# Patient Record
Sex: FEMALE | Race: BLACK OR AFRICAN AMERICAN | NOT HISPANIC OR LATINO | Employment: OTHER | URBAN - METROPOLITAN AREA
[De-identification: names, ages, dates, MRNs, and addresses within clinical notes are randomized per-mention and may not be internally consistent; named-entity substitution may affect disease eponyms.]

---

## 2018-02-12 ENCOUNTER — HISTORICAL (OUTPATIENT)
Dept: ADMINISTRATIVE | Facility: HOSPITAL | Age: 83
End: 2018-02-12

## 2018-02-14 LAB
HCT VFR BLD AUTO: 23.1 % (ref 36–48)
HGB BLD-MCNC: 7.5 G/DL (ref 12–15)
MCH RBC QN AUTO: 31 PG (ref 25–35)
MCHC RBC AUTO-ENTMCNC: 32.5 G/DL (ref 31–36)
MCV RBC AUTO: 95.5 FL (ref 79–98)
NUCLEATED RBCS: 0 %
PLATELET # BLD AUTO: 91 K/UL (ref 140–440)
RBC # BLD AUTO: 2.42 M/UL (ref 3.5–5.5)
WBC # BLD AUTO: 11.1 K/UL (ref 5–10)

## 2018-02-23 ENCOUNTER — OFFICE VISIT (OUTPATIENT)
Dept: ORTHOPEDICS | Facility: CLINIC | Age: 83
End: 2018-02-23
Payer: MEDICARE

## 2018-02-23 VITALS
DIASTOLIC BLOOD PRESSURE: 46 MMHG | SYSTOLIC BLOOD PRESSURE: 88 MMHG | HEIGHT: 63 IN | BODY MASS INDEX: 22.15 KG/M2 | HEART RATE: 83 BPM | WEIGHT: 125 LBS

## 2018-02-23 DIAGNOSIS — S72.142D CLOSED DISPLACED INTERTROCHANTERIC FRACTURE OF LEFT FEMUR WITH ROUTINE HEALING, SUBSEQUENT ENCOUNTER: Primary | ICD-10-CM

## 2018-02-23 PROCEDURE — 99024 POSTOP FOLLOW-UP VISIT: CPT | Mod: ,,, | Performed by: ORTHOPAEDIC SURGERY

## 2018-02-23 RX ORDER — ALLOPURINOL 300 MG/1
1 TABLET ORAL DAILY
Status: ON HOLD | COMMUNITY
Start: 2017-11-27 | End: 2018-06-11 | Stop reason: HOSPADM

## 2018-02-23 RX ORDER — FUROSEMIDE 20 MG/1
1 TABLET ORAL DAILY
Status: ON HOLD | COMMUNITY
Start: 2017-11-27 | End: 2018-06-11 | Stop reason: HOSPADM

## 2018-02-23 RX ORDER — HYDROCODONE BITARTRATE AND ACETAMINOPHEN 5; 325 MG/1; MG/1
1 TABLET ORAL EVERY 4 HOURS PRN
COMMUNITY
Start: 2018-02-14 | End: 2018-05-27

## 2018-02-23 RX ORDER — POTASSIUM CHLORIDE 750 MG/1
1 TABLET, EXTENDED RELEASE ORAL DAILY
Status: ON HOLD | COMMUNITY
Start: 2017-11-27 | End: 2018-06-11 | Stop reason: HOSPADM

## 2018-02-23 RX ORDER — LOSARTAN POTASSIUM 25 MG/1
1 TABLET ORAL DAILY
Status: ON HOLD | COMMUNITY
Start: 2017-11-27 | End: 2018-06-11 | Stop reason: HOSPADM

## 2018-02-23 RX ORDER — CLONIDINE HYDROCHLORIDE 0.1 MG/1
1 TABLET ORAL 2 TIMES DAILY
Status: ON HOLD | COMMUNITY
Start: 2017-11-27 | End: 2018-06-11

## 2018-02-23 RX ORDER — PRAVASTATIN SODIUM 40 MG/1
1 TABLET ORAL DAILY
Status: ON HOLD | COMMUNITY
Start: 2017-11-27 | End: 2018-06-11 | Stop reason: HOSPADM
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
## 2018-03-16 ENCOUNTER — OFFICE VISIT (OUTPATIENT)
Dept: ORTHOPEDICS | Facility: CLINIC | Age: 83
End: 2018-03-16
Payer: MEDICARE

## 2018-03-16 VITALS
SYSTOLIC BLOOD PRESSURE: 128 MMHG | BODY MASS INDEX: 22.15 KG/M2 | DIASTOLIC BLOOD PRESSURE: 60 MMHG | WEIGHT: 125 LBS | HEIGHT: 63 IN | HEART RATE: 65 BPM

## 2018-03-16 DIAGNOSIS — G89.18 POST-OPERATIVE PAIN: Primary | ICD-10-CM

## 2018-03-16 DIAGNOSIS — S72.142D CLOSED DISPLACED INTERTROCHANTERIC FRACTURE OF LEFT FEMUR WITH ROUTINE HEALING, SUBSEQUENT ENCOUNTER: ICD-10-CM

## 2018-03-16 DIAGNOSIS — Z98.890 POST-OPERATIVE STATE: ICD-10-CM

## 2018-03-16 PROCEDURE — 73502 X-RAY EXAM HIP UNI 2-3 VIEWS: CPT | Mod: FY,LT,, | Performed by: ORTHOPAEDIC SURGERY

## 2018-03-16 PROCEDURE — 99024 POSTOP FOLLOW-UP VISIT: CPT | Mod: ,,, | Performed by: ORTHOPAEDIC SURGERY

## 2018-03-16 NOTE — PROGRESS NOTES
Subjective:       Chief Complaint    Chief Complaint   Patient presents with    Post-op Evaluation     4 weeks & 4 days, left hip.  DOS:  2/12/18, reduction & internal fixation of displaced closed comminuted intertrochanteric fx left hip.  DOI: approx. 2/2/18, due to a fall.  Patient's daughter states the patient has had pain approx. 1 week to the point where she can't bear weight.  Before she was doing P.T. and moving around fine.  Using rolling walker like a wheelchair today.       HPI  Sury Cobos is a 83 y.o.  female who presentsFollow-up post TFN fixation intertrochanteric fracture left hip. Patient had a malunion deformity of the left femur prior to sustaining the hip fracture. We're able to get a short TFN nail in position. Overall, she seems to be doing well.       Past History  Past Medical History:   Diagnosis Date    Closed intertrochanteric fracture of left hip     Low potassium syndrome      Past Surgical History:   Procedure Laterality Date    reduction & internal fixation of displaced closed comminuted intertrochanteric fx left hip  02/12/2018     Social History     Social History    Marital status: Single     Spouse name: N/A    Number of children: N/A    Years of education: N/A     Occupational History    Not on file.     Social History Main Topics    Smoking status: Current Every Day Smoker     Packs/day: 3.00    Smokeless tobacco: Never Used    Alcohol use Yes      Comment: socially    Drug use: No    Sexual activity: Not on file     Other Topics Concern    Not on file     Social History Narrative    No narrative on file         Medications  Current Outpatient Prescriptions   Medication Sig    allopurinol (ZYLOPRIM) 300 MG tablet Take 1 tablet by mouth once daily.    cloNIDine (CATAPRES) 0.1 MG tablet Take 1 tablet by mouth once daily.     furosemide (LASIX) 20 MG tablet Take 1 tablet by mouth once daily.    hydrocodone-acetaminophen 5-325mg (NORCO) 5-325 mg per tablet Take 1  tablet by mouth every 4 (four) hours as needed.    losartan (COZAAR) 25 MG tablet Take 1 tablet by mouth once daily.    potassium chloride (KLOR-CON) 10 MEQ TbSR Take 1 tablet by mouth once daily.    pravastatin (PRAVACHOL) 40 MG tablet Take 1 tablet by mouth once daily.     No current facility-administered medications for this visit.        Allergies  Review of patient's allergies indicates:  No Known Allergies      Review of Systems     Constitutional: Negative    HENT: Negative  Eyes: Negative  Respiratory: Negative  Cardiovascular: Negative  Musculoskeletal: HPI  Skin: Negative  Neurological: Negative  Hematological: Negative  Endocrine: Negative      Physical Exam    Vitals:    03/16/18 1347   BP: 128/60   Pulse: 65     Physical Examination: There is no tenderness over the anterior hip. Rotation is painless. Anterolateral malunion deformity is evident at the middle third femur     Skin-clear  General appearance -  well appearing, and in no distress  Mental status - awake  Neck - supple  Chest -  symmetric air entry  Heart - normal rate   Abdomen - soft      Assessment/Plan   Post-operative pain  -     X-Ray Hip 2 or 3 views Left    Closed displaced intertrochanteric fracture of left femur with routine healing, subsequent encounter    Post-operative state        Patient is receiving excellent care from her daughter. With whom she lives.     This note was dictated using voice recognition software and may contain grammatical errors.

## 2018-05-27 ENCOUNTER — HOSPITAL ENCOUNTER (EMERGENCY)
Facility: HOSPITAL | Age: 83
Discharge: ANOTHER HOSPITAL | End: 2018-05-27
Attending: INTERNAL MEDICINE
Payer: MEDICARE

## 2018-05-27 ENCOUNTER — HOSPITAL ENCOUNTER (INPATIENT)
Facility: HOSPITAL | Age: 83
LOS: 15 days | Discharge: HOME HEALTH | DRG: 064 | End: 2018-06-11
Attending: PSYCHIATRY & NEUROLOGY | Admitting: PSYCHIATRY & NEUROLOGY
Payer: MEDICARE

## 2018-05-27 VITALS
BODY MASS INDEX: 19.84 KG/M2 | RESPIRATION RATE: 18 BRPM | DIASTOLIC BLOOD PRESSURE: 64 MMHG | SYSTOLIC BLOOD PRESSURE: 151 MMHG | WEIGHT: 112 LBS | OXYGEN SATURATION: 100 % | HEART RATE: 74 BPM

## 2018-05-27 DIAGNOSIS — R13.10 DYSPHAGIA, UNSPECIFIED TYPE: ICD-10-CM

## 2018-05-27 DIAGNOSIS — I63.411 EMBOLIC STROKE INVOLVING RIGHT MIDDLE CEREBRAL ARTERY: ICD-10-CM

## 2018-05-27 DIAGNOSIS — E78.00 HIGH CHOLESTEROL: ICD-10-CM

## 2018-05-27 DIAGNOSIS — R41.82 ALTERED MENTAL STATUS: ICD-10-CM

## 2018-05-27 DIAGNOSIS — I51.7 ATRIAL ENLARGEMENT, LEFT: ICD-10-CM

## 2018-05-27 DIAGNOSIS — I63.00 CEREBROVASCULAR ACCIDENT (CVA) DUE TO THROMBOSIS OF PRECEREBRAL ARTERY: Primary | ICD-10-CM

## 2018-05-27 DIAGNOSIS — Z79.899 LONG TERM CURRENT USE OF ANTIPSYCHOTIC MEDICATION: ICD-10-CM

## 2018-05-27 DIAGNOSIS — I48.91 NEW ONSET A-FIB: ICD-10-CM

## 2018-05-27 DIAGNOSIS — R47.1 DYSARTHRIA: ICD-10-CM

## 2018-05-27 DIAGNOSIS — R47.01 APHASIA: ICD-10-CM

## 2018-05-27 DIAGNOSIS — I10 BENIGN ESSENTIAL HTN: ICD-10-CM

## 2018-05-27 DIAGNOSIS — I63.9 ISCHEMIC STROKE: ICD-10-CM

## 2018-05-27 DIAGNOSIS — G93.40 ENCEPHALOPATHY: ICD-10-CM

## 2018-05-27 DIAGNOSIS — R41.82 ALTERED MENTAL STATUS, UNSPECIFIED ALTERED MENTAL STATUS TYPE: Primary | ICD-10-CM

## 2018-05-27 DIAGNOSIS — I63.9 STROKE: ICD-10-CM

## 2018-05-27 DIAGNOSIS — I63.511 ACUTE ISCHEMIC RIGHT MCA STROKE: ICD-10-CM

## 2018-05-27 PROBLEM — S72.142D CLOSED DISPLACED INTERTROCHANTERIC FRACTURE OF LEFT FEMUR WITH ROUTINE HEALING: Status: RESOLVED | Noted: 2018-02-23 | Resolved: 2018-05-27

## 2018-05-27 PROBLEM — F03.918 DEMENTIA WITH BEHAVIORAL DISTURBANCE: Status: ACTIVE | Noted: 2018-05-27

## 2018-05-27 PROBLEM — Z98.890 POST-OPERATIVE STATE: Status: RESOLVED | Noted: 2018-03-16 | Resolved: 2018-05-27

## 2018-05-27 PROBLEM — Z92.82 S/P ADMN TPA IN DIFF FAC W/N LAST 24 HR BEF ADM TO CRNT FAC: Status: ACTIVE | Noted: 2018-03-16

## 2018-05-27 PROBLEM — G89.18 POST-OPERATIVE PAIN: Status: RESOLVED | Noted: 2018-03-16 | Resolved: 2018-05-27

## 2018-05-27 LAB
ABO + RH BLD: NORMAL
ALBUMIN SERPL BCP-MCNC: 4.1 G/DL
ALP SERPL-CCNC: 67 U/L
ALT SERPL W/O P-5'-P-CCNC: 11 U/L
ANION GAP SERPL CALC-SCNC: 14 MMOL/L
AST SERPL-CCNC: 21 U/L
BASOPHILS # BLD AUTO: 0.09 K/UL
BASOPHILS NFR BLD: 1.2 %
BILIRUB SERPL-MCNC: 0.3 MG/DL
BLD GP AB SCN CELLS X3 SERPL QL: NORMAL
BUN SERPL-MCNC: 10 MG/DL
CALCIUM SERPL-MCNC: 9.5 MG/DL
CHLORIDE SERPL-SCNC: 103 MMOL/L
CHOLEST SERPL-MCNC: 197 MG/DL
CHOLEST/HDLC SERPL: 3.3 {RATIO}
CK MB SERPL-MCNC: 2.6 NG/ML
CK MB SERPL-RTO: 2.4 %
CK SERPL-CCNC: 109 U/L
CK SERPL-CCNC: 109 U/L
CO2 SERPL-SCNC: 29 MMOL/L
CREAT SERPL-MCNC: 0.6 MG/DL
DIFFERENTIAL METHOD: ABNORMAL
EOSINOPHIL # BLD AUTO: 0.2 K/UL
EOSINOPHIL NFR BLD: 2.5 %
ERYTHROCYTE [DISTWIDTH] IN BLOOD BY AUTOMATED COUNT: 13.5 %
EST. GFR  (AFRICAN AMERICAN): >60 ML/MIN/1.73 M^2
EST. GFR  (NON AFRICAN AMERICAN): >60 ML/MIN/1.73 M^2
ESTIMATED AVG GLUCOSE: 111 MG/DL
GLUCOSE SERPL-MCNC: 157 MG/DL
GLUCOSE SERPL-MCNC: 161 MG/DL (ref 70–110)
HBA1C MFR BLD HPLC: 5.5 %
HCT VFR BLD AUTO: 37.9 %
HDLC SERPL-MCNC: 59 MG/DL
HDLC SERPL: 29.9 %
HGB BLD-MCNC: 12.6 G/DL
IMM GRANULOCYTES # BLD AUTO: 0.01 K/UL
IMM GRANULOCYTES NFR BLD AUTO: 0.1 %
INR PPP: 1
LDLC SERPL CALC-MCNC: 110.8 MG/DL
LYMPHOCYTES # BLD AUTO: 3.6 K/UL
LYMPHOCYTES NFR BLD: 49.4 %
MCH RBC QN AUTO: 30.4 PG
MCHC RBC AUTO-ENTMCNC: 33.2 G/DL
MCV RBC AUTO: 91 FL
MONOCYTES # BLD AUTO: 0.5 K/UL
MONOCYTES NFR BLD: 6.6 %
NEUTROPHILS # BLD AUTO: 2.9 K/UL
NEUTROPHILS NFR BLD: 40.2 %
NONHDLC SERPL-MCNC: 138 MG/DL
NRBC BLD-RTO: 0 /100 WBC
PLATELET # BLD AUTO: 102 K/UL
PMV BLD AUTO: 12.6 FL
POCT GLUCOSE: 150 MG/DL (ref 70–110)
POCT GLUCOSE: 175 MG/DL (ref 70–110)
POTASSIUM SERPL-SCNC: 2.6 MMOL/L
PROT SERPL-MCNC: 7.6 G/DL
PROTHROMBIN TIME: 11.8 SEC
RBC # BLD AUTO: 4.15 M/UL
SODIUM SERPL-SCNC: 146 MMOL/L
TRIGL SERPL-MCNC: 136 MG/DL
TROPONIN I SERPL DL<=0.01 NG/ML-MCNC: 0.07 NG/ML
TSH SERPL DL<=0.005 MIU/L-ACNC: 1.64 UIU/ML
WBC # BLD AUTO: 7.31 K/UL

## 2018-05-27 PROCEDURE — 71045 X-RAY EXAM CHEST 1 VIEW: CPT | Mod: TC,FY

## 2018-05-27 PROCEDURE — 70450 CT HEAD/BRAIN W/O DYE: CPT | Mod: TC

## 2018-05-27 PROCEDURE — 93005 ELECTROCARDIOGRAM TRACING: CPT

## 2018-05-27 PROCEDURE — 82550 ASSAY OF CK (CPK): CPT

## 2018-05-27 PROCEDURE — 84100 ASSAY OF PHOSPHORUS: CPT

## 2018-05-27 PROCEDURE — 96365 THER/PROPH/DIAG IV INF INIT: CPT

## 2018-05-27 PROCEDURE — 63600175 PHARM REV CODE 636 W HCPCS: Mod: JW,JG

## 2018-05-27 PROCEDURE — 80061 LIPID PANEL: CPT

## 2018-05-27 PROCEDURE — G8987 SELF CARE CURRENT STATUS: HCPCS | Mod: CL

## 2018-05-27 PROCEDURE — 25500020 PHARM REV CODE 255: Performed by: PSYCHIATRY & NEUROLOGY

## 2018-05-27 PROCEDURE — 20000000 HC ICU ROOM

## 2018-05-27 PROCEDURE — 85025 COMPLETE CBC W/AUTO DIFF WBC: CPT

## 2018-05-27 PROCEDURE — 84443 ASSAY THYROID STIM HORMONE: CPT

## 2018-05-27 PROCEDURE — 25000003 PHARM REV CODE 250: Performed by: PHYSICIAN ASSISTANT

## 2018-05-27 PROCEDURE — 93010 ELECTROCARDIOGRAM REPORT: CPT | Mod: ,,, | Performed by: INTERNAL MEDICINE

## 2018-05-27 PROCEDURE — 94761 N-INVAS EAR/PLS OXIMETRY MLT: CPT

## 2018-05-27 PROCEDURE — 84484 ASSAY OF TROPONIN QUANT: CPT | Mod: 91

## 2018-05-27 PROCEDURE — 70450 CT HEAD/BRAIN W/O DYE: CPT | Mod: 26,,, | Performed by: RADIOLOGY

## 2018-05-27 PROCEDURE — 85610 PROTHROMBIN TIME: CPT

## 2018-05-27 PROCEDURE — 86850 RBC ANTIBODY SCREEN: CPT

## 2018-05-27 PROCEDURE — 80053 COMPREHEN METABOLIC PANEL: CPT

## 2018-05-27 PROCEDURE — 83036 HEMOGLOBIN GLYCOSYLATED A1C: CPT

## 2018-05-27 PROCEDURE — 96374 THER/PROPH/DIAG INJ IV PUSH: CPT | Mod: 59

## 2018-05-27 PROCEDURE — 96366 THER/PROPH/DIAG IV INF ADDON: CPT

## 2018-05-27 PROCEDURE — 82553 CREATINE MB FRACTION: CPT

## 2018-05-27 PROCEDURE — 63600175 PHARM REV CODE 636 W HCPCS: Performed by: INTERNAL MEDICINE

## 2018-05-27 PROCEDURE — 82553 CREATINE MB FRACTION: CPT | Mod: 91

## 2018-05-27 PROCEDURE — 84484 ASSAY OF TROPONIN QUANT: CPT

## 2018-05-27 PROCEDURE — 71045 X-RAY EXAM CHEST 1 VIEW: CPT | Mod: 26,,, | Performed by: RADIOLOGY

## 2018-05-27 PROCEDURE — 99221 1ST HOSP IP/OBS SF/LOW 40: CPT | Mod: ,,, | Performed by: PHYSICIAN ASSISTANT

## 2018-05-27 PROCEDURE — G8989 SELF CARE D/C STATUS: HCPCS | Mod: CL

## 2018-05-27 PROCEDURE — A4216 STERILE WATER/SALINE, 10 ML: HCPCS | Performed by: PHYSICIAN ASSISTANT

## 2018-05-27 PROCEDURE — 99291 CRITICAL CARE FIRST HOUR: CPT | Mod: 25

## 2018-05-27 PROCEDURE — 37195 THROMBOLYTIC THERAPY STROKE: CPT

## 2018-05-27 PROCEDURE — 99285 EMERGENCY DEPT VISIT HI MDM: CPT | Mod: 25

## 2018-05-27 PROCEDURE — 82962 GLUCOSE BLOOD TEST: CPT

## 2018-05-27 PROCEDURE — 83735 ASSAY OF MAGNESIUM: CPT

## 2018-05-27 PROCEDURE — G0427 INPT/ED TELECONSULT70: HCPCS | Mod: GT,,, | Performed by: PSYCHIATRY & NEUROLOGY

## 2018-05-27 PROCEDURE — G8988 SELF CARE GOAL STATUS: HCPCS | Mod: CK

## 2018-05-27 PROCEDURE — 99223 1ST HOSP IP/OBS HIGH 75: CPT | Mod: ,,, | Performed by: NURSE PRACTITIONER

## 2018-05-27 PROCEDURE — 82550 ASSAY OF CK (CPK): CPT | Mod: 91

## 2018-05-27 RX ORDER — DONEPEZIL HYDROCHLORIDE 5 MG/1
10 TABLET, FILM COATED ORAL NIGHTLY
Status: DISCONTINUED | OUTPATIENT
Start: 2018-05-28 | End: 2018-06-05

## 2018-05-27 RX ORDER — ATORVASTATIN CALCIUM 20 MG/1
40 TABLET, FILM COATED ORAL DAILY
Status: DISCONTINUED | OUTPATIENT
Start: 2018-05-28 | End: 2018-05-27

## 2018-05-27 RX ORDER — HYDROCODONE BITARTRATE AND ACETAMINOPHEN 7.5; 325 MG/1; MG/1
1 TABLET ORAL EVERY 12 HOURS PRN
Status: ON HOLD | COMMUNITY
End: 2018-06-11 | Stop reason: HOSPADM

## 2018-05-27 RX ORDER — MAGNESIUM SULFATE/D5W 2 G/50 ML
2 INTRAVENOUS SOLUTION, PIGGYBACK (ML) INTRAVENOUS ONCE
Status: DISCONTINUED | OUTPATIENT
Start: 2018-05-27 | End: 2018-05-27

## 2018-05-27 RX ORDER — BUPROPION HYDROCHLORIDE 75 MG/1
150 TABLET ORAL 2 TIMES DAILY
Status: DISCONTINUED | OUTPATIENT
Start: 2018-05-27 | End: 2018-05-27

## 2018-05-27 RX ORDER — ATORVASTATIN CALCIUM 20 MG/1
40 TABLET, FILM COATED ORAL DAILY
Status: DISCONTINUED | OUTPATIENT
Start: 2018-05-28 | End: 2018-06-05

## 2018-05-27 RX ORDER — DONEPEZIL HYDROCHLORIDE 10 MG/1
10 TABLET, FILM COATED ORAL NIGHTLY
Status: ON HOLD | COMMUNITY
End: 2018-06-11

## 2018-05-27 RX ORDER — BUPROPION HYDROCHLORIDE 150 MG/1
150 TABLET, EXTENDED RELEASE ORAL 2 TIMES DAILY
Status: ON HOLD | COMMUNITY
End: 2018-06-11 | Stop reason: HOSPADM

## 2018-05-27 RX ORDER — POTASSIUM CHLORIDE 14.9 MG/ML
20 INJECTION INTRAVENOUS
Status: COMPLETED | OUTPATIENT
Start: 2018-05-27 | End: 2018-05-27

## 2018-05-27 RX ORDER — HYDRALAZINE HYDROCHLORIDE 20 MG/ML
10 INJECTION INTRAMUSCULAR; INTRAVENOUS EVERY 4 HOURS PRN
Status: DISCONTINUED | OUTPATIENT
Start: 2018-05-27 | End: 2018-06-05

## 2018-05-27 RX ORDER — BUPROPION HYDROCHLORIDE 75 MG/1
150 TABLET ORAL 2 TIMES DAILY
Status: DISCONTINUED | OUTPATIENT
Start: 2018-05-28 | End: 2018-06-05

## 2018-05-27 RX ORDER — SODIUM CHLORIDE 0.9 % (FLUSH) 0.9 %
3 SYRINGE (ML) INJECTION EVERY 8 HOURS
Status: DISCONTINUED | OUTPATIENT
Start: 2018-05-27 | End: 2018-06-03

## 2018-05-27 RX ORDER — LABETALOL HYDROCHLORIDE 5 MG/ML
10 INJECTION, SOLUTION INTRAVENOUS
Status: DISCONTINUED | OUTPATIENT
Start: 2018-05-27 | End: 2018-06-11 | Stop reason: HOSPADM

## 2018-05-27 RX ORDER — DONEPEZIL HYDROCHLORIDE 5 MG/1
10 TABLET, FILM COATED ORAL NIGHTLY
Status: DISCONTINUED | OUTPATIENT
Start: 2018-05-27 | End: 2018-05-27

## 2018-05-27 RX ORDER — ASPIRIN 81 MG/1
81 TABLET ORAL DAILY
Status: ON HOLD | COMMUNITY
End: 2018-06-11 | Stop reason: HOSPADM

## 2018-05-27 RX ORDER — DEXMEDETOMIDINE HYDROCHLORIDE 4 UG/ML
0.2 INJECTION, SOLUTION INTRAVENOUS CONTINUOUS
Status: DISCONTINUED | OUTPATIENT
Start: 2018-05-27 | End: 2018-05-28

## 2018-05-27 RX ADMIN — DEXMEDETOMIDINE HYDROCHLORIDE 0.2 MCG/KG/HR: 4 INJECTION, SOLUTION INTRAVENOUS at 07:05

## 2018-05-27 RX ADMIN — IOHEXOL 100 ML: 350 INJECTION, SOLUTION INTRAVENOUS at 06:05

## 2018-05-27 RX ADMIN — ALTEPLASE 41.2 MG: KIT at 04:05

## 2018-05-27 RX ADMIN — Medication 3 ML: at 11:05

## 2018-05-27 RX ADMIN — POTASSIUM CHLORIDE 20 MEQ: 14.9 INJECTION, SOLUTION INTRAVENOUS at 04:05

## 2018-05-27 NOTE — ED NOTES
Bed: 05  Expected date: 5/27/18  Expected time: 4:31 PM  Means of arrival:   Comments:  TPA Transfer

## 2018-05-27 NOTE — HPI
84 y/o female LKN at 1320. Ambulating and normal. ~ 1445, found altered - mute with left hemiparesis and confusion. Unable to follow commands.

## 2018-05-27 NOTE — ED PROVIDER NOTES
Encounter Date: 5/27/2018       History     Chief Complaint   Patient presents with    Altered Mental Status     Patient was alert talking walking today with family, daughter went to run and errand and when returned found mother slumped over and unable to talk or move her left side.   No prior CVA. No complaints of headache. Hx of HBP, T2DM, and high cholesterol. Glucose on arrival was 161. Brought directly to the hospital.   Elapsed time from last seen to arrival about 45 min (tops). On arrival, dense left hemiparesis with faint reponsiveness and expressive aphasia.   After CT improved (daughter also agrees) with movement on left side and improved responsiveness. Able to talk but still faint and with left facial weakness.   Neurological consult performed with Dr. Marx, agrees with CVA and proceed to TPA.           Review of patient's allergies indicates:  No Known Allergies  Past Medical History:   Diagnosis Date    Closed intertrochanteric fracture of left hip     Diabetes mellitus     High cholesterol     Low potassium syndrome      Past Surgical History:   Procedure Laterality Date    CARDIAC SURGERY      reduction & internal fixation of displaced closed comminuted intertrochanteric fx left hip  02/12/2018     No family history on file.  Social History   Substance Use Topics    Smoking status: Current Every Day Smoker     Packs/day: 3.00    Smokeless tobacco: Never Used    Alcohol use Yes      Comment: socially     Review of Systems   Constitutional: Positive for activity change.   HENT: Positive for trouble swallowing.    Musculoskeletal: Positive for gait problem.   All other systems reviewed and are negative.      Physical Exam     Initial Vitals [05/27/18 1523]   BP Pulse Resp Temp SpO2   (!) 179/109 86 17 -- --      MAP       132.33         Physical Exam    Nursing note and vitals reviewed.  Constitutional: Vital signs are normal. She appears well-developed and well-nourished. She appears  listless. She is active and cooperative.   HENT:   Head: Normocephalic and atraumatic.   Eyes: Conjunctivae, EOM and lids are normal. Pupils are equal, round, and reactive to light. Lids are everted and swept, no foreign bodies found.   Neck: Trachea normal, normal range of motion and full passive range of motion without pain. Neck supple. No JVD present.   Cardiovascular: Normal rate, regular rhythm, S1 normal, S2 normal, normal heart sounds, intact distal pulses and normal pulses.  No extrasystoles are present.   Pulmonary/Chest: Breath sounds normal.   Abdominal: Soft. Normal appearance and bowel sounds are normal.   Musculoskeletal: Normal range of motion.   Lymphadenopathy:     She has no cervical adenopathy.   Neurological: She has normal reflexes. She appears listless. A cranial nerve deficit and sensory deficit is present. She exhibits abnormal muscle tone. Coordination abnormal. GCS eye subscore is 2. GCS verbal subscore is 2. GCS motor subscore is 3.   Initial dense left hemiparesis with expressive aphasia, improved from baseline at this writing.    Skin: Skin is warm, dry and intact. Capillary refill takes less than 2 seconds.   Psychiatric: She has a normal mood and affect. Her speech is normal and behavior is normal. Cognition and memory are normal.         ED Course   Procedures  Labs Reviewed   CBC W/ AUTO DIFFERENTIAL - Abnormal; Notable for the following:        Result Value    Platelets 102 (*)     Lymph% 49.4 (*)     All other components within normal limits   POCT GLUCOSE MONITORING CONTINUOUS - Abnormal; Notable for the following:     POC Glucose 161 (*)     All other components within normal limits   PROTIME-INR   COMPREHENSIVE METABOLIC PANEL   TSH   LIPID PANEL     EKG Readings: (Independently Interpreted)   Initial Reading: No STEMI. Rhythm: Normal Sinus Rhythm. Ectopy: Bigeminy. Conduction: Normal. ST Segments: Non-Specific ST Segment Depression. Axis: Normal. Clinical Impression: Normal  Sinus Rhythm and Myocardial Infarction       X-Rays:   Independently Interpreted Readings:   Head CT: No hemorrhage.  No skull fracture.  No acute stroke.     Medical Decision Making:   Clinical Tests:   Lab Tests: Ordered and Reviewed  Radiological Study: Ordered and Reviewed  ED Management:  Consultation with Dr. Marx agrees acute CVA (duration 2 hours), within window. Proceed with TPA.   Transfer to [facility] arranged.                       Clinical Impression:   The primary encounter diagnosis was Cerebrovascular accident (CVA) due to thrombosis of precerebral artery. A diagnosis of Stroke was also pertinent to this visit.    Disposition:   Disposition: Transferred  Reason for referral: Neurological evaluation and support                        Trell Tucker MD  05/27/18 [id number]

## 2018-05-27 NOTE — ASSESSMENT & PLAN NOTE
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  Antithrombotics for secondary stroke prevention: Antiplatelets: Aspirin: 81 mg daily  Clopidogrel: 75 mg daily    Statins for secondary stroke prevention and hyperlipidemia, if present:   Statins: Atorvastatin- 40 mg daily    Aggressive risk factor modification: HTN, DM, HLD     Rehab efforts: PT/OT/SLP to evaluate and treat    Diagnostics ordered/pending: CTA Head to assess vasculature , CTA Neck/Arch to assess vasculature, HgbA1C to assess blood glucose levels, Lipid Profile to assess cholesterol levels, MRI head without contrast to assess brain parenchyma, TTE to assess cardiac function/status     VTE prophylaxis: Heparin 5000 units SQ every 8 hours    BP parameters: Infarct: Post tPA, SBP <180

## 2018-05-27 NOTE — HPI
84 y/o female who was last seen normal at 1320 was alert talking walking today with family, daughter went to run and errand and when returned at round 1445  found mother slumped over and unable to talk or move her left side. She was taken to [facility] and telemedicine consult was done with Dr Marx and with with no acute changes on CT head recommendation was to give IV TPA and transfer to [facility] for further evaluation.   Upon arrival patient still with aphasia, facial droop, dysarthria. Minimal left sided weakness.   CTA head and neck multiphase complete and No LVO so no IR  Risk factors are DM, HTN, CHF, HLP

## 2018-05-27 NOTE — ED NOTES
Report and all required paperwork with radiology disk given to flight crew. Patient out of department with flight crew.

## 2018-05-27 NOTE — SUBJECTIVE & OBJECTIVE
Woke up with symptoms?: no  Last known normal:    1320     Recent bleeding noted: no  Does the patient take any Blood Thinners? no  Medications: Antiplatelets:  aspirin      Past Medical History: hypertension, diabetes and hyperlipidemia    Past Surgical History: no major surgeries within the last 2 weeks    Family History: no relevant history    Social History: drinking    Allergies:   No known drug allergies    Review of Systems   Neurological: Positive for facial asymmetry, speech difficulty and weakness.   All other systems reviewed and are negative.    Objective:   Vitals: Blood pressure (!) 179/109, pulse 86, resp. rate 17, weight 56.2 kg (124 lb).     CT READ: Yes  No hemmorhage. No mass effect. No early infarct signs.     Physical Exam   Constitutional: She is oriented to person, place, and time. She appears well-developed and well-nourished.   HENT:   Head: Normocephalic and atraumatic.   Eyes: EOM are normal. Pupils are equal, round, and reactive to light.   Cardiovascular: Normal rate and regular rhythm.    Pulmonary/Chest: Effort normal.   Neurological: She is alert and oriented to person, place, and time.   Vitals reviewed.

## 2018-05-27 NOTE — ED NOTES
Spoke with neuro critical care MD about patient kicking and scratching and getting out of bed. Orders for 4 point soft restraints being put in on patient. No further orders were given for patient.

## 2018-05-27 NOTE — ED PROVIDER NOTES
Encounter Date: 5/27/2018       History     Chief Complaint   Patient presents with    Cerebrovascular Accident     left facial droop, left sided weakness, which is resolved, expressive aphasia, transfer from [place], TPA given PTA.       Presented in transfer from [place] with stroke. Had been given TPA after having a last known well time of 1845. Had intially presented somnolent, but while in ER became agitated and confused and attempted to get out of bed and walk in ER. Able to move all extrem and speaking, though dysarthric. Combative to exam.           Review of patient's allergies indicates:  No Known Allergies  Past Medical History:   Diagnosis Date    Arthritis     CHF (congestive heart failure)     Closed intertrochanteric fracture of left hip     Coronary artery disease     Dementia     Diabetes mellitus     Gout     High cholesterol     Hypertension     Low potassium syndrome     Urinary incontinence      Past Surgical History:   Procedure Laterality Date    CARDIAC SURGERY      CORONARY ARTERY BYPASS GRAFT      reduction & internal fixation of displaced closed comminuted intertrochanteric fx left hip  02/12/2018     History reviewed. No pertinent family history.  Social History   Substance Use Topics    Smoking status: Current Every Day Smoker     Packs/day: 3.00    Smokeless tobacco: Never Used    Alcohol use Yes      Comment: socially     Review of Systems   Unable to perform ROS: Other (pt combative and confused)       Physical Exam     Initial Vitals   BP Pulse Resp Temp SpO2   05/27/18 1751 05/27/18 1751 05/27/18 1751 05/27/18 1752 05/27/18 1751   (!) 155/59 92 14 98.4 °F (36.9 °C) 95 %      MAP       05/27/18 1751       91         Physical Exam    Nursing note and vitals reviewed.  Constitutional:   Agitated and confused   HENT:   Head: Normocephalic and atraumatic.   Left facial droop, drooling   Eyes: Conjunctivae and EOM are normal.   Neck: Normal range of motion. Neck supple.  No JVD present.   Cardiovascular: Normal rate, regular rhythm and normal heart sounds.   Pulmonary/Chest: Breath sounds normal.   Abdominal: Soft. She exhibits no distension.   Musculoskeletal: Normal range of motion. She exhibits no edema or tenderness.   Neurological:   Moves all 4 exterm, confused, combative, dysarthria   Skin: Skin is warm.         ED Course   Critical Care  Date/Time: 5/27/2018 7:38 PM  Performed by: JACOB FOY  Authorized by: TAY MARIE   Direct patient critical care time: 15 minutes  Additional history critical care time: 5 minutes  Ordering / reviewing critical care time: 5 minutes  Documentation critical care time: 5 minutes  Consulting other physicians critical care time: 5 minutes  Total critical care time (exclusive of procedural time) : 35 minutes  Critical care time was exclusive of separately billable procedures and treating other patients and teaching time.  Critical care was necessary to treat or prevent imminent or life-threatening deterioration of the following conditions: CNS failure or compromise.  Critical care was time spent personally by me on the following activities: discussions with consultants, evaluation of patient's response to treatment, obtaining history from patient or surrogate, ordering and review of laboratory studies, pulse oximetry, ordering and review of radiographic studies, ordering and performing treatments and interventions, examination of patient, interpretation of cardiac output measurements and re-evaluation of patient's condition.        Labs Reviewed   HEMOGLOBIN A1C   TROPONIN I   CK-MB   URINALYSIS   CK   TOXICOLOGY SCREEN, URINE, RANDOM (COMPLIANCE)   TYPE & SCREEN   POCT GLUCOSE MONITORING CONTINUOUS             Medical Decision Making:   ED Management:  Required sedation with precedex. Will be sent to neuro icu.                       Clinical Impression:   The primary encounter diagnosis was Altered mental status, unspecified  altered mental status type. Diagnoses of Ischemic stroke, Benign essential HTN, and High cholesterol were also pertinent to this visit.                           Garret Sheriff MD  05/27/18 1939

## 2018-05-27 NOTE — ED NOTES
The patient came to the ER today by [facility] from [facility] for CVA symptoms. Pt received TPA. Bolus of 4.6 mg given at 1606 and infusion of 41.2 mg given at 1609. INR 1.0 and Creatinine 1.0, pt with PIV @20g to left AC and #20g right FA. Pt with baseline dementia. K = 2.6 at previous facility and was given 20 meq Potassium in 100cc over 2 hours. Potassium infusion was complete upon pt arrival to [facility]. Pt initially presented to [place] with expressive aphasia left sided weakness and left facial droop. Nkda. Hx of gout, htn, and dm

## 2018-05-27 NOTE — SUBJECTIVE & OBJECTIVE
Past Medical History:   Diagnosis Date    Arthritis     CHF (congestive heart failure)     Closed intertrochanteric fracture of left hip     Coronary artery disease     Dementia     Diabetes mellitus     Gout     High cholesterol     Hypertension     Low potassium syndrome     Urinary incontinence      Past Surgical History:   Procedure Laterality Date    CARDIAC SURGERY      CORONARY ARTERY BYPASS GRAFT      reduction & internal fixation of displaced closed comminuted intertrochanteric fx left hip  02/12/2018     History reviewed. No pertinent family history.  Social History   Substance Use Topics    Smoking status: Current Every Day Smoker     Packs/day: 3.00    Smokeless tobacco: Never Used    Alcohol use Yes      Comment: socially     Review of patient's allergies indicates:  No Known Allergies    Medications: I have reviewed the current medication administration record.      (Not in a hospital admission)    Review of Systems   Unable to perform ROS: Acuity of condition     Objective:     Vital Signs (Most Recent):  Temp: 98.4 °F (36.9 °C) (05/27/18 1752)  Pulse: 95 (05/27/18 1836)  Resp: 20 (05/27/18 1836)  BP: (!) 157/69 (05/27/18 1836)  SpO2: 96 % (05/27/18 1836)    Vital Signs Range (Last 24H):  Temp:  [98.4 °F (36.9 °C)]   Pulse:  [70-95]   Resp:  [12-20]   BP: (150-187)/()   SpO2:  [95 %-100 %]     Physical Exam   Constitutional:   Thin elderly female   HENT:   Head: Normocephalic and atraumatic.   Eyes: EOM are normal. Pupils are equal, round, and reactive to light.   Neck: Normal range of motion.   Cardiovascular: Normal rate.    Pulmonary/Chest: Effort normal.   Abdominal: Soft.   Neurological: She is alert.   oriented x2, aphasia, dysarthria and facial droop   Skin: Skin is warm and dry.   Nursing note and vitals reviewed.      Neurological Exam:   LOC: alert  Attention Span: poor  Language: Expressive aphasia  Articulation: Dysarthria  Orientation: Not oriented to place, Not  oriented to time  Visual Fields: Full  EOM (CN III, IV, VI): Full/intact  Pupils (CN II, III): PERRL  Facial Sensation (CN V): Normal  Facial Movement (CN VII): Lower facial weakness on the Left  Gag Reflex: present  Reflexes: 2+ throughout  flexor plantar responses bilaterally  Motor: Arm left  Paresis: 4/5  Leg left  Paresis: 4/5  Arm right  Normal 5/5  Leg right Normal 5/5  Cebellar: No evidence of appendicular or axial ataxia  Sensation: Intact to light touch, temperature and vibration  Tone: Normal tone throughout      Laboratory:  CMP:   Recent Labs  Lab 05/27/18  1514   CALCIUM 9.5   ALBUMIN 4.1   PROT 7.6   *   K 2.6*   CO2 29      BUN 10   CREATININE 0.6   ALKPHOS 67   ALT 11   AST 21   BILITOT 0.3     CBC:   Recent Labs  Lab 05/27/18  1514   WBC 7.31   RBC 4.15   HGB 12.6   HCT 37.9   *   MCV 91   MCH 30.4   MCHC 33.2     Lipid Panel:   Recent Labs  Lab 05/27/18  1514   CHOL 197   LDLCALC 110.8   HDL 59   TRIG 136     Coagulation:   Recent Labs  Lab 05/27/18  1514   INR 1.0     Hgb A1C: No results for input(s): HGBA1C in the last 168 hours.  TSH:   Recent Labs  Lab 05/27/18  1514   TSH 1.638       Diagnostic Results:      Brain imaging:  CT head w/o contrast 5-27-18 results:  Mild involutional changes and findings consistent with microvascular ischemic change.  Mild asymmetric widening of ventricles relative to the sulci and fissures which in the appropriate clinical setting can question normal pressure hydrocephalus with correlation recommended clinically.  No acute intracranial findings.    Vessel Imaging:  CTA Head and neck multiphase 5-27-18     Cardiac Evaluation:   EKG 5-27-18 results:  Normal sinus rhythm  Nonspecific ST and T wave abnormality  Abnormal ECG  No previous ECGs available

## 2018-05-27 NOTE — ED NOTES
Indy from [facility] line called. Patient accepted to [facility] ED by Dr. Marx. Number for report [phone]

## 2018-05-27 NOTE — ED NOTES
"Pt found wandering outside of room 5 in hallway. Pt had removed all cardiac monitoring, blood pressure cuff and pulse ox. Pt confused and and still with incomprehensible speech. Pt combative and not wanting to go back to ED room insisting that she "needs to go home". Pt escorted back to Room 5 and required two male RN assist to get back into bed due to refusal. Dr Sheriff at bedside and states Neuro team declines sedation for the pt at this time. Pt now attempting to bite ED staff, kicking, scratching, and punching at nurses. Pt yelling and cursing. Requires 4 point upper extremity soft restraints at this  Time for pt and staff safety. Pt placed back on cardiac monitor. Dr Sheriff aware of need for restraints  "

## 2018-05-27 NOTE — ED NOTES
Brittany Mclean with Stroke team at bedside. Per Brittany Mclean, pt's SBP needs to be less than 180

## 2018-05-27 NOTE — ED TRIAGE NOTES
Daughter reports patient was last seen normal at 1320 sitting in the chair smoking a cigarette. She came back to the house about 20 minutes later and the patient was found slumped over with left side facial droop. Patient was not able to stand and had expressive aphasia.Respirations even and unlabored. No distress noted.

## 2018-05-27 NOTE — HOSPITAL COURSE
84 y/o female with aphasia, dysarthria and facial droop, received IV TPA but no LVO so no IR. MRI with right precentral gyrus infarct. Suspected cardioembolic event. Patient noted per nursing to be in afib while in NCC, not captured on ECG. Patient was started after PEG placement on eliquis 2.5mg BID for secondary stroke prevention. She was also changed to atorvastatin 40mg daily (PCP will need to continue to monitor liver enzymes which were mildly elevated upon discharge). Patient with UTI and PEG site infections during hospitalization, treated with appropriate antibiotics. Additional events in hospitalization as outlined below. Patient will be discharged home with home health and home medical equipment on 6/11. Patient's daughter was given stroke education prior to discharge.       5/28: Blood pressure elevated >200 overnight requiring cardene. Patient bradycardic and with paroxysmal afib overnight as well. MRI with infarct in the right precentral gyrus.   5/29: No events overnight. Patient with many bacteria on UTI so short cipro course started per NCC. Pending step down to NSU.   5/30: seems more agitated today, not participating in exam or following commands, severe dysarthria and excessive secretions  05/31/18 Continues to have increased secretions and severe dysarthria.  Patient following some commands and plans to transfer to stroke service today   6/1: Secretions are better controlled today. Plan to step down. On exam patient is agitated and stating she wants to go home. In bilateral wrist restraints. Right lung opacity - Chest PT and duonebs ordered per NCC. Nursing notes noted Afib but EKG showing no afib. Echo revealed Severe left atrial enlargement. May need AC on discharge.  6/2: No acute events overnight. Stepped down to NSU. Plan for PEG early next week if no improvement per ST.   6/3: No acute events overnight. Holding TF and heparin tonight in preparation for possible PEG tomorrow. O2 sats low  90's, CXR ordered.   6/4: Family wishes to proceed with PEG (recs per SLP.) IR unable as no safe window; consult placed to Gen Surg.  6/5: PEG with GS today. SBP elevated related to NPO status; BP meds given. Repeat UA WNL.  6/6: Nystatin ordered for fungal infection under the breasts. Initiating TFs per PEG today. D/c'd restraints. CM to price check NOACs for previously-captured AFib. Bedside RNs will train family on home equipment and associated care needs tomorrow.  6/7: Changed eliquis to 2.5 mg BID due to age and weight. Patient with elevated white count, pending UA and CXR. Afebrile. Pending family training to take patient home with home health. Patient will need home medical equipment.   6/8/19 - UA and CXR normal today, wbc increased from yesterday. No signs of dvt, skin infection, urine or chest symptoms. May be due to recent peg placement  6/9/18 - pain at peg site. Discussed with gen surg - loosened slightly, has low suspicion for infection. WBC on the rise - 20 today. Will continue to monitor and do broad spectrum abx   6/10/18- peg pulled overnight - surgery contacted. Wbc improving, continue broad spectrum abx. CDiff neg  6/11/18- Discussed with general surgery and IR regarding fernandez in PEG place. Assured that the fernandez is a sufficient tube to continue medications and feeds and that the placement was confirmed and is ok to use. Changing IV antibiotics to augmentin suspension given susceptibilities came back on the staph cultured from the PEG site. Patient's white count improved to normal and afebrile last 24 hours. Patient's home medical equipment is being set up for today and patient will be ok to go home with home health.

## 2018-05-27 NOTE — CONSULTS
[facility]  Vascular Neurology  Comprehensive Stroke Center  Tele-Consultation Note      Inpatient consult to Telemedicine-Stroke  Consult performed by: TAY MARIE  Consult ordered by: KAY MITTAL          Consulting Provider: Spoke Physician:: dr kay mittal   Current Providers  No providers found    Patient Location: [place]  Spoke hospital nurse at bedside with patient assisting consultant.     Patient information was obtained from Dr. Mittal.       Assessment/Plan:     STROKE DOCUMENTATION     Acute Stroke Times:        NIH Scale:  Interval: baseline (upon arrival/admit)  1a. Level Of Consciousness: 0-->Alert: keenly responsive  1b. LOC Questions: 2-->Answers neither question correctly  1c. LOC Commands: 0-->Performs both tasks correctly  2. Best Gaze: 0-->Normal  3. Visual: 0-->No visual loss  4. Facial Palsy: 2-->Partial paralysis (total or near-total paralysis of lower face)  5a. Motor Arm, Left: 0-->No drift: limb holds 90 (or 45) degrees for full 10 secs  5b. Motor Arm, Right: 0-->No drift: limb holds 90 (or 45) degrees for full 10 secs  6a. Motor Leg, Left: 0-->No drift: leg holds 30 degree position for full 5 secs  6b. Motor Leg, Right: 0-->No drift: leg holds 30 degree position for full 5 secs  7. Limb Ataxia: 1-->Present in one limb  8. Sensory: 0-->Normal: no sensory loss  9. Best Language: 3-->Mute, global aphasia: no usable speech or auditory comprehension  10. Dysarthria: 2-->Severe dysarthria: patients speech is so slurred as to be unintelligible in the absence of or out of proportion to any dysphasia, or is mute/anarthric  11. 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  Antithrombotics for secondary stroke prevention: Antiplatelets: Aspirin: 81 mg daily  Clopidogrel: 75 mg daily    Statins for secondary stroke prevention and hyperlipidemia, if present:   Statins: Atorvastatin- 40 mg daily    Aggressive risk factor modification: HTN, DM, HLD     Rehab efforts: PT/OT/SLP to evaluate and treat    Diagnostics ordered/pending: CTA Head to assess vasculature , CTA Neck/Arch to assess vasculature, HgbA1C to assess blood glucose levels, Lipid Profile to assess cholesterol levels, MRI head without contrast to assess brain parenchyma, TTE to assess cardiac function/status     VTE prophylaxis: Heparin 5000 units SQ every 8 hours    BP parameters: Infarct: Post tPA, SBP <180                Blood pressure (!) 179/109, pulse 86, resp. rate 17, weight 56.2 kg (124 lb).  Alteplase Eligible?: Yes  Alteplase Recommendation:   Alteplase Total Dose:45.8 mg   Total dose: Alteplase 0.9mg/kg (max dose:90mg)                      ** based on acquired weight from facility   Bolus Dose: 4.6 mg   10% of total Alteplase dose given intravenously over 1 minute   Continuous Infusion Dose: 41.2 mg   Remaining 90% of total Alteplase dose infused intravenously over 60 minutes    **infusion must start at the same time as the bolus dose     Additional Recommendations:   1. Neurological assessment and vital signs (except temperature) every 15 minutes during Altaplase infusion.  2. Frequency of BP assessments may need to be increased if systolic BP stays >= 180 mm Hg or diastolic BP stays >= 105 mm Hg. Administer antihypertensive meds as ordered  3. Continue to monitor and control blood pressure and monitor for neurological deterioration every 15 minutes for the first hour after the infusion is stopped. Then every 30 minutes for the next 6 hours. Perform hourly monitoring from the 8th post-infusion hour until 24 hours  post-infusion.  4. Temperature every 4 hours or as required.  5. Follow hospital protocol for further orders re: post tPA infusion patient management.  6. No antithrombotics or anticoagulants (including but not limited to: heparin, warfarin, aspirin, clopidigrel, or dipyridamole) for 24 hours, then start antithrombotics as ordered by treating physician    Adapted from the American Heart Association/American Stroke Association (AHA/ASA) and American Association of Neuroscience Nurses (AANN) Guidelines.   Possible Interventional Revascularization Candidate? Yes    Disposition Recommendation: transfer to [facility] by  air  stat      Subjective:     History of Present Illness:  84 y/o female LKN at 1320. Ambulating and normal. ~ 1445, found altered - mute with left hemiparesis and confusion. Unable to follow commands.      Woke up with symptoms?: no  Last known normal:    1320     Recent bleeding noted: no  Does the patient take any Blood Thinners? no  Medications: Antiplatelets:  aspirin      Past Medical History: hypertension, diabetes and hyperlipidemia    Past Surgical History: no major surgeries within the last 2 weeks    Family History: no relevant history    Social History: drinking    Allergies:   No known drug allergies    Review of Systems   Neurological: Positive for facial asymmetry, speech difficulty and weakness.   All other systems reviewed and are negative.    Objective:   Vitals: Blood pressure (!) 179/109, pulse 86, resp. rate 17, weight 56.2 kg (124 lb).     CT READ: Yes  No hemmorhage. No mass effect. No early infarct signs.     Physical Exam   Constitutional: She is oriented to person, place, and time. She appears well-developed and well-nourished.   HENT:   Head: Normocephalic and atraumatic.   Eyes: EOM are normal. Pupils are equal, round, and reactive to light.   Cardiovascular: Normal rate and regular rhythm.    Pulmonary/Chest: Effort normal.   Neurological: She is alert and oriented  to person, place, and time.   Vitals reviewed.            Recommended the emergency room physician to have a brief discussion with the patient and/or family if available regarding the risks and benefits of treatment, and to briefly document the occurrence of that discussion in his clinical encounter note.     The attending portion of this evaluation, treatment, and documentation was performed per Frida Marx MD via audiovisual.    Billing code:  (moderate to severe stroke, large areas of edema, some mimics)    · This patient has a critical neurological condition/illness, with high morbidity and mortality.  · There is a high probability for acute neurological change leading to clinical and possibly life-threatening deterioration requiring highest level of physician preparedness for urgent intervention.  · Care was coordinated with other physicians involved in the patient's care.  · Radiologic studies and laboratory data were reviewed and interpreted, and plan of care was re-assessed based on the results.  · Diagnosis, treatment options and prognosis may have been discussed with the patient and/or family members or caregiver.  · Further advanced medical management and further evaluation is warranted for his care.      Consult End Time: 4:08 PM     Frida Marx MD  Comprehensive Stroke Center  Vascular Neurology   [facility]

## 2018-05-28 PROBLEM — R41.82 ALTERED MENTAL STATUS: Status: ACTIVE | Noted: 2018-05-28

## 2018-05-28 PROBLEM — E87.6 HYPOKALEMIA: Status: ACTIVE | Noted: 2018-05-28

## 2018-05-28 LAB
ACANTHOCYTES BLD QL SMEAR: ABNORMAL
ALBUMIN SERPL BCP-MCNC: 3.5 G/DL
ALBUMIN SERPL BCP-MCNC: 3.8 G/DL
ALP SERPL-CCNC: 80 U/L
ALP SERPL-CCNC: 82 U/L
ALT SERPL W/O P-5'-P-CCNC: 10 U/L
ALT SERPL W/O P-5'-P-CCNC: 11 U/L
AMPHET+METHAMPHET UR QL: NEGATIVE
ANION GAP SERPL CALC-SCNC: 13 MMOL/L
ANION GAP SERPL CALC-SCNC: 9 MMOL/L
ANISOCYTOSIS BLD QL SMEAR: ABNORMAL
AORTIC VALVE STENOSIS: ABNORMAL
APTT BLDCRRT: 22.8 SEC
AST SERPL-CCNC: 19 U/L
AST SERPL-CCNC: 25 U/L
AUER BODIES BLD QL SMEAR: ABNORMAL
BACTERIA #/AREA URNS AUTO: ABNORMAL /HPF
BARBITURATES UR QL SCN>200 NG/ML: NEGATIVE
BASO STIPL BLD QL SMEAR: ABNORMAL
BASOPHILS # BLD AUTO: 0.07 K/UL
BASOPHILS # BLD AUTO: ABNORMAL K/UL
BASOPHILS NFR BLD: 0.9 %
BASOPHILS NFR BLD: ABNORMAL %
BENZODIAZ UR QL SCN>200 NG/ML: NORMAL
BILIRUB SERPL-MCNC: 0.3 MG/DL
BILIRUB SERPL-MCNC: 0.3 MG/DL
BILIRUB UR QL STRIP: NEGATIVE
BLASTS NFR BLD MANUAL: ABNORMAL %
BUN SERPL-MCNC: 11 MG/DL
BUN SERPL-MCNC: 12 MG/DL
BURR CELLS BLD QL SMEAR: ABNORMAL
BZE UR QL SCN: NEGATIVE
CABOT RINGS BLD QL SMEAR: ABNORMAL
CALCIUM SERPL-MCNC: 9.2 MG/DL
CALCIUM SERPL-MCNC: 9.3 MG/DL
CANNABINOIDS UR QL SCN: NEGATIVE
CHLORIDE SERPL-SCNC: 104 MMOL/L
CHLORIDE SERPL-SCNC: 104 MMOL/L
CK MB SERPL-MCNC: 3.5 NG/ML
CK MB SERPL-RTO: 2.5 %
CK SERPL-CCNC: 141 U/L
CLARITY UR REFRACT.AUTO: ABNORMAL
CO2 SERPL-SCNC: 28 MMOL/L
CO2 SERPL-SCNC: 30 MMOL/L
COLOR UR AUTO: ABNORMAL
CREAT SERPL-MCNC: 0.7 MG/DL
CREAT SERPL-MCNC: 0.7 MG/DL
CREAT UR-MCNC: 43 MG/DL
DACRYOCYTES BLD QL SMEAR: ABNORMAL
DIASTOLIC DYSFUNCTION: YES
DIFFERENTIAL METHOD: ABNORMAL
DIFFERENTIAL METHOD: ABNORMAL
DOHLE BOD BLD QL SMEAR: ABNORMAL
EOSINOPHIL # BLD AUTO: 0.1 K/UL
EOSINOPHIL # BLD AUTO: ABNORMAL K/UL
EOSINOPHIL NFR BLD: 1.7 %
EOSINOPHIL NFR BLD: ABNORMAL %
ERYTHROCYTE [DISTWIDTH] IN BLOOD BY AUTOMATED COUNT: 13.7 %
ERYTHROCYTE [DISTWIDTH] IN BLOOD BY AUTOMATED COUNT: ABNORMAL %
EST. GFR  (AFRICAN AMERICAN): >60 ML/MIN/1.73 M^2
EST. GFR  (AFRICAN AMERICAN): >60 ML/MIN/1.73 M^2
EST. GFR  (NON AFRICAN AMERICAN): >60 ML/MIN/1.73 M^2
EST. GFR  (NON AFRICAN AMERICAN): >60 ML/MIN/1.73 M^2
ETHANOL UR-MCNC: <10 MG/DL
GIANT PLATELETS BLD QL SMEAR: ABNORMAL
GLUCOSE SERPL-MCNC: 122 MG/DL
GLUCOSE SERPL-MCNC: 150 MG/DL
GLUCOSE UR QL STRIP: NEGATIVE
HCT VFR BLD AUTO: 36.8 %
HCT VFR BLD AUTO: ABNORMAL %
HEINZ BOD BLD QL SMEAR: ABNORMAL
HGB BLD-MCNC: 12.1 G/DL
HGB BLD-MCNC: ABNORMAL G/DL
HGB C CRY RBC QL MICRO: ABNORMAL
HGB UR QL STRIP: NEGATIVE
HOWELL-JOLLY BOD BLD QL SMEAR: ABNORMAL
HYPOCHROMIA BLD QL SMEAR: ABNORMAL
IMM GRANULOCYTES # BLD AUTO: 0.01 K/UL
IMM GRANULOCYTES # BLD AUTO: ABNORMAL K/UL
IMM GRANULOCYTES NFR BLD AUTO: 0.1 %
IMM GRANULOCYTES NFR BLD AUTO: ABNORMAL %
INR PPP: 0.9
KETONES UR QL STRIP: NEGATIVE
LEUKOCYTE ESTERASE UR QL STRIP: ABNORMAL
LYMPHOCYTES # BLD AUTO: 2.5 K/UL
LYMPHOCYTES # BLD AUTO: ABNORMAL K/UL
LYMPHOCYTES NFR BLD: 33.2 %
LYMPHOCYTES NFR BLD: ABNORMAL %
MAGNESIUM SERPL-MCNC: 1.9 MG/DL
MAGNESIUM SERPL-MCNC: 2.1 MG/DL
MAGNESIUM SERPL-MCNC: 2.2 MG/DL
MCH RBC QN AUTO: 30.2 PG
MCH RBC QN AUTO: ABNORMAL PG
MCHC RBC AUTO-ENTMCNC: 32.9 G/DL
MCHC RBC AUTO-ENTMCNC: ABNORMAL G/DL
MCV RBC AUTO: 92 FL
MCV RBC AUTO: ABNORMAL FL
MEGAKARYOCYTIC FRAGMENTS: ABNORMAL
METAMYELOCYTES NFR BLD MANUAL: ABNORMAL %
METHADONE UR QL SCN>300 NG/ML: NEGATIVE
MICROSCOPIC COMMENT: ABNORMAL
MONOCYTES # BLD AUTO: 0.5 K/UL
MONOCYTES # BLD AUTO: ABNORMAL K/UL
MONOCYTES NFR BLD: 7.1 %
MONOCYTES NFR BLD: ABNORMAL %
MYELOCYTES NFR BLD MANUAL: ABNORMAL %
NEUTROPHILS # BLD AUTO: 4.3 K/UL
NEUTROPHILS # BLD AUTO: ABNORMAL K/UL
NEUTROPHILS NFR BLD: 57 %
NEUTROPHILS NFR BLD: ABNORMAL %
NEUTS BAND NFR BLD MANUAL: ABNORMAL %
NITRITE UR QL STRIP: NEGATIVE
NRBC BLD-RTO: 0 /100 WBC
NRBC BLD-RTO: ABNORMAL /100 WBC
OPIATES UR QL SCN: NEGATIVE
OVALOCYTES BLD QL SMEAR: ABNORMAL
PAPPENHEIMER BOD BLD QL SMEAR: ABNORMAL
PCP UR QL SCN>25 NG/ML: NEGATIVE
PH UR STRIP: 7 [PH] (ref 5–8)
PHOSPHATE SERPL-MCNC: 2.5 MG/DL
PHOSPHATE SERPL-MCNC: 2.6 MG/DL
PHOSPHATE SERPL-MCNC: 3.2 MG/DL
PHOSPHATE SERPL-MCNC: 3.2 MG/DL
PLASMODIUM BLD QL SMEAR: ABNORMAL
PLATELET # BLD AUTO: 91 K/UL
PLATELET # BLD AUTO: ABNORMAL K/UL
PLATELET BLD QL SMEAR: ABNORMAL
PMV BLD AUTO: 12.4 FL
PMV BLD AUTO: ABNORMAL FL
POCT GLUCOSE: 127 MG/DL (ref 70–110)
POCT GLUCOSE: 135 MG/DL (ref 70–110)
POCT GLUCOSE: 135 MG/DL (ref 70–110)
POCT GLUCOSE: 152 MG/DL (ref 70–110)
POIKILOCYTOSIS BLD QL SMEAR: ABNORMAL
POLYCHROMASIA BLD QL SMEAR: ABNORMAL
POTASSIUM SERPL-SCNC: 2.8 MMOL/L
POTASSIUM SERPL-SCNC: 2.9 MMOL/L
POTASSIUM SERPL-SCNC: 3.1 MMOL/L
POTASSIUM SERPL-SCNC: 4.7 MMOL/L
PROMYELOCYTES NFR BLD MANUAL: ABNORMAL %
PROT SERPL-MCNC: 6.9 G/DL
PROT SERPL-MCNC: 7.8 G/DL
PROT UR QL STRIP: NEGATIVE
PROTHROMBIN TIME: 10 SEC
RBC # BLD AUTO: 4.01 M/UL
RBC # BLD AUTO: ABNORMAL M/UL
RBC #/AREA URNS AUTO: 1 /HPF (ref 0–4)
RBC AGGLUT BLD QL: ABNORMAL
RETIRED EF AND QEF - SEE NOTES: 72 (ref 55–65)
ROULEAUX BLD QL SMEAR: ABNORMAL
SCHISTOCYTES BLD QL SMEAR: ABNORMAL
SCHISTOCYTES BLD QL SMEAR: ABNORMAL
SICKLE CELLS BLD QL SMEAR: ABNORMAL
SMUDGE CELLS BLD QL SMEAR: ABNORMAL
SODIUM SERPL-SCNC: 143 MMOL/L
SODIUM SERPL-SCNC: 145 MMOL/L
SP GR UR STRIP: 1.02 (ref 1–1.03)
SPHEROCYTES BLD QL SMEAR: ABNORMAL
SQUAMOUS #/AREA URNS AUTO: 3 /HPF
STOMATOCYTES BLD QL SMEAR: ABNORMAL
TARGETS BLD QL SMEAR: ABNORMAL
TOXIC GRANULES BLD QL SMEAR: ABNORMAL
TOXICOLOGY INFORMATION: NORMAL
TROPONIN I SERPL DL<=0.01 NG/ML-MCNC: 0.07 NG/ML
TROPONIN I SERPL DL<=0.01 NG/ML-MCNC: 0.1 NG/ML
TROPONIN I SERPL DL<=0.01 NG/ML-MCNC: <0.006 NG/ML
URN SPEC COLLECT METH UR: ABNORMAL
UROBILINOGEN UR STRIP-ACNC: NEGATIVE EU/DL
WBC # BLD AUTO: 7.57 K/UL
WBC # BLD AUTO: ABNORMAL K/UL
WBC #/AREA URNS AUTO: 14 /HPF (ref 0–5)
WBC NRBC COR # BLD: ABNORMAL K/UL
WBC OTHER NFR BLD MANUAL: ABNORMAL %
WBC TOXIC VACUOLES BLD QL SMEAR: ABNORMAL

## 2018-05-28 PROCEDURE — 85730 THROMBOPLASTIN TIME PARTIAL: CPT

## 2018-05-28 PROCEDURE — 25000003 PHARM REV CODE 250

## 2018-05-28 PROCEDURE — 84100 ASSAY OF PHOSPHORUS: CPT | Mod: 91

## 2018-05-28 PROCEDURE — 80053 COMPREHEN METABOLIC PANEL: CPT | Mod: 91

## 2018-05-28 PROCEDURE — 85610 PROTHROMBIN TIME: CPT

## 2018-05-28 PROCEDURE — 25000003 PHARM REV CODE 250: Performed by: PHYSICIAN ASSISTANT

## 2018-05-28 PROCEDURE — 99233 SBSQ HOSP IP/OBS HIGH 50: CPT | Mod: GC,,, | Performed by: PSYCHIATRY & NEUROLOGY

## 2018-05-28 PROCEDURE — 80307 DRUG TEST PRSMV CHEM ANLYZR: CPT

## 2018-05-28 PROCEDURE — 84132 ASSAY OF SERUM POTASSIUM: CPT | Mod: 91

## 2018-05-28 PROCEDURE — 93010 ELECTROCARDIOGRAM REPORT: CPT | Mod: ,,, | Performed by: INTERNAL MEDICINE

## 2018-05-28 PROCEDURE — 97162 PT EVAL MOD COMPLEX 30 MIN: CPT

## 2018-05-28 PROCEDURE — 63600175 PHARM REV CODE 636 W HCPCS: Performed by: PSYCHIATRY & NEUROLOGY

## 2018-05-28 PROCEDURE — 63600175 PHARM REV CODE 636 W HCPCS: Performed by: PHYSICIAN ASSISTANT

## 2018-05-28 PROCEDURE — 97802 MEDICAL NUTRITION INDIV IN: CPT

## 2018-05-28 PROCEDURE — 92610 EVALUATE SWALLOWING FUNCTION: CPT

## 2018-05-28 PROCEDURE — 85025 COMPLETE CBC W/AUTO DIFF WBC: CPT

## 2018-05-28 PROCEDURE — 81001 URINALYSIS AUTO W/SCOPE: CPT

## 2018-05-28 PROCEDURE — 20000000 HC ICU ROOM

## 2018-05-28 PROCEDURE — 84132 ASSAY OF SERUM POTASSIUM: CPT

## 2018-05-28 PROCEDURE — A4216 STERILE WATER/SALINE, 10 ML: HCPCS | Performed by: PHYSICIAN ASSISTANT

## 2018-05-28 PROCEDURE — 83735 ASSAY OF MAGNESIUM: CPT

## 2018-05-28 PROCEDURE — 80053 COMPREHEN METABOLIC PANEL: CPT

## 2018-05-28 PROCEDURE — 25000003 PHARM REV CODE 250: Performed by: NURSE PRACTITIONER

## 2018-05-28 PROCEDURE — 93306 TTE W/DOPPLER COMPLETE: CPT

## 2018-05-28 PROCEDURE — 99233 SBSQ HOSP IP/OBS HIGH 50: CPT | Mod: ,,, | Performed by: PSYCHIATRY & NEUROLOGY

## 2018-05-28 PROCEDURE — 83735 ASSAY OF MAGNESIUM: CPT | Mod: 91

## 2018-05-28 PROCEDURE — 25000003 PHARM REV CODE 250: Performed by: PSYCHIATRY & NEUROLOGY

## 2018-05-28 PROCEDURE — G8996 SWALLOW CURRENT STATUS: HCPCS | Mod: CN

## 2018-05-28 PROCEDURE — G8997 SWALLOW GOAL STATUS: HCPCS | Mod: CK

## 2018-05-28 PROCEDURE — 84100 ASSAY OF PHOSPHORUS: CPT

## 2018-05-28 PROCEDURE — 97116 GAIT TRAINING THERAPY: CPT

## 2018-05-28 PROCEDURE — 84484 ASSAY OF TROPONIN QUANT: CPT

## 2018-05-28 PROCEDURE — 94761 N-INVAS EAR/PLS OXIMETRY MLT: CPT

## 2018-05-28 PROCEDURE — 93306 TTE W/DOPPLER COMPLETE: CPT | Mod: 26,,, | Performed by: INTERNAL MEDICINE

## 2018-05-28 RX ORDER — NICARDIPINE HYDROCHLORIDE 0.2 MG/ML
1 INJECTION INTRAVENOUS CONTINUOUS
Status: DISCONTINUED | OUTPATIENT
Start: 2018-05-28 | End: 2018-05-28

## 2018-05-28 RX ORDER — NICARDIPINE HYDROCHLORIDE 0.2 MG/ML
INJECTION INTRAVENOUS
Status: COMPLETED
Start: 2018-05-28 | End: 2018-05-28

## 2018-05-28 RX ORDER — SODIUM,POTASSIUM PHOSPHATES 280-250MG
2 POWDER IN PACKET (EA) ORAL
Status: DISCONTINUED | OUTPATIENT
Start: 2018-05-28 | End: 2018-06-05

## 2018-05-28 RX ORDER — POTASSIUM CHLORIDE 20 MEQ/15ML
40 SOLUTION ORAL
Status: DISCONTINUED | OUTPATIENT
Start: 2018-05-28 | End: 2018-06-05

## 2018-05-28 RX ORDER — QUETIAPINE FUMARATE 25 MG/1
25 TABLET, FILM COATED ORAL NIGHTLY
Status: DISCONTINUED | OUTPATIENT
Start: 2018-05-28 | End: 2018-06-05

## 2018-05-28 RX ORDER — MIDAZOLAM HYDROCHLORIDE 1 MG/ML
1 INJECTION INTRAMUSCULAR; INTRAVENOUS ONCE
Status: COMPLETED | OUTPATIENT
Start: 2018-05-28 | End: 2018-05-28

## 2018-05-28 RX ORDER — CARVEDILOL 3.12 MG/1
3.12 TABLET ORAL 2 TIMES DAILY
Status: DISCONTINUED | OUTPATIENT
Start: 2018-05-28 | End: 2018-05-30

## 2018-05-28 RX ORDER — HEPARIN SODIUM 5000 [USP'U]/ML
5000 INJECTION, SOLUTION INTRAVENOUS; SUBCUTANEOUS EVERY 8 HOURS
Status: DISCONTINUED | OUTPATIENT
Start: 2018-05-28 | End: 2018-06-03

## 2018-05-28 RX ORDER — AMOXICILLIN 250 MG
1 CAPSULE ORAL DAILY PRN
Status: DISCONTINUED | OUTPATIENT
Start: 2018-05-28 | End: 2018-06-05

## 2018-05-28 RX ORDER — SODIUM CHLORIDE 9 MG/ML
INJECTION, SOLUTION INTRAVENOUS CONTINUOUS
Status: DISCONTINUED | OUTPATIENT
Start: 2018-05-28 | End: 2018-05-29

## 2018-05-28 RX ORDER — MIDAZOLAM HYDROCHLORIDE 1 MG/ML
1 INJECTION INTRAMUSCULAR; INTRAVENOUS
Status: DISCONTINUED | OUTPATIENT
Start: 2018-05-28 | End: 2018-05-28

## 2018-05-28 RX ORDER — ASPIRIN 81 MG/1
81 TABLET ORAL DAILY
Status: DISCONTINUED | OUTPATIENT
Start: 2018-05-28 | End: 2018-05-30

## 2018-05-28 RX ORDER — HALOPERIDOL 5 MG/ML
INJECTION INTRAMUSCULAR
Status: DISPENSED
Start: 2018-05-28 | End: 2018-05-29

## 2018-05-28 RX ORDER — NICARDIPINE HYDROCHLORIDE 0.2 MG/ML
2.5 INJECTION INTRAVENOUS CONTINUOUS
Status: DISCONTINUED | OUTPATIENT
Start: 2018-05-28 | End: 2018-05-28

## 2018-05-28 RX ORDER — HALOPERIDOL 5 MG/ML
2 INJECTION INTRAMUSCULAR ONCE
Status: COMPLETED | OUTPATIENT
Start: 2018-05-28 | End: 2018-05-28

## 2018-05-28 RX ORDER — POTASSIUM CHLORIDE 20 MEQ/15ML
20 SOLUTION ORAL ONCE
Status: COMPLETED | OUTPATIENT
Start: 2018-05-28 | End: 2018-05-28

## 2018-05-28 RX ORDER — LOSARTAN POTASSIUM 25 MG/1
25 TABLET ORAL DAILY
Status: DISCONTINUED | OUTPATIENT
Start: 2018-05-28 | End: 2018-05-29

## 2018-05-28 RX ORDER — FENTANYL CITRATE 50 UG/ML
INJECTION, SOLUTION INTRAMUSCULAR; INTRAVENOUS
Status: DISCONTINUED
Start: 2018-05-28 | End: 2018-05-28 | Stop reason: WASHOUT

## 2018-05-28 RX ORDER — LANOLIN ALCOHOL/MO/W.PET/CERES
800 CREAM (GRAM) TOPICAL
Status: DISCONTINUED | OUTPATIENT
Start: 2018-05-28 | End: 2018-06-05

## 2018-05-28 RX ORDER — POTASSIUM CHLORIDE 20 MEQ/15ML
60 SOLUTION ORAL
Status: DISCONTINUED | OUTPATIENT
Start: 2018-05-28 | End: 2018-06-05

## 2018-05-28 RX ADMIN — LABETALOL HYDROCHLORIDE 10 MG: 5 INJECTION, SOLUTION INTRAVENOUS at 05:05

## 2018-05-28 RX ADMIN — NICARDIPINE HYDROCHLORIDE 2.5 MG: 0.2 INJECTION INTRAVENOUS at 08:05

## 2018-05-28 RX ADMIN — HYDRALAZINE HYDROCHLORIDE 10 MG: 20 INJECTION INTRAMUSCULAR; INTRAVENOUS at 12:05

## 2018-05-28 RX ADMIN — POTASSIUM CHLORIDE 60 MEQ: 20 SOLUTION ORAL at 11:05

## 2018-05-28 RX ADMIN — POTASSIUM CHLORIDE 60 MEQ: 20 SOLUTION ORAL at 01:05

## 2018-05-28 RX ADMIN — CARVEDILOL 3.12 MG: 3.12 TABLET, FILM COATED ORAL at 09:05

## 2018-05-28 RX ADMIN — POTASSIUM & SODIUM PHOSPHATES POWDER PACK 280-160-250 MG 2 PACKET: 280-160-250 PACK at 03:05

## 2018-05-28 RX ADMIN — HALOPERIDOL LACTATE 2 MG: 5 INJECTION, SOLUTION INTRAMUSCULAR at 06:05

## 2018-05-28 RX ADMIN — Medication 3 ML: at 09:05

## 2018-05-28 RX ADMIN — CARVEDILOL 3.12 MG: 3.12 TABLET, FILM COATED ORAL at 01:05

## 2018-05-28 RX ADMIN — POTASSIUM & SODIUM PHOSPHATES POWDER PACK 280-160-250 MG 2 PACKET: 280-160-250 PACK at 11:05

## 2018-05-28 RX ADMIN — ASPIRIN 81 MG: 81 TABLET, COATED ORAL at 05:05

## 2018-05-28 RX ADMIN — HYDRALAZINE HYDROCHLORIDE 10 MG: 20 INJECTION INTRAMUSCULAR; INTRAVENOUS at 08:05

## 2018-05-28 RX ADMIN — HYDRALAZINE HYDROCHLORIDE 10 MG: 20 INJECTION INTRAMUSCULAR; INTRAVENOUS at 06:05

## 2018-05-28 RX ADMIN — MIDAZOLAM HYDROCHLORIDE 1 MG: 1 INJECTION, SOLUTION INTRAMUSCULAR; INTRAVENOUS at 05:05

## 2018-05-28 RX ADMIN — HEPARIN SODIUM 5000 UNITS: 5000 INJECTION, SOLUTION INTRAVENOUS; SUBCUTANEOUS at 09:05

## 2018-05-28 RX ADMIN — LABETALOL HYDROCHLORIDE 10 MG: 5 INJECTION, SOLUTION INTRAVENOUS at 11:05

## 2018-05-28 RX ADMIN — DONEPEZIL HYDROCHLORIDE 10 MG: 5 TABLET, FILM COATED ORAL at 09:05

## 2018-05-28 RX ADMIN — ATORVASTATIN CALCIUM 40 MG: 20 TABLET, FILM COATED ORAL at 08:05

## 2018-05-28 RX ADMIN — BUPROPION HYDROCHLORIDE 150 MG: 75 TABLET, FILM COATED ORAL at 09:05

## 2018-05-28 RX ADMIN — POTASSIUM CHLORIDE 20 MEQ: 20 SOLUTION ORAL at 04:05

## 2018-05-28 RX ADMIN — QUETIAPINE FUMARATE 25 MG: 25 TABLET ORAL at 09:05

## 2018-05-28 RX ADMIN — Medication 3 ML: at 06:05

## 2018-05-28 RX ADMIN — LOSARTAN POTASSIUM 25 MG: 25 TABLET, FILM COATED ORAL at 01:05

## 2018-05-28 RX ADMIN — BUPROPION HYDROCHLORIDE 150 MG: 75 TABLET, FILM COATED ORAL at 08:05

## 2018-05-28 RX ADMIN — SODIUM CHLORIDE: 0.9 INJECTION, SOLUTION INTRAVENOUS at 01:05

## 2018-05-28 RX ADMIN — NICARDIPINE HYDROCHLORIDE 2.5 MG: 0.2 INJECTION, SOLUTION INTRAVENOUS at 08:05

## 2018-05-28 RX ADMIN — Medication 3 ML: at 02:05

## 2018-05-28 NOTE — ASSESSMENT & PLAN NOTE
-s/p tpa at O[facility]  -CTAMP neg for LVO  -admit to NCC  -q1h neuro checks  -PT OT SLP  -VN consulted  -post tpa MRI tomorrow at 1400  -atorvastatin 40 mg qd  -hold ASA, SQH for now

## 2018-05-28 NOTE — ED NOTES
Patient sitting up stating that she wants to go fishing and that she needs to urinate. Patient reassured that she has a depends on and can urinate at anytime.

## 2018-05-28 NOTE — PLAN OF CARE
05/28/18 1451   Discharge Assessment   Assessment Type Discharge Planning Assessment   Confirmed/corrected address and phone number on facesheet? Yes   Assessment information obtained from? Patient;Caregiver   Expected Length of Stay (days) 4   Communicated expected length of stay with patient/caregiver yes   Prior to hospitilization cognitive status: Alert/Oriented   Prior to hospitalization functional status: Independent   Current cognitive status: Unable to Assess  (H/O dementia)   Current Functional Status: Needs Assistance   Facility Arrived From: [facility]   Lives With child(tiana), adult   Able to Return to Prior Arrangements unable to determine at this time (comments)   Is patient able to care for self after discharge? Unable to determine at this time (comments)   Who are your caregiver(s) and their phone number(s)? Yuniel Benítez (daughter) [phone]   Patient's perception of discharge disposition other (comments)  (selwyn)   Readmission Within The Last 30 Days no previous admission in last 30 days   Patient currently being followed by outpatient case management? No   Patient currently receives any other outside agency services? No   Equipment Currently Used at Home walker, rolling;wheelchair   Do you have any problems affording any of your prescribed medications? No   Is the patient taking medications as prescribed? yes   Does the patient have transportation home? Yes   Transportation Available family or friend will provide   Does the patient receive services at the [facility]? No   Discharge Plan A Home with family;Home Health   Discharge Plan B Skilled Nursing Facility   Patient/Family In Agreement With Plan unable to assess     Awaiting therapy recs        Discharge/ My Health Packet Folder Given to patient/family:      yes      PCP:   Ese Moya MD        Pharmacy:    [facility] [address] [place] [address] & [address]  [facility] [address]  Phone: [phone] Fax: [phone]        Emergency Contacts:  Extended Emergency Contact Information  Primary Emergency Contact: Lauren Benítez   [facility] Phone: [phone]  Relation: Daughter      Insurance:  Payor: [employer] / Plan: [employer] [facility] HEALTH / Product Type: Medicare Advantage /     Suzy Moreno RN, CCRN-K, [facility]  Neuro-Critical Care   X [phone]

## 2018-05-28 NOTE — PT/OT/SLP EVAL
Physical Therapy Evaluation    Patient Name:  Sury Cobos   MRN:  [id number]    Recommendations:     Discharge Recommendations:  nursing facility, skilled   Discharge Equipment Recommendations: 3-in-1 commode   Barriers to discharge: Decreased caregiver support    Assessment:     Sury Cobos is a 83 y.o. female admitted with a medical diagnosis of Acute ischemic right MCA stroke.  She presents with the following impairments/functional limitations:  weakness, impaired endurance, impaired self care skills, impaired functional mobilty, gait instability, impaired balance, impaired cognition, decreased coordination, decreased upper extremity function, decreased lower extremity function, impaired joint extensibility.  Pt tolerated PT evaluation able to complete supine<>sit with Mod A, sit<>stand with Min A using RW x 2 trials, and amb 5 ft with Min A using RW x 2 trials.  Pt is appropriate and would benefit from skilled acute PT 4x/week.  Pt would require 24 hour assistance for safe d/c home, otherwise recommending SNF.    Rehab Prognosis:  Good; patient would benefit from acute skilled PT services to address these deficits and reach maximum level of function.      Recent Surgery: * No surgery found *      Plan:     During this hospitalization, patient to be seen 4 x/week to address the above listed problems via gait training, therapeutic activities, therapeutic exercises, neuromuscular re-education  · Plan of Care Expires:  06/28/18   Plan of Care Reviewed with: patient, daughter    Subjective     Communicated with RN prior to session.  Patient found supine with R UE restraint upon PT entry to room, agreeable to evaluation.  Pt's daughter present.    Chief Complaint: denies pain  Patient comments/goals: pt willing to participate in PT eval.  Pt has difficulty communicated needs to dysarthria and possible expressive aphasia.  Pain/Comfort:  · Pain Rating 1: 0/10  · Pain Rating Post-Intervention 1: 0/10    Patients  cultural, spiritual, [employer]alism conflicts given the current situation: None reported    Living Environment:  Home environment:  Pt lives in [facility], handicap accessible with a ramp; bathroom has a tub-shower combo.  DME:  Own a rollator, w/c, SPC.  PLOF:  Pt amb mod (I) household and in community using rollator with supervision from daughter.  Daughter assisted pt with transfers in/out of tub.  Social Hx:  Pt lives with daughter who is retired and able to assist.    Patient has the following equipment: walker, rolling, wheelchair.  DME owned (not currently used): single point cane and wheelchair.  Upon discharge, patient will have assistance from daughter.    Objective:     Patient found with: peripheral IV, blood pressure cuff, PureWick, SCD, pressure relief boots, pulse ox (continuous)     General Precautions: Standard, aspiration, fall   Orthopedic Precautions:N/A   Braces: N/A     Exams:  · Cognitive Exam:  Patient is oriented to Person and pt able to identify birth year, unable to accurately indicate Month.  PT oriented pt to time/place. and follows functional commands, follows 80% single step commands, difficulty following multi-step commands.  · Hard-of-hearing  · Postural Exam:  Patient presented with the following abnormalities:    · -       Rounded shoulders  · -       Crouched posture d/t B hip/knee flexion contractures.  · -       Leg-length discrepancy (owns L shoe with lift d/t shorter L LE, but do not currently have it).  · Sensation:    · -       Intact  light/touch B LE  · Skin Integrity/Edema:      · -       Skin integrity: Visible skin intact  · RLE ROM: Moderate hip & knee restriction d/t contracture.  · RLE Strength: Grossly WFL, except hip flexion 4+/5  · LLE ROM: Moderate hip & knee restriction d/t contracture.  · LLE Strength: Grossly WFL  · L UE Strength: hemiparesis, able to partially WB transfers and amb using RW    Functional Mobility:  · Bed Mobility:     · Rolling Left:  contact guard  assistance  · Rolling Right: contact guard assistance  · Supine to Sit: moderate assistance to the L side, pt assist with LE positioning and push-off to elevate trunk  · Sit to Supine: moderate assistance for safe positioning of L UE and controlled trunk descent.    · Transfers:     · Sit to Stand:    · minimum assistance with rolling walker  · 2 trials  · PT assist for stability and safety d/t limited pt safety awareness with safe hand-placement.    · Gait:   · 5 ft x 2 trials Min A using RW  · Deviations:  Decreased B knee/hip dynamic range of motion, flexed posture, absent L heel strike with L forefoot contact in stance d/t leg-length discrepancy, decreased SLS stability.  · PT assist with positioning RW and for stability, cuing upright posture.  · Gait limited d/t pt within tPA precautions timeframe.    · Balance:   · Static Sitting Balance SBA; Dynamic Sitting Balance CGA  · Static/Dynamic Standing Balance Min A using RW.    AM-PAC 6 CLICK MOBILITY  Total Score:16       Therapeutic Activities and Exercises:     PT educated pt and pt's daughter on:  - Role of PT & PT POC  - D/C options regarding therapy and any concerns/comments with d/c SNF vs. Home  - Safety with transfers and amb using RW    Patient left supine with all lines intact, call button in reach, RN notified and daughter present.    GOALS:    Physical Therapy Goals        Problem: Physical Therapy Goal    Goal Priority Disciplines Outcome Goal Variances Interventions   Physical Therapy Goal     PT/OT, PT Ongoing (interventions implemented as appropriate)     Description:  Goals to be met by: 2018     Patient will increase functional independence with mobility by performin. Supine to sit with Contact Guard Assistance  2. Sit to supine with Contact Guard Assistance  3. Rolling to Left and Right with Supervision.  4. Sit to stand transfer with Stand-by Assistance  5. Gait  x 20 feet with Contact Guard Assistance using Rolling Walker.   6.  Stand for 10 minutes with Contact Guard Assistance using Rolling Walker                      History:     Past Medical History:   Diagnosis Date    Arthritis     Benign essential HTN     CHF (congestive heart failure)     Closed intertrochanteric fracture of left hip     Coronary artery disease     Dementia     Diabetes mellitus     Gout     High cholesterol     Low potassium syndrome     Urinary incontinence        Past Surgical History:   Procedure Laterality Date    CARDIAC SURGERY      CORONARY ARTERY BYPASS GRAFT      reduction & internal fixation of displaced closed comminuted intertrochanteric fx left hip  02/12/2018       Clinical Decision Making:     History  Co-morbidities and personal factors that may impact the plan of care Examination  Body Structures and Functions, activity limitations and participation restrictions that may impact the plan of care Clinical Presentation   Decision Making/ Complexity Score   Co-morbidities:   [x] Time since onset of injury / illness / exacerbation  [] Status of current condition  [x]Patient's cognitive status and safety concerns    [x] Multiple Medical Problems (see med hx)  Personal Factors:   [] Patient's age  [x] Prior Level of function   [x] Patient's home situation (environment and family support)  [] Patient's level of motivation  [] Expected progression of patient      HISTORY:(criteria)    [] 17116 - no personal factors/history    [] 79314 - has 1-2 personal factor/comorbidity     [x] 71201 - has >3 personal factor/comorbidity     Body Regions:  [x] Objective examination findings  [] Head     []  Neck  [x] Trunk   [x] Upper Extremity  [x] Lower Extremity    Body Systems:  [x] For communication ability, affect, cognition, language, and learning style: the assessment of the ability to make needs known, consciousness, orientation (person, place, and time), expected emotional /behavioral responses, and learning preferences (eg, learning barriers,  education  needs)  [x] For the neuromuscular system: a general assessment of gross coordinated movement (eg, balance, gait, locomotion, transfers, and transitions) and motor function  (motor control and motor learning)  [x] For the musculoskeletal system: the assessment of gross symmetry, gross range of motion, gross strength, height, and weight  [x] For the integumentary system: the assessment of pliability(texture), presence of scar formation, skin color, and skin integrity  [] For cardiovascular/pulmonary system: the assessment of heart rate, respiratory rate, blood pressure, and edema     Activity limitations:    [x] Patient's cognitive status and saf ety concerns          [x] Status of current condition      [] Weight bearing restriction  [] Cardiopulmunary Restriction    Participation Restrictions:   [] Goals and goal agreement with the patient     [] Rehab potential (prognosis) and probable outcome      Examination of Body System: (criteria)    [] 42603 - addressing 1-2 elements    [] 79541 - addressing a total of 3 or more elements     [x] 90961 -  Addressing a total of 4 or more elements         Clinical Presentation: (criteria)  Evolving - 77335     On examination of body system using standardized tests and measures patient presents with 4 or more elements from any of the following: body structures and functions, activity limitations, and/or participation restrictions.  Leading to a clinical presentation that is considered evolving with changing characteristics                              Clinical Decision Making  (Eval Complexity):  Moderate - 04153     Time Tracking:     PT Received On: 05/28/18  PT Start Time: 1507     PT Stop Time: 1549  PT Total Time (min): 42 min     Billable Minutes: Evaluation 32, Gait Training 10      DANE Fowler  05/28/2018

## 2018-05-28 NOTE — PROGRESS NOTES
Notified ARISTEO Rodas troponin now 0.100 from 0.071 @ 1919 5-27. No new orders. Continue to monitor with trending q8 hours.

## 2018-05-28 NOTE — PROGRESS NOTES
[facility]  Neurocritical Care  Progress Note    Admit Date: 5/27/2018  Service Date: 05/28/2018  Length of Stay: 1    Subjective:     Chief Complaint: Acute ischemic right MCA stroke    History of Present Illness: Ms. Cobos is a 84 y/o female with pmhx DM2, HTN, HLD, dementia who was last seen normal at 1320, alert  today with family. Daughter states she went to run and errand and returned at 1445 to find the patient slumped over and unable to talk or move her left side. She was taken to [place] and telemedicine consult was done with Dr Marx. No acute changes on CT head. Recommendation was to give IV TPA and transfer to [facility] for further evaluation.   Upon arrival patient still with aphasia, L facial droop, dysarthria. Minimal left sided weakness.   CTA head and neck multiphase complete, no LVO, no IR. Pt admitted to [facility] for higher level of care.       Hospital Course: 5/27: admit to [facility] s/p tpa, no intervention, R MCA    Interval History:   NAEON.     Review of Systems   Respiratory: Negative for shortness of breath.    Cardiovascular: Negative for chest pain.   Neurological: Positive for speech difficulty and weakness.   Psychiatric/Behavioral: Positive for agitation.       Objective:     Vitals:  Temp: 98.5 °F (36.9 °C)  Pulse: 86  Rhythm: normal sinus rhythm  BP: 139/65  MAP (mmHg): 93  Resp: (!) 25  SpO2: 100 %  O2 Device (Oxygen Therapy): room air    Temp  Min: 97.7 °F (36.5 °C)  Max: 98.8 °F (37.1 °C)  Pulse  Min: 47  Max: 122  BP  Min: 122/60  Max: 223/92  MAP (mmHg)  Min: 87  Max: 132  Resp  Min: 11  Max: 37  SpO2  Min: 95 %  Max: 100 %    05/27 0701 - 05/28 0700  In: 140.9 [I.V.:70.9]  Out: -    Unmeasured Output  Urine Occurrence: 1  Stool Occurrence: 0  Pad Count: 1       Physical Exam   Constitutional: She appears well-developed and well-nourished.   HENT:   Head: Normocephalic and atraumatic.   Cardiovascular: Normal rate.    Pulmonary/Chest: Effort normal.    Neurological:   E4V4M5  Alert. Oriented to person, place.   Dysarthric. Follows simple commands, agitated however and intermittently refuses to follow.   PERRL, EOMI.   Left facial droop  Motor LUE LLE 4/5 RUE RLE 5/5       Medications:  Continuous  sodium chloride 0.9% Last Rate: 75 mL/hr at 05/28/18 1339   nicardipine    Scheduled  atorvastatin 40 mg Daily   buPROPion 150 mg BID   carvedilol 3.125 mg BID   donepezil 10 mg QHS   losartan 25 mg Daily   sodium chloride 0.9% 3 mL Q8H   PRN  hydrALAZINE 10 mg Q4H PRN   labetalol 10 mg PRN   magnesium oxide 800 mg PRN   magnesium oxide 800 mg PRN   potassium chloride 10% 40 mEq PRN   potassium chloride 10% 40 mEq PRN   potassium chloride 10% 60 mEq PRN   potassium, sodium phosphates 2 packet PRN   potassium, sodium phosphates 2 packet PRN   potassium, sodium phosphates 2 packet PRN   senna-docusate 8.6-50 mg 1 tablet Daily PRN     Today I personally reviewed pertinent medications, lines/drains/airways, imaging, cardiology, lab results, microbiology results,     Diet  Diet NPO  Diet NPO    Assessment/Plan:     Neuro   * Acute ischemic right MCA stroke    -s/p tpa at OSH  -CTA without evidence of LVO  -MRI with evidence of cortical infarct involving the right pre-central gyrus  -q1h neuro checks  -PT OT SLP  -Vascular neurology following  -asp 81mg qd  -atorvastatin 40 mg qd  -heparin 5000U q8h        Dementia with behavioral disturbance    -home wellbutrin 150 mg BID  -donepezil 10 mg qhs        Aphasia    -see R MCA         Cardiac/Vascular   Benign essential HTN    -SBP<180  -start coreg 3.125mg BID  -resume home med - losartan 25mg qd  -labetalol, hydralazine, cardene prn        Hematology   S/P admn tPA in diff fac w/n last 24 hr bef adm to crnt fac    -see R MCA            Stepdown to vascular neurology when out of the tpa window.      Prophylaxis:  Venous Thromboembolism: mechanical chemical  Stress Ulcer: NA  Ventilator Pneumonia: not applicable     Activity  Orders          Activity as tolerated starting at 05/28 0868        Full Code    Juani Queen MD  Neurocritical Care  [facility]

## 2018-05-28 NOTE — HOSPITAL COURSE
5/27: admit to [facility] s/p tpa, no intervention, R MCA  5/30: EEG to rule out seizures, Continue 3% nebulizer, duo nebs and Chest PT for increased thick secretions. CXR to rule out aspiration pneumonia. Pt intermittently lethargic. Cancelled transfer as pt not stable for transfer. Will need anti-coagulation once stable.    5/31:Pulmonary toileting, holding transfer for frequent suctioning    6/1: TTF under Vascular Neurology

## 2018-05-28 NOTE — ED NOTES
Patient is agitated at this time. Patient pulling at restraints, yelling, swearing and attempting to bite this RN. ED tech at bedside sitting with patient.

## 2018-05-28 NOTE — H&P
[facility]  Neurocritical Care  H&P    Admit Date: 5/27/2018  Service Date: 05/27/2018  Length of Stay: 0    Subjective:     Chief Complaint: Acute ischemic right MCA stroke    History of Present Illness:  Ms. Cobos is a 82 y/o female with pmhx DM2, HTN, HLD, dementia who was last seen normal at 1320, alert  today with family. Daughter states she went to run and errand and returned at 1445 to find the patient slumped over and unable to talk or move her left side. She was taken to [place] and telemedicine consult was done with Dr Marx. No acute changes on CT head. Recommendation was to give IV TPA and transfer to [facility] for further evaluation.   Upon arrival patient still with aphasia, L facial droop, dysarthria. Minimal left sided weakness.   CTA head and neck multiphase complete, no LVO, no IR. Pt admitted to [facility] for higher level of care.     Hospital Course: 5/27: admit to [facility] s/p tpa, no intervention, R MCA    Past Medical History:   Diagnosis Date    Arthritis     Benign essential HTN     CHF (congestive heart failure)     Closed intertrochanteric fracture of left hip     Coronary artery disease     Dementia     Diabetes mellitus     Gout     High cholesterol     Low potassium syndrome     Urinary incontinence      Past Surgical History:   Procedure Laterality Date    CARDIAC SURGERY      CORONARY ARTERY BYPASS GRAFT      reduction & internal fixation of displaced closed comminuted intertrochanteric fx left hip  02/12/2018     History reviewed. No pertinent family history.  Social History   Substance Use Topics    Smoking status: Current Every Day Smoker     Packs/day: 3.00    Smokeless tobacco: Never Used    Alcohol use Yes      Comment: socially     Review of patient's allergies indicates:  No Known Allergies    Interval History: NAEON    Review of Systems:   Constitutional: Denies fevers or chills.  Pulmonary: Denies shortness of breath or cough.  Cardiology:  Denies chest pain or palpitations.  GI: Denies abdominal pain or constipation.  Neurologic: Denies new weakness,  headache, or paresthesias.    Vitals:   Temp: 98.4 °F (36.9 °C)  Pulse: 96  BP: 136/69  MAP (mmHg): 96  Resp: 20  SpO2: 96 %    Temp  Min: 98.4 °F (36.9 °C)  Max: 98.4 °F (36.9 °C)  Pulse  Min: 70  Max: 122  BP  Min: 122/91  Max: 187/67  MAP (mmHg)  Min: 96  Max: 96  Resp  Min: 12  Max: 20  SpO2  Min: 95 %  Max: 100 %    No intake/output data recorded.         Examination:   Constitutional: Well-nourished and -developed. No apparent distress.   Eyes: Conjunctiva clear, anicteric. Lids no lesions.  Head/Ears/Nose/Mouth/Throat/Neck: Moist mucous membranes. External ears, nose atraumatic.   Cardiovascular: Regular rhythm. No murmurs. No leg edema.  Respiratory: Comfortable respirations. Clear to auscultation.  Gastrointestinal: No hernia. Soft, nondistended, nontender. + bowel sounds.    Neurologic:  -GCS E4V4M5  -Alert. Oriented to person, place. Dysarthria. Expressive aphasia. Agitated. Does not cooperate with exam. Intermittently follows commands.  -Cranial nerves II-XII intact except lower facial weakness on the left  -Motor LUE LLE 4/5 RUE RLE 5/5  -Sensation to light touch intact       Medications:   Continuous    dexmedetomidine (PRECEDEX) infusion Last Rate: 0.4 mcg/kg/hr (05/27/18 2002)   Scheduled    [START ON 5/28/2018] atorvastatin 40 mg Daily   buPROPion 150 mg BID   donepezil 10 mg QHS   sodium chloride 0.9% 3 mL Q8H   PRN    hydrALAZINE 10 mg Q4H PRN   labetalol 10 mg PRN      Today I independently reviewed pertinent medications, lines/drains/airways, imaging, cardiology, lab results, microbiology results,      Assessment/Plan:     Neuro   * Acute ischemic right MCA stroke    -s/p tpa at OSH  -CTAMP neg for LVO  -admit to [facility]  -q1h neuro checks  -PT OT SLP  -VN consulted  -post tpa MRI tomorrow at 1400  -atorvastatin 40 mg qd  -hold ASA, SQH for now        Dementia with behavioral disturbance     -home wellbutrin 150 mg BID  -donepezil 10 mg qhs        Aphasia    -see R MCA         Cardiac/Vascular   Benign essential HTN    -SBP<180  -labetalol, hydralazine prn        Hematology   S/P admn tPA in diff fac w/n last 24 hr bef adm to crnt fac    -see R MCA             Prophylaxis:  Venous Thromboembolism: mechanical  Stress Ulcer: None  Ventilator Pneumonia: not applicable     Activity Orders          None        Full Code    Georgina Rascon PA-C  Neurocritical Care  [facility]

## 2018-05-28 NOTE — HPI
Ms. Cobos is a 84 y/o female with pmhx DM2, HTN, HLD, dementia who was last seen normal at 1320, alert  today with family. Daughter states she went to run and errand and returned at 1445 to find the patient slumped over and unable to talk or move her left side. She was taken to [place] and telemedicine consult was done with Dr Marx. No acute changes on CT head. Recommendation was to give IV TPA and transfer to [facility] for further evaluation.   Upon arrival patient still with aphasia, L facial droop, dysarthria. Minimal left sided weakness.   CTA head and neck multiphase complete, no LVO, no IR. Pt admitted to [facility] for higher level of care.

## 2018-05-28 NOTE — PLAN OF CARE
Problem: Patient Care Overview  Goal: Plan of Care Review  Outcome: Ongoing (interventions implemented as appropriate)  Nutrition assessment completed. Please see RD note for details.    Recommendation/Intervention:  1. If unable to advance diet recommend TF regimen of Glucerna 1.5 @ goal rate of 40 mL/hr.   · Initiate @ 15 mL/hr and advance by 10mL/hr q4hr as tolerated or until goal is met.   · Hold for residuals >500mL.   · This regimen provides 1440 kcal/day, 79g protein/day and 1093mL free water.    2. If diet advanced, recommend diabetic diet with texture per SLP.  RD to monitor.      Goals: Initiation of nutrition support/ diet  Nutrition Goal Status: new  Communication of RD Recs: reviewed with RN

## 2018-05-28 NOTE — PROGRESS NOTES
Pt bradycardic with HR as low as 45 bpm, frequent PACs, and went A-fib on monitor. Decreased precedex gtt to 0.4mcg/kg/hr. 12 Lead done. Notified ARISTEO Rodas. No new orders at this time. Will continue to monitor very closely.

## 2018-05-28 NOTE — CONSULTS
"  [facility]  Adult Nutrition  Consult Note    SUMMARY     Recommendations    Recommendation/Intervention:  1. If unable to advance diet recommend TF regimen of Glucerna 1.5 @ goal rate of 40 mL/hr.   · Initiate @ 15 mL/hr and advance by 10mL/hr q4hr as tolerated or until goal is met.   · Hold for residuals >500mL.   · This regimen provides 1440 kcal/day, 79g protein/day and 1093mL free water.    2. If diet advanced, recommend diabetic diet with texture per SLP.  RD to monitor.     Goals: Initiation of nutrition support/ diet  Nutrition Goal Status: new  Communication of RD Recs: reviewed with RN    Reason for Assessment    Reason for Assessment: consult  Diagnosis: stroke/CVA (Acute ischemic right MCA stroke)  Relevant Medical History: DM, CAD, low potassium syndrome, high cholesterol, aphasia, dementia w/ behavioral disturbance  Interdisciplinary Rounds: attended  General Information Comments: Noted NG tube in place, no formula ordered currently  Nutrition Discharge Planning: unable to determine at this time    Nutrition Risk Screen    Nutrition Risk Screen: tube feeding or parenteral nutrition    Nutrition/Diet History  Patient Reported Diet/Restrictions/Preferences: general  Do you have any cultural, spiritual, Voodoo conflicts, given your current situation?: no  Factors Affecting Nutritional Intake: NPO    Anthropometrics  Temp: 98.6 °F (37 °C)  Height Method: Measured  Height: 4' 11" (149.9 cm)  Height (inches): 59 in  Weight Method: Bed Scale  Weight: 54.7 kg (120 lb 9.5 oz)  Weight (lb): 120.59 lb  Ideal Body Weight (IBW), Female: 95 lb  % Ideal Body Weight, Female (lb): 126.94 lb  BMI (Calculated): 24.4  BMI Grade: 18.5-24.9 - normal       Lab/Procedures/Meds  Pertinent Labs Reviewed: reviewed  Pertinent Labs Comments: K 2.8, Anion Gap 30, glu 150 POCT glu 135, phos 2.5  Pertinent Medications Reviewed: reviewed  Pertinent Medications Comments: precedex, atorvastatin, cardene, " donepezil, wellbutrin    Physical Findings/Assessment  Overall Physical Appearance: nourished  Tubes: nasogastric tube  Oral/Mouth Cavity: tooth/teeth missing  Skin: intact    Estimated/Assessed Needs  Weight Used For Calorie Calculations: 54.7 kg (120 lb 9.5 oz)  Energy Calorie Requirements (kcal): 1135 kcal/day  Energy Need Method: [place]-St Jeor (1.25 x PAL)    Protein Requirements: 44-50g/day (0.8-0.9 g/kg)  Weight Used For Protein Calculations: 54.7 kg (120 lb 9.5 oz)    Fluid Requirements (mL): 1135 or per MD  Fluid Need Method: RDA Method  RDA Method (mL): 1135  CHO requirements: 50% of total kcals     Nutrition Prescription Ordered  Current Diet Order: NPO    Evaluation of Received Nutrient/Fluid Intake  I/O:     Intake/Output Summary (Last 24 hours) at 05/28/18 1015  Last data filed at 05/28/18 0909   Gross per 24 hour   Intake           258.38 ml   Output              350 ml   Net           -91.62 ml     % Intake of Estimated Energy Needs: 0%  % Meal Intake: NPO    Nutrition Risk  Level of Risk/Frequency of Follow-up:  (f/u 2x weekly)     Assessment and Plan  Nutrition Problem  Inadequate energy intake    Related to (etiology):   Decreased  Ability to consume sufficient nutrition    Signs and Symptoms (as evidenced by):   NPO status with no alternate means of nutrtion    Interventions/Recommendations (treatment strategy):  See above     Nutrition Diagnosis Status:   New      Service: Nutrition       Monitor and Evaluation  Food and Nutrient Intake: energy intake, food and beverage intake, enteral nutrition intake  Food and Nutrient Adminstration: diet order, enteral and parenteral nutrition administration  Knowledge/Beliefs/Attitudes: food and nutrition knowledge/skill  Physical Activity and Function: nutrition-related ADLs and IADLs  Anthropometric Measurements: weight, weight change, body mass index  Biochemical Data, Medical Tests and Procedures: electrolyte and renal panel, gastrointestinal profile,  glucose/endocrine profile  Nutrition-Focused Physical Findings: overall appearance, skin     Nutrition Follow-Up    RD Follow-up?: Yes    I certify that I directed the dietetic intern in service delivery and guided them using my skilled judgment. As the cosigning dietitian, I have reviewed the dietetic interns documentation and am responsible for the treatment, assessment, and plan.    Note Completed by : Addie Cr

## 2018-05-28 NOTE — ASSESSMENT & PLAN NOTE
-SBP<180  -start coreg 3.125mg BID  -resume home med - losartan 25mg qd  -labetalol, hydralazine, cardene prn

## 2018-05-28 NOTE — SUBJECTIVE & OBJECTIVE
Interval History:   NAEON.     Review of Systems   Respiratory: Negative for shortness of breath.    Cardiovascular: Negative for chest pain.   Neurological: Positive for speech difficulty and weakness.   Psychiatric/Behavioral: Positive for agitation.       Objective:     Vitals:  Temp: 98.5 °F (36.9 °C)  Pulse: 86  Rhythm: normal sinus rhythm  BP: 139/65  MAP (mmHg): 93  Resp: (!) 25  SpO2: 100 %  O2 Device (Oxygen Therapy): room air    Temp  Min: 97.7 °F (36.5 °C)  Max: 98.8 °F (37.1 °C)  Pulse  Min: 47  Max: 122  BP  Min: 122/60  Max: 223/92  MAP (mmHg)  Min: 87  Max: 132  Resp  Min: 11  Max: 37  SpO2  Min: 95 %  Max: 100 %    05/27 0701 - 05/28 0700  In: 140.9 [I.V.:70.9]  Out: -    Unmeasured Output  Urine Occurrence: 1  Stool Occurrence: 0  Pad Count: 1       Physical Exam   Constitutional: She appears well-developed and well-nourished.   HENT:   Head: Normocephalic and atraumatic.   Cardiovascular: Normal rate.    Pulmonary/Chest: Effort normal.   Neurological:   E4V4M5  Alert. Oriented to person, place.   Dysarthric. Follows simple commands, agitated however and intermittently refuses to follow.   PERRL, EOMI.   Left facial droop  Motor LUE LLE 4/5 RUE RLE 5/5       Medications:  Continuous  sodium chloride 0.9% Last Rate: 75 mL/hr at 05/28/18 1339   nicardipine    Scheduled  atorvastatin 40 mg Daily   buPROPion 150 mg BID   carvedilol 3.125 mg BID   donepezil 10 mg QHS   losartan 25 mg Daily   sodium chloride 0.9% 3 mL Q8H   PRN  hydrALAZINE 10 mg Q4H PRN   labetalol 10 mg PRN   magnesium oxide 800 mg PRN   magnesium oxide 800 mg PRN   potassium chloride 10% 40 mEq PRN   potassium chloride 10% 40 mEq PRN   potassium chloride 10% 60 mEq PRN   potassium, sodium phosphates 2 packet PRN   potassium, sodium phosphates 2 packet PRN   potassium, sodium phosphates 2 packet PRN   senna-docusate 8.6-50 mg 1 tablet Daily PRN     Today I personally reviewed pertinent medications, lines/drains/airways, imaging,  cardiology, lab results, microbiology results,     Diet  Diet NPO  Diet NPO

## 2018-05-28 NOTE — PLAN OF CARE
Problem: SLP Goal  Goal: SLP Goal  Outcome: Ongoing (interventions implemented as appropriate)  Remain npo with ng tube and strict aspiration precautions.    Paula Pal MA/[facility]-SLP  Speech Language Pathologist  Pager [phone]  5/28/2018

## 2018-05-28 NOTE — NURSING
1420H- patient started to get restless during MRI procedure, Liz REYES was notified and stated to give 1mg of versed IV. Will monitor patient.

## 2018-05-28 NOTE — ASSESSMENT & PLAN NOTE
84 y/o female with left sided facial droop, aphasia, dysarthria who received IV TPA but no IR. MRI with infarct in R precentral gyrus.   Patient NPO with NG in place.   Nursing notes state afib seen on telemetry, but no afib documented on 12 lead ECG.     Antithrombotics: Aspirin 81 mg on 5-28-17 at 2100    Statins: Lipitor 40 mg daily    Aggressive risk factor modification: HTN, HLD     Rehab efforts: PT/OT/SLP to evaluate and treat, PM&R consult     Diagnostics ordered/pending: none     VTE prophylaxis: SCD's may begin Heparin 500 units SC Q8H on 5-28-17 at 2200    BP parameters: Infarct: Post tPA, SBP <180

## 2018-05-28 NOTE — PLAN OF CARE
Problem: Patient Care Overview  Goal: Plan of Care Review  Outcome: Ongoing (interventions implemented as appropriate)  POC reviewed with pt and family at 1400. Pt verbalized understanding. Questions and concerns addressed. No acute events today. Pt progressing toward goals. Patient is still confused but oriented to self. Patient with left side neglect. MRI was done today. Will continue to monitor. See flowsheets for full assessment and VS info.

## 2018-05-28 NOTE — ASSESSMENT & PLAN NOTE
84 y/o female with left sided facail droop, aphasia, dysarthria who received IV TPA but no IR    Antithrombotics: Aspirin 81 mg on 5-28-17 at 2100 if no conversion    Statins: Lipitor 40 mg daily    Aggressive risk factor modification: HTN, HLD     Rehab efforts: PT/OT/SLP to evaluate and treat, PM&R consult     Diagnostics ordered/pending: HgbA1C to assess blood glucose levels, MRI head without contrast to assess brain parenchyma, TTE to assess cardiac function/status     VTE prophylaxis: SCD's may begin Heparin 500 units SC Q8H on 5-28-17 at 2200    BP parameters: Infarct: Post tPA, SBP <180

## 2018-05-28 NOTE — PROGRESS NOTES
Notified AGNES Hoover that pt's K+ 3.1 and continuing to have bradycardia with PACs. Precedex turned off. Awaiting replacement orders. Will continue to monitor very closely.

## 2018-05-28 NOTE — SUBJECTIVE & OBJECTIVE
Neurologic Chief Complaint: Aphasia, dysarthria, LSW     Subjective:     Interval History: Patient is seen for follow-up neurological assessment and treatment recommendations:     Blood pressure elevated >200 overnight requiring cardene. Patient bradycardic and with paroxysmal afib overnight as well. MRI with infarct in the right precentral gyrus.     HPI, Past Medical, Family, and Social History remains the same as documented in the initial encounter.     Review of Systems   Constitutional: Negative for fever.   Gastrointestinal: Negative for abdominal pain, nausea and vomiting.   Neurological: Positive for speech difficulty. Negative for headaches.   Psychiatric/Behavioral: Negative for agitation and behavioral problems.     Scheduled Meds:   aspirin  81 mg Per NG tube Daily    atorvastatin  40 mg Per NG tube Daily    buPROPion  150 mg Per NG tube BID    carvedilol  3.125 mg Per NG tube BID    donepezil  10 mg Per NG tube QHS    heparin (porcine)  5,000 Units Subcutaneous Q8H    losartan  25 mg Per NG tube Daily    midazolam  1 mg Intravenous Once    sodium chloride 0.9%  3 mL Intravenous Q8H     Continuous Infusions:   sodium chloride 0.9% 75 mL/hr at 05/28/18 1709     PRN Meds:hydrALAZINE, labetalol, magnesium oxide, magnesium oxide, potassium chloride 10%, potassium chloride 10%, potassium chloride 10%, potassium, sodium phosphates, potassium, sodium phosphates, potassium, sodium phosphates, senna-docusate 8.6-50 mg    Objective:     Vital Signs (Most Recent):  Temp: 99 °F (37.2 °C) (05/28/18 1709)  Pulse: 83 (05/28/18 1709)  Resp: (!) 28 (05/28/18 1709)  BP: (!) 173/104 (05/28/18 1714)  SpO2: 99 % (05/28/18 1709)  BP Location: Right arm    Vital Signs Range (Last 24H):  Temp:  [97.7 °F (36.5 °C)-99 °F (37.2 °C)]   Pulse:  []   Resp:  [11-37]   BP: (122-223)/()   SpO2:  [95 %-100 %]   BP Location: Right arm    Physical Exam   Constitutional: She appears well-developed. No distress.   HENT:    Head: Normocephalic and atraumatic.   Eyes: EOM are normal.   Neck: Neck supple.   Pulmonary/Chest: Effort normal.   Neurological: She is alert.   Not oriented   Skin: Skin is warm and dry.   Psychiatric: She has a normal mood and affect.       Neurological Exam:   LOC: alert  Attention Span: Good   Language: mild aphasia  Articulation: Dysarthria  Orientation: Not oriented to time  Visual Fields: Full  EOM (CN III, IV, VI): Full/intact  Facial Movement (CN VII): Lower facial weakness on the Left  Motor: Arm left  Paresis: 4/5  Leg left  Normal 5/5  Arm right  Normal 5/5  Leg right Normal 5/5  Sensation: Avery-hypoesthesia left  Tone: Normal tone throughout    Laboratory:  CMP:   Recent Labs  Lab 05/28/18  0332 05/28/18  0853   CALCIUM 9.3  --    ALBUMIN 3.5  --    PROT 6.9  --      --    K 2.8* 2.9*   CO2 30*  --      --    BUN 11  --    CREATININE 0.7  --    ALKPHOS 80  --    ALT 10  --    AST 19  --    BILITOT 0.3  --      BMP:   Recent Labs  Lab 05/28/18  0332 05/28/18  0853     --    K 2.8* 2.9*     --    CO2 30*  --    BUN 11  --    CREATININE 0.7  --    CALCIUM 9.3  --      CBC:   Recent Labs  Lab 05/28/18  0332   WBC 7.57   RBC 4.01   HGB 12.1   HCT 36.8*   PLT 91*   MCV 92   MCH 30.2   MCHC 32.9     Lipid Panel:   Recent Labs  Lab 05/27/18  1514   CHOL 197   LDLCALC 110.8   HDL 59   TRIG 136     Coagulation:   Recent Labs  Lab 05/28/18  0332   INR 0.9   APTT 22.8     Platelet Aggregation Study: No results for input(s): PLTAGG, PLTAGINTERP, PLTAGREGLACO, ADPPLTAGGREG in the last 168 hours.  Hgb A1C:   Recent Labs  Lab 05/27/18  1919   HGBA1C 5.5     TSH:   Recent Labs  Lab 05/27/18  1514   TSH 1.638       Diagnostic Results     Brain Imaging   MRI brain 5/28:   There is a small cortical area of  acute/recent infarction in the right precentral gyrus.    Superimposed age-appropriate cerebral volume loss with patchy and confluent T2 FLAIR signal abnormality supra infratentorial  parenchyma most compatible with severe chronic ischemic change.    Numerous scattered areas of a prior infarction in the cerebellum with encephalomalacia.    Punctate susceptibility right frontal periventricular white matter suggestive for remote microhemorrhage.    Vessel Imaging   CTA MP 5/27:   No acute intracranial hemorrhage or major vascular distribution infarct.    Bilateral basal ganglia, thalami and cerebellar hemispheres small remote infarcts.    Generalized cerebral volume loss and extensive white matter chronic small vessel ischemic change.    Ventriculomegaly out of proportion to sulcal prominence, nonspecific but can be seen with normal pressure hydrocephalus, but overall unchanged from prior MRI.    The anterior and posterior circulation demonstrate no high-grade stenosis or major vessel occlusion.    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    Cardiac Imaging   ECHO 5/28/18:   CONCLUSIONS     1 - Concentric LVH with hyperdynamic left ventricular systolic function (EF >70%).     2 - Severe left atrial enlargement.     3 - Impaired LV relaxation, elevated LAP (grade 2 diastolic dysfunction).     4 - Normal right ventricular systolic function .     5 - Mild to moderate aortic stenosis, ALBER = 1.64 cm2, AVAi = 1.12 cm2/m2, peak velocity = 3.11 m/s, mean gradient = 24 mmHg

## 2018-05-28 NOTE — MEDICAL/APP STUDENT
"Subjective:       Patient ID: Sury Cobos is a 83 y.o. female.    Chief Complaint:  Cerebrovascular Accident (left facial droop, left sided weakness, which is resolved, expressive aphasia, transfer from [place], TPA given PTA.  )    BRIEF HPI  Sury Cobos is a 83 y.o. [unfilled] female who  has a past medical history of Arthritis; Benign essential HTN; CHF (congestive heart failure); Closed intertrochanteric fracture of left hip; Coronary artery disease; Dementia; Diabetes mellitus; Gout; High cholesterol; Low potassium syndrome; and Urinary incontinence., currently presenting with Acute ischemic right MCA stroke.     Per notes: "Patient was last seen normal at 1320, alert  today with family. Daughter states she went to run and errand and returned at 1445 to find the patient slumped over and unable to talk or move her left side. She was taken to [place] and telemedicine consult was done with Dr Marx. No acute changes on CT head. Recommendation was to give IV TPA and transfer to [facility] for further evaluation.   Upon arrival patient still with aphasia, L facial droop, dysarthria. Minimal left sided weakness.   CTA head and neck multiphase complete, no LVO, no IR. Pt admitted to [facility] for higher level of care."    Consultation Requested by:   Trell Tucker Md  [address] [place] [address]    History of Present Illness  Stroke Follow-up  She presents for follow-up of a stroke. Event occurred 1 day ago. She has residual symptoms of weakness of left face, left arm(s), left upper leg(s), left lower leg(s) or left foot(feet), inability to speak. She denies syncope, seizures, cognitive impairment, swallowing difficulty, loss of vision bilateral.     Past Medical History:   Diagnosis Date    Arthritis     Benign essential HTN     CHF (congestive heart failure)     Closed intertrochanteric fracture of left hip     Coronary artery disease     Dementia     Diabetes mellitus     Gout     High " cholesterol     Low potassium syndrome     Urinary incontinence        Past Surgical History:   Procedure Laterality Date    CARDIAC SURGERY      CORONARY ARTERY BYPASS GRAFT      reduction & internal fixation of displaced closed comminuted intertrochanteric fx left hip  02/12/2018       History reviewed. No pertinent family history.    Social History     Social History    Marital status: Single     Spouse name: N/A    Number of children: N/A    Years of education: N/A     Social History Main Topics    Smoking status: Current Every Day Smoker     Packs/day: 3.00    Smokeless tobacco: Never Used    Alcohol use Yes      Comment: socially    Drug use: No    Sexual activity: Not Asked     Other Topics Concern    None     Social History Narrative    None       Review of Systems  Review of Systems   Constitutional: Negative.    HENT: Negative.    Eyes: Negative.    Respiratory: Negative.    Cardiovascular: Negative.    Gastrointestinal: Negative.    Endocrine: Negative.    Genitourinary: Negative.    Musculoskeletal: Negative.    Skin: Negative.    Allergic/Immunologic: Negative.    Neurological: Positive for speech difficulty. Negative for dizziness, tremors, seizures, syncope, light-headedness and headaches.   Hematological: Negative.    Psychiatric/Behavioral: Negative.        Objective:     Vitals:    05/28/18 1109   BP: (!) 165/72   Pulse: 73   Resp: (!) 22   Temp: 98.5 °F (36.9 °C)      Physical Exam   Constitutional: She appears well-developed and well-nourished.   HENT:   Head: Normocephalic and atraumatic.   Eyes: Conjunctivae are normal.   Neck: Normal range of motion. Neck supple.   Cardiovascular: Normal rate, regular rhythm, normal heart sounds and intact distal pulses.    Pulmonary/Chest: Effort normal and breath sounds normal.   Abdominal: Soft. Bowel sounds are normal.   Musculoskeletal: Normal range of motion.   Skin: Skin is warm.   Psychiatric: She has a normal mood and affect. Her  behavior is normal. Judgment and thought content normal.   Nursing note and vitals reviewed.      Neurologic Exam  -GCS E4V4M5  -Alert. Oriented to person, place. Dysarthria. Expressive aphasia. Agitated. Does not cooperate with exam. Intermittently follows commands.  -Cranial nerves II-XII intact except lower facial weakness on the left  -Motor LUE LLE 4/5 RUE RLE 5/5  -Sensation to light touch intact     Results for orders placed or performed during the hospital encounter of 05/27/18   CT Head Without Contrast    Narrative    EXAMINATION:  CT HEAD WITHOUT CONTRAST    CLINICAL HISTORY:  Confusion/delirium, altered LOC, unexplained;    TECHNIQUE:  Low dose axial images were obtained through the head.  Coronal and sagittal reformations were also performed. Contrast was not administered.    COMPARISON:  None.    FINDINGS:  There is mild dilatation of ventricles, sulci, fissures.  There is decreased density in white matter consistent with microvascular ischemic change.  There is slightly greater asymmetric dilatation of the ventricles relative to the sulci and fissures and recommend correlation clinically if there is clinical consideration for normal pressure hydrocephalus.  More likely this represents asymmetric involutional change.    There is no acute intracranial hemorrhage.  There is no intracranial mass effect.  There is no acute major vascular territory infarct.  The calvarium appears intact, mastoids pneumatized.  Visualized paranasal sinuses appear clear aside from slight mucosal thickening and questionable trace fluid within the sphenoid sinus.      Impression    Mild involutional changes and findings consistent with microvascular ischemic change.  Mild asymmetric widening of ventricles relative to the sulci and fissures which in the appropriate clinical setting can question normal pressure hydrocephalus with correlation recommended clinically.  No acute intracranial findings.      Electronically signed  by: Floridalma Rojas MD  Date:    05/27/2018  Time:    15:40   Results for orders placed or performed during the hospital encounter of 05/15/15   MRI Brain W WO Contrast    Narrative    Sagittal and axial images are obtained through the brain with multiple MR sequences.  Fine detail axial and coronal images were obtained through the internal auditory canals.  Following administration of 12 cc Multihance gadolinium contrast IV repeat   fine detail axial and coronal images are obtained to the internal artery canals and inner ears and through the brain.      The seventh and eighth nerve complex these are well seen bilaterally.  The internal auditory canals are sharp and symmetrical.  No neuroma or mass is identified.  The basal cisterns are clear and symmetric.  The MR signal from the inner ear bilaterally   is symmetric and within normal limits.  Abnormal contrast enhancement is not seen.    The basal cisterns are clear and symmetric.  There is no mass-effect or midline shift.  There is mild to moderate enlargement of the lateral ventricles and some mild enlargement of the cerebral sulci consistent with brain atrophy.  Within the cerebrum   and central cerebellum there is elevated signal intensity on flair weighting consistent with deep white matter ischemic changes.  In the right frontal lobe there is additional abnormal elevated signal around a single sulcus which may represent a small   ischemic CVA.  Other focal areas of increased or decreased signal intensity consistent with tumor, edema, CVA or hemorrhage are not seen.  No intracranial hemorrhage or hematoma is noted.  No abnormal areas of contrast enhancement are noted.  The patient   is seen to have left chronic maxillary sinusitis.    Impression     Negative MRI of the internal auditory canals and inner ears bilaterally.  Mild to moderate generalized brain atrophy with extensive deep white matter ischemic changes.  Possible small old right frontal CVA.  Chronic  left maxillary sinusitis        Electronically signed by: Dago Hathaway MD  Date:     05/21/15  Time:    11:38      Assessment/Plan:     Problem List Items Addressed This Visit     * (Principal)Acute ischemic right MCA stroke    Current Assessment & Plan     -s/p tpa at [facility]  -CTAMP neg for LVO  -admit to [facility]  -q1h neuro checks  -PT OT SLP  -VN consulted  -post tpa MRI tomorrow at 1400  -atorvastatin 40 mg qd  -hold ASA, SQH for now         Benign essential HTN    Current Assessment & Plan     -SBP<180  -labetalol, hydralazine prn         High cholesterol    Current Assessment & Plan     Stroke risk factor    Lipitor 40 mg daily           Other Visit Diagnoses     Altered mental status, unspecified altered mental status type    -  Primary            Qasim Adams (MS3)  5/28/2018 11:22 AM

## 2018-05-28 NOTE — PT/OT/SLP EVAL
"Speech Language Pathology Evaluation  Bedside Swallow    Patient Name:  Sury Cobos   MRN:  [id number]  Admitting Diagnosis: Acute ischemic right MCA stroke    Recommendations:                 General Recommendations:  Dysphagia therapy and Speech language evaluation  Diet recommendations:  NPO, NPO   Aspiration Precautions: Strict aspiration precautions   General Precautions: Standard, aspiration, fall  Communication strategies:  go to room if call light pushed    History:     Past Medical History:   Diagnosis Date    Arthritis     Benign essential HTN     CHF (congestive heart failure)     Closed intertrochanteric fracture of left hip     Coronary artery disease     Dementia     Diabetes mellitus     Gout     High cholesterol     Low potassium syndrome     Urinary incontinence        Past Surgical History:   Procedure Laterality Date    CARDIAC SURGERY      CORONARY ARTERY BYPASS GRAFT      reduction & internal fixation of displaced closed comminuted intertrochanteric fx left hip  02/12/2018       Social History: Patient lives with family.      Prior diet: regular      Subjective   "Why did this happen?"  Referring to stroke  Patient goals: home    Pain/Comfort:  · Pain Rating 1: 0/10  · Pain Rating Post-Intervention 1: 0/10    Objective:     Oral Musculature Evaluation  · Oral Musculature: left weakness, gross asymmetry present, general weakness  · Dentition: scattered dentition  · Mucosal Quality: adequate  · Mandibular Strength and Mobility: WFL  · Oral Labial Strength and Mobility: impaired retraction, impaired pursing  · Lingual Strength and Mobility: impaired strength, impaired protrusion  · Velar Elevation: impaired  · Buccal Strength and Mobility: impaired  · Volitional Cough: weak, wet  · Volitional Swallow: delayed  · Voice Prior to PO Intake: wet; decreased intensity    Bedside Swallow Eval:   Consistencies Assessed:  · Thin liquids teaspoon x2 trials  · Honey thick liquids teaspoon x1 "     Oral Phase:   · Slow oral transit time    Pharyngeal Phase:   · coughing/choking  · decreased hyolaryngeal excursion to palpation  · multiple spontaneous swallows  · throat clearing  · wet vocal quality after swallow    Compensatory Strategies  · None    Treatment: Coughing noted on 1 of 2 trials of thin.  Delayed cough noted following honey thick water    Assessment:     Sury Cobos is a 83 y.o. female with an SLP diagnosis of Dysphagia and Dysarthria.      Goals:    SLP Goals        Problem: SLP Goal    Goal Priority Disciplines Outcome   SLP Goal     SLP Ongoing (interventions implemented as appropriate)                   Plan:     · Patient to be seen:  5 x/week   · Plan of Care expires:  06/26/18  · Plan of Care reviewed with:  patient   · SLP Follow-Up:  Yes       Discharge recommendations:  nursing facility, skilled     Time Tracking:     SLP Treatment Date:   05/28/18  Speech Start Time:  0800  Speech Stop Time:  0810     Speech Total Time (min):  10 min    Billable Minutes: Eval Swallow and Oral Function 10    Paula Pal MA, CCC-SLP  05/28/2018

## 2018-05-28 NOTE — CONSULTS
[facility]  Vascular Neurology  Comprehensive Stroke Center  Consult Note    Inpatient consult to Vascular (Stroke) Neurology  Consult performed by: BARBARA CLARKE  Consult ordered by: FANI RICHARDS  Reason for consult: stroke        Assessment/Plan:     Patient is a 83 y.o. year old female with:    * Acute ischemic right MCA stroke    84 y/o female with left sided facail droop, aphasia, dysarthria who received IV TPA but no IR    Antithrombotics: Aspirin 81 mg on 5-28-17 at 2100 if no conversion    Statins: Lipitor 40 mg daily    Aggressive risk factor modification: HTN, HLD     Rehab efforts: PT/OT/SLP to evaluate and treat, PM&R consult     Diagnostics ordered/pending: HgbA1C to assess blood glucose levels, MRI head without contrast to assess brain parenchyma, TTE to assess cardiac function/status     VTE prophylaxis: SCD's may begin Heparin 500 units SC Q8H on 5-28-17 at 2200    BP parameters: Infarct: Post tPA, SBP <180            Aphasia    Due to stroke  Aggressive therapy        Benign essential HTN    Stroke risk factor  SBP <180 due to TPA        High cholesterol    Stroke risk factor    Lipitor 40 mg daily        S/P admn tPA in diff fac w/n last 24 hr bef adm to crnt fac    Close monitoring in NCCU for 24 hours post administration            STROKE DOCUMENTATION     Acute Stroke Times   Last Known Normal Date: 05/27/18  Last Known Normal Time: 1320  Symptom Onset Date: 05/27/18  Symptom Onset Time: 1445  Stroke Team Called Date: 05/27/18  Stroke Team Called Time: 1550  Stroke Team Arrival Date: 05/27/18  Stroke Team Arrival Time: 1751  CT Interpretation Time: 1536  Decision to Treat Time for Alteplase: 1806    NIH Scale:  Interval: 1hour post tPA or Endovascular procedure completion  1a. Level Of Consciousness: 0-->Alert: keenly responsive  1b. LOC Questions: 2-->Answers neither question correctly  1c. LOC Commands: 0-->Performs both tasks correctly  2. Best Gaze:  0-->Normal  3. Visual: 0-->No visual loss  4. Facial Palsy: 2-->Partial paralysis (total or near-total paralysis of lower face)  5a. Motor Arm, Left: 1-->Drift: limb holds 90 (or 45) degrees, but drifts down before full 10 seconds: does not hit bed or other support  5b. Motor Arm, Right: 0-->No drift: limb holds 90 (or 45) degrees for full 10 secs  6a. Motor Leg, Left: 0-->No drift: leg holds 30 degree position for full 5 secs  6b. Motor Leg, Right: 0-->No drift: leg holds 30 degree position for full 5 secs  7. Limb Ataxia: 0-->Absent  8. Sensory: 0-->Normal: no sensory loss  9. Best Language: 2-->Severe aphasia: all communication is through fragmentary expression: great need for inference, questioning, and guessing by the listener. Range of information that can be exchanged is limited: listener carries burden of. . . (see row details)  10. Dysarthria: 2-->Severe dysarthria: patients speech is so slurred as to be unintelligible in the absence of or out of proportion to any dysphasia, or is mute/anarthric  11. Extinction and Inattention (formerly Neglect): 0-->No abnormality  Total (NIH Stroke Scale): 9    Modified [place] Score: 0  [place] Coma Scale:12   ABCD2 Score:    [id number] Score:   HAS -BLED Score:   ICH Score:   Hunt & Burkett Classification:       Thrombolysis Candidate? Yes, given prior to arrival at outside hospital      Interventional Revascularization Candidate?   Is the patient eligible for mechanical endovascular reperfusion (ROSALIA)?  No; No large vessel occlusion      Hemorrhagic change of an Ischemic Stroke: Does this patient have an ischemic stroke with hemorrhagic changes? No     Subjective:     History of Present Illness:  82 y/o female who was last seen normal at 1320 was alert talking walking today with family, daughter went to run and errand and when returned at round 1445  found mother slumped over and unable to talk or move her left side. She was taken to [facility] and  telemedicine consult was done with Dr Marx and with with no acute changes on CT head recommendation was to give IV TPA and transfer to [facility] for further evaluation.   Upon arrival patient still with aphasia, facial droop, dysarthria. Minimal left sided weakness.   CTA head and neck multiphase complete and No LVO so no IR  Risk factors are DM, HTN, CHF, HLP        Past Medical History:   Diagnosis Date    Arthritis     CHF (congestive heart failure)     Closed intertrochanteric fracture of left hip     Coronary artery disease     Dementia     Diabetes mellitus     Gout     High cholesterol     Hypertension     Low potassium syndrome     Urinary incontinence      Past Surgical History:   Procedure Laterality Date    CARDIAC SURGERY      CORONARY ARTERY BYPASS GRAFT      reduction & internal fixation of displaced closed comminuted intertrochanteric fx left hip  02/12/2018     History reviewed. No pertinent family history.  Social History   Substance Use Topics    Smoking status: Current Every Day Smoker     Packs/day: 3.00    Smokeless tobacco: Never Used    Alcohol use Yes      Comment: socially     Review of patient's allergies indicates:  No Known Allergies    Medications: I have reviewed the current medication administration record.      (Not in a hospital admission)    Review of Systems   Unable to perform ROS: Acuity of condition     Objective:     Vital Signs (Most Recent):  Temp: 98.4 °F (36.9 °C) (05/27/18 1752)  Pulse: 95 (05/27/18 1836)  Resp: 20 (05/27/18 1836)  BP: (!) 157/69 (05/27/18 1836)  SpO2: 96 % (05/27/18 1836)    Vital Signs Range (Last 24H):  Temp:  [98.4 °F (36.9 °C)]   Pulse:  [70-95]   Resp:  [12-20]   BP: (150-187)/()   SpO2:  [95 %-100 %]     Physical Exam   Constitutional:   Thin elderly female   HENT:   Head: Normocephalic and atraumatic.   Eyes: EOM are normal. Pupils are equal, round, and reactive to light.   Neck: Normal range of motion.    Cardiovascular: Normal rate.    Pulmonary/Chest: Effort normal.   Abdominal: Soft.   Neurological: She is alert.   oriented x2, aphasia, dysarthria and facial droop   Skin: Skin is warm and dry.   Nursing note and vitals reviewed.      Neurological Exam:   LOC: alert  Attention Span: poor  Language: Expressive aphasia  Articulation: Dysarthria  Orientation: Not oriented to place, Not oriented to time  Visual Fields: Full  EOM (CN III, IV, VI): Full/intact  Pupils (CN II, III): PERRL  Facial Sensation (CN V): Normal  Facial Movement (CN VII): Lower facial weakness on the Left  Gag Reflex: present  Reflexes: 2+ throughout  flexor plantar responses bilaterally  Motor: Arm left  Paresis: 4/5  Leg left  Paresis: 4/5  Arm right  Normal 5/5  Leg right Normal 5/5  Cebellar: No evidence of appendicular or axial ataxia  Sensation: Intact to light touch, temperature and vibration  Tone: Normal tone throughout      Laboratory:  CMP:   Recent Labs  Lab 05/27/18  1514   CALCIUM 9.5   ALBUMIN 4.1   PROT 7.6   *   K 2.6*   CO2 29      BUN 10   CREATININE 0.6   ALKPHOS 67   ALT 11   AST 21   BILITOT 0.3     CBC:   Recent Labs  Lab 05/27/18  1514   WBC 7.31   RBC 4.15   HGB 12.6   HCT 37.9   *   MCV 91   MCH 30.4   MCHC 33.2     Lipid Panel:   Recent Labs  Lab 05/27/18  1514   CHOL 197   LDLCALC 110.8   HDL 59   TRIG 136     Coagulation:   Recent Labs  Lab 05/27/18  1514   INR 1.0     Hgb A1C: No results for input(s): HGBA1C in the last 168 hours.  TSH:   Recent Labs  Lab 05/27/18  1514   TSH 1.638       Diagnostic Results:      Brain imaging:  CT head w/o contrast 5-27-18 results:  Mild involutional changes and findings consistent with microvascular ischemic change.  Mild asymmetric widening of ventricles relative to the sulci and fissures which in the appropriate clinical setting can question normal pressure hydrocephalus with correlation recommended clinically.  No acute intracranial findings.    Vessel  Imaging:  CTA Head and neck multiphase 5-27-18     Cardiac Evaluation:   EKG 5-27-18 results:  Normal sinus rhythm  Nonspecific ST and T wave abnormality  Abnormal ECG  No previous ECGs available      Brittany Mclean NP  [facility] Stroke [facility]  Department of Vascular Neurology   [facility]

## 2018-05-28 NOTE — PROGRESS NOTES
Notified AGNES Hoover that pts /79 after receiving PRN hydralazine @ 0635. Holding PRN labetalol since pt has been pa throughout night. Cardene gtt ordered. Informed AM RN who will start gtt. Will continue to monitor very closely.

## 2018-05-28 NOTE — PROGRESS NOTES
Patient arrived to [facility] from [place] via [facility] ED by stretcher    Type of Stroke ischemic R MCA    TPA start time 1609 & end time 1709    Patients current symptoms include slurred speech, L FD, LUE drift, slight aphasia    Charge RN and NCC notified.

## 2018-05-28 NOTE — ED NOTES
Patient continues to be agitated. Patient yelling, hitting side rails. Patient being frequently checked on and closely monitored.

## 2018-05-28 NOTE — ASSESSMENT & PLAN NOTE
-s/p tpa at OSH  -CTA without evidence of LVO  -MRI with evidence of cortical infarct involving the right pre-central gyrus  -q1h neuro checks  -PT OT SLP  -Vascular neurology following  -asp 81mg qd  -atorvastatin 40 mg qd  -heparin 5000U q8h

## 2018-05-28 NOTE — PROGRESS NOTES
2300 - Pt failed MARYURI. Notified AGNES Hoover and ARISTEO Rodas at bedside. Okay to insert NGT.    2315 - attempted to insert NGT with RNx2 unsuccessfully. Pt became agitated, tried to bite tube, cussing, and pulling at restraints. Restarted precedex gtt @ 0.2mcg/kg/hr. Will reattempt after pt has settled down.    2340 - NGT inserted successfully @ 55cm. KUB ordered.

## 2018-05-28 NOTE — PROGRESS NOTES
[facility]  Vascular Neurology  Comprehensive Stroke Center  Progress Note    Assessment/Plan:     * Acute ischemic right MCA stroke    84 y/o female with left sided facial droop, aphasia, dysarthria who received IV TPA but no IR. MRI with infarct in R precentral gyrus.   Patient NPO with NG in place.   Nursing notes state afib seen on telemetry, but no afib documented on 12 lead ECG.     Antithrombotics: Aspirin 81 mg on 5-28-17 at 2100    Statins: Lipitor 40 mg daily    Aggressive risk factor modification: HTN, HLD     Rehab efforts: PT/OT/SLP to evaluate and treat, PM&R consult     Diagnostics ordered/pending: none     VTE prophylaxis: SCD's may begin Heparin 500 units SC Q8H on 5-28-17 at 2200    BP parameters: Infarct: Post tPA, SBP <180            Hypokalemia    K 2.9 today  Management per NCC, replacement ordered        High cholesterol    Stroke risk factor    Lipitor 40 mg daily        Benign essential HTN    Stroke risk factor  SBP <180 due to TPA        Aphasia    Due to stroke  Aggressive therapy        S/P admn tPA in diff fac w/n last 24 hr bef adm to crnt fac    Close monitoring in NCCU for 24 hours post administration             84 y/o female with aphasia, dysarthria and facial droop, received IV TPA but no LVO so no IR. Probable small vessel disease.    5/28: Blood pressure elevated >200 overnight requiring cardene. Patient bradycardic and with paroxysmal afib overnight as well. MRI with infarct in the right precentral gyrus.     STROKE DOCUMENTATION   Acute Stroke Times   Last Known Normal Date: 05/27/18  Last Known Normal Time: 1320  Symptom Onset Date: 05/27/18  Symptom Onset Time: 1445  Stroke Team Called Date: 05/27/18  Stroke Team Called Time: 1550  Stroke Team Arrival Date: 05/27/18  Stroke Team Arrival Time: 1751  CT Interpretation Time: 1536  Decision to Treat Time for Alteplase: 1806    NIH Scale:  1a. Level Of Consciousness: 0-->Alert: keenly responsive  1b.  LOC Questions: 2-->Answers neither question correctly  1c. LOC Commands: 0-->Performs both tasks correctly  2. Best Gaze: 1-->Partial gaze palsy: gaze is abnormal in one or both eyes, but forced deviation or total gaze paresis is not present  3. Visual: 0-->No visual loss  4. Facial Palsy: 0-->Normal symmetrical movements  5a. Motor Arm, Left: 1-->Drift: limb holds 90 (or 45) degrees, but drifts down before full 10 seconds: does not hit bed or other support  5b. Motor Arm, Right: 0-->No drift: limb holds 90 (or 45) degrees for full 10 secs  6a. Motor Leg, Left: 0-->No drift: leg holds 30 degree position for full 5 secs  6b. Motor Leg, Right: 0-->No drift: leg holds 30 degree position for full 5 secs  7. Limb Ataxia: 0-->Absent  8. Sensory: 0-->Normal: no sensory loss  9. Best Language: 1-->Mild-to-moderate aphasia: some obvious loss of fluency or facility of comprehension, without significant limitation on ideas expressed or form of expression. Reduction of speech and/or comprehension, however, makes conversation. . . (see row details)  10. Dysarthria: 2-->Severe dysarthria: patients speech is so slurred as to be unintelligible in the absence of or out of proportion to any dysphasia, or is mute/anarthric  11. Extinction and Inattention (formerly Neglect): 1-->Visual, tactile, auditory, spatial, or personal inattention or extinction to bilateral simultaneous stimulation in one of the sensory modalities  Total (NIH Stroke Scale): 8       Modified [place] Score: 0  [place] Coma Scale:    ABCD2 Score:    [id number] Score:   HAS -BLED Score:   ICH Score:   Hunt & Burkett Classification:      Hemorrhagic change of an Ischemic Stroke: Does this patient have an ischemic stroke with hemorrhagic changes? No     Neurologic Chief Complaint: Aphasia, dysarthria, LSW     Subjective:     Interval History: Patient is seen for follow-up neurological assessment and treatment recommendations:     Blood pressure elevated >200 overnight  requiring cardene. Patient bradycardic and with paroxysmal afib overnight as well. MRI with infarct in the right precentral gyrus.     HPI, Past Medical, Family, and Social History remains the same as documented in the initial encounter.     Review of Systems   Constitutional: Negative for fever.   Gastrointestinal: Negative for abdominal pain, nausea and vomiting.   Neurological: Positive for speech difficulty. Negative for headaches.   Psychiatric/Behavioral: Negative for agitation and behavioral problems.     Scheduled Meds:   aspirin  81 mg Per NG tube Daily    atorvastatin  40 mg Per NG tube Daily    buPROPion  150 mg Per NG tube BID    carvedilol  3.125 mg Per NG tube BID    donepezil  10 mg Per NG tube QHS    heparin (porcine)  5,000 Units Subcutaneous Q8H    losartan  25 mg Per NG tube Daily    midazolam  1 mg Intravenous Once    sodium chloride 0.9%  3 mL Intravenous Q8H     Continuous Infusions:   sodium chloride 0.9% 75 mL/hr at 05/28/18 1709     PRN Meds:hydrALAZINE, labetalol, magnesium oxide, magnesium oxide, potassium chloride 10%, potassium chloride 10%, potassium chloride 10%, potassium, sodium phosphates, potassium, sodium phosphates, potassium, sodium phosphates, senna-docusate 8.6-50 mg    Objective:     Vital Signs (Most Recent):  Temp: 99 °F (37.2 °C) (05/28/18 1709)  Pulse: 83 (05/28/18 1709)  Resp: (!) 28 (05/28/18 1709)  BP: (!) 173/104 (05/28/18 1714)  SpO2: 99 % (05/28/18 1709)  BP Location: Right arm    Vital Signs Range (Last 24H):  Temp:  [97.7 °F (36.5 °C)-99 °F (37.2 °C)]   Pulse:  []   Resp:  [11-37]   BP: (122-223)/()   SpO2:  [95 %-100 %]   BP Location: Right arm    Physical Exam   Constitutional: She appears well-developed. No distress.   HENT:   Head: Normocephalic and atraumatic.   Eyes: EOM are normal.   Neck: Neck supple.   Pulmonary/Chest: Effort normal.   Neurological: She is alert.   Not oriented   Skin: Skin is warm and dry.   Psychiatric: She has a  normal mood and affect.       Neurological Exam:   LOC: alert  Attention Span: Good   Language: mild aphasia  Articulation: Dysarthria  Orientation: Not oriented to time  Visual Fields: Full  EOM (CN III, IV, VI): Full/intact  Facial Movement (CN VII): Lower facial weakness on the Left  Motor: Arm left  Paresis: 4/5  Leg left  Normal 5/5  Arm right  Normal 5/5  Leg right Normal 5/5  Sensation: Avery-hypoesthesia left  Tone: Normal tone throughout    Laboratory:  CMP:   Recent Labs  Lab 05/28/18  0332 05/28/18  0853   CALCIUM 9.3  --    ALBUMIN 3.5  --    PROT 6.9  --      --    K 2.8* 2.9*   CO2 30*  --      --    BUN 11  --    CREATININE 0.7  --    ALKPHOS 80  --    ALT 10  --    AST 19  --    BILITOT 0.3  --      BMP:   Recent Labs  Lab 05/28/18  0332 05/28/18  0853     --    K 2.8* 2.9*     --    CO2 30*  --    BUN 11  --    CREATININE 0.7  --    CALCIUM 9.3  --      CBC:   Recent Labs  Lab 05/28/18  0332   WBC 7.57   RBC 4.01   HGB 12.1   HCT 36.8*   PLT 91*   MCV 92   MCH 30.2   MCHC 32.9     Lipid Panel:   Recent Labs  Lab 05/27/18  1514   CHOL 197   LDLCALC 110.8   HDL 59   TRIG 136     Coagulation:   Recent Labs  Lab 05/28/18  0332   INR 0.9   APTT 22.8     Platelet Aggregation Study: No results for input(s): PLTAGG, PLTAGINTERP, PLTAGREGLACO, ADPPLTAGGREG in the last 168 hours.  Hgb A1C:   Recent Labs  Lab 05/27/18  1919   HGBA1C 5.5     TSH:   Recent Labs  Lab 05/27/18  1514   TSH 1.638       Diagnostic Results     Brain Imaging   MRI brain 5/28:   There is a small cortical area of  acute/recent infarction in the right precentral gyrus.    Superimposed age-appropriate cerebral volume loss with patchy and confluent T2 FLAIR signal abnormality supra infratentorial parenchyma most compatible with severe chronic ischemic change.    Numerous scattered areas of a prior infarction in the cerebellum with encephalomalacia.    Punctate susceptibility right frontal periventricular white matter  suggestive for remote microhemorrhage.    Vessel Imaging   CTA MP 5/27:   No acute intracranial hemorrhage or major vascular distribution infarct.    Bilateral basal ganglia, thalami and cerebellar hemispheres small remote infarcts.    Generalized cerebral volume loss and extensive white matter chronic small vessel ischemic change.    Ventriculomegaly out of proportion to sulcal prominence, nonspecific but can be seen with normal pressure hydrocephalus, but overall unchanged from prior MRI.    The anterior and posterior circulation demonstrate no high-grade stenosis or major vessel occlusion.    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    Cardiac Imaging   ECHO 5/28/18:   CONCLUSIONS     1 - Concentric LVH with hyperdynamic left ventricular systolic function (EF >70%).     2 - Severe left atrial enlargement.     3 - Impaired LV relaxation, elevated LAP (grade 2 diastolic dysfunction).     4 - Normal right ventricular systolic function .     5 - Mild to moderate aortic stenosis, ALBER = 1.64 cm2, AVAi = 1.12 cm2/m2, peak velocity = 3.11 m/s, mean gradient = 24 mmHg      Adrianna Perez PA-C  Comprehensive Stroke Center  Department of Vascular Neurology   [facility]

## 2018-05-28 NOTE — PLAN OF CARE
Problem: Patient Care Overview  Goal: Plan of Care Review  Outcome: Ongoing (interventions implemented as appropriate)  POC reviewed with Sury Cobos at 0500. Pt refuses to verbalized any understanding. No acute events overnight. Pt progressing toward goals. Will continue to monitor. See flowsheets for full assessment and VS info. NGT placed @ 55cm, confirmed with nursing communication order in. Replacing K+ with one time dose. EKG done. Trending troponin q8 hours. On and off precedex gtt throughout night. See notes from overnight for more details.    Restraints: bilateral wrists still present for pulling at lines,  18 @ 1846.        Admit Date: 18    Admit Dx: R MCA s/p tPA    Past Medical History:   Diagnosis Date    Arthritis     Benign essential HTN     CHF (congestive heart failure)     Closed intertrochanteric fracture of left hip     Coronary artery disease     Dementia     Diabetes mellitus     Gout     High cholesterol     Low potassium syndrome     Urinary incontinence        Past Surgical History:   Procedure Laterality Date    CARDIAC SURGERY      CORONARY ARTERY BYPASS GRAFT      reduction & internal fixation of displaced closed comminuted intertrochanteric fx left hip  2018       Individualization:   1.  Likes to be covered up

## 2018-05-28 NOTE — PLAN OF CARE
Problem: Physical Therapy Goal  Goal: Physical Therapy Goal  Goals to be met by: 2018     Patient will increase functional independence with mobility by performin. Supine to sit with Contact Guard Assistance  2. Sit to supine with Contact Guard Assistance  3. Rolling to Left and Right with Supervision.  4. Sit to stand transfer with Stand-by Assistance  5. Gait  x 20 feet with Contact Guard Assistance using Rolling Walker.   6. Stand for 10 minutes with Contact Guard Assistance using Rolling Walker    Outcome: Ongoing (interventions implemented as appropriate)  Pt evaluated, and PT goals set.    Cruz Pedersen, SPT  2018

## 2018-05-29 PROBLEM — N39.0 UTI (URINARY TRACT INFECTION): Status: ACTIVE | Noted: 2018-05-29

## 2018-05-29 LAB
ALBUMIN SERPL BCP-MCNC: 3.5 G/DL
ALP SERPL-CCNC: 77 U/L
ALT SERPL W/O P-5'-P-CCNC: 12 U/L
ANION GAP SERPL CALC-SCNC: 9 MMOL/L
APTT BLDCRRT: 28.4 SEC
AST SERPL-CCNC: 24 U/L
BASOPHILS # BLD AUTO: 0.05 K/UL
BASOPHILS NFR BLD: 0.5 %
BILIRUB SERPL-MCNC: 0.6 MG/DL
BUN SERPL-MCNC: 9 MG/DL
CALCIUM SERPL-MCNC: 8.9 MG/DL
CHLORIDE SERPL-SCNC: 102 MMOL/L
CO2 SERPL-SCNC: 25 MMOL/L
CREAT SERPL-MCNC: 0.6 MG/DL
DIFFERENTIAL METHOD: ABNORMAL
EOSINOPHIL # BLD AUTO: 0.1 K/UL
EOSINOPHIL NFR BLD: 0.5 %
ERYTHROCYTE [DISTWIDTH] IN BLOOD BY AUTOMATED COUNT: 14.1 %
EST. GFR  (AFRICAN AMERICAN): >60 ML/MIN/1.73 M^2
EST. GFR  (NON AFRICAN AMERICAN): >60 ML/MIN/1.73 M^2
GLUCOSE SERPL-MCNC: 145 MG/DL
HCT VFR BLD AUTO: 35.3 %
HGB BLD-MCNC: 11.6 G/DL
IMM GRANULOCYTES # BLD AUTO: 0.02 K/UL
IMM GRANULOCYTES NFR BLD AUTO: 0.2 %
INR PPP: 1.4
LYMPHOCYTES # BLD AUTO: 2 K/UL
LYMPHOCYTES NFR BLD: 20 %
MAGNESIUM SERPL-MCNC: 1.7 MG/DL
MCH RBC QN AUTO: 30.6 PG
MCHC RBC AUTO-ENTMCNC: 32.9 G/DL
MCV RBC AUTO: 93 FL
MONOCYTES # BLD AUTO: 0.6 K/UL
MONOCYTES NFR BLD: 5.7 %
NEUTROPHILS # BLD AUTO: 7.3 K/UL
NEUTROPHILS NFR BLD: 73.1 %
NRBC BLD-RTO: 0 /100 WBC
PHOSPHATE SERPL-MCNC: 3.4 MG/DL
PLATELET # BLD AUTO: 106 K/UL
PMV BLD AUTO: 13.4 FL
POCT GLUCOSE: 115 MG/DL (ref 70–110)
POCT GLUCOSE: 132 MG/DL (ref 70–110)
POCT GLUCOSE: 147 MG/DL (ref 70–110)
POCT GLUCOSE: 159 MG/DL (ref 70–110)
POTASSIUM SERPL-SCNC: 3.5 MMOL/L
POTASSIUM SERPL-SCNC: 4 MMOL/L
PROT SERPL-MCNC: 7.1 G/DL
PROTHROMBIN TIME: 13.7 SEC
RBC # BLD AUTO: 3.79 M/UL
SODIUM SERPL-SCNC: 136 MMOL/L
WBC # BLD AUTO: 10 K/UL

## 2018-05-29 PROCEDURE — 36600 WITHDRAWAL OF ARTERIAL BLOOD: CPT

## 2018-05-29 PROCEDURE — 94761 N-INVAS EAR/PLS OXIMETRY MLT: CPT

## 2018-05-29 PROCEDURE — 92526 ORAL FUNCTION THERAPY: CPT

## 2018-05-29 PROCEDURE — 85610 PROTHROMBIN TIME: CPT

## 2018-05-29 PROCEDURE — 84100 ASSAY OF PHOSPHORUS: CPT

## 2018-05-29 PROCEDURE — 83735 ASSAY OF MAGNESIUM: CPT

## 2018-05-29 PROCEDURE — 20000000 HC ICU ROOM

## 2018-05-29 PROCEDURE — 25000242 PHARM REV CODE 250 ALT 637 W/ HCPCS: Performed by: PHYSICIAN ASSISTANT

## 2018-05-29 PROCEDURE — 99233 SBSQ HOSP IP/OBS HIGH 50: CPT | Mod: ,,, | Performed by: PSYCHIATRY & NEUROLOGY

## 2018-05-29 PROCEDURE — 85730 THROMBOPLASTIN TIME PARTIAL: CPT

## 2018-05-29 PROCEDURE — 25000003 PHARM REV CODE 250: Performed by: PHYSICIAN ASSISTANT

## 2018-05-29 PROCEDURE — A4216 STERILE WATER/SALINE, 10 ML: HCPCS | Performed by: PHYSICIAN ASSISTANT

## 2018-05-29 PROCEDURE — 25000003 PHARM REV CODE 250: Performed by: PSYCHIATRY & NEUROLOGY

## 2018-05-29 PROCEDURE — 94640 AIRWAY INHALATION TREATMENT: CPT

## 2018-05-29 PROCEDURE — 63600175 PHARM REV CODE 636 W HCPCS: Performed by: PSYCHIATRY & NEUROLOGY

## 2018-05-29 PROCEDURE — 97530 THERAPEUTIC ACTIVITIES: CPT

## 2018-05-29 PROCEDURE — 85025 COMPLETE CBC W/AUTO DIFF WBC: CPT

## 2018-05-29 PROCEDURE — 99900035 HC TECH TIME PER 15 MIN (STAT)

## 2018-05-29 PROCEDURE — 82803 BLOOD GASES ANY COMBINATION: CPT

## 2018-05-29 PROCEDURE — 92523 SPEECH SOUND LANG COMPREHEN: CPT

## 2018-05-29 PROCEDURE — 84132 ASSAY OF SERUM POTASSIUM: CPT

## 2018-05-29 PROCEDURE — 80053 COMPREHEN METABOLIC PANEL: CPT

## 2018-05-29 PROCEDURE — 63600175 PHARM REV CODE 636 W HCPCS: Performed by: PHYSICIAN ASSISTANT

## 2018-05-29 PROCEDURE — 94667 MNPJ CHEST WALL 1ST: CPT

## 2018-05-29 RX ORDER — LOSARTAN POTASSIUM 50 MG/1
50 TABLET ORAL DAILY
Status: DISCONTINUED | OUTPATIENT
Start: 2018-05-29 | End: 2018-05-30

## 2018-05-29 RX ORDER — LOSARTAN POTASSIUM 25 MG/1
25 TABLET ORAL ONCE
Status: DISCONTINUED | OUTPATIENT
Start: 2018-05-29 | End: 2018-05-29

## 2018-05-29 RX ORDER — SODIUM CHLORIDE FOR INHALATION 3 %
4 VIAL, NEBULIZER (ML) INHALATION EVERY 6 HOURS
Status: DISCONTINUED | OUTPATIENT
Start: 2018-05-29 | End: 2018-06-03

## 2018-05-29 RX ORDER — IPRATROPIUM BROMIDE AND ALBUTEROL SULFATE 2.5; .5 MG/3ML; MG/3ML
3 SOLUTION RESPIRATORY (INHALATION) EVERY 8 HOURS
Status: DISCONTINUED | OUTPATIENT
Start: 2018-05-30 | End: 2018-06-03

## 2018-05-29 RX ORDER — CIPROFLOXACIN 500 MG/1
500 TABLET ORAL EVERY 12 HOURS
Status: DISCONTINUED | OUTPATIENT
Start: 2018-05-29 | End: 2018-05-30

## 2018-05-29 RX ORDER — LOSARTAN POTASSIUM 50 MG/1
50 TABLET ORAL DAILY
Status: DISCONTINUED | OUTPATIENT
Start: 2018-05-30 | End: 2018-05-29

## 2018-05-29 RX ORDER — SODIUM CHLORIDE FOR INHALATION 3 %
4 VIAL, NEBULIZER (ML) INHALATION EVERY 6 HOURS PRN
Status: DISCONTINUED | OUTPATIENT
Start: 2018-05-29 | End: 2018-05-29

## 2018-05-29 RX ADMIN — LABETALOL HYDROCHLORIDE 10 MG: 5 INJECTION, SOLUTION INTRAVENOUS at 04:05

## 2018-05-29 RX ADMIN — HYDRALAZINE HYDROCHLORIDE 10 MG: 20 INJECTION INTRAMUSCULAR; INTRAVENOUS at 05:05

## 2018-05-29 RX ADMIN — HEPARIN SODIUM 5000 UNITS: 5000 INJECTION, SOLUTION INTRAVENOUS; SUBCUTANEOUS at 09:05

## 2018-05-29 RX ADMIN — HYDRALAZINE HYDROCHLORIDE 10 MG: 20 INJECTION INTRAMUSCULAR; INTRAVENOUS at 02:05

## 2018-05-29 RX ADMIN — LABETALOL HYDROCHLORIDE 10 MG: 5 INJECTION, SOLUTION INTRAVENOUS at 01:05

## 2018-05-29 RX ADMIN — Medication 800 MG: at 10:05

## 2018-05-29 RX ADMIN — SODIUM CHLORIDE SOLN NEBU 3% 4 ML: 3 NEBU SOLN at 07:05

## 2018-05-29 RX ADMIN — DONEPEZIL HYDROCHLORIDE 10 MG: 5 TABLET, FILM COATED ORAL at 08:05

## 2018-05-29 RX ADMIN — BUPROPION HYDROCHLORIDE 150 MG: 75 TABLET, FILM COATED ORAL at 11:05

## 2018-05-29 RX ADMIN — BUPROPION HYDROCHLORIDE 150 MG: 75 TABLET, FILM COATED ORAL at 08:05

## 2018-05-29 RX ADMIN — Medication 3 ML: at 01:05

## 2018-05-29 RX ADMIN — Medication 800 MG: at 02:05

## 2018-05-29 RX ADMIN — QUETIAPINE FUMARATE 25 MG: 25 TABLET ORAL at 08:05

## 2018-05-29 RX ADMIN — ASPIRIN 81 MG: 81 TABLET, COATED ORAL at 10:05

## 2018-05-29 RX ADMIN — CIPROFLOXACIN HYDROCHLORIDE 500 MG: 500 TABLET, FILM COATED ORAL at 08:05

## 2018-05-29 RX ADMIN — LABETALOL HYDROCHLORIDE 10 MG: 5 INJECTION, SOLUTION INTRAVENOUS at 12:05

## 2018-05-29 RX ADMIN — CIPROFLOXACIN HYDROCHLORIDE 500 MG: 500 TABLET, FILM COATED ORAL at 10:05

## 2018-05-29 RX ADMIN — CARVEDILOL 3.12 MG: 3.12 TABLET, FILM COATED ORAL at 10:05

## 2018-05-29 RX ADMIN — POTASSIUM CHLORIDE 40 MEQ: 20 SOLUTION ORAL at 10:05

## 2018-05-29 RX ADMIN — HEPARIN SODIUM 5000 UNITS: 5000 INJECTION, SOLUTION INTRAVENOUS; SUBCUTANEOUS at 05:05

## 2018-05-29 RX ADMIN — LOSARTAN POTASSIUM 50 MG: 50 TABLET, FILM COATED ORAL at 10:05

## 2018-05-29 RX ADMIN — Medication 3 ML: at 05:05

## 2018-05-29 RX ADMIN — ATORVASTATIN CALCIUM 40 MG: 20 TABLET, FILM COATED ORAL at 10:05

## 2018-05-29 RX ADMIN — CARVEDILOL 3.12 MG: 3.12 TABLET, FILM COATED ORAL at 08:05

## 2018-05-29 RX ADMIN — STANDARDIZED SENNA CONCENTRATE AND DOCUSATE SODIUM 1 TABLET: 8.6; 5 TABLET, FILM COATED ORAL at 10:05

## 2018-05-29 RX ADMIN — SODIUM CHLORIDE: 0.9 INJECTION, SOLUTION INTRAVENOUS at 02:05

## 2018-05-29 RX ADMIN — Medication 3 ML: at 09:05

## 2018-05-29 RX ADMIN — HEPARIN SODIUM 5000 UNITS: 5000 INJECTION, SOLUTION INTRAVENOUS; SUBCUTANEOUS at 01:05

## 2018-05-29 NOTE — ASSESSMENT & PLAN NOTE
84 y/o female with left sided facial droop, aphasia, dysarthria who received IV TPA but no IR. MRI with infarct in R precentral gyrus.   Patient NPO with NG in place.   Nursing notes state afib seen on telemetry, but no afib documented on 12 lead ECG. At this time, unclear etiology; ESUS vs small vessel.     Antithrombotics: Aspirin 81 mg    Statins: Lipitor 40 mg daily    Aggressive risk factor modification: HTN, HLD     Rehab efforts: PT/OT/SLP to evaluate and treat, PM&R consult     Diagnostics ordered/pending: none     VTE prophylaxis: SCD's may begin Heparin 500 units SC Q8H     BP parameters: Infarct: Post tPA, SBP <180

## 2018-05-29 NOTE — PLAN OF CARE
Problem: SLP Goal  Goal: SLP Goal  Speech Language Pathology Goals  Goals expected to be met by 6/5  1. Ongoing swallow assessment  2. Pt will follow 1 step commands with 75% acc given min A  3. Pt will answer yes/no questions with 75% acc given min A  4. Pt will complete OME's given mod A  5. Pt will complete word level dysarthria tasks with 80% intell. given max A to utilize speech strategies   6. Assess reading, writing, visual spatial skills  7. Assess memory, problem solving, reasoning  SLP Speech/Language/Cognitive Evaluation completed see note for details.     Amairani Jane MS, CCC-SLP  Speech Language Pathologist  Pager: [phone]  5/29/2018

## 2018-05-29 NOTE — PLAN OF CARE
Post stroke management  bld pr goal < 160 - adjust antihypertensives  Thrombocytopenia - chronic  Urinalysis - many bacteria, short course of cipro.

## 2018-05-29 NOTE — SUBJECTIVE & OBJECTIVE
Neurologic Chief Complaint: Aphasia, dysarthria, LSW     Subjective:     Interval History: Patient is seen for follow-up neurological assessment and treatment recommendations:     No events overnight. Patient with many bacteria on UTI so short cipro course started per NCC. Pending step down to NSU.      HPI, Past Medical, Family, and Social History remains the same as documented in the initial encounter.     Review of Systems   Constitutional: Negative for fever.   Gastrointestinal: Negative for abdominal pain, nausea and vomiting.   Neurological: Positive for speech difficulty. Negative for headaches.   Psychiatric/Behavioral: Positive for agitation and confusion. Negative for behavioral problems.     Scheduled Meds:   aspirin  81 mg Per NG tube Daily    atorvastatin  40 mg Per NG tube Daily    buPROPion  150 mg Per NG tube BID    carvedilol  3.125 mg Per NG tube BID    ciprofloxacin HCl  500 mg Per NG tube Q12H    donepezil  10 mg Per NG tube QHS    heparin (porcine)  5,000 Units Subcutaneous Q8H    losartan  50 mg Per NG tube Daily    QUEtiapine  25 mg Per NG tube QHS    sodium chloride 0.9%  3 mL Intravenous Q8H     Continuous Infusions:   sodium chloride 0.9% 75 mL/hr at 05/29/18 1501     PRN Meds:hydrALAZINE, labetalol, magnesium oxide, magnesium oxide, potassium chloride 10%, potassium chloride 10%, potassium chloride 10%, potassium, sodium phosphates, potassium, sodium phosphates, potassium, sodium phosphates, senna-docusate 8.6-50 mg    Objective:     Vital Signs (Most Recent):  Temp: 97.8 °F (36.6 °C) (05/29/18 1501)  Pulse: 68 (05/29/18 1501)  Resp: (!) 29 (05/29/18 1501)  BP: (!) 143/64 (05/29/18 1501)  SpO2: 98 % (05/29/18 1501)  BP Location: Right arm    Vital Signs Range (Last 24H):  Temp:  [97 °F (36.1 °C)-99.3 °F (37.4 °C)]   Pulse:  [52-92]   Resp:  [18-36]   BP: (124-186)/()   SpO2:  [95 %-99 %]   BP Location: Right arm    Physical Exam   Constitutional: She appears  well-developed. No distress.   HENT:   Head: Normocephalic and atraumatic.   Eyes: EOM are normal.   Neck: Neck supple.   Pulmonary/Chest: Effort normal.   Neurological: She is alert.   Not oriented   Skin: Skin is warm and dry.   Psychiatric: She has a normal mood and affect.       Neurological Exam:   LOC: alert  Attention Span: Good   Language: mild aphasia  Articulation: Dysarthria  Orientation: Not oriented to time  Visual Fields: Full  EOM (CN III, IV, VI): Full/intact  Facial Movement (CN VII): Lower facial weakness on the Left  Motor: Arm left  Paresis: 4/5  Leg left  Normal 5/5  Arm right  Normal 5/5  Leg right Normal 5/5  Sensation: Avery-hypoesthesia left  Tone: Normal tone throughout    Laboratory:  CMP:     Recent Labs  Lab 05/29/18  0332 05/29/18  1354   CALCIUM 8.9  --    ALBUMIN 3.5  --    PROT 7.1  --      --    K 3.5 4.0   CO2 25  --      --    BUN 9  --    CREATININE 0.6  --    ALKPHOS 77  --    ALT 12  --    AST 24  --    BILITOT 0.6  --      BMP:     Recent Labs  Lab 05/29/18  0332 05/29/18  1354     --    K 3.5 4.0     --    CO2 25  --    BUN 9  --    CREATININE 0.6  --    CALCIUM 8.9  --      CBC:     Recent Labs  Lab 05/29/18  0332   WBC 10.00   RBC 3.79*   HGB 11.6*   HCT 35.3*   *   MCV 93   MCH 30.6   MCHC 32.9     Lipid Panel:     Recent Labs  Lab 05/27/18  1514   CHOL 197   LDLCALC 110.8   HDL 59   TRIG 136     Coagulation:     Recent Labs  Lab 05/29/18  0317   INR 1.4*   APTT 28.4     Platelet Aggregation Study: No results for input(s): PLTAGG, PLTAGINTERP, PLTAGREGLACO, ADPPLTAGGREG in the last 168 hours.  Hgb A1C:     Recent Labs  Lab 05/27/18  1919   HGBA1C 5.5     TSH:     Recent Labs  Lab 05/27/18  1514   TSH 1.638       Diagnostic Results     Brain Imaging   MRI brain 5/28:   There is a small cortical area of  acute/recent infarction in the right precentral gyrus.    Superimposed age-appropriate cerebral volume loss with patchy and confluent T2 FLAIR  signal abnormality supra infratentorial parenchyma most compatible with severe chronic ischemic change.    Numerous scattered areas of a prior infarction in the cerebellum with encephalomalacia.    Punctate susceptibility right frontal periventricular white matter suggestive for remote microhemorrhage.    Vessel Imaging   CTA MP 5/27:   No acute intracranial hemorrhage or major vascular distribution infarct.    Bilateral basal ganglia, thalami and cerebellar hemispheres small remote infarcts.    Generalized cerebral volume loss and extensive white matter chronic small vessel ischemic change.    Ventriculomegaly out of proportion to sulcal prominence, nonspecific but can be seen with normal pressure hydrocephalus, but overall unchanged from prior MRI.    The anterior and posterior circulation demonstrate no high-grade stenosis or major vessel occlusion.    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    Cardiac Imaging   ECHO 5/28/18:   CONCLUSIONS     1 - Concentric LVH with hyperdynamic left ventricular systolic function (EF >70%).     2 - Severe left atrial enlargement.     3 - Impaired LV relaxation, elevated LAP (grade 2 diastolic dysfunction).     4 - Normal right ventricular systolic function .     5 - Mild to moderate aortic stenosis, ALBER = 1.64 cm2, AVAi = 1.12 cm2/m2, peak velocity = 3.11 m/s, mean gradient = 24 mmHg

## 2018-05-29 NOTE — PT/OT/SLP EVAL
Speech-Language Pathology Evaluation  Cognitive Communication    Patient Name:  Sury Cobos   MRN:  [id number]  Admitting Diagnosis: Acute ischemic right MCA stroke    Recommendations:     Recommendations:                General Recommendations:  Dysphagia therapy and Speech/language/cognitive therapy  Diet recommendations:  NPO, NPO   Aspiration Precautions: Continue alternate means of nutrition   General Precautions: Standard, aspiration, NPO, fall  Communication strategies:  yes/no questions and provide increased time to answer    History:     Past Medical History:   Diagnosis Date    Arthritis     Benign essential HTN     CHF (congestive heart failure)     Closed intertrochanteric fracture of left hip     Coronary artery disease     Dementia     Diabetes mellitus     Gout     High cholesterol     Low potassium syndrome     Urinary incontinence        Past Surgical History:   Procedure Laterality Date    CARDIAC SURGERY      CORONARY ARTERY BYPASS GRAFT      reduction & internal fixation of displaced closed comminuted intertrochanteric fx left hip  02/12/2018       Subjective   Intermittent alertness, lethargic, falling asleep during assessment. Daughter at bedside report pt is not at baseline regarding communication/speech.     Pain/Comfort:  Pain Rating 1: 0/10 (no pain indicated)  Pain Rating Post-Intervention 2: 0/10    Objective:   Cognitive Status:    Arousal/Alertness - Inconsistent responses   Attention - Sustained attention deficit   Orientation Person  Memory, Problem Solving & Reasoning not assessed during this eval 2/2 pt unable to maintain alertness     Receptive Language:   Comprehension:      Questions Simple yes/no 50% acc IND'ly  Commands  One step 50% acc given min A, poor attention for 2 step directions    Expressive Language:  Verbal:    Automatic Speech  Counting 1-10 IND'ly  Naming Confrontation 2/3 accurate given min A and Single word responsive naming no response despite max  A      Motor Speech:  Dysarthria severe  Intelligibility 50% in structured conversation    Voice:   Quality Wet  Intensity low    Visual-Spatial:  not assessed    Reading:   not assessed     Written Expression:   not assessed    Treatment: Pt repositioned to upright in bed. Pt having difficulty managing secretions, yanker used to get minimal secretions from oral cavity. Wet vocal quality. Pt elicited swallow on command followed by immediate coughing & continued wet vocal quality. Pt unable to elicit volitional cough. Pt not appropriate for PO trials at this time 2/2 high risk of aspiration.     Assessment:   Sury Cobos is a 83 y.o. female with an SLP diagnosis of Dysphagia, Dysarthria and Cognitive-Linguistic Impairment    Goals:    SLP Goals        Problem: SLP Goal    Goal Priority Disciplines Outcome   SLP Goal     SLP Ongoing (interventions implemented as appropriate)   Description:  Speech Language Pathology Goals  Goals expected to be met by 6/5  1. Ongoing swallow assessment  2. Pt will follow 1 step commands with 75% acc given min A  3. Pt will answer yes/no questions with 75% acc given min A  4. Pt will complete OME's given mod A  5. Pt will complete word level dysarthria tasks with 80% intell. given max A to utilize speech strategies   6. Assess reading, writing, visual spatial skills  7. Assess memory, problem solving, reasoning                    Plan:   · Patient to be seen:  4 x/week   · Plan of Care expires:  06/26/18  · Plan of Care reviewed with:  patient   · SLP Follow-Up:  Yes       Discharge recommendations:  Discharge Facility/Level Of Care Needs: nursing facility, skilled     Time Tracking:   SLP Treatment Date:   05/30/18  Speech Start Time:  1428  Speech Stop Time:  1444     Speech Total Time (min):  16 min    Billable Minutes: Eval 12  and Treatment Swallowing Dysfunction 8    Amairani Jane CCC-SLP  05/30/2018

## 2018-05-29 NOTE — PLAN OF CARE
STORMY advised by CM that the Pt daughter is requesting to take the Pt home with HH. Pt has [facility]. They set up HH for the Pt. STORMY sent facesheet and HP to [facility] via . When Pt ready to dc, Plan of Care HH orders will be needed to be sent to [facility].    Lidia Vera, CINDY  Neurocritical Care   [facility]  [id number]

## 2018-05-29 NOTE — PLAN OF CARE
CM met with patient's daughter in room at daughter's request to discuss discharge options.  Per daughter, therapy is recommending SNF placement an daughter wanted to know if she could take the patient home from SNF if she was not happy.  CM explained to daughter that she could take the patient home at any time if she is not happy with the care.  Daughter stated that she wants to take the patient home with home health and does not want SNF at this time.     Suzy Moreno RN, CCRN-K, [facility]  Neuro-Critical Care   [phone]

## 2018-05-29 NOTE — PLAN OF CARE
Problem: Patient Care Overview  Goal: Plan of Care Review  Outcome: Ongoing (interventions implemented as appropriate)  No acute events throughout the day, VS and assessment per flow sheet, patient progressing towards goal as tolerated. Pt has transfer orders; awaiting bed.  Plan of care reviewed with Sury Cobos at 0400, pt needs reinforcement. Will continue to monitor.

## 2018-05-29 NOTE — PT/OT/SLP PROGRESS
"Physical Therapy Treatment    Patient Name:  Sury Cobos   MRN:  [id number]  Admitting Diagnosis: Acute ischemic right MCA stroke  Recent Surgery: * No surgery found *      Recommendations:     Discharge Recommendations:  nursing facility, skilled (pending family decision)  Discharge Equipment Recommendations: 3-in-1 commode   Barriers to discharge: Decreased caregiver support    Plan:     During this hospitalization, patient to be seen 4 x/week to address the listed problems via gait training, therapeutic activities, therapeutic exercises, neuromuscular re-education  · Plan of Care Expires:  06/28/18   Plan of Care Reviewed with: patient    This Plan of care has been discussed with the patient who was involved in its development and understands and is in agreement with the identified goals and treatment plan    Subjective     Communicated with RN prior to session.  Patient found supine upon PT entry to room. Pt's daughter present during session.  "I need to use the bathroom"    Pain/Comfort:  · Pain Rating 1: 0/10  · Pain Rating Post-Intervention 1: 0/10    Objective:     Patient found with: telemetry, pulse ox (continuous), blood pressure cuff, restraints, NG tube, peripheral IV, PureWick, pressure relief boots   Mental Status: Patient is oriented to Person and Place and follows 80% of verbal commands. Patient is Alert and Cooperative during session.    General Precautions: Standard, Cardiac aspiration, fall, NPO   Orthopedic Precautions:N/A   Braces: N/A   Respiratory Status: room air  Vital Signs (Most Recent):    Temp: 98.5 °F (36.9 °C) (05/29/18 1101)  Pulse: (!) 52 (05/29/18 1401)  Resp: (!) 24 (05/29/18 1401)  BP: (!) 162/63 (05/29/18 1401)  SpO2: 98 % (05/29/18 1401)    Functional Mobility:  Bed Mobility:   · Scooting to HOB via supine bridge: minimum assistance  · Scooting to EOB to have both feet planted on floor: minimum assistance  · Supine to Sit: minimum assistance; from left side of bed  · Sit to " Supine: minimum assistance; to left side of bed    Sitting Balance at Edge of Bed:  · Assistance Level Required: minimal assist  · Time: 12 minutes  · Postural deviations noted: rounded shoulders, PPT  · Encouraged: attention to task, upright posture, neutral pelvis  · Comments: pt pre-occupied with lines. Pt required tactile cues for balance    Transfers:   · Sit <> Stand Transfer: minimum assistance with HHA   · Stand <> Sit Transfer: minimum assistance with HHA   · Pt with posterior lean      Gait:  · NT, pt requesting to use bathroom    Therapeutic Activities & Exercises:   Upon returned to supine, pt stating she needed to have BM, PT placed bed pan, RN notified.    Education:  Patient provided with daily orientation and goals of this PT session. Patient and family agreed to participate in session. Patient and family aware of patient's deficits and therapy progression. They were educated to transfer with RN/PCT only; mod A of 1 person with RW. Time provided for therapeutic counseling and discussion of health disposition. All questions answered to patient's and family's satisfaction, within scope of PT practice; voiced no other concerns. White board updated in patient's room, RN notified of session.    Patient left supine, with head in midline, neutral pelvis & heels floated for skin protection with all lines intact, call button in reach, RN notified and daughter present    AM-PAC 6 CLICK MOBILITY  Turning over in bed (including adjusting bedclothes, sheets and blankets)?: 3  Sitting down on and standing up from a chair with arms (e.g., wheelchair, bedside commode, etc.): 3  Moving from lying on back to sitting on the side of the bed?: 3  Moving to and from a bed to a chair (including a wheelchair)?: 3  Need to walk in hospital room?: 3  Climbing 3-5 steps with a railing?: 1  Total Score: 16     Assessment:     Sury Cobos is a 83 y.o. female admitted with a medical diagnosis of Acute ischemic right MCA stroke.    Patient is making progress towards goals, participating well in this session. Pt continues to require significant assist with functional mobility and posture. Pt with good mobility and strength. Anticipate improvement in mobility and concentration with reduction in lines. Sury Gomezs deficits effect their ability to safely and independently participate in self-care tasks and functional mobility which increases their caregiver burden. Due to her physical therapy diagnosis of debility and deconditioning, they continue to benefit from acute PT services to address the following limitations: high fall risk, weakness, instability, the need for supervised instruction of exercise. Sury Gomezs deficits effect their roles and responsibilities in which they were able to complete prior to admit. Education was provided to patient & family regarding importance of continued participation in therapy, patient's progress and discharge disposition. Pt would benefit from SNF upon discharge to improve quality of life and focus on recovery of impairments.     Problem List: weakness, impaired endurance, impaired self care skills, impaired functional mobilty, gait instability, impaired balance, decreased upper extremity function, decreased lower extremity function, decreased safety awareness. weakness, impaired endurance, impaired self care skills, impaired functional mobilty, gait instability, impaired balance, decreased upper extremity function, decreased lower extremity function, decreased safety awareness  Rehab Prognosis: good; patient would benefit from acute skilled PT services to address these deficits and reach maximum level of function.      GOALS:    Physical Therapy Goals        Problem: Physical Therapy Goal    Goal Priority Disciplines Outcome Goal Variances Interventions   Physical Therapy Goal     PT/OT, PT Ongoing (interventions implemented as appropriate)     Description:  Goals to be met by: 6/7/2018      Patient will increase functional independence with mobility by performin. Supine to sit with Contact Guard Assistance  2. Sit to supine with Contact Guard Assistance  3. Rolling to Left and Right with Supervision.  4. Sit to stand transfer with Stand-by Assistance  5. Gait  x 20 feet with Contact Guard Assistance using Rolling Walker.   6. Stand for 10 minutes with Contact Guard Assistance using Rolling Walker                      Time Tracking:     PT Received On: 18  PT Start Time: 1125     PT Stop Time: 1153  PT Total Time (min): 28 min     Billable Minutes:   · Therapeutic Activity 28    Treatment Type: Treatment  PT/PTA: PT       Heather Che PT, DPT  2018  Pager: [phone]

## 2018-05-29 NOTE — PROGRESS NOTES
[facility]  Vascular Neurology  Comprehensive Stroke Center  Progress Note    Assessment/Plan:     * Acute ischemic right MCA stroke    82 y/o female with left sided facial droop, aphasia, dysarthria who received IV TPA but no IR. MRI with infarct in R precentral gyrus.   Patient NPO with NG in place.   Nursing notes state afib seen on telemetry, but no afib documented on 12 lead ECG. At this time, unclear etiology; ESUS vs small vessel.     Antithrombotics: Aspirin 81 mg    Statins: Lipitor 40 mg daily    Aggressive risk factor modification: HTN, HLD     Rehab efforts: PT/OT/SLP to evaluate and treat, PM&R consult     Diagnostics ordered/pending: none     VTE prophylaxis: SCD's may begin Heparin 500 units SC Q8H     BP parameters: Infarct: Post tPA, SBP <180            Hypokalemia    K 4 today  Management per NCC, replacement ordered        Dementia with behavioral disturbance    Donepezil 10mg daily  Seroquel 25mg nightly        High cholesterol    Stroke risk factor    Lipitor 40 mg daily        Benign essential HTN    Stroke risk factor  SBP <180 due to TPA        Aphasia    Due to stroke  Aggressive therapy        S/P admn tPA in diff fac w/n last 24 hr bef adm to crnt fac    Close monitoring in NCCU for 24 hours post administration, completed             82 y/o female with aphasia, dysarthria and facial droop, received IV TPA but no LVO so no IR. Probable small vessel disease.    5/28: Blood pressure elevated >200 overnight requiring cardene. Patient bradycardic and with paroxysmal afib overnight as well. MRI with infarct in the right precentral gyrus.   5/29: No events overnight. Patient with many bacteria on UTI so short cipro course started per NCC. Pending step down to NSU.     STROKE DOCUMENTATION   Acute Stroke Times   Last Known Normal Date: 05/27/18  Last Known Normal Time: 1320  Symptom Onset Date: 05/27/18  Symptom Onset Time: 1445  Stroke Team Called Date: 05/27/18  Stroke  Team Called Time: 1550  Stroke Team Arrival Date: 05/27/18  Stroke Team Arrival Time: 1751  CT Interpretation Time: 1536  Decision to Treat Time for Alteplase: 1806    NIH Scale:  1a. Level Of Consciousness: 0-->Alert: keenly responsive  1b. LOC Questions: 2-->Answers neither question correctly  1c. LOC Commands: 1-->Performs one task correctly  2. Best Gaze: 1-->Partial gaze palsy: gaze is abnormal in one or both eyes, but forced deviation or total gaze paresis is not present  3. Visual: 0-->No visual loss  4. Facial Palsy: 0-->Normal symmetrical movements  5a. Motor Arm, Left: 2-->Some effort against gravity: limb cannot get to or maintain (if cued) 90 (or 45) degrees, drifts down to bed, but has some effort against gravity  5b. Motor Arm, Right: 0-->No drift: limb holds 90 (or 45) degrees for full 10 secs  6a. Motor Leg, Left: 1-->Drift: leg falls by the end of the 5-sec period but does not hit bed  6b. Motor Leg, Right: 0-->No drift: leg holds 30 degree position for full 5 secs  7. Limb Ataxia: 0-->Absent  8. Sensory: 0-->Normal: no sensory loss  9. Best Language: 1-->Mild-to-moderate aphasia: some obvious loss of fluency or facility of comprehension, without significant limitation on ideas expressed or form of expression. Reduction of speech and/or comprehension, however, makes conversation. . . (see row details)  10. Dysarthria: 2-->Severe dysarthria: patients speech is so slurred as to be unintelligible in the absence of or out of proportion to any dysphasia, or is mute/anarthric  11. Extinction and Inattention (formerly Neglect): 0-->No abnormality  Total (NIH Stroke Scale): 10       Modified [place] Score: 0  [place] Coma Scale:    ABCD2 Score:    [id number] Score:   HAS -BLED Score:   ICH Score:   Hunt & Burkett Classification:      Hemorrhagic change of an Ischemic Stroke: Does this patient have an ischemic stroke with hemorrhagic changes? No     Neurologic Chief Complaint: Aphasia, dysarthria, LSW      Subjective:     Interval History: Patient is seen for follow-up neurological assessment and treatment recommendations:     No events overnight. Patient with many bacteria on UTI so short cipro course started per NCC. Pending step down to NSU.      HPI, Past Medical, Family, and Social History remains the same as documented in the initial encounter.     Review of Systems   Constitutional: Negative for fever.   Gastrointestinal: Negative for abdominal pain, nausea and vomiting.   Neurological: Positive for speech difficulty. Negative for headaches.   Psychiatric/Behavioral: Positive for agitation and confusion. Negative for behavioral problems.     Scheduled Meds:   aspirin  81 mg Per NG tube Daily    atorvastatin  40 mg Per NG tube Daily    buPROPion  150 mg Per NG tube BID    carvedilol  3.125 mg Per NG tube BID    ciprofloxacin HCl  500 mg Per NG tube Q12H    donepezil  10 mg Per NG tube QHS    heparin (porcine)  5,000 Units Subcutaneous Q8H    losartan  50 mg Per NG tube Daily    QUEtiapine  25 mg Per NG tube QHS    sodium chloride 0.9%  3 mL Intravenous Q8H     Continuous Infusions:   sodium chloride 0.9% 75 mL/hr at 05/29/18 1501     PRN Meds:hydrALAZINE, labetalol, magnesium oxide, magnesium oxide, potassium chloride 10%, potassium chloride 10%, potassium chloride 10%, potassium, sodium phosphates, potassium, sodium phosphates, potassium, sodium phosphates, senna-docusate 8.6-50 mg    Objective:     Vital Signs (Most Recent):  Temp: 97.8 °F (36.6 °C) (05/29/18 1501)  Pulse: 68 (05/29/18 1501)  Resp: (!) 29 (05/29/18 1501)  BP: (!) 143/64 (05/29/18 1501)  SpO2: 98 % (05/29/18 1501)  BP Location: Right arm    Vital Signs Range (Last 24H):  Temp:  [97 °F (36.1 °C)-99.3 °F (37.4 °C)]   Pulse:  [52-92]   Resp:  [18-36]   BP: (124-186)/()   SpO2:  [95 %-99 %]   BP Location: Right arm    Physical Exam   Constitutional: She appears well-developed. No distress.   HENT:   Head: Normocephalic and  atraumatic.   Eyes: EOM are normal.   Neck: Neck supple.   Pulmonary/Chest: Effort normal.   Neurological: She is alert.   Not oriented   Skin: Skin is warm and dry.   Psychiatric: She has a normal mood and affect.       Neurological Exam:   LOC: alert  Attention Span: Good   Language: mild aphasia  Articulation: Dysarthria  Orientation: Not oriented to time  Visual Fields: Full  EOM (CN III, IV, VI): Full/intact  Facial Movement (CN VII): Lower facial weakness on the Left  Motor: Arm left  Paresis: 4/5  Leg left  Normal 5/5  Arm right  Normal 5/5  Leg right Normal 5/5  Sensation: Avery-hypoesthesia left  Tone: Normal tone throughout    Laboratory:  CMP:     Recent Labs  Lab 05/29/18  0332 05/29/18  1354   CALCIUM 8.9  --    ALBUMIN 3.5  --    PROT 7.1  --      --    K 3.5 4.0   CO2 25  --      --    BUN 9  --    CREATININE 0.6  --    ALKPHOS 77  --    ALT 12  --    AST 24  --    BILITOT 0.6  --      BMP:     Recent Labs  Lab 05/29/18  0332 05/29/18  1354     --    K 3.5 4.0     --    CO2 25  --    BUN 9  --    CREATININE 0.6  --    CALCIUM 8.9  --      CBC:     Recent Labs  Lab 05/29/18  0332   WBC 10.00   RBC 3.79*   HGB 11.6*   HCT 35.3*   *   MCV 93   MCH 30.6   MCHC 32.9     Lipid Panel:     Recent Labs  Lab 05/27/18  1514   CHOL 197   LDLCALC 110.8   HDL 59   TRIG 136     Coagulation:     Recent Labs  Lab 05/29/18  0317   INR 1.4*   APTT 28.4     Platelet Aggregation Study: No results for input(s): PLTAGG, PLTAGINTERP, PLTAGREGLACO, ADPPLTAGGREG in the last 168 hours.  Hgb A1C:     Recent Labs  Lab 05/27/18  1919   HGBA1C 5.5     TSH:     Recent Labs  Lab 05/27/18  1514   TSH 1.638       Diagnostic Results     Brain Imaging   MRI brain 5/28:   There is a small cortical area of  acute/recent infarction in the right precentral gyrus.    Superimposed age-appropriate cerebral volume loss with patchy and confluent T2 FLAIR signal abnormality supra infratentorial parenchyma most  compatible with severe chronic ischemic change.    Numerous scattered areas of a prior infarction in the cerebellum with encephalomalacia.    Punctate susceptibility right frontal periventricular white matter suggestive for remote microhemorrhage.    Vessel Imaging   CTA MP 5/27:   No acute intracranial hemorrhage or major vascular distribution infarct.    Bilateral basal ganglia, thalami and cerebellar hemispheres small remote infarcts.    Generalized cerebral volume loss and extensive white matter chronic small vessel ischemic change.    Ventriculomegaly out of proportion to sulcal prominence, nonspecific but can be seen with normal pressure hydrocephalus, but overall unchanged from prior MRI.    The anterior and posterior circulation demonstrate no high-grade stenosis or major vessel occlusion.    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    Cardiac Imaging   ECHO 5/28/18:   CONCLUSIONS     1 - Concentric LVH with hyperdynamic left ventricular systolic function (EF >70%).     2 - Severe left atrial enlargement.     3 - Impaired LV relaxation, elevated LAP (grade 2 diastolic dysfunction).     4 - Normal right ventricular systolic function .     5 - Mild to moderate aortic stenosis, ALBER = 1.64 cm2, AVAi = 1.12 cm2/m2, peak velocity = 3.11 m/s, mean gradient = 24 mmHg      Adrianna Perez PA-C  Comprehensive Stroke Center  Department of Vascular Neurology   [facility]

## 2018-05-29 NOTE — MEDICAL/APP STUDENT
"Subjective:       Patient ID: Sury Cobos is a 83 y.o. female.    Chief Complaint:  Cerebrovascular Accident (left facial droop, left sided weakness, which is resolved, expressive aphasia, transfer from [place], TPA given PTA.  )    BRIEF HPI  Sury Cobos is a 83 y.o. RIGHT handed  female who  has a past medical history of Arthritis; Benign essential HTN; CHF (congestive heart failure); Closed intertrochanteric fracture of left hip; Coronary artery disease; Dementia; Diabetes mellitus; Gout; High cholesterol; Low potassium syndrome; and Urinary incontinence., currently presenting with Acute ischemic right MCA stroke.     Interval History:   Patient is seen for follow-up neurological assessment and treatment recommendations:     Per nursing notes:  5/28/2018: "Blood pressure elevated >200 overnight requiring cardene. Patient bradycardic and with paroxysmal afib overnight as well. MRI with infarct in the right precentral gyrus."   5/29/2028: "No acute events throughout the day, VS and assessment per flow sheet, patient progressing towards goal as tolerated. Pt has transfer orders; awaiting bed.  Plan of care reviewed with Sury Cobos at 0400, pt needs reinforcement. Will continue to monitor."   Patient is scheduled to be moved to the floor this afternoon      HPI, Past Medical, Family, and Social History remains the same as documented in the initial encounter.  Review of Systems   Constitutional: Negative for fever.   Gastrointestinal: Negative for abdominal pain, nausea and vomiting.   Neurological: Positive for speech difficulty. Negative for headaches.   Psychiatric/Behavioral: Negative for agitation and behavioral problems.     Scheduled Meds:   aspirin  81 mg Per NG tube Daily    atorvastatin  40 mg Per NG tube Daily    buPROPion  150 mg Per NG tube BID    carvedilol  3.125 mg Per NG tube BID    donepezil  10 mg Per NG tube QHS    heparin (porcine)  5,000 Units Subcutaneous Q8H    " losartan  25 mg Per NG tube Daily    midazolam  1 mg Intravenous Once    sodium chloride 0.9%  3 mL Intravenous Q8H     Continuous Infusions:   sodium chloride 0.9% 75 mL/hr at 05/28/18 1709      PRN Meds:hydrALAZINE, labetalol, magnesium oxide, magnesium oxide, potassium chloride 10%, potassium chloride 10%, potassium chloride 10%, potassium, sodium phosphates, potassium, sodium phosphates, potassium, sodium phosphates, senna-docusate 8.6-50 mg      Objective:     Vitals:    05/29/18 0801   BP: (!) 144/67   Pulse: 60   Resp: (!) 23   Temp:      Vital Signs Range (Last 24H):  Temp:  [97.7 °F (36.5 °C)-99 °F (37.2 °C)]   Pulse:  []   Resp:  [11-37]   BP: (122-223)/()   SpO2:  [95 %-100 %]   BP Location: Right arm     Physical Exam   Nursing note and vitals reviewed.  Constitutional: She appears well-developed. No distress.   HENT:   Head: Normocephalic and atraumatic.   Eyes: EOM are normal.   Neck: Neck supple.   Pulmonary/Chest: Effort normal.   Neurological: She is alert.   Not oriented   Skin: Skin is warm and dry.   Psychiatric: She has a normal mood and affect.     Neurologic Exam   LOC: alert  Attention Span: Good   Language: mild aphasia  Articulation: Dysarthria  Orientation: Not oriented to time  Visual Fields: Full  EOM (CN III, IV, VI): Full/intact  Facial Movement (CN VII): Lower facial weakness on the Left  Motor: Arm left  Paresis: 4/5  Leg left  Normal 5/5  Arm right  Normal 5/5  Leg right Normal 5/5  Sensation: Avery-hypoesthesia left  Tone: Normal tone throughout    Recent Results (from the past 12 hour(s))   Phosphorus    Collection Time: 05/28/18  9:29 PM   Result Value Ref Range    Phosphorus 3.2 2.7 - 4.5 mg/dL   POCT glucose    Collection Time: 05/28/18 11:06 PM   Result Value Ref Range    POCT Glucose 152 (H) 70 - 110 mg/dL   APTT    Collection Time: 05/29/18  3:17 AM   Result Value Ref Range    aPTT 28.4 21.0 - 32.0 sec   Protime-INR    Collection Time: 05/29/18  3:17 AM    Result Value Ref Range    Prothrombin Time 13.7 (H) 9.0 - 12.5 sec    INR 1.4 (H) 0.8 - 1.2   Magnesium    Collection Time: 05/29/18  3:32 AM   Result Value Ref Range    Magnesium 1.7 1.6 - 2.6 mg/dL   Phosphorus    Collection Time: 05/29/18  3:32 AM   Result Value Ref Range    Phosphorus 3.4 2.7 - 4.5 mg/dL   CBC auto differential    Collection Time: 05/29/18  3:32 AM   Result Value Ref Range    WBC 10.00 3.90 - 12.70 K/uL    RBC 3.79 (L) 4.00 - 5.40 M/uL    Hemoglobin 11.6 (L) 12.0 - 16.0 g/dL    Hematocrit 35.3 (L) 37.0 - 48.5 %    MCV 93 82 - 98 fL    MCH 30.6 27.0 - 31.0 pg    MCHC 32.9 32.0 - 36.0 g/dL    RDW 14.1 11.5 - 14.5 %    Platelets 106 (L) 150 - 350 K/uL    MPV 13.4 (H) 9.2 - 12.9 fL    Immature Granulocytes 0.2 0.0 - 0.5 %    Gran # (ANC) 7.3 1.8 - 7.7 K/uL    Immature Grans (Abs) 0.02 0.00 - 0.04 K/uL    Lymph # 2.0 1.0 - 4.8 K/uL    Mono # 0.6 0.3 - 1.0 K/uL    Eos # 0.1 0.0 - 0.5 K/uL    Baso # 0.05 0.00 - 0.20 K/uL    nRBC 0 0 /100 WBC    Gran% 73.1 (H) 38.0 - 73.0 %    Lymph% 20.0 18.0 - 48.0 %    Mono% 5.7 4.0 - 15.0 %    Eosinophil% 0.5 0.0 - 8.0 %    Basophil% 0.5 0.0 - 1.9 %    Differential Method Automated    Comprehensive metabolic panel    Collection Time: 05/29/18  3:32 AM   Result Value Ref Range    Sodium 136 136 - 145 mmol/L    Potassium 3.5 3.5 - 5.1 mmol/L    Chloride 102 95 - 110 mmol/L    CO2 25 23 - 29 mmol/L    Glucose 145 (H) 70 - 110 mg/dL    BUN, Bld 9 8 - 23 mg/dL    Creatinine 0.6 0.5 - 1.4 mg/dL    Calcium 8.9 8.7 - 10.5 mg/dL    Total Protein 7.1 6.0 - 8.4 g/dL    Albumin 3.5 3.5 - 5.2 g/dL    Total Bilirubin 0.6 0.1 - 1.0 mg/dL    Alkaline Phosphatase 77 55 - 135 U/L    AST 24 10 - 40 U/L    ALT 12 10 - 44 U/L    Anion Gap 9 8 - 16 mmol/L    eGFR if African American >60.0 >60 mL/min/1.73 m^2    eGFR if non African American >60.0 >60 mL/min/1.73 m^2   POCT glucose    Collection Time: 05/29/18  5:04 AM   Result Value Ref Range    POCT Glucose 115 (H) 70 - 110 mg/dL      Results for orders placed or performed during the hospital encounter of 05/27/18   MRI Brain Without Contrast    Narrative    EXAMINATION:  MRI BRAIN WITHOUT CONTRAST    CLINICAL HISTORY:  Stroke;    TECHNIQUE:  Sagittal and axial T1, axial T2, axial FLAIR, axial gradient and axial diffusion imaging of the whole brain without contrast.    COMPARISON:  CTA 05/27/2018    FINDINGS:  There is a small bandlike cortical region of restricted diffusion within the right precentral gyrus with corresponding T2 flair hyperintensity compatible with acute/recent infarction.    Superimposed mild degree of age-appropriate generalized cerebral volume loss.  There is patchy and confluent T2 FLAIR signal abnormality within the supratentorial white matter, keyonna which is nonspecific and concerning for severe chronic microvascular ischemic change.  There is remote bilateral cerebellar infarcts with scattered encephalomalacia with T2 flair hyperintensity compatible gliosis and scarring..  There is no midline shift or mass effect.  Questioned turbulent flow or occlusion of the left distal vertebral artery.  Remaining major intracranial T2 flow voids are present.    Punctate susceptibility in the right frontal periventricular white matter suggestive for remote microhemorrhage..    This report was flagged in Epic as abnormal.      Impression    There is a small cortical area of  acute/recent infarction in the right precentral gyrus.    Superimposed age-appropriate cerebral volume loss with patchy and confluent T2 FLAIR signal abnormality supra infratentorial parenchyma most compatible with severe chronic ischemic change.    Numerous scattered areas of a prior infarction in the cerebellum with encephalomalacia.    Punctate susceptibility right frontal periventricular white matter suggestive for remote microhemorrhage.    Please see above for additional details.      Electronically signed by: Jeferson Rinaldi  DO  Date:    05/28/2018  Time:    14:49   Results for orders placed or performed during the hospital encounter of 05/27/18   CT Head Without Contrast    Narrative    EXAMINATION:  CT HEAD WITHOUT CONTRAST    CLINICAL HISTORY:  Confusion/delirium, altered LOC, unexplained;    TECHNIQUE:  Low dose axial images were obtained through the head.  Coronal and sagittal reformations were also performed. Contrast was not administered.    COMPARISON:  None.    FINDINGS:  There is mild dilatation of ventricles, sulci, fissures.  There is decreased density in white matter consistent with microvascular ischemic change.  There is slightly greater asymmetric dilatation of the ventricles relative to the sulci and fissures and recommend correlation clinically if there is clinical consideration for normal pressure hydrocephalus.  More likely this represents asymmetric involutional change.    There is no acute intracranial hemorrhage.  There is no intracranial mass effect.  There is no acute major vascular territory infarct.  The calvarium appears intact, mastoids pneumatized.  Visualized paranasal sinuses appear clear aside from slight mucosal thickening and questionable trace fluid within the sphenoid sinus.      Impression    Mild involutional changes and findings consistent with microvascular ischemic change.  Mild asymmetric widening of ventricles relative to the sulci and fissures which in the appropriate clinical setting can question normal pressure hydrocephalus with correlation recommended clinically.  No acute intracranial findings.      Electronically signed by: Floridalma Rojas MD  Date:    05/27/2018  Time:    15:40   Results for orders placed or performed during the hospital encounter of 05/15/15   MRI Brain W WO Contrast    Narrative    Sagittal and axial images are obtained through the brain with multiple MR sequences.  Fine detail axial and coronal images were obtained through the internal auditory canals.  Following  administration of 12 cc Multihance gadolinium contrast IV repeat   fine detail axial and coronal images are obtained to the internal artery canals and inner ears and through the brain.      The seventh and eighth nerve complex these are well seen bilaterally.  The internal auditory canals are sharp and symmetrical.  No neuroma or mass is identified.  The basal cisterns are clear and symmetric.  The MR signal from the inner ear bilaterally   is symmetric and within normal limits.  Abnormal contrast enhancement is not seen.    The basal cisterns are clear and symmetric.  There is no mass-effect or midline shift.  There is mild to moderate enlargement of the lateral ventricles and some mild enlargement of the cerebral sulci consistent with brain atrophy.  Within the cerebrum   and central cerebellum there is elevated signal intensity on flair weighting consistent with deep white matter ischemic changes.  In the right frontal lobe there is additional abnormal elevated signal around a single sulcus which may represent a small   ischemic CVA.  Other focal areas of increased or decreased signal intensity consistent with tumor, edema, CVA or hemorrhage are not seen.  No intracranial hemorrhage or hematoma is noted.  No abnormal areas of contrast enhancement are noted.  The patient   is seen to have left chronic maxillary sinusitis.    Impression     Negative MRI of the internal auditory canals and inner ears bilaterally.  Mild to moderate generalized brain atrophy with extensive deep white matter ischemic changes.  Possible small old right frontal CVA.  Chronic left maxillary sinusitis        Electronically signed by: Dago Hathaway MD  Date:     05/21/15  Time:    11:38      Assessment/Plan:   82 y/o female with aphasia, dysarthria and facial droop, received IV TPA but no LVO so no IR. Probable small vessel disease.     5/28: Blood pressure elevated >200 overnight requiring cardene. Patient bradycardic and with paroxysmal  afib overnight as well. MRI with infarct in the right precentral gyrus.      STROKE DOCUMENTATION   Acute Stroke Times   Last Known Normal Date: 05/27/18  Last Known Normal Time: 1320  Symptom Onset Date: 05/27/18  Symptom Onset Time: 1445  Stroke Team Called Date: 05/27/18  Stroke Team Called Time: 1550  Stroke Team Arrival Date: 05/27/18  Stroke Team Arrival Time: 1751  CT Interpretation Time: 1536  Decision to Treat Time for Alteplase: 1806     NIH Scale:  1a. Level Of Consciousness: 0-->Alert: keenly responsive  1b. LOC Questions: 2-->Answers neither question correctly  1c. LOC Commands: 0-->Performs both tasks correctly  2. Best Gaze: 1-->Partial gaze palsy: gaze is abnormal in one or both eyes, but forced deviation or total gaze paresis is not present  3. Visual: 0-->No visual loss  4. Facial Palsy: 0-->Normal symmetrical movements  5a. Motor Arm, Left: 1-->Drift: limb holds 90 (or 45) degrees, but drifts down before full 10 seconds: does not hit bed or other support  5b. Motor Arm, Right: 0-->No drift: limb holds 90 (or 45) degrees for full 10 secs  6a. Motor Leg, Left: 0-->No drift: leg holds 30 degree position for full 5 secs  6b. Motor Leg, Right: 0-->No drift: leg holds 30 degree position for full 5 secs  7. Limb Ataxia: 0-->Absent  8. Sensory: 0-->Normal: no sensory loss  9. Best Language: 1-->Mild-to-moderate aphasia: some obvious loss of fluency or facility of comprehension, without significant limitation on ideas expressed or form of expression. Reduction of speech and/or comprehension, however, makes conversation. . . (see row details)  10. Dysarthria: 2-->Severe dysarthria: patients speech is so slurred as to be unintelligible in the absence of or out of proportion to any dysphasia, or is mute/anarthric  11. Extinction and Inattention (formerly Neglect): 1-->Visual, tactile, auditory, spatial, or personal inattention or extinction to bilateral simultaneous stimulation in one of the sensory  modalities  Total (NIH Stroke Scale): 8      Modified [place] Score: 0  [id number] Score: 9     Hemorrhagic change of an Ischemic Stroke: Does this patient have an ischemic stroke with hemorrhagic changes? No      Neurologic Chief Complaint: Aphasia, dysarthria, LSW     Problem List Items Addressed This Visit     * (Principal)Acute ischemic right MCA stroke    Current Assessment & Plan     84 y/o female with left sided facial droop, aphasia, dysarthria who received IV TPA but no IR. MRI with infarct in R precentral gyrus.   Patient NPO with NG in place.   Nursing notes state afib seen on telemetry, but no afib documented on 12 lead ECG.     Antithrombotics: Aspirin 81 mg on 5-28-17 at 2100    Statins: Lipitor 40 mg daily    Aggressive risk factor modification: HTN, HLD     Rehab efforts: PT/OT/SLP to evaluate and treat, PM&R consult     Diagnostics ordered/pending: none     VTE prophylaxis: SCD's may begin Heparin 500 units SC Q8H on 5-28-17 at 2200    BP parameters: Infarct: Post tPA, SBP <180             Benign essential HTN    Current Assessment & Plan     Stroke risk factor  SBP <180 due to TPA         High cholesterol    Current Assessment & Plan     Stroke risk factor    Lipitor 40 mg daily         Altered mental status - Primary        Hypokalemia     K 2.9 today  Management per N[facility], replacement ordered      Aphasia     Due to stroke  Aggressive therapy          S/P admn tPA in diff fac w/n last 24 hr bef adm to crnt fac     Close monitoring in [facility]U for 24 hours post administration               Patient is scheduled to be moved to the floor this afternoon     Qasim Adams (MS3)  5/29/2018 8:34 AM

## 2018-05-29 NOTE — PLAN OF CARE
Problem: Patient Care Overview  Goal: Plan of Care Review  Outcome: Ongoing (interventions implemented as appropriate)  POC reviewed with pt and family at 1200. Pt verbalized understanding. Questions and concerns addressed. No acute events today, with transfer order to step down.  Pt is still; confused but oriented to self. Patient still has episodes of agitation and being combative, restraints was maintained. Will continue to monitor. See flowsheets for full assessment and VS info.

## 2018-05-29 NOTE — NURSING
1715H- Patient started to get agitated and trying to get out from bed, Liz REYES was notified and stated to give haldol 2mg IV.

## 2018-05-29 NOTE — PLAN OF CARE
Problem: Physical Therapy Goal  Goal: Physical Therapy Goal  Goals to be met by: 2018     Patient will increase functional independence with mobility by performin. Supine to sit with Contact Guard Assistance  2. Sit to supine with Contact Guard Assistance  3. Rolling to Left and Right with Supervision.  4. Sit to stand transfer with Stand-by Assistance  5. Gait  x 20 feet with Contact Guard Assistance using Rolling Walker.   6. Stand for 10 minutes with Contact Guard Assistance using Rolling Walker     Outcome: Ongoing (interventions implemented as appropriate)    Pt would benefit from SNF (pending family decision) upon discharge.  Appropriate transfer level with nursing staff: Bed <> Chair:  Squat Pivot with Moderate Assistance with RW 1 person.    Heather Che PT, DPT  2018  Pager: [phone]

## 2018-05-29 NOTE — NURSING
1750H- Patient is more sleepy after an episode of agitation at 1630H. Aroused by voice, oriented to self, pupils are equal and  Brisk reactive to light with 3-4 mm in diameter. Still has the same baseline muscle strength.  Patient is having difficulty managing secretions o2 saturation is between 95-97% respiratory rate between 22-26, BP- 160/72, HR-68. Liz REYES was notified, ABG, CPT and nebulization was ordered. Will monitor patient.

## 2018-05-30 PROBLEM — G93.6 CYTOTOXIC CEREBRAL EDEMA: Status: ACTIVE | Noted: 2018-05-30

## 2018-05-30 PROBLEM — E87.6 HYPOKALEMIA: Status: RESOLVED | Noted: 2018-05-30 | Resolved: 2018-05-30

## 2018-05-30 PROBLEM — N39.0 UTI (URINARY TRACT INFECTION): Status: ACTIVE | Noted: 2018-05-30

## 2018-05-30 PROBLEM — E87.6 HYPOKALEMIA: Status: ACTIVE | Noted: 2018-05-30

## 2018-05-30 LAB
ALBUMIN SERPL BCP-MCNC: 3.2 G/DL
ALLENS TEST: ABNORMAL
ALLENS TEST: ABNORMAL
ALP SERPL-CCNC: 77 U/L
ALT SERPL W/O P-5'-P-CCNC: 16 U/L
ANION GAP SERPL CALC-SCNC: 9 MMOL/L
APTT BLDCRRT: 25.1 SEC
AST SERPL-CCNC: 31 U/L
BASOPHILS # BLD AUTO: 0.05 K/UL
BASOPHILS NFR BLD: 0.5 %
BILIRUB SERPL-MCNC: 0.5 MG/DL
BUN SERPL-MCNC: 15 MG/DL
CALCIUM SERPL-MCNC: 9.5 MG/DL
CHLORIDE SERPL-SCNC: 108 MMOL/L
CO2 SERPL-SCNC: 26 MMOL/L
CREAT SERPL-MCNC: 0.8 MG/DL
DELSYS: ABNORMAL
DELSYS: ABNORMAL
DIFFERENTIAL METHOD: ABNORMAL
EOSINOPHIL # BLD AUTO: 0 K/UL
EOSINOPHIL NFR BLD: 0.1 %
ERYTHROCYTE [DISTWIDTH] IN BLOOD BY AUTOMATED COUNT: 14.5 %
ERYTHROCYTE [SEDIMENTATION RATE] IN BLOOD BY WESTERGREN METHOD: 18 MM/H
EST. GFR  (AFRICAN AMERICAN): >60 ML/MIN/1.73 M^2
EST. GFR  (NON AFRICAN AMERICAN): >60 ML/MIN/1.73 M^2
FIO2: 21
GLUCOSE SERPL-MCNC: 148 MG/DL
HCO3 UR-SCNC: 24 MMOL/L (ref 24–28)
HCO3 UR-SCNC: 26.7 MMOL/L (ref 24–28)
HCT VFR BLD AUTO: 32.8 %
HGB BLD-MCNC: 10.6 G/DL
IMM GRANULOCYTES # BLD AUTO: 0.03 K/UL
IMM GRANULOCYTES NFR BLD AUTO: 0.3 %
INR PPP: 0.9
LYMPHOCYTES # BLD AUTO: 2.1 K/UL
LYMPHOCYTES NFR BLD: 18.9 %
MAGNESIUM SERPL-MCNC: 1.8 MG/DL
MCH RBC QN AUTO: 30.4 PG
MCHC RBC AUTO-ENTMCNC: 32.3 G/DL
MCV RBC AUTO: 94 FL
MODE: ABNORMAL
MODE: ABNORMAL
MONOCYTES # BLD AUTO: 0.7 K/UL
MONOCYTES NFR BLD: 6.6 %
NEUTROPHILS # BLD AUTO: 8.2 K/UL
NEUTROPHILS NFR BLD: 73.6 %
NRBC BLD-RTO: 0 /100 WBC
PCO2 BLDA: 28.6 MMHG (ref 35–45)
PCO2 BLDA: 31.6 MMHG (ref 35–45)
PH SMN: 7.53 [PH] (ref 7.35–7.45)
PH SMN: 7.54 [PH] (ref 7.35–7.45)
PHOSPHATE SERPL-MCNC: 3.4 MG/DL
PLATELET # BLD AUTO: 105 K/UL
PMV BLD AUTO: 12.5 FL
PO2 BLDA: 73 MMHG (ref 80–100)
PO2 BLDA: 88 MMHG (ref 80–100)
POC BE: 1 MMOL/L
POC BE: 4 MMOL/L
POC SATURATED O2: 96 % (ref 95–100)
POC SATURATED O2: 98 % (ref 95–100)
POC TCO2: 25 MMOL/L (ref 23–27)
POC TCO2: 28 MMOL/L (ref 23–27)
POCT GLUCOSE: 128 MG/DL (ref 70–110)
POCT GLUCOSE: 144 MG/DL (ref 70–110)
POCT GLUCOSE: 145 MG/DL (ref 70–110)
POCT GLUCOSE: 156 MG/DL (ref 70–110)
POTASSIUM SERPL-SCNC: 4 MMOL/L
PROT SERPL-MCNC: 6.6 G/DL
PROTHROMBIN TIME: 9.8 SEC
RBC # BLD AUTO: 3.49 M/UL
SAMPLE: ABNORMAL
SAMPLE: ABNORMAL
SITE: ABNORMAL
SITE: ABNORMAL
SODIUM SERPL-SCNC: 143 MMOL/L
SP02: 95
WBC # BLD AUTO: 11.08 K/UL

## 2018-05-30 PROCEDURE — 82803 BLOOD GASES ANY COMBINATION: CPT

## 2018-05-30 PROCEDURE — 25000003 PHARM REV CODE 250: Performed by: PSYCHIATRY & NEUROLOGY

## 2018-05-30 PROCEDURE — 25000003 PHARM REV CODE 250: Performed by: PHYSICIAN ASSISTANT

## 2018-05-30 PROCEDURE — 94640 AIRWAY INHALATION TREATMENT: CPT

## 2018-05-30 PROCEDURE — 85610 PROTHROMBIN TIME: CPT

## 2018-05-30 PROCEDURE — 80053 COMPREHEN METABOLIC PANEL: CPT

## 2018-05-30 PROCEDURE — 20000000 HC ICU ROOM

## 2018-05-30 PROCEDURE — 85025 COMPLETE CBC W/AUTO DIFF WBC: CPT

## 2018-05-30 PROCEDURE — A4216 STERILE WATER/SALINE, 10 ML: HCPCS | Performed by: PHYSICIAN ASSISTANT

## 2018-05-30 PROCEDURE — 27000221 HC OXYGEN, UP TO 24 HOURS

## 2018-05-30 PROCEDURE — 85730 THROMBOPLASTIN TIME PARTIAL: CPT

## 2018-05-30 PROCEDURE — 63600175 PHARM REV CODE 636 W HCPCS: Performed by: PSYCHIATRY & NEUROLOGY

## 2018-05-30 PROCEDURE — 95813 EEG EXTND MNTR 61-119 MIN: CPT | Mod: 26,,, | Performed by: PSYCHIATRY & NEUROLOGY

## 2018-05-30 PROCEDURE — 99900035 HC TECH TIME PER 15 MIN (STAT)

## 2018-05-30 PROCEDURE — 92526 ORAL FUNCTION THERAPY: CPT

## 2018-05-30 PROCEDURE — 36600 WITHDRAWAL OF ARTERIAL BLOOD: CPT

## 2018-05-30 PROCEDURE — 99233 SBSQ HOSP IP/OBS HIGH 50: CPT | Mod: ,,, | Performed by: PHYSICIAN ASSISTANT

## 2018-05-30 PROCEDURE — 63600175 PHARM REV CODE 636 W HCPCS: Performed by: PHYSICIAN ASSISTANT

## 2018-05-30 PROCEDURE — 84100 ASSAY OF PHOSPHORUS: CPT

## 2018-05-30 PROCEDURE — 94668 MNPJ CHEST WALL SBSQ: CPT

## 2018-05-30 PROCEDURE — 92507 TX SP LANG VOICE COMM INDIV: CPT

## 2018-05-30 PROCEDURE — 25000242 PHARM REV CODE 250 ALT 637 W/ HCPCS: Performed by: PHYSICIAN ASSISTANT

## 2018-05-30 PROCEDURE — 95813 EEG EXTND MNTR 61-119 MIN: CPT

## 2018-05-30 PROCEDURE — 94761 N-INVAS EAR/PLS OXIMETRY MLT: CPT

## 2018-05-30 PROCEDURE — 83735 ASSAY OF MAGNESIUM: CPT

## 2018-05-30 RX ORDER — ACETAMINOPHEN 650 MG/20.3ML
650 LIQUID ORAL EVERY 4 HOURS PRN
Status: DISCONTINUED | OUTPATIENT
Start: 2018-05-30 | End: 2018-06-11 | Stop reason: HOSPADM

## 2018-05-30 RX ORDER — LOSARTAN POTASSIUM 50 MG/1
50 TABLET ORAL ONCE
Status: COMPLETED | OUTPATIENT
Start: 2018-05-30 | End: 2018-05-30

## 2018-05-30 RX ORDER — NAPROXEN SODIUM 220 MG/1
81 TABLET, FILM COATED ORAL DAILY
Status: DISCONTINUED | OUTPATIENT
Start: 2018-05-31 | End: 2018-06-05

## 2018-05-30 RX ORDER — CEFTRIAXONE 1 G/1
1 INJECTION, POWDER, FOR SOLUTION INTRAMUSCULAR; INTRAVENOUS
Status: COMPLETED | OUTPATIENT
Start: 2018-05-30 | End: 2018-05-31

## 2018-05-30 RX ORDER — LOSARTAN POTASSIUM 50 MG/1
100 TABLET ORAL DAILY
Status: DISCONTINUED | OUTPATIENT
Start: 2018-05-31 | End: 2018-06-05

## 2018-05-30 RX ADMIN — SODIUM CHLORIDE SOLN NEBU 3% 4 ML: 3 NEBU SOLN at 04:05

## 2018-05-30 RX ADMIN — HEPARIN SODIUM 5000 UNITS: 5000 INJECTION, SOLUTION INTRAVENOUS; SUBCUTANEOUS at 09:05

## 2018-05-30 RX ADMIN — LABETALOL HYDROCHLORIDE 10 MG: 5 INJECTION, SOLUTION INTRAVENOUS at 09:05

## 2018-05-30 RX ADMIN — Medication 800 MG: at 05:05

## 2018-05-30 RX ADMIN — LOSARTAN POTASSIUM 50 MG: 50 TABLET, FILM COATED ORAL at 11:05

## 2018-05-30 RX ADMIN — LABETALOL HYDROCHLORIDE 10 MG: 5 INJECTION, SOLUTION INTRAVENOUS at 01:05

## 2018-05-30 RX ADMIN — IPRATROPIUM BROMIDE AND ALBUTEROL SULFATE 3 ML: .5; 3 SOLUTION RESPIRATORY (INHALATION) at 08:05

## 2018-05-30 RX ADMIN — BUPROPION HYDROCHLORIDE 150 MG: 75 TABLET, FILM COATED ORAL at 09:05

## 2018-05-30 RX ADMIN — DONEPEZIL HYDROCHLORIDE 10 MG: 5 TABLET, FILM COATED ORAL at 09:05

## 2018-05-30 RX ADMIN — Medication 800 MG: at 08:05

## 2018-05-30 RX ADMIN — IPRATROPIUM BROMIDE AND ALBUTEROL SULFATE 3 ML: .5; 3 SOLUTION RESPIRATORY (INHALATION) at 12:05

## 2018-05-30 RX ADMIN — SODIUM CHLORIDE SOLN NEBU 3% 4 ML: 3 NEBU SOLN at 12:05

## 2018-05-30 RX ADMIN — SODIUM CHLORIDE SOLN NEBU 3% 4 ML: 3 NEBU SOLN at 08:05

## 2018-05-30 RX ADMIN — IPRATROPIUM BROMIDE AND ALBUTEROL SULFATE 3 ML: .5; 3 SOLUTION RESPIRATORY (INHALATION) at 04:05

## 2018-05-30 RX ADMIN — SODIUM CHLORIDE SOLN NEBU 3% 4 ML: 3 NEBU SOLN at 11:05

## 2018-05-30 RX ADMIN — HEPARIN SODIUM 5000 UNITS: 5000 INJECTION, SOLUTION INTRAVENOUS; SUBCUTANEOUS at 05:05

## 2018-05-30 RX ADMIN — CIPROFLOXACIN HYDROCHLORIDE 500 MG: 500 TABLET, FILM COATED ORAL at 08:05

## 2018-05-30 RX ADMIN — ATORVASTATIN CALCIUM 40 MG: 20 TABLET, FILM COATED ORAL at 08:05

## 2018-05-30 RX ADMIN — CEFTRIAXONE SODIUM 1 G: 1 INJECTION, POWDER, FOR SOLUTION INTRAMUSCULAR; INTRAVENOUS at 12:05

## 2018-05-30 RX ADMIN — Medication 3 ML: at 09:05

## 2018-05-30 RX ADMIN — QUETIAPINE FUMARATE 25 MG: 25 TABLET ORAL at 09:05

## 2018-05-30 RX ADMIN — CARVEDILOL 3.12 MG: 3.12 TABLET, FILM COATED ORAL at 08:05

## 2018-05-30 RX ADMIN — ASPIRIN 81 MG: 81 TABLET, COATED ORAL at 08:05

## 2018-05-30 RX ADMIN — Medication 3 ML: at 05:05

## 2018-05-30 RX ADMIN — LOSARTAN POTASSIUM 50 MG: 50 TABLET, FILM COATED ORAL at 08:05

## 2018-05-30 RX ADMIN — LABETALOL HYDROCHLORIDE 10 MG: 5 INJECTION, SOLUTION INTRAVENOUS at 06:05

## 2018-05-30 RX ADMIN — BUPROPION HYDROCHLORIDE 150 MG: 75 TABLET, FILM COATED ORAL at 08:05

## 2018-05-30 RX ADMIN — HEPARIN SODIUM 5000 UNITS: 5000 INJECTION, SOLUTION INTRAVENOUS; SUBCUTANEOUS at 02:05

## 2018-05-30 RX ADMIN — IPRATROPIUM BROMIDE AND ALBUTEROL SULFATE 3 ML: .5; 3 SOLUTION RESPIRATORY (INHALATION) at 11:05

## 2018-05-30 RX ADMIN — Medication 3 ML: at 02:05

## 2018-05-30 NOTE — ASSESSMENT & PLAN NOTE
No fernandez on presentation - has had a purwick since admission  UA with 14 WBC  cipro 500mg BID X5d

## 2018-05-30 NOTE — SUBJECTIVE & OBJECTIVE
Interval History:   NAEON.     Review of Systems   Respiratory: Negative for shortness of breath.    Cardiovascular: Negative for chest pain.   Neurological: Positive for speech difficulty and weakness.   Psychiatric/Behavioral: Positive for agitation.       Objective:     Vitals:  Temp: 98.7 °F (37.1 °C)  Pulse: 66  Rhythm: normal sinus rhythm  BP: (!) 182/95  MAP (mmHg): 132  Resp: (!) 23  SpO2: 97 %  Oxygen Concentration (%): 21  O2 Device (Oxygen Therapy): room air    Temp  Min: 97 °F (36.1 °C)  Max: 99.3 °F (37.4 °C)  Pulse  Min: 52  Max: 92  BP  Min: 124/58  Max: 186/79  MAP (mmHg)  Min: 84  Max: 136  Resp  Min: 20  Max: 36  SpO2  Min: 95 %  Max: 100 %  Oxygen Concentration (%)  Min: 21  Max: 21    05/28 0701 - 05/29 0700  In: 1506.3 [I.V.:1326.3]  Out: 2300 [Urine:2300]   Unmeasured Output  Urine Occurrence: 1  Stool Occurrence: 0  Pad Count: 2       Physical Exam   Constitutional: She appears well-developed and well-nourished.   HENT:   Head: Normocephalic and atraumatic.   Cardiovascular: Normal rate.    Pulmonary/Chest: Effort normal.   Neurological:   [id number]  Alert. Oriented to person, place.   Dysarthric. Follows simple commands, agitated however and intermittently refuses to follow.   PERRL, EOMI.   Left facial droop  Motor LUE LLE 4/5 RUE RLE 5/5       Medications:  Continuous    sodium chloride 0.9% Last Rate: 75 mL/hr at 05/29/18 2100   Scheduled    [START ON 5/30/2018] albuterol-ipratropium 3 mL Q8H   aspirin 81 mg Daily   atorvastatin 40 mg Daily   buPROPion 150 mg BID   carvedilol 3.125 mg BID   ciprofloxacin HCl 500 mg Q12H   donepezil 10 mg QHS   heparin (porcine) 5,000 Units Q8H   losartan 50 mg Daily   QUEtiapine 25 mg QHS   sodium chloride 0.9% 3 mL Q8H   sodium chloride 3% 4 mL Q6H   PRN    hydrALAZINE 10 mg Q4H PRN   labetalol 10 mg PRN   magnesium oxide 800 mg PRN   magnesium oxide 800 mg PRN   potassium chloride 10% 40 mEq PRN   potassium chloride 10% 40 mEq PRN   potassium chloride 10%  60 mEq PRN   potassium, sodium phosphates 2 packet PRN   potassium, sodium phosphates 2 packet PRN   potassium, sodium phosphates 2 packet PRN   senna-docusate 8.6-50 mg 1 tablet Daily PRN     Today I personally reviewed pertinent medications, lines/drains/airways, imaging, cardiology, lab results, microbiology results,     Diet  Diet NPO  Diet NPO

## 2018-05-30 NOTE — SUBJECTIVE & OBJECTIVE
Neurologic Chief Complaint: Aphasia, dysarthria, LSW     Subjective:     Interval History: Patient is seen for follow-up neurological assessment and treatment recommendations:     No events overnight. Patient with many bacteria on UTI so short cipro course started per NCC. Pending step down to NSU.      HPI, Past Medical, Family, and Social History remains the same as documented in the initial encounter.     Review of Systems   Constitutional: Negative for fever.   Respiratory: Negative for shortness of breath.    Gastrointestinal: Negative for abdominal pain, nausea and vomiting.   Neurological: Positive for facial asymmetry, speech difficulty and weakness. Negative for headaches.   Psychiatric/Behavioral: Positive for agitation and confusion. Negative for behavioral problems.     Scheduled Meds:   albuterol-ipratropium  3 mL Nebulization Q8H    [START ON 5/31/2018] aspirin  81 mg Per NG tube Daily    atorvastatin  40 mg Per NG tube Daily    buPROPion  150 mg Per NG tube BID    cefTRIAXone (ROCEPHIN) IVPB  1 g Intravenous Q24H    donepezil  10 mg Per NG tube QHS    heparin (porcine)  5,000 Units Subcutaneous Q8H    [START ON 5/31/2018] losartan  100 mg Per NG tube Daily    QUEtiapine  25 mg Per NG tube QHS    sodium chloride 0.9%  3 mL Intravenous Q8H    sodium chloride 3%  4 mL Nebulization Q6H     Continuous Infusions:    PRN Meds:acetaminophen, hydrALAZINE, labetalol, magnesium oxide, magnesium oxide, potassium chloride 10%, potassium chloride 10%, potassium chloride 10%, potassium, sodium phosphates, potassium, sodium phosphates, potassium, sodium phosphates, senna-docusate 8.6-50 mg    Objective:     Vital Signs (Most Recent):  Temp: 98.2 °F (36.8 °C) (05/30/18 1200)  Pulse: 64 (05/30/18 1300)  Resp: (!) 22 (05/30/18 1300)  BP: (!) 161/70 (05/30/18 1200)  SpO2: 100 % (05/30/18 1300)  BP Location: Right arm    Vital Signs Range (Last 24H):  Temp:  [97.8 °F (36.6 °C)-99.8 °F (37.7 °C)]   Pulse:   [52-85]   Resp:  [21-40]   BP: (135-204)/()   SpO2:  [95 %-100 %]   BP Location: Right arm    Physical Exam   Constitutional: She appears well-developed. No distress.   HENT:   Head: Normocephalic and atraumatic.   Eyes: Pupils are equal, round, and reactive to light.   Cardiovascular: Normal rate.    Pulmonary/Chest: Effort normal.   Neurological: She is alert.   Not oriented   Skin: Skin is warm and dry.   Psychiatric: She is agitated.   Vitals reviewed.      Neurological Exam:   LOC: alert  Attention Span: poor  Language: difficult to assess 2/2 severe dysarthria  Articulation: Dysarthria  Orientation: difficult to assess 2/2 severe dysarthria  Visual Fields: Full  EOM (CN III, IV, VI): Full/intact  Facial Movement (CN VII): Lower facial weakness on the Left  Motor: Arm left  Paresis: 3/5  Leg left  Paresis: 4/5  Arm right  Paresis: 4/5  Leg right Paresis: 4/5  Sensation: withdraws to painful stimuli in all 4 extremities  Tone: Spasticity  RUE    Laboratory:  CMP:     Recent Labs  Lab 05/30/18  0238   CALCIUM 9.5   ALBUMIN 3.2*   PROT 6.6      K 4.0   CO2 26      BUN 15   CREATININE 0.8   ALKPHOS 77   ALT 16   AST 31   BILITOT 0.5     BMP:     Recent Labs  Lab 05/30/18  0238      K 4.0      CO2 26   BUN 15   CREATININE 0.8   CALCIUM 9.5     CBC:     Recent Labs  Lab 05/30/18  0238   WBC 11.08   RBC 3.49*   HGB 10.6*   HCT 32.8*   *   MCV 94   MCH 30.4   MCHC 32.3     Lipid Panel:     Recent Labs  Lab 05/27/18  1514   CHOL 197   LDLCALC 110.8   HDL 59   TRIG 136     Coagulation:     Recent Labs  Lab 05/30/18  0240   INR 0.9   APTT 25.1     Platelet Aggregation Study: No results for input(s): PLTAGG, PLTAGINTERP, PLTAGREGLACO, ADPPLTAGGREG in the last 168 hours.  Hgb A1C:     Recent Labs  Lab 05/27/18  1919   HGBA1C 5.5     TSH:     Recent Labs  Lab 05/27/18  1514   TSH 1.638       Diagnostic Results     Brain Imaging   MRI brain 5/28:   There is a small cortical area of   acute/recent infarction in the right precentral gyrus.    Superimposed age-appropriate cerebral volume loss with patchy and confluent T2 FLAIR signal abnormality supra infratentorial parenchyma most compatible with severe chronic ischemic change.    Numerous scattered areas of a prior infarction in the cerebellum with encephalomalacia.    Punctate susceptibility right frontal periventricular white matter suggestive for remote microhemorrhage.    Vessel Imaging   CTA MP 5/27:   No acute intracranial hemorrhage or major vascular distribution infarct.    Bilateral basal ganglia, thalami and cerebellar hemispheres small remote infarcts.    Generalized cerebral volume loss and extensive white matter chronic small vessel ischemic change.    Ventriculomegaly out of proportion to sulcal prominence, nonspecific but can be seen with normal pressure hydrocephalus, but overall unchanged from prior MRI.    The anterior and posterior circulation demonstrate no high-grade stenosis or major vessel occlusion.    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    Cardiac Imaging   ECHO 5/28/18:   CONCLUSIONS     1 - Concentric LVH with hyperdynamic left ventricular systolic function (EF >70%).     2 - Severe left atrial enlargement.     3 - Impaired LV relaxation, elevated LAP (grade 2 diastolic dysfunction).     4 - Normal right ventricular systolic function .     5 - Mild to moderate aortic stenosis, ALBER = 1.64 cm2, AVAi = 1.12 cm2/m2, peak velocity = 3.11 m/s, mean gradient = 24 mmHg

## 2018-05-30 NOTE — ASSESSMENT & PLAN NOTE
Area of cytotoxic cerebral edema identified when reviewing brain imaging in the territory of the R middle cerebral artery. There is not mass effect associated with it. We will continue to monitor the patients clinical exam for any worsening of symptoms which may indicate expansion of the stroke or the area of the edema resulting in the clinical change. The pattern is of unclear etiology.

## 2018-05-30 NOTE — PT/OT/SLP PROGRESS
Speech Language Pathology Treatment    Patient Name:  Sury Cobos   MRN:  [id number]  Admitting Diagnosis: Acute ischemic right MCA stroke    Recommendations:                 General Recommendations:  Speech/language/cognitive therapy & ongoing swallow assessment  Diet recommendations:  NPO, Liquid Diet Level: NPO   Aspiration Precautions: Continue alternate means of nutrition   General Precautions: Standard, aspiration, NPO, fall    Subjective   Awake, confused. Pt appears to have declined in status compared to yesterday.     Pain/Comfort:  Pain Rating 1: 0/10 (no pain indicated)  Pain Rating Post-Intervention 2: 0/10    Objective:     Has the patient been evaluated by SLP for swallowing?   Yes  Keep patient NPO? Yes   Current Respiratory Status: room air      Pt repositioned to upright. Pt unable to elicit volitional cough, swallow. Pt with wet voicing upon spontaneous unintelligible utterances throughout session. Pt with no response to ice chips to lips, no swallow elicited.     Pt followed 1 step body part commands x2 out of 8 trials. Pt responded accurately to simple yes/no x1, no response or incorrect to further stimulus items. Pt with no response to automatic speech tasks or to name objects.     Assessment:     Sury Cobos is a 83 y.o. female with an SLP diagnosis of Aphasia, Dysphagia and Cognitive-Linguistic Impairment     Goals:    SLP Goals        Problem: SLP Goal    Goal Priority Disciplines Outcome   SLP Goal     SLP Ongoing (interventions implemented as appropriate)   Description:  Speech Language Pathology Goals  Goals expected to be met by 6/5  1. Ongoing swallow assessment  2. Pt will follow 1 step commands with 75% acc given min A  3. Pt will answer yes/no questions with 75% acc given min A  4. Pt will complete OME's given mod A  5. Pt will complete word level dysarthria tasks with 80% intell. given max A to utilize speech strategies   6. Assess reading, writing, visual spatial skills  7. Assess  memory, problem solving, reasoning                    Plan:     · Patient to be seen:  4 x/week   · Plan of Care expires:  06/26/18  · Plan of Care reviewed with:  patient   · SLP Follow-Up:  Yes       Discharge recommendations:  nursing facility, skilled     Time Tracking:     SLP Treatment Date:   05/30/18  Speech Start Time:  1428  Speech Stop Time:  1444     Speech Total Time (min):  16 min    Billable Minutes: Speech Therapy Individual 8 and Treatment Swallowing Dysfunction 8    Amairani Jane CCC-SLP  05/30/2018

## 2018-05-30 NOTE — ASSESSMENT & PLAN NOTE
Difficult to assess if patient is responding appropriately to questions due to severe dysarthria  Not following commands, but patient is also agitated with UTI

## 2018-05-30 NOTE — PLAN OF CARE
Problem: Patient Care Overview  Goal: Plan of Care Review  Outcome: Revised  POC reviewed with pt , spoke to daughter on phone update given.. She verbalized understanding. Questions and concerns addressed. No acute events today. Pt progressing toward goals. Will continue to monitor. See flowsheets for full assessment and VS info.

## 2018-05-30 NOTE — PROGRESS NOTES
[facility]  Neurocritical Care  Progress Note    Admit Date: 5/27/2018  Service Date: 05/29/2018  Length of Stay: 2    Subjective:     Chief Complaint: Acute ischemic right MCA stroke    History of Present Illness: Ms. Cobos is a 82 y/o female with pmhx DM2, HTN, HLD, dementia who was last seen normal at 1320, alert  today with family. Daughter states she went to run and errand and returned at 1445 to find the patient slumped over and unable to talk or move her left side. She was taken to [place] and telemedicine consult was done with Dr Marx. No acute changes on CT head. Recommendation was to give IV TPA and transfer to [facility] for further evaluation.   Upon arrival patient still with aphasia, L facial droop, dysarthria. Minimal left sided weakness.   CTA head and neck multiphase complete, no LVO, no IR. Pt admitted to [facility] for higher level of care.       Hospital Course: 5/27: admit to [facility] s/p tpa, no intervention, R MCA    Interval History:   NAEON.     Review of Systems   Respiratory: Negative for shortness of breath.    Cardiovascular: Negative for chest pain.   Neurological: Positive for speech difficulty and weakness.   Psychiatric/Behavioral: Positive for agitation.       Objective:     Vitals:  Temp: 98.7 °F (37.1 °C)  Pulse: 66  Rhythm: normal sinus rhythm  BP: (!) 182/95  MAP (mmHg): 132  Resp: (!) 23  SpO2: 97 %  Oxygen Concentration (%): 21  O2 Device (Oxygen Therapy): room air    Temp  Min: 97 °F (36.1 °C)  Max: 99.3 °F (37.4 °C)  Pulse  Min: 52  Max: 92  BP  Min: 124/58  Max: 186/79  MAP (mmHg)  Min: 84  Max: 136  Resp  Min: 20  Max: 36  SpO2  Min: 95 %  Max: 100 %  Oxygen Concentration (%)  Min: 21  Max: 21    05/28 0701 - 05/29 0700  In: 1506.3 [I.V.:1326.3]  Out: 2300 [Urine:2300]   Unmeasured Output  Urine Occurrence: 1  Stool Occurrence: 0  Pad Count: 2       Physical Exam   Constitutional: She appears well-developed and well-nourished.   HENT:   Head: Normocephalic  and atraumatic.   Cardiovascular: Normal rate.    Pulmonary/Chest: Effort normal.   Neurological:   [id number]  Alert. Oriented to person, place.   Dysarthric. Follows simple commands, agitated however and intermittently refuses to follow.   PERRL, EOMI.   Left facial droop  Motor LUE LLE 4/5 RUE RLE 5/5       Medications:  Continuous    sodium chloride 0.9% Last Rate: 75 mL/hr at 05/29/18 2100   Scheduled    [START ON 5/30/2018] albuterol-ipratropium 3 mL Q8H   aspirin 81 mg Daily   atorvastatin 40 mg Daily   buPROPion 150 mg BID   carvedilol 3.125 mg BID   ciprofloxacin HCl 500 mg Q12H   donepezil 10 mg QHS   heparin (porcine) 5,000 Units Q8H   losartan 50 mg Daily   QUEtiapine 25 mg QHS   sodium chloride 0.9% 3 mL Q8H   sodium chloride 3% 4 mL Q6H   PRN    hydrALAZINE 10 mg Q4H PRN   labetalol 10 mg PRN   magnesium oxide 800 mg PRN   magnesium oxide 800 mg PRN   potassium chloride 10% 40 mEq PRN   potassium chloride 10% 40 mEq PRN   potassium chloride 10% 60 mEq PRN   potassium, sodium phosphates 2 packet PRN   potassium, sodium phosphates 2 packet PRN   potassium, sodium phosphates 2 packet PRN   senna-docusate 8.6-50 mg 1 tablet Daily PRN     Today I personally reviewed pertinent medications, lines/drains/airways, imaging, cardiology, lab results, microbiology results,     Diet  Diet NPO  Diet NPO    Assessment/Plan:     Neuro   * Acute ischemic right MCA stroke    -s/p tpa at [facility]  -CTA without evidence of LVO  -MRI with evidence of cortical infarct involving the right pre-central gyrus  -q1h neuro checks  -PT OT SLP  -Vascular neurology following  -asp 81mg qd  -atorvastatin 40 mg qd  -heparin 5000U q8h        Cardiac/Vascular   Benign essential HTN    -SBP<180  -coreg 3.125mg BID  -increase losartan 50mg qd  -labetalol, hydralazine, cardene prn        Renal/   UTI (urinary tract infection)    No fernandez on presentation - has had a purwick since admission  UA with 14 WBC  cipro 500mg BID X5d             Prophylaxis:  Venous Thromboembolism: mechanical chemical  Stress Ulcer: NA  Ventilator Pneumonia: not applicable     Activity Orders          Activity as tolerated starting at 05/28 0516        Full Code    Juani Queen MD  Neurocritical Care  [facility]

## 2018-05-30 NOTE — PROGRESS NOTES
[facility]  Vascular Neurology  Comprehensive Stroke Center  Progress Note    Assessment/Plan:     * Acute ischemic right MCA stroke    82 y/o female with left sided facial droop, aphasia, dysarthria who received IV TPA but no IR. MRI with infarct in R precentral gyrus.   Patient NPO with NG in place.   Nursing notes state afib seen on telemetry, but no afib documented on 12 lead ECG. At this time, unclear etiology; ESUS vs small vessel.     Antithrombotics: Aspirin 81 mg    Statins: Lipitor 40 mg daily    Aggressive risk factor modification: HTN, HLD     Rehab efforts: PT/OT/SLP to evaluate and treat, PM&R consult     Diagnostics ordered/pending: none     VTE prophylaxis: SCD's may begin Heparin 500 units SC Q8H     BP parameters: Infarct: Post tPA, SBP <180            Cytotoxic cerebral edema    Area of cytotoxic cerebral edema identified when reviewing brain imaging in the territory of the R middle cerebral artery. There is not mass effect associated with it. We will continue to monitor the patients clinical exam for any worsening of symptoms which may indicate expansion of the stroke or the area of the edema resulting in the clinical change. The pattern is of unclear etiology.            UTI (urinary tract infection)    Continue ceftriaxone        Dementia with behavioral disturbance    Donepezil 10mg daily  Seroquel 25mg nightly  Currently agitated and has UTI        High cholesterol    Stroke risk factor    Lipitor 40 mg daily        Benign essential HTN    Stroke risk factor  SBP <180 due to TPA        Aphasia    Difficult to assess if patient is responding appropriately to questions due to severe dysarthria  Not following commands, but patient is also agitated with UTI        S/P admn tPA in diff fac w/n last 24 hr bef adm to crnt fac    Close monitoring in NCCU for 24 hours post administration, completed             82 y/o female with aphasia, dysarthria and facial droop,  received IV TPA but no LVO so no IR. Probable small vessel disease.    5/28: Blood pressure elevated >200 overnight requiring cardene. Patient bradycardic and with paroxysmal afib overnight as well. MRI with infarct in the right precentral gyrus.   5/29: No events overnight. Patient with many bacteria on UTI so short cipro course started per NCC. Pending step down to NSU.   5/30: seems more agitated today, not participating in exam or following commands, severe dysarthria and excessive secretions    STROKE DOCUMENTATION   Acute Stroke Times   Last Known Normal Date: 05/27/18  Last Known Normal Time: 1320  Symptom Onset Date: 05/27/18  Symptom Onset Time: 1445  Stroke Team Called Date: 05/27/18  Stroke Team Called Time: 1550  Stroke Team Arrival Date: 05/27/18  Stroke Team Arrival Time: 1751  CT Interpretation Time: 1536  Decision to Treat Time for Alteplase: 1806    NIH Scale:  1a. Level Of Consciousness: 0-->Alert: keenly responsive  1b. LOC Questions: 2-->Answers neither question correctly  1c. LOC Commands: 2-->Performs neither task correctly  2. Best Gaze: 0-->Normal  3. Visual: 0-->No visual loss  4. Facial Palsy: 1-->Minor paralysis (flattened nasolabial fold, asymmetry on smiling)  5a. Motor Arm, Left: 2-->Some effort against gravity: limb cannot get to or maintain (if cued) 90 (or 45) degrees, drifts down to bed, but has some effort against gravity  5b. Motor Arm, Right: 1-->Drift: limb holds 90 (or 45) degrees, but drifts down before full 10 secs: does not hit bed or other support  6a. Motor Leg, Left: 1-->Drift: leg falls by the end of the 5-sec period but does not hit bed  6b. Motor Leg, Right: 1-->Drift: leg falls by the end of the 5-sec period but does not hit bed  7. Limb Ataxia: 0-->Absent  8. Sensory: 0-->Normal: no sensory loss  9. Best Language: 0-->No aphasia: normal  10. Dysarthria: 2-->Severe dysarthria: patients speech is so slurred as to be unintelligible in the absence of or out of proportion  to any dysphasia, or is mute/anarthric  11. Extinction and Inattention (formerly Neglect): 0-->No abnormality  Total (NIH Stroke Scale): 12       Modified [place] Score: 0  [place] Coma Scale:    ABCD2 Score:    [id number] Score:   HAS -BLED Score:   ICH Score:   Hunt & Burkett Classification:      Hemorrhagic change of an Ischemic Stroke: Does this patient have an ischemic stroke with hemorrhagic changes? No     Neurologic Chief Complaint: Aphasia, dysarthria, LSW     Subjective:     Interval History: Patient is seen for follow-up neurological assessment and treatment recommendations:     No events overnight. Patient with many bacteria on UTI so short cipro course started per NCC. Pending step down to NSU.      HPI, Past Medical, Family, and Social History remains the same as documented in the initial encounter.     Review of Systems   Constitutional: Negative for fever.   Respiratory: Negative for shortness of breath.    Gastrointestinal: Negative for abdominal pain, nausea and vomiting.   Neurological: Positive for facial asymmetry, speech difficulty and weakness. Negative for headaches.   Psychiatric/Behavioral: Positive for agitation and confusion. Negative for behavioral problems.     Scheduled Meds:   albuterol-ipratropium  3 mL Nebulization Q8H    [START ON 5/31/2018] aspirin  81 mg Per NG tube Daily    atorvastatin  40 mg Per NG tube Daily    buPROPion  150 mg Per NG tube BID    cefTRIAXone (ROCEPHIN) IVPB  1 g Intravenous Q24H    donepezil  10 mg Per NG tube QHS    heparin (porcine)  5,000 Units Subcutaneous Q8H    [START ON 5/31/2018] losartan  100 mg Per NG tube Daily    QUEtiapine  25 mg Per NG tube QHS    sodium chloride 0.9%  3 mL Intravenous Q8H    sodium chloride 3%  4 mL Nebulization Q6H     Continuous Infusions:    PRN Meds:acetaminophen, hydrALAZINE, labetalol, magnesium oxide, magnesium oxide, potassium chloride 10%, potassium chloride 10%, potassium chloride 10%, potassium, sodium  phosphates, potassium, sodium phosphates, potassium, sodium phosphates, senna-docusate 8.6-50 mg    Objective:     Vital Signs (Most Recent):  Temp: 98.2 °F (36.8 °C) (05/30/18 1200)  Pulse: 64 (05/30/18 1300)  Resp: (!) 22 (05/30/18 1300)  BP: (!) 161/70 (05/30/18 1200)  SpO2: 100 % (05/30/18 1300)  BP Location: Right arm    Vital Signs Range (Last 24H):  Temp:  [97.8 °F (36.6 °C)-99.8 °F (37.7 °C)]   Pulse:  [52-85]   Resp:  [21-40]   BP: (135-204)/()   SpO2:  [95 %-100 %]   BP Location: Right arm    Physical Exam   Constitutional: She appears well-developed. No distress.   HENT:   Head: Normocephalic and atraumatic.   Eyes: Pupils are equal, round, and reactive to light.   Cardiovascular: Normal rate.    Pulmonary/Chest: Effort normal.   Neurological: She is alert.   Not oriented   Skin: Skin is warm and dry.   Psychiatric: She is agitated.   Vitals reviewed.      Neurological Exam:   LOC: alert  Attention Span: poor  Language: difficult to assess 2/2 severe dysarthria  Articulation: Dysarthria  Orientation: difficult to assess 2/2 severe dysarthria  Visual Fields: Full  EOM (CN III, IV, VI): Full/intact  Facial Movement (CN VII): Lower facial weakness on the Left  Motor: Arm left  Paresis: 3/5  Leg left  Paresis: 4/5  Arm right  Paresis: 4/5  Leg right Paresis: 4/5  Sensation: withdraws to painful stimuli in all 4 extremities  Tone: Spasticity  RUE    Laboratory:  CMP:     Recent Labs  Lab 05/30/18 0238   CALCIUM 9.5   ALBUMIN 3.2*   PROT 6.6      K 4.0   CO2 26      BUN 15   CREATININE 0.8   ALKPHOS 77   ALT 16   AST 31   BILITOT 0.5     BMP:     Recent Labs  Lab 05/30/18 0238      K 4.0      CO2 26   BUN 15   CREATININE 0.8   CALCIUM 9.5     CBC:     Recent Labs  Lab 05/30/18 0238   WBC 11.08   RBC 3.49*   HGB 10.6*   HCT 32.8*   *   MCV 94   MCH 30.4   MCHC 32.3     Lipid Panel:     Recent Labs  Lab 05/27/18  1514   CHOL 197   LDLCALC 110.8   HDL 59   TRIG 136      Coagulation:     Recent Labs  Lab 05/30/18  0240   INR 0.9   APTT 25.1     Platelet Aggregation Study: No results for input(s): PLTAGG, PLTAGINTERP, PLTAGREGLACO, ADPPLTAGGREG in the last 168 hours.  Hgb A1C:     Recent Labs  Lab 05/27/18  1919   HGBA1C 5.5     TSH:     Recent Labs  Lab 05/27/18  1514   TSH 1.638       Diagnostic Results     Brain Imaging   MRI brain 5/28:   There is a small cortical area of  acute/recent infarction in the right precentral gyrus.    Superimposed age-appropriate cerebral volume loss with patchy and confluent T2 FLAIR signal abnormality supra infratentorial parenchyma most compatible with severe chronic ischemic change.    Numerous scattered areas of a prior infarction in the cerebellum with encephalomalacia.    Punctate susceptibility right frontal periventricular white matter suggestive for remote microhemorrhage.    Vessel Imaging   CTA MP 5/27:   No acute intracranial hemorrhage or major vascular distribution infarct.    Bilateral basal ganglia, thalami and cerebellar hemispheres small remote infarcts.    Generalized cerebral volume loss and extensive white matter chronic small vessel ischemic change.    Ventriculomegaly out of proportion to sulcal prominence, nonspecific but can be seen with normal pressure hydrocephalus, but overall unchanged from prior MRI.    The anterior and posterior circulation demonstrate no high-grade stenosis or major vessel occlusion.    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    Cardiac Imaging   ECHO 5/28/18:   CONCLUSIONS     1 - Concentric LVH with hyperdynamic left ventricular systolic function (EF >70%).     2 - Severe left atrial enlargement.     3 - Impaired LV relaxation, elevated LAP (grade 2 diastolic dysfunction).     4 - Normal right ventricular systolic function .     5 - Mild to moderate aortic stenosis, ALBER  = 1.64 cm2, AVAi = 1.12 cm2/m2, peak velocity = 3.11 m/s, mean gradient = 24 mmHg            Adrianna Rascon PA-C  [facility]  Department of Vascular Neurology   [facility]

## 2018-05-30 NOTE — MEDICAL/APP STUDENT
"                                            PROGRESS NOTE  Subjective:       Patient ID: Sury Cobos is a 83 y.o. female.    BRIEF HPI  Sury Cobos is a 83 y.o. RIGHT handed  female who  has a past medical history of Arthritis; Benign essential HTN; CHF (congestive heart failure); Closed intertrochanteric fracture of left hip; Coronary artery disease; Dementia; Diabetes mellitus; Gout; High cholesterol; Low potassium syndrome; and Urinary incontinence., currently presenting with Acute ischemic right MCA stroke.      Interval History:   Patient is seen for follow-up neurological assessment and treatment recommendations:      Per nursing notes:  5/28/2018: "Blood pressure elevated >200 overnight requiring cardene. Patient bradycardic and with paroxysmal afib overnight as well. MRI with infarct in the right precentral gyrus."   5/29/2028: "No acute events throughout the day, VS and assessment per flow sheet, patient progressing towards goal as tolerated. Pt has transfer orders; awaiting bed.  Plan of care reviewed with Sury Cobos at 0400, pt needs reinforcement. Will continue to monitor."   05/30/2018: This morning, she was difficult to arouse and complained of generalized pain and thirst. Appears to be uncomfortable and tries to reposition herself throughout the exam. No improvement with speech. Strength has improved on the LUE, but patient is reluctant to fully cooperate with the neuro-exam.     Per Nursing note: "No acute events throughout the day, VS and assessment per flow sheet, patient progressing towards goal as tolerated. Pt has transfer orders. Pt continues to be agitated and combative. Magnesium replaced per MAR parameters.  Plan of care reviewed with Sury Cobos at 0400, pt needs reinforcement.. Will continue to monitor. "     Per CM note: "CM met with patient's daughter in room at daughter's request to discuss discharge options.  Per daughter, therapy is recommending SNF placement " "an daughter wanted to know if she could take the patient home from SNF if she was not happy.  CM explained to daughter that she could take the patient home at any time if she is not happy with the care.  Daughter stated that she wants to take the patient home with home health and does not want SNF at this time. "    HPI, Past Medical, Family, and Social History remains the same as documented in the initial encounter.    Review of Systems     Constitutional: Negative for fever.   Gastrointestinal: Negative for abdominal pain, nausea and vomiting.   Neurological: Positive for speech difficulty. Negative for headaches.   Psychiatric/Behavioral: Positive for agitation and confusion. Negative for behavioral problems.     Scheduled Meds:   aspirin  81 mg Per NG tube Daily    atorvastatin  40 mg Per NG tube Daily    buPROPion  150 mg Per NG tube BID    carvedilol  3.125 mg Per NG tube BID    donepezil  10 mg Per NG tube QHS    heparin (porcine)  5,000 Units Subcutaneous Q8H    losartan  25 mg Per NG tube Daily    midazolam  1 mg Intravenous Once    sodium chloride 0.9%  3 mL Intravenous Q8H      Continuous Infusions:   sodium chloride 0.9% 75 mL/hr at 05/28/18 1709      PRN Meds:hydrALAZINE, labetalol, magnesium oxide, magnesium oxide, potassium chloride 10%, potassium chloride 10%, potassium chloride 10%, potassium, sodium phosphates, potassium, sodium phosphates, potassium, sodium phosphates, senna-docusate 8.6-50 mg    Objective:     VITALS   05/29 0700  05/30 0659 05/30 0700  05/30 0749  Most Recent    Temp (°F) 97.8-99.8    99.2 (37.3)    Pulse 52-85    84    Resp 20-40    37    //79    179/70    MAP (mmHg)     101    SpO2 (%)     99    Weight (kg) 57.2    57.2 kg (126 lb 1.7 oz)       Physical Exam    Constitutional: She appears well-developed. No distress.   HENT:   Head: Normocephalic and atraumatic.   Eyes: EOM are normal.   Neck: Neck supple.   Pulmonary/Chest: Effort normal. "   Neurological: She is alert.   Not oriented   Skin: Skin is warm and dry.   Psychiatric: She has a normal mood and affect.      Neurologic Exam   Neurological Exam:   LOC: alert  Attention Span: Good   Language: mild aphasia  Articulation: Dysarthria  Orientation: Not oriented to time  Visual Fields: Full  EOM (CN III, IV, VI): Full/intact  Facial Movement (CN VII): Lower facial weakness on the Left  Motor: Arm left  Paresis: 4/5  Leg left  Normal 5/5  Arm right  Normal 5/5  Leg right Normal 5/5  Sensation: Avery-hypoesthesia left  Tone: Normal tone throughout     Recent Results (from the past 12 hour(s))   POCT glucose    Collection Time: 05/29/18 11:02 PM   Result Value Ref Range    POCT Glucose 159 (H) 70 - 110 mg/dL   Magnesium    Collection Time: 05/30/18  2:38 AM   Result Value Ref Range    Magnesium 1.8 1.6 - 2.6 mg/dL   Phosphorus    Collection Time: 05/30/18  2:38 AM   Result Value Ref Range    Phosphorus 3.4 2.7 - 4.5 mg/dL   CBC auto differential    Collection Time: 05/30/18  2:38 AM   Result Value Ref Range    WBC 11.08 3.90 - 12.70 K/uL    RBC 3.49 (L) 4.00 - 5.40 M/uL    Hemoglobin 10.6 (L) 12.0 - 16.0 g/dL    Hematocrit 32.8 (L) 37.0 - 48.5 %    MCV 94 82 - 98 fL    MCH 30.4 27.0 - 31.0 pg    MCHC 32.3 32.0 - 36.0 g/dL    RDW 14.5 11.5 - 14.5 %    Platelets 105 (L) 150 - 350 K/uL    MPV 12.5 9.2 - 12.9 fL    Immature Granulocytes 0.3 0.0 - 0.5 %    Gran # (ANC) 8.2 (H) 1.8 - 7.7 K/uL    Immature Grans (Abs) 0.03 0.00 - 0.04 K/uL    Lymph # 2.1 1.0 - 4.8 K/uL    Mono # 0.7 0.3 - 1.0 K/uL    Eos # 0.0 0.0 - 0.5 K/uL    Baso # 0.05 0.00 - 0.20 K/uL    nRBC 0 0 /100 WBC    Gran% 73.6 (H) 38.0 - 73.0 %    Lymph% 18.9 18.0 - 48.0 %    Mono% 6.6 4.0 - 15.0 %    Eosinophil% 0.1 0.0 - 8.0 %    Basophil% 0.5 0.0 - 1.9 %    Differential Method Automated    Comprehensive metabolic panel    Collection Time: 05/30/18  2:38 AM   Result Value Ref Range    Sodium 143 136 - 145 mmol/L    Potassium 4.0 3.5 - 5.1 mmol/L     Chloride 108 95 - 110 mmol/L    CO2 26 23 - 29 mmol/L    Glucose 148 (H) 70 - 110 mg/dL    BUN, Bld 15 8 - 23 mg/dL    Creatinine 0.8 0.5 - 1.4 mg/dL    Calcium 9.5 8.7 - 10.5 mg/dL    Total Protein 6.6 6.0 - 8.4 g/dL    Albumin 3.2 (L) 3.5 - 5.2 g/dL    Total Bilirubin 0.5 0.1 - 1.0 mg/dL    Alkaline Phosphatase 77 55 - 135 U/L    AST 31 10 - 40 U/L    ALT 16 10 - 44 U/L    Anion Gap 9 8 - 16 mmol/L    eGFR if African American >60.0 >60 mL/min/1.73 m^2    eGFR if non African American >60.0 >60 mL/min/1.73 m^2   APTT    Collection Time: 05/30/18  2:40 AM   Result Value Ref Range    aPTT 25.1 21.0 - 32.0 sec   Protime-INR    Collection Time: 05/30/18  2:40 AM   Result Value Ref Range    Prothrombin Time 9.8 9.0 - 12.5 sec    INR 0.9 0.8 - 1.2   POCT glucose    Collection Time: 05/30/18  5:25 AM   Result Value Ref Range    POCT Glucose 156 (H) 70 - 110 mg/dL       Results for orders placed or performed during the hospital encounter of 05/27/18   MRI Brain Without Contrast    Narrative    EXAMINATION:  MRI BRAIN WITHOUT CONTRAST    CLINICAL HISTORY:  Stroke;    TECHNIQUE:  Sagittal and axial T1, axial T2, axial FLAIR, axial gradient and axial diffusion imaging of the whole brain without contrast.    COMPARISON:  CTA 05/27/2018    FINDINGS:  There is a small bandlike cortical region of restricted diffusion within the right precentral gyrus with corresponding T2 flair hyperintensity compatible with acute/recent infarction.    Superimposed mild degree of age-appropriate generalized cerebral volume loss.  There is patchy and confluent T2 FLAIR signal abnormality within the supratentorial white matter, keyonna which is nonspecific and concerning for severe chronic microvascular ischemic change.  There is remote bilateral cerebellar infarcts with scattered encephalomalacia with T2 flair hyperintensity compatible gliosis and scarring..  There is no midline shift or mass effect.  Questioned turbulent flow or occlusion of the  left distal vertebral artery.  Remaining major intracranial T2 flow voids are present.    Punctate susceptibility in the right frontal periventricular white matter suggestive for remote microhemorrhage..    This report was flagged in Epic as abnormal.      Impression    There is a small cortical area of  acute/recent infarction in the right precentral gyrus.    Superimposed age-appropriate cerebral volume loss with patchy and confluent T2 FLAIR signal abnormality supra infratentorial parenchyma most compatible with severe chronic ischemic change.    Numerous scattered areas of a prior infarction in the cerebellum with encephalomalacia.    Punctate susceptibility right frontal periventricular white matter suggestive for remote microhemorrhage.    Please see above for additional details.      Electronically signed by: Jeferson Rinaldi DO  Date:    05/28/2018  Time:    14:49   Results for orders placed or performed during the hospital encounter of 05/27/18   CT Head Without Contrast    Narrative    EXAMINATION:  CT HEAD WITHOUT CONTRAST    CLINICAL HISTORY:  Confusion/delirium, altered LOC, unexplained;    TECHNIQUE:  Low dose axial images were obtained through the head.  Coronal and sagittal reformations were also performed. Contrast was not administered.    COMPARISON:  None.    FINDINGS:  There is mild dilatation of ventricles, sulci, fissures.  There is decreased density in white matter consistent with microvascular ischemic change.  There is slightly greater asymmetric dilatation of the ventricles relative to the sulci and fissures and recommend correlation clinically if there is clinical consideration for normal pressure hydrocephalus.  More likely this represents asymmetric involutional change.    There is no acute intracranial hemorrhage.  There is no intracranial mass effect.  There is no acute major vascular territory infarct.  The calvarium appears intact, mastoids pneumatized.  Visualized paranasal sinuses  appear clear aside from slight mucosal thickening and questionable trace fluid within the sphenoid sinus.      Impression    Mild involutional changes and findings consistent with microvascular ischemic change.  Mild asymmetric widening of ventricles relative to the sulci and fissures which in the appropriate clinical setting can question normal pressure hydrocephalus with correlation recommended clinically.  No acute intracranial findings.      Electronically signed by: Floridalma Rojas MD  Date:    05/27/2018  Time:    15:40   Results for orders placed or performed during the hospital encounter of 05/15/15   MRI Brain W WO Contrast    Narrative    Sagittal and axial images are obtained through the brain with multiple MR sequences.  Fine detail axial and coronal images were obtained through the internal auditory canals.  Following administration of 12 cc Multihance gadolinium contrast IV repeat   fine detail axial and coronal images are obtained to the internal artery canals and inner ears and through the brain.      The seventh and eighth nerve complex these are well seen bilaterally.  The internal auditory canals are sharp and symmetrical.  No neuroma or mass is identified.  The basal cisterns are clear and symmetric.  The MR signal from the inner ear bilaterally   is symmetric and within normal limits.  Abnormal contrast enhancement is not seen.    The basal cisterns are clear and symmetric.  There is no mass-effect or midline shift.  There is mild to moderate enlargement of the lateral ventricles and some mild enlargement of the cerebral sulci consistent with brain atrophy.  Within the cerebrum   and central cerebellum there is elevated signal intensity on flair weighting consistent with deep white matter ischemic changes.  In the right frontal lobe there is additional abnormal elevated signal around a single sulcus which may represent a small   ischemic CVA.  Other focal areas of increased or decreased signal  intensity consistent with tumor, edema, CVA or hemorrhage are not seen.  No intracranial hemorrhage or hematoma is noted.  No abnormal areas of contrast enhancement are noted.  The patient   is seen to have left chronic maxillary sinusitis.    Impression     Negative MRI of the internal auditory canals and inner ears bilaterally.  Mild to moderate generalized brain atrophy with extensive deep white matter ischemic changes.  Possible small old right frontal CVA.  Chronic left maxillary sinusitis        Electronically signed by: Dago Hathaway MD  Date:     05/21/15  Time:    11:38      Assessment/Plan:     * Acute ischemic right MCA stroke     84 y/o female with left sided facial droop, aphasia, dysarthria who received IV TPA but no IR. MRI with infarct in R precentral gyrus.   Patient NPO with NG in place.   Nursing notes state afib seen on telemetry, but no afib documented on 12 lead ECG. At this time, unclear etiology; ESUS vs small vessel.      Antithrombotics: Aspirin 81 mg     Statins: Lipitor 40 mg daily     Aggressive risk factor modification: HTN, HLD     Rehab efforts: PT/OT/SLP to evaluate and treat, PM&R consult      Diagnostics ordered/pending: none      VTE prophylaxis: SCD's may begin Heparin 500 units SC Q8H      BP parameters: Infarct: Post tPA, SBP <180                Hypokalemia     K 4 today  Management per NCC, replacement ordered          Dementia with behavioral disturbance     Donepezil 10mg daily  Seroquel 25mg nightly          High cholesterol     Stroke risk factor    Lipitor 40 mg daily          Benign essential HTN     Stroke risk factor  SBP <180 due to TPA          Aphasia     Due to stroke  Aggressive therapy          S/P admn tPA in diff fac w/n last 24 hr bef adm to crnt fac     Close monitoring in N[facility]U for 24 hours post administration, completed                84 y/o female with aphasia, dysarthria and facial droop, received IV TPA but no LVO so no IR. Probable small vessel  disease.     5/28: Blood pressure elevated >200 overnight requiring cardene. Patient bradycardic and with paroxysmal afib overnight as well. MRI with infarct in the right precentral gyrus.   5/29: No events overnight. Patient with many bacteria on UTI so short cipro course started per NCC. Pending step down to NSU.      STROKE DOCUMENTATION   Acute Stroke Times   Last Known Normal Date: 05/27/18  Last Known Normal Time: 1320  Symptom Onset Date: 05/27/18  Symptom Onset Time: 1445  Stroke Team Called Date: 05/27/18  Stroke Team Called Time: 1550  Stroke Team Arrival Date: 05/27/18  Stroke Team Arrival Time: 1751  CT Interpretation Time: 1536  Decision to Treat Time for Alteplase: 1806     NIH Scale:  1a. Level Of Consciousness: 0-->Alert: keenly responsive  1b. LOC Questions: 2-->Answers neither question correctly  1c. LOC Commands: 1-->Performs one task correctly  2. Best Gaze: 1-->Partial gaze palsy: gaze is abnormal in one or both eyes, but forced deviation or total gaze paresis is not present  3. Visual: 0-->No visual loss  4. Facial Palsy: 0-->Normal symmetrical movements  5a. Motor Arm, Left: 2-->Some effort against gravity: limb cannot get to or maintain (if cued) 90 (or 45) degrees, drifts down to bed, but has some effort against gravity  5b. Motor Arm, Right: 0-->No drift: limb holds 90 (or 45) degrees for full 10 secs  6a. Motor Leg, Left: 1-->Drift: leg falls by the end of the 5-sec period but does not hit bed  6b. Motor Leg, Right: 0-->No drift: leg holds 30 degree position for full 5 secs  7. Limb Ataxia: 0-->Absent  8. Sensory: 0-->Normal: no sensory loss  9. Best Language: 1-->Mild-to-moderate aphasia: some obvious loss of fluency or facility of comprehension, without significant limitation on ideas expressed or form of expression. Reduction of speech and/or comprehension, however, makes conversation. . . (see row details)  10. Dysarthria: 2-->Severe dysarthria: patients speech is so slurred as to be  unintelligible in the absence of or out of proportion to any dysphasia, or is mute/anarthric  11. Extinction and Inattention (formerly Neglect): 0-->No abnormality  Total (NIH Stroke Scale): 10      Ivonne Coma Scale: 12  [id number] Score: 9      Hemorrhagic change of an Ischemic Stroke: Does this patient have an ischemic stroke with hemorrhagic changes? No      Neurologic Chief Complaint: Aphasia, dysarthria, LSW     Qasim Adams (MS3)  5/30/2018 7:34 AM

## 2018-05-30 NOTE — PLAN OF CARE
Problem: Patient Care Overview  Goal: Plan of Care Review  Outcome: Ongoing (interventions implemented as appropriate)  No acute events throughout the day, VS and assessment per flow sheet, patient progressing towards goal as tolerated. Pt has transfer orders. Pt continues to be agitated and combative. Magnesium replaced per MAR parameters.  Plan of care reviewed with Sury Cobos at 0400, pt needs reinforcement.. Will continue to monitor.

## 2018-05-30 NOTE — PLAN OF CARE
Problem: SLP Goal  Goal: SLP Goal  Speech Language Pathology Goals  Goals expected to be met by 6/5  1. Ongoing swallow assessment  2. Pt will follow 1 step commands with 75% acc given min A  3. Pt will answer yes/no questions with 75% acc given min A  4. Pt will complete OME's given mod A  5. Pt will complete word level dysarthria tasks with 80% intell. given max A to utilize speech strategies   6. Assess reading, writing, visual spatial skills   7. Assess memory, problem solving, reasoning  Outcome: Ongoing (interventions implemented as appropriate)  Pt was seen for ST session.     Amairani Jane MS, CCC-SLP  Speech Language Pathologist  Pager: [phone]  5/30/2018

## 2018-05-30 NOTE — PROCEDURES
EXTENDED  ELECTROENCEPHALOGRAM  REPORT    Sury Cobos  [id number]  6/7/1934    DATE OF SERVICE: 5/30/18    DATE OF ADMISSION: 5/27/2018  5:51 PM    ADMITTING/REQUESTING PROVIDER: Clyde Sifuentes MD    REASON FOR CONSULT: 84yo F with R MCA stroke    MEDICATIONS:   Current Facility-Administered Medications   Medication    acetaminophen oral solution 650 mg    albuterol-ipratropium 2.5 mg-0.5 mg/3 mL nebulizer solution 3 mL    [START ON 5/31/2018] aspirin chewable tablet 81 mg    atorvastatin tablet 40 mg    buPROPion tablet 150 mg    cefTRIAXone injection 1 g    donepezil tablet 10 mg    heparin (porcine) injection 5,000 Units    hydrALAZINE injection 10 mg    labetalol injection 10 mg    [START ON 5/31/2018] losartan tablet 100 mg    magnesium oxide tablet 800 mg    magnesium oxide tablet 800 mg    potassium chloride 10% oral solution 40 mEq    potassium chloride 10% oral solution 40 mEq    potassium chloride 10% oral solution 60 mEq    potassium, sodium phosphates 280-160-250 mg packet 2 packet    potassium, sodium phosphates 280-160-250 mg packet 2 packet    potassium, sodium phosphates 280-160-250 mg packet 2 packet    QUEtiapine tablet 25 mg    senna-docusate 8.6-50 mg per tablet 1 tablet    sodium chloride 0.9% flush 3 mL    sodium chloride 3% nebulizer solution 4 mL     METHODOLOGY   Electroencephalographic (EEG) recording is with electrodes placed according to the International 10-20 placement system.  Thirty two (32) channels of digital signal (sampling rate of 512/sec) including T1 and T2 was simultaneously recorded from the scalp and may include  EKG, EMG, and/or eye monitors.  Recording band pass was 0.1 to 512 hz.  Digital video recording of the patient is simultaneously recorded with the EEG.  The patient is instructed report clinical symptoms which may occur during the recording session.  EEG and video recording is stored and archived in digital format.  Activation procedures  which include photic stimulation, hyperventilation and instructing patients to perform simple task are done in selected patients.   The EEG is displayed on a monitor screen and can be reviewed using different montages.  Computer assisted analysis is employed to detect spike and electrographic seizure activity.   The entire record is submitted for computer analysis.  The entire recording is visually reviewed and the times identified by computer analysis as being spikes or seizures are reviewed again.  Compresses spectral analysis (CSA) is also performed on the activity recorded from each individual channel.  This is displayed as a power display of frequencies from 0 to 30 Hz over time.   The CSA is reviewed looking for asymmetries in power between homologous areas of the scalp and then compared with the original EEG recording.     [employer] software was also utilized in the review of this study.  This software suite analyzes the EEG recording in multiple domains.  Coherence and rhythmicity is computed to identify EEG sections which may contain organized seizures.  Each channel undergoes analysis to detect presence of spike and sharp waves which have special and morphological characteristic of epileptic activity.  The routine EEG recording is converted from spacial into frequency domain.  This is then displayed comparing homologous areas to identify areas of significant asymmetry.  Algorithm to identify non-cortically generated artifact is used to separate eye movement, EMG and other artifact from the EEG.      RECORDING TIMES  Start on 5/30/18, 11:51  Stop on 5/30/18, 13:07    A total of 1 hour and 16 minutes of EEG recording was obtained.    EEG FINGINGS  Background activity:   The background rhythm was characterized by delta-theta (4-7 Hz) activity   No posterior dominant rhythm seen.   Symmetry and continuity: the background was continuous and symmetric    Sleep:    Not seen.    Activation  procedures:  Hyperventilation and photic stimulation were not performed     Abnormal activity:   No epileptiform discharges, periodic discharges, lateralized rhythmic delta activity or electrographic seizures were seen.  Irregular heart rate was noted on EKG.    IMPRESSION:   This is an abnormal extended EEG due to the moderate to severe generalized slowing seen, suggestive of moderate to severe diffuse or multifocal cerebral dysfunction.    No epileptiform activity or electrographic seizures seen.  Irregular heart rate was noted on EKG.    CLINICAL CORRELATION IS RECOMMENDED    Leandra Ibarra MD, RUBI(), JASON OLMSTEAD.  Neurology-Epilepsy.  [facility].

## 2018-05-31 PROBLEM — R13.10 DYSPHAGIA: Status: ACTIVE | Noted: 2018-05-31

## 2018-05-31 PROBLEM — R47.1 DYSARTHRIA: Status: ACTIVE | Noted: 2018-05-31

## 2018-05-31 PROBLEM — G93.6 CYTOTOXIC CEREBRAL EDEMA: Status: ACTIVE | Noted: 2018-05-31

## 2018-05-31 PROBLEM — G93.40 ENCEPHALOPATHY: Status: ACTIVE | Noted: 2018-05-28

## 2018-05-31 LAB
ALBUMIN SERPL BCP-MCNC: 3.2 G/DL
ALP SERPL-CCNC: 77 U/L
ALT SERPL W/O P-5'-P-CCNC: 20 U/L
ANION GAP SERPL CALC-SCNC: 6 MMOL/L
APTT BLDCRRT: 24.7 SEC
AST SERPL-CCNC: 37 U/L
BASOPHILS # BLD AUTO: 0.05 K/UL
BASOPHILS NFR BLD: 0.6 %
BILIRUB SERPL-MCNC: 0.4 MG/DL
BUN SERPL-MCNC: 14 MG/DL
CALCIUM SERPL-MCNC: 9.3 MG/DL
CHLORIDE SERPL-SCNC: 106 MMOL/L
CO2 SERPL-SCNC: 30 MMOL/L
CREAT SERPL-MCNC: 0.7 MG/DL
DIFFERENTIAL METHOD: ABNORMAL
EOSINOPHIL # BLD AUTO: 0.1 K/UL
EOSINOPHIL NFR BLD: 0.7 %
ERYTHROCYTE [DISTWIDTH] IN BLOOD BY AUTOMATED COUNT: 14.4 %
EST. GFR  (AFRICAN AMERICAN): >60 ML/MIN/1.73 M^2
EST. GFR  (NON AFRICAN AMERICAN): >60 ML/MIN/1.73 M^2
GLUCOSE SERPL-MCNC: 137 MG/DL
HCT VFR BLD AUTO: 32.7 %
HGB BLD-MCNC: 10.6 G/DL
IMM GRANULOCYTES # BLD AUTO: 0.04 K/UL
IMM GRANULOCYTES NFR BLD AUTO: 0.5 %
INR PPP: 0.9
LYMPHOCYTES # BLD AUTO: 2.4 K/UL
LYMPHOCYTES NFR BLD: 28.4 %
MAGNESIUM SERPL-MCNC: 2.2 MG/DL
MCH RBC QN AUTO: 30.7 PG
MCHC RBC AUTO-ENTMCNC: 32.4 G/DL
MCV RBC AUTO: 95 FL
MONOCYTES # BLD AUTO: 0.6 K/UL
MONOCYTES NFR BLD: 6.6 %
NEUTROPHILS # BLD AUTO: 5.4 K/UL
NEUTROPHILS NFR BLD: 63.2 %
NRBC BLD-RTO: 0 /100 WBC
PHOSPHATE SERPL-MCNC: 3.7 MG/DL
PLATELET # BLD AUTO: 102 K/UL
PMV BLD AUTO: 13.7 FL
POCT GLUCOSE: 112 MG/DL (ref 70–110)
POCT GLUCOSE: 115 MG/DL (ref 70–110)
POCT GLUCOSE: 123 MG/DL (ref 70–110)
POTASSIUM SERPL-SCNC: 3.8 MMOL/L
PROT SERPL-MCNC: 6.8 G/DL
PROTHROMBIN TIME: 10 SEC
RBC # BLD AUTO: 3.45 M/UL
SODIUM SERPL-SCNC: 142 MMOL/L
WBC # BLD AUTO: 8.5 K/UL

## 2018-05-31 PROCEDURE — 99233 SBSQ HOSP IP/OBS HIGH 50: CPT | Mod: ,,, | Performed by: PSYCHIATRY & NEUROLOGY

## 2018-05-31 PROCEDURE — 25000003 PHARM REV CODE 250: Performed by: PSYCHIATRY & NEUROLOGY

## 2018-05-31 PROCEDURE — 94761 N-INVAS EAR/PLS OXIMETRY MLT: CPT

## 2018-05-31 PROCEDURE — 94668 MNPJ CHEST WALL SBSQ: CPT

## 2018-05-31 PROCEDURE — 25000003 PHARM REV CODE 250: Performed by: PHYSICIAN ASSISTANT

## 2018-05-31 PROCEDURE — 92526 ORAL FUNCTION THERAPY: CPT

## 2018-05-31 PROCEDURE — 63600175 PHARM REV CODE 636 W HCPCS: Performed by: PHYSICIAN ASSISTANT

## 2018-05-31 PROCEDURE — 99900035 HC TECH TIME PER 15 MIN (STAT)

## 2018-05-31 PROCEDURE — 85025 COMPLETE CBC W/AUTO DIFF WBC: CPT

## 2018-05-31 PROCEDURE — 63600175 PHARM REV CODE 636 W HCPCS: Performed by: PSYCHIATRY & NEUROLOGY

## 2018-05-31 PROCEDURE — 25000242 PHARM REV CODE 250 ALT 637 W/ HCPCS: Performed by: PHYSICIAN ASSISTANT

## 2018-05-31 PROCEDURE — 83735 ASSAY OF MAGNESIUM: CPT

## 2018-05-31 PROCEDURE — 85610 PROTHROMBIN TIME: CPT

## 2018-05-31 PROCEDURE — 97803 MED NUTRITION INDIV SUBSEQ: CPT

## 2018-05-31 PROCEDURE — 80053 COMPREHEN METABOLIC PANEL: CPT

## 2018-05-31 PROCEDURE — 94640 AIRWAY INHALATION TREATMENT: CPT

## 2018-05-31 PROCEDURE — 92507 TX SP LANG VOICE COMM INDIV: CPT

## 2018-05-31 PROCEDURE — A4216 STERILE WATER/SALINE, 10 ML: HCPCS | Performed by: PHYSICIAN ASSISTANT

## 2018-05-31 PROCEDURE — 84100 ASSAY OF PHOSPHORUS: CPT

## 2018-05-31 PROCEDURE — 20000000 HC ICU ROOM

## 2018-05-31 PROCEDURE — 85730 THROMBOPLASTIN TIME PARTIAL: CPT

## 2018-05-31 PROCEDURE — 97165 OT EVAL LOW COMPLEX 30 MIN: CPT

## 2018-05-31 RX ORDER — FUROSEMIDE 20 MG/1
20 TABLET ORAL DAILY
Status: DISCONTINUED | OUTPATIENT
Start: 2018-05-31 | End: 2018-06-05

## 2018-05-31 RX ORDER — FUROSEMIDE 20 MG/1
20 TABLET ORAL DAILY
Status: DISCONTINUED | OUTPATIENT
Start: 2018-05-31 | End: 2018-05-31

## 2018-05-31 RX ORDER — CLONIDINE HYDROCHLORIDE 0.1 MG/1
0.1 TABLET ORAL 2 TIMES DAILY
Status: DISCONTINUED | OUTPATIENT
Start: 2018-05-31 | End: 2018-06-05

## 2018-05-31 RX ADMIN — Medication 3 ML: at 09:05

## 2018-05-31 RX ADMIN — HEPARIN SODIUM 5000 UNITS: 5000 INJECTION, SOLUTION INTRAVENOUS; SUBCUTANEOUS at 01:05

## 2018-05-31 RX ADMIN — POTASSIUM CHLORIDE 40 MEQ: 20 SOLUTION ORAL at 04:05

## 2018-05-31 RX ADMIN — DONEPEZIL HYDROCHLORIDE 10 MG: 5 TABLET, FILM COATED ORAL at 08:05

## 2018-05-31 RX ADMIN — ATORVASTATIN CALCIUM 40 MG: 20 TABLET, FILM COATED ORAL at 08:05

## 2018-05-31 RX ADMIN — BUPROPION HYDROCHLORIDE 150 MG: 75 TABLET, FILM COATED ORAL at 08:05

## 2018-05-31 RX ADMIN — Medication 3 ML: at 01:05

## 2018-05-31 RX ADMIN — IPRATROPIUM BROMIDE AND ALBUTEROL SULFATE 3 ML: .5; 3 SOLUTION RESPIRATORY (INHALATION) at 08:05

## 2018-05-31 RX ADMIN — Medication 3 ML: at 05:05

## 2018-05-31 RX ADMIN — FUROSEMIDE 20 MG: 20 TABLET ORAL at 10:05

## 2018-05-31 RX ADMIN — IPRATROPIUM BROMIDE AND ALBUTEROL SULFATE 3 ML: .5; 3 SOLUTION RESPIRATORY (INHALATION) at 01:05

## 2018-05-31 RX ADMIN — LOSARTAN POTASSIUM 100 MG: 50 TABLET, FILM COATED ORAL at 08:05

## 2018-05-31 RX ADMIN — HEPARIN SODIUM 5000 UNITS: 5000 INJECTION, SOLUTION INTRAVENOUS; SUBCUTANEOUS at 05:05

## 2018-05-31 RX ADMIN — HEPARIN SODIUM 5000 UNITS: 5000 INJECTION, SOLUTION INTRAVENOUS; SUBCUTANEOUS at 09:05

## 2018-05-31 RX ADMIN — HYDRALAZINE HYDROCHLORIDE 10 MG: 20 INJECTION INTRAMUSCULAR; INTRAVENOUS at 07:05

## 2018-05-31 RX ADMIN — QUETIAPINE FUMARATE 25 MG: 25 TABLET ORAL at 08:05

## 2018-05-31 RX ADMIN — HYDRALAZINE HYDROCHLORIDE 10 MG: 20 INJECTION INTRAMUSCULAR; INTRAVENOUS at 08:05

## 2018-05-31 RX ADMIN — SODIUM CHLORIDE SOLN NEBU 3% 4 ML: 3 NEBU SOLN at 01:05

## 2018-05-31 RX ADMIN — SODIUM CHLORIDE SOLN NEBU 3% 4 ML: 3 NEBU SOLN at 08:05

## 2018-05-31 RX ADMIN — CLONIDINE HYDROCHLORIDE 0.1 MG: 0.1 TABLET ORAL at 08:05

## 2018-05-31 RX ADMIN — ASPIRIN 81 MG CHEWABLE TABLET 81 MG: 81 TABLET CHEWABLE at 08:05

## 2018-05-31 RX ADMIN — CEFTRIAXONE SODIUM 1 G: 1 INJECTION, POWDER, FOR SOLUTION INTRAMUSCULAR; INTRAVENOUS at 12:05

## 2018-05-31 NOTE — ASSESSMENT & PLAN NOTE
No fernandez on presentation - has had a purwick since admission  UA with 14 WBC  Ceftriaxone 1 g q 24

## 2018-05-31 NOTE — ASSESSMENT & PLAN NOTE
Intermittently lethargic but arousable.   EEG completed yesterday without evidence of epileptiform activity, however had diffuse (delta-theta) slowing.   Currently on rocephin for UTI  No lab derangements currently that explain alteration in mental status.   Mental status waxes and wanes - reported sundowning in the later evening. History of dementia, and hospitalization - multiple risk factors for delirium  Delirium precautions.   Seroquel 25mg nightly

## 2018-05-31 NOTE — PHYSICIAN QUERY
"PT Name: Sury Cobos  MR #: [id number]    Physician Query Form - Heart  Condition Clarification     CDS/: Maria Alejandra Dexter               Contact information: [phone]  This form is a permanent document in the medical record.     Query Date: May 31, 2018    By submitting this query, we are merely seeking further clarification of documentation. Please utilize your independent clinical judgment when addressing the question(s) below.    The medical record contains the following   Indicators     Supporting Clinical Findings Location in Medical Record    BNP      EF      Radiology findings     x Echo Results CONCLUSIONS     1 - Concentric LVH with hyperdynamic left ventricular systolic function (EF >70%).     2 - Severe left atrial enlargement.     3 - Impaired LV relaxation, elevated LAP (grade 2 diastolic dysfunction).     4 - Normal right ventricular systolic function .     5 - Mild to moderate aortic stenosis, ALBER = 1.64 cm2, AVAi = 1.12 cm2/m2, peak velocity = 3.11 m/s, mean gradient = 24 mmHg.  Echo 5/28    "Ascites" documented      "SOB" or "MESA" documented      "Hypoxia" documented     x Heart Failure documented Past Medical History:   Diagnosis Date    Arthritis      Benign essential HTN      CHF (congestive heart failure)      Closed intertrochanteric fracture of left hip       H/P 5/27 [facility] ([place])    "Edema" documented      Diuretics/Meds      Treatment:      Other:          Provider, please specify diagnosis or diagnoses associated with above clinical findings.                               [  x] Chronic Diastolic Heart Failure (EF > 40)*  [  ] Other Type of Heart Failure (please specify type): _________________________  [  ] Heart Failure Ruled Out  [  ] Other (please specify): ___________________________________  [  ] Clinically Undetermined            *American Heart Association                                                                                                          Please " document in your progress notes daily for the duration of treatment until resolved and include in your discharge summary.

## 2018-05-31 NOTE — PROGRESS NOTES
[facility]  Vascular Neurology  Comprehensive Stroke Center  Progress Note    Assessment/Plan:     * Acute ischemic right MCA stroke    84 y/o female with left sided facial droop, aphasia, dysarthria who received IV TPA but no IR. MRI with infarct in R precentral gyrus.   Nursing notes state afib seen on telemetry, but no afib documented on 12 lead ECG. At this time, unclear etiology; ESUS vs small vessel.  SNF vs  with 24/7 family care     Neurologically stable plan to step down to stroke service.    Antithrombotics: Aspirin 81 mg    Statins: Lipitor 40 mg daily    Aggressive risk factor modification: HTN, HLD     Rehab efforts: PT/OT/SLP to evaluate and treat, PM&R consult     Diagnostics ordered/pending: none     VTE prophylaxis: SCD's may begin Heparin 500 units SC Q8H     BP parameters: Infarct: Post tPA, SBP <160            Dysarthria    Severe dysarthria  SLP ordered           Dysphagia    Patient NPO with NG in place.         Cytotoxic cerebral edema    Area of cytotoxic cerebral edema identified when reviewing brain imaging in the territory of the R middle cerebral artery. There is no mass effect associated with it. We will continue to monitor the patients clinical exam for any worsening of symptoms which may indicate expansion of the stroke or the area of the edema resulting in the clinical change. The pattern is of unclear etiology (ESUS versus small vessel)            UTI (urinary tract infection)    Continue ceftriaxone        Dementia with behavioral disturbance    Donepezil 10mg daily  Seroquel 25mg nightly          High cholesterol    Stroke risk factor    Lipitor 40 mg daily        Benign essential HTN    Stroke risk factor  Systolic less than 160        Aphasia    Difficult to assess if patient is responding appropriately to questions due to severe dysarthria  Not following commands, but patient is also agitated with UTI        S/P admn tPA in diff fac w/n last 24 hr  bef adm to crnt fac    Close monitoring in NCCU for 24 hours post administration, completed             82 y/o female with aphasia, dysarthria and facial droop, received IV TPA but no LVO so no IR. Probable small vessel disease.    5/28: Blood pressure elevated >200 overnight requiring cardene. Patient bradycardic and with paroxysmal afib overnight as well. MRI with infarct in the right precentral gyrus.   5/29: No events overnight. Patient with many bacteria on UTI so short cipro course started per NCC. Pending step down to NSU.   5/30: seems more agitated today, not participating in exam or following commands, severe dysarthria and excessive secretions  05/31/18 Continues to have increased secretions and severe dysarthria.  Patient following some commands and plans to transfer to stroke service today     STROKE DOCUMENTATION   Acute Stroke Times   Last Known Normal Date: 05/27/18  Last Known Normal Time: 1320  Symptom Onset Date: 05/27/18  Symptom Onset Time: 1445  Stroke Team Called Date: 05/27/18  Stroke Team Called Time: 1550  Stroke Team Arrival Date: 05/27/18  Stroke Team Arrival Time: 1751  CT Interpretation Time: 1536  Decision to Treat Time for Alteplase: 1806    NIH Scale:  1a. Level Of Consciousness: 0-->Alert: keenly responsive  1b. LOC Questions: 1-->Answers one question correctly  1c. LOC Commands: 1-->Performs one task correctly  2. Best Gaze: 0-->Normal  3. Visual: 0-->No visual loss  4. Facial Palsy: 1-->Minor paralysis (flattened nasolabial fold, asymmetry on smiling)  5a. Motor Arm, Left: 1-->Drift: limb holds 90 (or 45) degrees, but drifts down before full 10 seconds: does not hit bed or other support  5b. Motor Arm, Right: 1-->Drift: limb holds 90 (or 45) degrees, but drifts down before full 10 secs: does not hit bed or other support  6a. Motor Leg, Left: 3-->No effort against gravity: leg falls to bed immediately  6b. Motor Leg, Right: 1-->Drift: leg falls by the end of the 5-sec period but  does not hit bed  7. Limb Ataxia: 0-->Absent  8. Sensory: 0-->Normal: no sensory loss  9. Best Language: 0-->No aphasia: normal  10. Dysarthria: 2-->Severe dysarthria: patients speech is so slurred as to be unintelligible in the absence of or out of proportion to any dysphasia, or is mute/anarthric  11. Extinction and Inattention (formerly Neglect): 0-->No abnormality  Total (NIH Stroke Scale): 11       Modified [place] Score: 0  Ivonne Coma Scale:    ABCD2 Score:    [id number] Score:   HAS -BLED Score:   ICH Score:   Hunt & Burkett Classification:      Hemorrhagic change of an Ischemic Stroke: Does this patient have an ischemic stroke with hemorrhagic changes? No     Neurologic Chief Complaint: Aphasia, dysarthria, LSW     Subjective:     Interval History: Patient is seen for follow-up neurological assessment and treatment recommendations: Continues to have increased secretions and severe dysarthria.  Patient following some commands and plans to transfer to stroke service today     HPI, Past Medical, Family, and Social History remains the same as documented in the initial encounter.     Review of Systems   Constitutional: Negative for fever.   Respiratory: Negative for shortness of breath.    Gastrointestinal: Negative for abdominal pain, nausea and vomiting.   Neurological: Positive for facial asymmetry, speech difficulty and weakness. Negative for headaches.   Psychiatric/Behavioral: Positive for agitation and confusion. Negative for behavioral problems.     Scheduled Meds:   albuterol-ipratropium  3 mL Nebulization Q8H    aspirin  81 mg Per NG tube Daily    atorvastatin  40 mg Per NG tube Daily    buPROPion  150 mg Per NG tube BID    cloNIDine  0.1 mg Per NG tube BID    donepezil  10 mg Per NG tube QHS    furosemide  20 mg Per NG tube Daily    heparin (porcine)  5,000 Units Subcutaneous Q8H    losartan  100 mg Per NG tube Daily    QUEtiapine  25 mg Per NG tube QHS    sodium chloride 0.9%  3 mL  Intravenous Q8H    sodium chloride 3%  4 mL Nebulization Q6H     Continuous Infusions:    PRN Meds:acetaminophen, hydrALAZINE, labetalol, magnesium oxide, magnesium oxide, potassium chloride 10%, potassium chloride 10%, potassium chloride 10%, potassium, sodium phosphates, potassium, sodium phosphates, potassium, sodium phosphates, senna-docusate 8.6-50 mg    Objective:     Vital Signs (Most Recent):  Temp: 98.5 °F (36.9 °C) (05/31/18 1101)  Pulse: 60 (05/31/18 1338)  Resp: (!) 25 (05/31/18 1338)  BP: (!) 169/73 (05/31/18 1201)  SpO2: 97 % (05/31/18 1338)  BP Location: Right arm    Vital Signs Range (Last 24H):  Temp:  [97.9 °F (36.6 °C)-99.3 °F (37.4 °C)]   Pulse:  [60-79]   Resp:  [19-28]   BP: (134-193)/()   SpO2:  [96 %-100 %]   BP Location: Right arm    Physical Exam   Constitutional: She appears well-developed. No distress.   HENT:   Head: Normocephalic and atraumatic.   Eyes: Pupils are equal, round, and reactive to light.   Cardiovascular: Normal rate.    Pulmonary/Chest: Effort normal.   Neurological: She is alert.   Oriented to month but not age      Skin: Skin is warm and dry.   Psychiatric: She has a normal mood and affect. She is not agitated and not aggressive.   Vitals reviewed.      Neurological Exam:   LOC: alert  Attention Span:alert  Language:no aphasia  Articulation: severe Dysarthria  Visual Fields: Full  EOM (CN III, IV, VI): Full/intact  Facial Movement (CN VII): Lower facial weakness on the Left  Motor: Arm left  Paresis: 4/5  Leg left  Paresis: 3/5  Arm right  Paresis: 4/5  Leg right Paresis: 4/5  Sensation: equal sensation   Tone: Spasticity  RUE    Laboratory:  CMP:     Recent Labs  Lab 05/31/18  0152   CALCIUM 9.3   ALBUMIN 3.2*   PROT 6.8      K 3.8   CO2 30*      BUN 14   CREATININE 0.7   ALKPHOS 77   ALT 20   AST 37   BILITOT 0.4     BMP:     Recent Labs  Lab 05/31/18  0152      K 3.8      CO2 30*   BUN 14   CREATININE 0.7   CALCIUM 9.3     CBC:      Recent Labs  Lab 05/31/18  0152   WBC 8.50   RBC 3.45*   HGB 10.6*   HCT 32.7*   *   MCV 95   MCH 30.7   MCHC 32.4     Lipid Panel:     Recent Labs  Lab 05/27/18  1514   CHOL 197   LDLCALC 110.8   HDL 59   TRIG 136     Coagulation:     Recent Labs  Lab 05/31/18  0152   INR 0.9   APTT 24.7     Platelet Aggregation Study: No results for input(s): PLTAGG, PLTAGINTERP, PLTAGREGLACO, ADPPLTAGGREG in the last 168 hours.  Hgb A1C:     Recent Labs  Lab 05/27/18  1919   HGBA1C 5.5     TSH:     Recent Labs  Lab 05/27/18  1514   TSH 1.638       Diagnostic Results     Brain Imaging   MRI brain 5/28:      Small area of restricted diffusion in the right precentral gyrus.  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    Bilateral basal ganglia, thalami and cerebellar hemispheres small remote infarcts.  Generalized cerebral volume loss and extensive white matter chronic small vessel ischemic change.    Ventriculomegaly out of proportion to sulcal prominence, nonspecific but can be seen with normal pressure hydrocephalus, but overall unchanged from prior MRI.        Cardiac Imaging   ECHO 5/28/18:   CONCLUSIONS     1 - Concentric LVH with hyperdynamic left ventricular systolic function (EF >70%).     2 - Severe left atrial enlargement.     3 - Impaired LV relaxation, elevated LAP (grade 2 diastolic dysfunction).     4 - Normal right ventricular systolic function .     5 - Mild to moderate aortic stenosis, ALBER = 1.64 cm2, AVAi = 1.12 cm2/m2, peak velocity = 3.11 m/s, mean gradient = 24 mmHg      Jazmin Ambrocio PA-C  Comprehensive Stroke Center  Department of Vascular Neurology   [facility]

## 2018-05-31 NOTE — PT/OT/SLP EVAL
Occupational Therapy   Evaluation    Name: Sury Cobos  MRN: [id number]  Admitting Diagnosis:  Acute ischemic right MCA stroke      Recommendations:     Discharge Recommendations: nursing facility, skilled  Discharge Equipment Recommendations:  3-in-1 commode  Barriers to discharge:  None    History:     Occupational Profile:  *Information provided by daughter 2* pt with dysarthria and cognitive impairments.  Living Environment: Pt lives with daughter in mobile home with ramp access.  Bathroom contains tub/shower combination.  Previous level of function: Pt was (I) with ADLs and was utilizing a rollator for mobility.  Pt required some assist to transfer into the tub, but could bathe independently.    Roles and Routines: Pt enjoys walking around the neighborhood, sitting outside on porch, going to [employer], and spending time with grandchildren  Equipment Owned:  wheelchair, shower chair, rollator  Assistance upon Discharge: Daughter able to provide assist upon d/c.     Past Medical History:   Diagnosis Date    Arthritis     Benign essential HTN     CHF (congestive heart failure)     Closed intertrochanteric fracture of left hip     Coronary artery disease     Dementia     Diabetes mellitus     Gout     High cholesterol     Low potassium syndrome     Urinary incontinence        Past Surgical History:   Procedure Laterality Date    CARDIAC SURGERY      CORONARY ARTERY BYPASS GRAFT      reduction & internal fixation of displaced closed comminuted intertrochanteric fx left hip  02/12/2018       Subjective     Chief Complaint: Wants to go home  Patient/Family stated goals: Resume PLOF  Communicated with: RN prior to and after session.  Pain/Comfort:  · Pain Rating 1: 0/10  · Pain Rating Post-Intervention 1: 0/10    Patients cultural, spiritual, [employer] conflicts given the current situation: None reported    Objective:     Patient found with: telemetry, pulse ox (continuous), blood pressure cuff,  restraints, NG tube, peripheral IV, pressure relief boots.  Daughter present during session.    General Precautions: Standard, aspiration, fall, NPO   Orthopedic Precautions:N/A   Braces: N/A     Occupational Performance:    Bed Mobility:    · Patient completed Rolling/Turning to Left with  minimum assistance  · Patient completed Scooting/Bridging with minimum assistance  · Patient completed Supine to Sit with moderate assistance  · Patient completed Sit to Supine with moderate assistance    Functional Mobility/Transfers:  · Patient completed Sit <> Stand Transfer with minimum assistance  with  no assistive device x 1 trial from EOB with bilateral HHA provided  · Functional Mobility: Pt took 2 steps with Min A and bilateral HHA.  Moderate postural instability noted.    Activities of Daily Living:  · Grooming: contact guard assistance for washing face with cloth while seated EOB.    Cognitive/Visual Perceptual:  Cognitive/Psychosocial Skills:    -       Oriented to: Person   -       Follows Commands/attention:Follows one-step commands  -       Communication: dysarthria  -       Memory: Deficits noted  -       Safety awareness/insight to disability: impaired   -       Mood/Affect/Coping skills/emotional control: Appropriate to situation    Physical Exam:  Postural examination/scapula alignment:   -       Rounded shoulders  Skin integrity: Visible skin intact  Edema:  None noted  Sensation: -       Intact  Motor Planning: -       WFL  Dominant hand: -       Right  Upper Extremity Range of Motion:    -       Right Upper Extremity: WFL  -       Left Upper Extremity: WFL  Upper Extremity Strength:   -       Right Upper Extremity: WFL  -       Left Upper Extremity: WFL   Strength:  4/5 both hands  Fine Motor Coordination: -       Intact  Gross motor coordination: WFL  Balance: Sitting- CGA, Standing- Min A    Patient left HOB elevated with all lines intact, call button in reach, bed alarm on, restraints reapplied at  "end of session, RN notified and daughter present    [facility] 6 Click:  [facility] Total Score: 12    Treatment & Education:  *Pt and family educated on role of OT and POC discussed  Education:    Assessment:     Sury Cobos is a 83 y.o. female with a medical diagnosis of Acute ischemic right MCA stroke.  She presents with the following performance deficits affecting function: weakness, impaired functional mobilty, gait instability, impaired endurance, impaired self care skills, impaired balance, decreased coordination, decreased safety awareness, impaired cognition.  Pt agreeable to participating in therapy and followed one step commands during session.  Some confusion noted with pt attempting to return to supine position, but with redirection able to participate fully.  While seated EOB CGA required to maintain upright posture with pt displaying slight posterior lean.  Pt able to perform grooming task while seated EOB with CGA.  PTA daughter states pt was (I) with ADLs and utilizing a rollator for mobility.  Pt is not at PLOF and would benefit from skilled OT services to address problems listed below and increase independence with ADLs.  SNF vs home health (pending family decision) is recommended upon d/c from acute care to further address deficits and help pt improve overall functional independence.     Rehab Prognosis:  Good; patient would benefit from acute skilled OT services to address these deficits and reach maximum level of function.         Clinical Decision Makin.  OT Low:  "Pt evaluation falls under low complexity for evaluation coding due to performance deficits noted in 1-3 areas as stated above and 0 co-morbities affecting current functional status. Data obtained from problem focused assessments. No modifications or assistance was required for completion of evaluation. Only brief occupational profile and history review completed."     Plan:     Patient to be seen 4 x/week to address the above listed " problems via self-care/home management, therapeutic activities, therapeutic exercises, neuromuscular re-education  · Plan of Care Expires:    · Plan of Care Reviewed with: patient, daughter    This Plan of care has been discussed with the patient who was involved in its development and understands and is in agreement with the identified goals and treatment plan    GOALS:    Occupational Therapy Goals        Problem: Occupational Therapy Goal    Goal Priority Disciplines Outcome Interventions   Occupational Therapy Goal     OT, PT/OT     Description:  Goals to be met by: 6/14/2018     Patient will increase functional independence with ADLs by performing:    UE Dressing with Minimal Assistance.  LE Dressing with Minimal Assistance.  Grooming while standing with Contact Guard Assistance.  Toileting from toilet with Minimal Assistance for hygiene and clothing management.   Sitting at edge of bed x 15 minutes with Stand-by Assistance.  Toilet transfer to toilet with Contact Guard Assistance.                      Time Tracking:     OT Date of Treatment: 05/31/18  OT Start Time: 1315  OT Stop Time: 1340  OT Total Time (min): 25 min    Billable Minutes:Evaluation 25    PATITO Palmer  5/31/2018

## 2018-05-31 NOTE — PROGRESS NOTES
[facility]  Neurocritical Care  Progress Note    Admit Date: 5/27/2018  Service Date: 05/31/2018  Length of Stay: 4    Subjective:     Chief Complaint: Acute ischemic right MCA stroke    History of Present Illness: Ms. Cobos is a 82 y/o female with pmhx DM2, HTN, HLD, dementia who was last seen normal at 1320, alert  today with family. Daughter states she went to run and errand and returned at 1445 to find the patient slumped over and unable to talk or move her left side. She was taken to [place] and telemedicine consult was done with Dr Marx. No acute changes on CT head. Recommendation was to give IV TPA and transfer to [facility] for further evaluation.   Upon arrival patient still with aphasia, L facial droop, dysarthria. Minimal left sided weakness.   CTA head and neck multiphase complete, no LVO, no IR. Pt admitted to [facility] for higher level of care.       Hospital Course: 5/27: admit to [facility] s/p tpa, no intervention, R MCA  5/30: EEG to rule out seizures, Continue 3% nebulizer, duo nebs and Chest PT for increased thick secretions. CXR to rule out aspiration pneumonia. Pt intermittently lethargic. Cancelled transfer as pt not stable for transfer. Will need anti-coagulation once stable.      Interval History: 5/30: EEG to rule out seizures, Continue 3% nebulizer, duo nebs and Chest PT for increased thick secretions. CXR to rule out aspiration pneumonia. Pt intermittently lethargic. Cancelled transfer as pt not stable for transfer. Will need anti-coagulation once stable    Review of Systems Unable to obtain a complete ROS due to level of consciousness.  Objective:     Vitals:  Temp: 98.3 °F (36.8 °C)  Pulse: 77  Rhythm: normal sinus rhythm  BP: (!) 164/76  MAP (mmHg): 107  Resp: (!) 27  SpO2: 96 %  O2 Device (Oxygen Therapy): room air    Temp  Min: 98.2 °F (36.8 °C)  Max: 99.3 °F (37.4 °C)  Pulse  Min: 61  Max: 85  BP  Min: 135/83  Max: 204/85  MAP (mmHg)  Min: 100  Max: 148  Resp  Min: 20   Max: 40  SpO2  Min: 96 %  Max: 100 %    05/30 0701 - 05/31 0700  In: 980 [I.V.:80]  Out: 225 [Urine:225]   Unmeasured Output  Urine Occurrence: 1  Stool Occurrence: 1  Pad Count: 1       Physical Exam   Physical Exam:  GA: Increased Lethargy, Intermittently follows commands when awake, Disoriented, Delirium, comfortable, no acute distress.   HEENT: No scleral icterus or JVD.   Pulmonary: Course, r to auscultation Anterior.. No wheezing, crackles, or rhonchi.  Cardiac: RRR S1 & S2 w/o rubs/murmurs/gallops.   Abdominal: Bowel sounds present x 4. No appreciable hepatosplenomegaly.  Skin: No jaundice, rashes, or visible lesions.  Neuro:  --GCS: E4 V3 M6  --Mental Status:  Increased Lethargy, Intermittently follows commands when awake, Disoriented, Delirium,  --CN II-XII grossly intact.   --Pupils 4mm, PERRL.   --Corneal reflex, gag, cough intact.  -- Moves all extremities spontaneously    Unable to test gait due to level of consciousness.    Medications:  Continuous Scheduled  albuterol-ipratropium 3 mL Q8H   aspirin 81 mg Daily   atorvastatin 40 mg Daily   buPROPion 150 mg BID   cefTRIAXone (ROCEPHIN) IVPB 1 g Q24H   donepezil 10 mg QHS   heparin (porcine) 5,000 Units Q8H   losartan 100 mg Daily   QUEtiapine 25 mg QHS   sodium chloride 0.9% 3 mL Q8H   sodium chloride 3% 4 mL Q6H   PRN  acetaminophen 650 mg Q4H PRN   hydrALAZINE 10 mg Q4H PRN   labetalol 10 mg PRN   magnesium oxide 800 mg PRN   magnesium oxide 800 mg PRN   potassium chloride 10% 40 mEq PRN   potassium chloride 10% 40 mEq PRN   potassium chloride 10% 60 mEq PRN   potassium, sodium phosphates 2 packet PRN   potassium, sodium phosphates 2 packet PRN   potassium, sodium phosphates 2 packet PRN   senna-docusate 8.6-50 mg 1 tablet Daily PRN     Today I personally reviewed pertinent medications, lines/drains/airways, lab results, notably:    Diet  Diet NPO  Diet NPO        Assessment/Plan:     Neuro   * Acute ischemic right MCA stroke    -s/p tpa at OSH  -CTA  without evidence of LVO  -MRI with evidence of cortical infarct involving the right pre-central gyrus  -q1h neuro checks  -PT OT SLP  -Vascular neurology following  -asp 81mg qd  -atorvastatin 40 mg qd  -heparin 5000U q8h  - will need full anti-coagulation once stable   - 5/30 ordered EEG to rule out seizures  - Cancel TTF as pt not stable for transfer due to increased lethargy and possible aspiration pneumonia        Cytotoxic cerebral edema    -- Continue to monitor for clinical deterioration with serial CT scans        Dementia with behavioral disturbance    -home wellbutrin 150 mg BID  -donepezil 10 mg qhs          Aphasia    -see R MCA   - SLP  - NG tube  - May need PEG        Cardiac/Vascular   Benign essential HTN    -SBP<160  -coreg 3.125mg BID  -continue losartan 50mg qd  -labetalol, hydralazine, cardene prn        Renal/   UTI (urinary tract infection)    No fernandez on presentation - has had a purwick since admission  UA with 14 WBC  Switch to Ceftriaxone 1 g q 24 for  2 days        Hematology   S/P admn tPA in diff fac w/n last 24 hr bef adm to crnt fac    -see R MCA             Prophylaxis:  Venous Thromboembolism: chemical  Stress Ulcer: PPI  Ventilator Pneumonia: not applicable     Activity Orders          Activity as tolerated starting at 05/28 1550        Full Code     Neuro:  Level 3  I Spent >35 min reviewing patient records, examining, and counseling the patient with greater than 50% of time spent with direct patient care and coordination      Florence Hatfield PA-C  Neurocritical Care  [facility]

## 2018-05-31 NOTE — PLAN OF CARE
Increased secretions - chest pt/cough assist/nebs  Lasix 20mg po  bld pressure control   Thrombocytopenia -  chronic  Rocephin for respiratory isolate.

## 2018-05-31 NOTE — SUBJECTIVE & OBJECTIVE
Interval History:   NAEON. Continues to have thick secretions overnight. CXR with some opacity in the right middle lung, aggressive chest PT. Will also trial cough assist as well today. Lasic 20mg PO ordered today.     Review of Systems   Respiratory: Negative for shortness of breath.    Cardiovascular: Negative for chest pain.   Neurological: Positive for speech difficulty and weakness.   Psychiatric/Behavioral: Positive for agitation.       Objective:     Vitals:  Temp: 98.5 °F (36.9 °C)  Pulse: 67  Rhythm: normal sinus rhythm  BP: (!) 175/74  MAP (mmHg): 107  Resp: (!) 24  SpO2: 98 %  O2 Device (Oxygen Therapy): room air    Temp  Min: 97.9 °F (36.6 °C)  Max: 99.3 °F (37.4 °C)  Pulse  Min: 60  Max: 79  BP  Min: 135/72  Max: 193/79  MAP (mmHg)  Min: 94  Max: 144  Resp  Min: 20  Max: 28  SpO2  Min: 96 %  Max: 100 %    05/30 0701 - 05/31 0700  In: 1250 [I.V.:110]  Out: 525 [Urine:525]   Unmeasured Output  Urine Occurrence: 1  Stool Occurrence: 1  Pad Count: 1       Physical Exam   Constitutional: She appears well-developed and well-nourished.   HENT:   Head: Normocephalic and atraumatic.   Cardiovascular: Normal rate.    Pulmonary/Chest: Effort normal.   Neurological:   E4V4M5  Alert. Oriented to person, place.   Dysarthric. Follows simple commands, agitated however and intermittently refuses to follow.   PERRL, EOMI.   Left facial droop  Motor LUE LLE 4/5 RUE RLE 5/5       Medications:  Continuous   Scheduled    albuterol-ipratropium 3 mL Q8H   aspirin 81 mg Daily   atorvastatin 40 mg Daily   buPROPion 150 mg BID   cefTRIAXone (ROCEPHIN) IVPB 1 g Q24H   cloNIDine 0.1 mg BID   donepezil 10 mg QHS   furosemide 20 mg Daily   heparin (porcine) 5,000 Units Q8H   losartan 100 mg Daily   QUEtiapine 25 mg QHS   sodium chloride 0.9% 3 mL Q8H   sodium chloride 3% 4 mL Q6H   PRN    acetaminophen 650 mg Q4H PRN   hydrALAZINE 10 mg Q4H PRN   labetalol 10 mg PRN   magnesium oxide 800 mg PRN   magnesium oxide 800 mg PRN    potassium chloride 10% 40 mEq PRN   potassium chloride 10% 40 mEq PRN   potassium chloride 10% 60 mEq PRN   potassium, sodium phosphates 2 packet PRN   potassium, sodium phosphates 2 packet PRN   potassium, sodium phosphates 2 packet PRN   senna-docusate 8.6-50 mg 1 tablet Daily PRN     Today I personally reviewed pertinent medications, lines/drains/airways, imaging, cardiology, lab results, microbiology results,     Diet  Diet NPO  Diet NPO

## 2018-05-31 NOTE — SUBJECTIVE & OBJECTIVE
Interval History: 5/30: EEG to rule out seizures, Continue 3% nebulizer, duo nebs and Chest PT for increased thick secretions. CXR to rule out aspiration pneumonia. Pt intermittently lethargic. Cancelled transfer as pt not stable for transfer. Will need anti-coagulation once stable    Review of Systems Unable to obtain a complete ROS due to level of consciousness.  Objective:     Vitals:  Temp: 98.3 °F (36.8 °C)  Pulse: 77  Rhythm: normal sinus rhythm  BP: (!) 164/76  MAP (mmHg): 107  Resp: (!) 27  SpO2: 96 %  O2 Device (Oxygen Therapy): room air    Temp  Min: 98.2 °F (36.8 °C)  Max: 99.3 °F (37.4 °C)  Pulse  Min: 61  Max: 85  BP  Min: 135/83  Max: 204/85  MAP (mmHg)  Min: 100  Max: 148  Resp  Min: 20  Max: 40  SpO2  Min: 96 %  Max: 100 %    05/30 0701 - 05/31 0700  In: 980 [I.V.:80]  Out: 225 [Urine:225]   Unmeasured Output  Urine Occurrence: 1  Stool Occurrence: 1  Pad Count: 1       Physical Exam   Physical Exam:  GA: Increased Lethargy, Intermittently follows commands when awake, Disoriented, Delirium, comfortable, no acute distress.   HEENT: No scleral icterus or JVD.   Pulmonary: Course, r to auscultation Anterior.. No wheezing, crackles, or rhonchi.  Cardiac: RRR S1 & S2 w/o rubs/murmurs/gallops.   Abdominal: Bowel sounds present x 4. No appreciable hepatosplenomegaly.  Skin: No jaundice, rashes, or visible lesions.  Neuro:  --GCS: E4 V3 M6  --Mental Status:  Increased Lethargy, Intermittently follows commands when awake, Disoriented, Delirium,  --CN II-XII grossly intact.   --Pupils 4mm, PERRL.   --Corneal reflex, gag, cough intact.  -- Moves all extremities spontaneously    Unable to test gait due to level of consciousness.    Medications:  Continuous Scheduled  albuterol-ipratropium 3 mL Q8H   aspirin 81 mg Daily   atorvastatin 40 mg Daily   buPROPion 150 mg BID   cefTRIAXone (ROCEPHIN) IVPB 1 g Q24H   donepezil 10 mg QHS   heparin (porcine) 5,000 Units Q8H   losartan 100 mg Daily   QUEtiapine 25 mg QHS    sodium chloride 0.9% 3 mL Q8H   sodium chloride 3% 4 mL Q6H   PRN  acetaminophen 650 mg Q4H PRN   hydrALAZINE 10 mg Q4H PRN   labetalol 10 mg PRN   magnesium oxide 800 mg PRN   magnesium oxide 800 mg PRN   potassium chloride 10% 40 mEq PRN   potassium chloride 10% 40 mEq PRN   potassium chloride 10% 60 mEq PRN   potassium, sodium phosphates 2 packet PRN   potassium, sodium phosphates 2 packet PRN   potassium, sodium phosphates 2 packet PRN   senna-docusate 8.6-50 mg 1 tablet Daily PRN     Today I personally reviewed pertinent medications, lines/drains/airways, lab results, notably:    Diet  Diet NPO  Diet NPO

## 2018-05-31 NOTE — PLAN OF CARE
Problem: Patient Care Overview  Goal: Plan of Care Review  Outcome: Ongoing (interventions implemented as appropriate)  No acute events throughout the day, VS and assessment per flow sheet, patient progressing towards goal as tolerated. Potassium replaced per MAR parameters.  Plan of care reviewed with Sury Cobos at 0400, pt needs reinforcement. Will continue to monitor.

## 2018-05-31 NOTE — ASSESSMENT & PLAN NOTE
No fernandez on presentation - has had a purwick since admission  UA with 14 WBC  Switch to Ceftriaxone 1 g q 24 for  2 days

## 2018-05-31 NOTE — PROGRESS NOTES
[facility]  Neurocritical Care  Progress Note    Admit Date: 5/27/2018  Service Date: 05/31/2018  Length of Stay: 4    Subjective:     Chief Complaint: Acute ischemic right MCA stroke    History of Present Illness: Ms. Cobos is a 84 y/o female with pmhx DM2, HTN, HLD, dementia who was last seen normal at 1320, alert  today with family. Daughter states she went to run and errand and returned at 1445 to find the patient slumped over and unable to talk or move her left side. She was taken to [place] and telemedicine consult was done with Dr Marx. No acute changes on CT head. Recommendation was to give IV TPA and transfer to [facility] for further evaluation.   Upon arrival patient still with aphasia, L facial droop, dysarthria. Minimal left sided weakness.   CTA head and neck multiphase complete, no LVO, no IR. Pt admitted to [facility] for higher level of care.       Hospital Course: 5/27: admit to [facility] s/p tpa, no intervention, R MCA  5/30: EEG to rule out seizures, Continue 3% nebulizer, duo nebs and Chest PT for increased thick secretions. CXR to rule out aspiration pneumonia. Pt intermittently lethargic. Cancelled transfer as pt not stable for transfer. Will need anti-coagulation once stable.      Interval History:   NAEON. Continues to have thick secretions overnight. CXR with some opacity in the right middle lung, aggressive chest PT. Will also trial cough assist as well today. Lasic 20mg PO ordered today.     Review of Systems   Respiratory: Negative for shortness of breath.    Cardiovascular: Negative for chest pain.   Neurological: Positive for speech difficulty and weakness.   Psychiatric/Behavioral: Positive for agitation.       Objective:     Vitals:  Temp: 98.5 °F (36.9 °C)  Pulse: 67  Rhythm: normal sinus rhythm  BP: (!) 175/74  MAP (mmHg): 107  Resp: (!) 24  SpO2: 98 %  O2 Device (Oxygen Therapy): room air    Temp  Min: 97.9 °F (36.6 °C)  Max: 99.3 °F (37.4 °C)  Pulse  Min: 60  Max:  79  BP  Min: 135/72  Max: 193/79  MAP (mmHg)  Min: 94  Max: 144  Resp  Min: 20  Max: 28  SpO2  Min: 96 %  Max: 100 %    05/30 0701 - 05/31 0700  In: 1250 [I.V.:110]  Out: 525 [Urine:525]   Unmeasured Output  Urine Occurrence: 1  Stool Occurrence: 1  Pad Count: 1       Physical Exam   Constitutional: She appears well-developed and well-nourished.   HENT:   Head: Normocephalic and atraumatic.   Cardiovascular: Normal rate.    Pulmonary/Chest: Effort normal.   Neurological:   E4V4M5  Alert. Oriented to person, place.   Dysarthric. Follows simple commands, agitated however and intermittently refuses to follow.   PERRL, EOMI.   Left facial droop  Motor LUE LLE 4/5 RUE RLE 5/5       Medications:  Continuous   Scheduled    albuterol-ipratropium 3 mL Q8H   aspirin 81 mg Daily   atorvastatin 40 mg Daily   buPROPion 150 mg BID   cefTRIAXone (ROCEPHIN) IVPB 1 g Q24H   cloNIDine 0.1 mg BID   donepezil 10 mg QHS   furosemide 20 mg Daily   heparin (porcine) 5,000 Units Q8H   losartan 100 mg Daily   QUEtiapine 25 mg QHS   sodium chloride 0.9% 3 mL Q8H   sodium chloride 3% 4 mL Q6H   PRN    acetaminophen 650 mg Q4H PRN   hydrALAZINE 10 mg Q4H PRN   labetalol 10 mg PRN   magnesium oxide 800 mg PRN   magnesium oxide 800 mg PRN   potassium chloride 10% 40 mEq PRN   potassium chloride 10% 40 mEq PRN   potassium chloride 10% 60 mEq PRN   potassium, sodium phosphates 2 packet PRN   potassium, sodium phosphates 2 packet PRN   potassium, sodium phosphates 2 packet PRN   senna-docusate 8.6-50 mg 1 tablet Daily PRN     Today I personally reviewed pertinent medications, lines/drains/airways, imaging, cardiology, lab results, microbiology results,     Diet  Diet NPO  Diet NPO    Assessment/Plan:     Neuro   * Acute ischemic right MCA stroke    -s/p tpa at OSH  -CTA without evidence of LVO  -MRI with evidence of cortical infarct involving the right pre-central gyrus  -q1h neuro checks  -PT OT SLP  -Vascular neurology following  -asp 81mg  qd  -atorvastatin 40 mg qd  -heparin 5000U q8h  -EEG unremarkable for epileptiform activity        Dysarthria    2/2 stroke  Motor cortex involvement on the right    Increased thick secretions - small opacity in the right middle lung  Aggressive chest PT, cough assist        Cytotoxic cerebral edema    Stable. Small territory stroke.         Encephalopathy    Intermittently lethargic but arousable.   EEG completed yesterday without evidence of epileptiform activity, however had diffuse (delta-theta) slowing.   Currently on rocephin for UTI  No lab derangements currently that explain alteration in mental status.   Mental status waxes and wanes - reported sundowning in the later evening. History of dementia, and hospitalization - multiple risk factors for delirium  Delirium precautions.   Seroquel 25mg nightly        Dementia with behavioral disturbance    -home wellbutrin 150 mg BID  -donepezil 10 mg qhs  -seroquel 25mg nightly        Cardiac/Vascular   Benign essential HTN    -SBP goal 100-180  -clonidine 0.1mg BID - home med started  -continue losartan 100mg qd  -labetalol, hydralazine, cardene prn        Renal/   UTI (urinary tract infection)    No fernandez on presentation - has had a purwick since admission  UA with 14 WBC  Ceftriaxone 1 g q 24        GI   Dysphagia    2/2 stroke. Strict NPO per SLP  NG tube for feeds            Prophylaxis:  Venous Thromboembolism: mechanical chemical  Stress Ulcer: NA  Ventilator Pneumonia: not applicable     Activity Orders          Activity as tolerated starting at 05/28 1550        Full Code    Juani Queen MD  Neurocritical Care  [facility]

## 2018-05-31 NOTE — ASSESSMENT & PLAN NOTE
-s/p tpa at OSH  -CTA without evidence of LVO  -MRI with evidence of cortical infarct involving the right pre-central gyrus  -q1h neuro checks  -PT OT SLP  -Vascular neurology following  -asp 81mg qd  -atorvastatin 40 mg qd  -heparin 5000U q8h  - will need full anti-coagulation once stable   - 5/30 ordered EEG to rule out seizures  - Cancel TTF as pt not stable for transfer due to increased lethargy and possible aspiration pneumonia

## 2018-05-31 NOTE — ASSESSMENT & PLAN NOTE
82 y/o female with left sided facial droop, aphasia, dysarthria who received IV TPA but no IR. MRI with infarct in R precentral gyrus.   Nursing notes state afib seen on telemetry, but no afib documented on 12 lead ECG. At this time, unclear etiology; ESUS vs small vessel.  SNF vs  with 24/7 family care     Neurologically stable plan to step down to stroke service.    Antithrombotics: Aspirin 81 mg    Statins: Lipitor 40 mg daily    Aggressive risk factor modification: HTN, HLD     Rehab efforts: PT/OT/SLP to evaluate and treat, PM&R consult     Diagnostics ordered/pending: none     VTE prophylaxis: SCD's may begin Heparin 500 units SC Q8H     BP parameters: Infarct: Post tPA, SBP <160

## 2018-05-31 NOTE — PROGRESS NOTES
"[facility]  Adult Nutrition  Progress Note    SUMMARY       Recommendations  Recommendation/Intervention:     Recommend continuing current TF regimen of glucerna 1.5 @goal rate of 40 mL/hr.   -Hold for residuals >500.   -This regimen provides 1440 kcal/day, 79g protein/day and 1093mL free water.      If diet advanced, recommend diabetic diet with texture per SLP.   RD to monitor.     Goals: Initiation of nutrition support/ diet  Nutrition Goal Status: goal met  Communication of RD Recs: reviewed with RN    Reason for Assessment  Reason for Assessment: RD follow-up  Diagnosis: stroke/CVA (Acute ischemic right MCA stroke)  Relevant Medical History: DM, CAD, low potassium syndrome, high cholesterol, aphasia, dementia w/ behavioral disturbance  Interdisciplinary Rounds: attended  General Information Comments: Tf running at goal during visit, no family at bedside.   Nutrition Discharge Planning: unable to determine at this time    Nutrition Risk Screen  Nutrition Risk Screen: tube feeding or parenteral nutrition    Nutrition/Diet History  Patient Reported Diet/Restrictions/Preferences: general  Do you have any cultural, spiritual, Islam conflicts, given your current situation?: None reported  Factors Affecting Nutritional Intake: NPO    Anthropometrics  Temp: 98.5 °F (36.9 °C)  Height Method: Measured  Height: 4' 11" (149.9 cm)  Height (inches): 59 in  Weight Method: Bed Scale  Weight: 54.2 kg (119 lb 7.8 oz)  Weight (lb): 119.49 lb  Ideal Body Weight (IBW), Female: 95 lb  % Ideal Body Weight, Female (lb): 125.78 lb  BMI (Calculated): 24.2  BMI Grade: 18.5-24.9 - normal       Lab/Procedures/Meds  Pertinent Labs Reviewed: reviewed  Pertinent Labs Comments: CO2 30, Anion Gap 6, Glu 137, Alb 3.2, POCT glu 115  Pertinent Medications Reviewed: reviewed  Pertinent Medications Comments: statin, donepezil, heparin, losartan, quetiapine, alteplase    Physical Findings/Assessment  Overall Physical " Appearance: nourished, weak  Tubes: nasogastric tube  Oral/Mouth Cavity: tooth/teeth missing  Skin: intact    Estimated/Assessed Needs  Weight Used For Calorie Calculations: 54.7 kg (120 lb 9.5 oz)  Energy Calorie Requirements (kcal): 1135 kcal/day  Energy Need Method: Rosie-St Jeor (1.25 x PAL)  Protein Requirements: 54-65g/day (1.0-1.2 g/kg)  Weight Used For Protein Calculations: 54.7 kg (120 lb 9.5 oz)  Fluid Requirements (mL): 1135 or per MD  Fluid Need Method: RDA Method  RDA Method (mL): 1135  CHO Requirements: 50% of total kcals     Nutrition Prescription Ordered  Current Diet Order: NPO  Nutrition Order Comments: TF running at goal  Current Nutrition Support Formula Ordered: Glucerna 1.5  Current Nutrition Support Rate Ordered: 40 (ml)  Current Nutrition Support Frequency Ordered: mL/hr    Evaluation of Received Nutrient/Fluid Intake  Enteral Calories (kcal): 1440  Enteral Protein (gm): 79  Enteral (Free Water) Fluid (mL): 1093  % Kcal Needs: 127  % Protein Needs: 158  I/O: +I/O, LBM 5/31  % Intake of Estimated Energy Needs:%  % Meal Intake:NPO    Nutrition Risk  Level of Risk/Frequency of Follow-up:  (f/u 1x weekly)     Assessment and Plan  Nutrition Problem  Inadequate energy intake     Related to (etiology):   Decreased  Ability to consume sufficient nutrition     Signs and Symptoms (as evidenced by):   NPO status with no alternate means of nutrtion     Interventions/Recommendations (treatment strategy):  Please see above      Nutrition Diagnosis Status:   Resolved    Service: Nutrition     Monitor and Evaluation  Food and Nutrient Intake: energy intake, enteral nutrition intake, food and beverage intake  Food and Nutrient Adminstration: diet order, enteral and parenteral nutrition administration  Knowledge/Beliefs/Attitudes: food and nutrition knowledge/skill  Physical Activity and Function: nutrition-related ADLs and IADLs  Anthropometric Measurements: weight, weight change, body mass  index  Biochemical Data, Medical Tests and Procedures: electrolyte and renal panel, gastrointestinal profile, glucose/endocrine profile  Nutrition-Focused Physical Findings: overall appearance, skin     Nutrition Follow-Up    RD Follow-up?: Yes    I certify that I directed the dietetic intern in service delivery and guided them using my skilled judgment. As the cosigning dietitian, I have reviewed the dietetic interns documentation and am responsible for the treatment, assessment, and plan.    Note Completed by: Addie Cr

## 2018-05-31 NOTE — ASSESSMENT & PLAN NOTE
Area of cytotoxic cerebral edema identified when reviewing brain imaging in the territory of the R middle cerebral artery. There is no mass effect associated with it. We will continue to monitor the patients clinical exam for any worsening of symptoms which may indicate expansion of the stroke or the area of the edema resulting in the clinical change. The pattern is of unclear etiology (ESUS versus small vessel)

## 2018-05-31 NOTE — PT/OT/SLP PROGRESS
Speech Language Pathology Treatment    Patient Name:  Sury Cobos   MRN:  [id number]  Admitting Diagnosis: Acute ischemic right MCA stroke    Recommendations:                 General Recommendations:  Dysphagia therapy, Speech/language therapy and Cognitive-linguistic therapy  Diet recommendations:  NPO, Liquid Diet Level: NPO   Aspiration Precautions: Continue alternate means of nutrition and Strict aspiration precautions   General Precautions: Standard, aspiration, fall, NPO  Communication strategies:  provide increased time to answer and go to room if call light pushed    Subjective       Pt with inconsistent alertness. Confusion noted.     Pain/Comfort:  · Pain Rating 1: 0/10  · Pain Rating Post-Intervention 1: 0/10    Objective:     Has the patient been evaluated by SLP for swallowing?   Yes  Keep patient NPO? Yes   Current Respiratory Status: room air      Pt seen bedside to assess swallowing and cognitive-linguistic deficits. Pt with increased lethargy and increased risk for aspiration. Pt repositioned and HOB elevated for appropriate positioning for po trials. Pt with wet/gurgly vocal quality and poor secretion management (more noted on R) upon SLP entering room. Suction provided to clear oral cavity. It is noted, speech intelligibility increased slightly after suction, however pt continues to present with poor speech intelligibility at this time. Pt with poor opening of mouth, also impacting speech intelligibility. OMEs attempted. Pt completed OME X4 with max cues for simple tasks.  Pt able to produce weak volitional cough. Pt with no response/acceptance to ice chips to lips or spoon to lips to attempt to initiate swallow. No further po trials attempted due to increased lethargy and increased risk for aspiration.     Pt followed simple commands and answered simple Y/N questions with approx 50% accy, however pt with decreased alertness and kept falling asleep, despite max verbal and tactile cues. Pt oriented  to self and initially demonstrated difficulty naming daughter's name. Rec continue ST to monitor for appropriateness of diet and decrease risk for aspiration. Education provided to pt and family regarding aspiration precautions, NPO, s/s of aspiration, role of SLP and POC. Pt and family verbalized understanding, however recommend reinforcement.   Whiteboard updated.     Assessment:     Sruy Cobos is a 83 y.o. female with an SLP diagnosis of Dysphagia, Dysarthria and Cognitive-Linguistic Impairment.     Goals:    SLP Goals        Problem: SLP Goal    Goal Priority Disciplines Outcome   SLP Goal     SLP Ongoing (interventions implemented as appropriate)   Description:  Speech Language Pathology Goals  Goals expected to be met by 6/5  1. Ongoing swallow assessment  2. Pt will follow 1 step commands with 75% acc given min A  3. Pt will answer yes/no questions with 75% acc given min A  4. Pt will complete OME's given mod A  5. Pt will complete word level dysarthria tasks with 80% intell. given max A to utilize speech strategies   6. Assess reading, writing, visual spatial skills  7. Assess memory, problem solving, reasoning                    Plan:     · Patient to be seen:  4 x/week   · Plan of Care expires:  06/26/18  · Plan of Care reviewed with:  patient, daughter   · SLP Follow-Up:  Yes       Discharge recommendations:  nursing facility, skilled       Time Tracking:     SLP Treatment Date:   05/31/18  Speech Start Time:  0855  Speech Stop Time:  0913     Speech Total Time (min):  18 min    Billable Minutes: Speech Therapy Individual 8 and Treatment Swallowing Dysfunction 10     Rowan Pinto M.S., CCC-SLP  Speech Language Pathologist  [phone] - pager   5/31/2018      Rowan Pinto, MS CCC-SLP  05/31/2018

## 2018-05-31 NOTE — SUBJECTIVE & OBJECTIVE
Neurologic Chief Complaint: Aphasia, dysarthria, LSW     Subjective:     Interval History: Patient is seen for follow-up neurological assessment and treatment recommendations: Continues to have increased secretions and severe dysarthria.  Patient following some commands and plans to transfer to stroke service today     HPI, Past Medical, Family, and Social History remains the same as documented in the initial encounter.     Review of Systems   Constitutional: Negative for fever.   Respiratory: Negative for shortness of breath.    Gastrointestinal: Negative for abdominal pain, nausea and vomiting.   Neurological: Positive for facial asymmetry, speech difficulty and weakness. Negative for headaches.   Psychiatric/Behavioral: Positive for agitation and confusion. Negative for behavioral problems.     Scheduled Meds:   albuterol-ipratropium  3 mL Nebulization Q8H    aspirin  81 mg Per NG tube Daily    atorvastatin  40 mg Per NG tube Daily    buPROPion  150 mg Per NG tube BID    cloNIDine  0.1 mg Per NG tube BID    donepezil  10 mg Per NG tube QHS    furosemide  20 mg Per NG tube Daily    heparin (porcine)  5,000 Units Subcutaneous Q8H    losartan  100 mg Per NG tube Daily    QUEtiapine  25 mg Per NG tube QHS    sodium chloride 0.9%  3 mL Intravenous Q8H    sodium chloride 3%  4 mL Nebulization Q6H     Continuous Infusions:    PRN Meds:acetaminophen, hydrALAZINE, labetalol, magnesium oxide, magnesium oxide, potassium chloride 10%, potassium chloride 10%, potassium chloride 10%, potassium, sodium phosphates, potassium, sodium phosphates, potassium, sodium phosphates, senna-docusate 8.6-50 mg    Objective:     Vital Signs (Most Recent):  Temp: 98.5 °F (36.9 °C) (05/31/18 1101)  Pulse: 60 (05/31/18 1338)  Resp: (!) 25 (05/31/18 1338)  BP: (!) 169/73 (05/31/18 1201)  SpO2: 97 % (05/31/18 1338)  BP Location: Right arm    Vital Signs Range (Last 24H):  Temp:  [97.9 °F (36.6 °C)-99.3 °F (37.4 °C)]   Pulse:  [60-79]    Resp:  [19-28]   BP: (134-193)/()   SpO2:  [96 %-100 %]   BP Location: Right arm    Physical Exam   Constitutional: She appears well-developed. No distress.   HENT:   Head: Normocephalic and atraumatic.   Eyes: Pupils are equal, round, and reactive to light.   Cardiovascular: Normal rate.    Pulmonary/Chest: Effort normal.   Neurological: She is alert.   Oriented to month but not age      Skin: Skin is warm and dry.   Psychiatric: She has a normal mood and affect. She is not agitated and not aggressive.   Vitals reviewed.      Neurological Exam:   LOC: alert  Attention Span:alert  Language:no aphasia  Articulation: severe Dysarthria  Visual Fields: Full  EOM (CN III, IV, VI): Full/intact  Facial Movement (CN VII): Lower facial weakness on the Left  Motor: Arm left  Paresis: 4/5  Leg left  Paresis: 3/5  Arm right  Paresis: 4/5  Leg right Paresis: 4/5  Sensation: equal sensation   Tone: Spasticity  RUE    Laboratory:  CMP:     Recent Labs  Lab 05/31/18  0152   CALCIUM 9.3   ALBUMIN 3.2*   PROT 6.8      K 3.8   CO2 30*      BUN 14   CREATININE 0.7   ALKPHOS 77   ALT 20   AST 37   BILITOT 0.4     BMP:     Recent Labs  Lab 05/31/18  0152      K 3.8      CO2 30*   BUN 14   CREATININE 0.7   CALCIUM 9.3     CBC:     Recent Labs  Lab 05/31/18  0152   WBC 8.50   RBC 3.45*   HGB 10.6*   HCT 32.7*   *   MCV 95   MCH 30.7   MCHC 32.4     Lipid Panel:     Recent Labs  Lab 05/27/18  1514   CHOL 197   LDLCALC 110.8   HDL 59   TRIG 136     Coagulation:     Recent Labs  Lab 05/31/18  0152   INR 0.9   APTT 24.7     Platelet Aggregation Study: No results for input(s): PLTAGG, PLTAGINTERP, PLTAGREGLACO, ADPPLTAGGREG in the last 168 hours.  Hgb A1C:     Recent Labs  Lab 05/27/18  1919   HGBA1C 5.5     TSH:     Recent Labs  Lab 05/27/18  1514   TSH 1.638       Diagnostic Results     Brain Imaging   MRI brain 5/28:      Small area of restricted diffusion in the right precentral gyrus.  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    Bilateral basal ganglia, thalami and cerebellar hemispheres small remote infarcts.  Generalized cerebral volume loss and extensive white matter chronic small vessel ischemic change.    Ventriculomegaly out of proportion to sulcal prominence, nonspecific but can be seen with normal pressure hydrocephalus, but overall unchanged from prior MRI.        Cardiac Imaging   ECHO 5/28/18:   CONCLUSIONS     1 - Concentric LVH with hyperdynamic left ventricular systolic function (EF >70%).     2 - Severe left atrial enlargement.     3 - Impaired LV relaxation, elevated LAP (grade 2 diastolic dysfunction).     4 - Normal right ventricular systolic function .     5 - Mild to moderate aortic stenosis, ALBER = 1.64 cm2, AVAi = 1.12 cm2/m2, peak velocity = 3.11 m/s, mean gradient = 24 mmHg

## 2018-05-31 NOTE — ASSESSMENT & PLAN NOTE
-SBP goal 100-180  -clonidine 0.1mg BID - home med started  -continue losartan 100mg qd  -labetalol, hydralazine, cardene prn

## 2018-05-31 NOTE — MEDICAL/APP STUDENT
"            Neurology              Progress Note    Name: Sury Cobos  MRN: [id number]   CSN: [id number]      Date: 05/31/2018      SUBJECTIVE:   Reason for Admission: Acute ischemic right MCA stroke  BRIEF HPI  Sury Cobos is a 83 y.o. RIGHT handed  female who  has a past medical history of Arthritis; Benign essential HTN; CHF (congestive heart failure); Closed intertrochanteric fracture of left hip; Coronary artery disease; Dementia; Diabetes mellitus; Gout; High cholesterol; Low potassium syndrome; and Urinary incontinence., currently presenting with Acute ischemic right MCA stroke.      Interval History:   Patient is seen for follow-up neurological assessment and treatment recommendations:      Per nursing notes:  5/28/2018: "Blood pressure elevated >200 overnight requiring cardene. Patient bradycardic and with paroxysmal afib overnight as well. MRI with infarct in the right precentral gyrus."   5/29/2028: "No acute events throughout the day, VS and assessment per flow sheet, patient progressing towards goal as tolerated. Pt has transfer orders; awaiting bed.  Plan of care reviewed with Sury Cobos at 0400, pt needs reinforcement. Will continue to monitor."   05/30/2018: She was difficult to arouse and complained of generalized pain and thirst. Appears to be uncomfortable and tries to reposition herself throughout the exam. No improvement with speech. Strength has improved on the LUE, but patient is reluctant to fully cooperate with the neuro-exam. "EEG to rule out seizures, Continue 3% nebulizer, duo nebs and Chest PT for increased thick secretions. CXR to rule out aspiration pneumonia. Pt intermittently lethargic. Cancelled transfer as pt not stable for transfer. Will need anti-coagulation once stable."  5/31/2018: She is alert. Speech is improved but unable (or reluctant) to name. States that she "wants to go home". Strength is improved and power is 5/5 in LUE and RUE. Cancelled plans to " "move her to the floor.    Per nursing note: "No acute events throughout the day, VS and assessment per flow sheet, patient progressing towards goal as tolerated. Potassium replaced per MAR parameters.  Plan of care reviewed with Sury Cobos at 0400, pt needs reinforcement. Will continue to monitor"    Review of Systems    Review of Systems Unable to obtain a complete ROS.      Objective:   Vital Signs       05/30 0700 05/31 0659 05/31 0700 05/31 0735  Most Recent    Temp (°F) 97.9-99.3    97.9 (36.6)    Pulse 61-79    72    Resp 20-29    26    //85    157/100    MAP (mmHg)     109    SpO2 (%)     97    Weight (kg) 56.7    56.7 kg (125 lb)    Intake/Output       05/30 0700 05/31 0659   I.V. (mL/kg) 110 (1.9)   NG/GT 1140   Total Intake(mL/kg) 1250 (22)   Urine (mL/kg/hr) 525 (0.4)   Total Output 525   Net +725       Urine Occurrence 3 x   Stool Occurrence 2 x        Physical Exam    GA: Increased Lethargy, Intermittently follows commands when awake, Disoriented, Delirium, comfortable, no acute distress.   HEENT: No scleral icterus or JVD.   Pulmonary: Course, r to auscultation Anterior.. No wheezing, crackles, or rhonchi.  Cardiac: RRR S1 & S2 w/o rubs/murmurs/gallops.   Abdominal: Bowel sounds present x 4. No appreciable hepatosplenomegaly.  Skin: No jaundice, rashes, or visible lesions.    Neurologic Exam   --GCS: E4 V3 M6  --Mental Status:  Increased Lethargy, Intermittently follows commands when awake, Disoriented, Delirium,  --CN II-XII grossly intact.   --Pupils 4mm, PERRL.   --Corneal reflex, gag, cough intact.  -- Moves all extremities spontaneously  --Power 5/5 in LUE and RUE     Unable to test gait due to level of consciousness.    IMAGING:  Cardiac Imaging   ECHO 5/28/18:   CONCLUSIONS     1 - Concentric LVH with hyperdynamic left ventricular systolic function (EF >70%).     2 - Severe left atrial enlargement.     3 - Impaired LV relaxation, elevated LAP (grade 2 diastolic " dysfunction).     4 - Normal right ventricular systolic function .     5 - Mild to moderate aortic stenosis, ALBER = 1.64 cm2, AVAi = 1.12 cm2/m2, peak velocity = 3.11 m/s, mean gradient = 24 mmHg  Results for orders placed or performed during the hospital encounter of 05/27/18   MRI Brain Without Contrast    Narrative    EXAMINATION:  MRI BRAIN WITHOUT CONTRAST    CLINICAL HISTORY:  Stroke;    TECHNIQUE:  Sagittal and axial T1, axial T2, axial FLAIR, axial gradient and axial diffusion imaging of the whole brain without contrast.    COMPARISON:  CTA 05/27/2018    FINDINGS:  There is a small bandlike cortical region of restricted diffusion within the right precentral gyrus with corresponding T2 flair hyperintensity compatible with acute/recent infarction.    Superimposed mild degree of age-appropriate generalized cerebral volume loss.  There is patchy and confluent T2 FLAIR signal abnormality within the supratentorial white matter, keyonna which is nonspecific and concerning for severe chronic microvascular ischemic change.  There is remote bilateral cerebellar infarcts with scattered encephalomalacia with T2 flair hyperintensity compatible gliosis and scarring..  There is no midline shift or mass effect.  Questioned turbulent flow or occlusion of the left distal vertebral artery.  Remaining major intracranial T2 flow voids are present.    Punctate susceptibility in the right frontal periventricular white matter suggestive for remote microhemorrhage..    This report was flagged in Epic as abnormal.      Impression    There is a small cortical area of  acute/recent infarction in the right precentral gyrus.    Superimposed age-appropriate cerebral volume loss with patchy and confluent T2 FLAIR signal abnormality supra infratentorial parenchyma most compatible with severe chronic ischemic change.    Numerous scattered areas of a prior infarction in the cerebellum with encephalomalacia.    Punctate susceptibility right  frontal periventricular white matter suggestive for remote microhemorrhage.    Please see above for additional details.      Electronically signed by: Jeferson Rinaldi DO  Date:    05/28/2018  Time:    14:49   Results for orders placed or performed during the hospital encounter of 05/27/18   CT Head Without Contrast    Narrative    EXAMINATION:  CT HEAD WITHOUT CONTRAST    CLINICAL HISTORY:  Confusion/delirium, altered LOC, unexplained;    TECHNIQUE:  Low dose axial images were obtained through the head.  Coronal and sagittal reformations were also performed. Contrast was not administered.    COMPARISON:  None.    FINDINGS:  There is mild dilatation of ventricles, sulci, fissures.  There is decreased density in white matter consistent with microvascular ischemic change.  There is slightly greater asymmetric dilatation of the ventricles relative to the sulci and fissures and recommend correlation clinically if there is clinical consideration for normal pressure hydrocephalus.  More likely this represents asymmetric involutional change.    There is no acute intracranial hemorrhage.  There is no intracranial mass effect.  There is no acute major vascular territory infarct.  The calvarium appears intact, mastoids pneumatized.  Visualized paranasal sinuses appear clear aside from slight mucosal thickening and questionable trace fluid within the sphenoid sinus.      Impression    Mild involutional changes and findings consistent with microvascular ischemic change.  Mild asymmetric widening of ventricles relative to the sulci and fissures which in the appropriate clinical setting can question normal pressure hydrocephalus with correlation recommended clinically.  No acute intracranial findings.      Electronically signed by: Floridalma Rojas MD  Date:    05/27/2018  Time:    15:40   Results for orders placed or performed during the hospital encounter of 05/15/15   MRI Brain W WO Contrast    Narrative    Sagittal and axial  images are obtained through the brain with multiple MR sequences.  Fine detail axial and coronal images were obtained through the internal auditory canals.  Following administration of 12 cc Multihance gadolinium contrast IV repeat   fine detail axial and coronal images are obtained to the internal artery canals and inner ears and through the brain.      The seventh and eighth nerve complex these are well seen bilaterally.  The internal auditory canals are sharp and symmetrical.  No neuroma or mass is identified.  The basal cisterns are clear and symmetric.  The MR signal from the inner ear bilaterally   is symmetric and within normal limits.  Abnormal contrast enhancement is not seen.    The basal cisterns are clear and symmetric.  There is no mass-effect or midline shift.  There is mild to moderate enlargement of the lateral ventricles and some mild enlargement of the cerebral sulci consistent with brain atrophy.  Within the cerebrum   and central cerebellum there is elevated signal intensity on flair weighting consistent with deep white matter ischemic changes.  In the right frontal lobe there is additional abnormal elevated signal around a single sulcus which may represent a small   ischemic CVA.  Other focal areas of increased or decreased signal intensity consistent with tumor, edema, CVA or hemorrhage are not seen.  No intracranial hemorrhage or hematoma is noted.  No abnormal areas of contrast enhancement are noted.  The patient   is seen to have left chronic maxillary sinusitis.    Impression     Negative MRI of the internal auditory canals and inner ears bilaterally.  Mild to moderate generalized brain atrophy with extensive deep white matter ischemic changes.  Possible small old right frontal CVA.  Chronic left maxillary sinusitis        Electronically signed by: Dago Hathaway MD  Date:     05/21/15  Time:    11:38        Laboratory:    Recent Labs  Lab 05/29/18  0332 05/30/18  0238 05/31/18  0152   WBC  10.00 11.08 8.50   HGB 11.6* 10.6* 10.6*   HCT 35.3* 32.8* 32.7*   * 105* 102*   MONO 5.7  0.6 6.6  0.7 6.6  0.6       Recent Labs  Lab 05/29/18  0332 05/29/18  1354 05/30/18  0238 05/31/18  0152     --  143 142   K 3.5 4.0 4.0 3.8     --  108 106   CO2 25  --  26 30*   BUN 9  --  15 14   CREATININE 0.6  --  0.8 0.7   CALCIUM 8.9  --  9.5 9.3   MG 1.7  --  1.8 2.2   PHOS 3.4  --  3.4 3.7   PROT 7.1  --  6.6 6.8   BILITOT 0.6  --  0.5 0.4   ALKPHOS 77  --  77 77   ALT 12  --  16 20   AST 24  --  31 37       ASSESSMENT & PLAN:   Sury Cobos is a 83 y.o. RIGHT handed  female who  has a past medical history of Arthritis; Benign essential HTN; CHF (congestive heart failure); Closed intertrochanteric fracture of left hip; Coronary artery disease; Dementia; Diabetes mellitus; Gout; High cholesterol; Low potassium syndrome; and Urinary incontinence., currently presenting with Acute ischemic right MCA stroke.     STROKE DOCUMENTATION   Acute Stroke Times   Last Known Normal Date: 05/27/18  Last Known Normal Time: 1320  Symptom Onset Date: 05/27/18  Symptom Onset Time: 1445  Stroke Team Called Date: 05/27/18  Stroke Team Called Time: 1550  Stroke Team Arrival Date: 05/27/18  Stroke Team Arrival Time: 1751  CT Interpretation Time: 1536  Decision to Treat Time for Alteplase: 1806     NIH Scale:  1a. Level Of Consciousness: 0-->Alert: keenly responsive  1b. LOC Questions: 2-->Answers neither question correctly  1c. LOC Commands: 2-->Performs neither task correctly  2. Best Gaze: 0-->Normal  3. Visual: 0-->No visual loss  4. Facial Palsy: 1-->Minor paralysis (flattened nasolabial fold, asymmetry on smiling)  5a. Motor Arm, Left: 2-->Some effort against gravity: limb cannot get to or maintain (if cued) 90 (or 45) degrees, drifts down to bed, but has some effort against gravity  5b. Motor Arm, Right: 1-->Drift: limb holds 90 (or 45) degrees, but drifts down before full 10 secs: does not hit  bed or other support  6a. Motor Leg, Left: 1-->Drift: leg falls by the end of the 5-sec period but does not hit bed  6b. Motor Leg, Right: 1-->Drift: leg falls by the end of the 5-sec period but does not hit bed  7. Limb Ataxia: 0-->Absent  8. Sensory: 0-->Normal: no sensory loss  9. Best Language: 0-->No aphasia: normal  10. Dysarthria: 2-->Severe dysarthria: patients speech is so slurred as to be unintelligible in the absence of or out of proportion to any dysphasia, or is mute/anarthric  11. Extinction and Inattention (formerly Neglect): 0-->No abnormality  Total (NIH Stroke Scale): 12         Modified Angelia Score: 0  Ivonne Coma Scale: 13  [id number] Score: 9       Hemorrhagic change of an Ischemic Stroke: Does this patient have an ischemic stroke with hemorrhagic changes? No      Neurologic Chief Complaint: Aphasia, dysarthria, LSW     Neuro   * Acute ischemic right MCA stroke     -s/p tpa at [facility]SH  -CTA without evidence of LVO  -MRI with evidence of cortical infarct involving the right pre-central gyrus  -q1h neuro checks  -PT OT SLP  -Vascular neurology following  -asp 81mg qd  -atorvastatin 40 mg qd  -heparin 5000U q8h  - will need full anti-coagulation once stable   - 5/30 ordered EEG to rule out seizures  - Cancel TTF as pt not stable for transfer due to increased lethargy and possible aspiration pneumonia          Cytotoxic cerebral edema     -- Continue to monitor for clinical deterioration with serial CT scans          Dementia with behavioral disturbance     -home wellbutrin 150 mg BID  -donepezil 10 mg qhs             Aphasia     -see R MCA   - SLP  - NG tube  - May need PEG          Cardiac/Vascular   Benign essential HTN     -SBP<160  -coreg 3.125mg BID  -continue losartan 50mg qd  -labetalol, hydralazine, cardene prn          Renal/   UTI (urinary tract infection)     No fernandez on presentation - has had a purwick since admission  UA with 14 WBC  Switch to Ceftriaxone 1 g q 24 for  2 days           Hematology   S/P admn tPA in diff fac w/n last 24 hr bef adm to crnt fac     -see R MCA               Prophylaxis:  Venous Thromboembolism: chemical  Stress Ulcer: PPI  Ventilator Pneumonia: not applicable          Activity Orders            Activity as tolerated starting at 05/28 4980          Full Code     Qasim Adams (MS3)  5/31/2018 7:26 AM

## 2018-05-31 NOTE — ASSESSMENT & PLAN NOTE
2/2 stroke  Motor cortex involvement on the right    Increased thick secretions - small opacity in the right middle lung  Aggressive chest PT, cough assist

## 2018-05-31 NOTE — PLAN OF CARE
"Problem: Patient Care Overview  Goal: Plan of Care Review  Outcome: Ongoing (interventions implemented as appropriate)  POC reviewed with Sury Cobos and daughter at 1700. Daughter verbalized understanding. Questions and concerns addressed. No acute events today. Pt progressing toward goals. Pt severely dysarthric, baseline dementia, "sundowns" at night. Copious oral secretions requiring frequent suctioning and breathing treatments. 20 mg lasix administered today. Will continue to monitor. See flowsheets for full assessment and VS info.           "

## 2018-05-31 NOTE — PLAN OF CARE
Problem: SLP Goal  Goal: SLP Goal  Speech Language Pathology Goals  Goals expected to be met by 6/5  1. Ongoing swallow assessment  2. Pt will follow 1 step commands with 75% acc given min A  3. Pt will answer yes/no questions with 75% acc given min A  4. Pt will complete OME's given mod A  5. Pt will complete word level dysarthria tasks with 80% intell. given max A to utilize speech strategies   6. Assess reading, writing, visual spatial skills  7. Assess memory, problem solving, reasoning   Outcome: Ongoing (interventions implemented as appropriate)    Pt seen for ongoing swallow assessment and cognitive-linguistic tx. Continue to recommend NPO at this time. ST to continue to follow according to POC.     Rowan Pinto M.S., CCC-SLP  Speech Language Pathologist  [phone] - pager   5/31/2018

## 2018-05-31 NOTE — PLAN OF CARE
05/31/18 1051   Discharge Reassessment   Assessment Type Discharge Planning Reassessment   Provided patient/caregiver education on the expected discharge date and the discharge plan No   Do you have any problems affording any of your prescribed medications? No   Discharge Plan A Home with family;Home Health  (Daughter would like home with HH)   Discharge Plan B Skilled Nursing Facility   Patient choice form signed by patient/caregiver N/A   Can the patient answer the patient profile reliably? No, cognitively impaired   Describe the patient's ability to walk at the present time. Major restrictions/daily assistance from another person   How often would a person be available to care for the patient? Whenever needed   Number of comorbid conditions (as recorded on the chart) Four   During the past month, has the patient often been bothered by feeling down, depressed or hopeless? (selwyn)   During the past month, has the patient often been bothered by little interest or pleasure in doing things? (selwyn)       Per MD:    5/30: EEG to rule out seizures, Continue 3% nebulizer, duo nebs and Chest PT for increased thick secretions. CXR to rule out aspiration pneumonia. Pt intermittently lethargic. Cancelled transfer as pt not stable for transfer  Not medically stable for discharge.      Therapy recommending SNF.   Patient's daughter stated that she would like home with hh.    Suzy Moreno RN, CCRN-K, [facility]  Neuro-Critical Care   [phone]

## 2018-05-31 NOTE — CHAPLAIN
Pt's dtr at bedside is a CNA and has been caring for the mother at home. Son is a  and teacher in [place]. Pt expressed to dtr that she wants to go home and go fishing!

## 2018-05-31 NOTE — ASSESSMENT & PLAN NOTE
-s/p tpa at OSH  -CTA without evidence of LVO  -MRI with evidence of cortical infarct involving the right pre-central gyrus  -q1h neuro checks  -PT OT SLP  -Vascular neurology following  -asp 81mg qd  -atorvastatin 40 mg qd  -heparin 5000U q8h  -EEG unremarkable for epileptiform activity

## 2018-05-31 NOTE — PLAN OF CARE
Problem: Occupational Therapy Goal  Goal: Occupational Therapy Goal  Goals to be met by: 6/14/2018     Patient will increase functional independence with ADLs by performing:    UE Dressing with Minimal Assistance.  LE Dressing with Minimal Assistance.  Grooming while standing with Contact Guard Assistance.  Toileting from toilet with Minimal Assistance for hygiene and clothing management.   Sitting at edge of bed x 15 minutes with Stand-by Assistance.  Toilet transfer to toilet with Contact Guard Assistance.      OT evaluation complete and POC established.  SNF vs. HH pending family decision is recommended upon d/c from acute care to further address deficits and help pt improve overall functional independence.     PATITO Palmer  5/31/2018

## 2018-06-01 PROBLEM — I63.411 CEREBROVASCULAR ACCIDENT (CVA) DUE TO EMBOLISM OF RIGHT MIDDLE CEREBRAL ARTERY: Status: ACTIVE | Noted: 2018-05-27

## 2018-06-01 PROBLEM — I48.91 NEW ONSET A-FIB: Status: ACTIVE | Noted: 2018-06-01

## 2018-06-01 PROBLEM — I51.7 ATRIAL ENLARGEMENT, LEFT: Status: ACTIVE | Noted: 2018-06-01

## 2018-06-01 LAB
ALBUMIN SERPL BCP-MCNC: 3.1 G/DL
ALP SERPL-CCNC: 76 U/L
ALT SERPL W/O P-5'-P-CCNC: 28 U/L
ANION GAP SERPL CALC-SCNC: 9 MMOL/L
ANISOCYTOSIS BLD QL SMEAR: SLIGHT
AST SERPL-CCNC: 44 U/L
BASOPHILS # BLD AUTO: 0.06 K/UL
BASOPHILS NFR BLD: 0.6 %
BILIRUB SERPL-MCNC: 0.4 MG/DL
BUN SERPL-MCNC: 13 MG/DL
BURR CELLS BLD QL SMEAR: ABNORMAL
CALCIUM SERPL-MCNC: 9.5 MG/DL
CHLORIDE SERPL-SCNC: 104 MMOL/L
CO2 SERPL-SCNC: 25 MMOL/L
CREAT SERPL-MCNC: 0.7 MG/DL
DIFFERENTIAL METHOD: ABNORMAL
EOSINOPHIL # BLD AUTO: 0.1 K/UL
EOSINOPHIL NFR BLD: 1.2 %
ERYTHROCYTE [DISTWIDTH] IN BLOOD BY AUTOMATED COUNT: 14.2 %
EST. GFR  (AFRICAN AMERICAN): >60 ML/MIN/1.73 M^2
EST. GFR  (NON AFRICAN AMERICAN): >60 ML/MIN/1.73 M^2
GIANT PLATELETS BLD QL SMEAR: PRESENT
GLUCOSE SERPL-MCNC: 127 MG/DL
HCT VFR BLD AUTO: 34.4 %
HGB BLD-MCNC: 11.1 G/DL
HYPOCHROMIA BLD QL SMEAR: ABNORMAL
IMM GRANULOCYTES # BLD AUTO: 0.06 K/UL
IMM GRANULOCYTES NFR BLD AUTO: 0.6 %
INR PPP: 0.9
LYMPHOCYTES # BLD AUTO: 2.1 K/UL
LYMPHOCYTES NFR BLD: 21.8 %
MAGNESIUM SERPL-MCNC: 2.1 MG/DL
MCH RBC QN AUTO: 30.2 PG
MCHC RBC AUTO-ENTMCNC: 32.3 G/DL
MCV RBC AUTO: 94 FL
MONOCYTES # BLD AUTO: 0.7 K/UL
MONOCYTES NFR BLD: 7.5 %
NEUTROPHILS # BLD AUTO: 6.4 K/UL
NEUTROPHILS NFR BLD: 68.3 %
NRBC BLD-RTO: 0 /100 WBC
OVALOCYTES BLD QL SMEAR: ABNORMAL
PHOSPHATE SERPL-MCNC: 4.5 MG/DL
PLATELET # BLD AUTO: 97 K/UL
PLATELET # BLD AUTO: ABNORMAL K/UL
PLATELET BLD QL SMEAR: ABNORMAL
PMV BLD AUTO: 13.4 FL
PMV BLD AUTO: 13.7 FL
POCT GLUCOSE: 120 MG/DL (ref 70–110)
POCT GLUCOSE: 135 MG/DL (ref 70–110)
POCT GLUCOSE: 135 MG/DL (ref 70–110)
POCT GLUCOSE: 147 MG/DL (ref 70–110)
POIKILOCYTOSIS BLD QL SMEAR: SLIGHT
POTASSIUM SERPL-SCNC: 4 MMOL/L
PROT SERPL-MCNC: 6.8 G/DL
PROTHROMBIN TIME: 9.8 SEC
RBC # BLD AUTO: 3.68 M/UL
SODIUM SERPL-SCNC: 138 MMOL/L
WBC # BLD AUTO: 9.44 K/UL

## 2018-06-01 PROCEDURE — 84100 ASSAY OF PHOSPHORUS: CPT

## 2018-06-01 PROCEDURE — 25000003 PHARM REV CODE 250: Performed by: PHYSICIAN ASSISTANT

## 2018-06-01 PROCEDURE — 25000003 PHARM REV CODE 250: Performed by: PSYCHIATRY & NEUROLOGY

## 2018-06-01 PROCEDURE — 36415 COLL VENOUS BLD VENIPUNCTURE: CPT

## 2018-06-01 PROCEDURE — 85610 PROTHROMBIN TIME: CPT

## 2018-06-01 PROCEDURE — 83735 ASSAY OF MAGNESIUM: CPT

## 2018-06-01 PROCEDURE — 25000242 PHARM REV CODE 250 ALT 637 W/ HCPCS: Performed by: PHYSICIAN ASSISTANT

## 2018-06-01 PROCEDURE — 80053 COMPREHEN METABOLIC PANEL: CPT

## 2018-06-01 PROCEDURE — 63600175 PHARM REV CODE 636 W HCPCS: Performed by: PHYSICIAN ASSISTANT

## 2018-06-01 PROCEDURE — 92507 TX SP LANG VOICE COMM INDIV: CPT

## 2018-06-01 PROCEDURE — 85025 COMPLETE CBC W/AUTO DIFF WBC: CPT

## 2018-06-01 PROCEDURE — 99900035 HC TECH TIME PER 15 MIN (STAT)

## 2018-06-01 PROCEDURE — A4216 STERILE WATER/SALINE, 10 ML: HCPCS | Performed by: PHYSICIAN ASSISTANT

## 2018-06-01 PROCEDURE — 63600175 PHARM REV CODE 636 W HCPCS: Performed by: PSYCHIATRY & NEUROLOGY

## 2018-06-01 PROCEDURE — 99233 SBSQ HOSP IP/OBS HIGH 50: CPT | Mod: ,,, | Performed by: PSYCHIATRY & NEUROLOGY

## 2018-06-01 PROCEDURE — 20600001 HC STEP DOWN PRIVATE ROOM

## 2018-06-01 PROCEDURE — 94640 AIRWAY INHALATION TREATMENT: CPT

## 2018-06-01 PROCEDURE — 85049 AUTOMATED PLATELET COUNT: CPT

## 2018-06-01 PROCEDURE — 99233 SBSQ HOSP IP/OBS HIGH 50: CPT | Mod: ,,, | Performed by: PHYSICIAN ASSISTANT

## 2018-06-01 PROCEDURE — 94761 N-INVAS EAR/PLS OXIMETRY MLT: CPT

## 2018-06-01 PROCEDURE — 94668 MNPJ CHEST WALL SBSQ: CPT

## 2018-06-01 RX ORDER — QUETIAPINE FUMARATE 25 MG/1
25 TABLET, FILM COATED ORAL ONCE
Status: COMPLETED | OUTPATIENT
Start: 2018-06-01 | End: 2018-06-01

## 2018-06-01 RX ORDER — HALOPERIDOL 5 MG/ML
1 INJECTION INTRAMUSCULAR ONCE
Status: COMPLETED | OUTPATIENT
Start: 2018-06-01 | End: 2018-06-01

## 2018-06-01 RX ADMIN — ASPIRIN 81 MG CHEWABLE TABLET 81 MG: 81 TABLET CHEWABLE at 08:06

## 2018-06-01 RX ADMIN — DONEPEZIL HYDROCHLORIDE 10 MG: 5 TABLET, FILM COATED ORAL at 09:06

## 2018-06-01 RX ADMIN — BUPROPION HYDROCHLORIDE 150 MG: 75 TABLET, FILM COATED ORAL at 08:06

## 2018-06-01 RX ADMIN — CLONIDINE HYDROCHLORIDE 0.1 MG: 0.1 TABLET ORAL at 08:06

## 2018-06-01 RX ADMIN — LOSARTAN POTASSIUM 100 MG: 50 TABLET, FILM COATED ORAL at 08:06

## 2018-06-01 RX ADMIN — FUROSEMIDE 20 MG: 20 TABLET ORAL at 08:06

## 2018-06-01 RX ADMIN — ATORVASTATIN CALCIUM 40 MG: 20 TABLET, FILM COATED ORAL at 08:06

## 2018-06-01 RX ADMIN — HEPARIN SODIUM 5000 UNITS: 5000 INJECTION, SOLUTION INTRAVENOUS; SUBCUTANEOUS at 02:06

## 2018-06-01 RX ADMIN — Medication 3 ML: at 05:06

## 2018-06-01 RX ADMIN — HYDRALAZINE HYDROCHLORIDE 10 MG: 20 INJECTION INTRAMUSCULAR; INTRAVENOUS at 04:06

## 2018-06-01 RX ADMIN — QUETIAPINE FUMARATE 25 MG: 25 TABLET ORAL at 12:06

## 2018-06-01 RX ADMIN — SODIUM CHLORIDE SOLN NEBU 3% 4 ML: 3 NEBU SOLN at 07:06

## 2018-06-01 RX ADMIN — SODIUM CHLORIDE SOLN NEBU 3% 4 ML: 3 NEBU SOLN at 12:06

## 2018-06-01 RX ADMIN — SODIUM CHLORIDE SOLN NEBU 3% 4 ML: 3 NEBU SOLN at 01:06

## 2018-06-01 RX ADMIN — QUETIAPINE FUMARATE 25 MG: 25 TABLET ORAL at 09:06

## 2018-06-01 RX ADMIN — ACETAMINOPHEN 650 MG: 650 SOLUTION ORAL at 08:06

## 2018-06-01 RX ADMIN — Medication 3 ML: at 02:06

## 2018-06-01 RX ADMIN — HEPARIN SODIUM 5000 UNITS: 5000 INJECTION, SOLUTION INTRAVENOUS; SUBCUTANEOUS at 09:06

## 2018-06-01 RX ADMIN — BUPROPION HYDROCHLORIDE 150 MG: 75 TABLET, FILM COATED ORAL at 10:06

## 2018-06-01 RX ADMIN — IPRATROPIUM BROMIDE AND ALBUTEROL SULFATE 3 ML: .5; 3 SOLUTION RESPIRATORY (INHALATION) at 12:06

## 2018-06-01 RX ADMIN — IPRATROPIUM BROMIDE AND ALBUTEROL SULFATE 3 ML: .5; 3 SOLUTION RESPIRATORY (INHALATION) at 09:06

## 2018-06-01 RX ADMIN — SODIUM CHLORIDE SOLN NEBU 3% 4 ML: 3 NEBU SOLN at 09:06

## 2018-06-01 RX ADMIN — Medication 3 ML: at 10:06

## 2018-06-01 RX ADMIN — HALOPERIDOL LACTATE 1 MG: 5 INJECTION, SOLUTION INTRAMUSCULAR at 02:06

## 2018-06-01 RX ADMIN — CLONIDINE HYDROCHLORIDE 0.1 MG: 0.1 TABLET ORAL at 09:06

## 2018-06-01 RX ADMIN — HEPARIN SODIUM 5000 UNITS: 5000 INJECTION, SOLUTION INTRAVENOUS; SUBCUTANEOUS at 05:06

## 2018-06-01 NOTE — PLAN OF CARE
Problem: Patient Care Overview  Goal: Plan of Care Review  Outcome: Ongoing (interventions implemented as appropriate)  POC reviewed with pt at 0400. Pt severely dysarthric and unable to verbalize understanding. No acute events overnight. Pure wick in place. Bilateral wrist restraints continued. Pt progressing toward goals. Will continue to monitor. See flowsheets for full assessment and VS info

## 2018-06-01 NOTE — ASSESSMENT & PLAN NOTE
s/p tpa at OSH  -CTA without evidence of LVO  -MRI with evidence of cortical infarct involving the right pre-central gyrus  -q1h neuro checks  -PT OT SLP  -Vascular Neurology following, stepdown to their service today   -ASA 81mg qd  -atorvastatin 40 mg qd  -heparin 5000U q8h  -EEG unremarkable for epileptiform activity  -Remained in unit for poor management of secretions, improved from yesterday - small opacity in the right middle lung. Continue aggressive chest PT, cough assist, scheduled duonebs

## 2018-06-01 NOTE — ASSESSMENT & PLAN NOTE
82 y/o female with left sided facial droop, aphasia, dysarthria who received IV TPA but no IR. MRI with infarct in R precentral gyrus.   Nursing notes state afib seen on telemetry, but no afib documented on 12 lead ECG. However, etiology likely cardioembolic due to Afib seen on telemetry and afib showing sever LAE. SNF vs  with 24/7 family care.     Neurologically stable plan to step down to stroke service.   Nursing note noted patient went into Afib on monitor in [facility]. EKG completed and not seen. Echo showing Severe LAE. Will need to be started on AC after PEG placement.     Antithrombotics: Aspirin 81 mg    Statins: Lipitor 40 mg daily    Aggressive risk factor modification: HTN, HLD     Rehab efforts: PT/OT/SLP to evaluate and treat, PM&R consult     Diagnostics ordered/pending: none     VTE prophylaxis: SCD's may begin Heparin 500 units SC Q8H     BP parameters: Infarct: Post tPA, SBP <160

## 2018-06-01 NOTE — SUBJECTIVE & OBJECTIVE
Interval History: Secretions improved from yesterday. Not requiring frequent suctioning anymore. Will continue current med and pulmonary toileting regimen. Will likely need PEG. TTF under Vascular Neurology service.     Review of Systems   Respiratory: Negative for shortness of breath.    Cardiovascular: Negative for chest pain.   Neurological: Positive for speech difficulty and weakness.   Psychiatric/Behavioral: Positive for agitation.       Objective:     Vitals:  Temp: 98.4 °F (36.9 °C)  Pulse: 78  Rhythm: normal sinus rhythm  BP: (!) 146/66  MAP (mmHg): 95  Resp: 20  SpO2: 100 %  O2 Device (Oxygen Therapy): room air    Temp  Min: 97.7 °F (36.5 °C)  Max: 98.7 °F (37.1 °C)  Pulse  Min: 56  Max: 87  BP  Min: 104/78  Max: 193/105  MAP (mmHg)  Min: 87  Max: 147  Resp  Min: 16  Max: 36  SpO2  Min: 96 %  Max: 100 %    05/31 0701 - 06/01 0700  In: 1195 [I.V.:115]  Out: 1250 [Urine:1250]   Unmeasured Output  Urine Occurrence: 1  Stool Occurrence: 1  Pad Count: 1       Physical Exam   Constitutional: She appears well-developed and well-nourished.   HENT:   Head: Normocephalic and atraumatic.   Cardiovascular: Normal rate.    Pulmonary/Chest: Effort normal.   Neurological:   [id number]  Alert. Oriented to person, place.   Severely dysarthric.   Follows simple commands, but agitated and intermittently refuses to follow.   PERRL, EOMI.   Left facial droop  Motor LUE LLE 4/5. RUE RLE 5/5.   Psychiatric: She is agitated.       Medications:  Continuous   Scheduled    albuterol-ipratropium 3 mL Q8H   aspirin 81 mg Daily   atorvastatin 40 mg Daily   buPROPion 150 mg BID   cloNIDine 0.1 mg BID   donepezil 10 mg QHS   furosemide 20 mg Daily   heparin (porcine) 5,000 Units Q8H   losartan 100 mg Daily   QUEtiapine 25 mg QHS   sodium chloride 0.9% 3 mL Q8H   sodium chloride 3% 4 mL Q6H   PRN    acetaminophen 650 mg Q4H PRN   hydrALAZINE 10 mg Q4H PRN   labetalol 10 mg PRN   magnesium oxide 800 mg PRN   magnesium oxide 800 mg PRN    potassium chloride 10% 40 mEq PRN   potassium chloride 10% 40 mEq PRN   potassium chloride 10% 60 mEq PRN   potassium, sodium phosphates 2 packet PRN   potassium, sodium phosphates 2 packet PRN   potassium, sodium phosphates 2 packet PRN   senna-docusate 8.6-50 mg 1 tablet Daily PRN     Today I personally reviewed pertinent medications, lines/drains/airways, imaging, lab results,     Diet  Diet NPO  Diet NPO

## 2018-06-01 NOTE — PROGRESS NOTES
[facility]  Vascular Neurology  Comprehensive Stroke Center  Progress Note    Assessment/Plan:     * Embolic stroke involving right middle cerebral artery     84 y/o female with left sided facial droop, aphasia, dysarthria who received IV TPA but no IR. MRI with infarct in R precentral gyrus.   Nursing notes state afib seen on telemetry, but no afib documented on 12 lead ECG. However, etiology likely cardioembolic due to Afib seen on telemetry and afib showing sever LAE. SNF vs  with 24/7 family care.     Neurologically stable plan to step down to stroke service.   Nursing note noted patient went into Afib on telemetry. EKG completed and not seen. Echo showing Severe LAE. Will need to be started on AC after PEG placement.     Antithrombotics: Aspirin 81 mg; will need AC after PEG placement    Statins: Lipitor 40 mg daily    Aggressive risk factor modification: HTN, HLD     Rehab efforts: PT/OT/SLP to evaluate and treat, PM&R consult     Diagnostics ordered/pending: none     VTE prophylaxis: SCD's may begin Heparin 500 units SC Q8H     BP parameters: Infarct: Post tPA, SBP <160            New onset a-fib    Seen on telemetry monitor in [facility]  Echo showing severe LAE  Will need AC after PEG placement        Atrial enlargement, left    Severe left atrial enlargement seen on echo  Per nursing note afib was seen on telemetry, ekg completed and sinus rhythm with Premature atrial complexes. Severe LAE seen on echocardiogram.  Will need AC after PEG placement         Dysarthria    Severe dysarthria  SLP ordered           Dysphagia    Patient NPO with NG in place.   Speech therapy to assess swallowing - may need PEG        Cytotoxic cerebral edema    Area of cytotoxic cerebral edema identified when reviewing brain imaging in the territory of the R middle cerebral artery. There is no mass effect associated with it. We will continue to monitor the patients clinical exam for any worsening of symptoms which  may indicate expansion of the stroke or the area of the edema resulting in the clinical change. The pattern is of unclear etiology (ESUS versus small vessel)            UTI (urinary tract infection)    Continue ceftriaxone        Dementia with behavioral disturbance        Home wellbutrin 150 mg  Donepezil 10 qhs  seroquel 25 mg qhs            High cholesterol    Stroke risk factor    Lipitor 40 mg daily        Benign essential HTN    Stroke risk factor  Systolic less than 160 - on clonidine 0.1 mg  BID, lasix 20 mg qd, Losartan 100 mg qd        Aphasia    Difficult to assess if patient is responding appropriately to questions due to severe dysarthria  Not following commands  Aggressive SLP therapy         S/P admn tPA in diff fac w/n last 24 hr bef adm to crnt fac    Close monitoring in NCCU for 24 hours post administration, completed             84 y/o female with aphasia, dysarthria and facial droop, received IV TPA but no LVO so no IR. Probable small vessel disease.    5/28: Blood pressure elevated >200 overnight requiring cardene. Patient bradycardic and with paroxysmal afib overnight as well. MRI with infarct in the right precentral gyrus.   5/29: No events overnight. Patient with many bacteria on UTI so short cipro course started per NCC. Pending step down to NSU.   5/30: seems more agitated today, not participating in exam or following commands, severe dysarthria and excessive secretions  05/31/18 Continues to have increased secretions and severe dysarthria.  Patient following some commands and plans to transfer to stroke service today   6/1: Secretions are better controlled today. Plan to step down. On exam patient is agitated and stating she wants to go home. In bilateral wrist restraints. Right lung opacity - Chest PT and duonebs ordered per NCC. Nursing notes noted Afib but EKG showing no afib. Echo revealed Severe left atrial enlargement. Will need AC after PEG placement.    STROKE DOCUMENTATION    Acute Stroke Times   Last Known Normal Date: 05/27/18  Last Known Normal Time: 1320  Symptom Onset Date: 05/27/18  Symptom Onset Time: 1445  Stroke Team Called Date: 05/27/18  Stroke Team Called Time: 1550  Stroke Team Arrival Date: 05/27/18  Stroke Team Arrival Time: 1751  CT Interpretation Time: 1536  Decision to Treat Time for Alteplase: 1806    NIH Scale:  1a. Level Of Consciousness: 0-->Alert: keenly responsive  1b. LOC Questions: 2-->Answers neither question correctly  1c. LOC Commands: 1-->Performs one task correctly  2. Best Gaze: 0-->Normal  3. Visual: 0-->No visual loss  4. Facial Palsy: 1-->Minor paralysis (flattened nasolabial fold, asymmetry on smiling)  5a. Motor Arm, Left: 0-->No drift: limb holds 90 (or 45) degrees for full 10 secs  5b. Motor Arm, Right: 0-->No drift: limb holds 90 (or 45) degrees for full 10 secs  6a. Motor Leg, Left: 2-->Some effort against gravity: leg falls to bed by 5 secs, but has some effort against gravity  6b. Motor Leg, Right: 1-->Drift: leg falls by the end of the 5-sec period but does not hit bed  7. Limb Ataxia: 0-->Absent  8. Sensory: 0-->Normal: no sensory loss  9. Best Language: 0-->No aphasia: normal  10. Dysarthria: 1-->Mild-to-moderate dysarthria: patient slurs at least some words and, at worst, can be understood with some difficulty  11. Extinction and Inattention (formerly Neglect): 0-->No abnormality  Total (NIH Stroke Scale): 8       Modified Angelia Score: 0  [place] Coma Scale:    ABCD2 Score:    [id number] Score:   HAS -BLED Score:   ICH Score:   Hunt & Burkett Classification:      Hemorrhagic change of an Ischemic Stroke: Does this patient have an ischemic stroke with hemorrhagic changes? No     Neurologic Chief Complaint: Aphasia, dysarthria, LSW     Subjective:     Interval History: Patient is seen for follow-up neurological assessment and treatment recommendations:     Secretions are better controlled today. Plan to step down. On exam patient is agitated  and stating she wants to go home. In bilateral wrist restraints. Right lung opacity - Chest PT and duonebs ordered per NCC.     HPI, Past Medical, Family, and Social History remains the same as documented in the initial encounter.     Review of Systems   Constitutional: Negative for fever.   Respiratory: Negative for shortness of breath.    Gastrointestinal: Negative for abdominal pain, nausea and vomiting.   Neurological: Positive for facial asymmetry, speech difficulty and weakness. Negative for headaches.   Psychiatric/Behavioral: Positive for agitation and confusion. Negative for behavioral problems.     Scheduled Meds:   albuterol-ipratropium  3 mL Nebulization Q8H    aspirin  81 mg Per NG tube Daily    atorvastatin  40 mg Per NG tube Daily    buPROPion  150 mg Per NG tube BID    cloNIDine  0.1 mg Per NG tube BID    donepezil  10 mg Per NG tube QHS    furosemide  20 mg Per NG tube Daily    heparin (porcine)  5,000 Units Subcutaneous Q8H    losartan  100 mg Per NG tube Daily    QUEtiapine  25 mg Per NG tube QHS    QUEtiapine  25 mg Per NG tube Once    sodium chloride 0.9%  3 mL Intravenous Q8H    sodium chloride 3%  4 mL Nebulization Q6H     Continuous Infusions:    PRN Meds:acetaminophen, hydrALAZINE, labetalol, magnesium oxide, magnesium oxide, potassium chloride 10%, potassium chloride 10%, potassium chloride 10%, potassium, sodium phosphates, potassium, sodium phosphates, potassium, sodium phosphates, senna-docusate 8.6-50 mg    Objective:     Vital Signs (Most Recent):  Temp: 97.8 °F (36.6 °C) (06/01/18 1105)  Pulse: 70 (06/01/18 1105)  Resp: (!) 26 (06/01/18 1105)  BP: (!) 158/67 (06/01/18 1105)  SpO2: 100 % (06/01/18 1105)  BP Location: Right arm    Vital Signs Range (Last 24H):  Temp:  [97.7 °F (36.5 °C)-99 °F (37.2 °C)]   Pulse:  [56-87]   Resp:  [18-36]   BP: (104-193)/()   SpO2:  [96 %-100 %]   BP Location: Right arm    Physical Exam   Constitutional: She appears well-developed. No  distress.   HENT:   Head: Normocephalic and atraumatic.   Eyes: Pupils are equal, round, and reactive to light.   Cardiovascular: Normal rate.    Pulmonary/Chest: Effort normal.   Neurological: She is alert.   Oriented to month but not age      Skin: Skin is warm and dry.   Psychiatric: She has a normal mood and affect. She is not agitated and not aggressive.   Vitals reviewed.      Neurological Exam:   LOC: alert  Attention Span:alert  Language:no aphasia  Articulation: severe Dysarthria  Visual Fields: Full  EOM (CN III, IV, VI): Full/intact  Facial Movement (CN VII): Lower facial weakness on the Left  Motor: Arm left  Paresis: 5/5  Leg left  Paresis: 3/5  Arm right  Paresis: 5/5  Leg right Paresis: 4/5  Sensation: equal sensation   Tone: Spasticity  RUE    Laboratory:  CMP:     Recent Labs  Lab 06/01/18  0134   CALCIUM 9.5   ALBUMIN 3.1*   PROT 6.8      K 4.0   CO2 25      BUN 13   CREATININE 0.7   ALKPHOS 76   ALT 28   AST 44*   BILITOT 0.4     BMP:     Recent Labs  Lab 06/01/18  0134      K 4.0      CO2 25   BUN 13   CREATININE 0.7   CALCIUM 9.5     CBC:     Recent Labs  Lab 06/01/18  0134 06/01/18  0258   WBC 9.44  --    RBC 3.68*  --    HGB 11.1*  --    HCT 34.4*  --    PLT SEE COMMENT 97*   MCV 94  --    MCH 30.2  --    MCHC 32.3  --      Lipid Panel:     Recent Labs  Lab 05/27/18  1514   CHOL 197   LDLCALC 110.8   HDL 59   TRIG 136     Coagulation:     Recent Labs  Lab 05/31/18  0152 06/01/18  0134   INR 0.9 0.9   APTT 24.7  --      Platelet Aggregation Study: No results for input(s): PLTAGG, PLTAGINTERP, PLTAGREGLACO, ADPPLTAGGREG in the last 168 hours.  Hgb A1C:     Recent Labs  Lab 05/27/18  1919   HGBA1C 5.5     TSH:     Recent Labs  Lab 05/27/18  1514   TSH 1.638       Diagnostic Results     Brain Imaging   MRI brain 5/28:      Small area of restricted diffusion in the right precentral gyrus.  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    Bilateral basal ganglia, thalami and cerebellar hemispheres small remote infarcts.  Generalized cerebral volume loss and extensive white matter chronic small vessel ischemic change.    Ventriculomegaly out of proportion to sulcal prominence, nonspecific but can be seen with normal pressure hydrocephalus, but overall unchanged from prior MRI.        Cardiac Imaging   ECHO 5/28/18:   CONCLUSIONS     1 - Concentric LVH with hyperdynamic left ventricular systolic function (EF >70%).     2 - Severe left atrial enlargement.     3 - Impaired LV relaxation, elevated LAP (grade 2 diastolic dysfunction).     4 - Normal right ventricular systolic function .     5 - Mild to moderate aortic stenosis, ALBER = 1.64 cm2, AVAi = 1.12 cm2/m2, peak velocity = 3.11 m/s, mean gradient = 24 mmHg      Leah Fan NP  Comprehensive Stroke Center  Department of Vascular Neurology   [facility]

## 2018-06-01 NOTE — PT/OT/SLP PROGRESS
Speech Language Pathology Treatment    Patient Name:  Sury Cobos   MRN:  [id number]  Admitting Diagnosis: Acute ischemic right MCA stroke    Recommendations:                 General Recommendations:  Dysphagia therapy and Speech/language therapy  Diet recommendations:  NPO, Liquid Diet Level: NPO   Aspiration Precautions: Strict aspiration precautions   General Precautions: Standard, aspiration, NPO, fall  Communication strategies:  none    Subjective     Unintelligible speech   Patient goals: unable to communicate     Pain/Comfort:  · Pain Rating 1: 0/10  · Pain Rating Post-Intervention 1: 0/10    Objective:     Has the patient been evaluated by SLP for swallowing?   Yes  Keep patient NPO? Yes   Current Respiratory Status: room air      Pt. Seen at bedside and was alert with ng tube in place.  Pt. Initiating unintelligible verbalizations through out session at times appearing agitated with increased vocal intensity/yelling noted.  She did not follow or model any oral commands with no lingual movement noted .  Despite tactile cues, she did not open her mouth with very mumbled , severely dysarthric speech.  No intelligible spontaneous verbalizations elicited.  Vocal quality was slightly wet.  Pt. Did count to 4 with therapist with verbalizations intelligible in known context with careful listening.  ANterior loss of secretions noted on right side of mouth.  Unintelligible verbalizations elicited when asked to count, sing with therapist.  No differentiated yes/no response elicited when asked simple yes/no questions.  NO po intake attempted at this time.      Assessment:     Sury Cobos is a 83 y.o. female with an SLP diagnosis of Dysphagia, Dysarthria and Cognitive-Linguistic Impairment.      Goals:    SLP Goals        Problem: SLP Goal    Goal Priority Disciplines Outcome   SLP Goal     SLP Ongoing (interventions implemented as appropriate)   Description:  Speech Language Pathology Goals  Goals expected to be met  by 6/5  1. Ongoing swallow assessment  2. Pt will follow 1 step commands with 75% acc given min A  3. Pt will answer yes/no questions with 75% acc given min A  4. Pt will complete OME's given mod A  5. Pt will complete word level dysarthria tasks with 80% intell. given max A to utilize speech strategies   6. Assess reading, writing, visual spatial skills  7. Assess memory, problem solving, reasoning                    Plan:     · Patient to be seen:  4 x/week   · Plan of Care expires:  06/26/18  · Plan of Care reviewed with:  patient   · SLP Follow-Up:  Yes       Discharge recommendations:  nursing facility, skilled   Barriers to Discharge:  Level of Skilled Assistance Needed 24  hour    Time Tracking:     SLP Treatment Date:   06/01/18  Speech Start Time:  1315  Speech Stop Time:  1325     Speech Total Time (min):  10 min    Billable Minutes: Speech Therapy Individual 10    Paula Pal MA, CCC-SLP  06/01/2018

## 2018-06-01 NOTE — ASSESSMENT & PLAN NOTE
Difficult to assess if patient is responding appropriately to questions due to severe dysarthria  Not following commands  Aggressive SLP therapy

## 2018-06-01 NOTE — ASSESSMENT & PLAN NOTE
82 y/o female with left sided facial droop, aphasia, dysarthria who received IV TPA but no IR. MRI with infarct in R precentral gyrus.   Nursing notes state afib seen on telemetry, but no afib documented on 12 lead ECG. However, etiology likely cardioembolic due to Afib seen on telemetry and afib showing sever LAE. SNF vs  with 24/7 family care.     Neurologically stable plan to step down to stroke service.   Nursing note noted patient went into Afib on monitor in [facility]. EKG completed and not seen. Echo showing Severe LAE. Will need to be started on AC after PEG placement.     Antithrombotics: Aspirin 81 mg    Statins: Lipitor 40 mg daily    Aggressive risk factor modification: HTN, HLD     Rehab efforts: PT/OT/SLP to evaluate and treat, PM&R consult     Diagnostics ordered/pending: none     VTE prophylaxis: SCD's may begin Heparin 500 units SC Q8H     BP parameters: Infarct: Post tPA, SBP <160

## 2018-06-01 NOTE — ASSESSMENT & PLAN NOTE
Seen on telemetry monitor in [facility]  Echo showing severe LAE  Will need AC after PEG placement

## 2018-06-01 NOTE — PROGRESS NOTES
[facility]  Neurocritical Care  Progress Note    Admit Date: 5/27/2018  Service Date: 06/01/2018  Length of Stay: 5    Subjective:     Chief Complaint: Embolic stroke involving right middle cerebral artery    History of Present Illness: Ms. Cobos is a 82 y/o female with pmhx DM2, HTN, HLD, dementia who was last seen normal at 1320, alert  today with family. Daughter states she went to run and [email or web address] and returned at 1445 to find the patient slumped over and unable to talk or move her left side. She was taken to [place] and telemedicine consult was done with Dr Marx. No acute changes on CT head. Recommendation was to give IV TPA and transfer to [facility] for further evaluation.   Upon arrival patient still with aphasia, L facial droop, dysarthria. Minimal left sided weakness.   CTA head and neck multiphase complete, no LVO, no IR. Pt admitted to [facility] for higher level of care.       Hospital Course: 5/27: admit to [facility] s/p tpa, no intervention, R MCA  5/30: EEG to rule out seizures, Continue 3% nebulizer, duo nebs and Chest PT for increased thick secretions. CXR to rule out aspiration pneumonia. Pt intermittently lethargic. Cancelled transfer as pt not stable for transfer. Will need anti-coagulation once stable.    5/31:Pulmonary toileting, holding transfer for frequent suctioning    6/1: TTF under Vascular Neurology     Interval History: Secretions improved from yesterday. Not requiring frequent suctioning anymore. Will continue current med and pulmonary toileting regimen. Will likely need PEG. TTF under Vascular Neurology service.     Review of Systems   Respiratory: Negative for shortness of breath.    Cardiovascular: Negative for chest pain.   Neurological: Positive for speech difficulty and weakness.   Psychiatric/Behavioral: Positive for agitation.       Objective:     Vitals:  Temp: 98.4 °F (36.9 °C)  Pulse: 78  Rhythm: normal sinus rhythm  BP: (!) 146/66  MAP (mmHg): 95  Resp: 20  SpO2:  100 %  O2 Device (Oxygen Therapy): room air    Temp  Min: 97.7 °F (36.5 °C)  Max: 98.7 °F (37.1 °C)  Pulse  Min: 56  Max: 87  BP  Min: 104/78  Max: 193/105  MAP (mmHg)  Min: 87  Max: 147  Resp  Min: 16  Max: 36  SpO2  Min: 96 %  Max: 100 %    05/31 0701 - 06/01 0700  In: 1195 [I.V.:115]  Out: 1250 [Urine:1250]   Unmeasured Output  Urine Occurrence: 1  Stool Occurrence: 1  Pad Count: 1       Physical Exam   Constitutional: She appears well-developed and well-nourished.   HENT:   Head: Normocephalic and atraumatic.   Cardiovascular: Normal rate.    Pulmonary/Chest: Effort normal.   Neurological:   [id number]  Alert. Oriented to person, place.   Severely dysarthric.   Follows simple commands, but agitated and intermittently refuses to follow.   PERRL, EOMI.   Left facial droop  Motor LUE LLE 4/5. RUE RLE 5/5.   Psychiatric: She is agitated.       Medications:  Continuous   Scheduled    albuterol-ipratropium 3 mL Q8H   aspirin 81 mg Daily   atorvastatin 40 mg Daily   buPROPion 150 mg BID   cloNIDine 0.1 mg BID   donepezil 10 mg QHS   furosemide 20 mg Daily   heparin (porcine) 5,000 Units Q8H   losartan 100 mg Daily   QUEtiapine 25 mg QHS   sodium chloride 0.9% 3 mL Q8H   sodium chloride 3% 4 mL Q6H   PRN    acetaminophen 650 mg Q4H PRN   hydrALAZINE 10 mg Q4H PRN   labetalol 10 mg PRN   magnesium oxide 800 mg PRN   magnesium oxide 800 mg PRN   potassium chloride 10% 40 mEq PRN   potassium chloride 10% 40 mEq PRN   potassium chloride 10% 60 mEq PRN   potassium, sodium phosphates 2 packet PRN   potassium, sodium phosphates 2 packet PRN   potassium, sodium phosphates 2 packet PRN   senna-docusate 8.6-50 mg 1 tablet Daily PRN     Today I personally reviewed pertinent medications, lines/drains/airways, imaging, lab results,     Diet  Diet NPO  Diet NPO    Assessment/Plan:     Neuro   * Embolic stroke involving right middle cerebral artery    s/p tpa at [facility]  -CTA without evidence of LVO  -MRI with evidence of cortical infarct  involving the right pre-central gyrus  -q1h neuro checks  -PT OT SLP  -Vascular Neurology following, stepdown to their service today   -ASA 81mg qd  -atorvastatin 40 mg qd  -heparin 5000U q8h  -EEG unremarkable for epileptiform activity  -Remained in unit for poor management of secretions, improved from yesterday - small opacity in the right middle lung. Continue aggressive chest PT, cough assist, scheduled duonebs         Dysarthria    2/2 stroke  Motor cortex involvement on the right  SLP        Dementia with behavioral disturbance    -home wellbutrin 150 mg BID  -donepezil 10 mg qhs  -seroquel 25mg nightly        Cardiac/Vascular   Benign essential HTN    -SBP goal 100-180  -Continue clonidine 0.1mg BID, losartan 100mg qd, furosemide 20 mg QD  -labetalol PRN             Prophylaxis:  Venous Thromboembolism: mechanical chemical  Stress Ulcer: NA  Ventilator Pneumonia: not applicable     Activity Orders          Activity as tolerated starting at 05/28 1550        Full Code    Adrianna Sunshine PA-C  Neurocritical Care  [facility]

## 2018-06-01 NOTE — NURSING TRANSFER
Nursing Transfer Note      6/1/2018     Transfer To:     Transfer via bed    Transfer with cardiac monitoring    Transported by RN x 2    Medicines sent: Bupropion 75 mg x 2 doses    Chart send with patient: Yes    Notified: Daughter    Patient reassessed at: 6/1/18 at 1545    Upon arrival to floor: cardiac monitor applied, patient oriented to room, call bell in reach and bed in lowest position    Patient belongings transferred: Ring, glasses, shoes, clothing items, deck of cards.

## 2018-06-01 NOTE — SUBJECTIVE & OBJECTIVE
Neurologic Chief Complaint: Aphasia, dysarthria, LSW     Subjective:     Interval History: Patient is seen for follow-up neurological assessment and treatment recommendations:     Secretions are better controlled today. Plan to step down. On exam patient is agitated and stating she wants to go home. In bilateral wrist restraints. Right lung opacity - Chest PT and duonebs ordered per NCC.     HPI, Past Medical, Family, and Social History remains the same as documented in the initial encounter.     Review of Systems   Constitutional: Negative for fever.   Respiratory: Negative for shortness of breath.    Gastrointestinal: Negative for abdominal pain, nausea and vomiting.   Neurological: Positive for facial asymmetry, speech difficulty and weakness. Negative for headaches.   Psychiatric/Behavioral: Positive for agitation and confusion. Negative for behavioral problems.     Scheduled Meds:   albuterol-ipratropium  3 mL Nebulization Q8H    aspirin  81 mg Per NG tube Daily    atorvastatin  40 mg Per NG tube Daily    buPROPion  150 mg Per NG tube BID    cloNIDine  0.1 mg Per NG tube BID    donepezil  10 mg Per NG tube QHS    furosemide  20 mg Per NG tube Daily    heparin (porcine)  5,000 Units Subcutaneous Q8H    losartan  100 mg Per NG tube Daily    QUEtiapine  25 mg Per NG tube QHS    QUEtiapine  25 mg Per NG tube Once    sodium chloride 0.9%  3 mL Intravenous Q8H    sodium chloride 3%  4 mL Nebulization Q6H     Continuous Infusions:    PRN Meds:acetaminophen, hydrALAZINE, labetalol, magnesium oxide, magnesium oxide, potassium chloride 10%, potassium chloride 10%, potassium chloride 10%, potassium, sodium phosphates, potassium, sodium phosphates, potassium, sodium phosphates, senna-docusate 8.6-50 mg    Objective:     Vital Signs (Most Recent):  Temp: 97.8 °F (36.6 °C) (06/01/18 1105)  Pulse: 70 (06/01/18 1105)  Resp: (!) 26 (06/01/18 1105)  BP: (!) 158/67 (06/01/18 1105)  SpO2: 100 % (06/01/18 1105)  BP  Location: Right arm    Vital Signs Range (Last 24H):  Temp:  [97.7 °F (36.5 °C)-99 °F (37.2 °C)]   Pulse:  [56-87]   Resp:  [18-36]   BP: (104-193)/()   SpO2:  [96 %-100 %]   BP Location: Right arm    Physical Exam   Constitutional: She appears well-developed. No distress.   HENT:   Head: Normocephalic and atraumatic.   Eyes: Pupils are equal, round, and reactive to light.   Cardiovascular: Normal rate.    Pulmonary/Chest: Effort normal.   Neurological: She is alert.   Oriented to month but not age      Skin: Skin is warm and dry.   Psychiatric: She has a normal mood and affect. She is not agitated and not aggressive.   Vitals reviewed.      Neurological Exam:   LOC: alert  Attention Span:alert  Language:no aphasia  Articulation: severe Dysarthria  Visual Fields: Full  EOM (CN III, IV, VI): Full/intact  Facial Movement (CN VII): Lower facial weakness on the Left  Motor: Arm left  Paresis: 5/5  Leg left  Paresis: 3/5  Arm right  Paresis: 5/5  Leg right Paresis: 4/5  Sensation: equal sensation   Tone: Spasticity  RUE    Laboratory:  CMP:     Recent Labs  Lab 06/01/18 0134   CALCIUM 9.5   ALBUMIN 3.1*   PROT 6.8      K 4.0   CO2 25      BUN 13   CREATININE 0.7   ALKPHOS 76   ALT 28   AST 44*   BILITOT 0.4     BMP:     Recent Labs  Lab 06/01/18  0134      K 4.0      CO2 25   BUN 13   CREATININE 0.7   CALCIUM 9.5     CBC:     Recent Labs  Lab 06/01/18  0134 06/01/18  0258   WBC 9.44  --    RBC 3.68*  --    HGB 11.1*  --    HCT 34.4*  --    PLT SEE COMMENT 97*   MCV 94  --    MCH 30.2  --    MCHC 32.3  --      Lipid Panel:     Recent Labs  Lab 05/27/18  1514   CHOL 197   LDLCALC 110.8   HDL 59   TRIG 136     Coagulation:     Recent Labs  Lab 05/31/18  0152 06/01/18  0134   INR 0.9 0.9   APTT 24.7  --      Platelet Aggregation Study: No results for input(s): PLTAGG, PLTAGINTERP, PLTAGREGLACO, ADPPLTAGGREG in the last 168 hours.  Hgb A1C:     Recent Labs  Lab 05/27/18  1919   HGBA1C 5.5      TSH:     Recent Labs  Lab 05/27/18  1514   TSH 1.638       Diagnostic Results     Brain Imaging   MRI brain 5/28:      Small area of restricted diffusion in the right precentral gyrus.  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    Bilateral basal ganglia, thalami and cerebellar hemispheres small remote infarcts.  Generalized cerebral volume loss and extensive white matter chronic small vessel ischemic change.    Ventriculomegaly out of proportion to sulcal prominence, nonspecific but can be seen with normal pressure hydrocephalus, but overall unchanged from prior MRI.        Cardiac Imaging   ECHO 5/28/18:   CONCLUSIONS     1 - Concentric LVH with hyperdynamic left ventricular systolic function (EF >70%).     2 - Severe left atrial enlargement.     3 - Impaired LV relaxation, elevated LAP (grade 2 diastolic dysfunction).     4 - Normal right ventricular systolic function .     5 - Mild to moderate aortic stenosis, ALBER = 1.64 cm2, AVAi = 1.12 cm2/m2, peak velocity = 3.11 m/s, mean gradient = 24 mmHg

## 2018-06-01 NOTE — ASSESSMENT & PLAN NOTE
Stroke risk factor  Systolic less than 160 - on clonidine 0.1 mg  BID, lasix 20 mg qd, Losartan 100 mg qd

## 2018-06-01 NOTE — ASSESSMENT & PLAN NOTE
Severe left atrial enlargement seen on echo  Per nursing note afib was seen on telemetry, ekg completed and sinus rhythm with Premature atrial complexes. Severe LAE seen on echocardiogram.  Will need AC after PEG placement

## 2018-06-01 NOTE — MEDICAL/APP STUDENT
"            Neurology              Progress Note    Name: Sury Cobos  MRN: [id number]   CSN: [id number]      Date: 06/01/2018      SUBJECTIVE:   Reason for Admission: Acute ischemic right MCA stroke  BRIEF HPI  Sury Cobos is a 83 y.o. RIGHT handed  female who  has a past medical history of Arthritis; Benign essential HTN; CHF (congestive heart failure); Closed intertrochanteric fracture of left hip; Coronary artery disease; Dementia; Diabetes mellitus; Gout; High cholesterol; Low potassium syndrome; and Urinary incontinence., currently presenting with Acute ischemic right MCA stroke.      Interval History:   Patient is seen for follow-up neurological assessment and treatment recommendations:      Per nursing notes:  5/28/2018: "Blood pressure elevated >200 overnight requiring cardene. Patient bradycardic and with paroxysmal afib overnight as well. MRI with infarct in the right precentral gyrus."   5/29/2028: "No acute events throughout the day, VS and assessment per flow sheet, patient progressing towards goal as tolerated. Pt has transfer orders; awaiting bed.  Plan of care reviewed with Sury Cobos at 0400, pt needs reinforcement. Will continue to monitor."   05/30/2018: She was difficult to arouse and complained of generalized pain and thirst. Appears to be uncomfortable and tries to reposition herself throughout the exam. No improvement with speech. Strength has improved on the LUE, but patient is reluctant to fully cooperate with the neuro-exam. "EEG to rule out seizures, Continue 3% nebulizer, duo nebs and Chest PT for increased thick secretions. CXR to rule out aspiration pneumonia. Pt intermittently lethargic. Cancelled transfer as pt not stable for transfer. Will need anti-coagulation once stable."  5/31/2018: She is alert. Speech is improved but unable (or reluctant) to name. States that she "wants to go home". Strength is improved and power is 5/5 in LUE and RUE. Cancelled plans to " "move her to the floor.  6/01/2018: Patient is dysarthric and difficult to arouse.    Per nursing note: "Pt severely dysarthric and unable to verbalize understanding. No acute events overnight. Pure wick in place. Bilateral wrist restraints continued. Pt progressing toward goals. Will continue to monitor. See flowsheets for full assessment and VS info"    Review of Systems  Review of Systems    Review of Systems Unable to obtain a complete ROS.  Objective:   Vital Signs      05/31 0700 06/01 0659 06/01 0700 06/01 0806  Most Recent    Temp (°F) 97.7-99 97.9  97.9 (36.6)    Pulse 57-87 61  61    Resp 19-36 19  19    //105 151/67  151/67    MAP (mmHg)  97  97    SpO2 (%)  99  99    Weight (kg) 54.2-56.4       Intake/Output       05/31 0700 06/01 0659 06/01 0700 06/02 0659   I.V. (mL/kg) 115 (2) 5 (0.1)   NG/GT 1080 100   Total Intake(mL/kg) 1195 (21.2) 105 (1.9)   Urine (mL/kg/hr) 1250 (0.9) 0 (0)   Total Output 1250 0   Net -55 +105        Urine Occurrence 1 x    Stool Occurrence 1 x         Physical Exam    Constitutional: She appears well-developed. No distress.   HENT:   Head: Normocephalic and atraumatic.   Eyes: Pupils are equal, round, and reactive to light.   Cardiovascular: Normal rate.    Pulmonary/Chest: Effort normal.   Neurological: She is alert.   Oriented to month but not age      Skin: Skin is warm and dry.   Psychiatric: She has a normal mood and affect. She is not agitated and not aggressive.   Vitals reviewed.  Neurologic Exam      Neurological Exam:   LOC: alert  Attention Span:alert  Language:no aphasia  Articulation: severe Dysarthria  Visual Fields: Full  EOM (CN III, IV, VI): Full/intact  Facial Movement (CN VII): Lower facial weakness on the Left  Motor: Arm left  Paresis: 4/5  Leg left  Paresis: 3/5  Arm right  Paresis: 4/5  Leg right Paresis: 4/5  Sensation: equal sensation   Tone: Spasticity  RUE    Laboratory:    Recent Labs  Lab 05/30/18  0238 05/31/18  0152 " 06/01/18  0134 06/01/18  0258   WBC 11.08 8.50 9.44  --    HGB 10.6* 10.6* 11.1*  --    HCT 32.8* 32.7* 34.4*  --    * 102* SEE COMMENT 97*   MONO 6.6  0.7 6.6  0.6 7.5  0.7  --        Recent Labs  Lab 05/30/18  0238 05/31/18  0152 06/01/18  0134    142 138   K 4.0 3.8 4.0    106 104   CO2 26 30* 25   BUN 15 14 13   CREATININE 0.8 0.7 0.7   CALCIUM 9.5 9.3 9.5   MG 1.8 2.2 2.1   PHOS 3.4 3.7 4.5   PROT 6.6 6.8 6.8   BILITOT 0.5 0.4 0.4   ALKPHOS 77 77 76   ALT 16 20 28   AST 31 37 44*       Imaging:   Imaging Results          CTA STROKE MULTI-PHASE (Final result)     Abnormal  Result time 05/27/18 19:34:11    Final result by Eduardo Garner MD (05/27/18 19:34:11)                 Impression:      No acute intracranial hemorrhage or major vascular distribution infarct.    Bilateral basal ganglia, thalami and cerebellar hemispheres small remote infarcts.    Generalized cerebral volume loss and extensive white matter chronic small vessel ischemic change.    Ventriculomegaly out of proportion to sulcal prominence, nonspecific but can be seen with normal pressure hydrocephalus, but overall unchanged from prior MRI.    The anterior and posterior circulation demonstrate no high-grade stenosis or major vessel occlusion.    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    Findings discussed with Dr. Kebede by Dr. Worley at 18:40 on 05/27/2018.    This report was flagged in Epic as abnormal.    Electronically signed by resident: Kira Worley  Date:    05/27/2018  Time:    18:47    Electronically signed by: Eduardo Garner MD  Date:    05/27/2018  Time:    19:34             Narrative:    EXAMINATION:  CTA STROKE MULTI-PHASE    CLINICAL HISTORY:  Stroke;    TECHNIQUE:  Non contrast low dose axial images were obtained thought the head. CT angiogram was performed from the level of the  iwona to the top of the head following the IV administration of 100mL of Omnipaque 350.   Sagittal and coronal reconstructions and maximum intensity projection reconstructions were performed. Arterial stenosis percentages are based on NASCET measurement criteria.2 additional phases of immediate post-contrast CT images were performed through the head alone.    COMPARISON:  MRI brain 05/21/2015    FINDINGS:  Study moderately degraded by patient motion, particularly on the noncontrast portion of the examination.    Intracranial Compartment:    Generalized cerebral volume loss with superimposed extensive chronic microvascular ischemic changes in the supratentorial white matter.  Small remote infarcts are again noted at the bilateral cerebellar hemispheres and bilateral basal ganglia and thalami.  No parenchymal mass, hemorrhage, edema, or major vascular distribution infarct definitively seen.  Ventricles and sulci are stable in size, out of proportion to the sulci, which can be seen with normal pressure hydrocephalus.  No extra-axial blood or fluid collections.  The calvarium appears intact.  There is patchy sinus disease involving the left maxillary sinus and sphenoid sinus.    Skull/Extracranial Contents (limited evaluation): No fracture. Mastoid air cells and paranasal sinuses are essentially clear.    Non-Vascular Structures of the Neck/Thoracic Inlet (limited evaluation): Postsurgical changes in keeping with prior sternotomy.  The remainder of the nonvascular structures of the neck appear unremarkable.    Aorta: Left-sided 3 vessel arch with significant atherosclerotic calcification of the aortic arch.    Extracranial carotid circulation: Calcific atherosclerosis with no hemodynamically significant stenosis, aneurysmal dilatation, or dissection.  There is calcified atherosclerotic plaque at the carotid bulbs bilaterally, worse on the right, with less than 50% stenosis at the origin of the right  ICA.    Extracranial vertebral circulation: Calcified plaque at the origin of the right vertebral artery with less than 50% stenosis.  The right vertebral artery is patent throughout its course with no hemodynamically significant stenosis, aneurysmal dilatation or dissection.  The left vertebral artery is occluded throughout its cervical course with reconstitution of the V4 segment retrograde from the basilar artery and right vertebral artery.    Intracranial Arteries: Calcified atherosclerotic plaque in the bilateral cavernous ICAs without high-grade stenosis or occlusion.  The anterior and posterior circulation demonstrate no focal high-grade stenosis, occlusion, aneurysm or vascular malformation.    Venous structures (limited evaluation): Normal.                                ASSESSMENT & PLAN:   Sury Cobos is a 83 y.o. RIGHT handed  female who  has a past medical history of Arthritis; Benign essential HTN; CHF (congestive heart failure); Closed intertrochanteric fracture of left hip; Coronary artery disease; Dementia; Diabetes mellitus; Gout; High cholesterol; Low potassium syndrome; and Urinary incontinence., currently presenting with Acute ischemic right MCA stroke.      STROKE DOCUMENTATION   Acute Stroke Times   Last Known Normal Date: 05/27/18  Last Known Normal Time: 1320  Symptom Onset Date: 05/27/18  Symptom Onset Time: 1445  Stroke Team Called Date: 05/27/18  Stroke Team Called Time: 1550  Stroke Team Arrival Date: 05/27/18  Stroke Team Arrival Time: 1751  CT Interpretation Time: 1536  Decision to Treat Time for Alteplase: 1806     NIH Scale:  1a. Level Of Consciousness: 0-->Alert: keenly responsive  1b. LOC Questions: 2-->Answers neither question correctly  1c. LOC Commands: 2-->Performs neither task correctly  2. Best Gaze: 0-->Normal  3. Visual: 0-->No visual loss  4. Facial Palsy: 1-->Minor paralysis (flattened nasolabial fold, asymmetry on smiling)  5a. Motor Arm, Left: 2-->Some  effort against gravity: limb cannot get to or maintain (if cued) 90 (or 45) degrees, drifts down to bed, but has some effort against gravity  5b. Motor Arm, Right: 1-->Drift: limb holds 90 (or 45) degrees, but drifts down before full 10 secs: does not hit bed or other support  6a. Motor Leg, Left: 1-->Drift: leg falls by the end of the 5-sec period but does not hit bed  6b. Motor Leg, Right: 1-->Drift: leg falls by the end of the 5-sec period but does not hit bed  7. Limb Ataxia: 0-->Absent  8. Sensory: 0-->Normal: no sensory loss  9. Best Language: 0-->No aphasia: normal  10. Dysarthria: 2-->Severe dysarthria: patients speech is so slurred as to be unintelligible in the absence of or out of proportion to any dysphasia, or is mute/anarthric  11. Extinction and Inattention (formerly Neglect): 0-->No abnormality  Total (NIH Stroke Scale): 12         Modified Clinton Score: 0  Ivonne Coma Scale: 12  [id number] Score: 9        Hemorrhagic change of an Ischemic Stroke: Does this patient have an ischemic stroke with hemorrhagic changes? No      Neurologic Chief Complaint: Aphasia, dysarthria, LSW          Neuro   * Acute ischemic right MCA stroke     -s/p tpa at OSH  -CTA without evidence of LVO  -MRI with evidence of cortical infarct involving the right pre-central gyrus  -q1h neuro checks  -PT OT SLP  -Vascular neurology following  -asp 81mg qd  -atorvastatin 40 mg qd  -heparin 5000U q8h  - will need full anti-coagulation once stable   - 5/30 ordered EEG to rule out seizures  - Cancel TTF as pt not stable for transfer due to increased lethargy and possible aspiration pneumonia          Cytotoxic cerebral edema     -- Continue to monitor for clinical deterioration with serial CT scans          Dementia with behavioral disturbance     -home wellbutrin 150 mg BID  -donepezil 10 mg qhs             Aphasia     -see R MCA   - SLP  - NG tube  - May need PEG          Cardiac/Vascular   Benign essential HTN      -SBP<160  -coreg 3.125mg BID  -continue losartan 50mg qd  -labetalol, hydralazine, cardene prn          Renal/   UTI (urinary tract infection)     No fernandez on presentation - has had a purwick since admission  UA with 14 WBC  Switch to Ceftriaxone 1 g q 24 for  2 days          Hematology   S/P admn tPA in diff fac w/n last 24 hr bef adm to crnt fac     -see R MCA              Prophylaxis:  Venous Thromboembolism: chemical  Stress Ulcer: PPI  Ventilator Pneumonia: not applicable            Activity Orders            Activity as tolerated starting at 05/28 1550          Full Code     Qasim Adams (MS3)  6/1/2018 8:04 AM

## 2018-06-01 NOTE — ASSESSMENT & PLAN NOTE
-SBP goal 100-180  -Continue clonidine 0.1mg BID, losartan 100mg qd, furosemide 20 mg QD  -labetalol PRN

## 2018-06-01 NOTE — PLAN OF CARE
Problem: SLP Goal  Goal: SLP Goal  Speech Language Pathology Goals  Goals expected to be met by 6/5  1. Ongoing swallow assessment  2. Pt will follow 1 step commands with 75% acc given min A  3. Pt will answer yes/no questions with 75% acc given min A  4. Pt will complete OME's given mod A  5. Pt will complete word level dysarthria tasks with 80% intell. given max A to utilize speech strategies   6. Assess reading, writing, visual spatial skills  7. Assess memory, problem solving, reasoning   Outcome: Ongoing (interventions implemented as appropriate)  Pt. Remains very confused  Paula Pal MA/[facility]-SLP  Speech Language Pathologist  Pager [phone]  6/1/2018

## 2018-06-02 LAB
ALBUMIN SERPL BCP-MCNC: 3 G/DL
ALP SERPL-CCNC: 75 U/L
ALT SERPL W/O P-5'-P-CCNC: 34 U/L
ANION GAP SERPL CALC-SCNC: 8 MMOL/L
AST SERPL-CCNC: 40 U/L
BASOPHILS # BLD AUTO: 0.07 K/UL
BASOPHILS NFR BLD: 1 %
BILIRUB SERPL-MCNC: 0.4 MG/DL
BUN SERPL-MCNC: 16 MG/DL
CALCIUM SERPL-MCNC: 9.3 MG/DL
CHLORIDE SERPL-SCNC: 103 MMOL/L
CO2 SERPL-SCNC: 29 MMOL/L
CREAT SERPL-MCNC: 0.8 MG/DL
DIFFERENTIAL METHOD: ABNORMAL
EOSINOPHIL # BLD AUTO: 0.1 K/UL
EOSINOPHIL NFR BLD: 2.1 %
ERYTHROCYTE [DISTWIDTH] IN BLOOD BY AUTOMATED COUNT: 14.2 %
EST. GFR  (AFRICAN AMERICAN): >60 ML/MIN/1.73 M^2
EST. GFR  (NON AFRICAN AMERICAN): >60 ML/MIN/1.73 M^2
GLUCOSE SERPL-MCNC: 125 MG/DL
HCT VFR BLD AUTO: 31 %
HGB BLD-MCNC: 10.3 G/DL
IMM GRANULOCYTES # BLD AUTO: 0.02 K/UL
IMM GRANULOCYTES NFR BLD AUTO: 0.3 %
INR PPP: 1
LYMPHOCYTES # BLD AUTO: 1.7 K/UL
LYMPHOCYTES NFR BLD: 24.7 %
MAGNESIUM SERPL-MCNC: 2 MG/DL
MCH RBC QN AUTO: 30.7 PG
MCHC RBC AUTO-ENTMCNC: 33.2 G/DL
MCV RBC AUTO: 92 FL
MONOCYTES # BLD AUTO: 0.6 K/UL
MONOCYTES NFR BLD: 9.5 %
NEUTROPHILS # BLD AUTO: 4.2 K/UL
NEUTROPHILS NFR BLD: 62.4 %
NRBC BLD-RTO: 0 /100 WBC
PHOSPHATE SERPL-MCNC: 4.6 MG/DL
PLATELET # BLD AUTO: 139 K/UL
PMV BLD AUTO: 13.6 FL
POCT GLUCOSE: 120 MG/DL (ref 70–110)
POCT GLUCOSE: 123 MG/DL (ref 70–110)
POCT GLUCOSE: 129 MG/DL (ref 70–110)
POCT GLUCOSE: 81 MG/DL (ref 70–110)
POTASSIUM SERPL-SCNC: 3.5 MMOL/L
PROT SERPL-MCNC: 6.6 G/DL
PROTHROMBIN TIME: 10.1 SEC
RBC # BLD AUTO: 3.36 M/UL
SODIUM SERPL-SCNC: 140 MMOL/L
WBC # BLD AUTO: 6.73 K/UL

## 2018-06-02 PROCEDURE — 99233 SBSQ HOSP IP/OBS HIGH 50: CPT | Mod: ,,, | Performed by: PSYCHIATRY & NEUROLOGY

## 2018-06-02 PROCEDURE — 85610 PROTHROMBIN TIME: CPT

## 2018-06-02 PROCEDURE — 99900035 HC TECH TIME PER 15 MIN (STAT)

## 2018-06-02 PROCEDURE — 20600001 HC STEP DOWN PRIVATE ROOM

## 2018-06-02 PROCEDURE — 80053 COMPREHEN METABOLIC PANEL: CPT

## 2018-06-02 PROCEDURE — 25000003 PHARM REV CODE 250: Performed by: PHYSICIAN ASSISTANT

## 2018-06-02 PROCEDURE — 85025 COMPLETE CBC W/AUTO DIFF WBC: CPT

## 2018-06-02 PROCEDURE — 97530 THERAPEUTIC ACTIVITIES: CPT

## 2018-06-02 PROCEDURE — 97116 GAIT TRAINING THERAPY: CPT

## 2018-06-02 PROCEDURE — 84100 ASSAY OF PHOSPHORUS: CPT

## 2018-06-02 PROCEDURE — 94668 MNPJ CHEST WALL SBSQ: CPT

## 2018-06-02 PROCEDURE — 94761 N-INVAS EAR/PLS OXIMETRY MLT: CPT

## 2018-06-02 PROCEDURE — 25000242 PHARM REV CODE 250 ALT 637 W/ HCPCS: Performed by: PHYSICIAN ASSISTANT

## 2018-06-02 PROCEDURE — 25000003 PHARM REV CODE 250: Performed by: PSYCHIATRY & NEUROLOGY

## 2018-06-02 PROCEDURE — 94640 AIRWAY INHALATION TREATMENT: CPT

## 2018-06-02 PROCEDURE — 63600175 PHARM REV CODE 636 W HCPCS: Performed by: PSYCHIATRY & NEUROLOGY

## 2018-06-02 PROCEDURE — 83735 ASSAY OF MAGNESIUM: CPT

## 2018-06-02 PROCEDURE — 36415 COLL VENOUS BLD VENIPUNCTURE: CPT

## 2018-06-02 PROCEDURE — A4216 STERILE WATER/SALINE, 10 ML: HCPCS | Performed by: PHYSICIAN ASSISTANT

## 2018-06-02 RX ORDER — SODIUM CHLORIDE FOR INHALATION 3 %
4 VIAL, NEBULIZER (ML) INHALATION EVERY 8 HOURS
Status: CANCELLED | OUTPATIENT
Start: 2018-06-03

## 2018-06-02 RX ADMIN — ASPIRIN 81 MG CHEWABLE TABLET 81 MG: 81 TABLET CHEWABLE at 10:06

## 2018-06-02 RX ADMIN — BUPROPION HYDROCHLORIDE 150 MG: 75 TABLET, FILM COATED ORAL at 09:06

## 2018-06-02 RX ADMIN — Medication 3 ML: at 10:06

## 2018-06-02 RX ADMIN — DONEPEZIL HYDROCHLORIDE 10 MG: 5 TABLET, FILM COATED ORAL at 09:06

## 2018-06-02 RX ADMIN — IPRATROPIUM BROMIDE AND ALBUTEROL SULFATE 3 ML: .5; 3 SOLUTION RESPIRATORY (INHALATION) at 11:06

## 2018-06-02 RX ADMIN — Medication 3 ML: at 03:06

## 2018-06-02 RX ADMIN — ATORVASTATIN CALCIUM 40 MG: 20 TABLET, FILM COATED ORAL at 10:06

## 2018-06-02 RX ADMIN — SODIUM CHLORIDE SOLN NEBU 3% 4 ML: 3 NEBU SOLN at 07:06

## 2018-06-02 RX ADMIN — FUROSEMIDE 20 MG: 20 TABLET ORAL at 10:06

## 2018-06-02 RX ADMIN — SODIUM CHLORIDE SOLN NEBU 3% 4 ML: 3 NEBU SOLN at 11:06

## 2018-06-02 RX ADMIN — CLONIDINE HYDROCHLORIDE 0.1 MG: 0.1 TABLET ORAL at 09:06

## 2018-06-02 RX ADMIN — IPRATROPIUM BROMIDE AND ALBUTEROL SULFATE 3 ML: .5; 3 SOLUTION RESPIRATORY (INHALATION) at 03:06

## 2018-06-02 RX ADMIN — IPRATROPIUM BROMIDE AND ALBUTEROL SULFATE 3 ML: .5; 3 SOLUTION RESPIRATORY (INHALATION) at 12:06

## 2018-06-02 RX ADMIN — SODIUM CHLORIDE SOLN NEBU 3% 4 ML: 3 NEBU SOLN at 12:06

## 2018-06-02 RX ADMIN — HEPARIN SODIUM 5000 UNITS: 5000 INJECTION, SOLUTION INTRAVENOUS; SUBCUTANEOUS at 06:06

## 2018-06-02 RX ADMIN — QUETIAPINE FUMARATE 25 MG: 25 TABLET ORAL at 09:06

## 2018-06-02 RX ADMIN — HEPARIN SODIUM 5000 UNITS: 5000 INJECTION, SOLUTION INTRAVENOUS; SUBCUTANEOUS at 03:06

## 2018-06-02 RX ADMIN — IPRATROPIUM BROMIDE AND ALBUTEROL SULFATE 3 ML: .5; 3 SOLUTION RESPIRATORY (INHALATION) at 07:06

## 2018-06-02 RX ADMIN — LOSARTAN POTASSIUM 100 MG: 50 TABLET, FILM COATED ORAL at 10:06

## 2018-06-02 RX ADMIN — BUPROPION HYDROCHLORIDE 150 MG: 75 TABLET, FILM COATED ORAL at 10:06

## 2018-06-02 RX ADMIN — HEPARIN SODIUM 5000 UNITS: 5000 INJECTION, SOLUTION INTRAVENOUS; SUBCUTANEOUS at 09:06

## 2018-06-02 RX ADMIN — CLONIDINE HYDROCHLORIDE 0.1 MG: 0.1 TABLET ORAL at 10:06

## 2018-06-02 NOTE — ASSESSMENT & PLAN NOTE
Difficult to assess if patient is responding appropriately to questions due to severe dysarthria  Follows some commands accurately   Aggressive SLP therapy

## 2018-06-02 NOTE — PT/OT/SLP PROGRESS
Physical Therapy Treatment    Patient Name:  Sury Cobos   MRN:  [id number]    Recommendations:     Discharge Recommendations:  nursing facility, skilled   Discharge Equipment Recommendations:     Barriers to discharge: None    Assessment:     Sury Cobos is a 83 y.o. female admitted with a medical diagnosis of Embolic stroke involving right middle cerebral artery.  She presents with the following impairments/functional limitations:  weakness, impaired endurance, impaired self care skills, impaired functional mobilty, gait instability, impaired balance, impaired cognition, decreased safety awareness, decreased coordination . Pt. cooperative at times, but also refusing to get back in bed once sitting/standing. Pt. found with (B) UE restraints and (B) LEs hanging over lower bed rail. Pt. with poor safety awareness and agitated at times.    Rehab Prognosis:  Fair-; patient would benefit from acute skilled PT services to address these deficits and reach maximum level of function.      Recent Surgery: * No surgery found *      Plan:     During this hospitalization, patient to be seen 4 x/week to address the above listed problems via gait training, therapeutic activities, therapeutic exercises, neuromuscular re-education  · Plan of Care Expires:  06/28/18   Plan of Care Reviewed with: patient    Subjective     Communicated with nursing prior to session.  Patient found supine upon PT entry to room, agreeable to treatment.      Chief Complaint: none stated  Patient comments/goals: to go home  Pain/Comfort:  · Pain Rating 1: 0/10  · Pain Rating Post-Intervention 1: 0/10    Patients cultural, spiritual, Yarsani conflicts given the current situation: None reported    Objective:     Patient found with: NG tube, pressure relief boots, peripheral IV, restraints     General Precautions: Standard, aspiration, fall   Orthopedic Precautions:N/A   Braces:       Functional Mobility:  · Bed Mobility:     · Rolling Right: minimum  assistance  · Scooting: minimum assistance  · Supine to Sit: minimum assistance  · Sit to Supine: maximal assistance  · Transfers:     · Sit to Stand:  minimum assistance with rolling walker  · Gait: 8 steps and 50' with RW and Min-Mod A for safety and RW management with seated rest break between trials. Pt. amb. with ataxic gait pattern and poor foot placement.  · Balance: fair-poor standing      AM-PAC 6 CLICK MOBILITY  Turning over in bed (including adjusting bedclothes, sheets and blankets)?: 3  Sitting down on and standing up from a chair with arms (e.g., wheelchair, bedside commode, etc.): 3  Moving from lying on back to sitting on the side of the bed?: 3  Moving to and from a bed to a chair (including a wheelchair)?: 3  Need to walk in hospital room?: 2  Climbing 3-5 steps with a railing?: 1  Total Score: 15       Therapeutic Activities and Exercises:   Pt. Performed sitting balance/tolerance along EOB. Discussed importance of following instructions for safety.    Patient left supine with all lines intact, call button in reach, bed alarm on, restraints reapplied at end of session and nursing notified..    GOALS:    Physical Therapy Goals        Problem: Physical Therapy Goal    Goal Priority Disciplines Outcome Goal Variances Interventions   Physical Therapy Goal     PT/OT, PT Ongoing (interventions implemented as appropriate)     Description:  Goals to be met by: 2018     Patient will increase functional independence with mobility by performin. Supine to sit with Contact Guard Assistance  2. Sit to supine with Contact Guard Assistance  3. Rolling to Left and Right with Supervision.  4. Sit to stand transfer with Stand-by Assistance  5. Gait  x 20 feet with Contact Guard Assistance using Rolling Walker.   6. Stand for 10 minutes with Contact Guard Assistance using Rolling Walker                      Time Tracking:     PT Received On: 18  PT Start Time: 1135     PT Stop Time: 1200  PT Total  Time (min): 25 min     Billable Minutes: Gait Training 15 and Therapeutic Activity 10    Treatment Type: Treatment  PT/PTA: PT     PTA Visit Number: 0     Dago Mendez, PT  06/02/2018

## 2018-06-02 NOTE — PLAN OF CARE
Problem: Physical Therapy Goal  Goal: Physical Therapy Goal  Goals to be met by: 2018     Patient will increase functional independence with mobility by performin. Supine to sit with Contact Guard Assistance  2. Sit to supine with Contact Guard Assistance  3. Rolling to Left and Right with Supervision.  4. Sit to stand transfer with Stand-by Assistance  5. Gait  x 20 feet with Contact Guard Assistance using Rolling Walker.   6. Stand for 10 minutes with Contact Guard Assistance using Rolling Walker     Outcome: Ongoing (interventions implemented as appropriate)  No goals met today

## 2018-06-02 NOTE — ASSESSMENT & PLAN NOTE
Severe left atrial enlargement seen on echo  Per nursing note afib was seen on telemetry, ekg completed and sinus rhythm with Premature atrial complexes. Severe LAE seen on echocardiogram.  Will need to consider AC after PEG placement

## 2018-06-02 NOTE — ASSESSMENT & PLAN NOTE
Patient NPO with NG in place.   Speech therapy to assess swallowing - may need PEG  CT abdomen ordered. Place IR consult first thing Monday morning (6/4)

## 2018-06-02 NOTE — ASSESSMENT & PLAN NOTE
Seen on telemetry monitor in [facility]  Echo showing severe LAE  Will need AC after PEG placement

## 2018-06-02 NOTE — PROGRESS NOTES
[facility]  Vascular Neurology  Comprehensive Stroke Center  Progress Note    Assessment/Plan:     * Embolic stroke involving right middle cerebral artery    84 y/o female with left sided facial droop, aphasia, dysarthria who received IV TPA but no IR. MRI with infarct in R precentral gyrus.   Nursing notes state afib seen on telemetry, but no afib documented on 12 lead ECG. However, etiology likely cardioembolic due to Afib seen on telemetry and afib showing sever LAE. SNF vs  with 24/7 family care.     Neurologically stable plan to step down to stroke service.   Nursing note noted patient went into Afib on monitor in [facility]. EKG completed and not seen. Echo showing Severe LAE. Will need to be started on AC after PEG placement.     Antithrombotics: Aspirin 81 mg    Statins: Lipitor 40 mg daily    Aggressive risk factor modification: HTN, HLD     Rehab efforts: PT/OT/SLP to evaluate and treat, PM&R consult     Diagnostics ordered/pending: none     VTE prophylaxis: SCD's may begin Heparin 500 units SC Q8H     BP parameters: Infarct: Post tPA, SBP <160            New onset a-fib    Seen on telemetry monitor in [facility]  Echo showing severe LAE  Will need AC after PEG placement        Atrial enlargement, left    Severe left atrial enlargement seen on echo  Per nursing note afib was seen on telemetry, ekg completed and sinus rhythm with Premature atrial complexes. Severe LAE seen on echocardiogram.  Will need to consider AC after PEG placement         Dysarthria    Severe dysarthria  SLP ordered           Dysphagia    Patient NPO with NG in place.   Speech therapy to assess swallowing - may need PEG  CT abdomen ordered. Place IR consult first thing Monday morning (6/4)        Cytotoxic cerebral edema    Area of cytotoxic cerebral edema identified when reviewing brain imaging in the territory of the R middle cerebral artery. There is no mass effect associated with it. We will continue to monitor the  patients clinical exam for any worsening of symptoms which may indicate expansion of the stroke or the area of the edema resulting in the clinical change. The pattern is of unclear etiology (ESUS versus small vessel)            UTI (urinary tract infection)    Continue ceftriaxone  Afebrile, no leukocytosis         Dementia with behavioral disturbance        Home wellbutrin 150 mg  Donepezil 10 qhs  seroquel 25 mg qhs            High cholesterol    Stroke risk factor    Lipitor 40 mg daily        Benign essential HTN    Stroke risk factor  Systolic less than 160 - on clonidine 0.1 mg  BID, lasix 20 mg qd, Losartan 100 mg qd        Aphasia    Difficult to assess if patient is responding appropriately to questions due to severe dysarthria  Follows some commands accurately   Aggressive SLP therapy         S/P admn tPA in diff fac w/n last 24 hr bef adm to crnt fac    Close monitoring in [facility]U for 24 hours post administration, completed             84 y/o female with aphasia, dysarthria and facial droop, received IV TPA but no LVO so no IR. Probable small vessel disease.    5/28: Blood pressure elevated >200 overnight requiring cardene. Patient bradycardic and with paroxysmal afib overnight as well. MRI with infarct in the right precentral gyrus.   5/29: No events overnight. Patient with many bacteria on UTI so short cipro course started per NCC. Pending step down to NSU.   5/30: seems more agitated today, not participating in exam or following commands, severe dysarthria and excessive secretions  05/31/18 Continues to have increased secretions and severe dysarthria.  Patient following some commands and plans to transfer to stroke service today   6/1: Secretions are better controlled today. Plan to step down. On exam patient is agitated and stating she wants to go home. In bilateral wrist restraints. Right lung opacity - Chest PT and duonebs ordered per NCC. Nursing notes noted Afib but EKG showing no afib. Echo  revealed Severe left atrial enlargement. May need AC on discharge.  6/2: No acute events overnight. Stepped down to NSU. Plan for PEG early next week if no improvement per ST.     STROKE DOCUMENTATION   Acute Stroke Times   Last Known Normal Date: 05/27/18  Last Known Normal Time: 1320  Symptom Onset Date: 05/27/18  Symptom Onset Time: 1445  Stroke Team Called Date: 05/27/18  Stroke Team Called Time: 1550  Stroke Team Arrival Date: 05/27/18  Stroke Team Arrival Time: 1751  CT Interpretation Time: 1536  Decision to Treat Time for Alteplase: 1806    NIH Scale:  1a. Level Of Consciousness: 0-->Alert: keenly responsive  1b. LOC Questions: 1-->Answers one question correctly  1c. LOC Commands: 0-->Performs both tasks correctly  2. Best Gaze: 0-->Normal  3. Visual: 0-->No visual loss  4. Facial Palsy: 1-->Minor paralysis (flattened nasolabial fold, asymmetry on smiling)  5a. Motor Arm, Left: 1-->Drift: limb holds 90 (or 45) degrees, but drifts down before full 10 seconds: does not hit bed or other support  5b. Motor Arm, Right: 0-->No drift: limb holds 90 (or 45) degrees for full 10 secs  6a. Motor Leg, Left: 2-->Some effort against gravity: leg falls to bed by 5 secs, but has some effort against gravity  6b. Motor Leg, Right: 2-->Some effort against gravity: leg falls to bed by 5 secs, but has some effort against gravity  7. Limb Ataxia: 0-->Absent  8. Sensory: 0-->Normal: no sensory loss  9. Best Language: 1-->Mild-to-moderate aphasia: some obvious loss of fluency or facility of comprehension, without significant limitation on ideas expressed or form of expression. Reduction of speech and/or comprehension, however, makes conversation. . . (see row details)  10. Dysarthria: 1-->Mild-to-moderate dysarthria: patient slurs at least some words and, at worst, can be understood with some difficulty  11. Extinction and Inattention (formerly Neglect): 0-->No abnormality  Total (NIH Stroke Scale): 9       Modified Guadalupe Score:  0  [place] Coma Scale:    ABCD2 Score:    [id number] Score:   HAS -BLED Score:   ICH Score:   Hunt & Burkett Classification:      Hemorrhagic change of an Ischemic Stroke: Does this patient have an ischemic stroke with hemorrhagic changes? No     Neurologic Chief Complaint: Aphasia, dysarthria, LSW     Subjective:     Interval History: Patient is seen for follow-up neurological assessment and treatment recommendations:     No acute events overnight. Stepped down to NSU. Plan for PEG early next week if no improvement per ST.    HPI, Past Medical, Family, and Social History remains the same as documented in the initial encounter.     Review of Systems   Constitutional: Negative for fever.   Respiratory: Negative for shortness of breath.    Gastrointestinal: Negative for abdominal pain, nausea and vomiting.   Neurological: Positive for facial asymmetry, speech difficulty and weakness. Negative for headaches.   Psychiatric/Behavioral: Positive for agitation and confusion. Negative for behavioral problems.     Scheduled Meds:   albuterol-ipratropium  3 mL Nebulization Q8H    aspirin  81 mg Per NG tube Daily    atorvastatin  40 mg Per NG tube Daily    buPROPion  150 mg Per NG tube BID    cloNIDine  0.1 mg Per NG tube BID    donepezil  10 mg Per NG tube QHS    furosemide  20 mg Per NG tube Daily    heparin (porcine)  5,000 Units Subcutaneous Q8H    losartan  100 mg Per NG tube Daily    QUEtiapine  25 mg Per NG tube QHS    sodium chloride 0.9%  3 mL Intravenous Q8H    sodium chloride 3%  4 mL Nebulization Q6H     Continuous Infusions:    PRN Meds:acetaminophen, hydrALAZINE, labetalol, magnesium oxide, magnesium oxide, potassium chloride 10%, potassium chloride 10%, potassium chloride 10%, potassium, sodium phosphates, potassium, sodium phosphates, potassium, sodium phosphates, senna-docusate 8.6-50 mg    Objective:     Vital Signs (Most Recent):  Temp: 98.5 °F (36.9 °C) (06/02/18 0822)  Pulse: 72 (06/02/18  0822)  Resp: 18 (06/02/18 0822)  BP: 139/61 (06/02/18 0822)  SpO2: (!) 94 % (06/02/18 0822)  BP Location: Left arm    Vital Signs Range (Last 24H):  Temp:  [97 °F (36.1 °C)-98.5 °F (36.9 °C)]   Pulse:  [54-89]   Resp:  [14-28]   BP: (103-160)/(51-81)   SpO2:  [91 %-100 %]   BP Location: Left arm    Physical Exam   Constitutional: She appears well-developed. No distress.   HENT:   Head: Normocephalic and atraumatic.   Eyes: Pupils are equal, round, and reactive to light.   Cardiovascular: Normal rate.    Pulmonary/Chest: Effort normal.   Neurological: She is alert.   Oriented to month but not age      Skin: Skin is warm and dry.   Psychiatric: She has a normal mood and affect. She is not agitated and not aggressive.   Vitals reviewed.      Neurological Exam:   LOC: alert  Attention Span:alert  Language: ? aphasia  Articulation: moderate/severe Dysarthria  Visual Fields: Full  EOM (CN III, IV, VI): Full/intact  Facial Movement (CN VII): Lower facial weakness on the Left  Motor: Arm left  Paresis: 4/5  Leg left  Paresis: 3/5  Arm right  Paresis: 5/5  Leg right Paresis: 3/5  Sensation: equal sensation   Tone: Spasticity  RUE    Laboratory:  CMP:     Recent Labs  Lab 06/02/18  0525   CALCIUM 9.3   ALBUMIN 3.0*   PROT 6.6      K 3.5   CO2 29      BUN 16   CREATININE 0.8   ALKPHOS 75   ALT 34   AST 40   BILITOT 0.4     BMP:     Recent Labs  Lab 06/02/18  0525      K 3.5      CO2 29   BUN 16   CREATININE 0.8   CALCIUM 9.3     CBC:     Recent Labs  Lab 06/02/18  0525   WBC 6.73   RBC 3.36*   HGB 10.3*   HCT 31.0*   *   MCV 92   MCH 30.7   MCHC 33.2     Lipid Panel:     Recent Labs  Lab 05/27/18  1514   CHOL 197   LDLCALC 110.8   HDL 59   TRIG 136     Coagulation:     Recent Labs  Lab 05/31/18  0152  06/02/18  0525   INR 0.9  < > 1.0   APTT 24.7  --   --    < > = values in this interval not displayed.  Platelet Aggregation Study: No results for input(s): PLTAGG, PLTAGINTERP, PLTAGREGLACO,  ADPPLTAGGREG in the last 168 hours.  Hgb A1C:     Recent Labs  Lab 05/27/18  1919   HGBA1C 5.5     TSH:     Recent Labs  Lab 05/27/18  1514   TSH 1.638       Diagnostic Results     Brain Imaging   MRI brain 5/28:      Small area of restricted diffusion in the right precentral gyrus.  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    Bilateral basal ganglia, thalami and cerebellar hemispheres small remote infarcts.  Generalized cerebral volume loss and extensive white matter chronic small vessel ischemic change.    Ventriculomegaly out of proportion to sulcal prominence, nonspecific but can be seen with normal pressure hydrocephalus, but overall unchanged from prior MRI.        Cardiac Imaging   ECHO 5/28/18:   CONCLUSIONS     1 - Concentric LVH with hyperdynamic left ventricular systolic function (EF >70%).     2 - Severe left atrial enlargement.     3 - Impaired LV relaxation, elevated LAP (grade 2 diastolic dysfunction).     4 - Normal right ventricular systolic function .     5 - Mild to moderate aortic stenosis, ALBER = 1.64 cm2, AVAi = 1.12 cm2/m2, peak velocity = 3.11 m/s, mean gradient = 24 mmHg      Adrianna Perez PA-C  Comprehensive Stroke Center  Department of Vascular Neurology   [facility]

## 2018-06-02 NOTE — SUBJECTIVE & OBJECTIVE
Neurologic Chief Complaint: Aphasia, dysarthria, LSW     Subjective:     Interval History: Patient is seen for follow-up neurological assessment and treatment recommendations:     No acute events overnight. Stepped down to NSU. Plan for PEG early next week if no improvement per ST.    HPI, Past Medical, Family, and Social History remains the same as documented in the initial encounter.     Review of Systems   Constitutional: Negative for fever.   Respiratory: Negative for shortness of breath.    Gastrointestinal: Negative for abdominal pain, nausea and vomiting.   Neurological: Positive for facial asymmetry, speech difficulty and weakness. Negative for headaches.   Psychiatric/Behavioral: Positive for agitation and confusion. Negative for behavioral problems.     Scheduled Meds:   albuterol-ipratropium  3 mL Nebulization Q8H    aspirin  81 mg Per NG tube Daily    atorvastatin  40 mg Per NG tube Daily    buPROPion  150 mg Per NG tube BID    cloNIDine  0.1 mg Per NG tube BID    donepezil  10 mg Per NG tube QHS    furosemide  20 mg Per NG tube Daily    heparin (porcine)  5,000 Units Subcutaneous Q8H    losartan  100 mg Per NG tube Daily    QUEtiapine  25 mg Per NG tube QHS    sodium chloride 0.9%  3 mL Intravenous Q8H    sodium chloride 3%  4 mL Nebulization Q6H     Continuous Infusions:    PRN Meds:acetaminophen, hydrALAZINE, labetalol, magnesium oxide, magnesium oxide, potassium chloride 10%, potassium chloride 10%, potassium chloride 10%, potassium, sodium phosphates, potassium, sodium phosphates, potassium, sodium phosphates, senna-docusate 8.6-50 mg    Objective:     Vital Signs (Most Recent):  Temp: 98.5 °F (36.9 °C) (06/02/18 0822)  Pulse: 72 (06/02/18 0822)  Resp: 18 (06/02/18 0822)  BP: 139/61 (06/02/18 0822)  SpO2: (!) 94 % (06/02/18 0822)  BP Location: Left arm    Vital Signs Range (Last 24H):  Temp:  [97 °F (36.1 °C)-98.5 °F (36.9 °C)]   Pulse:  [54-89]   Resp:  [14-28]   BP: (103-160)/(51-81)    SpO2:  [91 %-100 %]   BP Location: Left arm    Physical Exam   Constitutional: She appears well-developed. No distress.   HENT:   Head: Normocephalic and atraumatic.   Eyes: Pupils are equal, round, and reactive to light.   Cardiovascular: Normal rate.    Pulmonary/Chest: Effort normal.   Neurological: She is alert.   Oriented to month but not age      Skin: Skin is warm and dry.   Psychiatric: She has a normal mood and affect. She is not agitated and not aggressive.   Vitals reviewed.      Neurological Exam:   LOC: alert  Attention Span:alert  Language: ? aphasia  Articulation: moderate/severe Dysarthria  Visual Fields: Full  EOM (CN III, IV, VI): Full/intact  Facial Movement (CN VII): Lower facial weakness on the Left  Motor: Arm left  Paresis: 4/5  Leg left  Paresis: 3/5  Arm right  Paresis: 5/5  Leg right Paresis: 3/5  Sensation: equal sensation   Tone: Spasticity  RUE    Laboratory:  CMP:     Recent Labs  Lab 06/02/18  0525   CALCIUM 9.3   ALBUMIN 3.0*   PROT 6.6      K 3.5   CO2 29      BUN 16   CREATININE 0.8   ALKPHOS 75   ALT 34   AST 40   BILITOT 0.4     BMP:     Recent Labs  Lab 06/02/18  0525      K 3.5      CO2 29   BUN 16   CREATININE 0.8   CALCIUM 9.3     CBC:     Recent Labs  Lab 06/02/18  0525   WBC 6.73   RBC 3.36*   HGB 10.3*   HCT 31.0*   *   MCV 92   MCH 30.7   MCHC 33.2     Lipid Panel:     Recent Labs  Lab 05/27/18  1514   CHOL 197   LDLCALC 110.8   HDL 59   TRIG 136     Coagulation:     Recent Labs  Lab 05/31/18  0152  06/02/18  0525   INR 0.9  < > 1.0   APTT 24.7  --   --    < > = values in this interval not displayed.  Platelet Aggregation Study: No results for input(s): PLTAGG, PLTAGINTERP, PLTAGREGLACO, ADPPLTAGGREG in the last 168 hours.  Hgb A1C:     Recent Labs  Lab 05/27/18  1919   HGBA1C 5.5     TSH:     Recent Labs  Lab 05/27/18  1514   TSH 1.638       Diagnostic Results     Brain Imaging   MRI brain 5/28:      Small area of restricted diffusion in  the right precentral gyrus.  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    Bilateral basal ganglia, thalami and cerebellar hemispheres small remote infarcts.  Generalized cerebral volume loss and extensive white matter chronic small vessel ischemic change.    Ventriculomegaly out of proportion to sulcal prominence, nonspecific but can be seen with normal pressure hydrocephalus, but overall unchanged from prior MRI.        Cardiac Imaging   ECHO 5/28/18:   CONCLUSIONS     1 - Concentric LVH with hyperdynamic left ventricular systolic function (EF >70%).     2 - Severe left atrial enlargement.     3 - Impaired LV relaxation, elevated LAP (grade 2 diastolic dysfunction).     4 - Normal right ventricular systolic function .     5 - Mild to moderate aortic stenosis, ALBER = 1.64 cm2, AVAi = 1.12 cm2/m2, peak velocity = 3.11 m/s, mean gradient = 24 mmHg

## 2018-06-03 LAB
ALBUMIN SERPL BCP-MCNC: 3.2 G/DL
ALP SERPL-CCNC: 77 U/L
ALT SERPL W/O P-5'-P-CCNC: 34 U/L
ANION GAP SERPL CALC-SCNC: 7 MMOL/L
AST SERPL-CCNC: 40 U/L
BASOPHILS # BLD AUTO: 0.08 K/UL
BASOPHILS NFR BLD: 1.2 %
BILIRUB SERPL-MCNC: 0.4 MG/DL
BUN SERPL-MCNC: 16 MG/DL
CALCIUM SERPL-MCNC: 9.6 MG/DL
CHLORIDE SERPL-SCNC: 101 MMOL/L
CO2 SERPL-SCNC: 31 MMOL/L
CREAT SERPL-MCNC: 0.7 MG/DL
DIFFERENTIAL METHOD: ABNORMAL
EOSINOPHIL # BLD AUTO: 0.2 K/UL
EOSINOPHIL NFR BLD: 2.5 %
ERYTHROCYTE [DISTWIDTH] IN BLOOD BY AUTOMATED COUNT: 13.9 %
EST. GFR  (AFRICAN AMERICAN): >60 ML/MIN/1.73 M^2
EST. GFR  (NON AFRICAN AMERICAN): >60 ML/MIN/1.73 M^2
GLUCOSE SERPL-MCNC: 107 MG/DL
HCT VFR BLD AUTO: 34 %
HGB BLD-MCNC: 10.9 G/DL
IMM GRANULOCYTES # BLD AUTO: 0.01 K/UL
IMM GRANULOCYTES NFR BLD AUTO: 0.1 %
INR PPP: 0.9
LYMPHOCYTES # BLD AUTO: 1.9 K/UL
LYMPHOCYTES NFR BLD: 27.2 %
MAGNESIUM SERPL-MCNC: 2.2 MG/DL
MCH RBC QN AUTO: 30 PG
MCHC RBC AUTO-ENTMCNC: 32.1 G/DL
MCV RBC AUTO: 94 FL
MONOCYTES # BLD AUTO: 0.8 K/UL
MONOCYTES NFR BLD: 11.8 %
NEUTROPHILS # BLD AUTO: 3.9 K/UL
NEUTROPHILS NFR BLD: 57.2 %
NRBC BLD-RTO: 0 /100 WBC
PHOSPHATE SERPL-MCNC: 3.9 MG/DL
PLATELET # BLD AUTO: 173 K/UL
PMV BLD AUTO: 12.8 FL
POCT GLUCOSE: 114 MG/DL (ref 70–110)
POCT GLUCOSE: 127 MG/DL (ref 70–110)
POCT GLUCOSE: 134 MG/DL (ref 70–110)
POCT GLUCOSE: 90 MG/DL (ref 70–110)
POTASSIUM SERPL-SCNC: 3.8 MMOL/L
PROT SERPL-MCNC: 7.2 G/DL
PROTHROMBIN TIME: 10 SEC
RBC # BLD AUTO: 3.63 M/UL
SODIUM SERPL-SCNC: 139 MMOL/L
WBC # BLD AUTO: 6.88 K/UL

## 2018-06-03 PROCEDURE — 25000003 PHARM REV CODE 250: Performed by: PHYSICIAN ASSISTANT

## 2018-06-03 PROCEDURE — 99233 SBSQ HOSP IP/OBS HIGH 50: CPT | Mod: ,,, | Performed by: PSYCHIATRY & NEUROLOGY

## 2018-06-03 PROCEDURE — 99900035 HC TECH TIME PER 15 MIN (STAT)

## 2018-06-03 PROCEDURE — 36415 COLL VENOUS BLD VENIPUNCTURE: CPT

## 2018-06-03 PROCEDURE — 83735 ASSAY OF MAGNESIUM: CPT

## 2018-06-03 PROCEDURE — 25000242 PHARM REV CODE 250 ALT 637 W/ HCPCS: Performed by: PHYSICIAN ASSISTANT

## 2018-06-03 PROCEDURE — 94640 AIRWAY INHALATION TREATMENT: CPT

## 2018-06-03 PROCEDURE — 85025 COMPLETE CBC W/AUTO DIFF WBC: CPT

## 2018-06-03 PROCEDURE — 94761 N-INVAS EAR/PLS OXIMETRY MLT: CPT

## 2018-06-03 PROCEDURE — 20600001 HC STEP DOWN PRIVATE ROOM

## 2018-06-03 PROCEDURE — 25000003 PHARM REV CODE 250: Performed by: PSYCHIATRY & NEUROLOGY

## 2018-06-03 PROCEDURE — 63600175 PHARM REV CODE 636 W HCPCS: Performed by: PSYCHIATRY & NEUROLOGY

## 2018-06-03 PROCEDURE — 85610 PROTHROMBIN TIME: CPT

## 2018-06-03 PROCEDURE — 63600175 PHARM REV CODE 636 W HCPCS: Performed by: PHYSICIAN ASSISTANT

## 2018-06-03 PROCEDURE — 84100 ASSAY OF PHOSPHORUS: CPT

## 2018-06-03 PROCEDURE — A4216 STERILE WATER/SALINE, 10 ML: HCPCS | Performed by: PHYSICIAN ASSISTANT

## 2018-06-03 PROCEDURE — 94668 MNPJ CHEST WALL SBSQ: CPT

## 2018-06-03 PROCEDURE — 80053 COMPREHEN METABOLIC PANEL: CPT

## 2018-06-03 RX ORDER — IBUPROFEN 200 MG
1 TABLET ORAL DAILY
Status: DISCONTINUED | OUTPATIENT
Start: 2018-06-04 | End: 2018-06-11 | Stop reason: HOSPADM

## 2018-06-03 RX ORDER — IPRATROPIUM BROMIDE AND ALBUTEROL SULFATE 2.5; .5 MG/3ML; MG/3ML
3 SOLUTION RESPIRATORY (INHALATION) EVERY 6 HOURS
Status: DISCONTINUED | OUTPATIENT
Start: 2018-06-03 | End: 2018-06-11 | Stop reason: HOSPADM

## 2018-06-03 RX ORDER — SODIUM CHLORIDE 0.9 % (FLUSH) 0.9 %
3 SYRINGE (ML) INJECTION EVERY 12 HOURS
Status: DISCONTINUED | OUTPATIENT
Start: 2018-06-03 | End: 2018-06-03

## 2018-06-03 RX ORDER — SODIUM CHLORIDE FOR INHALATION 3 %
4 VIAL, NEBULIZER (ML) INHALATION 2 TIMES DAILY
Status: DISCONTINUED | OUTPATIENT
Start: 2018-06-03 | End: 2018-06-11 | Stop reason: HOSPADM

## 2018-06-03 RX ORDER — HEPARIN SODIUM 5000 [USP'U]/ML
5000 INJECTION, SOLUTION INTRAVENOUS; SUBCUTANEOUS EVERY 8 HOURS
Status: DISPENSED | OUTPATIENT
Start: 2018-06-03 | End: 2018-06-04

## 2018-06-03 RX ORDER — SODIUM CHLORIDE 0.9 % (FLUSH) 0.9 %
3 SYRINGE (ML) INJECTION EVERY 8 HOURS
Status: DISCONTINUED | OUTPATIENT
Start: 2018-06-03 | End: 2018-06-11 | Stop reason: HOSPADM

## 2018-06-03 RX ADMIN — QUETIAPINE FUMARATE 25 MG: 25 TABLET ORAL at 09:06

## 2018-06-03 RX ADMIN — IPRATROPIUM BROMIDE AND ALBUTEROL SULFATE 3 ML: .5; 3 SOLUTION RESPIRATORY (INHALATION) at 01:06

## 2018-06-03 RX ADMIN — CLONIDINE HYDROCHLORIDE 0.1 MG: 0.1 TABLET ORAL at 09:06

## 2018-06-03 RX ADMIN — LOSARTAN POTASSIUM 100 MG: 50 TABLET, FILM COATED ORAL at 09:06

## 2018-06-03 RX ADMIN — ATORVASTATIN CALCIUM 40 MG: 20 TABLET, FILM COATED ORAL at 09:06

## 2018-06-03 RX ADMIN — BUPROPION HYDROCHLORIDE 150 MG: 75 TABLET, FILM COATED ORAL at 09:06

## 2018-06-03 RX ADMIN — ASPIRIN 81 MG CHEWABLE TABLET 81 MG: 81 TABLET CHEWABLE at 09:06

## 2018-06-03 RX ADMIN — SODIUM CHLORIDE SOLN NEBU 3% 4 ML: 3 NEBU SOLN at 09:06

## 2018-06-03 RX ADMIN — IPRATROPIUM BROMIDE AND ALBUTEROL SULFATE 3 ML: .5; 3 SOLUTION RESPIRATORY (INHALATION) at 08:06

## 2018-06-03 RX ADMIN — HEPARIN SODIUM 5000 UNITS: 5000 INJECTION, SOLUTION INTRAVENOUS; SUBCUTANEOUS at 05:06

## 2018-06-03 RX ADMIN — SODIUM CHLORIDE SOLN NEBU 3% 4 ML: 3 NEBU SOLN at 07:06

## 2018-06-03 RX ADMIN — DONEPEZIL HYDROCHLORIDE 10 MG: 5 TABLET, FILM COATED ORAL at 09:06

## 2018-06-03 RX ADMIN — HEPARIN SODIUM 5000 UNITS: 5000 INJECTION, SOLUTION INTRAVENOUS; SUBCUTANEOUS at 02:06

## 2018-06-03 RX ADMIN — FUROSEMIDE 20 MG: 20 TABLET ORAL at 09:06

## 2018-06-03 RX ADMIN — Medication 3 ML: at 02:06

## 2018-06-03 RX ADMIN — IPRATROPIUM BROMIDE AND ALBUTEROL SULFATE 3 ML: .5; 3 SOLUTION RESPIRATORY (INHALATION) at 07:06

## 2018-06-03 RX ADMIN — Medication 3 ML: at 10:06

## 2018-06-03 NOTE — ASSESSMENT & PLAN NOTE
84 y/o female with left sided facial droop, aphasia, dysarthria who received IV TPA but no IR. MRI with infarct in R precentral gyrus.   Nursing notes state afib seen on telemetry, but no afib documented on 12 lead ECG. However, etiology likely cardioembolic due to Afib seen on telemetry and afib showing sever LAE. SNF vs  with 24/7 family care.     Neurologically stable plan to step down to stroke service.   Nursing note noted patient went into Afib on monitor in [facility]. EKG completed and not seen. Echo showing Severe LAE. Will need to be started on AC after PEG placement.     Antithrombotics: Aspirin 81 mg    Statins: Lipitor 40 mg daily    Aggressive risk factor modification: HTN, HLD     Rehab efforts: PT/OT/SLP to evaluate and treat, PM&R consult     Diagnostics ordered/pending: none     VTE prophylaxis: SCD's may begin Heparin 500 units SC Q8H     BP parameters: Infarct: Post tPA, SBP <160

## 2018-06-03 NOTE — PROGRESS NOTES
Patient's restraints removed to attempt trial without at 1441. Restraints reordered at 1738 due to patient climbing out of bed and attempting to remove medical devices. Patient has a sitter assigned to stay with her tonight. Will continue to monitor.

## 2018-06-03 NOTE — ASSESSMENT & PLAN NOTE
Area of cytotoxic cerebral edema identified when reviewing brain imaging in the territory of the R middle cerebral artery. There is no mass effect associated with it. We will continue to monitor the patients clinical exam for any worsening of symptoms which may indicate expansion of the stroke or the area of the edema resulting in the clinical change. The pattern is of unclear etiology (ESUS). Suspect afib (documented on telemetry)

## 2018-06-03 NOTE — ASSESSMENT & PLAN NOTE
Patient NPO with NG in place.   Speech therapy to assess swallowing - may need PEG  CT abdomen ordered. Place IR consult first thing Monday morning (6/4)  TF and heparin held evening of 6/3

## 2018-06-03 NOTE — SUBJECTIVE & OBJECTIVE
Neurologic Chief Complaint: Aphasia, dysarthria, LSW     Subjective:     Interval History: Patient is seen for follow-up neurological assessment and treatment recommendations:     No acute events overnight. Holding TF and heparin tonight in preparation for possible PEG tomorrow. O2 sats low 90's, CXR ordered.     HPI, Past Medical, Family, and Social History remains the same as documented in the initial encounter.     Review of Systems   Constitutional: Negative for fever.   Respiratory: Negative for shortness of breath.    Gastrointestinal: Negative for abdominal pain, nausea and vomiting.   Neurological: Positive for facial asymmetry, speech difficulty and weakness. Negative for headaches.   Psychiatric/Behavioral: Positive for agitation and confusion. Negative for behavioral problems.     Scheduled Meds:   albuterol-ipratropium  3 mL Nebulization Q6H    aspirin  81 mg Per NG tube Daily    atorvastatin  40 mg Per NG tube Daily    buPROPion  150 mg Per NG tube BID    cloNIDine  0.1 mg Per NG tube BID    donepezil  10 mg Per NG tube QHS    furosemide  20 mg Per NG tube Daily    heparin (porcine)  5,000 Units Subcutaneous Q8H    losartan  100 mg Per NG tube Daily    QUEtiapine  25 mg Per NG tube QHS    sodium chloride 0.9%  3 mL Intravenous Q8H    sodium chloride 3%  4 mL Nebulization BID     Continuous Infusions:    PRN Meds:acetaminophen, hydrALAZINE, labetalol, magnesium oxide, magnesium oxide, potassium chloride 10%, potassium chloride 10%, potassium chloride 10%, potassium, sodium phosphates, potassium, sodium phosphates, potassium, sodium phosphates, senna-docusate 8.6-50 mg    Objective:     Vital Signs (Most Recent):  Temp: 98.8 °F (37.1 °C) (06/02/18 2002)  Pulse: 62 (06/03/18 1329)  Resp: 14 (06/03/18 1329)  BP: (!) 150/80 (06/03/18 0921)  SpO2: 100 % (06/03/18 1329)  BP Location: Left arm    Vital Signs Range (Last 24H):  Temp:  [98.6 °F (37 °C)-98.8 °F (37.1 °C)]   Pulse:  [58-96]   Resp:   [14-18]   BP: (138-150)/(63-80)   SpO2:  [90 %-100 %]   BP Location: Left arm    Physical Exam   Constitutional: She appears well-developed. No distress.   HENT:   Head: Normocephalic and atraumatic.   Eyes: Pupils are equal, round, and reactive to light.   Cardiovascular: Normal rate.    Pulmonary/Chest: Effort normal.   Neurological: She is alert.   Oriented to month but not age      Skin: Skin is warm and dry.   Psychiatric: She is agitated. She is not aggressive. She is inattentive.   Nursing note and vitals reviewed.      Neurological Exam:   LOC: alert  Attention Span:alert  Language: ? aphasia  Articulation: moderate/severe Dysarthria  Visual Fields: Full  EOM (CN III, IV, VI): Full/intact  Facial Movement (CN VII): Lower facial weakness on the Left  Motor: Arm left  Paresis: 4/5  Leg left  Paresis: 3/5  Arm right  Paresis: 5/5  Leg right Paresis: 3/5  Sensation: equal sensation   Tone: Spasticity  RUE    Laboratory:  CMP:     Recent Labs  Lab 06/03/18  0523   CALCIUM 9.6   ALBUMIN 3.2*   PROT 7.2      K 3.8   CO2 31*      BUN 16   CREATININE 0.7   ALKPHOS 77   ALT 34   AST 40   BILITOT 0.4     BMP:     Recent Labs  Lab 06/03/18  0523      K 3.8      CO2 31*   BUN 16   CREATININE 0.7   CALCIUM 9.6     CBC:     Recent Labs  Lab 06/03/18  0523   WBC 6.88   RBC 3.63*   HGB 10.9*   HCT 34.0*      MCV 94   MCH 30.0   MCHC 32.1     Lipid Panel:     Recent Labs  Lab 05/27/18  1514   CHOL 197   LDLCALC 110.8   HDL 59   TRIG 136     Coagulation:     Recent Labs  Lab 05/31/18  0152  06/03/18  0523   INR 0.9  < > 0.9   APTT 24.7  --   --    < > = values in this interval not displayed.  Platelet Aggregation Study: No results for input(s): PLTAGG, PLTAGINTERP, PLTAGREGLACO, ADPPLTAGGREG in the last 168 hours.  Hgb A1C:     Recent Labs  Lab 05/27/18  1919   HGBA1C 5.5     TSH:     Recent Labs  Lab 05/27/18  1514   TSH 1.638       Diagnostic Results     Brain Imaging   MRI brain  5/28:      Small area of restricted diffusion in the right precentral gyrus.  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    Bilateral basal ganglia, thalami and cerebellar hemispheres small remote infarcts.  Generalized cerebral volume loss and extensive white matter chronic small vessel ischemic change.    Ventriculomegaly out of proportion to sulcal prominence, nonspecific but can be seen with normal pressure hydrocephalus, but overall unchanged from prior MRI.        Cardiac Imaging   ECHO 5/28/18:   CONCLUSIONS     1 - Concentric LVH with hyperdynamic left ventricular systolic function (EF >70%).     2 - Severe left atrial enlargement.     3 - Impaired LV relaxation, elevated LAP (grade 2 diastolic dysfunction).     4 - Normal right ventricular systolic function .     5 - Mild to moderate aortic stenosis, ALBER = 1.64 cm2, AVAi = 1.12 cm2/m2, peak velocity = 3.11 m/s, mean gradient = 24 mmHg

## 2018-06-03 NOTE — PROGRESS NOTES
[facility]  Vascular Neurology  Comprehensive Stroke Center  Progress Note    Assessment/Plan:     * Embolic stroke involving right middle cerebral artery    82 y/o female with left sided facial droop, aphasia, dysarthria who received IV TPA but no IR. MRI with infarct in R precentral gyrus.   Nursing notes state afib seen on telemetry, but no afib documented on 12 lead ECG. However, etiology likely cardioembolic due to Afib seen on telemetry and afib showing sever LAE. SNF vs  with 24/7 family care.     Neurologically stable plan to step down to stroke service.   Nursing note noted patient went into Afib on monitor in [facility]. EKG completed and not seen. Echo showing Severe LAE. Will need to be started on AC after PEG placement.     Antithrombotics: Aspirin 81 mg    Statins: Lipitor 40 mg daily    Aggressive risk factor modification: HTN, HLD     Rehab efforts: PT/OT/SLP to evaluate and treat, PM&R consult     Diagnostics ordered/pending: none     VTE prophylaxis: SCD's may begin Heparin 500 units SC Q8H     BP parameters: Infarct: Post tPA, SBP <160            New onset a-fib    Seen on telemetry monitor in [facility]  Echo showing severe LAE  Will need AC after PEG placement        Atrial enlargement, left    Severe left atrial enlargement seen on echo  Per nursing note afib was seen on telemetry, ekg completed and sinus rhythm with Premature atrial complexes. Severe LAE seen on echocardiogram.  Will need to consider AC after PEG placement         Dysarthria    Severe dysarthria  SLP ordered           Dysphagia    Patient NPO with NG in place.   Speech therapy to assess swallowing - may need PEG  CT abdomen ordered. Place IR consult first thing Monday morning (6/4)  TF and heparin held evening of 6/3  Last CXR 6/3 without evidence of aspiration PNA      Cytotoxic cerebral edema    Area of cytotoxic cerebral edema identified when reviewing brain imaging in the territory of the R middle cerebral artery.  There is no mass effect associated with it. We will continue to monitor the patients clinical exam for any worsening of symptoms which may indicate expansion of the stroke or the area of the edema resulting in the clinical change. The pattern is of unclear etiology (ESUS). Suspect afib (documented on telemetry)            UTI (urinary tract infection)    Continue ceftriaxone  Afebrile, no leukocytosis         Dementia with behavioral disturbance        Home wellbutrin 150 mg  Donepezil 10 qhs  seroquel 25 mg qhs            High cholesterol    Stroke risk factor    Lipitor 40 mg daily        Benign essential HTN    Stroke risk factor  Systolic less than 160 - on clonidine 0.1 mg  BID, lasix 20 mg qd, Losartan 100 mg qd        Aphasia    Difficult to assess if patient is responding appropriately to questions due to severe dysarthria  Follows some commands accurately   Aggressive SLP therapy         S/P admn tPA in diff fac w/n last 24 hr bef adm to crnt fac    Close monitoring in [facility]U for 24 hours post administration, completed             84 y/o female with aphasia, dysarthria and facial droop, received IV TPA but no LVO so no IR. Probable small vessel disease.    5/28: Blood pressure elevated >200 overnight requiring cardene. Patient bradycardic and with paroxysmal afib overnight as well. MRI with infarct in the right precentral gyrus.   5/29: No events overnight. Patient with many bacteria on UTI so short cipro course started per NCC. Pending step down to NSU.   5/30: seems more agitated today, not participating in exam or following commands, severe dysarthria and excessive secretions  05/31/18 Continues to have increased secretions and severe dysarthria.  Patient following some commands and plans to transfer to stroke service today   6/1: Secretions are better controlled today. Plan to step down. On exam patient is agitated and stating she wants to go home. In bilateral wrist restraints. Right lung opacity  - Chest PT and duonebs ordered per NCC. Nursing notes noted Afib but EKG showing no afib. Echo revealed Severe left atrial enlargement. May need AC on discharge.  6/2: No acute events overnight. Stepped down to NSU. Plan for PEG early next week if no improvement per ST.   6/3: No acute events overnight. Holding TF and heparin tonight in preparation for possible PEG tomorrow. O2 sats low 90's, CXR ordered.     STROKE DOCUMENTATION   Acute Stroke Times   Last Known Normal Date: 05/27/18  Last Known Normal Time: 1320  Symptom Onset Date: 05/27/18  Symptom Onset Time: 1445  Stroke Team Called Date: 05/27/18  Stroke Team Called Time: 1550  Stroke Team Arrival Date: 05/27/18  Stroke Team Arrival Time: 1751  CT Interpretation Time: 1536  Decision to Treat Time for Alteplase: 1806    NIH Scale:  1a. Level Of Consciousness: 0-->Alert: keenly responsive  1b. LOC Questions: 1-->Answers one question correctly  1c. LOC Commands: 0-->Performs both tasks correctly  2. Best Gaze: 0-->Normal  3. Visual: 0-->No visual loss  4. Facial Palsy: 1-->Minor paralysis (flattened nasolabial fold, asymmetry on smiling)  5a. Motor Arm, Left: 1-->Drift: limb holds 90 (or 45) degrees, but drifts down before full 10 seconds: does not hit bed or other support  5b. Motor Arm, Right: 0-->No drift: limb holds 90 (or 45) degrees for full 10 secs  6a. Motor Leg, Left: 1-->Drift: leg falls by the end of the 5-sec period but does not hit bed  6b. Motor Leg, Right: 1-->Drift: leg falls by the end of the 5-sec period but does not hit bed  7. Limb Ataxia: 0-->Absent  8. Sensory: 0-->Normal: no sensory loss  9. Best Language: 1-->Mild-to-moderate aphasia: some obvious loss of fluency or facility of comprehension, without significant limitation on ideas expressed or form of expression. Reduction of speech and/or comprehension, however, makes conversation. . . (see row details)  10. Dysarthria: 2-->Severe dysarthria: patients speech is so slurred as to be  unintelligible in the absence of or out of proportion to any dysphasia, or is mute/anarthric  11. Extinction and Inattention (formerly Neglect): 0-->No abnormality  Total (NIH Stroke Scale): 8       Modified Lenoir Score: 0  [place] Coma Scale:    ABCD2 Score:    [id number] Score:   HAS -BLED Score:   ICH Score:   Hunt & Burkett Classification:      Hemorrhagic change of an Ischemic Stroke: Does this patient have an ischemic stroke with hemorrhagic changes? No     Neurologic Chief Complaint: Aphasia, dysarthria, LSW     Subjective:     Interval History: Patient is seen for follow-up neurological assessment and treatment recommendations:     No acute events overnight. Holding TF and heparin tonight in preparation for possible PEG tomorrow. O2 sats low 90's, CXR ordered.     HPI, Past Medical, Family, and Social History remains the same as documented in the initial encounter.     Review of Systems   Constitutional: Negative for fever.   Respiratory: Negative for shortness of breath.    Gastrointestinal: Negative for abdominal pain, nausea and vomiting.   Neurological: Positive for facial asymmetry, speech difficulty and weakness. Negative for headaches.   Psychiatric/Behavioral: Positive for agitation and confusion. Negative for behavioral problems.     Scheduled Meds:   albuterol-ipratropium  3 mL Nebulization Q6H    aspirin  81 mg Per NG tube Daily    atorvastatin  40 mg Per NG tube Daily    buPROPion  150 mg Per NG tube BID    cloNIDine  0.1 mg Per NG tube BID    donepezil  10 mg Per NG tube QHS    furosemide  20 mg Per NG tube Daily    heparin (porcine)  5,000 Units Subcutaneous Q8H    losartan  100 mg Per NG tube Daily    QUEtiapine  25 mg Per NG tube QHS    sodium chloride 0.9%  3 mL Intravenous Q8H    sodium chloride 3%  4 mL Nebulization BID     Continuous Infusions:    PRN Meds:acetaminophen, hydrALAZINE, labetalol, magnesium oxide, magnesium oxide, potassium chloride 10%, potassium chloride 10%,  potassium chloride 10%, potassium, sodium phosphates, potassium, sodium phosphates, potassium, sodium phosphates, senna-docusate 8.6-50 mg    Objective:     Vital Signs (Most Recent):  Temp: 98.8 °F (37.1 °C) (06/02/18 2002)  Pulse: 62 (06/03/18 1329)  Resp: 14 (06/03/18 1329)  BP: (!) 150/80 (06/03/18 0921)  SpO2: 100 % (06/03/18 1329)  BP Location: Left arm    Vital Signs Range (Last 24H):  Temp:  [98.6 °F (37 °C)-98.8 °F (37.1 °C)]   Pulse:  [58-96]   Resp:  [14-18]   BP: (138-150)/(63-80)   SpO2:  [90 %-100 %]   BP Location: Left arm    Physical Exam   Constitutional: She appears well-developed. No distress.   HENT:   Head: Normocephalic and atraumatic.   Eyes: Pupils are equal, round, and reactive to light.   Cardiovascular: Normal rate.    Pulmonary/Chest: Effort normal.   Neurological: She is alert.   Oriented to month but not age      Skin: Skin is warm and dry.   Psychiatric: She is agitated. She is not aggressive. She is inattentive.   Nursing note and vitals reviewed.      Neurological Exam:   LOC: alert  Attention Span:alert  Language: ? aphasia  Articulation: moderate/severe Dysarthria  Visual Fields: Full  EOM (CN III, IV, VI): Full/intact  Facial Movement (CN VII): Lower facial weakness on the Left  Motor: Arm left  Paresis: 4/5  Leg left  Paresis: 3/5  Arm right  Paresis: 5/5  Leg right Paresis: 3/5  Sensation: equal sensation   Tone: Spasticity  RUE    Laboratory:  CMP:     Recent Labs  Lab 06/03/18 0523   CALCIUM 9.6   ALBUMIN 3.2*   PROT 7.2      K 3.8   CO2 31*      BUN 16   CREATININE 0.7   ALKPHOS 77   ALT 34   AST 40   BILITOT 0.4     BMP:     Recent Labs  Lab 06/03/18 0523      K 3.8      CO2 31*   BUN 16   CREATININE 0.7   CALCIUM 9.6     CBC:     Recent Labs  Lab 06/03/18 0523   WBC 6.88   RBC 3.63*   HGB 10.9*   HCT 34.0*      MCV 94   MCH 30.0   MCHC 32.1     Lipid Panel:     Recent Labs  Lab 05/27/18  1514   CHOL 197   LDLCALC 110.8   HDL 59   TRIG 136      Coagulation:     Recent Labs  Lab 05/31/18  0152  06/03/18  0523   INR 0.9  < > 0.9   APTT 24.7  --   --    < > = values in this interval not displayed.  Platelet Aggregation Study: No results for input(s): PLTAGG, PLTAGINTERP, PLTAGREGLACO, ADPPLTAGGREG in the last 168 hours.  Hgb A1C:     Recent Labs  Lab 05/27/18  1919   HGBA1C 5.5     TSH:     Recent Labs  Lab 05/27/18  1514   TSH 1.638       Diagnostic Results     Brain Imaging   MRI brain 5/28:      Small area of restricted diffusion in the right precentral gyrus.  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    Bilateral basal ganglia, thalami and cerebellar hemispheres small remote infarcts.  Generalized cerebral volume loss and extensive white matter chronic small vessel ischemic change.    Ventriculomegaly out of proportion to sulcal prominence, nonspecific but can be seen with normal pressure hydrocephalus, but overall unchanged from prior MRI.        Cardiac Imaging   ECHO 5/28/18:   CONCLUSIONS     1 - Concentric LVH with hyperdynamic left ventricular systolic function (EF >70%).     2 - Severe left atrial enlargement.     3 - Impaired LV relaxation, elevated LAP (grade 2 diastolic dysfunction).     4 - Normal right ventricular systolic function .     5 - Mild to moderate aortic stenosis, ALBER = 1.64 cm2, AVAi = 1.12 cm2/m2, peak velocity = 3.11 m/s, mean gradient = 24 mmHg      Adrianna Perez PA-C  Comprehensive Stroke Center  Department of Vascular Neurology   [facility]

## 2018-06-03 NOTE — ASSESSMENT & PLAN NOTE
Seen on telemetry monitor in [facility]  Echo showing severe LAE  Will need AC after PEG placement

## 2018-06-04 ENCOUNTER — ANESTHESIA EVENT (OUTPATIENT)
Dept: SURGERY | Facility: HOSPITAL | Age: 83
DRG: 064 | End: 2018-06-04
Payer: MEDICARE

## 2018-06-04 PROBLEM — Z93.1 PEG (PERCUTANEOUS ENDOSCOPIC GASTROSTOMY) STATUS: Status: ACTIVE | Noted: 2018-06-04

## 2018-06-04 PROBLEM — F03.918 DEMENTIA WITH BEHAVIORAL DISTURBANCE: Status: ACTIVE | Noted: 2018-06-04

## 2018-06-04 LAB
ALBUMIN SERPL BCP-MCNC: 3.2 G/DL
ALP SERPL-CCNC: 69 U/L
ALT SERPL W/O P-5'-P-CCNC: 31 U/L
ANION GAP SERPL CALC-SCNC: 8 MMOL/L
AST SERPL-CCNC: 32 U/L
BASOPHILS # BLD AUTO: 0.08 K/UL
BASOPHILS NFR BLD: 1 %
BILIRUB SERPL-MCNC: 0.4 MG/DL
BILIRUB UR QL STRIP: NEGATIVE
BUN SERPL-MCNC: 22 MG/DL
CALCIUM SERPL-MCNC: 9.5 MG/DL
CHLORIDE SERPL-SCNC: 99 MMOL/L
CLARITY UR REFRACT.AUTO: CLEAR
CO2 SERPL-SCNC: 30 MMOL/L
COLOR UR AUTO: YELLOW
CREAT SERPL-MCNC: 0.8 MG/DL
DIFFERENTIAL METHOD: ABNORMAL
EOSINOPHIL # BLD AUTO: 0.2 K/UL
EOSINOPHIL NFR BLD: 2 %
ERYTHROCYTE [DISTWIDTH] IN BLOOD BY AUTOMATED COUNT: 13.8 %
EST. GFR  (AFRICAN AMERICAN): >60 ML/MIN/1.73 M^2
EST. GFR  (NON AFRICAN AMERICAN): >60 ML/MIN/1.73 M^2
GLUCOSE SERPL-MCNC: 102 MG/DL
GLUCOSE UR QL STRIP: NEGATIVE
HCT VFR BLD AUTO: 33.8 %
HGB BLD-MCNC: 10.8 G/DL
HGB UR QL STRIP: NEGATIVE
HYALINE CASTS UR QL AUTO: 1 /LPF
IMM GRANULOCYTES # BLD AUTO: 0.03 K/UL
IMM GRANULOCYTES NFR BLD AUTO: 0.4 %
KETONES UR QL STRIP: NEGATIVE
LEUKOCYTE ESTERASE UR QL STRIP: NEGATIVE
LYMPHOCYTES # BLD AUTO: 2.1 K/UL
LYMPHOCYTES NFR BLD: 27.5 %
MAGNESIUM SERPL-MCNC: 2.5 MG/DL
MCH RBC QN AUTO: 30.3 PG
MCHC RBC AUTO-ENTMCNC: 32 G/DL
MCV RBC AUTO: 95 FL
MICROSCOPIC COMMENT: NORMAL
MONOCYTES # BLD AUTO: 0.9 K/UL
MONOCYTES NFR BLD: 11.7 %
NEUTROPHILS # BLD AUTO: 4.4 K/UL
NEUTROPHILS NFR BLD: 57.4 %
NITRITE UR QL STRIP: NEGATIVE
NRBC BLD-RTO: 0 /100 WBC
PH UR STRIP: 7 [PH] (ref 5–8)
PLATELET # BLD AUTO: 186 K/UL
PMV BLD AUTO: 12.6 FL
POCT GLUCOSE: 108 MG/DL (ref 70–110)
POCT GLUCOSE: 126 MG/DL (ref 70–110)
POCT GLUCOSE: 80 MG/DL (ref 70–110)
POCT GLUCOSE: 98 MG/DL (ref 70–110)
POTASSIUM SERPL-SCNC: 4.4 MMOL/L
PROT SERPL-MCNC: 7.2 G/DL
PROT UR QL STRIP: NEGATIVE
RBC # BLD AUTO: 3.56 M/UL
RBC #/AREA URNS AUTO: 0 /HPF (ref 0–4)
SODIUM SERPL-SCNC: 137 MMOL/L
SP GR UR STRIP: 1.02 (ref 1–1.03)
SQUAMOUS #/AREA URNS AUTO: 1 /HPF
URN SPEC COLLECT METH UR: NORMAL
UROBILINOGEN UR STRIP-ACNC: NEGATIVE EU/DL
WBC # BLD AUTO: 7.67 K/UL
WBC #/AREA URNS AUTO: 1 /HPF (ref 0–5)

## 2018-06-04 PROCEDURE — 20600001 HC STEP DOWN PRIVATE ROOM

## 2018-06-04 PROCEDURE — 25000003 PHARM REV CODE 250: Performed by: PHYSICIAN ASSISTANT

## 2018-06-04 PROCEDURE — 99900035 HC TECH TIME PER 15 MIN (STAT)

## 2018-06-04 PROCEDURE — S4991 NICOTINE PATCH NONLEGEND: HCPCS | Performed by: PHYSICIAN ASSISTANT

## 2018-06-04 PROCEDURE — 36415 COLL VENOUS BLD VENIPUNCTURE: CPT

## 2018-06-04 PROCEDURE — 83735 ASSAY OF MAGNESIUM: CPT

## 2018-06-04 PROCEDURE — 85025 COMPLETE CBC W/AUTO DIFF WBC: CPT

## 2018-06-04 PROCEDURE — A4216 STERILE WATER/SALINE, 10 ML: HCPCS | Performed by: PHYSICIAN ASSISTANT

## 2018-06-04 PROCEDURE — 92526 ORAL FUNCTION THERAPY: CPT

## 2018-06-04 PROCEDURE — 94640 AIRWAY INHALATION TREATMENT: CPT

## 2018-06-04 PROCEDURE — 99222 1ST HOSP IP/OBS MODERATE 55: CPT | Mod: 57,GC,, | Performed by: SURGERY

## 2018-06-04 PROCEDURE — 63600175 PHARM REV CODE 636 W HCPCS: Performed by: PHYSICIAN ASSISTANT

## 2018-06-04 PROCEDURE — 25000003 PHARM REV CODE 250: Performed by: PSYCHIATRY & NEUROLOGY

## 2018-06-04 PROCEDURE — 99222 1ST HOSP IP/OBS MODERATE 55: CPT | Mod: ,,, | Performed by: NURSE PRACTITIONER

## 2018-06-04 PROCEDURE — 97530 THERAPEUTIC ACTIVITIES: CPT

## 2018-06-04 PROCEDURE — 97535 SELF CARE MNGMENT TRAINING: CPT

## 2018-06-04 PROCEDURE — 99233 SBSQ HOSP IP/OBS HIGH 50: CPT | Mod: ,,, | Performed by: PSYCHIATRY & NEUROLOGY

## 2018-06-04 PROCEDURE — 94761 N-INVAS EAR/PLS OXIMETRY MLT: CPT

## 2018-06-04 PROCEDURE — 80053 COMPREHEN METABOLIC PANEL: CPT

## 2018-06-04 PROCEDURE — 25000242 PHARM REV CODE 250 ALT 637 W/ HCPCS: Performed by: PHYSICIAN ASSISTANT

## 2018-06-04 PROCEDURE — 81001 URINALYSIS AUTO W/SCOPE: CPT

## 2018-06-04 RX ORDER — HEPARIN SODIUM 5000 [USP'U]/ML
5000 INJECTION, SOLUTION INTRAVENOUS; SUBCUTANEOUS EVERY 8 HOURS
Status: DISPENSED | OUTPATIENT
Start: 2018-06-04 | End: 2018-06-05

## 2018-06-04 RX ADMIN — CLONIDINE HYDROCHLORIDE 0.1 MG: 0.1 TABLET ORAL at 09:06

## 2018-06-04 RX ADMIN — ATORVASTATIN CALCIUM 40 MG: 20 TABLET, FILM COATED ORAL at 04:06

## 2018-06-04 RX ADMIN — Medication 3 ML: at 10:06

## 2018-06-04 RX ADMIN — LOSARTAN POTASSIUM 100 MG: 50 TABLET, FILM COATED ORAL at 04:06

## 2018-06-04 RX ADMIN — QUETIAPINE FUMARATE 25 MG: 25 TABLET ORAL at 09:06

## 2018-06-04 RX ADMIN — IPRATROPIUM BROMIDE AND ALBUTEROL SULFATE 3 ML: .5; 3 SOLUTION RESPIRATORY (INHALATION) at 07:06

## 2018-06-04 RX ADMIN — CLONIDINE HYDROCHLORIDE 0.1 MG: 0.1 TABLET ORAL at 04:06

## 2018-06-04 RX ADMIN — HEPARIN SODIUM 5000 UNITS: 5000 INJECTION, SOLUTION INTRAVENOUS; SUBCUTANEOUS at 05:06

## 2018-06-04 RX ADMIN — Medication 3 ML: at 04:06

## 2018-06-04 RX ADMIN — NICOTINE 1 PATCH: 14 PATCH, EXTENDED RELEASE TRANSDERMAL at 04:06

## 2018-06-04 RX ADMIN — Medication 3 ML: at 05:06

## 2018-06-04 RX ADMIN — BUPROPION HYDROCHLORIDE 150 MG: 75 TABLET, FILM COATED ORAL at 09:06

## 2018-06-04 RX ADMIN — DONEPEZIL HYDROCHLORIDE 10 MG: 5 TABLET, FILM COATED ORAL at 09:06

## 2018-06-04 RX ADMIN — FUROSEMIDE 20 MG: 20 TABLET ORAL at 04:06

## 2018-06-04 RX ADMIN — SODIUM CHLORIDE SOLN NEBU 3% 4 ML: 3 NEBU SOLN at 08:06

## 2018-06-04 NOTE — NURSING
NAVIN. Patient's tube feeds held at midnight per orders pending PEG placement. Patient rested very well, remained subdued. Sitter at bedside, restraints in place, bed alarm on, bed in lowest position, call bell in reach. tm

## 2018-06-04 NOTE — ANESTHESIA PREPROCEDURE EVALUATION
[facility]  Anesthesia Pre-Operative Evaluation         Patient Name: Sury Cobos  YOB: 1934  MRN: [id number]    SUBJECTIVE:     Pre-operative evaluation for Procedure(s) (LRB):  EGD, WITH PEG TUBE INSERTION (N/A)     06/04/2018    Sury Cobos is a 83 y.o. female w/ a significant PMHx of DM2, HTN, HLD, dementia who presented to [facility] as transfer to stroke. She was given IV TPA and transferred to [facility] for further evaluation. Upon arrival patient still with aphasia, L facial droop, dysarthria. Minimal left sided weakness. She was admitted to [facility] but has since been stepped down to the floor. Speech therapy has recommended PEG tube placement for long term enteral access. IR has reviewed her scan and did not see a safe window for them to insert a PEG tube. General surgery has been consulted due to her inability to swallow secondary to post-stroke dysphagia and dysarthria.    Patient now presents for the above procedure(s).      LDA:        Peripheral IV - Single Lumen 05/30/18 1514 Right Forearm (Active)   Site Assessment Clean;Dry;Intact 6/3/2018  8:00 AM   Line Status Flushed 6/3/2018  8:00 AM   Dressing Status Clean;Dry;Intact 6/3/2018  8:00 AM   Dressing Intervention Dressing reinforced 6/2/2018  8:00 PM   Dressing Change Due 06/01/18 6/2/2018  8:00 PM   Site Change Due 06/02/18 6/2/2018  8:00 PM   Reason Not Rotated Not due 6/2/2018  8:00 PM   Number of days: 5            Peripheral IV - Single Lumen 06/01/18 1230 Left Wrist (Active)   Site Assessment Clean;Dry;Intact 6/3/2018  8:00 AM   Line Status Flushed 6/3/2018  8:00 AM   Dressing Status Clean;Dry;Intact 6/3/2018  8:00 AM   Dressing Intervention Dressing reinforced 6/2/2018  8:00 PM   Dressing Change Due 06/04/18 6/2/2018  8:00 PM   Site Change Due 06/05/18 6/2/2018  8:00 PM   Reason Not Rotated Not due 6/2/2018  8:00 PM   Number of days: 3            NG/OG Tube 05/29/18 0901 [place] sump 14 Fr. Right nostril (Active)    Placement Check placement verified by aspirate characteristics;placement verified by x-ray 6/3/2018  8:00 PM   Distal Tube Length (cm) 69 6/3/2018  8:00 PM   Tolerance no signs/symptoms of discomfort 6/3/2018  8:00 PM   Securement anchored to nostril center w/ adhesive device 6/3/2018  8:00 PM   Clamp Status/Tolerance no abdominal discomfort;no abdominal distention;no emesis;no nausea;no residual;no restlessness 6/3/2018  8:00 PM   Insertion Site Appearance no redness, warmth, tenderness, skin breakdown, drainage 6/2/2018  8:00 PM   Flush/Irrigation flushed w/;water;no resistance met 6/3/2018  8:00 PM   Feeding Method continuous 6/3/2018  8:00 PM   Current Rate (mL/hr) 40 mL/hr 6/3/2018  8:00 PM   Goal Rate (mL/hr) 40 mL/hr 6/3/2018  8:00 PM   Intake (mL) 60 mL 6/1/2018 12:05 PM   Water Bolus (mL) 200 mL 6/2/2018 10:38 AM   Intake (mL) - Formula Tube Feeding 40 6/1/2018  3:05 PM   Residual Amount (ml) 0 ml 6/3/2018  8:00 PM   Number of days: 6       Female External Urinary Catheter 05/27/18 2130 (Active)   Skin no redness;no breakdown;perineum cleansed w/ soap and water 6/1/2018 11:05 AM   Tolerance no signs/symptoms of discomfort 6/1/2018 11:05 AM   Suction Continuous suction at 70 mmHg 6/1/2018 11:05 AM   Date of last wick change 06/01/18 6/1/2018 11:05 AM   Time of last wick change 1105 6/1/2018 11:05 AM   Output (mL) 100 mL 6/1/2018  3:05 PM   Number of days: 7       Prev airway: None documented.    Drips: None documented.      Patient Active Problem List   Diagnosis    S/P admn tPA in diff fac w/n last 24 hr bef adm to crnt fac    Embolic stroke involving right middle cerebral artery    Aphasia    Benign essential HTN    Mixed hyperlipidemia    Dementia with behavioral disturbance    Encephalopathy    UTI (urinary tract infection)    Cytotoxic cerebral edema    Dysphagia    Dysarthria    Atrial enlargement, left    New onset a-fib       Review of patient's allergies indicates:  No Known  Allergies    Current Inpatient Medications:   albuterol-ipratropium  3 mL Nebulization Q6H    aspirin  81 mg Per NG tube Daily    atorvastatin  40 mg Per NG tube Daily    buPROPion  150 mg Per NG tube BID    cloNIDine  0.1 mg Per NG tube BID    donepezil  10 mg Per NG tube QHS    furosemide  20 mg Per NG tube Daily    heparin (porcine)  5,000 Units Subcutaneous Q8H    losartan  100 mg Per NG tube Daily    nicotine  1 patch Transdermal Daily    QUEtiapine  25 mg Per NG tube QHS    sodium chloride 0.9%  3 mL Intravenous Q8H    sodium chloride 3%  4 mL Nebulization BID       No current facility-administered medications on file prior to encounter.      Current Outpatient Prescriptions on File Prior to Encounter   Medication Sig Dispense Refill    allopurinol (ZYLOPRIM) 300 MG tablet Take 1 tablet by mouth once daily.      aspirin (ECOTRIN) 81 MG EC tablet Take 81 mg by mouth once daily.      buPROPion (WELLBUTRIN SR) 150 MG TBSR 12 hr tablet Take 150 mg by mouth 2 (two) times daily.      cloNIDine (CATAPRES) 0.1 MG tablet Take 1 tablet by mouth 2 (two) times daily.       donepezil (ARICEPT) 10 MG tablet Take 10 mg by mouth every evening.      furosemide (LASIX) 20 MG tablet Take 1 tablet by mouth once daily.      HYDROcodone-acetaminophen (NORCO) 7.5-325 mg per tablet Take 1 tablet by mouth every 12 (twelve) hours as needed for Pain.      losartan (COZAAR) 25 MG tablet Take 1 tablet by mouth once daily.      potassium chloride (KLOR-CON) 10 MEQ TbSR Take 1 tablet by mouth once daily.      pravastatin (PRAVACHOL) 40 MG tablet Take 1 tablet by mouth once daily.         Past Surgical History:   Procedure Laterality Date    CARDIAC SURGERY      CORONARY ARTERY BYPASS GRAFT      reduction & internal fixation of displaced closed comminuted intertrochanteric fx left hip  02/12/2018       Social History     Social History    Marital status: Single     Spouse name: N/A    Number of children: N/A     Years of education: N/A     Occupational History    Not on file.     Social History Main Topics    Smoking status: Current Every Day Smoker     Packs/day: 3.00    Smokeless tobacco: Never Used    Alcohol use Yes      Comment: socially    Drug use: No    Sexual activity: Not on file     Other Topics Concern    Not on file     Social History Narrative    No narrative on file       OBJECTIVE:     Vital Signs Range (Last 24H):  Temp:  [36.3 °C (97.3 °F)-36.8 °C (98.3 °F)]   Pulse:  [59-95]   Resp:  [16-18]   BP: (131-191)/(58-75)   SpO2:  [92 %-98 %]       CBC:   Recent Labs      06/03/18   0523 06/04/18   0453   WBC  6.88  7.67   RBC  3.63*  3.56*   HGB  10.9*  10.8*   HCT  34.0*  33.8*   PLT  173  186   MCV  94  95   MCH  30.0  30.3   MCHC  32.1  32.0       CMP:   Recent Labs      06/02/18 0525 06/03/18   0523 06/04/18   0453   NA  140  139  137   K  3.5  3.8  4.4   CL  103  101  99   CO2  29  31*  30*   BUN  16  16  22   CREATININE  0.8  0.7  0.8   GLU  125*  107  102   MG  2.0  2.2  2.5   PHOS  4.6*  3.9   --    CALCIUM  9.3  9.6  9.5   ALBUMIN  3.0*  3.2*  3.2*   PROT  6.6  7.2  7.2   ALKPHOS  75  77  69   ALT  34  34  31   AST  40  40  32   BILITOT  0.4  0.4  0.4       INR:  Recent Labs      06/02/18 0525 06/03/18 0523   INR  1.0  0.9       Diagnostic Studies: No relevant studies.    EKG: No recent studies available.    2D ECHO:  Results for orders placed or performed during the hospital encounter of 05/27/18   Echo doppler color flow   Result Value Ref Range    EF 72 55 - 65    Diastolic Dysfunction Yes (A)     Aortic Valve Stenosis MILD TO MODERATE (A)          ASSESSMENT/PLAN:         Anesthesia Evaluation    I have reviewed the Patient Summary Reports.     I have reviewed the Medications.     Review of Systems  Anesthesia Hx:  No problems with previous Anesthesia  Neg history of prior surgery. Denies Family Hx of Anesthesia complications.   Denies Personal Hx of Anesthesia complications.    Social:  No Alcohol Use, Smoker    Hematology/Oncology:     Oncology Normal    -- Anemia:   EENT/Dental:EENT/Dental Normal   Cardiovascular:   Hypertension CAD   CHF    Pulmonary:  Pulmonary Normal    Renal/:  Renal/ Normal     Hepatic/GI:  Hepatic/GI Normal    Musculoskeletal:   Arthritis     Neurological:   CVA, residual symptoms  Dementia    Endocrine:   Diabetes, type 2    Psych:   Psychiatric History          Physical Exam  General:  Well nourished    Airway/Jaw/Neck:  Airway Findings: Mouth Opening: Normal Tongue: Normal  General Airway Assessment: Adult  Mallampati: III  Improves to II with phonation.  TM Distance: Normal, at least 6 cm       Chest/Lungs:  Chest/Lungs Clear    Heart/Vascular:  Heart Findings: Normal Heart murmur: negative       Mental Status:  Mental Status Findings:  Confusion         Anesthesia Plan  Type of Anesthesia, risks & benefits discussed:  Anesthesia Type:  general  Patient's Preference:   Intra-op Monitoring Plan: standard ASA monitors  Intra-op Monitoring Plan Comments:   Post Op Pain Control Plan: per primary service following discharge from PACU, IV/PO Opioids PRN and multimodal analgesia  Post Op Pain Control Plan Comments:   Induction:   IV  Beta Blocker:  Patient is not currently on a Beta-Blocker (No further documentation required).       Informed Consent: Patient representative understands risks and agrees with Anesthesia plan.  Questions answered. Anesthesia consent signed with patient representative.  ASA Score: 3     Day of Surgery Review of History & Physical:  There are no significant changes.          Ready For Surgery From Anesthesia Perspective.

## 2018-06-04 NOTE — NURSING
IR called about the peg placement today. Awaiting x ray to be reviewed. Family at the bedside aware of the procedure.

## 2018-06-04 NOTE — ASSESSMENT & PLAN NOTE
84 y/o female with left sided facial droop, aphasia, dysarthria who received IV TPA but no IR. MRI with infarct in R precentral gyrus.   Etiology unclear though nursing note reporting patient went into Afib on monitor in [facility]. EKG completed and not seen. Echo showing Severe LAE. Will need to consider AC after PEG placement. SNF vs  with 24/7 family care.     Gen Surg consulted for PEG 6/4.      Antithrombotics: Aspirin 81 mg  Statins: Lipitor 40 mg daily  Aggressive risk factor modification: HTN, HLD  Rehab efforts: PT/OT/SLP to evaluate and treat, PM&R consult   Diagnostics ordered/pending: none   VTE prophylaxis: SCD's may begin Heparin 500 units SC Q8H   BP parameters: Infarct: Post tPA, SBP <160

## 2018-06-04 NOTE — HPI
Ms. Cobos is a 84 y/o female with pmhx DM2, HTN, HLD, dementia who was last seen normal at 1320, alert  today with family. Daughter states she went to [place] and [place] and returned at 1445 to find the patient slumped over and unable to talk or move her left side. She was taken to [place] and telemedicine consult was done with Dr Marx. No acute changes on CT head. She was given IV TPA and transferre to [facility] for further evaluation. Upon arrival patient still with aphasia, L facial droop, dysarthria. Minimal left sided weakness. CTA head and neck multiphase complete, no LVO, no IR. Pt admitted to [facility] for higher level of care.     She since has been stepped down to the floor. Speech therapy has recommended PEG tube placement for long term enteral access. IR has reviewed her scan and did not see a safe window for them to insert a PEG tube. General surgery has been consulted due to her inability to swallow secondary to post-stroke dysphagia and dysarthria.    She and her family deny any previous abdominal surgeries. She did undergo an open hear surgery in the past. She has been tolerating tube feeds through her NGT. She has been having bowel function, with no nausea or vomiting.

## 2018-06-04 NOTE — ASSESSMENT & PLAN NOTE
Pt NPO with NG in place  SLP recommending PEG  IR reviewed CT Abdomen and determined no safe window for PEG placement  Consult placed to Gen Surg  TF and heparin were held at midnight; Will continue to hold until hear back from [facility] about procedure scheduling

## 2018-06-04 NOTE — ASSESSMENT & PLAN NOTE
Stroke risk factor  Systolic less than 160   On clonidine 0.1 mg BID, lasix 20 mg qd, Losartan 100 mg qd

## 2018-06-04 NOTE — CONSULTS
Radiology Consult    Sury Cobos is a 83 y.o. female with a history of right MCA stroke and dysphagia. IR consulted for PEG placement.    Past Medical History:   Diagnosis Date    Aphasia 5/27/2018    Arthritis     Benign essential HTN     CHF (congestive heart failure)     Closed intertrochanteric fracture of left hip     Coronary artery disease     Dementia     Dementia with behavioral disturbance 5/27/2018    Diabetes mellitus     Embolic stroke involving right middle cerebral artery 5/27/2018    Gout     High cholesterol     Low potassium syndrome     PEG (percutaneous endoscopic gastrostomy) status 6/4/2018    Placed 6/4/18    Urinary incontinence      Past Surgical History:   Procedure Laterality Date    CARDIAC SURGERY      CORONARY ARTERY BYPASS GRAFT      reduction & internal fixation of displaced closed comminuted intertrochanteric fx left hip  02/12/2018       Discussed with primary team, Adrianna Rascon with stroke team    Imaging reviewed with Radiology staff, Dr. Lopez.     Procedure: PEG placement requested    Scheduled Meds:    albuterol-ipratropium  3 mL Nebulization Q6H    aspirin  81 mg Per NG tube Daily    atorvastatin  40 mg Per NG tube Daily    buPROPion  150 mg Per NG tube BID    cloNIDine  0.1 mg Per NG tube BID    donepezil  10 mg Per NG tube QHS    furosemide  20 mg Per NG tube Daily    losartan  100 mg Per NG tube Daily    nicotine  1 patch Transdermal Daily    QUEtiapine  25 mg Per NG tube QHS    sodium chloride 0.9%  3 mL Intravenous Q8H    sodium chloride 3%  4 mL Nebulization BID     Continuous Infusions:   PRN Meds:acetaminophen, hydrALAZINE, labetalol, magnesium oxide, magnesium oxide, potassium chloride 10%, potassium chloride 10%, potassium chloride 10%, potassium, sodium phosphates, potassium, sodium phosphates, potassium, sodium phosphates, senna-docusate 8.6-50 mg    Allergies: Review of patient's allergies indicates:  No Known  Allergies    Labs:    Recent Labs  Lab 06/03/18  0523   INR 0.9       Recent Labs  Lab 06/04/18  0453   WBC 7.67   HGB 10.8*   HCT 33.8*   MCV 95         Recent Labs  Lab 06/04/18  0453         K 4.4   CL 99   CO2 30*   BUN 22   CREATININE 0.8   CALCIUM 9.5   MG 2.5   ALT 31   AST 32   ALBUMIN 3.2*   BILITOT 0.4         Vitals (Most Recent):  Temp: 97.4 °F (36.3 °C) (06/04/18 1139)  Pulse: 69 (06/04/18 1139)  Resp: 18 (06/04/18 1139)  BP: (!) 166/70 (06/04/18 1139)  SpO2: 98 % (06/04/18 1139)    Plan:     CT abd/pelvis scan reviewed with staff. No safe window for IR placement of percutaneous gastrostomy tube. Consider consultation with surgery.    Greyson Reyna MD  PGY-5  Department of Radiology  [phone]

## 2018-06-04 NOTE — HOSPITAL COURSE
5/30-31/18: Evaluated by therapy.  Bed mobility Min-ModA.  Sit to stand May. Grooming CGA 5/31.   6/2/18: Participated with therapy. Bed mobility Min-MaxA .  Sit to stand May & RW.  Ambulated 50 ft Min-ModA & RW.  NPO. SLP diagnosis of Dysphagia, Dysarthria and Cognitive-Linguistic Impairment.   6/4/18: Participated with therapy. CGA-MaxA.  Sit to stand MaxA .  UBD MaxA and toileting MaxA.  Feeding ModA.

## 2018-06-04 NOTE — CONSULTS
[facility]  General Surgery  Consult Note    Patient Name: Sury Cobos  MRN: [id number]  Code Status: Full Code  Admission Date: 5/27/2018  Hospital Length of Stay: 8 days  Attending Physician: Steve Edouard MD  Primary Care Provider: Ese Moya MD    Patient information was obtained from patient, relative(s) and past medical records.     Inpatient consult to General Surgery  Consult performed by: NUBIA ASHBY  Consult ordered by: FLORI CHESTER  Reason for consult: PEG tube        Subjective:     Principal Problem: Embolic stroke involving right middle cerebral artery    History of Present Illness: Ms. Cobos is a 82 y/o female with pmhx DM2, HTN, HLD, dementia who was last seen normal at 1320, alert  today with family. Daughter states she went to [employer] and returned at 1445 to find the patient slumped over and unable to talk or move her left side. She was taken to [place] and telemedicine consult was done with Dr Marx. No acute changes on CT head. She was given IV TPA and transferre to [facility] for further evaluation. Upon arrival patient still with aphasia, L facial droop, dysarthria. Minimal left sided weakness. CTA head and neck multiphase complete, no LVO, no IR. Pt admitted to [facility] for higher level of care.     She since has been stepped down to the floor. Speech therapy has recommended PEG tube placement for long term enteral access. IR has reviewed her scan and did not see a safe window for them to insert a PEG tube. General surgery has been consulted due to her inability to swallow secondary to post-stroke dysphagia and dysarthria.    She and her family deny any previous abdominal surgeries. She did undergo an open hear surgery in the past. She has been tolerating tube feeds through her NGT. She has been having bowel function, with no nausea or vomiting.    No current facility-administered medications on file prior to encounter.      Current Outpatient  Prescriptions on File Prior to Encounter   Medication Sig    allopurinol (ZYLOPRIM) 300 MG tablet Take 1 tablet by mouth once daily.    aspirin (ECOTRIN) 81 MG EC tablet Take 81 mg by mouth once daily.    buPROPion (WELLBUTRIN SR) 150 MG TBSR 12 hr tablet Take 150 mg by mouth 2 (two) times daily.    cloNIDine (CATAPRES) 0.1 MG tablet Take 1 tablet by mouth 2 (two) times daily.     donepezil (ARICEPT) 10 MG tablet Take 10 mg by mouth every evening.    furosemide (LASIX) 20 MG tablet Take 1 tablet by mouth once daily.    HYDROcodone-acetaminophen (NORCO) 7.5-325 mg per tablet Take 1 tablet by mouth every 12 (twelve) hours as needed for Pain.    losartan (COZAAR) 25 MG tablet Take 1 tablet by mouth once daily.    potassium chloride (KLOR-CON) 10 MEQ TbSR Take 1 tablet by mouth once daily.    pravastatin (PRAVACHOL) 40 MG tablet Take 1 tablet by mouth once daily.       Review of patient's allergies indicates:  No Known Allergies    Past Medical History:   Diagnosis Date    Aphasia 5/27/2018    Arthritis     Benign essential HTN     CHF (congestive heart failure)     Closed intertrochanteric fracture of left hip     Coronary artery disease     Dementia     Dementia with behavioral disturbance 5/27/2018    Diabetes mellitus     Embolic stroke involving right middle cerebral artery 5/27/2018    Gout     High cholesterol     Low potassium syndrome     PEG (percutaneous endoscopic gastrostomy) status 6/4/2018    Placed 6/4/18    Urinary incontinence      Past Surgical History:   Procedure Laterality Date    CARDIAC SURGERY      CORONARY ARTERY BYPASS GRAFT      reduction & internal fixation of displaced closed comminuted intertrochanteric fx left hip  02/12/2018     Family History     None        Social History Main Topics    Smoking status: Current Every Day Smoker     Packs/day: 3.00    Smokeless tobacco: Never Used    Alcohol use Yes      Comment: socially    Drug use: No    Sexual  activity: Not on file     Review of Systems   Constitutional: Negative for chills and fever.   HENT: Positive for trouble swallowing. Negative for sore throat.    Respiratory: Negative for chest tightness and shortness of breath.    Cardiovascular: Negative for chest pain.   Gastrointestinal: Negative for abdominal pain, constipation, diarrhea, nausea and vomiting.   Musculoskeletal: Negative for back pain and neck pain.   Skin: Negative for color change.   Allergic/Immunologic: Negative for immunocompromised state.   Neurological: Negative for dizziness and headaches.   Hematological: Negative for adenopathy.     Objective:     Vital Signs (Most Recent):  Temp: 97.4 °F (36.3 °C) (06/04/18 1139)  Pulse: 69 (06/04/18 1139)  Resp: 18 (06/04/18 1139)  BP: (!) 166/70 (06/04/18 1139)  SpO2: 98 % (06/04/18 1139) Vital Signs (24h Range):  Temp:  [97.3 °F (36.3 °C)-98.3 °F (36.8 °C)] 97.4 °F (36.3 °C)  Pulse:  [59-95] 69  Resp:  [16-18] 18  SpO2:  [92 %-98 %] 98 %  BP: (131-166)/(58-70) 166/70     Weight: 56.4 kg (124 lb 5.4 oz)  Body mass index is 25.11 kg/m².    Physical Exam   Constitutional: She appears well-developed and well-nourished. No distress.   HENT:   Head: Normocephalic and atraumatic.   Eyes: EOM are normal.   Neck: Normal range of motion. Neck supple.   Cardiovascular: Normal rate and intact distal pulses.    Pulmonary/Chest: Effort normal. No respiratory distress.   Abdominal: Soft. She exhibits no distension. There is no tenderness. There is no guarding.   Three small well healed epigastric scars present, likely mediastinal chest tubes after open CABG   Neurological: She is alert.   Skin: Skin is warm. She is not diaphoretic.   Vitals reviewed.      Significant Labs:  CBC:   Recent Labs  Lab 06/04/18  0453   WBC 7.67   RBC 3.56*   HGB 10.8*   HCT 33.8*      MCV 95   MCH 30.3   MCHC 32.0     CMP:   Recent Labs  Lab 06/04/18  0453      CALCIUM 9.5   ALBUMIN 3.2*   PROT 7.2      K 4.4    CO2 30*   CL 99   BUN 22   CREATININE 0.8   ALKPHOS 69   ALT 31   AST 32   BILITOT 0.4     Coagulation:   Recent Labs  Lab 05/31/18  0152  06/03/18  0523   LABPROT 10.0  < > 10.0   INR 0.9  < > 0.9   APTT 24.7  --   --    < > = values in this interval not displayed.    Significant Diagnostics:  I have reviewed all pertinent imaging results/findings within the past 24 hours.    Assessment/Plan:     Dysphagia    Patient is an 82 yo female who suffered an MCA stroke 5/27/18 who has since been unable to swallow food PO due to high risk of aspiration. She has been tolerating TF per NGT.     We will schedule her for PEG tube placement tomorrow.  Hold TF at midnight  Ok for prophylactic heparin    Please call with any questions          VTE Risk Mitigation         Ordered     heparin (porcine) injection 5,000 Units  Every 8 hours      06/04/18 1625     heparin (porcine) injection 5,000 Units  Every 8 hours      06/03/18 0742     IP VTE HIGH RISK PATIENT  Once      05/27/18 1855     Reason for No Pharmacological VTE Prophylaxis  Once      05/27/18 1855          Thank you for your consult. I will follow-up with patient. Please contact us if you have any additional questions.    Paul Ospina MD  General Surgery  [facility]

## 2018-06-04 NOTE — HPI
Sury Cobos is a 83-year-old female with PMHx of gout, DM, dementia, CAD, CHF, & HTN.  Patient presented to [facility] ED with left sided facial droop, aphasia, dysarthria .  CTH revealed no acute pathology.  A telemedicine consult was placed and completed.  tPA was recommended and administered. Transferred to [facility] on 5/27 for further evaluation and management.  Upon admission, 84 y/o female with  who received IV TPA but no IR. MRI with infarct in R precentral gyrus. Etiology unclear though nursing note reporting patient went into Afib on monitor in [facility]. EKG completed and not seen. Echo showing Severe LAE. Will need to consider AC after PEG placement. Hospital course complicated by dysphagia (PEG pending for today), L sided weakness, aphasia, agitation.     Functional History: Patient lives with daughter in a mobile home with ramp access to enter.  Prior to admission, (I) with ADLs and was utilizing a rollator for mobility.  Patientt required some assist to transfer into the tub, but could bathe independently.  DME: WC, rollator, and shower chair.

## 2018-06-04 NOTE — SUBJECTIVE & OBJECTIVE
Neurologic Chief Complaint: Aphasia, dysarthria, LSW     Subjective:     Interval History: Patient is seen for follow-up neurological assessment and treatment recommendations:     TORIEEON. Family wishes to proceed with PEG (recs per SLP.) IR unable as no safe window; consult placed to Gen Surg.    HPI, Past Medical, Family, and Social History remains the same as documented in the initial encounter.     Review of Systems   Constitutional: Negative for chills and fever.   Respiratory: Negative for shortness of breath and stridor.    Gastrointestinal: Negative for nausea and vomiting.   Neurological: Positive for facial asymmetry, speech difficulty and weakness. Negative for headaches.   Psychiatric/Behavioral: Positive for agitation and confusion.     Scheduled Meds:   albuterol-ipratropium  3 mL Nebulization Q6H    aspirin  81 mg Per NG tube Daily    atorvastatin  40 mg Per NG tube Daily    buPROPion  150 mg Per NG tube BID    cloNIDine  0.1 mg Per NG tube BID    donepezil  10 mg Per NG tube QHS    furosemide  20 mg Per NG tube Daily    losartan  100 mg Per NG tube Daily    nicotine  1 patch Transdermal Daily    QUEtiapine  25 mg Per NG tube QHS    sodium chloride 0.9%  3 mL Intravenous Q8H    sodium chloride 3%  4 mL Nebulization BID     Continuous Infusions:    PRN Meds:acetaminophen, hydrALAZINE, labetalol, magnesium oxide, magnesium oxide, potassium chloride 10%, potassium chloride 10%, potassium chloride 10%, potassium, sodium phosphates, potassium, sodium phosphates, potassium, sodium phosphates, senna-docusate 8.6-50 mg    Objective:     Vital Signs (Most Recent):  Temp: 97.4 °F (36.3 °C) (06/04/18 1139)  Pulse: 69 (06/04/18 1139)  Resp: 18 (06/04/18 1139)  BP: (!) 166/70 (06/04/18 1139)  SpO2: 98 % (06/04/18 1139)  BP Location: Left arm    Vital Signs Range (Last 24H):  Temp:  [97.3 °F (36.3 °C)-98.3 °F (36.8 °C)]   Pulse:  [59-95]   Resp:  [16-18]   BP: (131-166)/(58-70)   SpO2:  [92 %-98 %]   BP  Location: Left arm    Physical Exam   Constitutional: She appears well-developed and well-nourished. No distress.   HENT:   Head: Normocephalic and atraumatic.   Eyes: Conjunctivae are normal. No scleral icterus.   Cardiovascular: Normal rate.    Pulmonary/Chest: Effort normal. No respiratory distress.   Neurological: She is alert. A cranial nerve deficit is present. No sensory deficit.   Skin: Skin is warm and dry.   Psychiatric: She is agitated. She is not aggressive. She is inattentive.   Nursing note and vitals reviewed.      Neurological Exam:   LOC: alert  Attention Span: good  Language: ? aphasia  Articulation: moderate/severe Dysarthria  Visual Fields: Full  EOM (CN III, IV, VI): Full/intact  Facial Movement (CN VII): Lower facial weakness on the Left  Motor: Arm left  Paresis: 4/5  Leg left  Paresis: 3/5  Arm right  Paresis: 5/5  Leg right Paresis: 3/5  Sensation: equal sensation   Tone: Spasticity  RUE    Laboratory:  CMP:     Recent Labs  Lab 06/04/18  0453   CALCIUM 9.5   ALBUMIN 3.2*   PROT 7.2      K 4.4   CO2 30*   CL 99   BUN 22   CREATININE 0.8   ALKPHOS 69   ALT 31   AST 32   BILITOT 0.4     BMP:     Recent Labs  Lab 06/04/18  0453      K 4.4   CL 99   CO2 30*   BUN 22   CREATININE 0.8   CALCIUM 9.5     CBC:     Recent Labs  Lab 06/04/18  0453   WBC 7.67   RBC 3.56*   HGB 10.8*   HCT 33.8*      MCV 95   MCH 30.3   MCHC 32.0     Lipid Panel:   No results for input(s): CHOL, LDLCALC, HDL, TRIG in the last 168 hours.  Coagulation:     Recent Labs  Lab 05/31/18  0152  06/03/18  0523   INR 0.9  < > 0.9   APTT 24.7  --   --    < > = values in this interval not displayed.  Platelet Aggregation Study: No results for input(s): PLTAGG, PLTAGINTERP, PLTAGREGLACO, ADPPLTAGGREG in the last 168 hours.  Hgb A1C:   No results for input(s): HGBA1C in the last 168 hours.  TSH:   No results for input(s): TSH in the last 168 hours.    Diagnostic Results     Brain Imaging   MRI Brain 5/28:    Small  area of restricted diffusion in the right precentral gyrus.  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  Bilateral basal ganglia, thalami and cerebellar hemispheres small remote infarcts.  Generalized cerebral volume loss and extensive white matter chronic small vessel ischemic change.  Ventriculomegaly out of proportion to sulcal prominence, nonspecific but can be seen with normal pressure hydrocephalus, but overall unchanged from prior MRI.      Cardiac Imaging   ECHO 5/28/18:   CONCLUSIONS     1 - Concentric LVH with hyperdynamic left ventricular systolic function (EF >70%).     2 - Severe left atrial enlargement.     3 - Impaired LV relaxation, elevated LAP (grade 2 diastolic dysfunction).     4 - Normal right ventricular systolic function .     5 - Mild to moderate aortic stenosis, ALBER = 1.64 cm2, AVAi = 1.12 cm2/m2, peak velocity = 3.11 m/s, mean gradient = 24 mmHg

## 2018-06-04 NOTE — PLAN OF CARE
Problem: SLP Goal  Goal: SLP Goal  Speech Language Pathology Goals  Goals expected to be met by 6/5  1. Ongoing swallow assessment  2. Pt will follow 1 step commands with 75% acc given min A  3. Pt will answer yes/no questions with 75% acc given min A  4. Pt will complete OME's given mod A  5. Pt will complete word level dysarthria tasks with 80% intell. given max A to utilize speech strategies   6. Assess reading, writing, visual spatial skills  7. Assess memory, problem solving, reasoning   Peg tube recommended.    Paula Pal MA/[facility]-SLP  Speech Language Pathologist  Pager [phone]  6/4/2018

## 2018-06-04 NOTE — PT/OT/SLP PROGRESS
"Speech Language Pathology Treatment    Patient Name:  Sury Cobos   MRN:  [id number]  Admitting Diagnosis: Embolic stroke involving right middle cerebral artery    Recommendations:                 General Recommendations:  Dysphagia therapy and Speech/language therapy  Diet recommendations:  NPO, Liquid Diet Level: NPO   Aspiration Precautions: Strict aspiration precautions   General Precautions: Standard, aspiration, fall  Communication strategies:  go to room if call light pushed    Subjective     "I want chicken" per pt  Patient goals: unable to state    Pain/Comfort:  · Pain Rating 1: 0/10  · Pain Rating Post-Intervention 1: 0/10    Objective:     Has the patient been evaluated by SLP for swallowing?   Yes  Keep patient NPO? Yes   Current Respiratory Status: room air      Pt. Seen at bedside and was alert though remained very confused.  NG tube remained in place.  Verbalizations elicited were severely dysarthric with poor intelligibility in known context with careful listening.  She inconsistently followed simple commands.  Pt. Refusing po intake when offered water, pudding.  When offered sip of coffee, pt. Took very small sip with anterior loss of entire bolus.  No pharyngeal swallow was elicited.  Vocal quality remained wet with adequate intensity.      Assessment:     Sury Cobos is a 83 y.o. female with an SLP diagnosis of Dysphagia, Dysarthria and Cognitive-Linguistic Impairment. .    Goals:    SLP Goals        Problem: SLP Goal    Goal Priority Disciplines Outcome   SLP Goal     SLP Ongoing (interventions implemented as appropriate)   Description:  Speech Language Pathology Goals  Goals expected to be met by 6/5  1. Ongoing swallow assessment  2. Pt will follow 1 step commands with 75% acc given min A  3. Pt will answer yes/no questions with 75% acc given min A  4. Pt will complete OME's given mod A  5. Pt will complete word level dysarthria tasks with 80% intell. given max A to utilize speech strategies "   6. Assess reading, writing, visual spatial skills  7. Assess memory, problem solving, reasoning                    Plan:     · Patient to be seen:  4 x/week   · Plan of Care expires:  06/26/18  · Plan of Care reviewed with:  patient   · SLP Follow-Up:  Yes       Discharge recommendations:  nursing facility, skilled     Time Tracking:     SLP Treatment Date:   06/04/18  Speech Start Time:  0755  Speech Stop Time:  0805     Speech Total Time (min):  10 min    Billable Minutes: Treatment Swallowing Dysfunction 10    Paula Pal MA, CCC-SLP  06/04/2018

## 2018-06-04 NOTE — SUBJECTIVE & OBJECTIVE
No current facility-administered medications on file prior to encounter.      Current Outpatient Prescriptions on File Prior to Encounter   Medication Sig    allopurinol (ZYLOPRIM) 300 MG tablet Take 1 tablet by mouth once daily.    aspirin (ECOTRIN) 81 MG EC tablet Take 81 mg by mouth once daily.    buPROPion (WELLBUTRIN SR) 150 MG TBSR 12 hr tablet Take 150 mg by mouth 2 (two) times daily.    cloNIDine (CATAPRES) 0.1 MG tablet Take 1 tablet by mouth 2 (two) times daily.     donepezil (ARICEPT) 10 MG tablet Take 10 mg by mouth every evening.    furosemide (LASIX) 20 MG tablet Take 1 tablet by mouth once daily.    HYDROcodone-acetaminophen (NORCO) 7.5-325 mg per tablet Take 1 tablet by mouth every 12 (twelve) hours as needed for Pain.    losartan (COZAAR) 25 MG tablet Take 1 tablet by mouth once daily.    potassium chloride (KLOR-CON) 10 MEQ TbSR Take 1 tablet by mouth once daily.    pravastatin (PRAVACHOL) 40 MG tablet Take 1 tablet by mouth once daily.       Review of patient's allergies indicates:  No Known Allergies    Past Medical History:   Diagnosis Date    Aphasia 5/27/2018    Arthritis     Benign essential HTN     CHF (congestive heart failure)     Closed intertrochanteric fracture of left hip     Coronary artery disease     Dementia     Dementia with behavioral disturbance 5/27/2018    Diabetes mellitus     Embolic stroke involving right middle cerebral artery 5/27/2018    Gout     High cholesterol     Low potassium syndrome     PEG (percutaneous endoscopic gastrostomy) status 6/4/2018    Placed 6/4/18    Urinary incontinence      Past Surgical History:   Procedure Laterality Date    CARDIAC SURGERY      CORONARY ARTERY BYPASS GRAFT      reduction & internal fixation of displaced closed comminuted intertrochanteric fx left hip  02/12/2018     Family History     None        Social History Main Topics    Smoking status: Current Every Day Smoker     Packs/day: 3.00    Smokeless  tobacco: Never Used    Alcohol use Yes      Comment: socially    Drug use: No    Sexual activity: Not on file     Review of Systems   Constitutional: Negative for chills and fever.   HENT: Positive for trouble swallowing. Negative for sore throat.    Respiratory: Negative for chest tightness and shortness of breath.    Cardiovascular: Negative for chest pain.   Gastrointestinal: Negative for abdominal pain, constipation, diarrhea, nausea and vomiting.   Musculoskeletal: Negative for back pain and neck pain.   Skin: Negative for color change.   Allergic/Immunologic: Negative for immunocompromised state.   Neurological: Negative for dizziness and headaches.   Hematological: Negative for adenopathy.     Objective:     Vital Signs (Most Recent):  Temp: 97.4 °F (36.3 °C) (06/04/18 1139)  Pulse: 69 (06/04/18 1139)  Resp: 18 (06/04/18 1139)  BP: (!) 166/70 (06/04/18 1139)  SpO2: 98 % (06/04/18 1139) Vital Signs (24h Range):  Temp:  [97.3 °F (36.3 °C)-98.3 °F (36.8 °C)] 97.4 °F (36.3 °C)  Pulse:  [59-95] 69  Resp:  [16-18] 18  SpO2:  [92 %-98 %] 98 %  BP: (131-166)/(58-70) 166/70     Weight: 56.4 kg (124 lb 5.4 oz)  Body mass index is 25.11 kg/m².    Physical Exam   Constitutional: She appears well-developed and well-nourished. No distress.   HENT:   Head: Normocephalic and atraumatic.   Eyes: EOM are normal.   Neck: Normal range of motion. Neck supple.   Cardiovascular: Normal rate and intact distal pulses.    Pulmonary/Chest: Effort normal. No respiratory distress.   Abdominal: Soft. She exhibits no distension. There is no tenderness. There is no guarding.   Three small well healed epigastric scars present, likely mediastinal chest tubes after open CABG   Neurological: She is alert.   Skin: Skin is warm. She is not diaphoretic.   Vitals reviewed.      Significant Labs:  CBC:   Recent Labs  Lab 06/04/18  0453   WBC 7.67   RBC 3.56*   HGB 10.8*   HCT 33.8*      MCV 95   MCH 30.3   MCHC 32.0     CMP:   Recent  Labs  Lab 06/04/18  0453      CALCIUM 9.5   ALBUMIN 3.2*   PROT 7.2      K 4.4   CO2 30*   CL 99   BUN 22   CREATININE 0.8   ALKPHOS 69   ALT 31   AST 32   BILITOT 0.4     Coagulation:   Recent Labs  Lab 05/31/18  0152  06/03/18  0523   LABPROT 10.0  < > 10.0   INR 0.9  < > 0.9   APTT 24.7  --   --    < > = values in this interval not displayed.    Significant Diagnostics:  I have reviewed all pertinent imaging results/findings within the past 24 hours.

## 2018-06-04 NOTE — CONSULTS
Inpatient consult to Physical Medicine Rehab  Consult performed by: CHANDLER COURTNEY  Consult ordered by: CONSTANCE ALMAGUER  Reason for consult: assess rehab needs        Reviewed patient history and current admission.  Patient is more likely a SNF candidate as recommended by therapy.  She is currently in soft restraints with NGT in place with dysarthria and cognitive-linguistic impairments. She would likely not tolerate 3 hours of therapy. Will discuss with PM&R staff tomorrow morning for rehab recommendation.     TANA Lopez, FNP-C  Physical Medicine & Rehabilitation   06/04/2018  Spectralink: [id number]

## 2018-06-04 NOTE — PT/OT/SLP PROGRESS
Occupational Therapy   Treatment    Name: Sury Cobos  MRN: [id number]  Admitting Diagnosis:  Embolic stroke involving right middle cerebral artery       Recommendations:     Discharge Recommendations: nursing facility, skilled  Discharge Equipment Recommendations:  3-in-1 commode  Barriers to discharge:  None    Subjective     Communicated with: RN prior to session.  Pain/Comfort:  · Pain Rating 1: 0/10  · Pain Rating Post-Intervention 1: 0/10    Patients cultural, spiritual, Cheondoism conflicts given the current situation: None reported    Objective:     Patient found with: NG tube, pressure relief boots, restraints    General Precautions: Standard, aspiration, fall   Orthopedic Precautions:N/A   Braces: N/A     Pt received W/ HOB elevated. Pt mumbling throughout session. Pt cursing consistently throughout session.     Occupational Performance:    Bed Mobility:    · Patient completed Rolling/Turning to Right with maximal assistance  · Patient completed Scooting/Bridging with contact guard assistance  · Patient completed Supine to Sit with moderate assistance  · Patient completed Sit to Supine with maximal assistance     Functional Mobility/Transfers:  · Patient completed Sit <> Stand Transfer with maximal assistance  with  no assistive device RLE knee buckling in standing  · Functional Mobility: Pt unable to take steps at this time.     Activities of Daily Living:  · Feeding:  moderate assistance pt performed motor feeding pattern while drikning from a cup  · UB Dressing: maximal assistance donned/doffed gown in front   · Toileting: maximal assistance jeremiah-anal care at bed level    Patient left HOB elevated with all lines intact, call button in reach and sitter present    AMPA 6 Click:  AMPA Total Score: 12    Treatment & Education:  Pt sat EOB ~10-15 minutes at min a to cga.   Writing therapist performed PROM to LUE for 10 reps to all joints in all planes.  Pt worked w/ SLP while OT worked on posture sitting  EOB.     Education:    Assessment:     Sury Cobos is a 83 y.o. female with a medical diagnosis of Embolic stroke involving right middle cerebral artery.  She presents with impairments listed below. Pt did well to follow 1 step commands. Pt displayed deficits for sitting balance and LUE functioning causing pt to require increased assist w/ ADLs and functional tasks. Pt would benefit from skilled OT services to improve independence and overall occupational functioning.      Performance deficits affecting function are weakness, impaired endurance, impaired self care skills, impaired functional mobilty, gait instability, impaired balance, decreased upper extremity function, decreased lower extremity function, impaired cognition, decreased safety awareness, abnormal tone, decreased ROM, impaired fine motor, impaired coordination.      Rehab Prognosis:  fair; patient would benefit from acute skilled OT services to address these deficits and reach maximum level of function.       Plan:     Patient to be seen 4 x/week to address the above listed problems via self-care/home management, therapeutic activities, therapeutic exercises, neuromuscular re-education  · Plan of Care Expires:    · Plan of Care Reviewed with: patient    This Plan of care has been discussed with the patient who was involved in its development and understands and is in agreement with the identified goals and treatment plan    GOALS:    Occupational Therapy Goals        Problem: Occupational Therapy Goal    Goal Priority Disciplines Outcome Interventions   Occupational Therapy Goal     OT, PT/OT     Description:  Goals to be met by: 6/14/2018     Patient will increase functional independence with ADLs by performing:    UE Dressing with Minimal Assistance.  LE Dressing with Minimal Assistance.  Grooming while standing with Contact Guard Assistance.  Toileting from toilet with Minimal Assistance for hygiene and clothing management.   Sitting at edge of  bed x 15 minutes with Stand-by Assistance.  Toilet transfer to toilet with Contact Guard Assistance.                      Time Tracking:     OT Date of Treatment: 06/04/18  OT Start Time: 0743  OT Stop Time: 0810  OT Total Time (min): 27 min    Billable Minutes:Self Care/Home Management 13 minutes  Therapeutic Activity 14 minutes    Jayme Peralta, OT  6/4/2018

## 2018-06-04 NOTE — ASSESSMENT & PLAN NOTE
Patient is an 82 yo female who suffered an MCA stroke 5/27/18 who has since been unable to swallow food PO due to high risk of aspiration. She has been tolerating TF per NGT.     We will schedule her for PEG tube placement tomorrow.  Hold TF at midnight  Ok for prophylactic heparin    Please call with any questions

## 2018-06-04 NOTE — PROGRESS NOTES
[facility]  Vascular Neurology  Comprehensive Stroke Center  Progress Note    Assessment/Plan:     * Embolic stroke involving right middle cerebral artery    84 y/o female with left sided facial droop, aphasia, dysarthria who received IV TPA but no IR. MRI with infarct in R precentral gyrus.   Etiology unclear though nursing note reporting patient went into Afib on monitor in NCC. EKG completed and not seen. Echo showing Severe LAE. Will need to consider AC after PEG placement. SNF vs  with 24/7 family care.     Gen Surg consulted for PEG 6/4.      Antithrombotics: Aspirin 81 mg  Statins: Lipitor 40 mg daily  Aggressive risk factor modification: HTN, HLD  Rehab efforts: PT/OT/SLP to evaluate and treat, PM&R consult   Diagnostics ordered/pending: none   VTE prophylaxis: SCD's may begin Heparin 500 units SC Q8H   BP parameters: Infarct: Post tPA, SBP <160        S/P admn tPA in diff fac w/n last 24 hr bef adm to crnt fac    Close monitoring in NCCU for 24 hours post administration, completed        Dysarthria    Severe dysarthria  SLP ordered         Dysphagia    Pt NPO with NG in place  SLP recommending PEG  IR reviewed CT Abdomen and determined no safe window for PEG placement  Consult placed to Gen Surg  TF and heparin were held at midnight; Will continue to hold until hear back from GS about procedure scheduling        UTI (urinary tract infection)    Prior UA with many bacteria; No UCx ordered  Pt received 2 days Cipro, followed by 2 days Rocephin only  Pt now afebrile, no leukocytosis   Will repeat UA and culture if necessary to ensure adequate treatment any/all agitation etiologies        Dementia with behavioral disturbance    Home wellbutrin 150 mg  Donepezil 10 qhs  seroquel 25 mg qhs        Aphasia    Difficult to assess if patient is responding appropriately to questions due to severe dysarthria  Follows some commands accurately   Aggressive SLP therapy         New onset a-fib    Seen  on telemetry monitor in [facility]  Echo showing severe LAE  Will need AC after PEG placement        Atrial enlargement, left    Severe left atrial enlargement seen on echo  Per nursing note, afib was seen on telemetry. EKG completed and sinus rhythm with Premature atrial complexes  Will need to consider AC after PEG placement         Cytotoxic cerebral edema    Area of cytotoxic cerebral edema identified when reviewing brain imaging in the territory of the R middle cerebral artery. There is no mass effect associated with it. We will continue to monitor the patients clinical exam for any worsening of symptoms which may indicate expansion of the stroke or the area of the edema resulting in the clinical change. The pattern is of unclear etiology (ESUS). Suspect afib (documented on telemetry)            Mixed hyperlipidemia    Stroke risk factor    Lipitor 40 mg daily        Benign essential HTN    Stroke risk factor  Systolic less than 160   On clonidine 0.1 mg BID, lasix 20 mg qd, Losartan 100 mg qd             84 y/o female with aphasia, dysarthria and facial droop, received IV TPA but no LVO so no IR. Probable small vessel disease.    5/28: Blood pressure elevated >200 overnight requiring cardene. Patient bradycardic and with paroxysmal afib overnight as well. MRI with infarct in the right precentral gyrus.   5/29: No events overnight. Patient with many bacteria on UTI so short cipro course started per [facility]. Pending step down to NSU.   5/30: seems more agitated today, not participating in exam or following commands, severe dysarthria and excessive secretions  05/31/18 Continues to have increased secretions and severe dysarthria.  Patient following some commands and plans to transfer to stroke service today   6/1: Secretions are better controlled today. Plan to step down. On exam patient is agitated and stating she wants to go home. In bilateral wrist restraints. Right lung opacity - Chest PT and duonebs ordered per  NCC. Nursing notes noted Afib but EKG showing no afib. Echo revealed Severe left atrial enlargement. May need AC on discharge.  6/2: No acute events overnight. Stepped down to NSU. Plan for PEG early next week if no improvement per ST.   6/3: No acute events overnight. Holding TF and heparin tonight in preparation for possible PEG tomorrow. O2 sats low 90's, CXR ordered.   6/4: Family wishes to proceed with PEG (recs per SLP.) IR unable as no safe window; consult placed to Gen Surg.    STROKE DOCUMENTATION   Acute Stroke Times   Last Known Normal Date: 05/27/18  Last Known Normal Time: 1320  Symptom Onset Date: 05/27/18  Symptom Onset Time: 1445  Stroke Team Called Date: 05/27/18  Stroke Team Called Time: 1550  Stroke Team Arrival Date: 05/27/18  Stroke Team Arrival Time: 1751  CT Interpretation Time: 1536  Decision to Treat Time for Alteplase: 1806    NIH Scale:  1a. Level Of Consciousness: 0-->Alert: keenly responsive  1b. LOC Questions: 2-->Answers neither question correctly  1c. LOC Commands: 0-->Performs both tasks correctly  2. Best Gaze: 0-->Normal  3. Visual: 0-->No visual loss  4. Facial Palsy: 2-->Partial paralysis (total or near-total paralysis of lower face)  5a. Motor Arm, Left: 2-->Some effort against gravity: limb cannot get to or maintain (if cued) 90 (or 45) degrees, drifts down to bed, but has some effort against gravity  5b. Motor Arm, Right: 1-->Drift: limb holds 90 (or 45) degrees, but drifts down before full 10 secs: does not hit bed or other support  6a. Motor Leg, Left: 3-->No effort against gravity: leg falls to bed immediately  6b. Motor Leg, Right: 2-->Some effort against gravity: leg falls to bed by 5 secs, but has some effort against gravity  7. Limb Ataxia: 0-->Absent  8. Sensory: 0-->Normal: no sensory loss  9. Best Language: 1-->Mild-to-moderate aphasia: some obvious loss of fluency or facility of comprehension, without significant limitation on ideas expressed or form of expression.  Reduction of speech and/or comprehension, however, makes conversation. . . (see row details)  10. Dysarthria: 2-->Severe dysarthria: patients speech is so slurred as to be unintelligible in the absence of or out of proportion to any dysphasia, or is mute/anarthric  11. Extinction and Inattention (formerly Neglect): 0-->No abnormality  Total (NIH Stroke Scale): 15       Modified Angelia Score: 0  [place] Coma Scale:    ABCD2 Score:    [id number] Score:   HAS -BLED Score:   ICH Score:   Hunt & Burkett Classification:      Hemorrhagic change of an Ischemic Stroke: Does this patient have an ischemic stroke with hemorrhagic changes? No     Neurologic Chief Complaint: Aphasia, dysarthria, LSW     Subjective:     Interval History: Patient is seen for follow-up neurological assessment and treatment recommendations:     TIAON. Family wishes to proceed with PEG (recs per SLP.) IR unable as no safe window; consult placed to Gen Surg.    HPI, Past Medical, Family, and Social History remains the same as documented in the initial encounter.     Review of Systems   Constitutional: Negative for chills and fever.   Respiratory: Negative for shortness of breath and stridor.    Gastrointestinal: Negative for nausea and vomiting.   Neurological: Positive for facial asymmetry, speech difficulty and weakness. Negative for headaches.   Psychiatric/Behavioral: Positive for agitation and confusion.     Scheduled Meds:   albuterol-ipratropium  3 mL Nebulization Q6H    aspirin  81 mg Per NG tube Daily    atorvastatin  40 mg Per NG tube Daily    buPROPion  150 mg Per NG tube BID    cloNIDine  0.1 mg Per NG tube BID    donepezil  10 mg Per NG tube QHS    furosemide  20 mg Per NG tube Daily    losartan  100 mg Per NG tube Daily    nicotine  1 patch Transdermal Daily    QUEtiapine  25 mg Per NG tube QHS    sodium chloride 0.9%  3 mL Intravenous Q8H    sodium chloride 3%  4 mL Nebulization BID     Continuous Infusions:    PRN  Meds:acetaminophen, hydrALAZINE, labetalol, magnesium oxide, magnesium oxide, potassium chloride 10%, potassium chloride 10%, potassium chloride 10%, potassium, sodium phosphates, potassium, sodium phosphates, potassium, sodium phosphates, senna-docusate 8.6-50 mg    Objective:     Vital Signs (Most Recent):  Temp: 97.4 °F (36.3 °C) (06/04/18 1139)  Pulse: 69 (06/04/18 1139)  Resp: 18 (06/04/18 1139)  BP: (!) 166/70 (06/04/18 1139)  SpO2: 98 % (06/04/18 1139)  BP Location: Left arm    Vital Signs Range (Last 24H):  Temp:  [97.3 °F (36.3 °C)-98.3 °F (36.8 °C)]   Pulse:  [59-95]   Resp:  [16-18]   BP: (131-166)/(58-70)   SpO2:  [92 %-98 %]   BP Location: Left arm    Physical Exam   Constitutional: She appears well-developed and well-nourished. No distress.   HENT:   Head: Normocephalic and atraumatic.   Eyes: Conjunctivae are normal. No scleral icterus.   Cardiovascular: Normal rate.    Pulmonary/Chest: Effort normal. No respiratory distress.   Neurological: She is alert. A cranial nerve deficit is present. No sensory deficit.   Skin: Skin is warm and dry.   Psychiatric: She is agitated. She is not aggressive. She is inattentive.   Nursing note and vitals reviewed.      Neurological Exam:   LOC: alert  Attention Span: good  Language: ? aphasia  Articulation: moderate/severe Dysarthria  Visual Fields: Full  EOM (CN III, IV, VI): Full/intact  Facial Movement (CN VII): Lower facial weakness on the Left  Motor: Arm left  Paresis: 4/5  Leg left  Paresis: 3/5  Arm right  Paresis: 5/5  Leg right Paresis: 3/5  Sensation: equal sensation   Tone: Spasticity  RUE    Laboratory:  CMP:     Recent Labs  Lab 06/04/18  0453   CALCIUM 9.5   ALBUMIN 3.2*   PROT 7.2      K 4.4   CO2 30*   CL 99   BUN 22   CREATININE 0.8   ALKPHOS 69   ALT 31   AST 32   BILITOT 0.4     BMP:     Recent Labs  Lab 06/04/18  0453      K 4.4   CL 99   CO2 30*   BUN 22   CREATININE 0.8   CALCIUM 9.5     CBC:     Recent Labs  Lab 06/04/18  0453    WBC 7.67   RBC 3.56*   HGB 10.8*   HCT 33.8*      MCV 95   MCH 30.3   MCHC 32.0     Lipid Panel:   No results for input(s): CHOL, LDLCALC, HDL, TRIG in the last 168 hours.  Coagulation:     Recent Labs  Lab 05/31/18  0152  06/03/18  0523   INR 0.9  < > 0.9   APTT 24.7  --   --    < > = values in this interval not displayed.  Platelet Aggregation Study: No results for input(s): PLTAGG, PLTAGINTERP, PLTAGREGLACO, ADPPLTAGGREG in the last 168 hours.  Hgb A1C:   No results for input(s): HGBA1C in the last 168 hours.  TSH:   No results for input(s): TSH in the last 168 hours.    Diagnostic Results     Brain Imaging   MRI Brain 5/28:    Small area of restricted diffusion in the right precentral gyrus.  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  Bilateral basal ganglia, thalami and cerebellar hemispheres small remote infarcts.  Generalized cerebral volume loss and extensive white matter chronic small vessel ischemic change.  Ventriculomegaly out of proportion to sulcal prominence, nonspecific but can be seen with normal pressure hydrocephalus, but overall unchanged from prior MRI.      Cardiac Imaging   ECHO 5/28/18:   CONCLUSIONS     1 - Concentric LVH with hyperdynamic left ventricular systolic function (EF >70%).     2 - Severe left atrial enlargement.     3 - Impaired LV relaxation, elevated LAP (grade 2 diastolic dysfunction).     4 - Normal right ventricular systolic function .     5 - Mild to moderate aortic stenosis, ALBER = 1.64 cm2, AVAi = 1.12 cm2/m2, peak velocity = 3.11 m/s, mean gradient = 24 mmHg      Cindy Varner PA-C  [facility]  Department of Vascular Neurology   [facility]

## 2018-06-04 NOTE — ASSESSMENT & PLAN NOTE
Severe left atrial enlargement seen on echo  Per nursing note, afib was seen on telemetry. EKG completed and sinus rhythm with Premature atrial complexes  Will need to consider AC after PEG placement

## 2018-06-04 NOTE — ASSESSMENT & PLAN NOTE
Prior UA with many bacteria; No UCx ordered  Pt received 2 days Cipro, followed by 2 days Rocephin only  Pt now afebrile, no leukocytosis   Will repeat UA and culture if necessary to ensure adequate treatment any/all agitation etiologies

## 2018-06-04 NOTE — PLAN OF CARE
Problem: Occupational Therapy Goal  Goal: Occupational Therapy Goal  Goals to be met by: 6/14/2018     Patient will increase functional independence with ADLs by performing:    UE Dressing with Minimal Assistance.  LE Dressing with Minimal Assistance.  Grooming while standing with Contact Guard Assistance.  Toileting from toilet with Minimal Assistance for hygiene and clothing management.   Sitting at edge of bed x 15 minutes with Stand-by Assistance.  Toilet transfer to toilet with Contact Guard Assistance.     Continue OT POC    Comments: Jayme Peralta OTR/L  6/4/2018

## 2018-06-05 ENCOUNTER — ANESTHESIA (OUTPATIENT)
Dept: SURGERY | Facility: HOSPITAL | Age: 83
DRG: 064 | End: 2018-06-05
Payer: MEDICARE

## 2018-06-05 ENCOUNTER — SURGERY (OUTPATIENT)
Age: 83
End: 2018-06-05

## 2018-06-05 LAB
ALBUMIN SERPL BCP-MCNC: 3.1 G/DL
ALP SERPL-CCNC: 69 U/L
ALT SERPL W/O P-5'-P-CCNC: 31 U/L
ANION GAP SERPL CALC-SCNC: 9 MMOL/L
AST SERPL-CCNC: 35 U/L
BASOPHILS # BLD AUTO: 0.06 K/UL
BASOPHILS NFR BLD: 0.8 %
BILIRUB SERPL-MCNC: 0.4 MG/DL
BUN SERPL-MCNC: 22 MG/DL
CALCIUM SERPL-MCNC: 9.4 MG/DL
CHLORIDE SERPL-SCNC: 100 MMOL/L
CO2 SERPL-SCNC: 30 MMOL/L
CREAT SERPL-MCNC: 0.8 MG/DL
DIFFERENTIAL METHOD: ABNORMAL
EOSINOPHIL # BLD AUTO: 0.2 K/UL
EOSINOPHIL NFR BLD: 2.3 %
ERYTHROCYTE [DISTWIDTH] IN BLOOD BY AUTOMATED COUNT: 13.7 %
EST. GFR  (AFRICAN AMERICAN): >60 ML/MIN/1.73 M^2
EST. GFR  (NON AFRICAN AMERICAN): >60 ML/MIN/1.73 M^2
GLUCOSE SERPL-MCNC: 93 MG/DL
HCT VFR BLD AUTO: 35 %
HGB BLD-MCNC: 11.2 G/DL
IMM GRANULOCYTES # BLD AUTO: 0.03 K/UL
IMM GRANULOCYTES NFR BLD AUTO: 0.4 %
LYMPHOCYTES # BLD AUTO: 1.8 K/UL
LYMPHOCYTES NFR BLD: 22.5 %
MAGNESIUM SERPL-MCNC: 2.2 MG/DL
MCH RBC QN AUTO: 30.2 PG
MCHC RBC AUTO-ENTMCNC: 32 G/DL
MCV RBC AUTO: 94 FL
MONOCYTES # BLD AUTO: 1 K/UL
MONOCYTES NFR BLD: 12.1 %
NEUTROPHILS # BLD AUTO: 5 K/UL
NEUTROPHILS NFR BLD: 61.9 %
NRBC BLD-RTO: 0 /100 WBC
PLATELET # BLD AUTO: 214 K/UL
PMV BLD AUTO: 12 FL
POCT GLUCOSE: 101 MG/DL (ref 70–110)
POCT GLUCOSE: 89 MG/DL (ref 70–110)
POCT GLUCOSE: 97 MG/DL (ref 70–110)
POTASSIUM SERPL-SCNC: 4 MMOL/L
PROT SERPL-MCNC: 7.4 G/DL
RBC # BLD AUTO: 3.71 M/UL
SODIUM SERPL-SCNC: 139 MMOL/L
WBC # BLD AUTO: 7.99 K/UL

## 2018-06-05 PROCEDURE — 37000008 HC ANESTHESIA 1ST 15 MINUTES: Performed by: SURGERY

## 2018-06-05 PROCEDURE — 25000242 PHARM REV CODE 250 ALT 637 W/ HCPCS: Performed by: PHYSICIAN ASSISTANT

## 2018-06-05 PROCEDURE — 25000003 PHARM REV CODE 250: Performed by: NURSE ANESTHETIST, CERTIFIED REGISTERED

## 2018-06-05 PROCEDURE — A4216 STERILE WATER/SALINE, 10 ML: HCPCS | Performed by: ANESTHESIOLOGY

## 2018-06-05 PROCEDURE — 94640 AIRWAY INHALATION TREATMENT: CPT

## 2018-06-05 PROCEDURE — 37000009 HC ANESTHESIA EA ADD 15 MINS: Performed by: SURGERY

## 2018-06-05 PROCEDURE — 36000707: Performed by: SURGERY

## 2018-06-05 PROCEDURE — 82962 GLUCOSE BLOOD TEST: CPT | Performed by: SURGERY

## 2018-06-05 PROCEDURE — 25000003 PHARM REV CODE 250: Performed by: SURGERY

## 2018-06-05 PROCEDURE — 63600175 PHARM REV CODE 636 W HCPCS: Performed by: PSYCHIATRY & NEUROLOGY

## 2018-06-05 PROCEDURE — 36415 COLL VENOUS BLD VENIPUNCTURE: CPT

## 2018-06-05 PROCEDURE — 71000033 HC RECOVERY, INTIAL HOUR: Performed by: SURGERY

## 2018-06-05 PROCEDURE — 25000003 PHARM REV CODE 250: Performed by: PHYSICIAN ASSISTANT

## 2018-06-05 PROCEDURE — 36000706: Performed by: SURGERY

## 2018-06-05 PROCEDURE — 85025 COMPLETE CBC W/AUTO DIFF WBC: CPT

## 2018-06-05 PROCEDURE — 94761 N-INVAS EAR/PLS OXIMETRY MLT: CPT

## 2018-06-05 PROCEDURE — 63600175 PHARM REV CODE 636 W HCPCS: Performed by: NURSE ANESTHETIST, CERTIFIED REGISTERED

## 2018-06-05 PROCEDURE — D9220A PRA ANESTHESIA: Mod: ANES,,, | Performed by: ANESTHESIOLOGY

## 2018-06-05 PROCEDURE — 97530 THERAPEUTIC ACTIVITIES: CPT

## 2018-06-05 PROCEDURE — 43246 EGD PLACE GASTROSTOMY TUBE: CPT | Mod: ,,, | Performed by: SURGERY

## 2018-06-05 PROCEDURE — 20600001 HC STEP DOWN PRIVATE ROOM

## 2018-06-05 PROCEDURE — 0DH68UZ INSERTION OF FEEDING DEVICE INTO STOMACH, VIA NATURAL OR ARTIFICIAL OPENING ENDOSCOPIC: ICD-10-PCS | Performed by: SURGERY

## 2018-06-05 PROCEDURE — 99233 SBSQ HOSP IP/OBS HIGH 50: CPT | Mod: ,,, | Performed by: PSYCHIATRY & NEUROLOGY

## 2018-06-05 PROCEDURE — 83735 ASSAY OF MAGNESIUM: CPT

## 2018-06-05 PROCEDURE — D9220A PRA ANESTHESIA: Mod: CRNA,,, | Performed by: NURSE ANESTHETIST, CERTIFIED REGISTERED

## 2018-06-05 PROCEDURE — A4216 STERILE WATER/SALINE, 10 ML: HCPCS | Performed by: PHYSICIAN ASSISTANT

## 2018-06-05 PROCEDURE — 63600175 PHARM REV CODE 636 W HCPCS: Performed by: PHYSICIAN ASSISTANT

## 2018-06-05 PROCEDURE — 71000039 HC RECOVERY, EACH ADD'L HOUR: Performed by: SURGERY

## 2018-06-05 PROCEDURE — 80053 COMPREHEN METABOLIC PANEL: CPT

## 2018-06-05 PROCEDURE — 25000003 PHARM REV CODE 250: Performed by: ANESTHESIOLOGY

## 2018-06-05 PROCEDURE — 27800903 OPTIME MED/SURG SUP & DEVICES OTHER IMPLANTS: Performed by: SURGERY

## 2018-06-05 RX ORDER — FUROSEMIDE 20 MG/1
20 TABLET ORAL DAILY
Status: DISCONTINUED | OUTPATIENT
Start: 2018-06-06 | End: 2018-06-09

## 2018-06-05 RX ORDER — QUETIAPINE FUMARATE 25 MG/1
25 TABLET, FILM COATED ORAL NIGHTLY
Status: DISCONTINUED | OUTPATIENT
Start: 2018-06-05 | End: 2018-06-11 | Stop reason: HOSPADM

## 2018-06-05 RX ORDER — SODIUM CHLORIDE 0.9 % (FLUSH) 0.9 %
3 SYRINGE (ML) INJECTION
Status: DISCONTINUED | OUTPATIENT
Start: 2018-06-05 | End: 2018-06-11 | Stop reason: HOSPADM

## 2018-06-05 RX ORDER — ACETAMINOPHEN 10 MG/ML
1000 INJECTION, SOLUTION INTRAVENOUS ONCE
Status: COMPLETED | OUTPATIENT
Start: 2018-06-05 | End: 2018-06-05

## 2018-06-05 RX ORDER — NYSTATIN AND TRIAMCINOLONE ACETONIDE 100000; 1 [USP'U]/G; MG/G
CREAM TOPICAL 2 TIMES DAILY
Status: DISCONTINUED | OUTPATIENT
Start: 2018-06-06 | End: 2018-06-11 | Stop reason: HOSPADM

## 2018-06-05 RX ORDER — ATORVASTATIN CALCIUM 20 MG/1
40 TABLET, FILM COATED ORAL DAILY
Status: DISCONTINUED | OUTPATIENT
Start: 2018-06-06 | End: 2018-06-11 | Stop reason: HOSPADM

## 2018-06-05 RX ORDER — IPRATROPIUM BROMIDE AND ALBUTEROL SULFATE 2.5; .5 MG/3ML; MG/3ML
SOLUTION RESPIRATORY (INHALATION)
Status: DISPENSED
Start: 2018-06-05 | End: 2018-06-05

## 2018-06-05 RX ORDER — LIDOCAINE HYDROCHLORIDE 10 MG/ML
INJECTION, SOLUTION EPIDURAL; INFILTRATION; INTRACAUDAL; PERINEURAL
Status: DISCONTINUED | OUTPATIENT
Start: 2018-06-05 | End: 2018-06-05 | Stop reason: HOSPADM

## 2018-06-05 RX ORDER — HEPARIN SODIUM 5000 [USP'U]/ML
5000 INJECTION, SOLUTION INTRAVENOUS; SUBCUTANEOUS EVERY 8 HOURS
Status: DISCONTINUED | OUTPATIENT
Start: 2018-06-05 | End: 2018-06-06

## 2018-06-05 RX ORDER — PROPOFOL 10 MG/ML
VIAL (ML) INTRAVENOUS CONTINUOUS PRN
Status: DISCONTINUED | OUTPATIENT
Start: 2018-06-05 | End: 2018-06-05

## 2018-06-05 RX ORDER — FENTANYL CITRATE 50 UG/ML
50 INJECTION, SOLUTION INTRAMUSCULAR; INTRAVENOUS EVERY 5 MIN PRN
Status: DISCONTINUED | OUTPATIENT
Start: 2018-06-05 | End: 2018-06-05 | Stop reason: HOSPADM

## 2018-06-05 RX ORDER — LOSARTAN POTASSIUM 50 MG/1
100 TABLET ORAL DAILY
Status: DISCONTINUED | OUTPATIENT
Start: 2018-06-06 | End: 2018-06-11 | Stop reason: HOSPADM

## 2018-06-05 RX ORDER — ACETAMINOPHEN 10 MG/ML
1000 INJECTION, SOLUTION INTRAVENOUS ONCE
Status: DISCONTINUED | OUTPATIENT
Start: 2018-06-05 | End: 2018-06-05

## 2018-06-05 RX ORDER — MEPERIDINE HYDROCHLORIDE 50 MG/ML
12.5 INJECTION INTRAMUSCULAR; INTRAVENOUS; SUBCUTANEOUS EVERY 10 MIN PRN
Status: DISCONTINUED | OUTPATIENT
Start: 2018-06-05 | End: 2018-06-05 | Stop reason: HOSPADM

## 2018-06-05 RX ORDER — BUPROPION HYDROCHLORIDE 75 MG/1
150 TABLET ORAL 2 TIMES DAILY
Status: DISCONTINUED | OUTPATIENT
Start: 2018-06-05 | End: 2018-06-11 | Stop reason: HOSPADM

## 2018-06-05 RX ORDER — DONEPEZIL HYDROCHLORIDE 5 MG/1
10 TABLET, FILM COATED ORAL NIGHTLY
Status: DISCONTINUED | OUTPATIENT
Start: 2018-06-05 | End: 2018-06-11 | Stop reason: HOSPADM

## 2018-06-05 RX ORDER — CLONIDINE HYDROCHLORIDE 0.1 MG/1
0.1 TABLET ORAL 2 TIMES DAILY
Status: DISCONTINUED | OUTPATIENT
Start: 2018-06-05 | End: 2018-06-11 | Stop reason: HOSPADM

## 2018-06-05 RX ORDER — SODIUM CHLORIDE 9 MG/ML
INJECTION, SOLUTION INTRAVENOUS CONTINUOUS PRN
Status: DISCONTINUED | OUTPATIENT
Start: 2018-06-05 | End: 2018-06-05

## 2018-06-05 RX ORDER — PROPOFOL 10 MG/ML
VIAL (ML) INTRAVENOUS
Status: DISCONTINUED | OUTPATIENT
Start: 2018-06-05 | End: 2018-06-05

## 2018-06-05 RX ORDER — NAPROXEN SODIUM 220 MG/1
81 TABLET, FILM COATED ORAL DAILY
Status: DISCONTINUED | OUTPATIENT
Start: 2018-06-06 | End: 2018-06-06

## 2018-06-05 RX ADMIN — SODIUM CHLORIDE SOLN NEBU 3% 4 ML: 3 NEBU SOLN at 08:06

## 2018-06-05 RX ADMIN — Medication 3 ML: at 10:06

## 2018-06-05 RX ADMIN — BUPROPION HYDROCHLORIDE 150 MG: 75 TABLET, FILM COATED ORAL at 09:06

## 2018-06-05 RX ADMIN — PROPOFOL 20 MG: 10 INJECTION, EMULSION INTRAVENOUS at 10:06

## 2018-06-05 RX ADMIN — SODIUM CHLORIDE, PRESERVATIVE FREE 3 ML: 5 INJECTION INTRAVENOUS at 04:06

## 2018-06-05 RX ADMIN — IPRATROPIUM BROMIDE AND ALBUTEROL SULFATE 3 ML: .5; 3 SOLUTION RESPIRATORY (INHALATION) at 02:06

## 2018-06-05 RX ADMIN — IPRATROPIUM BROMIDE AND ALBUTEROL SULFATE 3 ML: .5; 3 SOLUTION RESPIRATORY (INHALATION) at 01:06

## 2018-06-05 RX ADMIN — ACETAMINOPHEN 1000 MG: 10 INJECTION, SOLUTION INTRAVENOUS at 04:06

## 2018-06-05 RX ADMIN — PROPOFOL 200 MCG/KG/MIN: 10 INJECTION, EMULSION INTRAVENOUS at 10:06

## 2018-06-05 RX ADMIN — LIDOCAINE HYDROCHLORIDE 2 ML: 10 INJECTION, SOLUTION EPIDURAL; INFILTRATION; INTRACAUDAL; PERINEURAL at 11:06

## 2018-06-05 RX ADMIN — HEPARIN SODIUM 5000 UNITS: 5000 INJECTION, SOLUTION INTRAVENOUS; SUBCUTANEOUS at 09:06

## 2018-06-05 RX ADMIN — SODIUM CHLORIDE: 0.9 INJECTION, SOLUTION INTRAVENOUS at 10:06

## 2018-06-05 RX ADMIN — DONEPEZIL HYDROCHLORIDE 10 MG: 5 TABLET, FILM COATED ORAL at 09:06

## 2018-06-05 RX ADMIN — QUETIAPINE FUMARATE 25 MG: 25 TABLET ORAL at 09:06

## 2018-06-05 RX ADMIN — HEPARIN SODIUM 5000 UNITS: 5000 INJECTION, SOLUTION INTRAVENOUS; SUBCUTANEOUS at 04:06

## 2018-06-05 RX ADMIN — CLONIDINE HYDROCHLORIDE 0.1 MG: 0.1 TABLET ORAL at 09:06

## 2018-06-05 RX ADMIN — IPRATROPIUM BROMIDE AND ALBUTEROL SULFATE 3 ML: .5; 3 SOLUTION RESPIRATORY (INHALATION) at 07:06

## 2018-06-05 RX ADMIN — IPRATROPIUM BROMIDE AND ALBUTEROL SULFATE 3 ML: .5; 3 SOLUTION RESPIRATORY (INHALATION) at 08:06

## 2018-06-05 NOTE — PROGRESS NOTES
[facility]  Vascular Neurology  Comprehensive Stroke Center  Progress Note    Assessment/Plan:     * Embolic stroke involving right middle cerebral artery    84 y/o female with left sided facial droop, aphasia, dysarthria who received IV TPA but no IR. MRI with infarct in R precentral gyrus.   Etiology unclear though nursing note reporting patient went into Afib on monitor in NCC. EKG completed and not seen. Echo showing Severe LAE. Will need to consider AC after PEG placement.   Dispo possibly SNF though more likely  with 24/7 family care.     PEG with GS 6/5.       Antithrombotics: Aspirin 81 mg  Statins: Lipitor 40 mg daily  Aggressive risk factor modification: HTN, HLD  Rehab efforts: PT/OT/SLP to evaluate and treat, PM&R consult    Dispo: SNF vs  with 24/7 family supervision  Diagnostics ordered/pending: none   VTE prophylaxis: SCDs, Heparin 5000 units SC Q8H   BP parameters: Infarct: Post tPA, SBP <160        S/P admn tPA in diff fac w/n last 24 hr bef adm to crnt fac    Close monitoring in NCCU for 24 hours post administration -- Completed        Dysarthria    Severe dysarthria  SLP eval and treat        Dysphagia    Pt NPO with NG in place  SLP recommending PEG  IR determined no safe window; Consulted Gen Surg  PEG to be placed 6/5; TF were held at midnight, SQH ok per GS        Dementia with behavioral disturbance    Improved behavior when family is at the bedside  Home wellbutrin 150 mg  Donepezil 10 qhs  seroquel 25 mg qhs        New onset a-fib    Seen on telemetry monitor in NCC  Per nursing note, afib was seen on telemetry. EKG completed and sinus rhythm with Premature atrial complexes  Echo showing severe LAE  May need AC after PEG placement        Atrial enlargement, left    AFib risk factor  Severe left atrial enlargement seen on echo  Will need to consider AC after PEG placement         Cytotoxic cerebral edema    Area of cytotoxic cerebral edema identified when reviewing  brain imaging in the territory of the R middle cerebral artery. There is no mass effect associated with it. We will continue to monitor the patients clinical exam for any worsening of symptoms which may indicate expansion of the stroke or the area of the edema resulting in the clinical change. The pattern is of unclear etiology (ESUS). Suspect afib (documented on telemetry)            Mixed hyperlipidemia    Stroke risk factor    Lipitor 40 mg daily        Benign essential HTN    Stroke risk factor  SBP elevated over last 24 hrs, though likely related to pt's NPO status  Goal < 160   Continue clonidine 0.1 mg BID, lasix 20 mg qd, Losartan 100 mg qd        UTI (urinary tract infection)    Prior UA with many bacteria; No UCx ordered  Pt received 2 days Cipro, followed by 2 days Rocephin only  Pt now afebrile, no leukocytosis   Repeat UA 6/4 WNL        Aphasia    Pt responds appropriately though sometimes difficult to assess due to severe dysarthria  Follows some commands accurately   Aggressive SLP therapy              84 y/o female with aphasia, dysarthria and facial droop, received IV TPA but no LVO so no IR. Probable small vessel disease.    5/28: Blood pressure elevated >200 overnight requiring cardene. Patient bradycardic and with paroxysmal afib overnight as well. MRI with infarct in the right precentral gyrus.   5/29: No events overnight. Patient with many bacteria on UTI so short cipro course started per NCC. Pending step down to NSU.   5/30: seems more agitated today, not participating in exam or following commands, severe dysarthria and excessive secretions  05/31/18 Continues to have increased secretions and severe dysarthria.  Patient following some commands and plans to transfer to stroke service today   6/1: Secretions are better controlled today. Plan to step down. On exam patient is agitated and stating she wants to go home. In bilateral wrist restraints. Right lung opacity - Chest PT and  duonebs ordered per NCC. Nursing notes noted Afib but EKG showing no afib. Echo revealed Severe left atrial enlargement. May need AC on discharge.  6/2: No acute events overnight. Stepped down to NSU. Plan for PEG early next week if no improvement per ST.   6/3: No acute events overnight. Holding TF and heparin tonight in preparation for possible PEG tomorrow. O2 sats low 90's, CXR ordered.   6/4: Family wishes to proceed with PEG (recs per SLP.) IR unable as no safe window; consult placed to Gen Surg.  6/5: PEG with GS today. SBP elevated related to NPO status; BP meds given. Repeat UA WNL.    STROKE DOCUMENTATION   Acute Stroke Times   Last Known Normal Date: 05/27/18  Last Known Normal Time: 1320  Symptom Onset Date: 05/27/18  Symptom Onset Time: 1445  Stroke Team Called Date: 05/27/18  Stroke Team Called Time: 1550  Stroke Team Arrival Date: 05/27/18  Stroke Team Arrival Time: 1751  CT Interpretation Time: 1536  Decision to Treat Time for Alteplase: 1806    NIH Scale:  1a. Level Of Consciousness: 0-->Alert: keenly responsive  1b. LOC Questions: 1-->Answers one question correctly  1c. LOC Commands: 0-->Performs both tasks correctly  2. Best Gaze: 0-->Normal  3. Visual: 0-->No visual loss  4. Facial Palsy: 2-->Partial paralysis (total or near-total paralysis of lower face)  5a. Motor Arm, Left: 0-->No drift: limb holds 90 (or 45) degrees for full 10 secs  5b. Motor Arm, Right: 0-->No drift: limb holds 90 (or 45) degrees for full 10 secs  6a. Motor Leg, Left: 1-->Drift: leg falls by the end of the 5-sec period but does not hit bed  6b. Motor Leg, Right: 0-->No drift: leg holds 30 degree position for full 5 secs  7. Limb Ataxia: 0-->Absent  8. Sensory: 0-->Normal: no sensory loss  9. Best Language: 0-->No aphasia: normal  10. Dysarthria: 2-->Severe dysarthria: patients speech is so slurred as to be unintelligible in the absence of or out of proportion to any dysphasia, or is mute/anarthric  11. Extinction and  Inattention (formerly Neglect): 0-->No abnormality  Total (NIH Stroke Scale): 6       Modified Angelia Score: 0  [place] Coma Scale:    ABCD2 Score:    [id number] Score:   HAS -BLED Score:   ICH Score:   Hunt & Burkett Classification:      Hemorrhagic change of an Ischemic Stroke: Does this patient have an ischemic stroke with hemorrhagic changes? No     Neurologic Chief Complaint: Aphasia, dysarthria, LSW     Subjective:     Interval History: Patient is seen for follow-up neurological assessment and treatment recommendations:     No acute events overnight. Holding TF and heparin tonight in preparation for possible PEG tomorrow. O2 sats low 90's, CXR ordered.     HPI, Past Medical, Family, and Social History remains the same as documented in the initial encounter.     Review of Systems   Constitutional: Negative for fever.   Respiratory: Negative for shortness of breath.    Gastrointestinal: Negative for abdominal pain, nausea and vomiting.   Neurological: Positive for facial asymmetry, speech difficulty and weakness. Negative for headaches.   Psychiatric/Behavioral: Positive for agitation and confusion. Negative for behavioral problems.     Scheduled Meds:   albuterol-ipratropium  3 mL Nebulization Q6H    aspirin  81 mg Per NG tube Daily    atorvastatin  40 mg Per NG tube Daily    buPROPion  150 mg Per NG tube BID    cloNIDine  0.1 mg Per NG tube BID    donepezil  10 mg Per NG tube QHS    furosemide  20 mg Per NG tube Daily    heparin (porcine)  5,000 Units Subcutaneous Q8H    losartan  100 mg Per NG tube Daily    nicotine  1 patch Transdermal Daily    QUEtiapine  25 mg Per NG tube QHS    sodium chloride 0.9%  3 mL Intravenous Q8H    sodium chloride 3%  4 mL Nebulization BID     Continuous Infusions:    PRN Meds:acetaminophen, hydrALAZINE, labetalol, magnesium oxide, magnesium oxide, potassium chloride 10%, potassium chloride 10%, potassium chloride 10%, potassium, sodium phosphates, potassium, sodium  phosphates, potassium, sodium phosphates, senna-docusate 8.6-50 mg    Objective:     Vital Signs (Most Recent):  Temp: 98.4 °F (36.9 °C) (06/05/18 1008)  Pulse: 63 (06/05/18 1008)  Resp: 18 (06/05/18 1008)  BP: (!) 139/52 (06/05/18 1008)  SpO2: 97 % (06/05/18 1008)  BP Location: Right arm    Vital Signs Range (Last 24H):  Temp:  [97.2 °F (36.2 °C)-98.4 °F (36.9 °C)]   Pulse:  [56-98]   Resp:  [16-18]   BP: (139-191)/(52-94)   SpO2:  [92 %-98 %]   BP Location: Right arm    Physical Exam   Constitutional: She appears well-developed. No distress.   HENT:   Head: Normocephalic and atraumatic.   Eyes: Pupils are equal, round, and reactive to light.   Cardiovascular: Normal rate.    Pulmonary/Chest: Effort normal.   Neurological: She is alert.   Oriented to month but not age      Skin: Skin is warm and dry.   Psychiatric: She is agitated. She is not aggressive. She is inattentive.   Nursing note and vitals reviewed.      Neurological Exam:   LOC: alert  Attention Span: Good  Language: No aphasia  Articulation: Moderate/severe Dysarthria  Visual Fields: Full  EOM (CN III, IV, VI): Full/intact  Facial Movement (CN VII): Lower facial weakness on the Left  Motor: Arm left  Paresis: 4/5  Leg left  Paresis: 3/5  Arm right  Paresis: 5/5  Leg right Paresis: 5/5  Sensation: Sensation intact  Tone: Spasticity  RUE    Laboratory:  CMP:     Recent Labs  Lab 06/05/18  0702   CALCIUM 9.4   ALBUMIN 3.1*   PROT 7.4      K 4.0   CO2 30*      BUN 22   CREATININE 0.8   ALKPHOS 69   ALT 31   AST 35   BILITOT 0.4     BMP:     Recent Labs  Lab 06/05/18  0702      K 4.0      CO2 30*   BUN 22   CREATININE 0.8   CALCIUM 9.4     CBC:     Recent Labs  Lab 06/05/18  0702   WBC 7.99   RBC 3.71*   HGB 11.2*   HCT 35.0*      MCV 94   MCH 30.2   MCHC 32.0     Lipid Panel:   No results for input(s): CHOL, LDLCALC, HDL, TRIG in the last 168 hours.  Coagulation:     Recent Labs  Lab 05/31/18  0152  06/03/18  0523   INR 0.9  <  > 0.9   APTT 24.7  --   --    < > = values in this interval not displayed.  Platelet Aggregation Study: No results for input(s): PLTAGG, PLTAGINTERP, PLTAGREGLACO, ADPPLTAGGREG in the last 168 hours.  Hgb A1C:   No results for input(s): HGBA1C in the last 168 hours.  TSH:   No results for input(s): TSH in the last 168 hours.      Diagnostic Results     Brain Imaging     MRI brain 5/28:    Small area of restricted diffusion in the right precentral gyrus.  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  Bilateral basal ganglia, thalami and cerebellar hemispheres small remote infarcts.  Generalized cerebral volume loss and extensive white matter chronic small vessel ischemic change.  Ventriculomegaly out of proportion to sulcal prominence, nonspecific but can be seen with normal pressure hydrocephalus, but overall unchanged from prior MRI.      Cardiac Imaging     ECHO 5/28/18:   CONCLUSIONS     1 - Concentric LVH with hyperdynamic left ventricular systolic function (EF >70%).     2 - Severe left atrial enlargement.     3 - Impaired LV relaxation, elevated LAP (grade 2 diastolic dysfunction).     4 - Normal right ventricular systolic function .     5 - Mild to moderate aortic stenosis, ALBER = 1.64 cm2, AVAi = 1.12 cm2/m2, peak velocity = 3.11 m/s, mean gradient = 24 mmHg      Cindy Varner PA-C  [facility]  Department of Vascular Neurology   [facility]

## 2018-06-05 NOTE — ANESTHESIA RELEASE NOTE
"Anesthesia Release from PACU Note    Patient: Sury Cobos    Procedure(s) Performed: Procedure(s) (LRB):  EGD, WITH PEG TUBE INSERTION (N/A)  EGD (ESOPHAGOGASTRODUODENOSCOPY) (N/A)    Anesthesia type: GEN    Post pain: Adequate analgesia reported    Post assessment: no apparent anesthetic complications, tolerated procedure well and no evidence of recall    Post vital signs: BP (!) 165/72   Pulse 64   Temp 36.7 °C (98.1 °F) (Temporal)   Resp 20   Ht 4' 11" (1.499 m)   Wt 56.4 kg (124 lb 5.4 oz)   LMP  (LMP Unknown)   SpO2 95%   Breastfeeding? No   BMI 25.11 kg/m²     Level of consciousness: awake, alert poor verbal communication  Nausea/Vomiting: no nausea/no vomiting    Complications: none    Airway Patency: patent    Respiratory: unassisted, spontaneous ventilation, room air    Cardiovascular: stable and blood pressure at baseline    Hydration: euvolemic    Difficult to communicate but she appears to have done well    "

## 2018-06-05 NOTE — ASSESSMENT & PLAN NOTE
Prior UA with many bacteria; No UCx ordered  Pt received 2 days Cipro, followed by 2 days Rocephin only  Pt now afebrile, no leukocytosis   Repeat UA 6/4 WNL

## 2018-06-05 NOTE — CONSULTS
[facility]  Physical Medicine & Rehab  Consult Note    Patient Name: Sury Cobos  MRN: [id number]  Admission Date: 5/27/2018  Hospital Length of Stay: 9 days  Attending Physician: Steve Edouard MD     Inpatient consult to Physical Medicine & Rehabilitation  Consult performed by: Mirian Gray NP  Consult requested by:  Steve Edouard MD    Reason for Consult:  assess rehabilitation needs  Consults  Subjective:     Principal Problem: Embolic stroke involving right middle cerebral artery    HPI: Sury Cobos is a 83-year-old female with PMHx of gout, DM, dementia, CAD, CHF, & HTN.  Patient presented to [facility] ED with left sided facial droop, aphasia, dysarthria .  CTH revealed no acute pathology.  A telemedicine consult was placed and completed.  tPA was recommended and administered. Transferred to [facility] on [place]/27 for further evaluation and management.  Upon admission, 82 y/o female with  who received IV TPA but no IR. MRI with infarct in R precentral gyrus. Etiology unclear though nursing note reporting patient went into Afib on monitor in [facility]. EKG completed and not seen. Echo showing Severe LAE. Will need to consider AC after PEG placement. Hospital course complicated by dysphagia (PEG pending for today), L sided weakness, aphasia, agitation, and encephalopathy.     Functional History: Patient lives with daughter in a mobile home with ramp access to enter.  Prior to admission, (I) with ADLs and was utilizing a rollator for mobility.  P atientt required some assist to transfer into the tub, but could bathe independently.  DME: WC, rollator, and shower chair.    Hospital Course: 5/30-31/18: Evaluated by therapy.  Bed mobility Min-ModA.  Sit to stand May. Grooming CGA 5/31.   6/2/18: Participated with therapy. Bed mobility Min-MaxA .  Sit to stand May & RW.  Ambulated 50 ft Min-ModA & RW.  NPO. SLP diagnosis of Dysphagia, Dysarthria and Cognitive-Linguistic Impairment.   6/4/18:  Participated with therapy. CGA-MaxA.  Sit to stand MaxA .  UBD MaxA and toileting MaxA.  Feeding ModA.     Past Medical History:   Diagnosis Date    Aphasia 5/27/2018    Arthritis     Benign essential HTN     CHF (congestive heart failure)     Closed intertrochanteric fracture of left hip     Coronary artery disease     Dementia     Dementia with behavioral disturbance 5/27/2018    Diabetes mellitus     Embolic stroke involving right middle cerebral artery 5/27/2018    Gout     High cholesterol     Low potassium syndrome     PEG (percutaneous endoscopic gastrostomy) status 6/4/2018    Placed 6/4/18    Urinary incontinence      Past Surgical History:   Procedure Laterality Date    CARDIAC SURGERY      CORONARY ARTERY BYPASS GRAFT      reduction & internal fixation of displaced closed comminuted intertrochanteric fx left hip  02/12/2018     Review of patient's allergies indicates:  No Known Allergies    Scheduled Medications:    albuterol-ipratropium  3 mL Nebulization Q6H    aspirin  81 mg Per NG tube Daily    atorvastatin  40 mg Per NG tube Daily    buPROPion  150 mg Per NG tube BID    cloNIDine  0.1 mg Per NG tube BID    donepezil  10 mg Per NG tube QHS    furosemide  20 mg Per NG tube Daily    heparin (porcine)  5,000 Units Subcutaneous Q8H    losartan  100 mg Per NG tube Daily    nicotine  1 patch Transdermal Daily    QUEtiapine  25 mg Per NG tube QHS    sodium chloride 0.9%  3 mL Intravenous Q8H    sodium chloride 3%  4 mL Nebulization BID       PRN Medications: acetaminophen, hydrALAZINE, labetalol, magnesium oxide, magnesium oxide, potassium chloride 10%, potassium chloride 10%, potassium chloride 10%, potassium, sodium phosphates, potassium, sodium phosphates, potassium, sodium phosphates, senna-docusate 8.6-50 mg    Family History     Unknown as patient is aphasic.         Social History Main Topics    Smoking status: Current Every Day Smoker     Packs/day: 3.00    Smokeless  tobacco: Never Used    Alcohol use Yes      Comment: socially    Drug use: No    Sexual activity: Not on file     Review of Systems   Reason unable to perform ROS: cognitive linguistic impairment.     Objective:     Vital Signs (Most Recent):  Temp: 98.4 °F (36.9 °C) (06/05/18 1008)  Pulse: 63 (06/05/18 1008)  Resp: 18 (06/05/18 1008)  BP: (!) 139/52 (06/05/18 1008)  SpO2: 97 % (06/05/18 1008)    Vital Signs (24h Range):  Temp:  [97.2 °F (36.2 °C)-98.4 °F (36.9 °C)] 98.4 °F (36.9 °C)  Pulse:  [56-98] 63  Resp:  [16-18] 18  SpO2:  [92 %-98 %] 97 %  BP: (139-191)/(52-94) 139/52     Body mass index is 25.11 kg/m².    Physical Exam   Constitutional: She appears well-developed.   Sitter at bedside with camera sitter present also  Soft restraints in place  Thin, elderly female   HENT:   Head: Normocephalic and atraumatic.   Eyes: EOM are normal. Pupils are equal, round, and reactive to light.   Neck: Normal range of motion.   Cardiovascular: Normal rate and regular rhythm.    Pulmonary/Chest: Effort normal. No respiratory distress.   Abdominal: Soft. There is no tenderness.   NGT in place   Musculoskeletal: She exhibits no edema or tenderness.   Neurological: She is alert. She is disoriented.   + aphasia and + dysarthria   Skin: Skin is warm and dry.   Psychiatric: Her speech is slurred. She is agitated. Cognition and memory are impaired.   Vitals reviewed.      Diagnostic Results:   Labs: Reviewed  X-Ray: Reviewed  CT: Reviewed  MRI: Reviewed    Assessment/Plan:     * Embolic stroke involving right middle cerebral artery    See hospital course for functional, cognitive/speech/language, and nutrition/swallow status.      Recommendations  -  Encourage mobility, OOB in chair at least 3 hours per day, and early ambulation as appropriate   -  PT/OT evaluate and treat  -  SLP speech and cognitive evaluate and treat  -  Monitor sleep disturbances and establish consistent sleep-wake cycle  -  Monitor for bowel and bladder  dysfunction  -  Monitor for shoulder pain and subluxation  -  Monitor for spasticity  -  Monitor for and prevent skin breakdown and pressure ulcers  · Early mobility, repositioning/weight shifting every 20-30 minutes when sitting, turn patient every 2 hours, proper mattress/overlay and chair cushioning, pressure relief/heel protector boots  -  DVT prophylaxis:  [facility]  -  Reviewed discharge options (IP rehab, SNF, HH therapy, and OP therapy)          New onset a-fib    -AC pending after PEG placement         Atrial enlargement, left    -seen on Echo        Dysarthria    -SLP eval and treat  -2/2 stroke         Dysphagia    -NGT in place  -PEG pending        Encephalopathy    Recommendations  -  Monitor sleep disturbances and establish consistent sleep-wake cycle  ·  Day time- lights on and shades open, night time- lights dim/off  -  Environmental modifications to limit agitation/confusion   · Appropriate lighting, family at bedside, visible clock and calendar, updated white board, reduce noise, limited visitors, clustered nursing care  -  Reorient patient to person, place, time, and situation on each encounter  -  If possible, avoid restraints  -  May benefit from 24/7 supervision by sitter or camera sitter  -  Avoid/limit medications that can worsen delirium (benzodiazepines, antihistamines, anticholinergics, hypnotics, opiates)  -  Encourage mobility, OOB in chair at least 3 hours per day, and early ambulation as appropriate  -  PT/OT evaluate and treat            Dementia with behavioral disturbance    -h/o of dementia and lives with daughter  -required some assistance PTA        Aphasia    -2/2 stroke         Recommend Skilled Nursing rehab.  SNF preference per patient/[facility].  Barriers to SNF admission:  Medical stability. Will sign off.  Please call with questions/concerns or re-consult if situation changes.        Thank you for your consult.     Mirian Gray NP  Department of Physical Medicine &  Rehab  [facility]

## 2018-06-05 NOTE — PLAN OF CARE
Problem: Physical Therapy Goal  Goal: Physical Therapy Goal  Goals to be met by: 2018     Patient will increase functional independence with mobility by performin. Supine to sit with Contact Guard Assistance  2. Sit to supine with Contact Guard Assistance  3. Rolling to Left and Right with Supervision.  4. Sit to stand transfer with Stand-by Assistance  5. Gait  x 20 feet with Contact Guard Assistance using Rolling Walker.   6. Stand for 10 minutes with Contact Guard Assistance using Rolling Walker     Outcome: Ongoing (interventions implemented as appropriate)    Pt would benefit from SNF (family choosing HHPT) upon discharge.  Appropriate transfer level with nursing staff: Bed <> Chair:  Stand Pivot with Minimal Assistance with 1 person.    Heather Che PT, DPT  2018  Pager: [phone]

## 2018-06-05 NOTE — ASSESSMENT & PLAN NOTE
Seen on telemetry monitor in NCC  Per nursing note, afib was seen on telemetry. EKG completed and sinus rhythm with Premature atrial complexes  Echo showing severe LAE  May need AC after PEG placement

## 2018-06-05 NOTE — ANESTHESIA POSTPROCEDURE EVALUATION
"Anesthesia Post Evaluation    Patient: Sury Cobos    Procedure(s) Performed: Procedure(s) (LRB):  EGD, WITH PEG TUBE INSERTION (N/A)  EGD (ESOPHAGOGASTRODUODENOSCOPY) (N/A)    Final Anesthesia Type: general  Patient location during evaluation: PACU  Patient participation: No - Unable to Participate, Other Reason (see comments) (Difficult to communicate but she appears to have done well)  Level of consciousness: awake and alert  Post-procedure vital signs: reviewed and stable  Pain management: adequate  Airway patency: patent  PONV status at discharge: No PONV  Anesthetic complications: no      Cardiovascular status: stable  Respiratory status: unassisted, spontaneous ventilation and room air  Hydration status: euvolemic  Follow-up not needed.        Visit Vitals  BP (!) 165/72   Pulse 70   Temp 36.7 °C (98.1 °F) (Temporal)   Resp 20   Ht 4' 11" (1.499 m)   Wt 56.4 kg (124 lb 5.4 oz)   LMP  (LMP Unknown)   SpO2 99%   Breastfeeding? No   BMI 25.11 kg/m²       Pain/Joseph Score: Pain Assessment Performed: Yes (6/5/2018 12:15 PM)  Presence of Pain: denies (6/5/2018 12:15 PM)  Joseph Score: 9 (6/5/2018 12:15 PM)      "

## 2018-06-05 NOTE — SUBJECTIVE & OBJECTIVE
Past Medical History:   Diagnosis Date    Aphasia 5/27/2018    Arthritis     Benign essential HTN     CHF (congestive heart failure)     Closed intertrochanteric fracture of left hip     Coronary artery disease     Dementia     Dementia with behavioral disturbance 5/27/2018    Diabetes mellitus     Embolic stroke involving right middle cerebral artery 5/27/2018    Gout     High cholesterol     Low potassium syndrome     PEG (percutaneous endoscopic gastrostomy) status 6/4/2018    Placed 6/4/18    Urinary incontinence      Past Surgical History:   Procedure Laterality Date    CARDIAC SURGERY      CORONARY ARTERY BYPASS GRAFT      reduction & internal fixation of displaced closed comminuted intertrochanteric fx left hip  02/12/2018     Review of patient's allergies indicates:  No Known Allergies    Scheduled Medications:    albuterol-ipratropium  3 mL Nebulization Q6H    aspirin  81 mg Per NG tube Daily    atorvastatin  40 mg Per NG tube Daily    buPROPion  150 mg Per NG tube BID    cloNIDine  0.1 mg Per NG tube BID    donepezil  10 mg Per NG tube QHS    furosemide  20 mg Per NG tube Daily    heparin (porcine)  5,000 Units Subcutaneous Q8H    losartan  100 mg Per NG tube Daily    nicotine  1 patch Transdermal Daily    QUEtiapine  25 mg Per NG tube QHS    sodium chloride 0.9%  3 mL Intravenous Q8H    sodium chloride 3%  4 mL Nebulization BID       PRN Medications: acetaminophen, hydrALAZINE, labetalol, magnesium oxide, magnesium oxide, potassium chloride 10%, potassium chloride 10%, potassium chloride 10%, potassium, sodium phosphates, potassium, sodium phosphates, potassium, sodium phosphates, senna-docusate 8.6-50 mg    Family History     None        Social History Main Topics    Smoking status: Current Every Day Smoker     Packs/day: 3.00    Smokeless tobacco: Never Used    Alcohol use Yes      Comment: socially    Drug use: No    Sexual activity: Not on file     Review of Systems    Reason unable to perform ROS: cognitive linguistic impairment.     Objective:     Vital Signs (Most Recent):  Temp: 98.4 °F (36.9 °C) (06/05/18 1008)  Pulse: 63 (06/05/18 1008)  Resp: 18 (06/05/18 1008)  BP: (!) 139/52 (06/05/18 1008)  SpO2: 97 % (06/05/18 1008)    Vital Signs (24h Range):  Temp:  [97.2 °F (36.2 °C)-98.4 °F (36.9 °C)] 98.4 °F (36.9 °C)  Pulse:  [56-98] 63  Resp:  [16-18] 18  SpO2:  [92 %-98 %] 97 %  BP: (139-191)/(52-94) 139/52     Body mass index is 25.11 kg/m².    Physical Exam   Constitutional: She appears well-developed.   Sitter at bedside with camera sitter present also  Soft restraints in place  Thin, elderly female   HENT:   Head: Normocephalic and atraumatic.   Eyes: EOM are normal. Pupils are equal, round, and reactive to light.   Neck: Normal range of motion.   Cardiovascular: Normal rate and regular rhythm.    Pulmonary/Chest: Effort normal. No respiratory distress.   Abdominal: Soft. There is no tenderness.   NGT in place   Musculoskeletal: She exhibits no edema or tenderness.   Neurological: She is alert. She is disoriented.   + aphasia and + dysarthria   Skin: Skin is warm and dry.   Psychiatric: Her speech is slurred. She is agitated. Cognition and memory are impaired.   Vitals reviewed.    NEUROLOGICAL EXAMINATION:     MENTAL STATUS   Speech: slurred     CRANIAL NERVES     CN III, IV, VI   Pupils are equal, round, and reactive to light.  Extraocular motions are normal.       Diagnostic Results:   Labs: Reviewed  X-Ray: Reviewed  CT: Reviewed  MRI: Reviewed

## 2018-06-05 NOTE — PROGRESS NOTES
Pt hooked up to bedside telemetry pre op 10; central monitoring notified; telemetry box given to phase 2 DOSC charge nurse Rafia.

## 2018-06-05 NOTE — BRIEF OP NOTE
[facility]  Brief Operative Note     SUMMARY     Surgery Date: 6/5/2018     Surgeon(s) and Role:     * Madhu Bliss MD - Primary     * Paul Ospina MD - Resident - Assisting     * Mirian Barksdale MD - Resident - Assisting        Pre-op Diagnosis:  Ischemic stroke [I63.9]    Post-op Diagnosis:  Post-Op Diagnosis Codes:     * Ischemic stroke [I63.9]    Procedure(s) (LRB):  EGD, WITH PEG TUBE INSERTION (N/A)  EGD (ESOPHAGOGASTRODUODENOSCOPY) (N/A)    Anesthesia: General    Description of the findings of the procedure: PEG tube placed. Patient tolerated procedure well.    Findings/Key Components: Good transillumination and 1:1 abd wall movement prior to PEG placement.    Estimated Blood Loss: * No values recorded between 6/5/2018 10:50 AM and 6/5/2018 11:15 AM *         Specimens:   Specimen (12h ago through future)    None

## 2018-06-05 NOTE — ASSESSMENT & PLAN NOTE
AFib risk factor  Severe left atrial enlargement seen on echo  Will need to consider AC after PEG placement

## 2018-06-05 NOTE — ASSESSMENT & PLAN NOTE
Pt responds appropriately though sometimes difficult to assess due to severe dysarthria  Follows some commands accurately   Aggressive SLP therapy

## 2018-06-05 NOTE — PT/OT/SLP PROGRESS
"Physical Therapy Treatment    Patient Name:  Sury Cobos   MRN:  [id number]  Admitting Diagnosis: Embolic stroke involving right middle cerebral artery  Recent Surgery: Procedure(s) (LRB):  EGD, WITH PEG TUBE INSERTION (N/A)  EGD (ESOPHAGOGASTRODUODENOSCOPY) (N/A) Day of Surgery    Recommendations:     Discharge Recommendations:  nursing facility, skilled (family has chosen HHPT with 24hr supervision)  Discharge Equipment Recommendations: 3-in-1 commode   Barriers to discharge: Decreased caregiver support    Plan:     During this hospitalization, patient to be seen 4 x/week to address the listed problems via gait training, therapeutic activities, therapeutic exercises, neuromuscular re-education  · Plan of Care Expires:  06/28/18   Plan of Care Reviewed with: patient, daughter    This Plan of care has been discussed with the patient who was involved in its development and understands and is in agreement with the identified goals and treatment plan    Subjective     Communicated with RN prior to session.  Patient found supine upon PT entry to room. PT's daughter and sitter present during session.  "My stomach hurts."    Pain/Comfort:  · Pain Rating 1:  (pt c/o abdominal pain due to recent PEG procedure)  · Pain Addressed 1: Nurse notified    Objective:     Patient found with: telemetry, oxygen, SCD, PEG Tube (PEG set to fernandez)   Mental Status: Patient is oriented to Person and follows 100% of verbal commands. Patient is Alert and Cooperative during session.    General Precautions: Standard, Cardiac aspiration, fall, NPO   Orthopedic Precautions:N/A   Braces: N/A   Respiratory Status: nasal cannula 2L  Vital Signs (Most Recent):    Temp: 98.1 °F (36.7 °C) (06/05/18 1115)  Pulse: 79 (06/05/18 1500)  Resp: 16 (06/05/18 1330)  BP: (!) 165/72 (06/05/18 1130)  SpO2: 98 % (06/05/18 1330)    Functional Mobility:  Bed Mobility:   · Rolling/Turning to Left: minimum assistance and with side rail  · Rolling/Turning to Right: " minimum assistance and with side rail  · Scooting to HOB via supine bridge: stand by assistance  · Scooting to EOB to have both feet planted on floor: contact guard assistance  · Supine to Sit: minimum assistance; from right side of bed  · Sit to Supine: minimum assistance; to right side of bed    Sitting Balance at Edge of Bed:  · Assistance Level Required: supervision  · Time: 8 minutes  · Postural deviations noted: rounded shoulders, kyphotic posture  · Encouraged: upright posture  · Comments: pt's daughter combed pt's hair.    Transfers:   · Sit <> Stand Transfer: minimum assistance with HHA   · Stand <> Sit Transfer: minimum assistance with HHA         Gait:  · Patient ambulated: 2 lateral steps to the right   · Patient required: minimal assist  · Patient used:  No Assistive Device  · Gait Pattern observed: 2-point gait  · Gait Deviation(s): decreased addison and increased time in double stance  · Impairments due to: decreased strength and pain    Therapeutic Activities & Exercises:   Supine: RLE and LLE AAROM x 10 reps in all planes through available ROM. Exercises performed to develop and maintain pt's  strength, endurance and ROM.     Education:  Patient provided with daily orientation and goals of this PT session. Patient and family agreed to participate in session. Patient and family aware of patient's deficits and therapy progression. They were educated to transfer with RN/PCT only; min A of 1 person. Encouraged patient to perform daily exercises & mobility to increase endurance and decrease effects of bedrest.Time provided for therapeutic counseling and discussion of health disposition. All questions answered to patient's and family's satisfaction, within scope of PT practice; voiced no other concerns. White board updated in patient's room, RN notified of session.    Patient left left sidelying, with  head in midline, neutral pelvis & heels floated for skin protection with all lines intact, call button  in reach, bed alarm on, RN notified and family and sitter present    AM-PAC 6 CLICK MOBILITY  Turning over in bed (including adjusting bedclothes, sheets and blankets)?: 3  Sitting down on and standing up from a chair with arms (e.g., wheelchair, bedside commode, etc.): 3  Moving from lying on back to sitting on the side of the bed?: 3  Moving to and from a bed to a chair (including a wheelchair)?: 3  Need to walk in hospital room?: 2  Climbing 3-5 steps with a railing?: 1  Total Score: 15     Assessment:     Sury Cobos is a 83 y.o. female admitted with a medical diagnosis of Embolic stroke involving right middle cerebral artery.   Patient is making progress towards goals, participating well in this session. Pt continues to require significant assist with functional mobility. Session limited by pain due to recent PEG procedure. Plan for family training next visit. Sury Gomezs deficits effect their ability to safely and independently participate in self-care tasks and functional mobility which increases their caregiver burden. Due to her physical therapy diagnosis of debility and deconditioning, they continue to benefit from acute PT services to address the following limitations: high fall risk, weakness, instability, the need for supervised instruction of exercise. Sury Gomezs deficits effect their roles and responsibilities in which they were able to complete prior to admit. Education was provided to patient & family regarding importance of continued participation in therapy, patient's progress and discharge disposition. Pt would benefit from SNF (family choosing HHPT) upon discharge to improve quality of life and focus on recovery of impairments.     Problem List: weakness, impaired endurance, impaired self care skills, impaired functional mobilty, gait instability, impaired balance, decreased lower extremity function, decreased upper extremity function, decreased safety awareness, impaired cognition.  weakness, impaired endurance, impaired self care skills, impaired functional mobilty, gait instability, impaired balance, decreased lower extremity function, decreased upper extremity function, decreased safety awareness, impaired cognition  Rehab Prognosis: good; patient would benefit from acute skilled PT services to address these deficits and reach maximum level of function.      GOALS:    Physical Therapy Goals        Problem: Physical Therapy Goal    Goal Priority Disciplines Outcome Goal Variances Interventions   Physical Therapy Goal     PT/OT, PT Ongoing (interventions implemented as appropriate)     Description:  Goals to be met by: 2018     Patient will increase functional independence with mobility by performin. Supine to sit with Contact Guard Assistance  2. Sit to supine with Contact Guard Assistance  3. Rolling to Left and Right with Supervision.  4. Sit to stand transfer with Stand-by Assistance  5. Gait  x 20 feet with Contact Guard Assistance using Rolling Walker.   6. Stand for 10 minutes with Contact Guard Assistance using Rolling Walker                       Time Tracking:     PT Received On: 18  PT Start Time: 1511     PT Stop Time: 1539  PT Total Time (min): 28 min     Billable Minutes:   · Therapeutic Activity 28    Treatment Type: Treatment  PT/PTA: PT       Heather Che PT, DPT  2018  Pager: [phone]

## 2018-06-05 NOTE — PROGRESS NOTES
Both peripheral IV's woyld not flush; met with resistance and leaking at site; both D/C's: left and right arm. CRNA at bs to look for an IV for sx.

## 2018-06-05 NOTE — ASSESSMENT & PLAN NOTE
See hospital course for functional, cognitive/speech/language, and nutrition/swallow status.      Recommendations  -  Encourage mobility, OOB in chair at least 3 hours per day, and early ambulation as appropriate   -  PT/OT evaluate and treat  -  SLP speech and cognitive evaluate and treat  -  Monitor sleep disturbances and establish consistent sleep-wake cycle  -  Monitor for bowel and bladder dysfunction  -  Monitor for shoulder pain and subluxation  -  Monitor for spasticity  -  Monitor for and prevent skin breakdown and pressure ulcers  · Early mobility, repositioning/weight shifting every 20-30 minutes when sitting, turn patient every 2 hours, proper mattress/overlay and chair cushioning, pressure relief/heel protector boots  -  DVT prophylaxis:  [facility]  -  Reviewed discharge options (IP rehab, SNF, HH therapy, and OP therapy)

## 2018-06-05 NOTE — TRANSFER OF CARE
"Anesthesia Transfer of Care Note    Patient: Sury Cobos    Procedure(s) Performed: Procedure(s) (LRB):  EGD, WITH PEG TUBE INSERTION (N/A)  EGD (ESOPHAGOGASTRODUODENOSCOPY) (N/A)    Patient location: PACU    Anesthesia Type: general    Transport from OR: Transported from OR on 2-3 L/min O2 by NC with adequate spontaneous ventilation    Post pain: adequate analgesia    Post assessment: no apparent anesthetic complications    Post vital signs: stable    Level of consciousness: sedated    Nausea/Vomiting: no nausea/vomiting    Complications: none    Transfer of care protocol was followed      Last vitals:   Visit Vitals  BP (!) 139/52 (BP Location: Right arm, Patient Position: Lying)   Pulse 63   Temp 36.9 °C (98.4 °F) (Oral)   Resp 18   Ht 4' 11" (1.499 m)   Wt 56.4 kg (124 lb 5.4 oz)   LMP  (LMP Unknown)   SpO2 97%   Breastfeeding? No   BMI 25.11 kg/m²     "

## 2018-06-05 NOTE — PT/OT/SLP PROGRESS
Speech Language Pathology      Sury Cobos  MRN: [id number]    Patient not seen today secondary to Other (off the floor for peg tube placement). Will follow-up per plan of care.     SERENA Joe, CCC-SLP  6/5/2018

## 2018-06-05 NOTE — ASSESSMENT & PLAN NOTE
Pt NPO with NG in place  SLP recommending PEG  IR determined no safe window; Consulted Gen Surg  PEG to be placed 6/5; TF were held at midnight, SQH ok per GS

## 2018-06-05 NOTE — ASSESSMENT & PLAN NOTE
82 y/o female with left sided facial droop, aphasia, dysarthria who received IV TPA but no IR. MRI with infarct in R precentral gyrus.   Etiology unclear though nursing note reporting patient went into Afib on monitor in [facility]. EKG completed and not seen. Echo showing Severe LAE. Will need to consider AC after PEG placement.   Dispo possibly SNF though more likely  with 24/7 family care.     PEG with  6/5.       Antithrombotics: Aspirin 81 mg  Statins: Lipitor 40 mg daily  Aggressive risk factor modification: HTN, HLD  Rehab efforts: PT/OT/SLP to evaluate and treat, PM&R consult    Dispo: SNF vs  with 24/7 family supervision  Diagnostics ordered/pending: none   VTE prophylaxis: SCDs, Heparin 5000 units SC Q8H   BP parameters: Infarct: Post tPA, SBP <160

## 2018-06-05 NOTE — NURSING TRANSFER
Nursing Transfer Note      6/5/2018     Transfer To: 725 From PACU    Transfer via stretcher    Transfer with cardiac monitoring    Transported by Hospital transport    Medicines sent: none    Chart send with patient: Yes    Notified: daughter    Patient reassessed at: 6/5/18 @ 1215     Upon arrival to floor:

## 2018-06-05 NOTE — SUBJECTIVE & OBJECTIVE
Neurologic Chief Complaint: Aphasia, dysarthria, LSW     Subjective:     Interval History: Patient is seen for follow-up neurological assessment and treatment recommendations:     No acute events overnight. Holding TF and heparin tonight in preparation for possible PEG tomorrow. O2 sats low 90's, CXR ordered.     HPI, Past Medical, Family, and Social History remains the same as documented in the initial encounter.     Review of Systems   Constitutional: Negative for fever.   Respiratory: Negative for shortness of breath.    Gastrointestinal: Negative for abdominal pain, nausea and vomiting.   Neurological: Positive for facial asymmetry, speech difficulty and weakness. Negative for headaches.   Psychiatric/Behavioral: Positive for agitation and confusion. Negative for behavioral problems.     Scheduled Meds:   albuterol-ipratropium  3 mL Nebulization Q6H    aspirin  81 mg Per NG tube Daily    atorvastatin  40 mg Per NG tube Daily    buPROPion  150 mg Per NG tube BID    cloNIDine  0.1 mg Per NG tube BID    donepezil  10 mg Per NG tube QHS    furosemide  20 mg Per NG tube Daily    heparin (porcine)  5,000 Units Subcutaneous Q8H    losartan  100 mg Per NG tube Daily    nicotine  1 patch Transdermal Daily    QUEtiapine  25 mg Per NG tube QHS    sodium chloride 0.9%  3 mL Intravenous Q8H    sodium chloride 3%  4 mL Nebulization BID     Continuous Infusions:    PRN Meds:acetaminophen, hydrALAZINE, labetalol, magnesium oxide, magnesium oxide, potassium chloride 10%, potassium chloride 10%, potassium chloride 10%, potassium, sodium phosphates, potassium, sodium phosphates, potassium, sodium phosphates, senna-docusate 8.6-50 mg    Objective:     Vital Signs (Most Recent):  Temp: 98.4 °F (36.9 °C) (06/05/18 1008)  Pulse: 63 (06/05/18 1008)  Resp: 18 (06/05/18 1008)  BP: (!) 139/52 (06/05/18 1008)  SpO2: 97 % (06/05/18 1008)  BP Location: Right arm    Vital Signs Range (Last 24H):  Temp:  [97.2 °F (36.2 °C)-98.4 °F  (36.9 °C)]   Pulse:  [56-98]   Resp:  [16-18]   BP: (139-191)/(52-94)   SpO2:  [92 %-98 %]   BP Location: Right arm    Physical Exam   Constitutional: She appears well-developed. No distress.   HENT:   Head: Normocephalic and atraumatic.   Eyes: Pupils are equal, round, and reactive to light.   Cardiovascular: Normal rate.    Pulmonary/Chest: Effort normal.   Neurological: She is alert.   Oriented to month but not age      Skin: Skin is warm and dry.   Psychiatric: She is agitated. She is not aggressive. She is inattentive.   Nursing note and vitals reviewed.      Neurological Exam:   LOC: alert  Attention Span: Good  Language: No aphasia  Articulation: Moderate/severe Dysarthria  Visual Fields: Full  EOM (CN III, IV, VI): Full/intact  Facial Movement (CN VII): Lower facial weakness on the Left  Motor: Arm left  Paresis: 4/5  Leg left  Paresis: 3/5  Arm right  Paresis: 5/5  Leg right Paresis: 5/5  Sensation: Sensation intact  Tone: Spasticity  RUE    Laboratory:  CMP:     Recent Labs  Lab 06/05/18  0702   CALCIUM 9.4   ALBUMIN 3.1*   PROT 7.4      K 4.0   CO2 30*      BUN 22   CREATININE 0.8   ALKPHOS 69   ALT 31   AST 35   BILITOT 0.4     BMP:     Recent Labs  Lab 06/05/18  0702      K 4.0      CO2 30*   BUN 22   CREATININE 0.8   CALCIUM 9.4     CBC:     Recent Labs  Lab 06/05/18  0702   WBC 7.99   RBC 3.71*   HGB 11.2*   HCT 35.0*      MCV 94   MCH 30.2   MCHC 32.0     Lipid Panel:   No results for input(s): CHOL, LDLCALC, HDL, TRIG in the last 168 hours.  Coagulation:     Recent Labs  Lab 05/31/18  0152  06/03/18  0523   INR 0.9  < > 0.9   APTT 24.7  --   --    < > = values in this interval not displayed.  Platelet Aggregation Study: No results for input(s): PLTAGG, PLTAGINTERP, PLTAGREGLACO, ADPPLTAGGREG in the last 168 hours.  Hgb A1C:   No results for input(s): HGBA1C in the last 168 hours.  TSH:   No results for input(s): TSH in the last 168 hours.      Diagnostic Results      Brain Imaging     MRI brain 5/28:    Small area of restricted diffusion in the right precentral gyrus.  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  Bilateral basal ganglia, thalami and cerebellar hemispheres small remote infarcts.  Generalized cerebral volume loss and extensive white matter chronic small vessel ischemic change.  Ventriculomegaly out of proportion to sulcal prominence, nonspecific but can be seen with normal pressure hydrocephalus, but overall unchanged from prior MRI.      Cardiac Imaging     ECHO 5/28/18:   CONCLUSIONS     1 - Concentric LVH with hyperdynamic left ventricular systolic function (EF >70%).     2 - Severe left atrial enlargement.     3 - Impaired LV relaxation, elevated LAP (grade 2 diastolic dysfunction).     4 - Normal right ventricular systolic function .     5 - Mild to moderate aortic stenosis, ALBER = 1.64 cm2, AVAi = 1.12 cm2/m2, peak velocity = 3.11 m/s, mean gradient = 24 mmHg

## 2018-06-05 NOTE — PT/OT/SLP PROGRESS
Occupational Therapy      Patient Name:  Sury Cobos   MRN:  [id number]    Patient not seen today secondary to off floor for PEG placement  . Will follow-up as POC allows.    PATITO Palmer  6/5/2018

## 2018-06-05 NOTE — NURSING
Patient came back to the unit at 12:50 pm, awake, alert , responsive, verbal. No complaint of pain or discomfort. Surgical incision on left side upper quadrant covered with gauze, no bleeding noted.

## 2018-06-05 NOTE — PROGRESS NOTES
PEG tube placed, patient tolerated procdure well.  Return to floor.  OK for meds per g tube in 6 hours (~5:30 pm)  OK for tube feeds per g tube in 24 hours (~11:30 am, 6/6/18)  OK to vent if develops nausea or vomiting  PEG bumper set at 3 cm  Otherwise keep g tube to gravity.

## 2018-06-05 NOTE — PROGRESS NOTES
Pt seen and examined.    No issues overnight.    Abd soft, no scars. Tube feeds off.    Proceed to OR for PEG.    Mirian Barksdale PGY4  [phone]

## 2018-06-05 NOTE — ASSESSMENT & PLAN NOTE
Stroke risk factor  SBP elevated over last 24 hrs, though likely related to pt's NPO status  Goal < 160   Continue clonidine 0.1 mg BID, lasix 20 mg qd, Losartan 100 mg qd

## 2018-06-05 NOTE — OP NOTE
[facility]  Surgery Department      Operative Note    Date of Procedure: 6/5/2018     Procedure: Procedure(s) (LRB):  EGD, WITH PEG TUBE INSERTION (N/A)  EGD (ESOPHAGOGASTRODUODENOSCOPY) (N/A)     Surgeon(s) and Role:     * Madhu Bliss MD - Primary     * Paul Ospina MD - Resident - Assisting     * Mirian Barksdale MD - Resident - Assisting    Pre-Operative Diagnosis: Ischemic stroke [I63.9]    Post-Operative Diagnosis: Post-Op Diagnosis Codes:     * Ischemic stroke [I63.9]  1. Same    Anesthesia: General    Operative Findings:   1. 20-Malay percutaneous gastrostomy placed without apparent complication.     Procedures:  1. Esophagogastroduodenoscopy (EGD) with Percutaneous Endoscopic Gastrostomy Tube Placement (PEG)    Estimated Blood Loss (EBL):  <10 mL           Indications:  Sury Cobos presents for the above procedures.  Risks and benefits were reviewed with family including bleeding, infection, damage to local structures, perforated viscus, need for additional procedures, death, and imponderables.  She understands and gave informed consent to proceed.    Details: The patient was identified in preoperative holding and brought back to the operating room. Anesthesia was induced. A timeout procedure was performed and all team members present agreed this was the correct procedure on the correct patient.    Following timeouts, the patient's abdomen was prepped and draped. An endoscope was introduced into the oropharynx and guided down into the esophagus and stomach. The stomach was insufflated with air. We intubated the duodenum and examined the first and second portions; no abnormalities were noted. We withdrew the endoscope into the stomach and identified an appropriate position for gastrostomy tube placement below the left subcostal margin. Palpation of the anterior abdominal wall at this point was visualized endoscopically and transillumination from the endoscope was  visualized through the anterior abdominal wall. We made a skin incision. At this point, the stomach was cannulated with a catheter loaded on a needle using an aspiration/safe tract technique.  There was no evidence for intervening viscera. The catheter was grasped by a endoscopic snare. The needle was removed, and a guidewire was placed, and the snare was used to grasp the guidewire. The endoscope, snare, and guidewire were all withdrawn from the patient's mouth. A 20-Frisian gastrostomy tube was loaded onto the guidewire and pulled through the anterior abdominal wall.  There was no blanching of the gastric mucosa, and when the tube was twisted, the button did not grab the mucosa. The insufflation in the stomach was evacuated, and the endoscope was removed. The gastrostomy tube was secured in place using the supplied devices and connected to a bag for gravity drainage.    Implants: * No implants in log *    Specimens:   Specimen (12h ago through future)    None                  Condition: Stable    Disposition: PACU - hemodynamically stable.    Attestation: I directed and was immediately available for the procedure

## 2018-06-05 NOTE — ASSESSMENT & PLAN NOTE
Improved behavior when family is at the bedside  Home wellbutrin 150 mg  Donepezil 10 qhs  seroquel 25 mg qhs

## 2018-06-06 PROBLEM — B36.9 FUNGAL DERMATITIS: Status: ACTIVE | Noted: 2018-06-06

## 2018-06-06 PROBLEM — N39.0 UTI (URINARY TRACT INFECTION): Status: RESOLVED | Noted: 2018-05-30 | Resolved: 2018-06-06

## 2018-06-06 LAB
ALBUMIN SERPL BCP-MCNC: 3.1 G/DL
ALP SERPL-CCNC: 75 U/L
ALT SERPL W/O P-5'-P-CCNC: 27 U/L
ANION GAP SERPL CALC-SCNC: 11 MMOL/L
AST SERPL-CCNC: 27 U/L
BASOPHILS # BLD AUTO: 0.09 K/UL
BASOPHILS NFR BLD: 0.8 %
BILIRUB SERPL-MCNC: 0.5 MG/DL
BUN SERPL-MCNC: 24 MG/DL
CALCIUM SERPL-MCNC: 9.2 MG/DL
CHLORIDE SERPL-SCNC: 100 MMOL/L
CO2 SERPL-SCNC: 28 MMOL/L
CREAT SERPL-MCNC: 0.9 MG/DL
DIFFERENTIAL METHOD: ABNORMAL
EOSINOPHIL # BLD AUTO: 0.1 K/UL
EOSINOPHIL NFR BLD: 0.5 %
ERYTHROCYTE [DISTWIDTH] IN BLOOD BY AUTOMATED COUNT: 13.9 %
EST. GFR  (AFRICAN AMERICAN): >60 ML/MIN/1.73 M^2
EST. GFR  (NON AFRICAN AMERICAN): 59.3 ML/MIN/1.73 M^2
GLUCOSE SERPL-MCNC: 78 MG/DL
HCT VFR BLD AUTO: 35.8 %
HGB BLD-MCNC: 11.1 G/DL
IMM GRANULOCYTES # BLD AUTO: 0.05 K/UL
IMM GRANULOCYTES NFR BLD AUTO: 0.5 %
LYMPHOCYTES # BLD AUTO: 2 K/UL
LYMPHOCYTES NFR BLD: 17.8 %
MCH RBC QN AUTO: 29.8 PG
MCHC RBC AUTO-ENTMCNC: 31 G/DL
MCV RBC AUTO: 96 FL
MONOCYTES # BLD AUTO: 0.9 K/UL
MONOCYTES NFR BLD: 8.6 %
NEUTROPHILS # BLD AUTO: 7.9 K/UL
NEUTROPHILS NFR BLD: 71.8 %
NRBC BLD-RTO: 0 /100 WBC
PLATELET # BLD AUTO: 219 K/UL
PMV BLD AUTO: 12.1 FL
POCT GLUCOSE: 152 MG/DL (ref 70–110)
POTASSIUM SERPL-SCNC: 4.4 MMOL/L
PROT SERPL-MCNC: 7.6 G/DL
RBC # BLD AUTO: 3.72 M/UL
SODIUM SERPL-SCNC: 139 MMOL/L
WBC # BLD AUTO: 10.98 K/UL

## 2018-06-06 PROCEDURE — 92507 TX SP LANG VOICE COMM INDIV: CPT

## 2018-06-06 PROCEDURE — 25000003 PHARM REV CODE 250: Performed by: PHYSICIAN ASSISTANT

## 2018-06-06 PROCEDURE — 25000242 PHARM REV CODE 250 ALT 637 W/ HCPCS: Performed by: PHYSICIAN ASSISTANT

## 2018-06-06 PROCEDURE — A4216 STERILE WATER/SALINE, 10 ML: HCPCS | Performed by: PHYSICIAN ASSISTANT

## 2018-06-06 PROCEDURE — 80053 COMPREHEN METABOLIC PANEL: CPT

## 2018-06-06 PROCEDURE — 97535 SELF CARE MNGMENT TRAINING: CPT

## 2018-06-06 PROCEDURE — 20600001 HC STEP DOWN PRIVATE ROOM

## 2018-06-06 PROCEDURE — 97530 THERAPEUTIC ACTIVITIES: CPT

## 2018-06-06 PROCEDURE — 36415 COLL VENOUS BLD VENIPUNCTURE: CPT

## 2018-06-06 PROCEDURE — S4991 NICOTINE PATCH NONLEGEND: HCPCS | Performed by: PHYSICIAN ASSISTANT

## 2018-06-06 PROCEDURE — 94640 AIRWAY INHALATION TREATMENT: CPT

## 2018-06-06 PROCEDURE — 63600175 PHARM REV CODE 636 W HCPCS: Performed by: PHYSICIAN ASSISTANT

## 2018-06-06 PROCEDURE — 94761 N-INVAS EAR/PLS OXIMETRY MLT: CPT

## 2018-06-06 PROCEDURE — 85025 COMPLETE CBC W/AUTO DIFF WBC: CPT

## 2018-06-06 PROCEDURE — 97116 GAIT TRAINING THERAPY: CPT

## 2018-06-06 PROCEDURE — 92526 ORAL FUNCTION THERAPY: CPT

## 2018-06-06 PROCEDURE — 94668 MNPJ CHEST WALL SBSQ: CPT

## 2018-06-06 PROCEDURE — 99900035 HC TECH TIME PER 15 MIN (STAT)

## 2018-06-06 PROCEDURE — 99233 SBSQ HOSP IP/OBS HIGH 50: CPT | Mod: ,,, | Performed by: PSYCHIATRY & NEUROLOGY

## 2018-06-06 PROCEDURE — 25000003 PHARM REV CODE 250: Performed by: PSYCHIATRY & NEUROLOGY

## 2018-06-06 RX ORDER — DABIGATRAN ETEXILATE 150 MG/1
150 CAPSULE ORAL 2 TIMES DAILY
Qty: 60 CAPSULE | Refills: 0 | Status: SHIPPED | OUTPATIENT
Start: 2018-06-06 | End: 2018-06-11 | Stop reason: HOSPADM

## 2018-06-06 RX ADMIN — IPRATROPIUM BROMIDE AND ALBUTEROL SULFATE 3 ML: .5; 3 SOLUTION RESPIRATORY (INHALATION) at 01:06

## 2018-06-06 RX ADMIN — CLONIDINE HYDROCHLORIDE 0.1 MG: 0.1 TABLET ORAL at 10:06

## 2018-06-06 RX ADMIN — FUROSEMIDE 20 MG: 20 TABLET ORAL at 10:06

## 2018-06-06 RX ADMIN — NYSTATIN AND TRIAMCINOLONE ACETONIDE: 100000; 1 CREAM TOPICAL at 10:06

## 2018-06-06 RX ADMIN — BUPROPION HYDROCHLORIDE 150 MG: 75 TABLET, FILM COATED ORAL at 10:06

## 2018-06-06 RX ADMIN — ASPIRIN 81 MG CHEWABLE TABLET 81 MG: 81 TABLET CHEWABLE at 10:06

## 2018-06-06 RX ADMIN — Medication 3 ML: at 01:06

## 2018-06-06 RX ADMIN — IPRATROPIUM BROMIDE AND ALBUTEROL SULFATE 3 ML: .5; 3 SOLUTION RESPIRATORY (INHALATION) at 02:06

## 2018-06-06 RX ADMIN — IPRATROPIUM BROMIDE AND ALBUTEROL SULFATE 3 ML: .5; 3 SOLUTION RESPIRATORY (INHALATION) at 10:06

## 2018-06-06 RX ADMIN — APIXABAN 5 MG: 5 TABLET, FILM COATED ORAL at 10:06

## 2018-06-06 RX ADMIN — HEPARIN SODIUM 5000 UNITS: 5000 INJECTION, SOLUTION INTRAVENOUS; SUBCUTANEOUS at 05:06

## 2018-06-06 RX ADMIN — NICOTINE 1 PATCH: 14 PATCH, EXTENDED RELEASE TRANSDERMAL at 10:06

## 2018-06-06 RX ADMIN — NYSTATIN AND TRIAMCINOLONE ACETONIDE: 100000; 1 CREAM TOPICAL at 12:06

## 2018-06-06 RX ADMIN — SODIUM CHLORIDE SOLN NEBU 3% 4 ML: 3 NEBU SOLN at 10:06

## 2018-06-06 RX ADMIN — QUETIAPINE FUMARATE 25 MG: 25 TABLET ORAL at 10:06

## 2018-06-06 RX ADMIN — IPRATROPIUM BROMIDE AND ALBUTEROL SULFATE 3 ML: .5; 3 SOLUTION RESPIRATORY (INHALATION) at 08:06

## 2018-06-06 RX ADMIN — Medication 3 ML: at 06:06

## 2018-06-06 RX ADMIN — Medication 3 ML: at 10:06

## 2018-06-06 RX ADMIN — APIXABAN 5 MG: 5 TABLET, FILM COATED ORAL at 01:06

## 2018-06-06 RX ADMIN — LOSARTAN POTASSIUM 100 MG: 50 TABLET, FILM COATED ORAL at 10:06

## 2018-06-06 RX ADMIN — DONEPEZIL HYDROCHLORIDE 10 MG: 5 TABLET, FILM COATED ORAL at 10:06

## 2018-06-06 RX ADMIN — ATORVASTATIN CALCIUM 40 MG: 20 TABLET, FILM COATED ORAL at 10:06

## 2018-06-06 RX ADMIN — SODIUM CHLORIDE SOLN NEBU 3% 4 ML: 3 NEBU SOLN at 08:06

## 2018-06-06 NOTE — PLAN OF CARE
Problem: Occupational Therapy Goal  Goal: Occupational Therapy Goal  Goals to be met by: 6/14/2018     Patient will increase functional independence with ADLs by performing:    UE Dressing with Minimal Assistance.  LE Dressing with Minimal Assistance.  Grooming while standing with Contact Guard Assistance.  Toileting from toilet with Minimal Assistance for hygiene and clothing management.   Sitting at edge of bed x 15 minutes with Stand-by Assistance.  Toilet transfer to toilet with Contact Guard Assistance.       Pt is making progress towards goals.  Discharge recommendation changed from rehab to Home Health 2* family's decision to take pt home.      PATITO Palmer  6/6/2018

## 2018-06-06 NOTE — PLAN OF CARE
Problem: Physical Therapy Goal  Goal: Physical Therapy Goal  Goals to be met by: 2018     Patient will increase functional independence with mobility by performin. Supine to sit with Contact Guard Assistance - MET  2. Sit to supine with Contact Guard Assistance - MET  3. Rolling to Left and Right with Supervision. - MET  4. Sit to stand transfer with Stand-by Assistance  5. Gait  x 20 feet with Contact Guard Assistance using Rolling Walker. - distance met but not level of assist  6. Stand for 10 minutes with Contact Guard Assistance using Rolling Walker      Outcome: Ongoing (interventions implemented as appropriate)  Patient participated well in therapy.  POC and goals remain appropriate.  Please refer to progress note for functional mobility.     Pt is safe to perform transfers with nursing using 1 person assistance with or without RW. RW placed in room. Pt is safer with RW, but she sometimes refuses to use it and she can be difficult to redirect.      Zulema Mei, PT  2018  [phone] (pager)

## 2018-06-06 NOTE — PLAN OF CARE
Cm spoke to Lauren regarding discharge plan to home with home health. Cm explained that the patient would require 24/7 care. Lauren stated that she understood and would be able to provide 24/7 care for the patient. Cm asked what equipment the patient already has at home. Lauren stated that the patient has: wheelchair, shower chair and rollator. Cm will follow up with Zulema ZARATE to get any additional equipment recommendations that the patient will need to go home. Cm was told that the patient has had HH in the past but no one can remember the HH agency. Cm asked if Lauren wanted a list of HH. Lauren stated that would be fine. Cm will provide a list of HH in the room. Cm will continue to follow.

## 2018-06-06 NOTE — ASSESSMENT & PLAN NOTE
D/c'd restraints; Improved behavior when family is at the bedside  Home wellbutrin 150 mg  Donepezil 10 qhs  seroquel 25 mg qhs

## 2018-06-06 NOTE — PLAN OF CARE
Cm price checked Eliquis, Pradaxa and Xerralto all three medications are $3.70. Stroke team notified. Cm will continue to follow.

## 2018-06-06 NOTE — PROGRESS NOTES
[facility]  Vascular Neurology  Comprehensive Stroke Center  Progress Note     Assessment/Plan:     * Embolic stroke involving right middle cerebral artery    82 y/o female with left sided facial droop, aphasia, dysarthria who received IV TPA but no IR. MRI with infarct in R precentral gyrus.   Etiology unclear though nursing note reporting patient went into Afib on monitor in [facility]. EKG completed and not seen, though echo showing Severe LAE.     Initiated TFs per PEG and started Eliquis for AFib.   Likely d/c home 6/7 once TFs at goal and family has received home care training.      Antithrombotics: Eliquis 5mg BID  Statins: Lipitor 40 mg daily  Aggressive risk factor modification: HTN, HLD  Rehab efforts: PT/OT/SLP to evaluate and treat   Dispo:  with 24/7 family supervision  Diagnostics ordered/pending: none   VTE prophylaxis: SCDs, Eliquis  BP parameters: Infarct: Post tPA, SBP <160        S/P admn tPA in diff fac w/n last 24 hr bef adm to crnt fac    Close monitoring in NCCU for 24 hours post administration -- Completed        Fungal dermatitis    Fungal infection noted by RN under the breasts  Nystatin ordered  Continue to monitor        New onset a-fib    Stroke risk factor; Likely stroke etiology  Per nursing note, afib was seen on telemetry in NCC. EKG with sinus rhythm and Premature atrial complexes (likely paroxysmal)  Echo showing severe LAE  CM price-checked DOACs  Started Eliquis 5mg BID on 6/6        Dysarthria    Severe dysarthria  SLP eval and treat        Dysphagia    PEG placed with Gen Surg on 6/5  Initiated TFs per PEG 6/6; Titrating up to goal        Dementia with behavioral disturbance    D/c'd restraints; Improved behavior when family is at the bedside  Home wellbutrin 150 mg  Donepezil 10 qhs  seroquel 25 mg qhs        Cytotoxic cerebral edema    Area of cytotoxic cerebral edema identified when reviewing brain imaging in the territory of the R middle cerebral artery.  There is no mass effect associated with it. We will continue to monitor the patients clinical exam for any worsening of symptoms which may indicate expansion of the stroke or the area of the edema resulting in the clinical change. The pattern is of unclear etiology (ESUS). Suspect afib (documented on telemetry)            Mixed hyperlipidemia    Stroke risk factor    Lipitor 40 mg daily        Benign essential HTN    Stroke risk factor  Goal SBP < 160   Continue clonidine 0.1 mg BID, lasix 20 mg qd, Losartan 100 mg qd        Atrial enlargement, left    AFib risk factor  Severe left atrial enlargement seen on echo  AFib seen on telemetry in NCC; Initiated AC 6/6        Aphasia    Pt responds appropriately though sometimes difficult to assess due to severe dysarthria  Follows some commands accurately   Aggressive SLP therapy              82 y/o female with aphasia, dysarthria and facial droop, received IV TPA but no LVO so no IR. Probable small vessel disease.    5/28: Blood pressure elevated >200 overnight requiring cardene. Patient bradycardic and with paroxysmal afib overnight as well. MRI with infarct in the right precentral gyrus.   5/29: No events overnight. Patient with many bacteria on UTI so short cipro course started per NCC. Pending step down to NSU.   5/30: seems more agitated today, not participating in exam or following commands, severe dysarthria and excessive secretions  05/31/18 Continues to have increased secretions and severe dysarthria.  Patient following some commands and plans to transfer to stroke service today   6/1: Secretions are better controlled today. Plan to step down. On exam patient is agitated and stating she wants to go home. In bilateral wrist restraints. Right lung opacity - Chest PT and duonebs ordered per NCC. Nursing notes noted Afib but EKG showing no afib. Echo revealed Severe left atrial enlargement. May need AC on discharge.  6/2: No acute events overnight. Stepped  down to NSU. Plan for PEG early next week if no improvement per ST.   6/3: No acute events overnight. Holding TF and heparin tonight in preparation for possible PEG tomorrow. O2 sats low 90's, CXR ordered.   6/4: Family wishes to proceed with PEG (recs per SLP.) IR unable as no safe window; consult placed to Gen Surg.  6/5: PEG with GS today. SBP elevated related to NPO status; BP meds given. Repeat UA WNL.  6/6: Nystatin ordered for fungal infection under the breasts. Initiating TFs per PEG today. D/c'd restraints. CM to price check NOACs for previously-captured AFib. Bedside RNs will train family on home equipment and associated care needs tomorrow.    STROKE DOCUMENTATION   Acute Stroke Times   Last Known Normal Date: 05/27/18  Last Known Normal Time: 1320  Symptom Onset Date: 05/27/18  Symptom Onset Time: 1445  Stroke Team Called Date: 05/27/18  Stroke Team Called Time: 1550  Stroke Team Arrival Date: 05/27/18  Stroke Team Arrival Time: 1751  CT Interpretation Time: 1536  Decision to Treat Time for Alteplase: 1806    NIH Scale:  1a. Level Of Consciousness: 0-->Alert: keenly responsive  1b. LOC Questions: 1-->Answers one question correctly  1c. LOC Commands: 0-->Performs both tasks correctly  2. Best Gaze: 0-->Normal  3. Visual: 0-->No visual loss  4. Facial Palsy: 2-->Partial paralysis (total or near-total paralysis of lower face)  5a. Motor Arm, Left: 0-->No drift: limb holds 90 (or 45) degrees for full 10 secs  5b. Motor Arm, Right: 0-->No drift: limb holds 90 (or 45) degrees for full 10 secs  6a. Motor Leg, Left: 1-->Drift: leg falls by the end of the 5-sec period but does not hit bed  6b. Motor Leg, Right: 0-->No drift: leg holds 30 degree position for full 5 secs  7. Limb Ataxia: 0-->Absent  8. Sensory: 0-->Normal: no sensory loss  9. Best Language: 0-->No aphasia: normal  10. Dysarthria: 2-->Severe dysarthria: patients speech is so slurred as to be unintelligible in the absence of or out of proportion to  any dysphasia, or is mute/anarthric  11. Extinction and Inattention (formerly Neglect): 0-->No abnormality  Total (NIH Stroke Scale): 6       Modified Kittson Score: 0  Ivonne Coma Scale:    ABCD2 Score:    [id number] Score:   HAS -BLED Score:   ICH Score:   Hunt & Burkett Classification:      Hemorrhagic change of an Ischemic Stroke: Does this patient have an ischemic stroke with hemorrhagic changes? No     Neurologic Chief Complaint: Aphasia, dysarthria, LSW     Subjective:     Interval History: Patient is seen for follow-up neurological assessment and treatment recommendations:     TORIEEON. Nystatin ordered for fungal infection under the breasts. Initiating TFs per PEG today. D/c'd restraints. CM to price check NOACs for previously-captured AFib. Bedside RNs will train family on home equipment and associated care needs tomorrow.    HPI, Past Medical, Family, and Social History remains the same as documented in the initial encounter.     Review of Systems   Constitutional: Negative for fever.   Respiratory: Negative for shortness of breath.    Gastrointestinal: Negative for abdominal pain, nausea and vomiting.   Neurological: Positive for facial asymmetry, speech difficulty and weakness. Negative for headaches.   Psychiatric/Behavioral: Positive for confusion. Negative for agitation and behavioral problems.     Scheduled Meds:   albuterol-ipratropium  3 mL Nebulization Q6H    aspirin  81 mg Per G Tube Daily    atorvastatin  40 mg Per G Tube Daily    buPROPion  150 mg Per G Tube BID    cloNIDine  0.1 mg Per G Tube BID    donepezil  10 mg Per G Tube QHS    furosemide  20 mg Per G Tube Daily    heparin (porcine)  5,000 Units Subcutaneous Q8H    losartan  100 mg Per G Tube Daily    nicotine  1 patch Transdermal Daily    nystatin-triamcinolone   Topical (Top) BID    QUEtiapine  25 mg Per G Tube QHS    sodium chloride 0.9%  3 mL Intravenous Q8H    sodium chloride 3%  4 mL Nebulization BID     Continuous  Infusions:    PRN Meds:acetaminophen, labetalol, sodium chloride 0.9%    Objective:     Vital Signs (Most Recent):  Temp: 98.9 °F (37.2 °C) (06/06/18 0816)  Pulse: 64 (06/06/18 1100)  Resp: 17 (06/06/18 1031)  BP: (!) 159/69 (06/06/18 0816)  SpO2: 96 % (06/06/18 1031)  BP Location: Right arm    Vital Signs Range (Last 24H):  Temp:  [98.7 °F (37.1 °C)-99.6 °F (37.6 °C)]   Pulse:  [59-95]   Resp:  [14-18]   BP: (144-168)/(65-74)   SpO2:  [90 %-99 %]   BP Location: Right arm    Physical Exam   Constitutional: She appears well-developed and well-nourished. No distress.   HENT:   Head: Normocephalic and atraumatic.   Eyes: Conjunctivae and EOM are normal.   Cardiovascular: Normal rate.    Pulmonary/Chest: Effort normal. No respiratory distress.   Musculoskeletal: She exhibits no edema or tenderness.   Neurological: She is alert. A cranial nerve deficit (CN VII) is present. No sensory deficit.   Oriented to month but not age    Skin: Skin is warm and dry.   Psychiatric: She is not agitated and not aggressive.   Nursing note and vitals reviewed.      Neurological Exam:   LOC: alert  Attention Span: Good  Language: No aphasia  Articulation: Moderate/severe Dysarthria  Visual Fields: Full  EOM (CN III, IV, VI): Full/intact  Facial Movement (CN VII): Lower facial weakness on the Left  Motor: Arm left  Paresis: 4/5  Leg left  Paresis: 3/5  Arm right  Paresis: 5/5  Leg right Paresis: 5/5  Sensation: Sensation intact  Tone: Spasticity  RUE    Laboratory:  CMP:     Recent Labs  Lab 06/06/18  0550   CALCIUM 9.2   ALBUMIN 3.1*   PROT 7.6      K 4.4   CO2 28      BUN 24*   CREATININE 0.9   ALKPHOS 75   ALT 27   AST 27   BILITOT 0.5     BMP:     Recent Labs  Lab 06/06/18  0550      K 4.4      CO2 28   BUN 24*   CREATININE 0.9   CALCIUM 9.2     CBC:     Recent Labs  Lab 06/06/18  0550   WBC 10.98   RBC 3.72*   HGB 11.1*   HCT 35.8*      MCV 96   MCH 29.8   MCHC 31.0*     Lipid Panel:   No results for  input(s): CHOL, LDLCALC, HDL, TRIG in the last 168 hours.  Coagulation:     Recent Labs  Lab 05/31/18  0152  06/03/18  0523   INR 0.9  < > 0.9   APTT 24.7  --   --    < > = values in this interval not displayed.  Platelet Aggregation Study: No results for input(s): PLTAGG, PLTAGINTERP, PLTAGREGLACO, ADPPLTAGGREG in the last 168 hours.  Hgb A1C:   No results for input(s): HGBA1C in the last 168 hours.  TSH:   No results for input(s): TSH in the last 168 hours.      Diagnostic Results     Brain Imaging     MRI brain 5/28:    Small area of restricted diffusion in the right precentral gyrus.  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  Bilateral basal ganglia, thalami and cerebellar hemispheres small remote infarcts.  Generalized cerebral volume loss and extensive white matter chronic small vessel ischemic change.  Ventriculomegaly out of proportion to sulcal prominence, nonspecific but can be seen with normal pressure hydrocephalus, but overall unchanged from prior MRI.      Cardiac Imaging     ECHO 5/28/18:   CONCLUSIONS     1 - Concentric LVH with hyperdynamic left ventricular systolic function (EF >70%).     2 - Severe left atrial enlargement.     3 - Impaired LV relaxation, elevated LAP (grade 2 diastolic dysfunction).     4 - Normal right ventricular systolic function .     5 - Mild to moderate aortic stenosis, ALBER = 1.64 cm2, AVAi = 1.12 cm2/m2, peak velocity = 3.11 m/s, mean gradient = 24 mmHg      Cindy Varner PA-C  Comprehensive Stroke Center  Department of Vascular Neurology   [facility]

## 2018-06-06 NOTE — PT/OT/SLP PROGRESS
Occupational Therapy   Treatment    Name: Sury Cobos  MRN: [id number]  Admitting Diagnosis:  Embolic stroke involving right middle cerebral artery  1 Day Post-Op    Recommendations:     Discharge Recommendations: Home health with 24/7 supervision  Discharge Equipment Recommendations:  3-in-1 commode, RW, hospital bed  Barriers to discharge:  None    Subjective     Communicated with: RN prior to session.  Pain/Comfort:  · Pain Rating 1:  (Pt indicated pain in abdomen from PEG placement)    Patients cultural, spiritual, Christian conflicts given the current situation: None reported    Objective:     Patient found with: telemetry, SCD, PEG Tube (PEG set to fernandez).  Sitter present, bed alarm on, bilateral soft wrist restraints    General Precautions: Standard, aspiration, fall   Orthopedic Precautions:N/A   Braces: N/A     Occupational Performance:    Bed Mobility:    · Patient completed Rolling/Turning to Left with  minimum assistance  · Patient completed Rolling/Turning to Right with minimum assistance  · Patient completed Scooting/Bridging with contact guard assistance  · Patient completed Supine to Sit with contact guard assistance  · Patient completed Sit to Supine with minimum assistance     Functional Mobility/Transfers:  · Patient completed Sit <> Stand Transfer with minimum assistance  with  no assistive device x 3 trials from EOB.  Pt demonstrated difficulty standing straight up.  Pt unable to fully achieve proper stance with L foot maintaining it in internal rotation  · Functional Mobility: Pt took one side step towards R with Min A.      Activities of Daily Living:  · Grooming: stand by assistance for washing face with cloth while seated EOB.  · Toileting: total assistance for cleaning and drying perineal area after urinating on blue pads.  Pt able to maintain side lying position during task.    Patient left HOB elevated with all lines intact, call button in reach and RN and sitter present; restraints  removed    [facility] 6 Click:  [facility] Total Score: 12    Treatment & Education:  *Pt sat EOB with SBA and performed 4 UE exercises to promote increased endurance needed for ADLs: 2 sets x 10 reps with HHA  -Shoulder flexion  -Chest press  -Horizontal shoulder abduction/adduction  -Forward Circular rows  *Pt performed sit <> stand transfers x 3 trials as noted above.  Cues required for postural adjustments.  *POC reviewed with pt and education provided on benefits of performing exercises several times per day.  Education:    Assessment:     Sury Cobos is a 83 y.o. female with a medical diagnosis of Embolic stroke involving right middle cerebral artery.  She presents with the following performance deficits affecting function: weakness, impaired endurance, impaired cognition, impaired functional mobilty, impaired self care skills, impaired balance, gait instability, decreased safety awareness.  Pt demonstrated improvement with sitting balance this date maintaining upright position with SBA for majority of time.  In addition, pt able to perform grooming while seated with SBA for several trials.  Pt displayed decreased safety awareness when standing, but no instances of LOB noted.  Pt is not at OF and would benefit from skilled OT services to address problems listed below and increase independence with ADLs.  Discharge recommendation changed from rehab to  2* family decision to take pt home.               Rehab Prognosis:  Good; patient would benefit from acute skilled OT services to address these deficits and reach maximum level of function.       Plan:     Patient to be seen 4 x/week to address the above listed problems via self-care/home management, therapeutic activities, therapeutic exercises, neuromuscular re-education  · Plan of Care Expires:    · Plan of Care Reviewed with: patient, daughter    This Plan of care has been discussed with the patient who was involved in its development and understands and is in  agreement with the identified goals and treatment plan    GOALS:    Occupational Therapy Goals        Problem: Occupational Therapy Goal    Goal Priority Disciplines Outcome Interventions   Occupational Therapy Goal     OT, PT/OT     Description:  Goals to be met by: 6/14/2018     Patient will increase functional independence with ADLs by performing:    UE Dressing with Minimal Assistance.  LE Dressing with Minimal Assistance.  Grooming while standing with Contact Guard Assistance.  Toileting from toilet with Minimal Assistance for hygiene and clothing management.   Sitting at edge of bed x 15 minutes with Stand-by Assistance.  Toilet transfer to toilet with Contact Guard Assistance.                      Time Tracking:     OT Date of Treatment: 06/06/18  OT Start Time: 0949  OT Stop Time: 1020  OT Total Time (min): 31 min    Billable Minutes:Therapeutic Activity 31    PATITO Palmer  6/6/2018

## 2018-06-06 NOTE — PLAN OF CARE
Problem: SLP Goal  Goal: SLP Goal  Speech Language Pathology Goals  Goals expected to be met by 6/12  1. Ongoing swallow assessment  2. Pt will follow 1 step commands with 75% acc given min A  3. Pt will answer yes/no questions with 75% acc given min A  4. Pt will complete OME's given mod A  5. Pt will complete word level dysarthria tasks with 80% intell. given max A to utilize speech strategies   6. Assess reading, writing, visual spatial skills  7. Assess memory, problem solving, reasoning    Outcome: Ongoing (interventions implemented as appropriate)    Pt seen for ongoing swallow assessment. Continue to recommend NPO at this time. ST will continue to follow according to POC.     Rowan Pinto M.S., CCC-SLP  Speech Language Pathologist  [phone] - pager   6/6/2018

## 2018-06-06 NOTE — SUBJECTIVE & OBJECTIVE
Neurologic Chief Complaint: Aphasia, dysarthria, LSW     Subjective:     Interval History: Patient is seen for follow-up neurological assessment and treatment recommendations:     NAVIN. Nystatin ordered for fungal infection under the breasts. Initiating TFs per PEG today. D/c'd restraints. CM to price check NOACs for previously-captured AFib. Bedside RNs will train family on home equipment and associated care needs tomorrow.    HPI, Past Medical, Family, and Social History remains the same as documented in the initial encounter.     Review of Systems   Constitutional: Negative for fever.   Respiratory: Negative for shortness of breath.    Gastrointestinal: Negative for abdominal pain, nausea and vomiting.   Neurological: Positive for facial asymmetry, speech difficulty and weakness. Negative for headaches.   Psychiatric/Behavioral: Positive for confusion. Negative for agitation and behavioral problems.     Scheduled Meds:   albuterol-ipratropium  3 mL Nebulization Q6H    aspirin  81 mg Per G Tube Daily    atorvastatin  40 mg Per G Tube Daily    buPROPion  150 mg Per G Tube BID    cloNIDine  0.1 mg Per G Tube BID    donepezil  10 mg Per G Tube QHS    furosemide  20 mg Per G Tube Daily    heparin (porcine)  5,000 Units Subcutaneous Q8H    losartan  100 mg Per G Tube Daily    nicotine  1 patch Transdermal Daily    nystatin-triamcinolone   Topical (Top) BID    QUEtiapine  25 mg Per G Tube QHS    sodium chloride 0.9%  3 mL Intravenous Q8H    sodium chloride 3%  4 mL Nebulization BID     Continuous Infusions:    PRN Meds:acetaminophen, labetalol, sodium chloride 0.9%    Objective:     Vital Signs (Most Recent):  Temp: 98.9 °F (37.2 °C) (06/06/18 0816)  Pulse: 64 (06/06/18 1100)  Resp: 17 (06/06/18 1031)  BP: (!) 159/69 (06/06/18 0816)  SpO2: 96 % (06/06/18 1031)  BP Location: Right arm    Vital Signs Range (Last 24H):  Temp:  [98.7 °F (37.1 °C)-99.6 °F (37.6 °C)]   Pulse:  [59-95]   Resp:  [14-18]   BP:  (144-168)/(65-74)   SpO2:  [90 %-99 %]   BP Location: Right arm    Physical Exam   Constitutional: She appears well-developed and well-nourished. No distress.   HENT:   Head: Normocephalic and atraumatic.   Eyes: Conjunctivae and EOM are normal.   Cardiovascular: Normal rate.    Pulmonary/Chest: Effort normal. No respiratory distress.   Musculoskeletal: She exhibits no edema or tenderness.   Neurological: She is alert. A cranial nerve deficit (CN VII) is present. No sensory deficit.   Oriented to month but not age    Skin: Skin is warm and dry.   Psychiatric: She is not agitated and not aggressive.   Nursing note and vitals reviewed.      Neurological Exam:   LOC: alert  Attention Span: Good  Language: No aphasia  Articulation: Moderate/severe Dysarthria  Visual Fields: Full  EOM (CN III, IV, VI): Full/intact  Facial Movement (CN VII): Lower facial weakness on the Left  Motor: Arm left  Paresis: 4/5  Leg left  Paresis: 3/5  Arm right  Paresis: 5/5  Leg right Paresis: 5/5  Sensation: Sensation intact  Tone: Spasticity  RUE    Laboratory:  CMP:     Recent Labs  Lab 06/06/18  0550   CALCIUM 9.2   ALBUMIN 3.1*   PROT 7.6      K 4.4   CO2 28      BUN 24*   CREATININE 0.9   ALKPHOS 75   ALT 27   AST 27   BILITOT 0.5     BMP:     Recent Labs  Lab 06/06/18  0550      K 4.4      CO2 28   BUN 24*   CREATININE 0.9   CALCIUM 9.2     CBC:     Recent Labs  Lab 06/06/18  0550   WBC 10.98   RBC 3.72*   HGB 11.1*   HCT 35.8*      MCV 96   MCH 29.8   MCHC 31.0*     Lipid Panel:   No results for input(s): CHOL, LDLCALC, HDL, TRIG in the last 168 hours.  Coagulation:     Recent Labs  Lab 05/31/18  0152  06/03/18  0523   INR 0.9  < > 0.9   APTT 24.7  --   --    < > = values in this interval not displayed.  Platelet Aggregation Study: No results for input(s): PLTAGG, PLTAGINTERP, PLTAGREGLACO, ADPPLTAGGREG in the last 168 hours.  Hgb A1C:   No results for input(s): HGBA1C in the last 168 hours.  TSH:   No  results for input(s): TSH in the last 168 hours.      Diagnostic Results     Brain Imaging     MRI brain 5/28:    Small area of restricted diffusion in the right precentral gyrus.  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  Bilateral basal ganglia, thalami and cerebellar hemispheres small remote infarcts.  Generalized cerebral volume loss and extensive white matter chronic small vessel ischemic change.  Ventriculomegaly out of proportion to sulcal prominence, nonspecific but can be seen with normal pressure hydrocephalus, but overall unchanged from prior MRI.      Cardiac Imaging     ECHO 5/28/18:   CONCLUSIONS     1 - Concentric LVH with hyperdynamic left ventricular systolic function (EF >70%).     2 - Severe left atrial enlargement.     3 - Impaired LV relaxation, elevated LAP (grade 2 diastolic dysfunction).     4 - Normal right ventricular systolic function .     5 - Mild to moderate aortic stenosis, ALBER = 1.64 cm2, AVAi = 1.12 cm2/m2, peak velocity = 3.11 m/s, mean gradient = 24 mmHg

## 2018-06-06 NOTE — ASSESSMENT & PLAN NOTE
AFib risk factor  Severe left atrial enlargement seen on echo  AFib seen on telemetry in [facility]; Initiated AC 6/6

## 2018-06-06 NOTE — ASSESSMENT & PLAN NOTE
82 y/o female with left sided facial droop, aphasia, dysarthria who received IV TPA but no IR. MRI with infarct in R precentral gyrus.   Etiology unclear though nursing note reporting patient went into Afib on monitor in [facility]. EKG completed and not seen, though echo showing Severe LAE.     Initiated TFs per PEG and started Eliquis for AFib.   Likely d/c home 6/7 once TFs at goal and family has received home care training.      Antithrombotics: Eliquis 5mg BID  Statins: Lipitor 40 mg daily  Aggressive risk factor modification: HTN, HLD  Rehab efforts: PT/OT/SLP to evaluate and treat   Dispo:  with 24/7 family supervision  Diagnostics ordered/pending: none   VTE prophylaxis: SCDs, Eliquis  BP parameters: Infarct: Post tPA, SBP <160

## 2018-06-06 NOTE — PT/OT/SLP PROGRESS
"Speech Language Pathology Treatment    Patient Name:  Sury Cobos   MRN:  [id number]  Admitting Diagnosis: Embolic stroke involving right middle cerebral artery    Recommendations:                 General Recommendations:  Dysphagia therapy, Speech/language therapy and Cognitive-linguistic therapy  Diet recommendations:  NPO, Liquid Diet Level: NPO   Aspiration Precautions: Alternate means of nutrition/hydration   General Precautions: Standard, NPO, fall, aspiration  Communication strategies:  provide increased time to answer and go to room if call light pushed    Subjective     "All I want is to get well and go home."  Patient goals: to go home    Pain/Comfort:  · Pain Rating 1:  (pt w general pain 2/2 peg placement yesterda.)  · Pain Rating Post-Intervention 1: other (see comments)    Objective:     Has the patient been evaluated by SLP for swallowing?   Yes  Keep patient NPO? Yes   Current Respiratory Status: room air      Pt seen bedside for ongoing swallow assessment. Pt oriented to month with cues, however unable to refer to external aid for day and year. Pt able to follow simple 1-step commands and answer simple Y/N questions within context of conversation. Speech strategies introduced to pt, however, pt continues to demonstrate severe dysarthria at this time.     Pt with muffled, wet voice prior to po trials. Pt able to produce productive volitional cough requiring suction of secretions. Pt with significantly improved vocal quality post suctioning. However, pt's voice gradually became more muffled and wet throughout session duration. Pt reported general pain from peg placement yesterday, however pt did not rate pain. Nursing notified.     Pt repositioned and HOB elevated appropriate for po trials. Pt offered and accepted ice chips, thin liquids via half and full teaspoon, thin liquids via cup sip and pureed solids via half and full teaspoons. Pt consumed ice chip X2, thin liquids via half teaspoon X1, thin " liquids via full teaspoon X2, thin liquids via cup sip X4, pureed solids via half teaspoon X2 and pureed via full teaspoon X1. Pt demonstrated anterior loss on L with thin liquids with full teaspoon X1 and cup sip X1. Pt with decreased spoon stripping ability and decreased ability to monitor sip size. Pt demonstrated no additional overt signs of airway compromise. Rec ongoing swallow assessment to determine appropriateness of po intake.     Pt educated on NPO, aspiration precautions, s/s of aspiration, role of SLP and POC. Pt verbalized understanding, however recommend reinforcement.   Whiteboard updated.       Assessment:     Sury Cobos is a 83 y.o. female with an SLP diagnosis of Dysphagia, Dysarthria and Cognitive-Linguistic Impairment.     Goals:    SLP Goals        Problem: SLP Goal    Goal Priority Disciplines Outcome   SLP Goal     SLP Ongoing (interventions implemented as appropriate)   Description:  Speech Language Pathology Goals  Goals expected to be met by 6/12  1. Ongoing swallow assessment  2. Pt will follow 1 step commands with 75% acc given min A  3. Pt will answer yes/no questions with 75% acc given min A  4. Pt will complete OME's given mod A  5. Pt will complete word level dysarthria tasks with 80% intell. given max A to utilize speech strategies   6. Assess reading, writing, visual spatial skills  7. Assess memory, problem solving, reasoning                     Plan:     · Patient to be seen:  4 x/week   · Plan of Care expires:  06/26/18  · Plan of Care reviewed with:  patient   · SLP Follow-Up:  Yes       Discharge recommendations:  nursing facility, skilled       Time Tracking:     SLP Treatment Date:   06/06/18  Speech Start Time:  1204  Speech Stop Time:  1233     Speech Total Time (min):  29 min    Billable Minutes: Speech Therapy Individual 8, Treatment Swallowing Dysfunction 13 and Seld Care/Home Management Training 8     Rowan Pinto M.S., CCC-SLP  Speech Language  Pathologist  [phone] - pager   6/6/2018      Rowan Pinto, MS CCC-SLP  06/06/2018

## 2018-06-06 NOTE — ASSESSMENT & PLAN NOTE
Stroke risk factor; Likely stroke etiology  Per nursing note, afib was seen on telemetry in [facility]. EKG with sinus rhythm and Premature atrial complexes (likely paroxysmal)  Echo showing severe LAE  CM price-checked DOACs  Started Eliquis 5mg BID on 6/6

## 2018-06-06 NOTE — ASSESSMENT & PLAN NOTE
Patient is an 84 yo female who suffered an MCA stroke 5/27/18 who has since been unable to swallow food PO due to high risk of aspiration. She has been tolerating TF per NGT. Underwent PEG tube placement 6/5/18.    OK for tube feed per g tube starting today at 11:30.  Can vent g-tube for nausea, vomiting    Will sign off today, please call with any questions or concerns

## 2018-06-06 NOTE — CARE UPDATE
Plan of care reviewed with patient, with demonstration of understanding. NAEON, patient rested well. Restraints remain in place, and sitter at bedside.   Patient has developed some form of infection under breasts folds. Nystatin ordered per stroke team, and applied.   VSS, Neuro status stable, NAEON. Will continue to monitor.

## 2018-06-06 NOTE — ASSESSMENT & PLAN NOTE
Stroke risk factor  Goal SBP < 160   Continue clonidine 0.1 mg BID, lasix 20 mg qd, Losartan 100 mg qd

## 2018-06-06 NOTE — PT/OT/SLP PROGRESS
"Physical Therapy Treatment    Patient Name:  Sury Cobos   MRN:  [id number]    Recommendations:     Discharge Recommendations:  home health PT (and 24 hr supervision)   Discharge Equipment Recommendations: bedside commode   Barriers to discharge: 24 hr supervision     Plan:     During this hospitalization, patient to be seen 4 x/week to address the above listed problems via gait training, therapeutic activities, therapeutic exercises, neuromuscular re-education  · Plan of Care Expires:  06/28/18   Plan of Care Reviewed with: patient    Subjective     Communicated with Rn prior to session.    Patient comments/goals: "i want a cigarette."  "I'm not ready to go back to bed. I want to sit up for a while."  We'll walk to the end, then we'll turn around. I want to go the whole way."  Pain/Comfort:  · Pain Rating 1: 0/10  · Pain Rating Post-Intervention 1: 0/10        Objective:     Patient found with: telemetry, PEG Tube, SCD     General Precautions: Standard, aspiration, fall     Patient was found supine in bed with sitter at bedside.  She agreed to therapy. Patient was initially very distracted by the environment (position of bed sheets, arrangement of gowns, position of IV pole, something on the floor, her shoes not present, Rw not the same as her rollator, etc).  After gentle redirection and review of goals for this therapy session, she initiated mobility. Gait training was performed in the ivy with RW and therapist assist for RW management; sitter assist to manage IV pole.  She had difficulty managing the RW during turns, but normally uses rollator walker at home. See below for details.     Functional Mobility:  Bed Mobility:   · Rolling Right: NT  · Rolling Left: SBA  · Supine to Sit: min assistance, performed from long sitting   · Sit to Supine: NT     Sitting Balance at Edge of Bed:  · Assistance Level Required: SBA     Transfers:   · Sit <> Stand Transfer:  3x, min assistance > contact guard assistance using Rw " (improper hand placement)   · Bed <> Chair Transfer: min assist stand pivot transfer with no AD (pt refused to use walker)  · Toilet transfer: NT     Gait:  Gait x 120 feet min assistance with RW, gait deviations and safety concerns present.   · Pt demonstrated antalgic gait pattern and chronic gait deviations: TTWB LLE d/t contractures, LLD, and chronic hip problems.  She advances both LEs independently without assistance.   · PT provided min assistance for walker management, especially when turning, and for balance (LOB when turning to the R)  · Comments: Patient is impulsive and requires assistance at all times with gait.       Therapeutic Activities and Exercises:   PT educated patient on role of PT, goals of therapy session, use of RW to improve safety during gait, impending family training for safe d/c home. Family was not present today to perform family training.     At the end of the session, the patient was left sitting up in the chair with all lines intact, sitter at bedside, PCT present and RN notified.     AM-PAC 6 CLICK MOBILITY  Turning over in bed (including adjusting bedclothes, sheets and blankets)?: 3  Sitting down on and standing up from a chair with arms (e.g., wheelchair, bedside commode, etc.): 3  Moving from lying on back to sitting on the side of the bed?: 4  Moving to and from a bed to a chair (including a wheelchair)?: 3  Need to walk in hospital room?: 3  Climbing 3-5 steps with a railing?: 2  Total Score: 18       GOALS:    Physical Therapy Goals        Problem: Physical Therapy Goal    Goal Priority Disciplines Outcome Goal Variances Interventions   Physical Therapy Goal     PT/OT, PT Ongoing (interventions implemented as appropriate)     Description:  Goals to be met by: 2018     Patient will increase functional independence with mobility by performin. Supine to sit with Contact Guard Assistance - MET  2. Sit to supine with Contact Guard Assistance - MET  3. Rolling to Left  and Right with Supervision. - MET  4. Sit to stand transfer with Stand-by Assistance  5. Gait  x 20 feet with Contact Guard Assistance using Rolling Walker. - distance met but not level of assist  6. Stand for 10 minutes with Contact Guard Assistance using Rolling Walker                        Assessment:     Sury Cobos is a 83 y.o. female admitted with a medical diagnosis of Embolic stroke involving right middle cerebral artery.  She is progressing well.  Pt increased gait distance to 120 feet today using a RW.  She continues to require assistance with gait.  Prior to admit she ambulated with rollator walker without assistance or supervision.  She will now require supervision and assistance with all OOB mobility.  Family training will be attempted with family prior to admit to discharge.  PT has already begun education and demonstration of changes with mobility and the need for assistance with gait now.  Her dtr has verbalized understanding and willingness to provide this assistance on previous therapy sessions.     She presents with the following impairments/functional limitations:  weakness, impaired functional mobilty, impaired cognition, decreased safety awareness, gait instability, impaired balance, decreased upper extremity function, decreased lower extremity function, impaired self care skills.    Rehab Prognosis:  good; patient would benefit from acute skilled PT services to address these deficits and reach maximum level of function.      Recent Surgery: Procedure(s) (LRB):  EGD, WITH PEG TUBE INSERTION (N/A)  EGD (ESOPHAGOGASTRODUODENOSCOPY) (N/A) 1 Day Post-Op  Time Tracking:     PT Received On: 06/06/18  PT Start Time: 1528     PT Stop Time: 1600  PT Total Time (min): 32 min     Billable Minutes: Gait Training 22 and Therapeutic Activity 10    Treatment Type: Treatment  PT/PTA: PT     PTA Visit Number: 0     Zulema Mei, PT  6/6/2018  [phone] (pager)

## 2018-06-07 PROBLEM — D72.829 LEUKOCYTOSIS: Status: ACTIVE | Noted: 2018-06-07

## 2018-06-07 LAB
ALBUMIN SERPL BCP-MCNC: 3 G/DL
ALP SERPL-CCNC: 83 U/L
ALT SERPL W/O P-5'-P-CCNC: 31 U/L
ANION GAP SERPL CALC-SCNC: 11 MMOL/L
AST SERPL-CCNC: 31 U/L
BASOPHILS # BLD AUTO: 0.05 K/UL
BASOPHILS NFR BLD: 0.4 %
BILIRUB SERPL-MCNC: 0.4 MG/DL
BUN SERPL-MCNC: 32 MG/DL
CALCIUM SERPL-MCNC: 9.4 MG/DL
CHLORIDE SERPL-SCNC: 99 MMOL/L
CO2 SERPL-SCNC: 31 MMOL/L
CREAT SERPL-MCNC: 1 MG/DL
DIFFERENTIAL METHOD: ABNORMAL
EOSINOPHIL # BLD AUTO: 0 K/UL
EOSINOPHIL NFR BLD: 0.1 %
ERYTHROCYTE [DISTWIDTH] IN BLOOD BY AUTOMATED COUNT: 13.7 %
EST. GFR  (AFRICAN AMERICAN): 59.8 ML/MIN/1.73 M^2
EST. GFR  (NON AFRICAN AMERICAN): 51.9 ML/MIN/1.73 M^2
GLUCOSE SERPL-MCNC: 178 MG/DL
HCT VFR BLD AUTO: 32.6 %
HGB BLD-MCNC: 10.6 G/DL
IMM GRANULOCYTES # BLD AUTO: 0.07 K/UL
IMM GRANULOCYTES NFR BLD AUTO: 0.5 %
LYMPHOCYTES # BLD AUTO: 1.8 K/UL
LYMPHOCYTES NFR BLD: 13.4 %
MAGNESIUM SERPL-MCNC: 2.5 MG/DL
MCH RBC QN AUTO: 30.6 PG
MCHC RBC AUTO-ENTMCNC: 32.5 G/DL
MCV RBC AUTO: 94 FL
MONOCYTES # BLD AUTO: 1.2 K/UL
MONOCYTES NFR BLD: 9.1 %
NEUTROPHILS # BLD AUTO: 10.3 K/UL
NEUTROPHILS NFR BLD: 76.5 %
NRBC BLD-RTO: 0 /100 WBC
PHOSPHATE SERPL-MCNC: 3.5 MG/DL
PLATELET # BLD AUTO: 256 K/UL
PMV BLD AUTO: 12.6 FL
POCT GLUCOSE: 127 MG/DL (ref 70–110)
POCT GLUCOSE: 174 MG/DL (ref 70–110)
POCT GLUCOSE: 177 MG/DL (ref 70–110)
POTASSIUM SERPL-SCNC: 4 MMOL/L
PROT SERPL-MCNC: 7.6 G/DL
RBC # BLD AUTO: 3.46 M/UL
SODIUM SERPL-SCNC: 141 MMOL/L
WBC # BLD AUTO: 13.5 K/UL

## 2018-06-07 PROCEDURE — 97110 THERAPEUTIC EXERCISES: CPT

## 2018-06-07 PROCEDURE — 27000221 HC OXYGEN, UP TO 24 HOURS

## 2018-06-07 PROCEDURE — 25000242 PHARM REV CODE 250 ALT 637 W/ HCPCS: Performed by: PHYSICIAN ASSISTANT

## 2018-06-07 PROCEDURE — 92526 ORAL FUNCTION THERAPY: CPT

## 2018-06-07 PROCEDURE — 94761 N-INVAS EAR/PLS OXIMETRY MLT: CPT

## 2018-06-07 PROCEDURE — 97530 THERAPEUTIC ACTIVITIES: CPT

## 2018-06-07 PROCEDURE — 99233 SBSQ HOSP IP/OBS HIGH 50: CPT | Mod: ,,, | Performed by: PSYCHIATRY & NEUROLOGY

## 2018-06-07 PROCEDURE — 85025 COMPLETE CBC W/AUTO DIFF WBC: CPT

## 2018-06-07 PROCEDURE — 81001 URINALYSIS AUTO W/SCOPE: CPT

## 2018-06-07 PROCEDURE — 80053 COMPREHEN METABOLIC PANEL: CPT

## 2018-06-07 PROCEDURE — 20600001 HC STEP DOWN PRIVATE ROOM

## 2018-06-07 PROCEDURE — S4991 NICOTINE PATCH NONLEGEND: HCPCS | Performed by: PHYSICIAN ASSISTANT

## 2018-06-07 PROCEDURE — 93005 ELECTROCARDIOGRAM TRACING: CPT

## 2018-06-07 PROCEDURE — 25000003 PHARM REV CODE 250: Performed by: PHYSICIAN ASSISTANT

## 2018-06-07 PROCEDURE — 99900035 HC TECH TIME PER 15 MIN (STAT)

## 2018-06-07 PROCEDURE — 87086 URINE CULTURE/COLONY COUNT: CPT

## 2018-06-07 PROCEDURE — 94668 MNPJ CHEST WALL SBSQ: CPT

## 2018-06-07 PROCEDURE — 83735 ASSAY OF MAGNESIUM: CPT

## 2018-06-07 PROCEDURE — 36415 COLL VENOUS BLD VENIPUNCTURE: CPT

## 2018-06-07 PROCEDURE — 93010 ELECTROCARDIOGRAM REPORT: CPT | Mod: ,,, | Performed by: INTERNAL MEDICINE

## 2018-06-07 PROCEDURE — A4216 STERILE WATER/SALINE, 10 ML: HCPCS | Performed by: PHYSICIAN ASSISTANT

## 2018-06-07 PROCEDURE — 84100 ASSAY OF PHOSPHORUS: CPT

## 2018-06-07 PROCEDURE — 92507 TX SP LANG VOICE COMM INDIV: CPT

## 2018-06-07 PROCEDURE — 94640 AIRWAY INHALATION TREATMENT: CPT

## 2018-06-07 RX ADMIN — SODIUM CHLORIDE SOLN NEBU 3% 4 ML: 3 NEBU SOLN at 09:06

## 2018-06-07 RX ADMIN — QUETIAPINE FUMARATE 25 MG: 25 TABLET ORAL at 10:06

## 2018-06-07 RX ADMIN — FUROSEMIDE 20 MG: 20 TABLET ORAL at 09:06

## 2018-06-07 RX ADMIN — LOSARTAN POTASSIUM 100 MG: 50 TABLET, FILM COATED ORAL at 09:06

## 2018-06-07 RX ADMIN — IPRATROPIUM BROMIDE AND ALBUTEROL SULFATE 3 ML: .5; 3 SOLUTION RESPIRATORY (INHALATION) at 09:06

## 2018-06-07 RX ADMIN — SODIUM CHLORIDE SOLN NEBU 3% 4 ML: 3 NEBU SOLN at 08:06

## 2018-06-07 RX ADMIN — NYSTATIN AND TRIAMCINOLONE ACETONIDE: 100000; 1 CREAM TOPICAL at 09:06

## 2018-06-07 RX ADMIN — NYSTATIN AND TRIAMCINOLONE ACETONIDE: 100000; 1 CREAM TOPICAL at 10:06

## 2018-06-07 RX ADMIN — IPRATROPIUM BROMIDE AND ALBUTEROL SULFATE 3 ML: .5; 3 SOLUTION RESPIRATORY (INHALATION) at 12:06

## 2018-06-07 RX ADMIN — IPRATROPIUM BROMIDE AND ALBUTEROL SULFATE 3 ML: .5; 3 SOLUTION RESPIRATORY (INHALATION) at 08:06

## 2018-06-07 RX ADMIN — ATORVASTATIN CALCIUM 40 MG: 20 TABLET, FILM COATED ORAL at 09:06

## 2018-06-07 RX ADMIN — NICOTINE 1 PATCH: 14 PATCH, EXTENDED RELEASE TRANSDERMAL at 09:06

## 2018-06-07 RX ADMIN — APIXABAN 2.5 MG: 2.5 TABLET, FILM COATED ORAL at 10:06

## 2018-06-07 RX ADMIN — BUPROPION HYDROCHLORIDE 150 MG: 75 TABLET, FILM COATED ORAL at 10:06

## 2018-06-07 RX ADMIN — DONEPEZIL HYDROCHLORIDE 10 MG: 5 TABLET, FILM COATED ORAL at 10:06

## 2018-06-07 RX ADMIN — CLONIDINE HYDROCHLORIDE 0.1 MG: 0.1 TABLET ORAL at 09:06

## 2018-06-07 RX ADMIN — Medication 3 ML: at 05:06

## 2018-06-07 RX ADMIN — CLONIDINE HYDROCHLORIDE 0.1 MG: 0.1 TABLET ORAL at 10:06

## 2018-06-07 RX ADMIN — APIXABAN 5 MG: 5 TABLET, FILM COATED ORAL at 09:06

## 2018-06-07 RX ADMIN — BUPROPION HYDROCHLORIDE 150 MG: 75 TABLET, FILM COATED ORAL at 09:06

## 2018-06-07 RX ADMIN — QUETIAPINE FUMARATE 12.5 MG: 25 TABLET ORAL at 10:06

## 2018-06-07 NOTE — PT/OT/SLP PROGRESS
Occupational Therapy   Treatment    Name: Sury Cobos  MRN: [id number]  Admitting Diagnosis:  Embolic stroke involving right middle cerebral artery  2 Days Post-Op    Recommendations:     Discharge Recommendations: home health OT (with 24/hr supervision)  Discharge Equipment Recommendations:  bedside commode, walker, rolling  Barriers to discharge:  None    Subjective     Communicated with: Rn prior to session.  Pain/Comfort:  · Pain Rating 1: 0/10  · Pain Rating Post-Intervention 1: 0/10    Patients cultural, spiritual, Judaism conflicts given the current situation: None reported    Objective:     Patient found with: SCD, telemetry, PEG Tube    General Precautions: Standard, aspiration, NPO, fall   Orthopedic Precautions:N/A   Braces: N/A     Occupational Performance:    Bed Mobility:    · Patient completed Rolling/Turning to Right with minimum assistance  · Patient completed Scooting forward while seated EOB: Minimum assistance  · Patient completed Supine to Sit with minimum assistance  · Patient completed Sit to Supine with minimum assistance     Functional Mobility/Transfers:  · Patient completed Sit <> Stand Transfer with minimum assistance  with  no assistive device   · Functional Mobility: Pt took multiple steps at bedside with minimal assistance and RW for balance and safety    Activities of Daily Living:  · Grooming: Contact guard assistance to brush hair while seated EOB  · UB Dressing: moderate assistance to tatianna gown like jacket while seated EOB    Patient left supine with all lines intact, call button in reach and Rn notified    [facility] 6 Click:  AMPA Total Score: 13    Treatment & Education:  -Pt edu on OT role/POC  -Importance of OOB activity with staff assistance ( with supervision 2/2 poor safety awareness and impulsivity)  -Safety during functional t/f and mobility ( RW management)  - White board updated  - Multiple self care tasks completed-- noted above   - She completed x3 trials sit <> stand  from EOB with minimal assistance  - Pt completed BUE AAROM exercises while seated EOB including: 1x15 reps in shoulder flexion, elbow flexion/extension, supination/pronation, shoulder IR/ER, wrist, and digit flexion/extension. Pt tolerated well with min rest breaks between each exercise.   Education:  - Educated pt/family on DME recommendations/ how to properly use equipment; all questions/concerns answered within OT scope of practice    Assessment:     Sury Cobos is a 84 y.o. female with a medical diagnosis of Embolic stroke involving right middle cerebral artery.  She presents aphasia with poor safety awareness, and impulsivity. She tolerated treatment well this date and progressing with goals.  Performance deficits affecting function are weakness, impaired endurance, impaired self care skills, impaired functional mobilty, impaired balance, decreased upper extremity function, decreased safety awareness, decreased lower extremity function, gait instability, impaired cognition.      Rehab Prognosis:  Good  ; patient would benefit from acute skilled OT services to address these deficits and reach maximum level of function.       Plan:     Patient to be seen 4 x/week to address the above listed problems via self-care/home management, therapeutic activities, therapeutic exercises, neuromuscular re-education  · Plan of Care Expires: 06/28/18  · Plan of Care Reviewed with: patient    This Plan of care has been discussed with the patient who was involved in its development and understands and is in agreement with the identified goals and treatment plan    GOALS:    Occupational Therapy Goals        Problem: Occupational Therapy Goal    Goal Priority Disciplines Outcome Interventions   Occupational Therapy Goal     OT, PT/OT Ongoing (interventions implemented as appropriate)    Description:  Goals to be met by: 6/14/2018     Patient will increase functional independence with ADLs by performing:    UE Dressing with  Minimal Assistance.  LE Dressing with Minimal Assistance.  Grooming while standing with Contact Guard Assistance.  Toileting from toilet with Minimal Assistance for hygiene and clothing management.   Sitting at edge of bed x 15 minutes with Stand-by Assistance. Met 6/7  Revised: sitting at EOB x15 minutes with Supervision.   Toilet transfer to toilet with Contact Guard Assistance.                       Time Tracking:     OT Date of Treatment: 06/07/18  OT Start Time: 1042  OT Stop Time: 1108  OT Total Time (min): 26 min    Billable Minutes:Therapeutic Activity 10  Therapeutic Exercise 16    Kinsey monsalve OT  6/7/2018

## 2018-06-07 NOTE — PLAN OF CARE
Problem: Physical Therapy Goal  Goal: Physical Therapy Goal  Goals to be met by: 2018     Patient will increase functional independence with mobility by performin. Supine to sit with Contact Guard Assistance - MET  2. Sit to supine with Contact Guard Assistance - MET  3. Rolling to Left and Right with Supervision. - MET  4. Sit to stand transfer with Stand-by Assistance  5. Gait  x 20 feet with Contact Guard Assistance using Rolling Walker. - distance met but not level of assist  6. Stand for 10 minutes with Contact Guard Assistance using Rolling Walker       Outcome: Ongoing (interventions implemented as appropriate)    Pt would benefit from HHPT upon discharge.  Appropriate transfer level with nursing staff: Bed <> Chair:  Squat Pivot with Minimal Assistance with RW, 1 Person.    Heather Che PT, DPT  2018  Pager: [phone]

## 2018-06-07 NOTE — SUBJECTIVE & OBJECTIVE
Neurologic Chief Complaint: Aphasia, dysarthria, LSW     Subjective:     Interval History: Patient is seen for follow-up neurological assessment and treatment recommendations:     Changed eliquis to 2.5 mg BID due to age and weight. Patient with elevated white count, pending UA and CXR. Afebrile. Pending family training to take patient home with home health. Patient will need home medical equipment.    HPI, Past Medical, Family, and Social History remains the same as documented in the initial encounter.     Review of Systems   Constitutional: Negative for fever.   Respiratory: Negative for shortness of breath.    Gastrointestinal: Negative for abdominal pain, nausea and vomiting.   Neurological: Positive for facial asymmetry, speech difficulty and weakness. Negative for headaches.   Psychiatric/Behavioral: Positive for confusion. Negative for agitation and behavioral problems.     Scheduled Meds:   albuterol-ipratropium  3 mL Nebulization Q6H    apixaban  2.5 mg Per G Tube BID    atorvastatin  40 mg Per G Tube Daily    buPROPion  150 mg Per G Tube BID    cloNIDine  0.1 mg Per G Tube BID    donepezil  10 mg Per G Tube QHS    furosemide  20 mg Per G Tube Daily    losartan  100 mg Per G Tube Daily    nicotine  1 patch Transdermal Daily    nystatin-triamcinolone   Topical (Top) BID    QUEtiapine  25 mg Per G Tube QHS    sodium chloride 0.9%  3 mL Intravenous Q8H    sodium chloride 3%  4 mL Nebulization BID     Continuous Infusions:    PRN Meds:acetaminophen, labetalol, sodium chloride 0.9%    Objective:     Vital Signs (Most Recent):  Temp: 99.7 °F (37.6 °C) (06/07/18 0801)  Pulse: 91 (06/07/18 1057)  Resp: 18 (06/07/18 0816)  BP: (!) 139/90 (06/07/18 0801)  SpO2: 97 % (06/07/18 0816)  BP Location: Left arm    Vital Signs Range (Last 24H):  Temp:  [97.7 °F (36.5 °C)-99.7 °F (37.6 °C)]   Pulse:  []   Resp:  [16-18]   BP: (125-155)/(53-90)   SpO2:  [90 %-97 %]   BP Location: Left arm    Physical Exam    Constitutional: She appears well-developed and well-nourished. No distress.   HENT:   Head: Normocephalic and atraumatic.   Eyes: Conjunctivae and EOM are normal.   Cardiovascular: Normal rate.    Pulmonary/Chest: Effort normal. No respiratory distress.   Musculoskeletal: She exhibits no edema or tenderness.   Neurological: She is alert. A cranial nerve deficit (CN VII) is present. No sensory deficit.   Oriented to self, not date    Skin: Skin is warm and dry.   Psychiatric: She is agitated, hyperactive and combative.   Nursing note and vitals reviewed.    Neurological Exam:   LOC: alert  Attention Span: Good  Language: No aphasia  Articulation: Moderate/severe Dysarthria  Visual Fields: Full  EOM (CN III, IV, VI): Full/intact  Facial Movement (CN VII): Lower facial weakness on the Left  Motor: Arm left  Paresis: 4/5  Leg left  Paresis: 3/5  Arm right  Paresis: 5/5  Leg right Paresis: 5/5  Sensation: Sensation intact  Tone: normal     Laboratory:  CMP:     Recent Labs  Lab 06/07/18  0603   CALCIUM 9.4   ALBUMIN 3.0*   PROT 7.6      K 4.0   CO2 31*   CL 99   BUN 32*   CREATININE 1.0   ALKPHOS 83   ALT 31   AST 31   BILITOT 0.4     BMP:     Recent Labs  Lab 06/07/18  0603      K 4.0   CL 99   CO2 31*   BUN 32*   CREATININE 1.0   CALCIUM 9.4     CBC:     Recent Labs  Lab 06/07/18  0603   WBC 13.50*   RBC 3.46*   HGB 10.6*   HCT 32.6*      MCV 94   MCH 30.6   MCHC 32.5     Lipid Panel:   No results for input(s): CHOL, LDLCALC, HDL, TRIG in the last 168 hours.  Coagulation:     Recent Labs  Lab 06/03/18  0523   INR 0.9     Platelet Aggregation Study: No results for input(s): PLTAGG, PLTAGINTERP, PLTAGREGLACO, ADPPLTAGGREG in the last 168 hours.  Hgb A1C:   No results for input(s): HGBA1C in the last 168 hours.  TSH:   No results for input(s): TSH in the last 168 hours.      Diagnostic Results     Brain Imaging     MRI brain 5/28:    Small area of restricted diffusion in the right precentral  gyrus.  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  Bilateral basal ganglia, thalami and cerebellar hemispheres small remote infarcts.  Generalized cerebral volume loss and extensive white matter chronic small vessel ischemic change.  Ventriculomegaly out of proportion to sulcal prominence, nonspecific but can be seen with normal pressure hydrocephalus, but overall unchanged from prior MRI.      Cardiac Imaging     ECHO 5/28/18:   CONCLUSIONS     1 - Concentric LVH with hyperdynamic left ventricular systolic function (EF >70%).     2 - Severe left atrial enlargement.     3 - Impaired LV relaxation, elevated LAP (grade 2 diastolic dysfunction).     4 - Normal right ventricular systolic function .     5 - Mild to moderate aortic stenosis, ALBER = 1.64 cm2, AVAi = 1.12 cm2/m2, peak velocity = 3.11 m/s, mean gradient = 24 mmHg

## 2018-06-07 NOTE — ASSESSMENT & PLAN NOTE
84 y/o female with left sided facial droop, aphasia, dysarthria who received IV TPA but no IR. MRI with infarct in R precentral gyrus.   Etiology unclear though nursing note reporting patient went into Afib on monitor in [facility]. EKG completed and not seen, though echo showing Severe LAE.     TFs per PEG at goal and started Eliquis 2.5mg for AFib.   Likely d/c home 6/8 once family has received home care training and workup/treatment for leukocytosis addressed.      Antithrombotics: Eliquis 2.5mg BID  Statins: Lipitor 40 mg daily  Aggressive risk factor modification: HTN, HLD  Rehab efforts: PT/OT/SLP to evaluate and treat   Dispo:  with 24/7 family supervision  Diagnostics ordered/pending: none   VTE prophylaxis: SCDs, Eliquis  BP parameters: Infarct: Post tPA, SBP <160

## 2018-06-07 NOTE — PLAN OF CARE
Problem: Occupational Therapy Goal  Goal: Occupational Therapy Goal  Goals to be met by: 6/14/2018     Patient will increase functional independence with ADLs by performing:    UE Dressing with Minimal Assistance.  LE Dressing with Minimal Assistance.  Grooming while standing with Contact Guard Assistance.  Toileting from toilet with Minimal Assistance for hygiene and clothing management.   Sitting at edge of bed x 15 minutes with Stand-by Assistance. Met 6/7  Revised: sitting at EOB x15 minutes with Supervision.   Toilet transfer to toilet with Contact Guard Assistance.     Outcome: Ongoing (interventions implemented as appropriate)  Continue with POC.   Kinsey monsalve OT  6/7/2018

## 2018-06-07 NOTE — ASSESSMENT & PLAN NOTE
Stroke risk factor; Likely stroke etiology  Per nursing note, afib was seen on telemetry in [facility]. EKG with sinus rhythm and Premature atrial complexes (likely paroxysmal)  Echo showing severe LAE  CM price-checked DOACs  Started Eliquis 2.5mg BID

## 2018-06-07 NOTE — PT/OT/SLP PROGRESS
Speech Language Pathology Treatment    Patient Name:  Sury Cobos   MRN:  [id number]  Admitting Diagnosis: Embolic stroke involving right middle cerebral artery    Recommendations:                 General Recommendations:  Dysphagia therapy, Speech/language therapy and Cognitive-linguistic therapy  Diet recommendations:  NPO, Liquid Diet Level: NPO   Aspiration Precautions: Continue alternate means of nutrition and Strict aspiration precautions   General Precautions: Standard, aspiration, fall, NPO  Communication strategies:  go to room if call light pushed and motor speech strategies    Subjective     Pt seen sitting up on sofa with daughter present.  Pt initially awake, but XAVI diminishing. Pt appearing to have residual effects of medications given for agitation.  Pt's dysarthria appeared more pronounced and speech intelligibility worse compared to yesterday. Slight agitation and irritation evident.     Pain/Comfort:  · Pain Rating 1: 0/10    Objective:     Has the patient been evaluated by SLP for swallowing?   Yes  Keep patient NPO? Yes   Current Respiratory Status: room air      Pt seen for ongoing swallowing assessment this afternoon.  Pt presented with full tsp trials of thin water x 2. Pt with poor oral acceptance, poor oral containment, and absent A-P transit resulting in anterior loss of likely entire bolus. No pharyngeal swallow was palpated.  Pt's vocal quality was muffled and intermittently wet. Pt appeared to be unwilling to attempt to produce a volitional cough, but speech intelligibility was so poor that SLP was unable to determine why pt was unwilling to attempt. No further PO trials were given due to poor PO management and risks of aspiration. Education provided to pt's daughter regarding need for ongoing recommendations for NPO at this time due to increased confusion and poor ability to manage PO.  Understanding was demonstrated. Pt impulsive, somewhat agitated and irritated, and not following  commands consistently.  Speech intelligibility judged to be poor in an unknown context and felt to be worse compared to yesterday, which is assumed to be related to effects of medications given for agitation.  SLP reviewed motor speech strategies and encouraged pt to use during spontaneous speech, but pt was unable to utilize effectively.  Pt repeated phrases with adequate intelligibility on 1/2 trials given encouragement to utilize motor speech strategies.   Session was ended due to diminishing XAVI, poor attention, confusion, and irritability hindering participation.     Assessment:     Sury Cobos is a 84 y.o. female with an SLP diagnosis of Dysphagia, Dysarthria, Cognitive-Linguistic Impairment and Visio-Spatial Impairment.      Goals:    SLP Goals        Problem: SLP Goal    Goal Priority Disciplines Outcome   SLP Goal     SLP Ongoing (interventions implemented as appropriate)   Description:  Speech Language Pathology Goals  Goals expected to be met by 6/12  1. Ongoing swallow assessment  2. Pt will follow 1 step commands with 75% acc given min A  3. Pt will answer yes/no questions with 75% acc given min A  4. Pt will complete OME's given mod A  5. Pt will complete word level dysarthria tasks with 80% intell. given max A to utilize speech strategies   6. Assess reading, writing, visual spatial skills  7. Assess memory, problem solving, reasoning                     Plan:     · Patient to be seen:  4 x/week   · Plan of Care expires:  06/26/18  · Plan of Care reviewed with:  patient, daughter   · SLP Follow-Up:  Yes       Discharge recommendations:  home health speech therapy (24 hour close supervision)     Time Tracking:     SLP Treatment Date:   06/07/18  Speech Start Time:  1215  Speech Stop Time:  1231     Speech Total Time (min):  16 min    Billable Minutes: Speech Therapy Individual 8 and Treatment Swallowing Dysfunction 8    SERENA Machado, CCC-SLP  06/07/2018     SERENA Machado, CCC-SLP  Speech  Language Pathologist  [phone]  6/7/2018

## 2018-06-07 NOTE — PLAN OF CARE
Problem: SLP Goal  Goal: SLP Goal  Speech Language Pathology Goals  Goals expected to be met by 6/12  1. Ongoing swallow assessment  2. Pt will follow 1 step commands with 75% acc given min A  3. Pt will answer yes/no questions with 75% acc given min A  4. Pt will complete OME's given mod A  5. Pt will complete word level dysarthria tasks with 80% intell. given max A to utilize speech strategies   6. Assess reading, writing, visual spatial skills  7. Assess memory, problem solving, reasoning    Outcome: Ongoing (interventions implemented as appropriate)  Cont NPO. Pt exhibiting effects of medication after having to receive Seroquel for agitation. Pt appearing more confused, still somewhat agitated/irritated, with worse dysarthria, and decreased ability to manage PO trials.  SERENA Machado, CCC-SLP  Speech Language Pathologist  [phone]  6/7/2018

## 2018-06-07 NOTE — PROGRESS NOTES
Patient has become very agitated and started pulling at lines. One time dose of Seroquel ordered per Adrianna with stroke team and given.

## 2018-06-07 NOTE — PROGRESS NOTES
[facility]  Vascular Neurology  Comprehensive Stroke Center  Progress Note    Assessment/Plan:     * Embolic stroke involving right middle cerebral artery    84 y/o female with left sided facial droop, aphasia, dysarthria who received IV TPA but no IR. MRI with infarct in R precentral gyrus.   Etiology unclear though nursing note reporting patient went into Afib on monitor in [facility]. EKG completed and not seen, though echo showing Severe LAE.     TFs per PEG at goal and started Eliquis 2.5mg for AFib.   Likely d/c home 6/8 once family has received home care training and workup/treatment for leukocytosis addressed.      Antithrombotics: Eliquis 2.5mg BID  Statins: Lipitor 40 mg daily  Aggressive risk factor modification: HTN, HLD  Rehab efforts: PT/OT/SLP to evaluate and treat   Dispo:  with 24/7 family supervision  Diagnostics ordered/pending: none   VTE prophylaxis: SCDs, Eliquis  BP parameters: Infarct: Post tPA, SBP <160        Leukocytosis    WBC 13.5 today   Pending UA and CXR   Afebrile         Fungal dermatitis    Fungal infection noted by RN under the breasts  Nystatin ordered  Continue to monitor        New onset a-fib    Stroke risk factor; Likely stroke etiology  Per nursing note, afib was seen on telemetry in [facility]. EKG with sinus rhythm and Premature atrial complexes (likely paroxysmal)  Echo showing severe LAE  CM price-checked DOACs  Started Eliquis 2.5mg BID         Atrial enlargement, left    AFib risk factor  Severe left atrial enlargement seen on echo  AFib seen on telemetry in [facility]; Initiated AC 6/6        Dysarthria    Severe dysarthria  SLP eval and treat        Dysphagia    PEG placed with Gen Surg on 6/5  Initiated TFs per PEG 6/6; at goal 40cc/hr        Cytotoxic cerebral edema    Area of cytotoxic cerebral edema identified when reviewing brain imaging in the territory of the R middle cerebral artery. There is no mass effect associated with it. We will continue to monitor the  patients clinical exam for any worsening of symptoms which may indicate expansion of the stroke or the area of the edema resulting in the clinical change. The pattern is of unclear etiology (ESUS). Suspect afib (documented on telemetry)            Dementia with behavioral disturbance    D/c'd restraints; Improved behavior when family is at the bedside  Home wellbutrin 150 mg  Donepezil 10 qhs  seroquel 25 mg qhs  Seroquel 12.5mg x1 dose 6/7 due to aggression. ECG with qTc 423        Mixed hyperlipidemia    Stroke risk factor    Lipitor 40 mg daily        Benign essential HTN    Stroke risk factor  Goal SBP < 160   Continue clonidine 0.1 mg BID, lasix 20 mg qd, Losartan 100 mg qd        Aphasia    Pt responds appropriately though sometimes difficult to assess due to severe dysarthria  Follows some commands accurately   Aggressive SLP therapy         S/P admn tPA in diff fac w/n last 24 hr bef adm to crnt fac    Close monitoring in NCCU for 24 hours post administration -- Completed             82 y/o female with aphasia, dysarthria and facial droop, received IV TPA but no LVO so no IR. Probable small vessel disease.    5/28: Blood pressure elevated >200 overnight requiring cardene. Patient bradycardic and with paroxysmal afib overnight as well. MRI with infarct in the right precentral gyrus.   5/29: No events overnight. Patient with many bacteria on UTI so short cipro course started per NCC. Pending step down to NSU.   5/30: seems more agitated today, not participating in exam or following commands, severe dysarthria and excessive secretions  05/31/18 Continues to have increased secretions and severe dysarthria.  Patient following some commands and plans to transfer to stroke service today   6/1: Secretions are better controlled today. Plan to step down. On exam patient is agitated and stating she wants to go home. In bilateral wrist restraints. Right lung opacity - Chest PT and duonebs ordered per NCC.  Nursing notes noted Afib but EKG showing no afib. Echo revealed Severe left atrial enlargement. May need AC on discharge.  6/2: No acute events overnight. Stepped down to NSU. Plan for PEG early next week if no improvement per ST.   6/3: No acute events overnight. Holding TF and heparin tonight in preparation for possible PEG tomorrow. O2 sats low 90's, CXR ordered.   6/4: Family wishes to proceed with PEG (recs per SLP.) IR unable as no safe window; consult placed to Gen Surg.  6/5: PEG with GS today. SBP elevated related to NPO status; BP meds given. Repeat UA WNL.  6/6: Nystatin ordered for fungal infection under the breasts. Initiating TFs per PEG today. D/c'd restraints. CM to price check NOACs for previously-captured AFib. Bedside RNs will train family on home equipment and associated care needs tomorrow.  6/7: Changed eliquis to 2.5 mg BID due to age and weight. Patient with elevated white count, pending UA and CXR. Afebrile. Pending family training to take patient home with home health. Patient will need home medical equipment.     STROKE DOCUMENTATION   Acute Stroke Times   Last Known Normal Date: 05/27/18  Last Known Normal Time: 1320  Symptom Onset Date: 05/27/18  Symptom Onset Time: 1445  Stroke Team Called Date: 05/27/18  Stroke Team Called Time: 1550  Stroke Team Arrival Date: 05/27/18  Stroke Team Arrival Time: 1751  CT Interpretation Time: 1536  Decision to Treat Time for Alteplase: 1806    NIH Scale:  1a. Level Of Consciousness: 0-->Alert: keenly responsive  1b. LOC Questions: 2-->Answers neither question correctly  1c. LOC Commands: 0-->Performs both tasks correctly  2. Best Gaze: 0-->Normal  3. Visual: 0-->No visual loss  4. Facial Palsy: 1-->Minor paralysis (flattened nasolabial fold, asymmetry on smiling)  5a. Motor Arm, Left: 1-->Drift: limb holds 90 (or 45) degrees, but drifts down before full 10 seconds: does not hit bed or other support  5b. Motor Arm, Right: 0-->No drift: limb holds 90 (or  45) degrees for full 10 secs  6a. Motor Leg, Left: 1-->Drift: leg falls by the end of the 5-sec period but does not hit bed  6b. Motor Leg, Right: 0-->No drift: leg holds 30 degree position for full 5 secs  7. Limb Ataxia: 0-->Absent  8. Sensory: 0-->Normal: no sensory loss  9. Best Language: 0-->No aphasia: normal  10. Dysarthria: 2-->Severe dysarthria: patients speech is so slurred as to be unintelligible in the absence of or out of proportion to any dysphasia, or is mute/anarthric  11. Extinction and Inattention (formerly Neglect): 0-->No abnormality  Total (NIH Stroke Scale): 7       Modified Angelia Score: 0  Ivonne Coma Scale:    ABCD2 Score:    [id number] Score:   HAS -BLED Score:   ICH Score:   Hunt & Burkett Classification:      Hemorrhagic change of an Ischemic Stroke: Does this patient have an ischemic stroke with hemorrhagic changes? No     Neurologic Chief Complaint: Aphasia, dysarthria, LSW     Subjective:     Interval History: Patient is seen for follow-up neurological assessment and treatment recommendations:     Changed eliquis to 2.5 mg BID due to age and weight. Patient with elevated white count, pending UA and CXR. Afebrile. Pending family training to take patient home with home health. Patient will need home medical equipment.    HPI, Past Medical, Family, and Social History remains the same as documented in the initial encounter.     Review of Systems   Constitutional: Negative for fever.   Respiratory: Negative for shortness of breath.    Gastrointestinal: Negative for abdominal pain, nausea and vomiting.   Neurological: Positive for facial asymmetry, speech difficulty and weakness. Negative for headaches.   Psychiatric/Behavioral: Positive for confusion. Negative for agitation and behavioral problems.     Scheduled Meds:   albuterol-ipratropium  3 mL Nebulization Q6H    apixaban  2.5 mg Per G Tube BID    atorvastatin  40 mg Per G Tube Daily    buPROPion  150 mg Per G Tube BID    cloNIDine   0.1 mg Per G Tube BID    donepezil  10 mg Per G Tube QHS    furosemide  20 mg Per G Tube Daily    losartan  100 mg Per G Tube Daily    nicotine  1 patch Transdermal Daily    nystatin-triamcinolone   Topical (Top) BID    QUEtiapine  25 mg Per G Tube QHS    sodium chloride 0.9%  3 mL Intravenous Q8H    sodium chloride 3%  4 mL Nebulization BID     Continuous Infusions:    PRN Meds:acetaminophen, labetalol, sodium chloride 0.9%    Objective:     Vital Signs (Most Recent):  Temp: 99.7 °F (37.6 °C) (06/07/18 0801)  Pulse: 91 (06/07/18 1057)  Resp: 18 (06/07/18 0816)  BP: (!) 139/90 (06/07/18 0801)  SpO2: 97 % (06/07/18 0816)  BP Location: Left arm    Vital Signs Range (Last 24H):  Temp:  [97.7 °F (36.5 °C)-99.7 °F (37.6 °C)]   Pulse:  []   Resp:  [16-18]   BP: (125-155)/(53-90)   SpO2:  [90 %-97 %]   BP Location: Left arm    Physical Exam   Constitutional: She appears well-developed and well-nourished. No distress.   HENT:   Head: Normocephalic and atraumatic.   Eyes: Conjunctivae and EOM are normal.   Cardiovascular: Normal rate.    Pulmonary/Chest: Effort normal. No respiratory distress.   Musculoskeletal: She exhibits no edema or tenderness.   Neurological: She is alert. A cranial nerve deficit (CN VII) is present. No sensory deficit.   Oriented to self, not date    Skin: Skin is warm and dry.   Psychiatric: She is agitated, hyperactive and combative.   Nursing note and vitals reviewed.    Neurological Exam:   LOC: alert  Attention Span: Good  Language: No aphasia  Articulation: Moderate/severe Dysarthria  Visual Fields: Full  EOM (CN III, IV, VI): Full/intact  Facial Movement (CN VII): Lower facial weakness on the Left  Motor: Arm left  Paresis: 4/5  Leg left  Paresis: 3/5  Arm right  Paresis: 5/5  Leg right Paresis: 5/5  Sensation: Sensation intact  Tone: normal     Laboratory:  CMP:     Recent Labs  Lab 06/07/18  0603   CALCIUM 9.4   ALBUMIN 3.0*   PROT 7.6      K 4.0   CO2 31*   CL 99   BUN  32*   CREATININE 1.0   ALKPHOS 83   ALT 31   AST 31   BILITOT 0.4     BMP:     Recent Labs  Lab 06/07/18  0603      K 4.0   CL 99   CO2 31*   BUN 32*   CREATININE 1.0   CALCIUM 9.4     CBC:     Recent Labs  Lab 06/07/18  0603   WBC 13.50*   RBC 3.46*   HGB 10.6*   HCT 32.6*      MCV 94   MCH 30.6   MCHC 32.5     Lipid Panel:   No results for input(s): CHOL, LDLCALC, HDL, TRIG in the last 168 hours.  Coagulation:     Recent Labs  Lab 06/03/18  0523   INR 0.9     Platelet Aggregation Study: No results for input(s): PLTAGG, PLTAGINTERP, PLTAGREGLACO, ADPPLTAGGREG in the last 168 hours.  Hgb A1C:   No results for input(s): HGBA1C in the last 168 hours.  TSH:   No results for input(s): TSH in the last 168 hours.      Diagnostic Results     Brain Imaging     MRI brain 5/28:    Small area of restricted diffusion in the right precentral gyrus.  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  Bilateral basal ganglia, thalami and cerebellar hemispheres small remote infarcts.  Generalized cerebral volume loss and extensive white matter chronic small vessel ischemic change.  Ventriculomegaly out of proportion to sulcal prominence, nonspecific but can be seen with normal pressure hydrocephalus, but overall unchanged from prior MRI.      Cardiac Imaging     ECHO 5/28/18:   CONCLUSIONS     1 - Concentric LVH with hyperdynamic left ventricular systolic function (EF >70%).     2 - Severe left atrial enlargement.     3 - Impaired LV relaxation, elevated LAP (grade 2 diastolic dysfunction).     4 - Normal right ventricular systolic function .     5 - Mild to moderate aortic stenosis, ALBER = 1.64 cm2, AVAi = 1.12 cm2/m2, peak velocity = 3.11 m/s, mean gradient = 24 mmHg      Adrianna ABDALLA  [facility]ISTE[facility] Perez  Comprehensive Stroke Center  Department of Vascular Neurology   [facility]

## 2018-06-07 NOTE — PROGRESS NOTES
"[facility]  Adult Nutrition  Progress Note    SUMMARY       Recommendations  Recommendation/Intervention:     For Bolus feeds:  Glucerna 1.5 bolus 1 can (240mL) 4 times daily to provide 1424kcals, 70g protein and 720mL free water.   - Consider initiating bolus feeds with 1/2 can (120mL) and increasing by 50mL/feed until complete 240mL feed tolerated.  - Schedule to mimic mealtimes: 7a, 11a, 3pm, 7pm    Continue current TF regimen of Glucerna 1.5 @ 40mL/hr.   -This regimen provides 1440 kcal, 79 g protein and 1093 mL free water.   -Hold for residuals >500mL.   RD to monitor.       Goals: TF regimen will continue to meet >85% of EEN/EPN  Nutrition Goal Status: new  Communication of RD Recs: reviewed with RN    Reason for Assessment  Reason for Assessment: RD follow-up  Diagnosis: stroke/CVA (Acute ischemic right MCA stroke)  Relevant Medical History: DM, CAD, low potassium syndrome, high cholesterol, aphasia, dementia w/ behavioral disturbance  Interdisciplinary Rounds: attended  General Information Comments: Pt s/p PEG placement 6/5. TF running at goal, pt asleep at time of visit, no family at bedside.  Nutrition Discharge Planning: TF regimen to meet >85% of EEN/EPN    Nutrition Risk Screen  Nutrition Risk Screen: tube feeding or parenteral nutrition    Nutrition/Diet History  Patient Reported Diet/Restrictions/Preferences: general  Do you have any cultural, spiritual, Adventist conflicts, given your current situation?: selwyn  Factors Affecting Nutritional Intake: NPO    Anthropometrics  Temp: 99.7 °F (37.6 °C)  Height Method: Measured  Height: 4' 11" (149.9 cm)  Height (inches): 59 in  Weight Method: Bed Scale  Weight: 56.4 kg (124 lb 5.4 oz)  Weight (lb): 124.34 lb  Ideal Body Weight (IBW), Female: 95 lb  % Ideal Body Weight, Female (lb): 125.78 lb  BMI (Calculated): 24.2  BMI Grade: 18.5-24.9 - normal     Lab/Procedures/Meds  Pertinent Labs Reviewed: reviewed  Pertinent Labs Comments: CO2 31, " BUN 32, Glu 178, Alb 3.0, POCT glu 177  Pertinent Medications Reviewed: reviewed  Pertinent Medications Comments: Statin, donepezil, apixaban, bupropion, clonidinem furosemide, losartan, seroquel    Physical Findings/Assessment  Overall Physical Appearance: nourished, weak, lethargic  Tubes: gastrostomy tube  Oral/Mouth Cavity: tooth/teeth missing  Skin: intact    Estimated/Assessed Needs  Weight Used For Calorie Calculations: 56.4 kg (124 lb 5.4 oz)  Energy Calorie Requirements (kcal): 1149  Energy Need Method: Ganado-St Jeor (x 1.25)    Protein Requirements: 57-68 g/day (1.0-1.2 g/kg)  Weight Used For Protein Calculations: 56.4 kg (124 lb 5.4 oz)    Fluid Requirements (mL): 1 mL/kcal or per MD  Fluid Need Method: RDA Method  RDA Method (mL): 1149  CHO requirements: 50% of total kcals    Nutrition Prescription Ordered  Current Diet Order: NPO  Nutrition Order Comments: TF running at goal  Current Nutrition Support Formula Ordered: Glucerna 1.5  Current Nutrition Support Rate Ordered: 40 (ml)  Current Nutrition Support Frequency Ordered: mL/hr    Evaluation of Received Nutrient/Fluid Intake  Enteral Calories (kcal): 1440  Enteral Protein (gm): 79  Enteral (Free Water) Fluid (mL): 1093  % Kcal Needs: 99  % Protein Needs: 116    I/O: +I/O, LBM 6/3  % Intake of Estimated Energy Needs: 75 - 100 %  % Meal Intake: NPO    Nutrition Risk  Level of Risk/Frequency of Follow-up:  (F/U 1x week)     Assessment and Plan  Nutrition Problem  Inadequate energy intake     Related to (etiology):   Decreased  Ability to consume sufficient nutrition     Signs and Symptoms (as evidenced by):   NPO status with no alternate means of nutrtion     Interventions/Recommendations (treatment strategy):  Please see above      Nutrition Diagnosis Status:   Resolved     Service: Nutrition    Monitor and Evaluation  Food and Nutrient Intake: energy intake, enteral nutrition intake, food and beverage intake  Food and Nutrient Adminstration: diet  order, enteral and parenteral nutrition administration  Knowledge/Beliefs/Attitudes: food and nutrition knowledge/skill  Physical Activity and Function: nutrition-related ADLs and IADLs  Anthropometric Measurements: weight, weight change, body mass index  Biochemical Data, Medical Tests and Procedures: electrolyte and renal panel, gastrointestinal profile, glucose/endocrine profile  Nutrition-Focused Physical Findings: overall appearance, skin     Nutrition Follow-Up  RD Follow-up?: Yes

## 2018-06-07 NOTE — ASSESSMENT & PLAN NOTE
D/c'd restraints; Improved behavior when family is at the bedside  Home wellbutrin 150 mg  Donepezil 10 qhs  seroquel 25 mg qhs  Seroquel 12.5mg x1 dose 6/7 due to aggression. ECG with qTc 423

## 2018-06-07 NOTE — PT/OT/SLP PROGRESS
"Physical Therapy Treatment    Patient Name:  Sury Cobos   MRN:  [id number]  Admitting Diagnosis: Embolic stroke involving right middle cerebral artery  Recent Surgery: Procedure(s) (LRB):  EGD, WITH PEG TUBE INSERTION (N/A)  EGD (ESOPHAGOGASTRODUODENOSCOPY) (N/A) 2 Days Post-Op    Recommendations:     Discharge Recommendations:  home health PT   Discharge Equipment Recommendations: bedside commode   Barriers to discharge: Decreased caregiver support    Plan:     During this hospitalization, patient to be seen 4 x/week to address the listed problems via gait training, therapeutic activities, therapeutic exercises, neuromuscular re-education  · Plan of Care Expires:  06/28/18   Plan of Care Reviewed with: patient, daughter    This Plan of care has been discussed with the patient who was involved in its development and understands and is in agreement with the identified goals and treatment plan    Subjective     Communicated with RN prior to session.  Patient found supine upon PT entry to room. Pt's daughter and sitter present during session.  "I don't want to walk anymore."    Pain/Comfort:  · Pain Rating 1: 0/10  · Pain Rating Post-Intervention 1: 0/10    Objective:     Patient found with: telemetry, PEG Tube, SCD   Mental Status: Patient is oriented to Person and follows 100% of verbal commands. Patient is Alert during session.    General Precautions: Standard, Cardiac aspiration, fall, NPO   Orthopedic Precautions:N/A   Braces: N/A   Respiratory Status: room air  Vital Signs (Most Recent):    Temp: 98.4 °F (36.9 °C) (06/07/18 1133)  Pulse: 71 (06/07/18 1133)  Resp: 18 (06/07/18 1133)  BP: (!) 109/51 (06/07/18 1133)  SpO2: 95 % (06/07/18 1133)    Functional Mobility:  Bed Mobility:   · Supine to Sit: minimum assistance; from left side of bed  · Sit to Supine: minimum assistance; to right side of bed    Transfers:   · Sit <> Stand Transfer: minimum assistance with rolling walker   · Stand <> Sit Transfer: contact " guard assistance with rolling walker       Gait:  · Patient ambulated: 25ft within room   · Patient required: minimal assist  · Patient used:  Rolling Walker  · Gait Pattern observed: 3-point gait  · Gait Deviation(s): decreased addison  · Impairments due to: decreased strength  · Comments: pt with decreased WB onto RLE due to leg length and ROM deficiencies. Pt required max verbal and tactile cues to participate in gait trial. Pt turning around in RW to sit, as she believed she had her rollator.     Therapeutic Activities & Exercises:   -discussed with pt's daughter any concerns about returning home. Daughter reports having support and they are prepared to provide assistance for pt. Pt's mobility is close to baseline.     Education:  Patient provided with daily orientation and goals of this PT session. Patient and family agreed to participate in session. Patient and family aware of patient's deficits and therapy progression. They were educated to transfer with RN/PCT only; min A of 1 person with RW. Encouraged patient to perform daily exercises & mobility to increase endurance and decrease effects of bedrest.Time provided for therapeutic counseling and discussion of health disposition. All questions answered to patient's and family's satisfaction, within scope of PT practice; voiced no other concerns. White board updated in patient's room, RN notified of session.    Patient left supine, with head in midline, neutral pelvis & heels floated for skin protection with all lines intact, call button in reach and RN notified    AM-PAC 6 CLICK MOBILITY  Turning over in bed (including adjusting bedclothes, sheets and blankets)?: 3  Sitting down on and standing up from a chair with arms (e.g., wheelchair, bedside commode, etc.): 3  Moving from lying on back to sitting on the side of the bed?: 4  Moving to and from a bed to a chair (including a wheelchair)?: 3  Need to walk in hospital room?: 3  Climbing 3-5 steps with a  railing?: 2  Total Score: 18     Assessment:     Sury Cobos is a 84 y.o. female admitted with a medical diagnosis of Embolic stroke involving right middle cerebral artery.   Patient is making progress towards goals, participating well in this session. Pt is self-limiting and decreased motivation to participate in session this date. Pt's daughter able to convince pt to participate. Pt appears to be close to baseline mobility. Pt and family are comfortable with returning home with HH and family support. Sury Gomezs deficits effect their ability to safely and independently participate in self-care tasks and functional mobility which increases their caregiver burden. Due to her physical therapy diagnosis of debility and deconditioning, they continue to benefit from acute PT services to address the following limitations: high fall risk, weakness, instability, the need for supervised instruction of exercise. Sury Gomezs deficits effect their roles and responsibilities in which they were able to complete prior to admit. Education was provided to patient & family regarding importance of continued participation in therapy, patient's progress and discharge disposition. Pt would benefit from HHPT upon discharge to improve quality of life and focus on recovery of impairments.     Problem List: weakness, impaired functional mobilty, impaired endurance, gait instability, decreased upper extremity function, decreased lower extremity function, impaired cognition, decreased safety awareness, decreased ROM. weakness, impaired functional mobilty, impaired endurance, gait instability, decreased upper extremity function, decreased lower extremity function, impaired cognition, decreased safety awareness, decreased ROM  Rehab Prognosis: fair; patient would benefit from acute skilled PT services to address these deficits and reach maximum level of function.      GOALS:    Physical Therapy Goals        Problem: Physical Therapy  Goal    Goal Priority Disciplines Outcome Goal Variances Interventions   Physical Therapy Goal     PT/OT, PT Ongoing (interventions implemented as appropriate)     Description:  Goals to be met by: 2018     Patient will increase functional independence with mobility by performin. Supine to sit with Contact Guard Assistance - MET  2. Sit to supine with Contact Guard Assistance - MET  3. Rolling to Left and Right with Supervision. - MET  4. Sit to stand transfer with Stand-by Assistance  5. Gait  x 20 feet with Contact Guard Assistance using Rolling Walker. - distance met but not level of assist  6. Stand for 10 minutes with Contact Guard Assistance using Rolling Walker                        Time Tracking:     PT Received On: 18  PT Start Time: 1424     PT Stop Time: 1441  PT Total Time (min): 17 min     Billable Minutes:   · Therapeutic Activity 17    Treatment Type: Treatment  PT/PTA: PT       Heather Che PT, DPT  2018  Pager: [phone]

## 2018-06-08 PROBLEM — R79.9 ELEVATED BUN: Status: ACTIVE | Noted: 2018-06-08

## 2018-06-08 LAB
ALBUMIN SERPL BCP-MCNC: 2.8 G/DL
ALP SERPL-CCNC: 72 U/L
ALT SERPL W/O P-5'-P-CCNC: 29 U/L
ANION GAP SERPL CALC-SCNC: 9 MMOL/L
AST SERPL-CCNC: 26 U/L
BACTERIA #/AREA URNS AUTO: ABNORMAL /HPF
BASOPHILS # BLD AUTO: 0.04 K/UL
BASOPHILS NFR BLD: 0.2 %
BILIRUB SERPL-MCNC: 0.4 MG/DL
BILIRUB UR QL STRIP: NEGATIVE
BUN SERPL-MCNC: 42 MG/DL
CALCIUM SERPL-MCNC: 9.2 MG/DL
CHLORIDE SERPL-SCNC: 99 MMOL/L
CLARITY UR REFRACT.AUTO: ABNORMAL
CO2 SERPL-SCNC: 30 MMOL/L
COLOR UR AUTO: YELLOW
CREAT SERPL-MCNC: 1.1 MG/DL
DIFFERENTIAL METHOD: ABNORMAL
EOSINOPHIL # BLD AUTO: 0 K/UL
EOSINOPHIL NFR BLD: 0.1 %
ERYTHROCYTE [DISTWIDTH] IN BLOOD BY AUTOMATED COUNT: 13.9 %
EST. GFR  (AFRICAN AMERICAN): 53.3 ML/MIN/1.73 M^2
EST. GFR  (NON AFRICAN AMERICAN): 46.2 ML/MIN/1.73 M^2
GLUCOSE SERPL-MCNC: 148 MG/DL
GLUCOSE UR QL STRIP: NEGATIVE
HCT VFR BLD AUTO: 29.7 %
HGB BLD-MCNC: 9.6 G/DL
HGB UR QL STRIP: NEGATIVE
HYALINE CASTS UR QL AUTO: 5 /LPF
IMM GRANULOCYTES # BLD AUTO: 0.11 K/UL
IMM GRANULOCYTES NFR BLD AUTO: 0.6 %
KETONES UR QL STRIP: NEGATIVE
LEUKOCYTE ESTERASE UR QL STRIP: NEGATIVE
LYMPHOCYTES # BLD AUTO: 2.1 K/UL
LYMPHOCYTES NFR BLD: 11.1 %
MCH RBC QN AUTO: 30.5 PG
MCHC RBC AUTO-ENTMCNC: 32.3 G/DL
MCV RBC AUTO: 94 FL
MICROSCOPIC COMMENT: ABNORMAL
MONOCYTES # BLD AUTO: 1.3 K/UL
MONOCYTES NFR BLD: 7 %
NEUTROPHILS # BLD AUTO: 15.2 K/UL
NEUTROPHILS NFR BLD: 81 %
NITRITE UR QL STRIP: NEGATIVE
NRBC BLD-RTO: 0 /100 WBC
PH UR STRIP: 5 [PH] (ref 5–8)
PLATELET # BLD AUTO: 247 K/UL
PMV BLD AUTO: 12 FL
POCT GLUCOSE: 207 MG/DL (ref 70–110)
POTASSIUM SERPL-SCNC: 4.1 MMOL/L
PROT SERPL-MCNC: 7.3 G/DL
PROT UR QL STRIP: NEGATIVE
RBC # BLD AUTO: 3.15 M/UL
RBC #/AREA URNS AUTO: 1 /HPF (ref 0–4)
SODIUM SERPL-SCNC: 138 MMOL/L
SP GR UR STRIP: 1.02 (ref 1–1.03)
SQUAMOUS #/AREA URNS AUTO: 2 /HPF
URN SPEC COLLECT METH UR: ABNORMAL
UROBILINOGEN UR STRIP-ACNC: NEGATIVE EU/DL
WBC # BLD AUTO: 18.78 K/UL
WBC #/AREA URNS AUTO: 1 /HPF (ref 0–5)

## 2018-06-08 PROCEDURE — 25000003 PHARM REV CODE 250: Performed by: PHYSICIAN ASSISTANT

## 2018-06-08 PROCEDURE — 87075 CULTR BACTERIA EXCEPT BLOOD: CPT

## 2018-06-08 PROCEDURE — 99900035 HC TECH TIME PER 15 MIN (STAT)

## 2018-06-08 PROCEDURE — 87186 SC STD MICRODIL/AGAR DIL: CPT

## 2018-06-08 PROCEDURE — 92526 ORAL FUNCTION THERAPY: CPT

## 2018-06-08 PROCEDURE — S4991 NICOTINE PATCH NONLEGEND: HCPCS | Performed by: PHYSICIAN ASSISTANT

## 2018-06-08 PROCEDURE — 94640 AIRWAY INHALATION TREATMENT: CPT

## 2018-06-08 PROCEDURE — 87077 CULTURE AEROBIC IDENTIFY: CPT

## 2018-06-08 PROCEDURE — 36415 COLL VENOUS BLD VENIPUNCTURE: CPT

## 2018-06-08 PROCEDURE — 20600001 HC STEP DOWN PRIVATE ROOM

## 2018-06-08 PROCEDURE — 87076 CULTURE ANAEROBE IDENT EACH: CPT

## 2018-06-08 PROCEDURE — 92507 TX SP LANG VOICE COMM INDIV: CPT

## 2018-06-08 PROCEDURE — 99233 SBSQ HOSP IP/OBS HIGH 50: CPT | Mod: GC,,, | Performed by: PSYCHIATRY & NEUROLOGY

## 2018-06-08 PROCEDURE — A4216 STERILE WATER/SALINE, 10 ML: HCPCS | Performed by: PHYSICIAN ASSISTANT

## 2018-06-08 PROCEDURE — 94761 N-INVAS EAR/PLS OXIMETRY MLT: CPT

## 2018-06-08 PROCEDURE — 87070 CULTURE OTHR SPECIMN AEROBIC: CPT

## 2018-06-08 PROCEDURE — 85025 COMPLETE CBC W/AUTO DIFF WBC: CPT

## 2018-06-08 PROCEDURE — 94668 MNPJ CHEST WALL SBSQ: CPT

## 2018-06-08 PROCEDURE — 25000242 PHARM REV CODE 250 ALT 637 W/ HCPCS: Performed by: PHYSICIAN ASSISTANT

## 2018-06-08 PROCEDURE — 80053 COMPREHEN METABOLIC PANEL: CPT

## 2018-06-08 RX ADMIN — SODIUM CHLORIDE SOLN NEBU 3% 4 ML: 3 NEBU SOLN at 07:06

## 2018-06-08 RX ADMIN — IPRATROPIUM BROMIDE AND ALBUTEROL SULFATE 3 ML: .5; 3 SOLUTION RESPIRATORY (INHALATION) at 08:06

## 2018-06-08 RX ADMIN — SODIUM CHLORIDE SOLN NEBU 3% 4 ML: 3 NEBU SOLN at 08:06

## 2018-06-08 RX ADMIN — IPRATROPIUM BROMIDE AND ALBUTEROL SULFATE 3 ML: .5; 3 SOLUTION RESPIRATORY (INHALATION) at 07:06

## 2018-06-08 RX ADMIN — IPRATROPIUM BROMIDE AND ALBUTEROL SULFATE 3 ML: .5; 3 SOLUTION RESPIRATORY (INHALATION) at 01:06

## 2018-06-08 RX ADMIN — Medication 3 ML: at 04:06

## 2018-06-08 RX ADMIN — FUROSEMIDE 20 MG: 20 TABLET ORAL at 09:06

## 2018-06-08 RX ADMIN — QUETIAPINE FUMARATE 25 MG: 25 TABLET ORAL at 10:06

## 2018-06-08 RX ADMIN — CLONIDINE HYDROCHLORIDE 0.1 MG: 0.1 TABLET ORAL at 09:06

## 2018-06-08 RX ADMIN — LOSARTAN POTASSIUM 100 MG: 50 TABLET, FILM COATED ORAL at 09:06

## 2018-06-08 RX ADMIN — BUPROPION HYDROCHLORIDE 150 MG: 75 TABLET, FILM COATED ORAL at 10:06

## 2018-06-08 RX ADMIN — IPRATROPIUM BROMIDE AND ALBUTEROL SULFATE 3 ML: .5; 3 SOLUTION RESPIRATORY (INHALATION) at 12:06

## 2018-06-08 RX ADMIN — ACETAMINOPHEN 650 MG: 650 SOLUTION ORAL at 11:06

## 2018-06-08 RX ADMIN — APIXABAN 2.5 MG: 2.5 TABLET, FILM COATED ORAL at 09:06

## 2018-06-08 RX ADMIN — CLONIDINE HYDROCHLORIDE 0.1 MG: 0.1 TABLET ORAL at 10:06

## 2018-06-08 RX ADMIN — NYSTATIN AND TRIAMCINOLONE ACETONIDE: 100000; 1 CREAM TOPICAL at 10:06

## 2018-06-08 RX ADMIN — DONEPEZIL HYDROCHLORIDE 10 MG: 5 TABLET, FILM COATED ORAL at 10:06

## 2018-06-08 RX ADMIN — NYSTATIN AND TRIAMCINOLONE ACETONIDE: 100000; 1 CREAM TOPICAL at 09:06

## 2018-06-08 RX ADMIN — ATORVASTATIN CALCIUM 40 MG: 20 TABLET, FILM COATED ORAL at 09:06

## 2018-06-08 RX ADMIN — ACETAMINOPHEN 650 MG: 650 SOLUTION ORAL at 10:06

## 2018-06-08 RX ADMIN — NICOTINE 1 PATCH: 14 PATCH, EXTENDED RELEASE TRANSDERMAL at 09:06

## 2018-06-08 RX ADMIN — APIXABAN 2.5 MG: 2.5 TABLET, FILM COATED ORAL at 10:06

## 2018-06-08 NOTE — ASSESSMENT & PLAN NOTE
Stroke risk factor  Goal SBP < 160   Continue clonidine 0.1 mg BID, lasix 20 mg qd, Losartan 100 mg qd    -132/51-66 range for 24 hours

## 2018-06-08 NOTE — ASSESSMENT & PLAN NOTE
WBC 18  UA and CXR - normal   Afebrile - tmax 99.2  Likely due to recent peg placement   Will monitor

## 2018-06-08 NOTE — SUBJECTIVE & OBJECTIVE
Neurologic Chief Complaint: Aphasia, dysarthria, LSW     Subjective:     Interval History: Patient is seen for follow-up neurological assessment and treatment recommendations:     UA and CXR normal today, wbc increased from yesterday. No signs of dvt, skin infection, urine or chest symptoms. May be due to recent peg placement    HPI, Past Medical, Family, and Social History remains the same as documented in the initial encounter.     Review of Systems   Constitutional: Negative for fever (tmax - 99.2).   Respiratory: Negative for shortness of breath.    Neurological: Positive for facial asymmetry, speech difficulty and weakness. Negative for headaches.   Psychiatric/Behavioral: Positive for confusion. Negative for agitation and behavioral problems.     Scheduled Meds:   albuterol-ipratropium  3 mL Nebulization Q6H    apixaban  2.5 mg Per G Tube BID    atorvastatin  40 mg Per G Tube Daily    buPROPion  150 mg Per G Tube BID    cloNIDine  0.1 mg Per G Tube BID    donepezil  10 mg Per G Tube QHS    furosemide  20 mg Per G Tube Daily    losartan  100 mg Per G Tube Daily    nicotine  1 patch Transdermal Daily    nystatin-triamcinolone   Topical (Top) BID    QUEtiapine  25 mg Per G Tube QHS    sodium chloride 0.9%  3 mL Intravenous Q8H    sodium chloride 3%  4 mL Nebulization BID     Continuous Infusions:    PRN Meds:acetaminophen, labetalol, sodium chloride 0.9%    Objective:     Vital Signs (Most Recent):  Temp: 99.2 °F (37.3 °C) (06/07/18 1922)  Pulse: 86 (06/08/18 0803)  Resp: 16 (06/08/18 0803)  BP: 129/60 (06/08/18 0803)  SpO2: 96 % (06/08/18 0803)  BP Location: Left arm    Vital Signs Range (Last 24H):  Temp:  [98.3 °F (36.8 °C)-99.2 °F (37.3 °C)]   Pulse:  [70-91]   Resp:  [16-18]   BP: (109-132)/(51-66)   SpO2:  [90 %-96 %]   BP Location: Left arm    Physical Exam   Constitutional: She appears well-developed and well-nourished. No distress.   HENT:   Head: Normocephalic and atraumatic.   Eyes:  Conjunctivae are normal.   Cardiovascular: Normal rate.    Pulmonary/Chest: Effort normal. No respiratory distress.   Musculoskeletal: She exhibits no edema or tenderness.   Neurological: She is alert. No sensory deficit.   Skin: Skin is warm and dry.   Psychiatric: She is agitated, hyperactive and combative.   Nursing note and vitals reviewed.    Neurological Exam:   LOC: alert  Attention Span: Good  Language: No aphasia  Articulation: Moderate/severe Dysarthria  Visual Fields: Full  EOM (CN III, IV, VI): Full/intact  Facial Movement (CN VII): Lower facial weakness on the Left  Motor: Arm left  Paresis: 4/5  Leg left  Paresis: 3/5  Arm right  Paresis: 5/5  Leg right Paresis: 5/5  Sensation: Sensation intact  Tone: normal     Laboratory:  CMP:     Recent Labs  Lab 06/08/18 0412   CALCIUM 9.2   ALBUMIN 2.8*   PROT 7.3      K 4.1   CO2 30*   CL 99   BUN 42*   CREATININE 1.1   ALKPHOS 72   ALT 29   AST 26   BILITOT 0.4     BMP:     Recent Labs  Lab 06/08/18 0412      K 4.1   CL 99   CO2 30*   BUN 42*   CREATININE 1.1   CALCIUM 9.2     CBC:     Recent Labs  Lab 06/08/18 0412   WBC 18.78*   RBC 3.15*   HGB 9.6*   HCT 29.7*      MCV 94   MCH 30.5   MCHC 32.3     Lipid Panel:   No results for input(s): CHOL, LDLCALC, HDL, TRIG in the last 168 hours.  Coagulation:     Recent Labs  Lab 06/03/18  0523   INR 0.9     Platelet Aggregation Study: No results for input(s): PLTAGG, PLTAGINTERP, PLTAGREGLACO, ADPPLTAGGREG in the last 168 hours.  Hgb A1C:   No results for input(s): HGBA1C in the last 168 hours.  TSH:   No results for input(s): TSH in the last 168 hours.      Diagnostic Results     Brain Imaging     MRI brain 5/28:    Small area of restricted diffusion in the right precentral gyrus.  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  Bilateral basal ganglia, thalami and cerebellar hemispheres small remote infarcts.  Generalized cerebral volume loss and extensive white matter chronic small vessel ischemic change.  Ventriculomegaly out of proportion to sulcal prominence, nonspecific but can be seen with normal pressure hydrocephalus, but overall unchanged from prior MRI.      Cardiac Imaging     ECHO 5/28/18:   CONCLUSIONS     1 - Concentric LVH with hyperdynamic left ventricular systolic function (EF >70%).     2 - Severe left atrial enlargement.     3 - Impaired LV relaxation, elevated LAP (grade 2 diastolic dysfunction).     4 - Normal right ventricular systolic function .     5 - Mild to moderate aortic stenosis, ALBER = 1.64 cm2, AVAi = 1.12 cm2/m2, peak velocity = 3.11 m/s, mean gradient = 24 mmHg

## 2018-06-08 NOTE — ASSESSMENT & PLAN NOTE
Stroke risk factor; Likely stroke etiology  Per nursing note, afib was seen on telemetry in [facility]. EKG with sinus rhythm and Premature atrial complexes (likely paroxysmal)  Echo showing severe LAE  CM price-checked DOACs  Started Eliquis 2.5mg BID

## 2018-06-08 NOTE — ASSESSMENT & PLAN NOTE
82 y/o female with left sided facial droop, aphasia, dysarthria who received IV TPA but no IR. MRI with infarct in R precentral gyrus.   Etiology unclear though nursing note reporting patient went into Afib on monitor in [facility]. EKG completed and not seen, though echo showing Severe LAE.     TFs per PEG at goal and started Eliquis 2.5mg for AFib.       Antithrombotics: Eliquis 2.5mg BID  Statins: Lipitor 40 mg daily  Aggressive risk factor modification: HTN, HLD  Rehab efforts: PT/OT/SLP to evaluate and treat   Dispo:  with 24/7 family supervision  Diagnostics ordered/pending: none   VTE prophylaxis: SCDs, Eliquis  BP parameters: Infarct: Post tPA, SBP <160

## 2018-06-08 NOTE — PROGRESS NOTES
[facility]  Vascular Neurology  Comprehensive Stroke Center  Progress Note    Assessment/Plan:     * Embolic stroke involving right middle cerebral artery    84 y/o female with left sided facial droop, aphasia, dysarthria who received IV TPA but no IR. MRI with infarct in R precentral gyrus.   Etiology unclear though nursing note reporting patient went into Afib on monitor in [facility]. EKG completed and not seen, though echo showing Severe LAE.     TFs per PEG at goal and started Eliquis 2.5mg for AFib.       Antithrombotics: Eliquis 2.5mg BID  Statins: Lipitor 40 mg daily  Aggressive risk factor modification: HTN, HLD  Rehab efforts: PT/OT/SLP to evaluate and treat   Dispo:  with 24/7 family supervision  Diagnostics ordered/pending: none   VTE prophylaxis: SCDs, Eliquis  BP parameters: Infarct: Post tPA, SBP <160        Elevated BUN    1.1 /42 today   Had been 0.8 /22 on 6/5/18  Added free water bolus to tube feeds (250 q 6 hours)        Leukocytosis    WBC 18  UA and CXR - normal   Afebrile - tmax 99.2  Likely due to recent peg placement   Will monitor         Fungal dermatitis    Fungal infection noted by RN under the breasts  Nystatin ordered  Continue to monitor        New onset a-fib    Stroke risk factor; Likely stroke etiology  Per nursing note, afib was seen on telemetry in [facility]. EKG with sinus rhythm and Premature atrial complexes (likely paroxysmal)  Echo showing severe LAE  CM price-checked DOACs  Started Eliquis 2.5mg BID         Atrial enlargement, left    AFib risk factor  Severe left atrial enlargement seen on echo  AFib seen on telemetry in [facility]; Initiated AC 6/6        Dysarthria    Severe dysarthria  SLP eval and treat        Dysphagia    PEG placed with Gen Surg on 6/5  Initiated TFs per PEG 6/6; at goal 40cc/hr        Cytotoxic cerebral edema    Area of cytotoxic cerebral edema identified when reviewing brain imaging in the territory of the R middle cerebral artery. There is no  mass effect associated with it. We will continue to monitor the patients clinical exam for any worsening of symptoms which may indicate expansion of the stroke or the area of the edema resulting in the clinical change. The pattern is of unclear etiology (ESUS). Suspect afib (documented on telemetry)            Dementia with behavioral disturbance    D/c'd restraints; Improved behavior when family is at the bedside  Home wellbutrin 150 mg  Donepezil 10 qhs  seroquel 25 mg qhs  Seroquel 12.5mg x1 dose 6/7 due to aggression. ECG with qTc 423        Mixed hyperlipidemia    Stroke risk factor    Lipitor 40 mg daily        Benign essential HTN    Stroke risk factor  Goal SBP < 160   Continue clonidine 0.1 mg BID, lasix 20 mg qd, Losartan 100 mg qd    -132/51-66 range for 24 hours         Aphasia    Pt responds appropriately though sometimes difficult to assess due to severe dysarthria  Follows some commands accurately   Aggressive SLP therapy         S/P admn tPA in diff fac w/n last 24 hr bef adm to crnt fac    Close monitoring in [facility]CCU for 24 hours post administration -- Completed             82 y/o female with aphasia, dysarthria and facial droop, received IV TPA but no LVO so no IR. Probable small vessel disease.    5/28: Blood pressure elevated >200 overnight requiring cardene. Patient bradycardic and with paroxysmal afib overnight as well. MRI with infarct in the right precentral gyrus.   5/29: No events overnight. Patient with many bacteria on UTI so short cipro course started per NCC. Pending step down to NSU.   5/30: seems more agitated today, not participating in exam or following commands, severe dysarthria and excessive secretions  05/31/18 Continues to have increased secretions and severe dysarthria.  Patient following some commands and plans to transfer to stroke service today   6/1: Secretions are better controlled today. Plan to step down. On exam patient is agitated and stating she wants to  go home. In bilateral wrist restraints. Right lung opacity - Chest PT and duonebs ordered per NCC. Nursing notes noted Afib but EKG showing no afib. Echo revealed Severe left atrial enlargement. May need AC on discharge.  6/2: No acute events overnight. Stepped down to NSU. Plan for PEG early next week if no improvement per ST.   6/3: No acute events overnight. Holding TF and heparin tonight in preparation for possible PEG tomorrow. O2 sats low 90's, CXR ordered.   6/4: Family wishes to proceed with PEG (recs per SLP.) IR unable as no safe window; consult placed to Gen Surg.  6/5: PEG with GS today. SBP elevated related to NPO status; BP meds given. Repeat UA WNL.  6/6: Nystatin ordered for fungal infection under the breasts. Initiating TFs per PEG today. D/c'd restraints. CM to price check NOACs for previously-captured AFib. Bedside RNs will train family on home equipment and associated care needs tomorrow.  6/7: Changed eliquis to 2.5 mg BID due to age and weight. Patient with elevated white count, pending UA and CXR. Afebrile. Pending family training to take patient home with home health. Patient will need home medical equipment.   6/8/19 - UA and CXR normal today, wbc increased from yesterday. No signs of dvt, skin infection, urine or chest symptoms. May be due to recent peg placement    STROKE DOCUMENTATION   Acute Stroke Times   Last Known Normal Date: 05/27/18  Last Known Normal Time: 1320  Symptom Onset Date: 05/27/18  Symptom Onset Time: 1445  Stroke Team Called Date: 05/27/18  Stroke Team Called Time: 1550  Stroke Team Arrival Date: 05/27/18  Stroke Team Arrival Time: 1751  CT Interpretation Time: 1536  Decision to Treat Time for Alteplase: 1806    NIH Scale:  1a. Level Of Consciousness: 0-->Alert: keenly responsive  1b. LOC Questions: 2-->Answers neither question correctly  1c. LOC Commands: 0-->Performs both tasks correctly  2. Best Gaze: 0-->Normal  3. Visual: 0-->No visual loss  4. Facial Palsy:  1-->Minor paralysis (flattened nasolabial fold, asymmetry on smiling)  5a. Motor Arm, Left: 1-->Drift: limb holds 90 (or 45) degrees, but drifts down before full 10 seconds: does not hit bed or other support  5b. Motor Arm, Right: 0-->No drift: limb holds 90 (or 45) degrees for full 10 secs  6a. Motor Leg, Left: 1-->Drift: leg falls by the end of the 5-sec period but does not hit bed  6b. Motor Leg, Right: 0-->No drift: leg holds 30 degree position for full 5 secs  7. Limb Ataxia: 0-->Absent  8. Sensory: 0-->Normal: no sensory loss  9. Best Language: 0-->No aphasia: normal  10. Dysarthria: 2-->Severe dysarthria: patients speech is so slurred as to be unintelligible in the absence of or out of proportion to any dysphasia, or is mute/anarthric  11. Extinction and Inattention (formerly Neglect): 0-->No abnormality  Total (NIH Stroke Scale): 7       Modified [place] Score: 0  Zanoni Coma Scale:    ABCD2 Score:    [id number] Score:   HAS -BLED Score:   ICH Score:   Hunt & Burkett Classification:      Hemorrhagic change of an Ischemic Stroke: Does this patient have an ischemic stroke with hemorrhagic changes? No     Neurologic Chief Complaint: Aphasia, dysarthria, LSW     Subjective:     Interval History: Patient is seen for follow-up neurological assessment and treatment recommendations:     UA and CXR normal today, wbc increased from yesterday. No signs of dvt, skin infection, urine or chest symptoms. May be due to recent peg placement    HPI, Past Medical, Family, and Social History remains the same as documented in the initial encounter.     Review of Systems   Constitutional: Negative for fever (tmax - 99.2).   Respiratory: Negative for shortness of breath.    Neurological: Positive for facial asymmetry, speech difficulty and weakness. Negative for headaches.   Psychiatric/Behavioral: Positive for confusion. Negative for agitation and behavioral problems.     Scheduled Meds:   albuterol-ipratropium  3 mL Nebulization  Q6H    apixaban  2.5 mg Per G Tube BID    atorvastatin  40 mg Per G Tube Daily    buPROPion  150 mg Per G Tube BID    cloNIDine  0.1 mg Per G Tube BID    donepezil  10 mg Per G Tube QHS    furosemide  20 mg Per G Tube Daily    losartan  100 mg Per G Tube Daily    nicotine  1 patch Transdermal Daily    nystatin-triamcinolone   Topical (Top) BID    QUEtiapine  25 mg Per G Tube QHS    sodium chloride 0.9%  3 mL Intravenous Q8H    sodium chloride 3%  4 mL Nebulization BID     Continuous Infusions:    PRN Meds:acetaminophen, labetalol, sodium chloride 0.9%    Objective:     Vital Signs (Most Recent):  Temp: 99.2 °F (37.3 °C) (06/07/18 1922)  Pulse: 86 (06/08/18 0803)  Resp: 16 (06/08/18 0803)  BP: 129/60 (06/08/18 0803)  SpO2: 96 % (06/08/18 0803)  BP Location: Left arm    Vital Signs Range (Last 24H):  Temp:  [98.3 °F (36.8 °C)-99.2 °F (37.3 °C)]   Pulse:  [70-91]   Resp:  [16-18]   BP: (109-132)/(51-66)   SpO2:  [90 %-96 %]   BP Location: Left arm    Physical Exam   Constitutional: She appears well-developed and well-nourished. No distress.   HENT:   Head: Normocephalic and atraumatic.   Eyes: Conjunctivae are normal.   Cardiovascular: Normal rate.    Pulmonary/Chest: Effort normal. No respiratory distress.   Musculoskeletal: She exhibits no edema or tenderness.   Neurological: She is alert. No sensory deficit.   Skin: Skin is warm and dry.   Psychiatric: She is agitated, hyperactive and combative.   Nursing note and vitals reviewed.    Neurological Exam:   LOC: alert  Attention Span: Good  Language: No aphasia  Articulation: Moderate/severe Dysarthria  Visual Fields: Full  EOM (CN III, IV, VI): Full/intact  Facial Movement (CN VII): Lower facial weakness on the Left  Motor: Arm left  Paresis: 4/5  Leg left  Paresis: 3/5  Arm right  Paresis: 5/5  Leg right Paresis: 5/5  Sensation: Sensation intact  Tone: normal     Laboratory:  CMP:     Recent Labs  Lab 06/08/18  0412   CALCIUM 9.2   ALBUMIN 2.8*   PROT 7.3       K 4.1   CO2 30*   CL 99   BUN 42*   CREATININE 1.1   ALKPHOS 72   ALT 29   AST 26   BILITOT 0.4     BMP:     Recent Labs  Lab 06/08/18  0412      K 4.1   CL 99   CO2 30*   BUN 42*   CREATININE 1.1   CALCIUM 9.2     CBC:     Recent Labs  Lab 06/08/18  0412   WBC 18.78*   RBC 3.15*   HGB 9.6*   HCT 29.7*      MCV 94   MCH 30.5   MCHC 32.3     Lipid Panel:   No results for input(s): CHOL, LDLCALC, HDL, TRIG in the last 168 hours.  Coagulation:     Recent Labs  Lab 06/03/18  0523   INR 0.9     Platelet Aggregation Study: No results for input(s): PLTAGG, PLTAGINTERP, PLTAGREGLACO, ADPPLTAGGREG in the last 168 hours.  Hgb A1C:   No results for input(s): HGBA1C in the last 168 hours.  TSH:   No results for input(s): TSH in the last 168 hours.      Diagnostic Results     Brain Imaging     MRI brain 5/28:    Small area of restricted diffusion in the right precentral gyrus.  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  Bilateral basal ganglia, thalami and cerebellar hemispheres small remote infarcts.  Generalized cerebral volume loss and extensive white matter chronic small vessel ischemic change.  Ventriculomegaly out of proportion to sulcal prominence, nonspecific but can be seen with normal pressure hydrocephalus, but overall unchanged from prior MRI.      Cardiac Imaging     ECHO 5/28/18:   CONCLUSIONS     1 - Concentric LVH with hyperdynamic left ventricular systolic function (EF >70%).     2 - Severe left atrial enlargement.     3 - Impaired LV relaxation, elevated LAP (grade 2 diastolic dysfunction).     4 - Normal right ventricular systolic function .     5 - Mild to moderate aortic stenosis, ALBER = 1.64 cm2, AVAi = 1.12 cm2/m2, peak velocity = 3.11  m/s, mean gradient = 24 mmHg          ERIC Aguilar  [facility]  Department of Vascular Neurology   [facility]

## 2018-06-08 NOTE — PLAN OF CARE
Problem: SLP Goal  Goal: SLP Goal  Speech Language Pathology Goals  Goals expected to be met by 6/12  1. Ongoing swallow assessment  2. Pt will follow 1 step commands with 75% acc given min A  3. Pt will answer yes/no questions with 75% acc given min A  4. Pt will complete OME's given mod A  5. Pt will complete word level dysarthria tasks with 80% intell. given max A to utilize speech strategies   6. Assess reading, writing, visual spatial skills  7. Assess memory, problem solving, reasoning    Continue POC at this time, all goals remain appropriate  Julia Chacon CCC-SLP  6/8/2018

## 2018-06-08 NOTE — PT/OT/SLP PROGRESS
"Speech Language Pathology Treatment    Patient Name:  Sury Cobos   MRN:  [id number]  Admitting Diagnosis: Embolic stroke involving right middle cerebral artery    Recommendations:                 General Recommendations:  Dysphagia therapy and Speech/language therapy  Diet recommendations:  NPO, Liquid Diet Level: NPO   Aspiration Precautions: Continue alternate means of nutrition and Strict aspiration precautions   General Precautions: Standard, aspiration, fall, NPO  Communication strategies:  provide increased time to answer    Subjective     Awake, eyes closed throughout session  Patient goals: " I need water."      Pain/Comfort:  · Pain Rating 1: 0/10  · Pain Rating Post-Intervention 1: 0/10    Objective:     Has the patient been evaluated by SLP for swallowing?   Yes  Keep patient NPO? Yes   Current Respiratory Status: room air      Pt repositioned upright in bed for PO trials. Pt tolerated puree via 1/4 tsp, thin liquids via 1/4 tsp and ice x1 with no pharyngeal swallow initiation. Anterior loss of bolus noted with thin liquids via tsp. Pt with decreased bolus manipulation, oral holding and no AP transit across all consistencies. SLP removed boluses from oral cavity with Yankauer. OME completed x3 with limited participation from pt despite max cues. Severe dysarthria noted throughout session. Pt able to repeat single syllable words with 90% intelligibility and mod cues. Pt answered simple yes/no questions with 70% accy provided min repetition. Pt followed 1 step commands with 90% accy provided mod-max repetition and cues. Continue POC at this time. Recommend continue NPO diet with alternate means of nutrition.     Assessment:     Sury Cobos is a 84 y.o. female with an SLP diagnosis of Aphasia, Dysphagia and Dysarthria.  Goals:    SLP Goals        Problem: SLP Goal    Goal Priority Disciplines Outcome   SLP Goal     SLP Ongoing (interventions implemented as appropriate)   Description:  Speech Language " Pathology Goals  Goals expected to be met by 6/12  1. Ongoing swallow assessment  2. Pt will follow 1 step commands with 75% acc given min A  3. Pt will answer yes/no questions with 75% acc given min A  4. Pt will complete OME's given mod A  5. Pt will complete word level dysarthria tasks with 80% intell. given max A to utilize speech strategies   6. Assess reading, writing, visual spatial skills  7. Assess memory, problem solving, reasoning                     Plan:     · Patient to be seen:  3 x/week   · Plan of Care expires:  06/26/18  · Plan of Care reviewed with:  patient   · SLP Follow-Up:  Yes       Discharge recommendations:  home health speech therapy (24 hour close supervision)       Time Tracking:     SLP Treatment Date:   06/08/18  Speech Start Time:  1035  Speech Stop Time:  1051     Speech Total Time (min):  16 min    Billable Minutes: Speech Therapy Individual 8 and Treatment Swallowing Dysfunction 8    Julia Chacon CCC-SLP  06/08/2018

## 2018-06-09 PROBLEM — I67.9 CEREBROVASCULAR SMALL VESSEL DISEASE: Status: ACTIVE | Noted: 2018-06-09

## 2018-06-09 PROBLEM — Z93.1 S/P PERCUTANEOUS ENDOSCOPIC GASTROSTOMY (PEG) TUBE PLACEMENT: Status: ACTIVE | Noted: 2018-06-09

## 2018-06-09 PROBLEM — K94.22 INFECTION OF PEG SITE: Status: ACTIVE | Noted: 2018-06-09

## 2018-06-09 LAB
ALBUMIN SERPL BCP-MCNC: 2.7 G/DL
ALP SERPL-CCNC: 95 U/L
ALT SERPL W/O P-5'-P-CCNC: 38 U/L
ANION GAP SERPL CALC-SCNC: 10 MMOL/L
AST SERPL-CCNC: 42 U/L
BACTERIA UR CULT: NO GROWTH
BASOPHILS # BLD AUTO: 0.07 K/UL
BASOPHILS NFR BLD: 0.3 %
BILIRUB SERPL-MCNC: 0.3 MG/DL
BUN SERPL-MCNC: 47 MG/DL
C DIFF GDH STL QL: NEGATIVE
C DIFF TOX A+B STL QL IA: NEGATIVE
CALCIUM SERPL-MCNC: 9.4 MG/DL
CHLORIDE SERPL-SCNC: 100 MMOL/L
CO2 SERPL-SCNC: 29 MMOL/L
CREAT SERPL-MCNC: 1.1 MG/DL
DIFFERENTIAL METHOD: ABNORMAL
EOSINOPHIL # BLD AUTO: 0 K/UL
EOSINOPHIL NFR BLD: 0.2 %
ERYTHROCYTE [DISTWIDTH] IN BLOOD BY AUTOMATED COUNT: 13.8 %
EST. GFR  (AFRICAN AMERICAN): 53.3 ML/MIN/1.73 M^2
EST. GFR  (NON AFRICAN AMERICAN): 46.2 ML/MIN/1.73 M^2
GLUCOSE SERPL-MCNC: 112 MG/DL
HCT VFR BLD AUTO: 32.2 %
HGB BLD-MCNC: 10.4 G/DL
IMM GRANULOCYTES # BLD AUTO: 0.13 K/UL
IMM GRANULOCYTES NFR BLD AUTO: 0.6 %
LYMPHOCYTES # BLD AUTO: 2.4 K/UL
LYMPHOCYTES NFR BLD: 11.4 %
MAGNESIUM SERPL-MCNC: 2.9 MG/DL
MCH RBC QN AUTO: 30.5 PG
MCHC RBC AUTO-ENTMCNC: 32.3 G/DL
MCV RBC AUTO: 94 FL
MONOCYTES # BLD AUTO: 1.1 K/UL
MONOCYTES NFR BLD: 5.1 %
NEUTROPHILS # BLD AUTO: 16.9 K/UL
NEUTROPHILS NFR BLD: 82.4 %
NRBC BLD-RTO: 0 /100 WBC
PHOSPHATE SERPL-MCNC: 4.4 MG/DL
PLATELET # BLD AUTO: 243 K/UL
PMV BLD AUTO: 12 FL
POCT GLUCOSE: 129 MG/DL (ref 70–110)
POCT GLUCOSE: 89 MG/DL (ref 70–110)
POTASSIUM SERPL-SCNC: 4.2 MMOL/L
PROT SERPL-MCNC: 7.4 G/DL
RBC # BLD AUTO: 3.41 M/UL
SODIUM SERPL-SCNC: 139 MMOL/L
VANCOMYCIN SERPL-MCNC: <1.1 UG/ML
WBC # BLD AUTO: 20.53 K/UL

## 2018-06-09 PROCEDURE — 84100 ASSAY OF PHOSPHORUS: CPT

## 2018-06-09 PROCEDURE — 25000242 PHARM REV CODE 250 ALT 637 W/ HCPCS: Performed by: PHYSICIAN ASSISTANT

## 2018-06-09 PROCEDURE — 87449 NOS EACH ORGANISM AG IA: CPT

## 2018-06-09 PROCEDURE — 83735 ASSAY OF MAGNESIUM: CPT

## 2018-06-09 PROCEDURE — 25000003 PHARM REV CODE 250: Performed by: PHYSICIAN ASSISTANT

## 2018-06-09 PROCEDURE — 97110 THERAPEUTIC EXERCISES: CPT

## 2018-06-09 PROCEDURE — 94668 MNPJ CHEST WALL SBSQ: CPT

## 2018-06-09 PROCEDURE — S4991 NICOTINE PATCH NONLEGEND: HCPCS | Performed by: PHYSICIAN ASSISTANT

## 2018-06-09 PROCEDURE — 94761 N-INVAS EAR/PLS OXIMETRY MLT: CPT

## 2018-06-09 PROCEDURE — 85025 COMPLETE CBC W/AUTO DIFF WBC: CPT

## 2018-06-09 PROCEDURE — 80053 COMPREHEN METABOLIC PANEL: CPT

## 2018-06-09 PROCEDURE — A4216 STERILE WATER/SALINE, 10 ML: HCPCS | Performed by: PHYSICIAN ASSISTANT

## 2018-06-09 PROCEDURE — 87040 BLOOD CULTURE FOR BACTERIA: CPT | Mod: 59

## 2018-06-09 PROCEDURE — 99233 SBSQ HOSP IP/OBS HIGH 50: CPT | Mod: GC,,, | Performed by: PSYCHIATRY & NEUROLOGY

## 2018-06-09 PROCEDURE — 80202 ASSAY OF VANCOMYCIN: CPT

## 2018-06-09 PROCEDURE — 94640 AIRWAY INHALATION TREATMENT: CPT

## 2018-06-09 PROCEDURE — 99900035 HC TECH TIME PER 15 MIN (STAT)

## 2018-06-09 PROCEDURE — 97116 GAIT TRAINING THERAPY: CPT

## 2018-06-09 PROCEDURE — 25000003 PHARM REV CODE 250: Performed by: PSYCHIATRY & NEUROLOGY

## 2018-06-09 PROCEDURE — 36415 COLL VENOUS BLD VENIPUNCTURE: CPT

## 2018-06-09 PROCEDURE — 20600001 HC STEP DOWN PRIVATE ROOM

## 2018-06-09 PROCEDURE — 63600175 PHARM REV CODE 636 W HCPCS: Performed by: PHYSICIAN ASSISTANT

## 2018-06-09 RX ORDER — AMOXICILLIN 250 MG
1 CAPSULE ORAL DAILY
Status: DISCONTINUED | OUTPATIENT
Start: 2018-06-09 | End: 2018-06-09

## 2018-06-09 RX ORDER — SODIUM CHLORIDE 9 MG/ML
INJECTION, SOLUTION INTRAVENOUS CONTINUOUS
Status: DISCONTINUED | OUTPATIENT
Start: 2018-06-09 | End: 2018-06-11 | Stop reason: HOSPADM

## 2018-06-09 RX ORDER — CEFTRIAXONE 1 G/1
1 INJECTION, POWDER, FOR SOLUTION INTRAMUSCULAR; INTRAVENOUS
Status: DISCONTINUED | OUTPATIENT
Start: 2018-06-09 | End: 2018-06-11

## 2018-06-09 RX ADMIN — BUPROPION HYDROCHLORIDE 150 MG: 75 TABLET, FILM COATED ORAL at 09:06

## 2018-06-09 RX ADMIN — SODIUM CHLORIDE SOLN NEBU 3% 4 ML: 3 NEBU SOLN at 08:06

## 2018-06-09 RX ADMIN — SODIUM CHLORIDE SOLN NEBU 3% 4 ML: 3 NEBU SOLN at 09:06

## 2018-06-09 RX ADMIN — IPRATROPIUM BROMIDE AND ALBUTEROL SULFATE 3 ML: .5; 3 SOLUTION RESPIRATORY (INHALATION) at 08:06

## 2018-06-09 RX ADMIN — CLONIDINE HYDROCHLORIDE 0.1 MG: 0.1 TABLET ORAL at 09:06

## 2018-06-09 RX ADMIN — SODIUM CHLORIDE 1000 ML: 0.9 INJECTION, SOLUTION INTRAVENOUS at 04:06

## 2018-06-09 RX ADMIN — NICOTINE 1 PATCH: 14 PATCH, EXTENDED RELEASE TRANSDERMAL at 09:06

## 2018-06-09 RX ADMIN — ATORVASTATIN CALCIUM 40 MG: 20 TABLET, FILM COATED ORAL at 09:06

## 2018-06-09 RX ADMIN — ACETAMINOPHEN 650 MG: 650 SOLUTION ORAL at 09:06

## 2018-06-09 RX ADMIN — NYSTATIN AND TRIAMCINOLONE ACETONIDE: 100000; 1 CREAM TOPICAL at 09:06

## 2018-06-09 RX ADMIN — APIXABAN 2.5 MG: 2.5 TABLET, FILM COATED ORAL at 09:06

## 2018-06-09 RX ADMIN — VANCOMYCIN HYDROCHLORIDE 750 MG: 750 INJECTION, POWDER, LYOPHILIZED, FOR SOLUTION INTRAVENOUS at 08:06

## 2018-06-09 RX ADMIN — FUROSEMIDE 20 MG: 20 TABLET ORAL at 09:06

## 2018-06-09 RX ADMIN — IPRATROPIUM BROMIDE AND ALBUTEROL SULFATE 3 ML: .5; 3 SOLUTION RESPIRATORY (INHALATION) at 07:06

## 2018-06-09 RX ADMIN — IPRATROPIUM BROMIDE AND ALBUTEROL SULFATE 3 ML: .5; 3 SOLUTION RESPIRATORY (INHALATION) at 12:06

## 2018-06-09 RX ADMIN — SODIUM CHLORIDE: 0.9 INJECTION, SOLUTION INTRAVENOUS at 10:06

## 2018-06-09 RX ADMIN — DONEPEZIL HYDROCHLORIDE 10 MG: 5 TABLET, FILM COATED ORAL at 09:06

## 2018-06-09 RX ADMIN — NYSTATIN AND TRIAMCINOLONE ACETONIDE: 100000; 1 CREAM TOPICAL at 10:06

## 2018-06-09 RX ADMIN — CEFTRIAXONE SODIUM 1 G: 1 INJECTION, POWDER, FOR SOLUTION INTRAMUSCULAR; INTRAVENOUS at 04:06

## 2018-06-09 RX ADMIN — LOSARTAN POTASSIUM 100 MG: 50 TABLET, FILM COATED ORAL at 09:06

## 2018-06-09 RX ADMIN — Medication 3 ML: at 02:06

## 2018-06-09 RX ADMIN — QUETIAPINE FUMARATE 25 MG: 25 TABLET ORAL at 09:06

## 2018-06-09 NOTE — PROGRESS NOTES
[facility]  Vascular Neurology  Comprehensive Stroke Center  Progress Note    Assessment/Plan:     * Embolic stroke involving right middle cerebral artery    84 y/o female with left sided facial droop, aphasia, dysarthria who received IV TPA but no IR. MRI with infarct in R precentral gyrus.   Etiology unclear though nursing note reporting patient went into Afib on monitor in [facility]. EKG completed and not seen, though echo showing Severe LAE.     TFs per PEG at goal and started Eliquis 2.5mg for AFib.      Antithrombotics: Eliquis 2.5mg BID  Statins: Lipitor 40 mg daily  Aggressive risk factor modification: HTN, HLD  Rehab efforts: PT/OT/SLP to evaluate and treat   Dispo:  with 24/7 family supervision  Diagnostics ordered/pending: none   VTE prophylaxis: SCDs, Eliquis  BP parameters: Infarct: Post tPA, SBP <160        Elevated BUN    Elevated to 47   Had been 0.8 /22 on 6/5/18  Added free water bolus to tube feeds (250 q 6 hours)  Can increase - fluids as needed         Leukocytosis    WBC 20  UA and CXR - normal   tmax 100.6  Some concern for purulent drainage from PEG site - cultured  Blood cultures  Started vanc and rocephin at the present   gen surg examined site, no recommended imaging at this time - loosened peg, reported it had felt tight which may be causing some of the discomfort  Will monitor         Fungal dermatitis    Fungal infection noted by RN under the breasts  Nystatin ordered  Continue to monitor        New onset a-fib    Stroke risk factor; Likely stroke etiology  Per nursing note, afib was seen on telemetry in [facility]. EKG with sinus rhythm and Premature atrial complexes (likely paroxysmal)  Echo showing severe LAE  CM price-checked DOACs  Started Eliquis 2.5mg BID         Atrial enlargement, left    AFib risk factor  Severe left atrial enlargement seen on echo  AFib seen on telemetry in [facility]; Initiated AC 6/6        Dysarthria    Severe dysarthria  SLP eval and treat         Dysphagia    PEG placed with Gen Surg on 6/5  Initiated TFs per PEG 6/6; at goal 40cc/hr        Cytotoxic cerebral edema    Area of cytotoxic cerebral edema identified when reviewing brain imaging in the territory of the R middle cerebral artery. There is no mass effect associated with it. We will continue to monitor the patients clinical exam for any worsening of symptoms which may indicate expansion of the stroke or the area of the edema resulting in the clinical change. The pattern is of unclear etiology (ESUS). Suspect afib (documented on telemetry)            Dementia with behavioral disturbance    D/c'd restraints; Improved behavior when family is at the bedside  Home wellbutrin 150 mg  Donepezil 10 qhs  seroquel 25 mg qhs  Seroquel 12.5mg x1 dose 6/7 due to aggression. ECG with qTc 423        Mixed hyperlipidemia    Stroke risk factor    Lipitor 40 mg daily        Benign essential HTN    Stroke risk factor  Goal SBP < 160   Continue clonidine 0.1 mg BID, lasix 20 mg qd, Losartan 100 mg qd    -158/48-70 range for 24 hours         Aphasia    Pt responds appropriately though sometimes difficult to assess due to severe dysarthria  Follows some commands accurately   Aggressive SLP therapy         S/P admn tPA in diff fac w/n last 24 hr bef adm to crnt fac    Close monitoring in NCCU for 24 hours post administration -- Completed  No apparent complications              84 y/o female with aphasia, dysarthria and facial droop, received IV TPA but no LVO so no IR. Probable small vessel disease.    5/28: Blood pressure elevated >200 overnight requiring cardene. Patient bradycardic and with paroxysmal afib overnight as well. MRI with infarct in the right precentral gyrus.   5/29: No events overnight. Patient with many bacteria on UTI so short cipro course started per NCC. Pending step down to NSU.   5/30: seems more agitated today, not participating in exam or following commands, severe dysarthria and  excessive secretions  05/31/18 Continues to have increased secretions and severe dysarthria.  Patient following some commands and plans to transfer to stroke service today   6/1: Secretions are better controlled today. Plan to step down. On exam patient is agitated and stating she wants to go home. In bilateral wrist restraints. Right lung opacity - Chest PT and duonebs ordered per NCC. Nursing notes noted Afib but EKG showing no afib. Echo revealed Severe left atrial enlargement. May need AC on discharge.  6/2: No acute events overnight. Stepped down to NSU. Plan for PEG early next week if no improvement per ST.   6/3: No acute events overnight. Holding TF and heparin tonight in preparation for possible PEG tomorrow. O2 sats low 90's, CXR ordered.   6/4: Family wishes to proceed with PEG (recs per SLP.) IR unable as no safe window; consult placed to Gen Surg.  6/5: PEG with GS today. SBP elevated related to NPO status; BP meds given. Repeat UA WNL.  6/6: Nystatin ordered for fungal infection under the breasts. Initiating TFs per PEG today. D/c'd restraints. CM to price check NOACs for previously-captured AFib. Bedside RNs will train family on home equipment and associated care needs tomorrow.  6/7: Changed eliquis to 2.5 mg BID due to age and weight. Patient with elevated white count, pending UA and CXR. Afebrile. Pending family training to take patient home with home health. Patient will need home medical equipment.   6/8/19 - UA and CXR normal today, wbc increased from yesterday. No signs of dvt, skin infection, urine or chest symptoms. May be due to recent peg placement  6/9/18 - pain at peg site. Discussed with gen surg - loosened slightly, has low suspicion for infection. WBC on the rise - 20 today. Will continue to monitor and do broad spectrum abx     STROKE DOCUMENTATION   Acute Stroke Times   Last Known Normal Date: 05/27/18  Last Known Normal Time: 1320  Symptom Onset Date: 05/27/18  Symptom Onset Time:  1445  Stroke Team Called Date: 05/27/18  Stroke Team Called Time: 1550  Stroke Team Arrival Date: 05/27/18  Stroke Team Arrival Time: 1751  CT Interpretation Time: 1536  Decision to Treat Time for Alteplase: 1806    NIH Scale:  1a. Level Of Consciousness: 0-->Alert: keenly responsive  1b. LOC Questions: 2-->Answers neither question correctly  1c. LOC Commands: 0-->Performs both tasks correctly  2. Best Gaze: 0-->Normal  3. Visual: 0-->No visual loss  4. Facial Palsy: 1-->Minor paralysis (flattened nasolabial fold, asymmetry on smiling)  5a. Motor Arm, Left: 0-->No drift: limb holds 90 (or 45) degrees for full 10 secs  5b. Motor Arm, Right: 0-->No drift: limb holds 90 (or 45) degrees for full 10 secs  6a. Motor Leg, Left: 1-->Drift: leg falls by the end of the 5-sec period but does not hit bed  6b. Motor Leg, Right: 1-->Drift: leg falls by the end of the 5-sec period but does not hit bed  7. Limb Ataxia: 0-->Absent  8. Sensory: 0-->Normal: no sensory loss  9. Best Language: 0-->No aphasia: normal  10. Dysarthria: 0-->Normal  11. Extinction and Inattention (formerly Neglect): 0-->No abnormality  Total (NIH Stroke Scale): 5       Modified [place] Score: 0  [place] Coma Scale:    ABCD2 Score:    [id number] Score:   HAS -BLED Score:   ICH Score:   Hunt & Burkett Classification:      Hemorrhagic change of an Ischemic Stroke: Does this patient have an ischemic stroke with hemorrhagic changes? No     Neurologic Chief Complaint: Aphasia, dysarthria, LSW     Subjective:     Interval History: Patient is seen for follow-up neurological assessment and treatment recommendations:     pain at peg site. Discussed with gen surg - loosened slightly, has low suspicion for infection. WBC on the rise - 20 today.   Will continue to monitor and do broad spectrum abx - site and blood cultures sent.     Diarrhea per nurse, will draw c diff as well with next bm     HPI, Past Medical, Family, and Social History remains the same as documented in  the initial encounter.     Review of Systems   Constitutional: Positive for fever (100.6).   Respiratory: Negative for shortness of breath.    Gastrointestinal: Positive for abdominal pain and diarrhea.   Neurological: Positive for facial asymmetry, speech difficulty and weakness. Negative for headaches.   Psychiatric/Behavioral: Positive for confusion. Negative for agitation and behavioral problems.     Scheduled Meds:   albuterol-ipratropium  3 mL Nebulization Q6H    apixaban  2.5 mg Per G Tube BID    atorvastatin  40 mg Per G Tube Daily    buPROPion  150 mg Per G Tube BID    cefTRIAXone (ROCEPHIN) IVPB  1 g Intravenous Q24H    cloNIDine  0.1 mg Per G Tube BID    donepezil  10 mg Per G Tube QHS    furosemide  20 mg Per G Tube Daily    losartan  100 mg Per G Tube Daily    nicotine  1 patch Transdermal Daily    nystatin-triamcinolone   Topical (Top) BID    QUEtiapine  25 mg Per G Tube QHS    senna-docusate 8.6-50 mg  1 tablet Oral Daily    sodium chloride 0.9%  3 mL Intravenous Q8H    sodium chloride 3%  4 mL Nebulization BID    vancomycin (VANCOCIN) IVPB  750 mg Intravenous Q24H     Continuous Infusions:    PRN Meds:acetaminophen, labetalol, sodium chloride 0.9%    Objective:     Vital Signs (Most Recent):  Temp: 98.5 °F (36.9 °C) (06/09/18 1101)  Pulse: 71 (06/09/18 1200)  Resp: 16 (06/09/18 1200)  BP: (!) 109/48 (06/09/18 1120)  SpO2: 97 % (06/09/18 1200)  BP Location: Right arm    Vital Signs Range (Last 24H):  Temp:  [98.5 °F (36.9 °C)-100.4 °F (38 °C)]   Pulse:  [64-94]   Resp:  [16-19]   BP: (109-158)/(48-70)   SpO2:  [90 %-98 %]   BP Location: Right arm    Physical Exam   Constitutional: She appears well-developed and well-nourished. No distress.   HENT:   Head: Normocephalic and atraumatic.   Eyes: Conjunctivae are normal.   Cardiovascular: Normal rate.    Pulmonary/Chest: Effort normal. No respiratory distress.   Musculoskeletal: She exhibits no edema or tenderness.   Neurological: She is  alert. No sensory deficit.   Skin: Skin is warm and dry.   Nursing note and vitals reviewed.    Neurological Exam:   LOC: alert  Attention Span: Good  Language: No aphasia  Articulation: Moderate/severe Dysarthria  Visual Fields: Full  Facial Movement (CN VII): Lower facial weakness on the Left  Motor: Arm left  Paresis: 4/5  Leg left  Paresis: 3/5  Arm right  Paresis: 5/5  Leg right Paresis: 5/5  Sensation: Sensation intact  Tone: normal     Laboratory:  CMP:     Recent Labs  Lab 06/09/18 0426   CALCIUM 9.4   ALBUMIN 2.7*   PROT 7.4      K 4.2   CO2 29      BUN 47*   CREATININE 1.1   ALKPHOS 95   ALT 38   AST 42*   BILITOT 0.3     BMP:     Recent Labs  Lab 06/09/18 0426      K 4.2      CO2 29   BUN 47*   CREATININE 1.1   CALCIUM 9.4     CBC:     Recent Labs  Lab 06/09/18 0426   WBC 20.53*   RBC 3.41*   HGB 10.4*   HCT 32.2*      MCV 94   MCH 30.5   MCHC 32.3     Lipid Panel:   No results for input(s): CHOL, LDLCALC, HDL, TRIG in the last 168 hours.  Coagulation:     Recent Labs  Lab 06/03/18  0523   INR 0.9     Platelet Aggregation Study: No results for input(s): PLTAGG, PLTAGINTERP, PLTAGREGLACO, ADPPLTAGGREG in the last 168 hours.  Hgb A1C:   No results for input(s): HGBA1C in the last 168 hours.  TSH:   No results for input(s): TSH in the last 168 hours.      Diagnostic Results     Brain Imaging     MRI brain 5/28:    Small area of restricted diffusion in the right precentral gyrus.  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  Bilateral basal ganglia, thalami and cerebellar hemispheres small remote infarcts.  Generalized cerebral volume loss and extensive white matter chronic small vessel ischemic change.  Ventriculomegaly out of  proportion to sulcal prominence, nonspecific but can be seen with normal pressure hydrocephalus, but overall unchanged from prior MRI.      Cardiac Imaging     ECHO 5/28/18:   CONCLUSIONS     1 - Concentric LVH with hyperdynamic left ventricular systolic function (EF >70%).     2 - Severe left atrial enlargement.     3 - Impaired LV relaxation, elevated LAP (grade 2 diastolic dysfunction).     4 - Normal right ventricular systolic function .     5 - Mild to moderate aortic stenosis, ALBER = 1.64 cm2, AVAi = 1.12 cm2/m2, peak velocity = 3.11 m/s, mean gradient = 24 mmHg          ERIC Aguilar  Comprehensive Stroke Center  Department of Vascular Neurology   [facility]

## 2018-06-09 NOTE — ASSESSMENT & PLAN NOTE
84 y/o female with left sided facial droop, aphasia, dysarthria who received IV TPA but no IR. MRI with infarct in R precentral gyrus.   Etiology unclear though nursing note reporting patient went into Afib on monitor in [facility]. EKG completed and not seen, though echo showing Severe LAE.     TFs per PEG at goal and started Eliquis 2.5mg for AFib.      Antithrombotics: Eliquis 2.5mg BID  Statins: Lipitor 40 mg daily  Aggressive risk factor modification: HTN, HLD  Rehab efforts: PT/OT/SLP to evaluate and treat   Dispo:  with 24/7 family supervision  Diagnostics ordered/pending: none   VTE prophylaxis: SCDs, Eliquis  BP parameters: Infarct: Post tPA, SBP <160

## 2018-06-09 NOTE — PT/OT/SLP PROGRESS
"Physical Therapy Treatment    Patient Name:  Sury Cobos   MRN:  [id number]    Recommendations:     Discharge Recommendations:  home with home health, home health PT (24 hr supervision )   Discharge Equipment Recommendations: 3-in-1 commode   Barriers to discharge: None    Plan:     During this hospitalization, patient to be seen 4 x/week to address the above listed problems via gait training, therapeutic activities, therapeutic exercises, neuromuscular re-education  · Plan of Care Expires:  06/28/18   Plan of Care Reviewed with: patient, family    Subjective     Communicated with RN prior to session.    Patient comments/goals: "it hurts here" - patient indicating insertion site of PEG  Pain/Comfort:  · Pain Rating 1:  (pain at PEG site)  · Pain Addressed 1: Pre-medicate for activity, Nurse notified  · Pain Rating Post-Intervention 1:  (pain at PEG site)        Objective:     Patient found with: telemetry, PEG Tube, bed alarm (bedside sitter)     General Precautions: Standard, aspiration, fall, NPO     Patient was found supine in bed with sitter at bedside. She agreed to therapy.  Pt indicated several times that her PEG was hurting.  PEG in place with bandage and minimal amount of purulent drainage on bandage.  She required frequent encouragement and reorientation to hospital equipment (RW not her rollator walker with seat) to ensure safety.  She performed bed mobility, transfer, and gait training in ivy.  LE exercises in the chair. Pt left sitting up in chair with sitter at bedside.  See below for details.     Functional Mobility:  Bed Mobility:   · Rolling Right: SBA with bed rail  · Rolling Left: NT  · Supine to Sit: min assistance to elevate trunk; SBA for LE management   · Sit to Supine: NT     Sitting Balance at Edge of Bed:  · Assistance Level Required: SBA     Transfers:   · Sit <> Stand Transfer:  3x, min assist to CGA for encouragement using RW     Gait:  Gait x 80 feet with min assistance for walker " management and frequent encouragement to continue task  Several times the patient asked to sit down and rest. PT re-oriented patient to current equipment and no seat available on the walker (she uses 4 wheel walker with seat at home).  She was easy to redirect and safely ambulated back to the room.     Therapeutic Activities and Exercises:   Sitting LE exercises 15x/ea:  - ankle pumps  - LAQ  - hip ADD  - HIp ABD  - marching    At the end of the session, the patient was left sitting up in chair with all lines intact.    AM-PAC 6 CLICK MOBILITY  Turning over in bed (including adjusting bedclothes, sheets and blankets)?: 4  Sitting down on and standing up from a chair with arms (e.g., wheelchair, bedside commode, etc.): 3  Moving from lying on back to sitting on the side of the bed?: 3  Moving to and from a bed to a chair (including a wheelchair)?: 3  Need to walk in hospital room?: 3  Climbing 3-5 steps with a railing?: 2  Total Score: 18     GOALS:    Physical Therapy Goals        Problem: Physical Therapy Goal    Goal Priority Disciplines Outcome Goal Variances Interventions   Physical Therapy Goal     PT/OT, PT Ongoing (interventions implemented as appropriate)     Description:  Goals to be met by: 2018     Patient will increase functional independence with mobility by performin. Supine to sit with Contact Guard Assistance - MET  2. Sit to supine with Contact Guard Assistance - MET  3. Rolling to Left and Right with Supervision. - MET  4. Sit to stand transfer with Stand-by Assistance  5. Gait  x 20 feet with Contact Guard Assistance using Rolling Walker. - distance met but not level of assist  6. Stand for 10 minutes with Contact Guard Assistance using Rolling Walker                        Assessment:     Sury Cobos is a 84 y.o. female admitted with a medical diagnosis of Embolic stroke involving right middle cerebral artery.  She participated well in therapy with gentle encouragement and  redirection.  She is improving her independence with transfers and gait, but still requires min assistance for safety, redirection and walker management.  She is appropriate for t/f home when medically appropriate, performing mobility with family using an assistive device and 24 hr supervision, and follow up with  therapy. Will continue to progress gait distance.  Patient was modified independent with gait using a rollator walker prior to admit.     She presents with the following impairments/functional limitations:  weakness, impaired functional mobilty, impaired cognition, decreased safety awareness, impaired endurance, gait instability, impaired balance, impaired self care skills, decreased lower extremity function, decreased upper extremity function, impaired joint extensibility, pain.    Rehab Prognosis:  good; patient would benefit from acute skilled PT services to address these deficits and reach maximum level of function.      Recent Surgery: Procedure(s) (LRB):  EGD, WITH PEG TUBE INSERTION (N/A)  EGD (ESOPHAGOGASTRODUODENOSCOPY) (N/A) 4 Days Post-Op      Time Tracking:     PT Received On: 06/09/18  PT Start Time: 0849     PT Stop Time: 0920  PT Total Time (min): 31 min     Billable Minutes: Gait Training 21 and Therapeutic Exercise 10    Treatment Type: Treatment  PT/PTA: PT     PTA Visit Number: 0     Zulema Mei, PT  6/9/2018  [phone] (pager)

## 2018-06-09 NOTE — PLAN OF CARE
Problem: Patient Care Overview  Goal: Plan of Care Review  Plan of care reviewed with pt. Pt disoriented to self, date, time, place. Pt lying supine with HOB @ 30-45 degrees. NAD noted. Pt demonstrates grimacing and guarding upon assessing; palpating abdomen. Pt states pain 8/10. Purulent drainage observed @ PEG tube site; NP notified. PEG Cx ordered; performed @ bedside. Pt tolerates w/o distress. Fall/Safety precautions maintained. Bed in lowest position, and locked. Call light within reach and advised to call for assistance. Side rails x 2; slip resistance socks and floating boots applied.

## 2018-06-09 NOTE — NURSING
Notified stroke team PA of pt.'s loss of IV access and diffifculty maintaining IV line.  PA states to request night charge nurse to attempt to start IV line and to notify team of status.

## 2018-06-09 NOTE — ASSESSMENT & PLAN NOTE
Elevated to 47   Had been 0.8 /22 on 6/5/18  Added free water bolus to tube feeds (250 q 6 hours)  Can increase - fluids as needed

## 2018-06-09 NOTE — CONSULTS
Informed PAOLA Alejo that [facility] to [facility] due to high consult volume.  Suggested other option for PIV access.

## 2018-06-09 NOTE — ASSESSMENT & PLAN NOTE
Stroke risk factor; Likely stroke etiology  Per nursing note, afib was seen on telemetry in [facility]. EKG with sinus rhythm and Premature atrial complexes (likely paroxysmal)  Echo showing severe LAE  CM price-checked DOACs  Started Eliquis 2.5mg BID

## 2018-06-09 NOTE — ASSESSMENT & PLAN NOTE
Stroke risk factor  Goal SBP < 160   Continue clonidine 0.1 mg BID, lasix 20 mg qd, Losartan 100 mg qd    -158/48-70 range for 24 hours

## 2018-06-09 NOTE — SUBJECTIVE & OBJECTIVE
Neurologic Chief Complaint: Aphasia, dysarthria, LSW     Subjective:     Interval History: Patient is seen for follow-up neurological assessment and treatment recommendations:     pain at peg site. Discussed with gen surg - loosened slightly, has low suspicion for infection. WBC on the rise - 20 today.   Will continue to monitor and do broad spectrum abx - site and blood cultures sent.     Diarrhea per nurse, will draw c diff as well with next bm     HPI, Past Medical, Family, and Social History remains the same as documented in the initial encounter.     Review of Systems   Constitutional: Positive for fever (100.6).   Respiratory: Negative for shortness of breath.    Gastrointestinal: Positive for abdominal pain and diarrhea.   Neurological: Positive for facial asymmetry, speech difficulty and weakness. Negative for headaches.   Psychiatric/Behavioral: Positive for confusion. Negative for agitation and behavioral problems.     Scheduled Meds:   albuterol-ipratropium  3 mL Nebulization Q6H    apixaban  2.5 mg Per G Tube BID    atorvastatin  40 mg Per G Tube Daily    buPROPion  150 mg Per G Tube BID    cefTRIAXone (ROCEPHIN) IVPB  1 g Intravenous Q24H    cloNIDine  0.1 mg Per G Tube BID    donepezil  10 mg Per G Tube QHS    furosemide  20 mg Per G Tube Daily    losartan  100 mg Per G Tube Daily    nicotine  1 patch Transdermal Daily    nystatin-triamcinolone   Topical (Top) BID    QUEtiapine  25 mg Per G Tube QHS    senna-docusate 8.6-50 mg  1 tablet Oral Daily    sodium chloride 0.9%  3 mL Intravenous Q8H    sodium chloride 3%  4 mL Nebulization BID    vancomycin (VANCOCIN) IVPB  750 mg Intravenous Q24H     Continuous Infusions:    PRN Meds:acetaminophen, labetalol, sodium chloride 0.9%    Objective:     Vital Signs (Most Recent):  Temp: 98.5 °F (36.9 °C) (06/09/18 1101)  Pulse: 71 (06/09/18 1200)  Resp: 16 (06/09/18 1200)  BP: (!) 109/48 (06/09/18 1120)  SpO2: 97 % (06/09/18 1200)  BP Location:  Right arm    Vital Signs Range (Last 24H):  Temp:  [98.5 °F (36.9 °C)-100.4 °F (38 °C)]   Pulse:  [64-94]   Resp:  [16-19]   BP: (109-158)/(48-70)   SpO2:  [90 %-98 %]   BP Location: Right arm    Physical Exam   Constitutional: She appears well-developed and well-nourished. No distress.   HENT:   Head: Normocephalic and atraumatic.   Eyes: Conjunctivae are normal.   Cardiovascular: Normal rate.    Pulmonary/Chest: Effort normal. No respiratory distress.   Musculoskeletal: She exhibits no edema or tenderness.   Neurological: She is alert. No sensory deficit.   Skin: Skin is warm and dry.   Nursing note and vitals reviewed.    Neurological Exam:   LOC: alert  Attention Span: Good  Language: No aphasia  Articulation: Moderate/severe Dysarthria  Visual Fields: Full  Facial Movement (CN VII): Lower facial weakness on the Left  Motor: Arm left  Paresis: 4/5  Leg left  Paresis: 3/5  Arm right  Paresis: 5/5  Leg right Paresis: 5/5  Sensation: Sensation intact  Tone: normal     Laboratory:  CMP:     Recent Labs  Lab 06/09/18  0426   CALCIUM 9.4   ALBUMIN 2.7*   PROT 7.4      K 4.2   CO2 29      BUN 47*   CREATININE 1.1   ALKPHOS 95   ALT 38   AST 42*   BILITOT 0.3     BMP:     Recent Labs  Lab 06/09/18  0426      K 4.2      CO2 29   BUN 47*   CREATININE 1.1   CALCIUM 9.4     CBC:     Recent Labs  Lab 06/09/18  0426   WBC 20.53*   RBC 3.41*   HGB 10.4*   HCT 32.2*      MCV 94   MCH 30.5   MCHC 32.3     Lipid Panel:   No results for input(s): CHOL, LDLCALC, HDL, TRIG in the last 168 hours.  Coagulation:     Recent Labs  Lab 06/03/18  0523   INR 0.9     Platelet Aggregation Study: No results for input(s): PLTAGG, PLTAGINTERP, PLTAGREGLACO, ADPPLTAGGREG in the last 168 hours.  Hgb A1C:   No results for input(s): HGBA1C in the last 168 hours.  TSH:   No results for input(s): TSH in the last 168 hours.      Diagnostic Results     Brain Imaging     MRI brain 5/28:    Small area of restricted diffusion  in the right precentral gyrus.  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  Bilateral basal ganglia, thalami and cerebellar hemispheres small remote infarcts.  Generalized cerebral volume loss and extensive white matter chronic small vessel ischemic change.  Ventriculomegaly out of proportion to sulcal prominence, nonspecific but can be seen with normal pressure hydrocephalus, but overall unchanged from prior MRI.      Cardiac Imaging     ECHO 5/28/18:   CONCLUSIONS     1 - Concentric LVH with hyperdynamic left ventricular systolic function (EF >70%).     2 - Severe left atrial enlargement.     3 - Impaired LV relaxation, elevated LAP (grade 2 diastolic dysfunction).     4 - Normal right ventricular systolic function .     5 - Mild to moderate aortic stenosis, ALBER = 1.64 cm2, AVAi = 1.12 cm2/m2, peak velocity = 3.11 m/s, mean gradient = 24 mmHg

## 2018-06-09 NOTE — PROGRESS NOTES
Spoke to AGNES Davis regarding pt PEG putting out a lot of puss, being malodorous and temp of 100.4. Orders for cultures put in. Tylenol given. Will continue to monitor.

## 2018-06-09 NOTE — ASSESSMENT & PLAN NOTE
Close monitoring in [facility] for 24 hours post administration -- Completed  No apparent complications

## 2018-06-10 LAB
ALBUMIN SERPL BCP-MCNC: 2.3 G/DL
ALP SERPL-CCNC: 98 U/L
ALT SERPL W/O P-5'-P-CCNC: 46 U/L
ANION GAP SERPL CALC-SCNC: 9 MMOL/L
AST SERPL-CCNC: 48 U/L
BASOPHILS # BLD AUTO: 0.07 K/UL
BASOPHILS NFR BLD: 0.4 %
BILIRUB SERPL-MCNC: 0.3 MG/DL
BUN SERPL-MCNC: 27 MG/DL
CALCIUM SERPL-MCNC: 8.4 MG/DL
CHLORIDE SERPL-SCNC: 104 MMOL/L
CO2 SERPL-SCNC: 27 MMOL/L
CREAT SERPL-MCNC: 0.7 MG/DL
DIFFERENTIAL METHOD: ABNORMAL
EOSINOPHIL # BLD AUTO: 0.2 K/UL
EOSINOPHIL NFR BLD: 1 %
ERYTHROCYTE [DISTWIDTH] IN BLOOD BY AUTOMATED COUNT: 13.7 %
EST. GFR  (AFRICAN AMERICAN): >60 ML/MIN/1.73 M^2
EST. GFR  (NON AFRICAN AMERICAN): >60 ML/MIN/1.73 M^2
GLUCOSE SERPL-MCNC: 79 MG/DL
HCT VFR BLD AUTO: 28.3 %
HGB BLD-MCNC: 9 G/DL
IMM GRANULOCYTES # BLD AUTO: 0.08 K/UL
IMM GRANULOCYTES NFR BLD AUTO: 0.5 %
LYMPHOCYTES # BLD AUTO: 1.9 K/UL
LYMPHOCYTES NFR BLD: 11.2 %
MAGNESIUM SERPL-MCNC: 2.2 MG/DL
MCH RBC QN AUTO: 30.2 PG
MCHC RBC AUTO-ENTMCNC: 31.8 G/DL
MCV RBC AUTO: 95 FL
MONOCYTES # BLD AUTO: 0.9 K/UL
MONOCYTES NFR BLD: 5.1 %
NEUTROPHILS # BLD AUTO: 14.1 K/UL
NEUTROPHILS NFR BLD: 81.8 %
NRBC BLD-RTO: 0 /100 WBC
PHOSPHATE SERPL-MCNC: 2.8 MG/DL
PLATELET # BLD AUTO: 217 K/UL
PMV BLD AUTO: 12.1 FL
POCT GLUCOSE: 77 MG/DL (ref 70–110)
POTASSIUM SERPL-SCNC: 3.4 MMOL/L
PROT SERPL-MCNC: 6.5 G/DL
RBC # BLD AUTO: 2.98 M/UL
SODIUM SERPL-SCNC: 140 MMOL/L
WBC # BLD AUTO: 17.22 K/UL

## 2018-06-10 PROCEDURE — 20600001 HC STEP DOWN PRIVATE ROOM

## 2018-06-10 PROCEDURE — 25000003 PHARM REV CODE 250: Performed by: PHYSICIAN ASSISTANT

## 2018-06-10 PROCEDURE — 27000221 HC OXYGEN, UP TO 24 HOURS

## 2018-06-10 PROCEDURE — 63600175 PHARM REV CODE 636 W HCPCS: Performed by: PHYSICIAN ASSISTANT

## 2018-06-10 PROCEDURE — 84100 ASSAY OF PHOSPHORUS: CPT

## 2018-06-10 PROCEDURE — 25500020 PHARM REV CODE 255: Performed by: SURGERY

## 2018-06-10 PROCEDURE — 94668 MNPJ CHEST WALL SBSQ: CPT

## 2018-06-10 PROCEDURE — A4216 STERILE WATER/SALINE, 10 ML: HCPCS | Performed by: PHYSICIAN ASSISTANT

## 2018-06-10 PROCEDURE — 83735 ASSAY OF MAGNESIUM: CPT

## 2018-06-10 PROCEDURE — 85025 COMPLETE CBC W/AUTO DIFF WBC: CPT

## 2018-06-10 PROCEDURE — 80053 COMPREHEN METABOLIC PANEL: CPT

## 2018-06-10 PROCEDURE — 99233 SBSQ HOSP IP/OBS HIGH 50: CPT | Mod: GC,,, | Performed by: PSYCHIATRY & NEUROLOGY

## 2018-06-10 PROCEDURE — 25000242 PHARM REV CODE 250 ALT 637 W/ HCPCS: Performed by: PHYSICIAN ASSISTANT

## 2018-06-10 PROCEDURE — 99900035 HC TECH TIME PER 15 MIN (STAT)

## 2018-06-10 PROCEDURE — 36415 COLL VENOUS BLD VENIPUNCTURE: CPT

## 2018-06-10 PROCEDURE — 94640 AIRWAY INHALATION TREATMENT: CPT

## 2018-06-10 PROCEDURE — 94761 N-INVAS EAR/PLS OXIMETRY MLT: CPT

## 2018-06-10 PROCEDURE — 25000003 PHARM REV CODE 250: Performed by: SURGERY

## 2018-06-10 PROCEDURE — S4991 NICOTINE PATCH NONLEGEND: HCPCS | Performed by: PHYSICIAN ASSISTANT

## 2018-06-10 RX ORDER — LIDOCAINE HYDROCHLORIDE 10 MG/ML
20 INJECTION INFILTRATION; PERINEURAL ONCE
Status: COMPLETED | OUTPATIENT
Start: 2018-06-10 | End: 2018-06-10

## 2018-06-10 RX ORDER — POTASSIUM CHLORIDE 7.45 MG/ML
10 INJECTION INTRAVENOUS
Status: DISPENSED | OUTPATIENT
Start: 2018-06-10 | End: 2018-06-10

## 2018-06-10 RX ADMIN — POTASSIUM CHLORIDE 10 MEQ: 10 INJECTION, SOLUTION INTRAVENOUS at 03:06

## 2018-06-10 RX ADMIN — SODIUM CHLORIDE: 0.9 INJECTION, SOLUTION INTRAVENOUS at 09:06

## 2018-06-10 RX ADMIN — APIXABAN 2.5 MG: 2.5 TABLET, FILM COATED ORAL at 09:06

## 2018-06-10 RX ADMIN — DONEPEZIL HYDROCHLORIDE 10 MG: 5 TABLET, FILM COATED ORAL at 09:06

## 2018-06-10 RX ADMIN — IPRATROPIUM BROMIDE AND ALBUTEROL SULFATE 3 ML: .5; 3 SOLUTION RESPIRATORY (INHALATION) at 07:06

## 2018-06-10 RX ADMIN — IOHEXOL 20 ML: 350 INJECTION, SOLUTION INTRAVENOUS at 10:06

## 2018-06-10 RX ADMIN — SODIUM CHLORIDE SOLN NEBU 3% 4 ML: 3 NEBU SOLN at 07:06

## 2018-06-10 RX ADMIN — CLONIDINE HYDROCHLORIDE 0.1 MG: 0.1 TABLET ORAL at 09:06

## 2018-06-10 RX ADMIN — VANCOMYCIN HYDROCHLORIDE 750 MG: 750 INJECTION, POWDER, LYOPHILIZED, FOR SOLUTION INTRAVENOUS at 06:06

## 2018-06-10 RX ADMIN — BUPROPION HYDROCHLORIDE 150 MG: 75 TABLET, FILM COATED ORAL at 09:06

## 2018-06-10 RX ADMIN — NYSTATIN AND TRIAMCINOLONE ACETONIDE: 100000; 1 CREAM TOPICAL at 09:06

## 2018-06-10 RX ADMIN — IPRATROPIUM BROMIDE AND ALBUTEROL SULFATE 3 ML: .5; 3 SOLUTION RESPIRATORY (INHALATION) at 12:06

## 2018-06-10 RX ADMIN — CEFTRIAXONE SODIUM 1 G: 1 INJECTION, POWDER, FOR SOLUTION INTRAMUSCULAR; INTRAVENOUS at 04:06

## 2018-06-10 RX ADMIN — NICOTINE 1 PATCH: 14 PATCH, EXTENDED RELEASE TRANSDERMAL at 09:06

## 2018-06-10 RX ADMIN — POTASSIUM CHLORIDE 10 MEQ: 10 INJECTION, SOLUTION INTRAVENOUS at 02:06

## 2018-06-10 RX ADMIN — LIDOCAINE HYDROCHLORIDE 20 ML: 10 INJECTION, SOLUTION INFILTRATION; PERINEURAL at 11:06

## 2018-06-10 RX ADMIN — QUETIAPINE FUMARATE 25 MG: 25 TABLET ORAL at 09:06

## 2018-06-10 RX ADMIN — Medication 3 ML: at 02:06

## 2018-06-10 NOTE — ASSESSMENT & PLAN NOTE
Stroke risk factor  Goal SBP < 160   Continue clonidine 0.1 mg BID, lasix 20 mg qd, Losartan 100 mg qd    -172/56-69 range for 24 hours

## 2018-06-10 NOTE — PROGRESS NOTES
Lay placed in gastrostomy site overnight.  Xray confirms placement.  Sutured in place.  Ok to use.   Keep binder in place at all times.

## 2018-06-10 NOTE — ASSESSMENT & PLAN NOTE
WBC 20 -->17  UA and CXR - normal, afebrile, c diff negative  Some concern for purulent drainage from PEG site - cultured (staph aureus, sensitivities pending)  Blood cultures -NGTD  Started vanc and rocephin at the present   Will monitor

## 2018-06-10 NOTE — ASSESSMENT & PLAN NOTE
PEG placed with Gen Surg on 6/5  Initiated TFs per PEG 6/6; at goal 40cc/hr  Peg removed by patient overnight on 6/9-6/10  Surgery contacted, fernandez placed to maintain site

## 2018-06-10 NOTE — SUBJECTIVE & OBJECTIVE
Neurologic Chief Complaint: Aphasia, dysarthria, LSW     Subjective:     Interval History: Patient is seen for follow-up neurological assessment and treatment recommendations:     peg pulled overnight - surgery contacted. Wbc improving, continue broad spectrum abx. CDiff neg    HPI, Past Medical, Family, and Social History remains the same as documented in the initial encounter.     Review of Systems   Constitutional: Negative for fever.   Respiratory: Negative for shortness of breath.    Gastrointestinal:        PEG removed, fernandez in site   Neurological: Positive for facial asymmetry, speech difficulty and weakness. Negative for headaches.   Psychiatric/Behavioral: Positive for agitation and confusion. Negative for behavioral problems.     Scheduled Meds:   albuterol-ipratropium  3 mL Nebulization Q6H    apixaban  2.5 mg Per G Tube BID    atorvastatin  40 mg Per G Tube Daily    buPROPion  150 mg Per G Tube BID    cefTRIAXone (ROCEPHIN) IVPB  1 g Intravenous Q24H    cloNIDine  0.1 mg Per G Tube BID    donepezil  10 mg Per G Tube QHS    losartan  100 mg Per G Tube Daily    nicotine  1 patch Transdermal Daily    nystatin-triamcinolone   Topical (Top) BID    QUEtiapine  25 mg Per G Tube QHS    sodium chloride 0.9%  3 mL Intravenous Q8H    sodium chloride 3%  4 mL Nebulization BID    vancomycin (VANCOCIN) IVPB  750 mg Intravenous Q24H     Continuous Infusions:   sodium chloride 0.9% 100 mL/hr at 06/10/18 0936     PRN Meds:acetaminophen, labetalol, sodium chloride 0.9%    Objective:     Vital Signs (Most Recent):  Temp: 98.9 °F (37.2 °C) (06/10/18 0800)  Pulse: 88 (06/10/18 1100)  Resp: 17 (06/10/18 0800)  BP: (!) 172/69 (06/10/18 0800)  SpO2: 97 % (06/10/18 0800)  BP Location: Right arm    Vital Signs Range (Last 24H):  Temp:  [96.8 °F (36 °C)-98.9 °F (37.2 °C)]   Pulse:  [57-88]   Resp:  [16-20]   BP: (126-172)/(56-69)   SpO2:  [93 %-98 %]   BP Location: Right arm    Physical Exam   Constitutional: She  appears well-developed and well-nourished. No distress.   HENT:   Head: Normocephalic and atraumatic.   Cardiovascular: Normal rate.    Pulmonary/Chest: Effort normal. No respiratory distress.   Abdominal: She exhibits no distension. There is no tenderness.   Musculoskeletal: She exhibits no edema or tenderness.   Neurological: She is alert. No sensory deficit.   Skin: Skin is warm and dry.   Nursing note and vitals reviewed.    Neurological Exam:   LOC: alert  Attention Span: Good  Language: No aphasia  Articulation: Moderate/severe Dysarthria  Visual Fields: Full  Facial Movement (CN VII): Lower facial weakness on the Left  Motor: Arm left  Paresis: 4/5  Leg left  Paresis: 3/5  Arm right  Paresis: 5/5  Leg right Paresis: 5/5  Sensation: Sensation intact  Tone: normal     Laboratory:  CMP:     Recent Labs  Lab 06/10/18  0424   CALCIUM 8.4*   ALBUMIN 2.3*   PROT 6.5      K 3.4*   CO2 27      BUN 27*   CREATININE 0.7   ALKPHOS 98   ALT 46*   AST 48*   BILITOT 0.3     BMP:     Recent Labs  Lab 06/10/18  0424      K 3.4*      CO2 27   BUN 27*   CREATININE 0.7   CALCIUM 8.4*     CBC:     Recent Labs  Lab 06/10/18  0424   WBC 17.22*   RBC 2.98*   HGB 9.0*   HCT 28.3*      MCV 95   MCH 30.2   MCHC 31.8*     Lipid Panel:   No results for input(s): CHOL, LDLCALC, HDL, TRIG in the last 168 hours.  Coagulation:   No results for input(s): PT, INR, APTT in the last 168 hours.  Platelet Aggregation Study: No results for input(s): PLTAGG, PLTAGINTERP, PLTAGREGLACO, ADPPLTAGGREG in the last 168 hours.  Hgb A1C:   No results for input(s): HGBA1C in the last 168 hours.  TSH:   No results for input(s): TSH in the last 168 hours.      Diagnostic Results     Brain Imaging     MRI brain 5/28:    Small area of restricted diffusion in the right precentral gyrus.  Cytotoxic cerebral edema       Vessel Imaging     CTA MP 5/27:   No significant stenosis compromising cerebral blood flow   Occluded left vertebral  artery throughout its cervical course with reconstitution of the left V4 segment retrograde from the basilar artery and right vertebral artery.  Bilateral basal ganglia, thalami and cerebellar hemispheres small remote infarcts.  Generalized cerebral volume loss and extensive white matter chronic small vessel ischemic change.  Ventriculomegaly out of proportion to sulcal prominence, nonspecific but can be seen with normal pressure hydrocephalus, but overall unchanged from prior MRI.      Cardiac Imaging     ECHO 5/28/18:   CONCLUSIONS     1 - Concentric LVH with hyperdynamic left ventricular systolic function (EF >70%).     2 - Severe left atrial enlargement.     3 - Impaired LV relaxation, elevated LAP (grade 2 diastolic dysfunction).     4 - Normal right ventricular systolic function .     5 - Mild to moderate aortic stenosis, ALBER = 1.64 cm2, AVAi = 1.12 cm2/m2, peak velocity = 3.11 m/s, mean gradient = 24 mmHg

## 2018-06-10 NOTE — ASSESSMENT & PLAN NOTE
82 y/o female with left sided facial droop, aphasia, dysarthria who received IV TPA but no IR. MRI with infarct in R precentral gyrus.   Etiology unclear though nursing note reporting patient went into Afib on monitor in [facility]. EKG completed and not seen, though echo showing Severe LAE.     Antithrombotics: Eliquis 2.5mg BID  Statins: Lipitor 40 mg daily  Aggressive risk factor modification: HTN, HLD  Rehab efforts: PT/OT/SLP to evaluate and treat   Dispo:  with 24/7 family supervision  Diagnostics ordered/pending: none   VTE prophylaxis: SCDs, Eliquis  BP parameters: Infarct: Post tPA, SBP <160

## 2018-06-10 NOTE — ASSESSMENT & PLAN NOTE
Elevated to 47 --> improved today to 27  Had been 0.8 /22 on 6/5/18  Added free water bolus to tube feeds (250 q 6 hours)  Can increase - fluids as needed

## 2018-06-10 NOTE — ASSESSMENT & PLAN NOTE
Stroke risk factor; Likely stroke etiology  Per nursing note, afib was seen on telemetry in [facility]. EKG with sinus rhythm and Premature atrial complexes (likely paroxysmal)  Echo showing severe LAE  CM price-checked DOACs  Started Eliquis 2.5mg BID

## 2018-06-10 NOTE — PROGRESS NOTES
[facility]  Vascular Neurology  Comprehensive Stroke Center  Progress Note    Assessment/Plan:     * Embolic stroke involving right middle cerebral artery    82 y/o female with left sided facial droop, aphasia, dysarthria who received IV TPA but no IR. MRI with infarct in R precentral gyrus.   Etiology unclear though nursing note reporting patient went into Afib on monitor in [facility]. EKG completed and not seen, though echo showing Severe LAE.     Antithrombotics: Eliquis 2.5mg BID  Statins: Lipitor 40 mg daily  Aggressive risk factor modification: HTN, HLD  Rehab efforts: PT/OT/SLP to evaluate and treat   Dispo:  with 24/7 family supervision  Diagnostics ordered/pending: none   VTE prophylaxis: SCDs, Eliquis  BP parameters: Infarct: Post tPA, SBP <160        Elevated BUN    Elevated to 47 --> improved today to 27  Had been 0.8 /22 on 6/5/18  Added free water bolus to tube feeds (250 q 6 hours)  Can increase - fluids as needed         Leukocytosis    WBC 20 -->17  UA and CXR - normal, afebrile, c diff negative  Some concern for purulent drainage from PEG site - cultured (staph aureus, sensitivities pending)  Blood cultures -NGTD  Started vanc and rocephin at the present   Will monitor         Fungal dermatitis    Fungal infection noted by RN under the breasts  Nystatin ordered  Continue to monitor        New onset a-fib    Stroke risk factor; Likely stroke etiology  Per nursing note, afib was seen on telemetry in [facility]. EKG with sinus rhythm and Premature atrial complexes (likely paroxysmal)  Echo showing severe LAE  CM price-checked DOACs  Started Eliquis 2.5mg BID         Atrial enlargement, left    AFib risk factor  Severe left atrial enlargement seen on echo  AFib seen on telemetry in [facility]; Initiated AC 6/6        Dysarthria    Severe dysarthria  SLP eval and treat        Dysphagia    PEG placed with Gen Surg on 6/5  Initiated TFs per PEG 6/6; at goal 40cc/hr  Peg removed by patient overnight on  6/9-6/10  Surgery contacted, fernandez placed to maintain site         Cytotoxic cerebral edema    Area of cytotoxic cerebral edema identified when reviewing brain imaging in the territory of the R middle cerebral artery. There is no mass effect associated with it. We will continue to monitor the patients clinical exam for any worsening of symptoms which may indicate expansion of the stroke or the area of the edema resulting in the clinical change. The pattern is of unclear etiology (ESUS). Suspect afib (documented on telemetry)            Hypokalemia    K 3.4 today  Will replace IV due to no access for po or peg         Dementia with behavioral disturbance    D/c'd restraints; Improved behavior when family is at the bedside  Home wellbutrin 150 mg  Donepezil 10 qhs  seroquel 25 mg qhs  Seroquel 12.5mg x1 dose 6/7 due to aggression. ECG with qTc 423        Mixed hyperlipidemia    Stroke risk factor    Lipitor 40 mg daily        Benign essential HTN    Stroke risk factor  Goal SBP < 160   Continue clonidine 0.1 mg BID, lasix 20 mg qd, Losartan 100 mg qd    -172/56-69 range for 24 hours         Aphasia    Pt responds appropriately though sometimes difficult to assess due to severe dysarthria  Follows some commands accurately   Aggressive SLP therapy         S/P admn tPA in diff fac w/n last 24 hr bef adm to crnt fac    Close monitoring in NCCU for 24 hours post administration -- Completed  No apparent complications              82 y/o female with aphasia, dysarthria and facial droop, received IV TPA but no LVO so no IR. Probable small vessel disease.    5/28: Blood pressure elevated >200 overnight requiring cardene. Patient bradycardic and with paroxysmal afib overnight as well. MRI with infarct in the right precentral gyrus.   5/29: No events overnight. Patient with many bacteria on UTI so short cipro course started per NCC. Pending step down to NSU.   5/30: seems more agitated today, not participating in  exam or following commands, severe dysarthria and excessive secretions  05/31/18 Continues to have increased secretions and severe dysarthria.  Patient following some commands and plans to transfer to stroke service today   6/1: Secretions are better controlled today. Plan to step down. On exam patient is agitated and stating she wants to go home. In bilateral wrist restraints. Right lung opacity - Chest PT and duonebs ordered per NCC. Nursing notes noted Afib but EKG showing no afib. Echo revealed Severe left atrial enlargement. May need AC on discharge.  6/2: No acute events overnight. Stepped down to NSU. Plan for PEG early next week if no improvement per ST.   6/3: No acute events overnight. Holding TF and heparin tonight in preparation for possible PEG tomorrow. O2 sats low 90's, CXR ordered.   6/4: Family wishes to proceed with PEG (recs per SLP.) IR unable as no safe window; consult placed to Gen Surg.  6/5: PEG with GS today. SBP elevated related to NPO status; BP meds given. Repeat UA WNL.  6/6: Nystatin ordered for fungal infection under the breasts. Initiating TFs per PEG today. D/c'd restraints. CM to price check NOACs for previously-captured AFib. Bedside RNs will train family on home equipment and associated care needs tomorrow.  6/7: Changed eliquis to 2.5 mg BID due to age and weight. Patient with elevated white count, pending UA and CXR. Afebrile. Pending family training to take patient home with home health. Patient will need home medical equipment.   6/8/19 - UA and CXR normal today, wbc increased from yesterday. No signs of dvt, skin infection, urine or chest symptoms. May be due to recent peg placement  6/9/18 - pain at peg site. Discussed with gen surg - loosened slightly, has low suspicion for infection. WBC on the rise - 20 today. Will continue to monitor and do broad spectrum abx   6/10/18- peg pulled overnight - surgery contacted. Wbc improving, continue broad spectrum abx. CDiff  neg    STROKE DOCUMENTATION   Acute Stroke Times   Last Known Normal Date: 05/27/18  Last Known Normal Time: 1320  Symptom Onset Date: 05/27/18  Symptom Onset Time: 1445  Stroke Team Called Date: 05/27/18  Stroke Team Called Time: 1550  Stroke Team Arrival Date: 05/27/18  Stroke Team Arrival Time: 1751  CT Interpretation Time: 1536  Decision to Treat Time for Alteplase: 1806    NIH Scale:  1a. Level Of Consciousness: 0-->Alert: keenly responsive  1b. LOC Questions: 2-->Answers neither question correctly  1c. LOC Commands: 0-->Performs both tasks correctly  2. Best Gaze: 0-->Normal  3. Visual: 0-->No visual loss  4. Facial Palsy: 1-->Minor paralysis (flattened nasolabial fold, asymmetry on smiling)  5a. Motor Arm, Left: 0-->No drift: limb holds 90 (or 45) degrees for full 10 secs  5b. Motor Arm, Right: 0-->No drift: limb holds 90 (or 45) degrees for full 10 secs  6a. Motor Leg, Left: 1-->Drift: leg falls by the end of the 5-sec period but does not hit bed  6b. Motor Leg, Right: 1-->Drift: leg falls by the end of the 5-sec period but does not hit bed  7. Limb Ataxia: 0-->Absent  8. Sensory: 0-->Normal: no sensory loss  9. Best Language: 0-->No aphasia: normal  10. Dysarthria: 0-->Normal  11. Extinction and Inattention (formerly Neglect): 0-->No abnormality  Total (NIH Stroke Scale): 5       Modified Angelia Score: 0  Trezevant Coma Scale:    ABCD2 Score:    [id number] Score:   HAS -BLED Score:   ICH Score:   Hunt & Burkett Classification:      Hemorrhagic change of an Ischemic Stroke: Does this patient have an ischemic stroke with hemorrhagic changes? No     Neurologic Chief Complaint: Aphasia, dysarthria, LSW     Subjective:     Interval History: Patient is seen for follow-up neurological assessment and treatment recommendations:     peg pulled overnight - surgery contacted. Wbc improving, continue broad spectrum abx. CDiff neg    HPI, Past Medical, Family, and Social History remains the same as documented in the initial  encounter.     Review of Systems   Constitutional: Negative for fever.   Respiratory: Negative for shortness of breath.    Gastrointestinal:        PEG removed, fernandez in site   Neurological: Positive for facial asymmetry, speech difficulty and weakness. Negative for headaches.   Psychiatric/Behavioral: Positive for agitation and confusion. Negative for behavioral problems.     Scheduled Meds:   albuterol-ipratropium  3 mL Nebulization Q6H    apixaban  2.5 mg Per G Tube BID    atorvastatin  40 mg Per G Tube Daily    buPROPion  150 mg Per G Tube BID    cefTRIAXone (ROCEPHIN) IVPB  1 g Intravenous Q24H    cloNIDine  0.1 mg Per G Tube BID    donepezil  10 mg Per G Tube QHS    losartan  100 mg Per G Tube Daily    nicotine  1 patch Transdermal Daily    nystatin-triamcinolone   Topical (Top) BID    QUEtiapine  25 mg Per G Tube QHS    sodium chloride 0.9%  3 mL Intravenous Q8H    sodium chloride 3%  4 mL Nebulization BID    vancomycin (VANCOCIN) IVPB  750 mg Intravenous Q24H     Continuous Infusions:   sodium chloride 0.9% 100 mL/hr at 06/10/18 0936     PRN Meds:acetaminophen, labetalol, sodium chloride 0.9%    Objective:     Vital Signs (Most Recent):  Temp: 98.9 °F (37.2 °C) (06/10/18 0800)  Pulse: 88 (06/10/18 1100)  Resp: 17 (06/10/18 0800)  BP: (!) 172/69 (06/10/18 0800)  SpO2: 97 % (06/10/18 0800)  BP Location: Right arm    Vital Signs Range (Last 24H):  Temp:  [96.8 °F (36 °C)-98.9 °F (37.2 °C)]   Pulse:  [57-88]   Resp:  [16-20]   BP: (126-172)/(56-69)   SpO2:  [93 %-98 %]   BP Location: Right arm    Physical Exam   Constitutional: She appears well-developed and well-nourished. No distress.   HENT:   Head: Normocephalic and atraumatic.   Cardiovascular: Normal rate.    Pulmonary/Chest: Effort normal. No respiratory distress.   Abdominal: She exhibits no distension. There is no tenderness.   Musculoskeletal: She exhibits no edema or tenderness.   Neurological: She is alert. No sensory deficit.   Skin:  Skin is warm and dry.   Nursing note and vitals reviewed.    Neurological Exam:   LOC: alert  Attention Span: Good  Language: No aphasia  Articulation: Moderate/severe Dysarthria  Visual Fields: Full  Facial Movement (CN VII): Lower facial weakness on the Left  Motor: Arm left  Paresis: 4/5  Leg left  Paresis: 3/5  Arm right  Paresis: 5/5  Leg right Paresis: 5/5  Sensation: Sensation intact  Tone: normal     Laboratory:  CMP:     Recent Labs  Lab 06/10/18  0424   CALCIUM 8.4*   ALBUMIN 2.3*   PROT 6.5      K 3.4*   CO2 27      BUN 27*   CREATININE 0.7   ALKPHOS 98   ALT 46*   AST 48*   BILITOT 0.3     BMP:     Recent Labs  Lab 06/10/18  0424      K 3.4*      CO2 27   BUN 27*   CREATININE 0.7   CALCIUM 8.4*     CBC:     Recent Labs  Lab 06/10/18  0424   WBC 17.22*   RBC 2.98*   HGB 9.0*   HCT 28.3*      MCV 95   MCH 30.2   MCHC 31.8*     Lipid Panel:   No results for input(s): CHOL, LDLCALC, HDL, TRIG in the last 168 hours.  Coagulation:   No results for input(s): PT, INR, APTT in the last 168 hours.  Platelet Aggregation Study: No results for input(s): PLTAGG, PLTAGINTERP, PLTAGREGLACO, ADPPLTAGGREG in the last 168 hours.  Hgb A1C:   No results for input(s): HGBA1C in the last 168 hours.  TSH:   No results for input(s): TSH in the last 168 hours.      Diagnostic Results     Brain Imaging     MRI brain 5/28:    Small area of restricted diffusion in the right precentral gyrus.  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  Bilateral basal ganglia, thalami and cerebellar hemispheres small remote infarcts.  Generalized cerebral volume loss and extensive white matter chronic small vessel ischemic change.  Ventriculomegaly out of proportion to  sulcal prominence, nonspecific but can be seen with normal pressure hydrocephalus, but overall unchanged from prior MRI.      Cardiac Imaging     ECHO 5/28/18:   CONCLUSIONS     1 - Concentric LVH with hyperdynamic left ventricular systolic function (EF >70%).     2 - Severe left atrial enlargement.     3 - Impaired LV relaxation, elevated LAP (grade 2 diastolic dysfunction).     4 - Normal right ventricular systolic function .     5 - Mild to moderate aortic stenosis, ALBER = 1.64 cm2, AVAi = 1.12 cm2/m2, peak velocity = 3.11 m/s, mean gradient = 24 mmHg          ERIC Aguilar  Comprehensive Stroke Center  Department of Vascular Neurology   [facility]

## 2018-06-10 NOTE — PROGRESS NOTES
Informed AGNES Davis pt pulled out PEG although pt was in restraints. Instructed to place fernandez in order to keep PEG site patent. Fernandez placed, NAD.  Abdominal binder placed. Will continue to monitor.

## 2018-06-10 NOTE — PLAN OF CARE
Problem: Patient Care Overview  Goal: Plan of Care Review  Plan of care reviewed with pt. Idiopathic malodorous odor present upon assessment of pt body systems. Pt disoriented to self, date, time, place. Pt lying supine with HOB @ 30-45 degrees. NAD noted.Pt denies pain. Pt demonstrates efforts of verbal communication; otherwise dysarthria noted. No swelling or redness observed @PEG site. Pt dislodged PEG tube; provider notified. 16FR Lay inserted to maintain patency. Pt tolerates w/o distress. Will continue to monitor. Fall/Safety precautions maintained. Bed in lowest position, and locked. Call light within reach and advised to call for assistance. Side rails x 2; slip resistance socks and floating boots applied.

## 2018-06-11 ENCOUNTER — TELEPHONE (OUTPATIENT)
Dept: NEUROLOGY | Facility: CLINIC | Age: 83
End: 2018-06-11

## 2018-06-11 VITALS
TEMPERATURE: 98 F | WEIGHT: 124.31 LBS | HEART RATE: 70 BPM | HEIGHT: 59 IN | BODY MASS INDEX: 25.06 KG/M2 | DIASTOLIC BLOOD PRESSURE: 58 MMHG | OXYGEN SATURATION: 96 % | RESPIRATION RATE: 14 BRPM | SYSTOLIC BLOOD PRESSURE: 146 MMHG

## 2018-06-11 LAB
ALBUMIN SERPL BCP-MCNC: 2.4 G/DL
ALP SERPL-CCNC: 86 U/L
ALT SERPL W/O P-5'-P-CCNC: 53 U/L
ANION GAP SERPL CALC-SCNC: 11 MMOL/L
AST SERPL-CCNC: 49 U/L
BACTERIA SPEC AEROBE CULT: NORMAL
BASOPHILS # BLD AUTO: 0.1 K/UL
BASOPHILS NFR BLD: 1.1 %
BILIRUB SERPL-MCNC: 0.5 MG/DL
BUN SERPL-MCNC: 9 MG/DL
CALCIUM SERPL-MCNC: 9.2 MG/DL
CHLORIDE SERPL-SCNC: 106 MMOL/L
CO2 SERPL-SCNC: 25 MMOL/L
CREAT SERPL-MCNC: 0.6 MG/DL
DIFFERENTIAL METHOD: ABNORMAL
EOSINOPHIL # BLD AUTO: 0.2 K/UL
EOSINOPHIL NFR BLD: 2.7 %
ERYTHROCYTE [DISTWIDTH] IN BLOOD BY AUTOMATED COUNT: 13.5 %
EST. GFR  (AFRICAN AMERICAN): >60 ML/MIN/1.73 M^2
EST. GFR  (NON AFRICAN AMERICAN): >60 ML/MIN/1.73 M^2
GLUCOSE SERPL-MCNC: 76 MG/DL
HCT VFR BLD AUTO: 32.4 %
HGB BLD-MCNC: 10.1 G/DL
IMM GRANULOCYTES # BLD AUTO: 0.04 K/UL
IMM GRANULOCYTES NFR BLD AUTO: 0.4 %
LYMPHOCYTES # BLD AUTO: 1.6 K/UL
LYMPHOCYTES NFR BLD: 17.5 %
MCH RBC QN AUTO: 29.7 PG
MCHC RBC AUTO-ENTMCNC: 31.2 G/DL
MCV RBC AUTO: 95 FL
MONOCYTES # BLD AUTO: 0.6 K/UL
MONOCYTES NFR BLD: 7.1 %
NEUTROPHILS # BLD AUTO: 6.5 K/UL
NEUTROPHILS NFR BLD: 71.2 %
NRBC BLD-RTO: 0 /100 WBC
PLATELET # BLD AUTO: 241 K/UL
PMV BLD AUTO: 11.8 FL
POCT GLUCOSE: 110 MG/DL (ref 70–110)
POTASSIUM SERPL-SCNC: 3.6 MMOL/L
PROT SERPL-MCNC: 7.1 G/DL
RBC # BLD AUTO: 3.4 M/UL
SODIUM SERPL-SCNC: 142 MMOL/L
WBC # BLD AUTO: 9.05 K/UL

## 2018-06-11 PROCEDURE — 97110 THERAPEUTIC EXERCISES: CPT

## 2018-06-11 PROCEDURE — 97535 SELF CARE MNGMENT TRAINING: CPT

## 2018-06-11 PROCEDURE — 94668 MNPJ CHEST WALL SBSQ: CPT

## 2018-06-11 PROCEDURE — 92526 ORAL FUNCTION THERAPY: CPT

## 2018-06-11 PROCEDURE — 36415 COLL VENOUS BLD VENIPUNCTURE: CPT

## 2018-06-11 PROCEDURE — 25000242 PHARM REV CODE 250 ALT 637 W/ HCPCS: Performed by: PHYSICIAN ASSISTANT

## 2018-06-11 PROCEDURE — 25000003 PHARM REV CODE 250: Performed by: PHYSICIAN ASSISTANT

## 2018-06-11 PROCEDURE — 85025 COMPLETE CBC W/AUTO DIFF WBC: CPT

## 2018-06-11 PROCEDURE — 94640 AIRWAY INHALATION TREATMENT: CPT

## 2018-06-11 PROCEDURE — S4991 NICOTINE PATCH NONLEGEND: HCPCS | Performed by: PHYSICIAN ASSISTANT

## 2018-06-11 PROCEDURE — 94761 N-INVAS EAR/PLS OXIMETRY MLT: CPT

## 2018-06-11 PROCEDURE — 80053 COMPREHEN METABOLIC PANEL: CPT

## 2018-06-11 PROCEDURE — 99233 SBSQ HOSP IP/OBS HIGH 50: CPT | Mod: ,,, | Performed by: PSYCHIATRY & NEUROLOGY

## 2018-06-11 RX ORDER — BUPROPION HYDROCHLORIDE 75 MG/1
150 TABLET ORAL 2 TIMES DAILY
Qty: 120 TABLET | Refills: 0 | Status: ON HOLD | OUTPATIENT
Start: 2018-06-11 | End: 2019-06-06 | Stop reason: SDUPTHER

## 2018-06-11 RX ORDER — FUROSEMIDE 20 MG/1
20 TABLET ORAL DAILY
Qty: 30 TABLET | Refills: 0 | Status: SHIPPED | OUTPATIENT
Start: 2018-06-11 | End: 2019-05-20

## 2018-06-11 RX ORDER — DONEPEZIL HYDROCHLORIDE 10 MG/1
10 TABLET, FILM COATED ORAL NIGHTLY
Start: 2018-06-11 | End: 2019-05-20

## 2018-06-11 RX ORDER — QUETIAPINE FUMARATE 25 MG/1
25 TABLET, FILM COATED ORAL NIGHTLY
Qty: 15 TABLET | Refills: 0 | Status: SHIPPED | OUTPATIENT
Start: 2018-06-11 | End: 2019-05-20

## 2018-06-11 RX ORDER — POTASSIUM CHLORIDE 1.5 G/1.58G
10 POWDER, FOR SOLUTION ORAL DAILY
Qty: 15 PACKET | Refills: 0 | Status: SHIPPED | OUTPATIENT
Start: 2018-06-11 | End: 2019-05-20

## 2018-06-11 RX ORDER — AMOXICILLIN AND CLAVULANATE POTASSIUM 400; 57 MG/5ML; MG/5ML
875 POWDER, FOR SUSPENSION ORAL EVERY 12 HOURS
Status: DISCONTINUED | OUTPATIENT
Start: 2018-06-11 | End: 2018-06-11 | Stop reason: HOSPADM

## 2018-06-11 RX ORDER — AMOXICILLIN AND CLAVULANATE POTASSIUM 250; 62.5 MG/5ML; MG/5ML
875 POWDER, FOR SUSPENSION ORAL EVERY 12 HOURS
Qty: 252 ML | Refills: 0 | Status: SHIPPED | OUTPATIENT
Start: 2018-06-11 | End: 2018-06-18

## 2018-06-11 RX ORDER — ATORVASTATIN CALCIUM 40 MG/1
40 TABLET, FILM COATED ORAL DAILY
Qty: 30 TABLET | Refills: 0 | Status: ON HOLD | OUTPATIENT
Start: 2018-06-12 | End: 2019-06-06 | Stop reason: SDUPTHER

## 2018-06-11 RX ORDER — LOSARTAN POTASSIUM 100 MG/1
100 TABLET ORAL DAILY
Qty: 30 TABLET | Refills: 0 | Status: SHIPPED | OUTPATIENT
Start: 2018-06-12 | End: 2019-05-20

## 2018-06-11 RX ORDER — CLONIDINE HYDROCHLORIDE 0.1 MG/1
0.1 TABLET ORAL 2 TIMES DAILY
Status: ON HOLD
Start: 2018-06-11 | End: 2019-06-06 | Stop reason: SDUPTHER

## 2018-06-11 RX ADMIN — IPRATROPIUM BROMIDE AND ALBUTEROL SULFATE 3 ML: .5; 3 SOLUTION RESPIRATORY (INHALATION) at 01:06

## 2018-06-11 RX ADMIN — CLONIDINE HYDROCHLORIDE 0.1 MG: 0.1 TABLET ORAL at 09:06

## 2018-06-11 RX ADMIN — LOSARTAN POTASSIUM 100 MG: 50 TABLET, FILM COATED ORAL at 09:06

## 2018-06-11 RX ADMIN — APIXABAN 2.5 MG: 2.5 TABLET, FILM COATED ORAL at 09:06

## 2018-06-11 RX ADMIN — IPRATROPIUM BROMIDE AND ALBUTEROL SULFATE 3 ML: .5; 3 SOLUTION RESPIRATORY (INHALATION) at 06:06

## 2018-06-11 RX ADMIN — NICOTINE 1 PATCH: 14 PATCH, EXTENDED RELEASE TRANSDERMAL at 09:06

## 2018-06-11 RX ADMIN — ATORVASTATIN CALCIUM 40 MG: 20 TABLET, FILM COATED ORAL at 09:06

## 2018-06-11 RX ADMIN — SODIUM CHLORIDE SOLN NEBU 3% 4 ML: 3 NEBU SOLN at 06:06

## 2018-06-11 RX ADMIN — BUPROPION HYDROCHLORIDE 150 MG: 75 TABLET, FILM COATED ORAL at 09:06

## 2018-06-11 RX ADMIN — IPRATROPIUM BROMIDE AND ALBUTEROL SULFATE 3 ML: .5; 3 SOLUTION RESPIRATORY (INHALATION) at 12:06

## 2018-06-11 NOTE — PT/OT/SLP PROGRESS
Occupational Therapy   Treatment    Name: Sury Cobos  MRN: [id number]  Admitting Diagnosis:  Embolic stroke involving right middle cerebral artery  6 Days Post-Op    Recommendations:     Discharge Recommendations: home health OT  Discharge Equipment Recommendations:  3-in-1 commode  Barriers to discharge:  None    Subjective     Communicated with: RN prior to session.  Pt agreeable to participate with therapy. No family present during session.   Pain/Comfort:  · Pain Rating 1: 0/10  · Pain Rating Post-Intervention 1: 0/10    Patients cultural, spiritual, Jew conflicts given the current situation: None reported    Objective:     Patient found with: telemetry, peripheral IV, bed alarm, PEG Tube (AVASYS)    General Precautions: Standard, aspiration, NPO, fall   Orthopedic Precautions:N/A   Braces: N/A     Occupational Performance:    Bed Mobility:    · Patient completed Rolling/Turning to Left with  contact guard assistance  · Patient completed Scooting/Bridging with contact guard assistance  · Patient completed Supine to Sit with contact guard assistance  · Patient completed Sit to Supine with contact guard assistance     Functional Mobility/Transfers:  · Patient completed Sit <> Stand Transfer with contact guard assistance  with  rolling walker   · Patient completed Toilet Transfer Stand Pivot technique with contact guard assistance and minimum assistance with  rolling walker-- Required assistance with RW management  · Functional Mobility: Pt took multiple steps from EOB < > Bedside commode requiring CGA-Min A using RW    Activities of Daily Living:  · Grooming: stand by assistance to wash face while seated EOB  · UB Dressing: minimum assistance to tatianna fresh gown in front while seated EOB  · Toileting: maximal assistance to complete toilet hygiene while alternating from sitting to standing from bedside commode  * Pt found soiled upon arrival; however transferred to bedside commode to complete toileting; OT  and PCT assisted with perineal care while alternating between sitting and stanidng    Patient left supine with all lines intact, call button in reach and Rn notified    [facility] 6 Click:  [facility] Total Score: 13    Treatment & Education:  -Pt edu on OT role/POC  -Importance of OOB activity with staff assistance  -Safety during functional t/f and mobility ( RW management)  - White board updated  - Multiple self care tasks completed-- assistance level noted above  - Pt completed BUE AROM exercises while seated EOB: 1x15 reps in shoulder flexion, elbow flexion/extension, and chest press. Pt tolerated well with min rest breaks between each exercise.   Education:    Assessment:     Sury Cobos is a 84 y.o. female with a medical diagnosis of Embolic stroke involving right middle cerebral artery.  She presents improved level of assistance with self care tasks this date.  Performance deficits affecting function are weakness, impaired endurance, impaired functional mobilty, decreased upper extremity function, decreased lower extremity function, impaired self care skills, impaired balance, impaired joint extensibility, pain, impaired cognition, decreased safety awareness, gait instability.  Pt would benefit from HHOT following d/c to continue to progress towards goals and improve quality of life.     Rehab Prognosis:  Good; patient would benefit from acute skilled OT services to address these deficits and reach maximum level of function.       Plan:     Patient to be seen 4 x/week to address the above listed problems via therapeutic activities, self-care/home management, therapeutic exercises, neuromuscular re-education  · Plan of Care Expires: 06/28/18  · Plan of Care Reviewed with: patient, daughter    This Plan of care has been discussed with the patient who was involved in its development and understands and is in agreement with the identified goals and treatment plan    GOALS:    Occupational Therapy Goals        Problem:  Occupational Therapy Goal    Goal Priority Disciplines Outcome Interventions   Occupational Therapy Goal     OT, PT/OT Ongoing (interventions implemented as appropriate)    Description:  Goals to be met by: 6/14/2018     Patient will increase functional independence with ADLs by performing:    UE Dressing with Minimal Assistance. Met 6/11  Revised: UE dressing with Set-up and SBA  LE Dressing with Minimal Assistance.  Grooming while standing with Contact Guard Assistance.  Toileting from toilet with Minimal Assistance for hygiene and clothing management.   Sitting at edge of bed x 15 minutes with Stand-by Assistance. Met 6/7  Revised: sitting at EOB x15 minutes with Supervision.   Toilet transfer to toilet with Contact Guard Assistance.                        Time Tracking:     OT Date of Treatment: 06/11/18  OT Start Time: 1035  OT Stop Time: 1107  OT Total Time (min): 32 min    Billable Minutes:Self Care/Home Management 20  Therapeutic Exercise 12    Kinsey monsalve OT  6/11/2018

## 2018-06-11 NOTE — PLAN OF CARE
Patient will have [facility] for home health and [facility] for bedside commode. [place] will provide tube feedings. Feedings will be delivered to the home.     Fern Moss LMSW  [facility]  Ext. [phone]

## 2018-06-11 NOTE — ASSESSMENT & PLAN NOTE
82 y/o female with left sided facial droop, aphasia, dysarthria who received IV TPA but no IR. MRI with infarct in R precentral gyrus.   Etiology likely cardioembolic- nursing note reporting patient went into Afib on monitor in [facility]. EKG completed and not seen, though echo showing Severe LAE. Treating patient for cardioembolic stroke as below.     Antithrombotics: Eliquis 2.5mg BID  Statins: Lipitor 40 mg daily   Aggressive risk factor modification: HTN, HLD  Rehab efforts: PT/OT/SLP to evaluate and treat   Dispo:  with 24/7 family supervision  Diagnostics ordered/pending: none   VTE prophylaxis: SCDs, Eliquis  BP parameters: Infarct: Post tPA, SBP <160

## 2018-06-11 NOTE — ASSESSMENT & PLAN NOTE
Intermittently requiring restraints; Improved behavior when family is at the bedside  Home wellbutrin 150 mg  Donepezil 10 qhs  seroquel 25 mg qhs

## 2018-06-11 NOTE — PT/OT/SLP PROGRESS
"Speech Language Pathology Treatment    Patient Name:  Sury Cobos   MRN:  [id number]  Admitting Diagnosis: Embolic stroke involving right middle cerebral artery    Recommendations:                 General Recommendations:  Dysphagia therapy and Speech/language therapy  Diet recommendations:  NPO, Liquid Diet Level: NPO   Aspiration Precautions: Continue alternate means of nutrition and Strict aspiration precautions   General Precautions: Standard, aspiration, fall, NPO  Communication strategies:  go to room if call light pushed and encourage use of motor speech strategies (slow rate, increase volume, and overarticulate - "talk big and loud")    Subjective     "Can't I get a piece of candy?"    Pain/Comfort:  · Pain Rating 1:  (no c/o pain)    Objective:     Has the patient been evaluated by SLP for swallowing?   Yes  Keep patient NPO? Yes   Current Respiratory Status: room air      Noted PEG tube at been pulled with Lay in place temporarily.  Nurse gave clearance to proceed with PO trials with SLP at this time.    Pt was seen in bed with soft restraints on both wrists. Pt awake/alert and calm. Daughter present at bedside.  Pt exhibiting some confusion, but cooperative and willing to participate. Speech intelligibility remains fair to poor. Improved since last session this SLP worked with pt.  [facility] elevated to prepare pt for PO trials.  Vocal quality muffled and slightly wet.  Oral cavity suctioned with [place], improving vocal quality.  Pt accepted the following PO trials:  Ice chips x 2; thin water 1/2 tsp x 2, full tsp x 1, cup sip x 1; nectar thick water via cup x 1, straw x 6; 1/2 tsp applesauce x 2, full tsp applesauce x 1.  Pt exhibited cough after 1/2 ice chips, delayed change in vocal quality after full tsp thin water, anterior loss on left and delayed cough after cup sip thin water.  Delayed throat clear present after 1 out of 6 nectar thick liquid trials via straw.  Mild lingual residue present after full " tsp puree, which cleared effectively with nectar thick liquid wash.  Pt's volitional cough reduced in strength.  Pt remains easily distractible, though improved today compared to previous sessions.  Education provided to pt and daughter regarding ongoing swallowing assessment, improved management and tolerance with thickened liquids, recommendations to remain NPO at this time, and continued SLP services to conduct ongoing swallowing assessment to determine when safe to increase PO intake.  Pt's daughter expressed understanding, but pt unable to demonstrate full understanding.  White board up to date.     Assessment:     Sury Cobos is a 84 y.o. female with an SLP diagnosis of Dysphagia, Dysarthria, Cognitive-Linguistic Impairment and Visio-Spatial Impairment.      Goals:    SLP Goals        Problem: SLP Goal    Goal Priority Disciplines Outcome   SLP Goal     SLP Ongoing (interventions implemented as appropriate)   Description:  Speech Language Pathology Goals  Goals expected to be met by 6/12  1. Ongoing swallow assessment  2. Pt will follow 1 step commands with 75% acc given min A  3. Pt will answer yes/no questions with 75% acc given min A  4. Pt will complete OME's given mod A  5. Pt will complete word level dysarthria tasks with 80% intell. given max A to utilize speech strategies   6. Assess reading, writing, visual spatial skills  7. Assess memory, problem solving, reasoning                     Plan:     · Patient to be seen:  3 x/week   · Plan of Care expires:  06/26/18  · Plan of Care reviewed with:  patient, daughter   · SLP Follow-Up:  Yes       Discharge recommendations:  home health speech therapy     Time Tracking:     SLP Treatment Date:   06/11/18  Speech Start Time:  1012  Speech Stop Time:  1033     Speech Total Time (min):  21 min    Billable Minutes: Treatment Swallowing Dysfunction 13 and [facility] Care/Home Management Training 8    SERENA Machado, CCC-SLP  06/11/2018     SERENA Machado,  CCC-SLP  Speech Language Pathologist  [phone]  6/11/2018

## 2018-06-11 NOTE — PLAN OF CARE
[facility]    HOME HEALTH ORDERS  FACE TO FACE ENCOUNTER    Patient Name: Sury Cobos  YOB: 1934    PCP: Ese Moya MD   PCP Address: [address] / [place] [address]  PCP Phone Number: [phone]  PCP Fax: [phone]    Encounter Date: 06/11/2018    Admit to Home Health    Diagnoses:  Active Hospital Problems    Diagnosis  POA    *Embolic stroke involving right middle cerebral artery [I63.411]  Yes     Priority: 1 - High    S/P percutaneous endoscopic gastrostomy (PEG) tube placement [Z93.1]  Not Applicable    Infection of PEG site [K94.22]  No    Cerebrovascular small vessel disease [I67.9]  Yes    Elevated BUN [R79.9]  Yes    Leukocytosis [D72.829]  Yes    Fungal dermatitis [B36.9]  Yes    Dementia with behavioral disturbance [F03.91]  Yes    Atrial enlargement, left [I51.7]  Yes    New onset a-fib [I48.91]  Yes    Cytotoxic cerebral edema [G93.6]  Yes    Dysphagia [R13.10]  Yes    Dysarthria [R47.1]  Yes    Hypokalemia [E87.6]  Yes    Encephalopathy [G93.40]  Yes    Aphasia [R47.01]  Yes    Benign essential HTN [I10]  Yes    Mixed hyperlipidemia [E78.2]  Yes    S/P admn tPA in diff fac w/n last 24 hr bef adm to crnt fac [Z92.82]  Not Applicable      Resolved Hospital Problems    Diagnosis Date Resolved POA    UTI (urinary tract infection) [N39.0] 06/06/2018 Yes       No future appointments.  Follow-up Information     Ese Moya MD.    Specialty:  Family Medicine  Why:  The office will call you to schedule an appt within two weeks from discharge.  Contact information:  [address]  [address]  [place] [address]  [phone]             [facility].    Specialty:  Neurology  Why:  The office will call you to schedule an appt in 4-6 weeks. If the office does not call you by 6/18/18 please call to schedule an appt.  Contact information:  [address]  [facility]  [address]  [phone]  Additional information:  7th Floor                   I have seen and examined this patient face to face today. My clinical findings that support the need for the home health skilled services and home bound status are the following:  Weakness/numbness causing balance and gait disturbance due to Stroke and Weakness/Debility making it taxing to leave home.  Mental confusion making it unsafe for patient to leave home alone due to  Dementia.    Allergies:Review of patient's allergies indicates:  No Known Allergies    Diet: tube feedings:  Glucerna 1.5 laron. Administer 1 can (240mL) four times daily per G (fernandez) tube at 7am, 11am, 3pm, 7pm.   Enteral feeding water bolus: give 250mL free water four times daily.     Activities: activity as tolerated    Nursing:   SN to complete comprehensive assessment including routine vital signs. Instruct on disease process and s/s of complications to report to MD. Review/verify medication list sent home with the patient at time of discharge  and instruct patient/caregiver as needed. Frequency may be adjusted depending on start of care date.    Notify MD if SBP > 160 or < 90; DBP > 90 or < 50; HR > 120 or < 50; Temp > 101      CONSULTS:    Physical Therapy to evaluate and treat. Evaluate for home safety and equipment needs; Establish/upgrade home exercise program. Perform / instruct on therapeutic exercises, gait training, transfer training, and Range of Motion.  Occupational Therapy to evaluate and treat. Evaluate home environment for safety and equipment needs. Perform/Instruct on transfers, ADL training, ROM, and therapeutic exercises.  Speech Therapy  to evaluate and treat for  Language, Swallowing and Cognition.  Aide to provide assistance with personal care, ADLs, and vital signs.    MISCELLANEOUS CARE:  PEG Care:  Instruct patient/caregiver to clean site.  Monitor skin integrity.    WOUND CARE ORDERS  as above      Medications: Review discharge  medications with patient and family and provide education.      Current Discharge Medication List      START taking these medications    Details   amoxicillin-pot clavulanate 250-62.5 mg/5ml (AUGMENTIN) 250-62.5 mg/5 mL suspension 18 mLs (900 mg total) by Per G Tube route every 12 (twelve) hours.  Qty: 252 mL, Refills: 0      apixaban 2.5 mg Tab 1 tablet (2.5 mg total) by Per G Tube route 2 (two) times daily.  Qty: 60 tablet, Refills: 0      atorvastatin (LIPITOR) 40 MG tablet 1 tablet (40 mg total) by Per G Tube route once daily.  Qty: 30 tablet, Refills: 0      buPROPion (WELLBUTRIN) 75 MG tablet 2 tablets (150 mg total) by Per G Tube route 2 (two) times daily.  Qty: 120 tablet, Refills: 0      potassium chloride (KLOR-CON) 20 mEq Pack 10 mEq by Per G Tube route once daily.  Qty: 15 packet, Refills: 0      QUEtiapine (SEROQUEL) 25 MG Tab 1 tablet (25 mg total) by Per G Tube route every evening.  Qty: 15 tablet, Refills: 0         CONTINUE these medications which have CHANGED    Details   cloNIDine (CATAPRES) 0.1 MG tablet 1 tablet (0.1 mg total) by Per G Tube route 2 (two) times daily.      donepezil (ARICEPT) 10 MG tablet 1 tablet (10 mg total) by Per G Tube route every evening.      furosemide (LASIX) 20 MG tablet 1 tablet (20 mg total) by Per G Tube route once daily.  Qty: 30 tablet, Refills: 0      losartan (COZAAR) 100 MG tablet 1 tablet (100 mg total) by Per G Tube route once daily.  Qty: 30 tablet, Refills: 0         STOP taking these medications       allopurinol (ZYLOPRIM) 300 MG tablet Comments:   Reason for Stopping:         aspirin (ECOTRIN) 81 MG EC tablet Comments:   Reason for Stopping:         buPROPion (WELLBUTRIN SR) 150 MG TBSR 12 hr tablet Comments:   Reason for Stopping:         HYDROcodone-acetaminophen (NORCO) 7.5-325 mg per tablet Comments:   Reason for Stopping:         potassium chloride (KLOR-CON) 10 MEQ TbSR Comments:   Reason for Stopping:         pravastatin (PRAVACHOL) 40 MG tablet  Comments:   Reason for Stopping:               I certify that this patient is confined to her home and needs intermittent skilled nursing care, physical therapy, speech therapy and occupational therapy.

## 2018-06-11 NOTE — PLAN OF CARE
Problem: SLP Goal  Goal: SLP Goal  Speech Language Pathology Goals  Goals expected to be met by 6/12  1. Ongoing swallow assessment  2. Pt will follow 1 step commands with 75% acc given min A  3. Pt will answer yes/no questions with 75% acc given min A  4. Pt will complete OME's given mod A  5. Pt will complete word level dysarthria tasks with 80% intell. given max A to utilize speech strategies   6. Assess reading, writing, visual spatial skills  7. Assess memory, problem solving, reasoning    Outcome: Ongoing (interventions implemented as appropriate)  Pt seen for ongoing assessment of swallowing function. Recommend continue NPO with ongoing PO trials with SLP only to determine if pt is safe for PO intake.    SERENA Machado, CCC-SLP  Speech Language Pathologist  [phone]  6/11/2018

## 2018-06-11 NOTE — ASSESSMENT & PLAN NOTE
PEG placed with Gen Surg on 6/5  Initiated TFs per PEG 6/6; at goal 40cc/hr  Peg removed by patient overnight on 6/9-6/10  Surgery contacted, fernandez placed to maintain site. General surgery states fernandez is appropriate to use long term for TF and medication administration.

## 2018-06-11 NOTE — ASSESSMENT & PLAN NOTE
Stroke risk factor; Likely stroke etiology  Per nursing note, afib was seen on telemetry in [facility]. EKG with sinus rhythm and Premature atrial complexes (likely paroxysmal)  Echo showing severe LAE  CM price-checked DOACs  Started Eliquis 2.5mg BID

## 2018-06-11 NOTE — PLAN OF CARE
STORMY sent clinical information to [facility] to arrange home health, tube feedings, and bedside commode for home use. STORMY also requested EMS auth to have patient transported home vie ambulance.    2:35 PM  SW sent HH orders to [facility] to provide tube feedings.     Fern Moss LMSW  [facility]  Ext. [phone]

## 2018-06-11 NOTE — PROGRESS NOTES
[facility]  Vascular Neurology  Comprehensive Stroke Center  Progress Note    Assessment/Plan:     * Embolic stroke involving right middle cerebral artery    84 y/o female with left sided facial droop, aphasia, dysarthria who received IV TPA but no IR. MRI with infarct in R precentral gyrus.   Etiology likely cardioembolic- nursing note reporting patient went into Afib on monitor in [facility]. EKG completed and not seen, though echo showing Severe LAE. Treating patient for cardioembolic stroke as below.     Antithrombotics: Eliquis 2.5mg BID  Statins: Lipitor 40 mg daily   Aggressive risk factor modification: HTN, HLD  Rehab efforts: PT/OT/SLP to evaluate and treat   Dispo:  with 24/7 family supervision  Diagnostics ordered/pending: none   VTE prophylaxis: SCDs, Eliquis  BP parameters: Infarct: Post tPA, SBP <160        Infection of PEG site    Please see leukocytosis  Cultures grew STAPH AUREUS   Multiple sensitivities, changed to augmentin 875 BID x 7 days         Elevated BUN    Elevated up to 47 --> improved  Added free water bolus to tube feeds (250 q 6 hours)        Leukocytosis    WBC 20 -->17 --> 9  UA and CXR - normal, afebrile, c diff negative  Some concern for purulent drainage from PEG site - cultured (staph aureus, sensitivities reviewed)  Blood cultures -NGTD  Started vanc and rocephin, d/c 6/11. Started augmentin 875mg BID x7d as alternative given sensitivities to go home. Will follow up on anaerobic cultures, still in process.         Fungal dermatitis    Fungal infection noted by RN under the breasts  Nystatin ordered  Continue to monitor        New onset a-fib    Stroke risk factor; Likely stroke etiology  Per nursing note, afib was seen on telemetry in [facility]. EKG with sinus rhythm and Premature atrial complexes (likely paroxysmal)  Echo showing severe LAE  CM price-checked DOACs  Started Eliquis 2.5mg BID         Atrial enlargement, left    AFib risk factor  Severe left atrial  enlargement seen on echo  AFib seen on telemetry in [facility]; Initiated AC 6/6        Dysarthria    Severe dysarthria  SLP eval and treat        Dysphagia    PEG placed with Gen Surg on 6/5  Initiated TFs per PEG 6/6; at goal 40cc/hr  Peg removed by patient overnight on 6/9-6/10  Surgery contacted, fernandez placed to maintain site. General surgery states fernandez is appropriate to use long term for TF and medication administration.         Cytotoxic cerebral edema    Area of cytotoxic cerebral edema identified when reviewing brain imaging in the territory of the R middle cerebral artery. There is no mass effect associated with it. We will continue to monitor the patients clinical exam for any worsening of symptoms which may indicate expansion of the stroke or the area of the edema resulting in the clinical change. The pattern is of unclear etiology (ESUS). Suspect afib (documented on telemetry)            Hypokalemia    Stable, continue to monitor on OP basis with PCP         Dementia with behavioral disturbance    Intermittently requiring restraints; Improved behavior when family is at the bedside  Home wellbutrin 150 mg  Donepezil 10 qhs  seroquel 25 mg qhs        Mixed hyperlipidemia    Stroke risk factor    Lipitor 40 mg daily  AST/ALT 49/53 respectively, continue to monitor on OP basis with PCP         Benign essential HTN    Stroke risk factor  Goal SBP < 160   Continue clonidine 0.1 mg BID, Losartan 100 mg qd. Lasix held         Aphasia    Pt responds appropriately though sometimes difficult to assess due to severe dysarthria  Follows some commands accurately   Aggressive SLP therapy         S/P admn tPA in diff fac w/n last 24 hr bef adm to crnt fac    Close monitoring in [facility]U for 24 hours post administration -- Completed  No apparent complications              84 y/o female with aphasia, dysarthria and facial droop, received IV TPA but no LVO so no IR. Probable small vessel disease.    5/28: Blood pressure  elevated >200 overnight requiring cardene. Patient bradycardic and with paroxysmal afib overnight as well. MRI with infarct in the right precentral gyrus.   5/29: No events overnight. Patient with many bacteria on UTI so short cipro course started per NCC. Pending step down to NSU.   5/30: seems more agitated today, not participating in exam or following commands, severe dysarthria and excessive secretions  05/31/18 Continues to have increased secretions and severe dysarthria.  Patient following some commands and plans to transfer to stroke service today   6/1: Secretions are better controlled today. Plan to step down. On exam patient is agitated and stating she wants to go home. In bilateral wrist restraints. Right lung opacity - Chest PT and duonebs ordered per NCC. Nursing notes noted Afib but EKG showing no afib. Echo revealed Severe left atrial enlargement. May need AC on discharge.  6/2: No acute events overnight. Stepped down to NSU. Plan for PEG early next week if no improvement per ST.   6/3: No acute events overnight. Holding TF and heparin tonight in preparation for possible PEG tomorrow. O2 sats low 90's, CXR ordered.   6/4: Family wishes to proceed with PEG (recs per SLP.) IR unable as no safe window; consult placed to Gen Surg.  6/5: PEG with GS today. SBP elevated related to NPO status; BP meds given. Repeat UA WNL.  6/6: Nystatin ordered for fungal infection under the breasts. Initiating TFs per PEG today. D/c'd restraints. CM to price check NOACs for previously-captured AFib. Bedside RNs will train family on home equipment and associated care needs tomorrow.  6/7: Changed eliquis to 2.5 mg BID due to age and weight. Patient with elevated white count, pending UA and CXR. Afebrile. Pending family training to take patient home with home health. Patient will need home medical equipment.   6/8/19 - UA and CXR normal today, wbc increased from yesterday. No signs of dvt, skin infection, urine or chest  symptoms. May be due to recent peg placement  6/9/18 - pain at peg site. Discussed with gen surg - loosened slightly, has low suspicion for infection. WBC on the rise - 20 today. Will continue to monitor and do broad spectrum abx   6/10/18- peg pulled overnight - surgery contacted. Wbc improving, continue broad spectrum abx. CDiff neg  6/11/18- Discussed with general surgery and IR regarding fernandez in PEG place. Assured that the fernandez is a sufficient tube to continue medications and feeds and that the placement was confirmed and is ok to use. Changing IV antibiotics to augmentin suspension given susceptibilities came back on the staph cultured from the PEG site. Patient's white count improved to normal and afebrile last 24 hours. Patient's home medical equipment is being set up for today and patient will be ok to go home with home health.     STROKE DOCUMENTATION   Acute Stroke Times   Last Known Normal Date: 05/27/18  Last Known Normal Time: 1320  Symptom Onset Date: 05/27/18  Symptom Onset Time: 1445  Stroke Team Called Date: 05/27/18  Stroke Team Called Time: 1550  Stroke Team Arrival Date: 05/27/18  Stroke Team Arrival Time: 1751  CT Interpretation Time: 1536  Decision to Treat Time for Alteplase: 1806    NIH Scale:  1a. Level Of Consciousness: 0-->Alert: keenly responsive  1b. LOC Questions: 2-->Answers neither question correctly  1c. LOC Commands: 0-->Performs both tasks correctly  2. Best Gaze: 0-->Normal  3. Visual: 0-->No visual loss  4. Facial Palsy: 1-->Minor paralysis (flattened nasolabial fold, asymmetry on smiling)  5a. Motor Arm, Left: 0-->No drift: limb holds 90 (or 45) degrees for full 10 secs  5b. Motor Arm, Right: 0-->No drift: limb holds 90 (or 45) degrees for full 10 secs  6a. Motor Leg, Left: 0-->No drift: leg holds 30 degree position for full 5 secs  6b. Motor Leg, Right: 0-->No drift: leg holds 30 degree position for full 5 secs  7. Limb Ataxia: 0-->Absent  8. Sensory: 0-->Normal: no sensory  loss  9. Best Language: 1-->Mild-to-moderate aphasia: some obvious loss of fluency or facility of comprehension, without significant limitation on ideas expressed or form of expression. Reduction of speech and/or comprehension, however, makes conversation. . . (see row details)  10. Dysarthria: 1-->Mild-to-moderate dysarthria: patient slurs at least some words and, at worst, can be understood with some difficulty  11. Extinction and Inattention (formerly Neglect): 0-->No abnormality  Total (NIH Stroke Scale): 5       Modified Lesage Score: 0  Sulphur Coma Scale:    ABCD2 Score:    [id number] Score:   HAS -BLED Score:   ICH Score:   Hunt & Burkett Classification:      Hemorrhagic change of an Ischemic Stroke: Does this patient have an ischemic stroke with hemorrhagic changes? No     Neurologic Chief Complaint: Aphasia, dysarthria, LSW     Subjective:     Interval History: Patient is seen for follow-up neurological assessment and treatment recommendations:     Discussed with general surgery and IR regarding fernandez in PEG place. Assured that the fernandez is a sufficient tube to continue medications and feeds and that the placement was confirmed and is ok to use. Changing IV antibiotics to augmentin suspension given susceptibilities came back on the staph cultured from the PEG site. Patient's white count improved to normal and afebrile last 24 hours. Patient's home medical equipment is being set up for today and patient will be ok to go home with home health.     HPI, Past Medical, Family, and Social History remains the same as documented in the initial encounter.     Review of Systems   Constitutional: Negative for fever.   Respiratory: Negative for shortness of breath.    Gastrointestinal:        PEG removed, fernandez in site   Neurological: Positive for facial asymmetry, speech difficulty and weakness. Negative for headaches.   Psychiatric/Behavioral: Positive for agitation and confusion. Negative for behavioral problems.      Scheduled Meds:   albuterol-ipratropium  3 mL Nebulization Q6H    amoxicillin-pot clavulanate 250-62.5 mg/5ml  875 mg Per G Tube Q12H    apixaban  2.5 mg Per G Tube BID    atorvastatin  40 mg Per G Tube Daily    buPROPion  150 mg Per G Tube BID    cloNIDine  0.1 mg Per G Tube BID    donepezil  10 mg Per G Tube QHS    losartan  100 mg Per G Tube Daily    nicotine  1 patch Transdermal Daily    nystatin-triamcinolone   Topical (Top) BID    QUEtiapine  25 mg Per G Tube QHS    sodium chloride 0.9%  3 mL Intravenous Q8H    sodium chloride 3%  4 mL Nebulization BID     Continuous Infusions:   sodium chloride 0.9% 100 mL/hr at 06/10/18 0936     PRN Meds:acetaminophen, labetalol, sodium chloride 0.9%    Objective:     Vital Signs (Most Recent):  Temp: 97.8 °F (36.6 °C) (06/11/18 1218)  Pulse: 67 (06/11/18 1250)  Resp: 14 (06/11/18 1250)  BP: (!) 146/58 (06/11/18 1218)  SpO2: 96 % (06/11/18 1250)  BP Location: Right arm    Vital Signs Range (Last 24H):  Temp:  [97.5 °F (36.4 °C)-97.8 °F (36.6 °C)]   Pulse:  [50-90]   Resp:  [12-18]   BP: (132-160)/(58-78)   SpO2:  [93 %-98 %]   BP Location: Right arm    Physical Exam   Constitutional: She appears well-developed and well-nourished. No distress.   HENT:   Head: Normocephalic and atraumatic.   Eyes: EOM are normal.   Cardiovascular: Normal rate.    Pulmonary/Chest: Effort normal. No respiratory distress.   Abdominal: She exhibits no distension.   Musculoskeletal: She exhibits no edema or tenderness.   Neurological: She is alert. No sensory deficit.   Skin: Skin is warm and dry.   Nursing note and vitals reviewed.    Neurological Exam:   LOC: alert  Attention Span: Good  Language: mild? aphasia  Articulation: Moderate/severe Dysarthria  Visual Fields: Full  Facial Movement (CN VII): Lower facial weakness on the Left  Motor: Arm left  Paresis: 4+/5  Leg left  Paresis: 4/5  Arm right  Paresis: 5/5  Leg right Paresis: 5/5  Sensation: Sensation intact  Tone: normal      Laboratory:  CMP:     Recent Labs  Lab 06/11/18  0831   CALCIUM 9.2   ALBUMIN 2.4*   PROT 7.1      K 3.6   CO2 25      BUN 9   CREATININE 0.6   ALKPHOS 86   ALT 53*   AST 49*   BILITOT 0.5     BMP:     Recent Labs  Lab 06/11/18  0831      K 3.6      CO2 25   BUN 9   CREATININE 0.6   CALCIUM 9.2     CBC:     Recent Labs  Lab 06/11/18  0831   WBC 9.05   RBC 3.40*   HGB 10.1*   HCT 32.4*      MCV 95   MCH 29.7   MCHC 31.2*     Lipid Panel:   No results for input(s): CHOL, LDLCALC, HDL, TRIG in the last 168 hours.  Coagulation:   No results for input(s): PT, INR, APTT in the last 168 hours.  Platelet Aggregation Study: No results for input(s): PLTAGG, PLTAGINTERP, PLTAGREGLACO, ADPPLTAGGREG in the last 168 hours.  Hgb A1C:   No results for input(s): HGBA1C in the last 168 hours.  TSH:   No results for input(s): TSH in the last 168 hours.      Diagnostic Results     Brain Imaging     MRI brain 5/28:    Small area of restricted diffusion in the right precentral gyrus.  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  Bilateral basal ganglia, thalami and cerebellar hemispheres small remote infarcts.  Generalized cerebral volume loss and extensive white matter chronic small vessel ischemic change.  Ventriculomegaly out of proportion to sulcal prominence, nonspecific but can be seen with normal pressure hydrocephalus, but overall unchanged from prior MRI.      Cardiac Imaging     ECHO 5/28/18:   CONCLUSIONS     1 - Concentric LVH with hyperdynamic left ventricular systolic function (EF >70%).     2 - Severe left atrial enlargement.     3 - Impaired LV relaxation, elevated LAP (grade 2 diastolic dysfunction).     4 - Normal right ventricular systolic  function .     5 - Mild to moderate aortic stenosis, ALBER = 1.64 cm2, AVAi = 1.12 cm2/m2, peak velocity = 3.11 m/s, mean gradient = 24 mmHg          Adrianna Perez PA-C  [facility]  Department of Vascular Neurology   [facility]

## 2018-06-11 NOTE — SUBJECTIVE & OBJECTIVE
Neurologic Chief Complaint: Aphasia, dysarthria, LSW     Subjective:     Interval History: Patient is seen for follow-up neurological assessment and treatment recommendations:     Discussed with general surgery and IR regarding fernandez in PEG place. Assured that the fernandez is a sufficient tube to continue medications and feeds and that the placement was confirmed and is ok to use. Changing IV antibiotics to augmentin suspension given susceptibilities came back on the staph cultured from the PEG site. Patient's white count improved to normal and afebrile last 24 hours. Patient's home medical equipment is being set up for today and patient will be ok to go home with home health.     HPI, Past Medical, Family, and Social History remains the same as documented in the initial encounter.     Review of Systems   Constitutional: Negative for fever.   Respiratory: Negative for shortness of breath.    Gastrointestinal:        PEG removed, fernandez in site   Neurological: Positive for facial asymmetry, speech difficulty and weakness. Negative for headaches.   Psychiatric/Behavioral: Positive for agitation and confusion. Negative for behavioral problems.     Scheduled Meds:   albuterol-ipratropium  3 mL Nebulization Q6H    amoxicillin-pot clavulanate 250-62.5 mg/5ml  875 mg Per G Tube Q12H    apixaban  2.5 mg Per G Tube BID    atorvastatin  40 mg Per G Tube Daily    buPROPion  150 mg Per G Tube BID    cloNIDine  0.1 mg Per G Tube BID    donepezil  10 mg Per G Tube QHS    losartan  100 mg Per G Tube Daily    nicotine  1 patch Transdermal Daily    nystatin-triamcinolone   Topical (Top) BID    QUEtiapine  25 mg Per G Tube QHS    sodium chloride 0.9%  3 mL Intravenous Q8H    sodium chloride 3%  4 mL Nebulization BID     Continuous Infusions:   sodium chloride 0.9% 100 mL/hr at 06/10/18 0936     PRN Meds:acetaminophen, labetalol, sodium chloride 0.9%    Objective:     Vital Signs (Most Recent):  Temp: 97.8 °F (36.6 °C)  (06/11/18 1218)  Pulse: 67 (06/11/18 1250)  Resp: 14 (06/11/18 1250)  BP: (!) 146/58 (06/11/18 1218)  SpO2: 96 % (06/11/18 1250)  BP Location: Right arm    Vital Signs Range (Last 24H):  Temp:  [97.5 °F (36.4 °C)-97.8 °F (36.6 °C)]   Pulse:  [50-90]   Resp:  [12-18]   BP: (132-160)/(58-78)   SpO2:  [93 %-98 %]   BP Location: Right arm    Physical Exam   Constitutional: She appears well-developed and well-nourished. No distress.   HENT:   Head: Normocephalic and atraumatic.   Eyes: EOM are normal.   Cardiovascular: Normal rate.    Pulmonary/Chest: Effort normal. No respiratory distress.   Abdominal: She exhibits no distension.   Musculoskeletal: She exhibits no edema or tenderness.   Neurological: She is alert. No sensory deficit.   Skin: Skin is warm and dry.   Nursing note and vitals reviewed.    Neurological Exam:   LOC: alert  Attention Span: Good  Language: mild? aphasia  Articulation: Moderate/severe Dysarthria  Visual Fields: Full  Facial Movement (CN VII): Lower facial weakness on the Left  Motor: Arm left  Paresis: 4+/5  Leg left  Paresis: 4/5  Arm right  Paresis: 5/5  Leg right Paresis: 5/5  Sensation: Sensation intact  Tone: normal     Laboratory:  CMP:     Recent Labs  Lab 06/11/18  0831   CALCIUM 9.2   ALBUMIN 2.4*   PROT 7.1      K 3.6   CO2 25      BUN 9   CREATININE 0.6   ALKPHOS 86   ALT 53*   AST 49*   BILITOT 0.5     BMP:     Recent Labs  Lab 06/11/18  0831      K 3.6      CO2 25   BUN 9   CREATININE 0.6   CALCIUM 9.2     CBC:     Recent Labs  Lab 06/11/18  0831   WBC 9.05   RBC 3.40*   HGB 10.1*   HCT 32.4*      MCV 95   MCH 29.7   MCHC 31.2*     Lipid Panel:   No results for input(s): CHOL, LDLCALC, HDL, TRIG in the last 168 hours.  Coagulation:   No results for input(s): PT, INR, APTT in the last 168 hours.  Platelet Aggregation Study: No results for input(s): PLTAGG, PLTAGINTERP, PLTAGREGLACO, ADPPLTAGGREG in the last 168 hours.  Hgb A1C:   No results for  input(s): HGBA1C in the last 168 hours.  TSH:   No results for input(s): TSH in the last 168 hours.      Diagnostic Results     Brain Imaging     MRI brain 5/28:    Small area of restricted diffusion in the right precentral gyrus.  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  Bilateral basal ganglia, thalami and cerebellar hemispheres small remote infarcts.  Generalized cerebral volume loss and extensive white matter chronic small vessel ischemic change.  Ventriculomegaly out of proportion to sulcal prominence, nonspecific but can be seen with normal pressure hydrocephalus, but overall unchanged from prior MRI.      Cardiac Imaging     ECHO 5/28/18:   CONCLUSIONS     1 - Concentric LVH with hyperdynamic left ventricular systolic function (EF >70%).     2 - Severe left atrial enlargement.     3 - Impaired LV relaxation, elevated LAP (grade 2 diastolic dysfunction).     4 - Normal right ventricular systolic function .     5 - Mild to moderate aortic stenosis, ALBER = 1.64 cm2, AVAi = 1.12 cm2/m2, peak velocity = 3.11 m/s, mean gradient = 24 mmHg

## 2018-06-11 NOTE — PLAN OF CARE
Problem: Occupational Therapy Goal  Goal: Occupational Therapy Goal  Goals to be met by: 6/14/2018     Patient will increase functional independence with ADLs by performing:    UE Dressing with Minimal Assistance. Met 6/11  Revised: UE dressing with Set-up and SBA  LE Dressing with Minimal Assistance.  Grooming while standing with Contact Guard Assistance.  Toileting from toilet with Minimal Assistance for hygiene and clothing management.   Sitting at edge of bed x 15 minutes with Stand-by Assistance. Met 6/7  Revised: sitting at EOB x15 minutes with Supervision.   Toilet transfer to toilet with Contact Guard Assistance.      Outcome: Ongoing (interventions implemented as appropriate)  Pt progressing well with goals.   Continue with POC.   Kinsey monsalve OT  6/11/2018

## 2018-06-11 NOTE — ASSESSMENT & PLAN NOTE
Stroke risk factor    Lipitor 40 mg daily  Mildly elevated AST/ALT, continue to monitor with labs per PCP

## 2018-06-11 NOTE — ASSESSMENT & PLAN NOTE
Please see leukocytosis  Cultures grew STAPH AUREUS   Multiple sensitivities, changed to augmentin 875 BID x 7 days

## 2018-06-11 NOTE — ASSESSMENT & PLAN NOTE
Stroke risk factor; Likely stroke etiology  Per nursing note, afib was seen on telemetry in [facility]. EKG with sinus rhythm and Premature atrial complexes (likely paroxysmal)  Echo showing severe LAE  CM price-checked DOACs  Started Eliquis 2.5mg BID

## 2018-06-11 NOTE — ASSESSMENT & PLAN NOTE
Stroke risk factor  Goal SBP < 160   Continue clonidine 0.1 mg BID, Losartan 100 mg qd. Lasix held

## 2018-06-11 NOTE — ASSESSMENT & PLAN NOTE
82 y/o female with left sided facial droop, aphasia, dysarthria who received IV TPA but no IR. MRI with infarct in R precentral gyrus.   Etiology likely cardioembolic- nursing note reporting patient went into Afib on monitor in [facility]. EKG completed and not seen, though echo showing Severe LAE. Treating patient for cardioembolic stroke as below.     Antithrombotics: Eliquis 2.5mg BID  Statins: Lipitor 40 mg daily   Aggressive risk factor modification: HTN, HLD  Rehab efforts: PT/OT/SLP to evaluate and treat   Dispo:  with 24/7 family supervision  Diagnostics ordered/pending: none   VTE prophylaxis: SCDs, Eliquis  BP parameters: Infarct: Post tPA, SBP <160

## 2018-06-11 NOTE — TELEPHONE ENCOUNTER
----- Message from Sasha Tate RN sent at 6/11/2018 11:44 AM CDT -----  Contact: Sasha White  Please schedule a hospital follow up appt in 4-6 weeks. The patient was seen by Dr. Edouard while in patient. Please call the patient with date and time of appt.

## 2018-06-11 NOTE — PLAN OF CARE
06/11/18 1400   Final Note   Assessment Type Final Discharge Note   Discharge Disposition Home     Patient is discharged to home with home health today. Sw to arrange HH. Jamison called Viridiana with [facility] to ensure the equipment will be delivered to the patient's room. Jamison sent in basket message to [facility] for hospital follow up in 4-6 weeks. Jamison called to make a PCP appt with Dr. Moya. But was told Rn would call Cm to schedule the appt. Jamison called a second time to get and appt date and time but again was told the Rn would call Cm back. Jamison informed the patient's daughter Lauren that EMS transport home would not be authorized by N the patient's insurance company. Lauren stated that she and her sister would transport the patient back home today.    2533  Jamison was called back by Dr. Delarosa's office regarding PCP appt. Cm was given 6/14 at 10:00 am. Cm called the patient's daughter and left the PCP appt information on Lauren's voicemail.

## 2018-06-11 NOTE — PROGRESS NOTES
Discharge instructions reviewed with patient and daughter. Daughter states that she was taught how to feed her mother through the Lay that is inserted into the PEG stoma. States that a nurse taught her and that she feels comfortable. Refuses further instruction. Given sterile Lay kit and instructed on how to use it in the event that the Lay in the stoma becomes dislodged. States understanding. Informed that prescriptions are awaiting pickup at the pharmacy in [place]. PIV discontinued. Telemetry discontinued. DME at the bedside. Awaiting transportation.

## 2018-06-11 NOTE — ASSESSMENT & PLAN NOTE
WBC 20 -->17 --> 9  UA and CXR - normal, afebrile, c diff negative  Some concern for purulent drainage from PEG site - cultured (staph aureus, sensitivities reviewed)  Blood cultures -NGTD  Started vanc and rocephin, d/c 6/11. Started augmentin 875mg BID x7d as alternative given sensitivities to go home. Will follow up on anaerobic cultures, still in process.

## 2018-06-11 NOTE — DISCHARGE SUMMARY
[facility]  Vascular Neurology  Comprehensive Stroke Center  Discharge Summary     Summary:     Admit Date: 5/27/2018  5:51 PM    Discharge Date and Time:  06/11/2018 3:55 PM    Attending Physician: Steve Edouard MD     Discharge Provider: Adrianna Perez PA-C    History of Present Illness: 82 y/o female who was last seen normal at 1320 was alert talking walking today with family, daughter went to run and errand and when returned at round 1445  found mother slumped over and unable to talk or move her left side. She was taken to [facility] and telemedicine consult was done with Dr Marx and with with no acute changes on CT head recommendation was to give IV TPA and transfer to [facility] for further evaluation.   Upon arrival patient still with aphasia, facial droop, dysarthria. Minimal left sided weakness.   CTA head and neck multiphase complete and No LVO so no IR  Risk factors are DM, HTN, CHF, HLP    Hospital Course (synopsis of major diagnoses, care, treatment, and services provided during the course of the hospital stay): 82 y/o female with aphasia, dysarthria and facial droop, received IV TPA but no LVO so no IR. MRI with right precentral gyrus infarct. Suspected cardioembolic event. Patient noted per nursing to be in afib while in [facility], not captured on ECG. Patient was started after PEG placement on eliquis 2.5mg BID for secondary stroke prevention. She was also changed to atorvastatin 40mg daily (PCP will need to continue to monitor liver enzymes which were mildly elevated upon discharge). Patient with UTI and PEG site infections during hospitalization, treated with appropriate antibiotics. Additional events in hospitalization as outlined below. Patient will be discharged home with home health and home medical equipment on 6/11. Patient's daughter was given stroke education prior to discharge.       5/28: Blood pressure elevated >200 overnight requiring cardene.  Patient bradycardic and with paroxysmal afib overnight as well. MRI with infarct in the right precentral gyrus.   5/29: No events overnight. Patient with many bacteria on UTI so short cipro course started per NCC. Pending step down to NSU.   5/30: seems more agitated today, not participating in exam or following commands, severe dysarthria and excessive secretions  05/31/18 Continues to have increased secretions and severe dysarthria.  Patient following some commands and plans to transfer to stroke service today   6/1: Secretions are better controlled today. Plan to step down. On exam patient is agitated and stating she wants to go home. In bilateral wrist restraints. Right lung opacity - Chest PT and duonebs ordered per NCC. Nursing notes noted Afib but EKG showing no afib. Echo revealed Severe left atrial enlargement. May need AC on discharge.  6/2: No acute events overnight. Stepped down to NSU. Plan for PEG early next week if no improvement per ST.   6/3: No acute events overnight. Holding TF and heparin tonight in preparation for possible PEG tomorrow. O2 sats low 90's, CXR ordered.   6/4: Family wishes to proceed with PEG (recs per SLP.) IR unable as no safe window; consult placed to Gen Surg.  6/5: PEG with GS today. SBP elevated related to NPO status; BP meds given. Repeat UA WNL.  6/6: Nystatin ordered for fungal infection under the breasts. Initiating TFs per PEG today. D/c'd restraints. CM to price check NOACs for previously-captured AFib. Bedside RNs will train family on home equipment and associated care needs tomorrow.  6/7: Changed eliquis to 2.5 mg BID due to age and weight. Patient with elevated white count, pending UA and CXR. Afebrile. Pending family training to take patient home with home health. Patient will need home medical equipment.   6/8/19 - UA and CXR normal today, wbc increased from yesterday. No signs of dvt, skin infection, urine or chest symptoms. May be due to recent peg  placement  6/9/18 - pain at peg site. Discussed with gen surg - loosened slightly, has low suspicion for infection. WBC on the rise - 20 today. Will continue to monitor and do broad spectrum abx   6/10/18- peg pulled overnight - surgery contacted. Wbc improving, continue broad spectrum abx. CDiff neg  6/11/18- Discussed with general surgery and IR regarding fernandez in PEG place. Assured that the fernandez is a sufficient tube to continue medications and feeds and that the placement was confirmed and is ok to use. Changing IV antibiotics to augmentin suspension given susceptibilities came back on the staph cultured from the PEG site. Patient's white count improved to normal and afebrile last 24 hours. Patient's home medical equipment is being set up for today and patient will be ok to go home with home health.     STROKE DOCUMENTATION   Acute Stroke Times   Last Known Normal Date: 05/27/18  Last Known Normal Time: 1320  Symptom Onset Date: 05/27/18  Symptom Onset Time: 1445  Stroke Team Called Date: 05/27/18  Stroke Team Called Time: 1550  Stroke Team Arrival Date: 05/27/18  Stroke Team Arrival Time: 1751  CT Interpretation Time: 1536  Decision to Treat Time for Alteplase: 1806     NIH Scale:  1a. Level Of Consciousness: 0-->Alert: keenly responsive  1b. LOC Questions: 2-->Answers neither question correctly  1c. LOC Commands: 0-->Performs both tasks correctly  2. Best Gaze: 0-->Normal  3. Visual: 0-->No visual loss  4. Facial Palsy: 1-->Minor paralysis (flattened nasolabial fold, asymmetry on smiling)  5a. Motor Arm, Left: 0-->No drift: limb holds 90 (or 45) degrees for full 10 secs  5b. Motor Arm, Right: 0-->No drift: limb holds 90 (or 45) degrees for full 10 secs  6a. Motor Leg, Left: 0-->No drift: leg holds 30 degree position for full 5 secs  6b. Motor Leg, Right: 0-->No drift: leg holds 30 degree position for full 5 secs  7. Limb Ataxia: 0-->Absent  8. Sensory: 0-->Normal: no sensory loss  9. Best Language:  1-->Mild-to-moderate aphasia: some obvious loss of fluency or facility of comprehension, without significant limitation on ideas expressed or form of expression. Reduction of speech and/or comprehension, however, makes conversation. . . (see row details)  10. Dysarthria: 1-->Mild-to-moderate dysarthria: patient slurs at least some words and, at worst, can be understood with some difficulty  11. Extinction and Inattention (formerly Neglect): 0-->No abnormality  Total (NIH Stroke Scale): 5        Modified [place] Score: 4  Ivonne Coma Scale:14   ABCD2 Score:    [id number] Score:8  HAS -BLED Score:4  ICH Score:   Hunt & Burkett Classification:       Assessment/Plan:     Diagnostic Results:      Brain Imaging:   MRI brain 5/28:    Small area of restricted diffusion in the right precentral gyrus.  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  Bilateral basal ganglia, thalami and cerebellar hemispheres small remote infarcts.  Generalized cerebral volume loss and extensive white matter chronic small vessel ischemic change.  Ventriculomegaly out of proportion to sulcal prominence, nonspecific but can be seen with normal pressure hydrocephalus, but overall unchanged from prior MRI.        Cardiac Imaging      ECHO 5/28/18:   CONCLUSIONS     1 - Concentric LVH with hyperdynamic left ventricular systolic function (EF >70%).     2 - Severe left atrial enlargement.     3 - Impaired LV relaxation, elevated LAP (grade 2 diastolic dysfunction).     4 - Normal right ventricular systolic function .     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.22] 06/09/2018 No    Cerebrovascular small vessel disease [I67.9] 06/09/2018 Yes    Elevated BUN [R79.9] 06/08/2018 Yes    Leukocytosis [D72.829] 06/07/2018 Yes    Fungal dermatitis [B36.9] 06/06/2018 Yes    Dementia with behavioral disturbance [F03.91] 06/04/2018 Yes    Atrial enlargement, left [I51.7] 06/01/2018 Yes    New onset a-fib [I48.91] 06/01/2018 Yes    Cytotoxic cerebral edema [G93.6] 05/31/2018 Yes    Dysphagia [R13.10] 05/31/2018 Yes    Dysarthria [R47.1] 05/31/2018 Yes    Hypokalemia [E87.6] 05/30/2018 Yes    Encephalopathy [G93.40] 05/28/2018 Yes    Aphasia [R47.01] 05/27/2018 Yes    Benign essential HTN [I10]  Yes    Mixed hyperlipidemia [E78.2]  Yes    S/P admn tPA in diff fac w/n last 24 hr bef adm to crnt fac [Z92.82] 03/16/2018 Not Applicable      Problems Resolved During this Admission:    Diagnosis Date Noted Date Resolved POA    UTI (urinary tract infection) [N39.0] 05/30/2018 06/06/2018 Yes     * Embolic stroke involving right middle cerebral artery    84 y/o female with left sided facial droop, aphasia, dysarthria who received IV TPA but no IR. MRI with infarct in R precentral gyrus.   Etiology likely cardioembolic- nursing note reporting patient went into Afib on monitor in [facility]. EKG completed and not seen, though echo showing Severe LAE. Treating patient for cardioembolic stroke as below.     Antithrombotics: Eliquis 2.5mg BID  Statins: Lipitor 40 mg daily   Aggressive risk factor modification: HTN, HLD  Rehab efforts: PT/OT/SLP to evaluate and treat   Dispo:  with 24/7 family supervision  Diagnostics ordered/pending: none   VTE prophylaxis: SCDs, Eliquis  BP parameters:  Infarct: Post tPA, SBP <160        Infection of PEG site    Please see leukocytosis  Cultures grew STAPH AUREUS   Multiple sensitivities, changed to augmentin 875 BID x 7 days         Elevated BUN    Elevated up to 47 --> improved  Added free water bolus to tube feeds (250 q 6 hours)        Leukocytosis    WBC 20 -->17 --> 9  UA and CXR - normal, afebrile, c diff negative  Some concern for purulent drainage from PEG site - cultured (staph aureus, sensitivities reviewed)  Blood cultures -NGTD  Started vanc and rocephin, d/c 6/11. Started augmentin 875mg BID x7d as alternative given sensitivities to go home. Will follow up on anaerobic cultures, still in process.         Fungal dermatitis    Fungal infection noted by RN under the breasts  Nystatin ordered  Continue to monitor        New onset a-fib    Stroke risk factor; Likely stroke etiology  Per nursing note, afib was seen on telemetry in [facility]. EKG with sinus rhythm and Premature atrial complexes (likely paroxysmal)  Echo showing severe LAE  CM price-checked DOACs  Started Eliquis 2.5mg BID         Atrial enlargement, left    AFib risk factor  Severe left atrial enlargement seen on echo  AFib seen on telemetry in [facility]; Initiated AC 6/6        Dysarthria    Severe dysarthria  SLP eval and treat        Dysphagia    PEG placed with Gen Surg on 6/5  Initiated TFs per PEG 6/6; at goal 40cc/hr  Peg removed by patient overnight on 6/9-6/10  Surgery contacted, fernandez placed to maintain site. General surgery states fernandez is appropriate to use long term for TF and medication administration.         Cytotoxic cerebral edema    Area of cytotoxic cerebral edema identified when reviewing brain imaging in the territory of the R middle cerebral artery. There is no mass effect associated with it. We will continue to monitor the patients clinical exam for any worsening of symptoms which may indicate expansion of the stroke or the area of the edema resulting in the clinical change. The  pattern is of unclear etiology (ESUS). Suspect afib (documented on telemetry)            Hypokalemia    Stable, continue to monitor on OP basis with PCP         Dementia with behavioral disturbance    Intermittently requiring restraints; Improved behavior when family is at the bedside  Home wellbutrin 150 mg  Donepezil 10 qhs  seroquel 25 mg qhs        Mixed hyperlipidemia    Stroke risk factor    Lipitor 40 mg daily  Mildly elevated AST/ALT, continue to monitor with labs per PCP         Benign essential HTN    Stroke risk factor  Goal SBP < 160   Continue clonidine 0.1 mg BID, Losartan 100 mg qd. Lasix held intermittently while IP due to hypokalemia. Will resume upon discharge with K supplementation. COntinue close follow up with PCP regarding BP and electrolytes        Aphasia    Pt responds appropriately though sometimes difficult to assess due to severe dysarthria  Follows some commands accurately   Aggressive SLP therapy         S/P admn tPA in diff fac w/n last 24 hr bef adm to crnt fac    Close monitoring in NCCU for 24 hours post administration -- Completed  No apparent complications             Recommendations:     Post-discharge complication risks: Falls, Pneumonia, Seizure, Skin breakdown, Urinary tract infections    Stroke Education given to: patient and family    Follow-up in Stroke Clinic in 4-6 weeks.     Discharge Plan:  Statin: Atorvastatin 40mg  Anticoagulant: apixaban 2.5mg BID   Aggresive risk factor modification:  Hypertension  Smoking  High Cholesterol  Diet  Exercise  Atrial Fibrillation    Follow Up:  Follow-up Information     Ese Moya MD.    Specialty:  Family Medicine  Why:  The office will call you to schedule an appt within two weeks from discharge.  Contact information:  [address] [address] [phone]             [facility].    Specialty:  Neurology  Why:  The office will call you to schedule an appt in 4-6 weeks. If the  "office does not call you by 6/18/18 please call to schedule an appt.  Contact information:  [address]ica Abdi  [facility] [address] [phone]  Additional information:  7th Floor                 Patient Instructions:     COMMODE FOR HOME USE   Order Specific Question Answer Comments   Type: Standard    Height: 4' 11" (1.499 m)    Weight: 56.4 kg (124 lb 5.4 oz)    Does patient have medical equipment at home? wheelchair    Does patient have medical equipment at home? rollator    Does patient have medical equipment at home? shower chair    Length of need (1-99 months): 99    Vendor: [facility]    Expected Date of Delivery: 6/11/2018          Medications:  Reconciled Home Medications:      Medication List      START taking these medications    amoxicillin-pot clavulanate 250-62.5 mg/5ml 250-62.5 mg/5 mL suspension  Commonly known as:  AUGMENTIN  18 mLs (900 mg total) by Per G Tube route every 12 (twelve) hours.     apixaban 2.5 mg Tab  1 tablet (2.5 mg total) by Per G Tube route 2 (two) times daily.     atorvastatin 40 MG tablet  Commonly known as:  LIPITOR  1 tablet (40 mg total) by Per G Tube route once daily.  Start taking on:  6/12/2018     buPROPion 75 MG tablet  Commonly known as:  WELLBUTRIN  2 tablets (150 mg total) by Per G Tube route 2 (two) times daily.  Replaces:  buPROPion 150 MG TBSR 12 hr tablet     potassium chloride 20 mEq Pack  Commonly known as:  KLOR-CON  10 mEq by Per G Tube route once daily.  Replaces:  potassium chloride 10 MEQ Tbsr     QUEtiapine 25 MG Tab  Commonly known as:  SEROQUEL  1 tablet (25 mg total) by Per G Tube route every evening.        CHANGE how you take these medications    cloNIDine 0.1 MG tablet  Commonly known as:  CATAPRES  1 tablet (0.1 mg total) by Per G Tube route 2 (two) times daily.  What changed:  how to take this     donepezil 10 MG tablet  Commonly known as:  ARICEPT  1 tablet (10 mg total) by Per G Tube route every evening.  What changed:  how to " take this     furosemide 20 MG tablet  Commonly known as:  LASIX  1 tablet (20 mg total) by Per G Tube route once daily.  What changed:  how to take this     losartan 100 MG tablet  Commonly known as:  COZAAR  1 tablet (100 mg total) by Per G Tube route once daily.  Start taking on:  6/12/2018  What changed:  · medication strength  · how much to take  · how to take this        STOP taking these medications    allopurinol 300 MG tablet  Commonly known as:  ZYLOPRIM     aspirin 81 MG EC tablet  Commonly known as:  ECOTRIN     buPROPion 150 MG TBSR 12 hr tablet  Commonly known as:  WELLBUTRIN SR  Replaced by:  buPROPion 75 MG tablet     HYDROcodone-acetaminophen 7.5-325 mg per tablet  Commonly known as:  NORCO     potassium chloride 10 MEQ Tbsr  Commonly known as:  KLOR-CON  Replaced by:  potassium chloride 20 mEq Pack     pravastatin 40 MG tablet  Commonly known as:  PRAVACHOL            Adrianna Perez PA-C  Comprehensive Stroke Center  Department of Vascular Neurology   [facility]

## 2018-06-12 ENCOUNTER — PATIENT OUTREACH (OUTPATIENT)
Dept: ADMINISTRATIVE | Facility: CLINIC | Age: 83
End: 2018-06-12

## 2018-06-12 RX ORDER — LOSARTAN POTASSIUM 100 MG/1
TABLET ORAL
Qty: 90 TABLET | Refills: 0 | OUTPATIENT
Start: 2018-06-12

## 2018-06-12 RX ORDER — ATORVASTATIN CALCIUM 40 MG/1
TABLET, FILM COATED ORAL
Qty: 90 TABLET | Refills: 0 | OUTPATIENT
Start: 2018-06-12

## 2018-06-12 RX ORDER — FUROSEMIDE 20 MG/1
TABLET ORAL
Qty: 90 TABLET | Refills: 0 | OUTPATIENT
Start: 2018-06-12

## 2018-06-12 RX ORDER — BUPROPION HYDROCHLORIDE 75 MG/1
TABLET ORAL
Qty: 360 TABLET | Refills: 0 | OUTPATIENT
Start: 2018-06-12

## 2018-06-12 RX ORDER — QUETIAPINE FUMARATE 25 MG/1
TABLET, FILM COATED ORAL
Qty: 90 TABLET | Refills: 0 | OUTPATIENT
Start: 2018-06-12

## 2018-06-12 RX ORDER — POTASSIUM CHLORIDE 1.5 G/1
POWDER, FOR SOLUTION ORAL
Refills: 0 | OUTPATIENT
Start: 2018-06-12

## 2018-06-12 NOTE — PATIENT INSTRUCTIONS
Discharge Instructions for Stroke  You have been diagnosed with a stroke, or with a TIA (transient ischemic attack). Or you have been identified as having a high risk for stroke. During a stroke, blood stops flowing to part of your brain. This can damage areas in the brain that control other parts of the body. Symptoms after a stroke depend on which part of the brain has been affected.  Stroke risk factors  Once youve had a stroke, youre at greater risk for another one. Listed below are some other factors that can increase your risk for a stroke:  · High blood pressure  · High cholesterol  · Cigarette or cigar smoking  · Diabetes  · Carotid or other artery disease  · Atrial fibrillation, atrial flutter, or other heart disease  · Not being physically active  · Obesity  · Certain blood disorders (such as sickle cell anemia)  · Excessive alcohol use  · Abuse of street drugs  · Race  · Gender  · Family history of stroke  · Diet high in salty, fried, or greasy foods  Changes in daily living  Doing your regular tasks may be difficult after youve had a stroke, but you can learn new ways to manage your daily activities. In fact, doing daily activities may help you to regain muscle strength and bring back function to affected limbs. Be patient, give yourself time to adjust, and appreciate the progress you make.  Daily activities  You may be at risk of falling. Make changes to your home to help you walk more easily. A therapist will decide if you need an assistive device to walk safely.  You may need to see an occupational therapist or physical therapist to learn new ways of doing things. For example, you may need to make adjustments when bathing or dressing:  Tips for showering or bathing  · Test the water temperature with a hand or foot that was not affected by the stroke.  · Use grab bars, a shower seat, a hand-held showerhead, and a long-handled brush.  Tips for getting dressed  · Dress while sitting, starting with  the affected side or limb.  · Wear shirts that pull easily over your head. Wear pants or skirts with elastic waistbands.  · Use zippers with loops attached to the pull tabs.  Lifestyle changes  · Take your medicines exactly as directed. Dont skip doses.  · Begin an exercise program. Ask your provider how to get started. Also ask how much activity you should try to get on a daily or weekly basis. You can benefit from simple activities such as walking or gardening.  · Limit alcohol intake. Men should have no more than 2 alcoholic drinks a day. Women should limit themselves to 1 alcoholic drink per day.  · Know your cholesterol level. Follow your providers recommendations about how to keep cholesterol under control.  · If you are a smoker, quit now. Join a stop-smoking program to improve your chances of success. Ask your provider about medicines or other methods to help you quit.  · Learn stress management techniques to help you deal with stress in your home and work life.  Diet  Your healthcare provider will give you information on dietary changes that you may need to make, based on your situation. Your provider may recommend that you see a registered dietitian for help with diet changes. Changes may include:  · Reducing fat and cholesterol intake  · Reducing salt (sodium) intake, especially if you have high blood pressure  · Eating more fresh vegetables and fruits  · Eating more lean proteins, such as fish, poultry, and beans and peas (legumes)  · Eating less red meat and processed meats  · Using low-fat dairy products  · Limiting vegetable oils and nut oils  · Limiting sweets and processed foods such as chips, cookies, and baked goods  Follow-up care  · Keep your medical appointments. Close follow-up is important to stroke rehabilitation and recovery.  · Some medicines require blood tests to check for progress or problems. Keep follow-up appointments for any blood tests ordered by your providers.  When to call  911  Call 911 right away if you have any of the following symptoms of stroke:  · Weakness, tingling, or loss of feeling on one side of your face or body  · Sudden double vision or trouble seeing in one or both eyes  · Sudden trouble talking or slurred speech  · Trouble understanding others  · Sudden, severe headache  · Dizziness, loss of balance, or a sense of falling  · Blackouts or seizures      F.A.S.T. is an easy way to remember the signs of stroke. When you see these signs, you know that you need to call 911 fast.  F.A.S.T. stands for:  · F is for face drooping. One side of the face is drooping or numb. When the person smiles, the smile is uneven.  · A is for arm weakness. One arm is weak or numb. When the person lifts both arms at the same time, one arm may drift downward.  · S is for speech difficulty. You may notice slurred speech or trouble speaking. The person can't repeat a simple sentence correctly when asked.  · T is for time to call 911. If someone shows any of these symptoms, even if they go away, call 911 immediately. Make note of the time the symptoms first appeared.  Date Last Reviewed: 8/26/2015 © 2000-2018 [employer]. [address] [place] [address], [place] [address]. All rights reserved. This information is not intended as a substitute for professional medical care. Always follow your healthcare professional's instructions.

## 2018-06-12 NOTE — PLAN OF CARE
10:26 am 6/12/18    Jamison was called by the patient's daughter Lauren regarding leaving the patient's BSC at the hospital after discharge. Cm retrieved the BSC and brought it to the 9th floor Cm office. Cm called Lauren and left a message informing her of the above information. Cm will continue to follow.

## 2018-06-13 ENCOUNTER — TELEPHONE (OUTPATIENT)
Dept: NEUROSURGERY | Facility: HOSPITAL | Age: 83
End: 2018-06-13

## 2018-06-13 NOTE — TELEPHONE ENCOUNTER
Spoke with patient's daughter. Risk factors to for stroke discussed with teach back method implemented. Verbalized understanding of discharge instructions and medications. Follow up appointment discussed and transferred to clinic to schedule an appt. Warning signs were discussed with teach back method implemented. Instructed to seek medical help via 911 if new symptoms occur. Patient's daughter relayed no new questions or comments at this time.

## 2018-06-14 LAB
BACTERIA BLD CULT: NORMAL
BACTERIA BLD CULT: NORMAL
BACTERIA SPEC ANAEROBE CULT: NORMAL

## 2018-06-14 NOTE — PT/OT/SLP DISCHARGE
Occupational Therapy Discharge Summary    Sury Cobos  MRN: [id number]   Principal Problem: Embolic stroke involving right middle cerebral artery      Patient Discharged from acute Occupational Therapy on 6/11/18 .  Please refer to prior OT note dated 6/11 for functional status.    Assessment:      Patient appropriate for care in another setting.    Objective:     GOALS:    Occupational Therapy Goals        Problem: Occupational Therapy Goal    Goal Priority Disciplines Outcome Interventions   Occupational Therapy Goal     OT, PT/OT Ongoing (interventions implemented as appropriate)    Description:  Goals to be met by: 6/14/2018     Patient will increase functional independence with ADLs by performing:    UE Dressing with Minimal Assistance. Met 6/11  Revised: UE dressing with Set-up and SBA  LE Dressing with Minimal Assistance.  Grooming while standing with Contact Guard Assistance.  Toileting from toilet with Minimal Assistance for hygiene and clothing management.   Sitting at edge of bed x 15 minutes with Stand-by Assistance. Met 6/7  Revised: sitting at EOB x15 minutes with Supervision.   Toilet transfer to toilet with Contact Guard Assistance.                        Reasons for Discontinuation of Therapy Services  Transfer to alternate level of care.      Plan:     Patient Discharged to: Home with Home Health Service    Kinsey monsalve OT  6/14/2018

## 2018-06-20 ENCOUNTER — TELEPHONE (OUTPATIENT)
Dept: SURGERY | Facility: CLINIC | Age: 83
End: 2018-06-20

## 2018-06-20 NOTE — TELEPHONE ENCOUNTER
----- Message from Taco Ross sent at 6/20/2018  9:18 AM CDT -----  Contact: Pt daughter Marlene   Pt would like to be called back regarding scheduling post-op appt.     Pt hannah Rosario can be reached at [phone].

## 2018-06-21 ENCOUNTER — HOME CARE VISIT (OUTPATIENT)
Dept: NEUROLOGY | Facility: HOSPITAL | Age: 83
End: 2018-06-21

## 2018-06-28 ENCOUNTER — TELEPHONE (OUTPATIENT)
Dept: NEUROLOGY | Facility: CLINIC | Age: 83
End: 2018-06-28

## 2018-07-02 NOTE — PT/OT/SLP DISCHARGE
Physical Therapy Discharge Summary    Name: Sury Cobos  MRN: [id number]   Principal Problem: Embolic stroke involving right middle cerebral artery     Patient Discharged from acute Physical Therapy on 2018.  Please refer to prior PT noted date on 2018 for functional status.     Assessment:     Patient appropriate for care in another setting.    Objective:     GOALS:    Physical Therapy Goals        Problem: Physical Therapy Goal    Goal Priority Disciplines Outcome Goal Variances Interventions   Physical Therapy Goal     PT/OT, PT Ongoing (interventions implemented as appropriate)     Description:  Goals to be met by: 2018     Patient will increase functional independence with mobility by performin. Supine to sit with Contact Guard Assistance - MET  2. Sit to supine with Contact Guard Assistance - MET  3. Rolling to Left and Right with Supervision. - MET  4. Sit to stand transfer with Stand-by Assistance  5. Gait  x 20 feet with Contact Guard Assistance using Rolling Walker. - distance met but not level of assist  6. Stand for 10 minutes with Contact Guard Assistance using Rolling Walker                        Reasons for Discontinuation of Therapy Services  Transfer to alternate level of care.      Plan:     Patient Discharged to: Home with Home Health Service and 24 hr assist.    Zulema Mei, PT  2018

## 2018-07-05 RX ORDER — APIXABAN 2.5 MG/1
TABLET, FILM COATED ORAL
Qty: 60 TABLET | Refills: 0 | OUTPATIENT
Start: 2018-07-05

## 2018-07-10 VITALS
RESPIRATION RATE: 20 BRPM | OXYGEN SATURATION: 99 % | WEIGHT: 125 LBS | HEART RATE: 78 BPM | SYSTOLIC BLOOD PRESSURE: 130 MMHG | DIASTOLIC BLOOD PRESSURE: 64 MMHG | BODY MASS INDEX: 25.25 KG/M2

## 2018-07-10 NOTE — PROGRESS NOTES
Patient doing well at her daughters home, very pleasant woman functioning ok with family assistance dealing with moderate dementia. Strong family support assisting with medications diet and MD follow up visits. BP/Pulse WNL. Swallowing/eating with no difficulties and states sleeps thought the night. No complaints of headaches, dizziness or nausea.

## 2018-07-25 ENCOUNTER — HOME CARE VISIT (OUTPATIENT)
Dept: NEUROLOGY | Facility: HOSPITAL | Age: 83
End: 2018-07-25

## 2018-07-25 VITALS
DIASTOLIC BLOOD PRESSURE: 68 MMHG | RESPIRATION RATE: 20 BRPM | HEART RATE: 70 BPM | OXYGEN SATURATION: 97 % | SYSTOLIC BLOOD PRESSURE: 108 MMHG

## 2018-07-30 ENCOUNTER — TELEPHONE (OUTPATIENT)
Dept: NEUROLOGY | Facility: CLINIC | Age: 83
End: 2018-07-30

## 2019-05-20 ENCOUNTER — HOSPITAL ENCOUNTER (INPATIENT)
Facility: HOSPITAL | Age: 84
LOS: 1 days | Discharge: ANOTHER HOSPITAL | DRG: 065 | End: 2019-05-21
Attending: EMERGENCY MEDICINE | Admitting: HOSPITALIST
Payer: MEDICARE

## 2019-05-20 DIAGNOSIS — I63.9 STROKE: ICD-10-CM

## 2019-05-20 LAB
BASOPHILS # BLD AUTO: 0 K/UL (ref 0–0.2)
BASOPHILS NFR BLD: 0.5 % (ref 0–1.9)
DIFFERENTIAL METHOD: ABNORMAL
EOSINOPHIL # BLD AUTO: 0.1 K/UL (ref 0–0.5)
EOSINOPHIL NFR BLD: 1.4 % (ref 0–8)
ERYTHROCYTE [DISTWIDTH] IN BLOOD BY AUTOMATED COUNT: 14.8 % (ref 11.5–14.5)
HCT VFR BLD AUTO: 34.5 % (ref 37–48.5)
HGB BLD-MCNC: 11 G/DL (ref 12–16)
INR PPP: 1 (ref 0.8–1.2)
LYMPHOCYTES # BLD AUTO: 3.6 K/UL (ref 1–4.8)
LYMPHOCYTES NFR BLD: 38.4 % (ref 18–48)
MCH RBC QN AUTO: 28.9 PG (ref 27–31)
MCHC RBC AUTO-ENTMCNC: 32 G/DL (ref 32–36)
MCV RBC AUTO: 90 FL (ref 82–98)
MONOCYTES # BLD AUTO: 0.6 K/UL (ref 0.3–1)
MONOCYTES NFR BLD: 6.3 % (ref 4–15)
NEUTROPHILS # BLD AUTO: 5 K/UL (ref 1.8–7.7)
NEUTROPHILS NFR BLD: 53.4 % (ref 38–73)
PLATELET # BLD AUTO: 96 K/UL (ref 150–350)
PMV BLD AUTO: 10.4 FL (ref 9.2–12.9)
POCT GLUCOSE: 101 MG/DL (ref 70–110)
PROTHROMBIN TIME: 10.6 SEC (ref 9–12.5)
RBC # BLD AUTO: 3.82 M/UL (ref 4–5.4)
WBC # BLD AUTO: 9.3 K/UL (ref 3.9–12.7)

## 2019-05-20 PROCEDURE — 85025 COMPLETE CBC W/AUTO DIFF WBC: CPT

## 2019-05-20 PROCEDURE — 12000002 HC ACUTE/MED SURGE SEMI-PRIVATE ROOM

## 2019-05-20 PROCEDURE — 99291 CRITICAL CARE FIRST HOUR: CPT | Mod: 25

## 2019-05-20 PROCEDURE — 84443 ASSAY THYROID STIM HORMONE: CPT

## 2019-05-20 PROCEDURE — 80061 LIPID PANEL: CPT

## 2019-05-20 PROCEDURE — 93005 ELECTROCARDIOGRAM TRACING: CPT

## 2019-05-20 PROCEDURE — 82962 GLUCOSE BLOOD TEST: CPT

## 2019-05-20 PROCEDURE — 80053 COMPREHEN METABOLIC PANEL: CPT

## 2019-05-20 PROCEDURE — 85610 PROTHROMBIN TIME: CPT

## 2019-05-20 PROCEDURE — 96365 THER/PROPH/DIAG IV INF INIT: CPT

## 2019-05-20 PROCEDURE — 36415 COLL VENOUS BLD VENIPUNCTURE: CPT

## 2019-05-20 PROCEDURE — 96366 THER/PROPH/DIAG IV INF ADDON: CPT

## 2019-05-20 RX ORDER — MEMANTINE HYDROCHLORIDE 10 MG/1
10 TABLET ORAL 2 TIMES DAILY
Status: ON HOLD | COMMUNITY
End: 2019-06-06 | Stop reason: HOSPADM

## 2019-05-20 RX ORDER — TRAZODONE HYDROCHLORIDE 50 MG/1
50 TABLET ORAL NIGHTLY PRN
Status: ON HOLD | COMMUNITY
End: 2019-06-06 | Stop reason: HOSPADM

## 2019-05-21 ENCOUNTER — HOSPITAL ENCOUNTER (INPATIENT)
Facility: HOSPITAL | Age: 84
LOS: 16 days | Discharge: NURSING, REHAB OR LONG-TERM CARE | DRG: 023 | End: 2019-06-06
Attending: EMERGENCY MEDICINE | Admitting: PSYCHIATRY & NEUROLOGY
Payer: MEDICARE

## 2019-05-21 VITALS
TEMPERATURE: 98 F | HEART RATE: 64 BPM | BODY MASS INDEX: 22.15 KG/M2 | OXYGEN SATURATION: 99 % | HEIGHT: 63 IN | RESPIRATION RATE: 16 BRPM | SYSTOLIC BLOOD PRESSURE: 148 MMHG | DIASTOLIC BLOOD PRESSURE: 70 MMHG | WEIGHT: 125 LBS

## 2019-05-21 DIAGNOSIS — I48.0 PAROXYSMAL ATRIAL FIBRILLATION: ICD-10-CM
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
## 2019-05-21 DIAGNOSIS — I51.7 ATRIAL ENLARGEMENT, LEFT: ICD-10-CM

## 2019-05-21 DIAGNOSIS — I10 BENIGN ESSENTIAL HTN: ICD-10-CM

## 2019-05-21 DIAGNOSIS — I63.9 CEREBROVASCULAR ACCIDENT (CVA), UNSPECIFIED MECHANISM: ICD-10-CM

## 2019-05-21 DIAGNOSIS — R47.01 APHASIA: ICD-10-CM

## 2019-05-21 DIAGNOSIS — I63.9 CVA (CEREBRAL VASCULAR ACCIDENT): ICD-10-CM

## 2019-05-21 DIAGNOSIS — E78.2 MIXED HYPERLIPIDEMIA: ICD-10-CM

## 2019-05-21 DIAGNOSIS — I63.512 ACUTE ISCHEMIC LEFT MCA STROKE: Primary | ICD-10-CM

## 2019-05-21 DIAGNOSIS — G81.11 RIGHT SPASTIC HEMIPARESIS: ICD-10-CM

## 2019-05-21 DIAGNOSIS — R00.0 TACHYCARDIA: ICD-10-CM

## 2019-05-21 PROBLEM — G93.6 CYTOTOXIC CEREBRAL EDEMA: Status: RESOLVED | Noted: 2018-05-31 | Resolved: 2019-05-21

## 2019-05-21 PROBLEM — G93.40 ENCEPHALOPATHY: Status: RESOLVED | Noted: 2018-05-28 | Resolved: 2019-05-21

## 2019-05-21 PROBLEM — K94.22 INFECTION OF PEG SITE: Status: RESOLVED | Noted: 2018-06-09 | Resolved: 2019-05-21

## 2019-05-21 PROBLEM — D72.829 LEUKOCYTOSIS: Status: RESOLVED | Noted: 2018-06-07 | Resolved: 2019-05-21

## 2019-05-21 PROBLEM — R13.10 DYSPHAGIA: Status: RESOLVED | Noted: 2018-05-31 | Resolved: 2019-05-21

## 2019-05-21 PROBLEM — E87.6 HYPOKALEMIA: Status: RESOLVED | Noted: 2018-05-30 | Resolved: 2019-05-21

## 2019-05-21 PROBLEM — R79.9 ELEVATED BUN: Status: RESOLVED | Noted: 2018-06-08 | Resolved: 2019-05-21

## 2019-05-21 PROBLEM — B36.9 FUNGAL DERMATITIS: Status: RESOLVED | Noted: 2018-06-06 | Resolved: 2019-05-21

## 2019-05-21 PROBLEM — E11.9 DIABETES MELLITUS: Status: ACTIVE | Noted: 2019-05-21

## 2019-05-21 PROBLEM — R47.1 DYSARTHRIA: Status: RESOLVED | Noted: 2018-05-31 | Resolved: 2019-05-21

## 2019-05-21 LAB
ABO + RH BLD: NORMAL
ALBUMIN SERPL BCP-MCNC: 3.7 G/DL (ref 3.5–5.2)
ALLENS TEST: ABNORMAL
ALP SERPL-CCNC: 59 U/L (ref 55–135)
ALT SERPL W/O P-5'-P-CCNC: 15 U/L (ref 10–44)
ANION GAP SERPL CALC-SCNC: 10 MMOL/L (ref 8–16)
APTT BLDCRRT: 39.7 SEC (ref 21–32)
ASCENDING AORTA: 2.24 CM
AST SERPL-CCNC: 16 U/L (ref 10–40)
AV INDEX (PROSTH): 0.47
AV MEAN GRADIENT: 15.51 MMHG
AV PEAK GRADIENT: 26.83 MMHG
AV VALVE AREA: 1.43 CM2
AV VELOCITY RATIO: 0.48
BILIRUB SERPL-MCNC: 0.4 MG/DL (ref 0.1–1)
BLD GP AB SCN CELLS X3 SERPL QL: NORMAL
BSA FOR ECHO PROCEDURE: 1.62 M2
BUN SERPL-MCNC: 15 MG/DL (ref 8–23)
CALCIUM SERPL-MCNC: 9.3 MG/DL (ref 8.7–10.5)
CHLORIDE SERPL-SCNC: 102 MMOL/L (ref 95–110)
CHOLEST SERPL-MCNC: 118 MG/DL (ref 120–199)
CHOLEST SERPL-MCNC: 124 MG/DL (ref 120–199)
CHOLEST/HDLC SERPL: 2.3 {RATIO} (ref 2–5)
CHOLEST/HDLC SERPL: 2.4 {RATIO} (ref 2–5)
CO2 SERPL-SCNC: 33 MMOL/L (ref 23–29)
CREAT SERPL-MCNC: 0.8 MG/DL (ref 0.5–1.4)
CV ECHO LV RWT: 0.74 CM
DELSYS: ABNORMAL
DOP CALC AO PEAK VEL: 2.59 M/S
DOP CALC AO VTI: 44.29 CM
DOP CALC LVOT AREA: 3.05 CM2
DOP CALC LVOT DIAMETER: 1.97 CM
DOP CALC LVOT PEAK VEL: 1.23 M/S
DOP CALC LVOT STROKE VOLUME: 63.55 CM3
DOP CALCLVOT PEAK VEL VTI: 20.86 CM
E WAVE DECELERATION TIME: 391.95 MSEC
E/A RATIO: 0.74
E/E' RATIO: 14.62
ECHO LV POSTERIOR WALL: 1.1 CM (ref 0.6–1.1)
EST. GFR  (AFRICAN AMERICAN): >60 ML/MIN/1.73 M^2
EST. GFR  (NON AFRICAN AMERICAN): >60 ML/MIN/1.73 M^2
ESTIMATED AVG GLUCOSE: 123 MG/DL (ref 68–131)
FIO2: 50
FLOW: 15
FRACTIONAL SHORTENING: 28 % (ref 28–44)
GLUCOSE SERPL-MCNC: 104 MG/DL (ref 70–110)
HBA1C MFR BLD HPLC: 5.9 % (ref 4–5.6)
HCO3 UR-SCNC: 27.1 MMOL/L (ref 24–28)
HDLC SERPL-MCNC: 49 MG/DL (ref 40–75)
HDLC SERPL-MCNC: 53 MG/DL (ref 40–75)
HDLC SERPL: 41.5 % (ref 20–50)
HDLC SERPL: 42.7 % (ref 20–50)
INR PPP: 1 (ref 0.8–1.2)
INTERVENTRICULAR SEPTUM: 1.05 CM (ref 0.6–1.1)
LA MAJOR: 5.94 CM
LA MINOR: 4.89 CM
LA WIDTH: 3.84 CM
LDLC SERPL CALC-MCNC: 58.4 MG/DL (ref 63–159)
LDLC SERPL CALC-MCNC: 59.4 MG/DL (ref 63–159)
LEFT ATRIUM SIZE: 4.2 CM
LEFT ATRIUM VOLUME INDEX: 44.9 ML/M2
LEFT ATRIUM VOLUME: 73.54 CM3
LEFT INTERNAL DIMENSION IN SYSTOLE: 2.15 CM (ref 2.1–4)
LEFT VENTRICLE DIASTOLIC VOLUME INDEX: 21.21 ML/M2
LEFT VENTRICLE DIASTOLIC VOLUME: 34.74 ML
LEFT VENTRICLE MASS INDEX: 55.8 G/M2
LEFT VENTRICLE SYSTOLIC VOLUME INDEX: 9.3 ML/M2
LEFT VENTRICLE SYSTOLIC VOLUME: 15.2 ML
LEFT VENTRICULAR INTERNAL DIMENSION IN DIASTOLE: 2.99 CM (ref 3.5–6)
LEFT VENTRICULAR MASS: 91.34 G
LV LATERAL E/E' RATIO: 11.88
LV SEPTAL E/E' RATIO: 19
MODE: ABNORMAL
MV PEAK A VEL: 1.28 M/S
MV PEAK E VEL: 0.95 M/S
NONHDLC SERPL-MCNC: 69 MG/DL
NONHDLC SERPL-MCNC: 71 MG/DL
PCO2 BLDA: 37.4 MMHG (ref 35–45)
PH SMN: 7.47 [PH] (ref 7.35–7.45)
PO2 BLDA: 60 MMHG (ref 80–100)
POC BE: 3 MMOL/L
POC SATURATED O2: 92 % (ref 95–100)
POC TCO2: 28 MMOL/L (ref 23–27)
POCT GLUCOSE: 122 MG/DL (ref 70–110)
POCT GLUCOSE: 132 MG/DL (ref 70–110)
POCT GLUCOSE: 134 MG/DL (ref 70–110)
POCT GLUCOSE: 142 MG/DL (ref 70–110)
POTASSIUM SERPL-SCNC: 2.8 MMOL/L (ref 3.5–5.1)
POTASSIUM SERPL-SCNC: 3.4 MMOL/L (ref 3.5–5.1)
PROT SERPL-MCNC: 7 G/DL (ref 6–8.4)
PROTHROMBIN TIME: 10.5 SEC (ref 9–12.5)
RA MAJOR: 4.35 CM
RA WIDTH: 2.94 CM
RIGHT VENTRICULAR END-DIASTOLIC DIMENSION: 3.03 CM
SAMPLE: ABNORMAL
SINUS: 2.49 CM
SITE: ABNORMAL
SODIUM SERPL-SCNC: 145 MMOL/L (ref 136–145)
STJ: 2.34 CM
TDI LATERAL: 0.08
TDI SEPTAL: 0.05
TDI: 0.07
TRIGL SERPL-MCNC: 53 MG/DL (ref 30–150)
TRIGL SERPL-MCNC: 58 MG/DL (ref 30–150)
TSH SERPL DL<=0.005 MIU/L-ACNC: 2.29 UIU/ML (ref 0.4–4)
TSH SERPL DL<=0.005 MIU/L-ACNC: 2.84 UIU/ML (ref 0.4–4)

## 2019-05-21 PROCEDURE — 85730 THROMBOPLASTIN TIME PARTIAL: CPT

## 2019-05-21 PROCEDURE — 84443 ASSAY THYROID STIM HORMONE: CPT

## 2019-05-21 PROCEDURE — 99900035 HC TECH TIME PER 15 MIN (STAT)

## 2019-05-21 PROCEDURE — 82803 BLOOD GASES ANY COMBINATION: CPT

## 2019-05-21 PROCEDURE — 12000002 HC ACUTE/MED SURGE SEMI-PRIVATE ROOM

## 2019-05-21 PROCEDURE — 99223 PR INITIAL HOSPITAL CARE,LEVL III: ICD-10-PCS | Mod: ,,, | Performed by: NURSE PRACTITIONER

## 2019-05-21 PROCEDURE — 94761 N-INVAS EAR/PLS OXIMETRY MLT: CPT

## 2019-05-21 PROCEDURE — G0425 PR INPT TELEHEALTH CONSULT 30M: ICD-10-PCS | Mod: GT,G0,, | Performed by: PSYCHIATRY & NEUROLOGY

## 2019-05-21 PROCEDURE — 99285 PR EMERGENCY DEPT VISIT,LEVEL V: ICD-10-PCS | Mod: ,,, | Performed by: NURSE PRACTITIONER

## 2019-05-21 PROCEDURE — 80061 LIPID PANEL: CPT

## 2019-05-21 PROCEDURE — 82962 GLUCOSE BLOOD TEST: CPT

## 2019-05-21 PROCEDURE — 84132 ASSAY OF SERUM POTASSIUM: CPT

## 2019-05-21 PROCEDURE — 25000003 PHARM REV CODE 250: Performed by: NURSE PRACTITIONER

## 2019-05-21 PROCEDURE — 25500020 PHARM REV CODE 255: Performed by: EMERGENCY MEDICINE

## 2019-05-21 PROCEDURE — 99285 EMERGENCY DEPT VISIT HI MDM: CPT | Mod: 25

## 2019-05-21 PROCEDURE — 82800 BLOOD PH: CPT

## 2019-05-21 PROCEDURE — 93010 EKG 12-LEAD: ICD-10-PCS | Mod: ,,, | Performed by: INTERNAL MEDICINE

## 2019-05-21 PROCEDURE — 99291 PR CRITICAL CARE, E/M 30-74 MINUTES: ICD-10-PCS | Mod: GC,,, | Performed by: PSYCHIATRY & NEUROLOGY

## 2019-05-21 PROCEDURE — 63600175 PHARM REV CODE 636 W HCPCS: Performed by: RADIOLOGY

## 2019-05-21 PROCEDURE — G0425 INPT/ED TELECONSULT30: HCPCS | Mod: GT,G0,, | Performed by: PSYCHIATRY & NEUROLOGY

## 2019-05-21 PROCEDURE — 93005 ELECTROCARDIOGRAM TRACING: CPT

## 2019-05-21 PROCEDURE — 86850 RBC ANTIBODY SCREEN: CPT

## 2019-05-21 PROCEDURE — 99285 EMERGENCY DEPT VISIT HI MDM: CPT | Mod: ,,, | Performed by: NURSE PRACTITIONER

## 2019-05-21 PROCEDURE — 93010 ELECTROCARDIOGRAM REPORT: CPT | Mod: ,,, | Performed by: INTERNAL MEDICINE

## 2019-05-21 PROCEDURE — 27000221 HC OXYGEN, UP TO 24 HOURS

## 2019-05-21 PROCEDURE — 99291 CRITICAL CARE FIRST HOUR: CPT | Mod: GC,,, | Performed by: PSYCHIATRY & NEUROLOGY

## 2019-05-21 PROCEDURE — 83036 HEMOGLOBIN GLYCOSYLATED A1C: CPT

## 2019-05-21 PROCEDURE — 96365 THER/PROPH/DIAG IV INF INIT: CPT

## 2019-05-21 PROCEDURE — 20000000 HC ICU ROOM

## 2019-05-21 PROCEDURE — 85610 PROTHROMBIN TIME: CPT

## 2019-05-21 PROCEDURE — 92610 EVALUATE SWALLOWING FUNCTION: CPT

## 2019-05-21 PROCEDURE — 99223 1ST HOSP IP/OBS HIGH 75: CPT | Mod: ,,, | Performed by: NURSE PRACTITIONER

## 2019-05-21 PROCEDURE — 36600 WITHDRAWAL OF ARTERIAL BLOOD: CPT

## 2019-05-21 PROCEDURE — 25000003 PHARM REV CODE 250: Performed by: RADIOLOGY

## 2019-05-21 PROCEDURE — 81001 URINALYSIS AUTO W/SCOPE: CPT

## 2019-05-21 PROCEDURE — 63600175 PHARM REV CODE 636 W HCPCS: Performed by: NURSE PRACTITIONER

## 2019-05-21 RX ORDER — MAGNESIUM SULFATE HEPTAHYDRATE 40 MG/ML
2 INJECTION, SOLUTION INTRAVENOUS
Status: DISCONTINUED | OUTPATIENT
Start: 2019-05-21 | End: 2019-05-22

## 2019-05-21 RX ORDER — IODIXANOL 320 MG/ML
200 INJECTION, SOLUTION INTRAVASCULAR
Status: COMPLETED | OUTPATIENT
Start: 2019-05-21 | End: 2019-05-21

## 2019-05-21 RX ORDER — SODIUM CHLORIDE 9 MG/ML
INJECTION, SOLUTION INTRAVENOUS CONTINUOUS
Status: DISCONTINUED | OUTPATIENT
Start: 2019-05-21 | End: 2019-05-29

## 2019-05-21 RX ORDER — MAGNESIUM SULFATE HEPTAHYDRATE 40 MG/ML
4 INJECTION, SOLUTION INTRAVENOUS
Status: DISCONTINUED | OUTPATIENT
Start: 2019-05-21 | End: 2019-05-22

## 2019-05-21 RX ORDER — SODIUM CHLORIDE 0.9 % (FLUSH) 0.9 %
10 SYRINGE (ML) INJECTION
Status: DISCONTINUED | OUTPATIENT
Start: 2019-05-21 | End: 2019-06-07 | Stop reason: HOSPADM

## 2019-05-21 RX ORDER — SODIUM CHLORIDE 0.9 % (FLUSH) 0.9 %
10 SYRINGE (ML) INJECTION
Status: CANCELLED | OUTPATIENT
Start: 2019-05-21

## 2019-05-21 RX ORDER — INSULIN ASPART 100 [IU]/ML
0-5 INJECTION, SOLUTION INTRAVENOUS; SUBCUTANEOUS EVERY 6 HOURS PRN
Status: DISCONTINUED | OUTPATIENT
Start: 2019-05-21 | End: 2019-06-07 | Stop reason: HOSPADM

## 2019-05-21 RX ORDER — MIDAZOLAM HYDROCHLORIDE 1 MG/ML
INJECTION INTRAMUSCULAR; INTRAVENOUS CODE/TRAUMA/SEDATION MEDICATION
Status: COMPLETED | OUTPATIENT
Start: 2019-05-21 | End: 2019-05-21

## 2019-05-21 RX ORDER — POTASSIUM CHLORIDE 7.45 MG/ML
10 INJECTION INTRAVENOUS
Status: DISCONTINUED | OUTPATIENT
Start: 2019-05-21 | End: 2019-05-21

## 2019-05-21 RX ORDER — IPRATROPIUM BROMIDE AND ALBUTEROL SULFATE 2.5; .5 MG/3ML; MG/3ML
3 SOLUTION RESPIRATORY (INHALATION) EVERY 8 HOURS
Status: DISCONTINUED | OUTPATIENT
Start: 2019-05-22 | End: 2019-06-07 | Stop reason: HOSPADM

## 2019-05-21 RX ORDER — ATORVASTATIN CALCIUM 20 MG/1
40 TABLET, FILM COATED ORAL DAILY
Status: DISCONTINUED | OUTPATIENT
Start: 2019-05-22 | End: 2019-05-29

## 2019-05-21 RX ORDER — AMOXICILLIN 250 MG
1 CAPSULE ORAL 2 TIMES DAILY
Status: DISCONTINUED | OUTPATIENT
Start: 2019-05-22 | End: 2019-05-29

## 2019-05-21 RX ORDER — SODIUM CHLORIDE 9 MG/ML
INJECTION, SOLUTION INTRAVENOUS CONTINUOUS
Status: CANCELLED | OUTPATIENT
Start: 2019-05-21

## 2019-05-21 RX ORDER — POTASSIUM CHLORIDE 7.45 MG/ML
40 INJECTION INTRAVENOUS
Status: CANCELLED | OUTPATIENT
Start: 2019-05-21

## 2019-05-21 RX ORDER — ONDANSETRON 2 MG/ML
4 INJECTION INTRAMUSCULAR; INTRAVENOUS EVERY 8 HOURS PRN
Status: DISCONTINUED | OUTPATIENT
Start: 2019-05-21 | End: 2019-06-07 | Stop reason: HOSPADM

## 2019-05-21 RX ORDER — ACETAMINOPHEN 325 MG/1
650 TABLET ORAL EVERY 6 HOURS PRN
Status: DISCONTINUED | OUTPATIENT
Start: 2019-05-21 | End: 2019-05-24

## 2019-05-21 RX ORDER — ATORVASTATIN CALCIUM 20 MG/1
40 TABLET, FILM COATED ORAL DAILY
Status: DISCONTINUED | OUTPATIENT
Start: 2019-05-21 | End: 2019-05-21

## 2019-05-21 RX ORDER — POTASSIUM CHLORIDE 7.45 MG/ML
80 INJECTION INTRAVENOUS
Status: CANCELLED | OUTPATIENT
Start: 2019-05-21

## 2019-05-21 RX ORDER — MAGNESIUM SULFATE HEPTAHYDRATE 40 MG/ML
4 INJECTION, SOLUTION INTRAVENOUS
Status: CANCELLED | OUTPATIENT
Start: 2019-05-21

## 2019-05-21 RX ORDER — MAGNESIUM SULFATE HEPTAHYDRATE 40 MG/ML
2 INJECTION, SOLUTION INTRAVENOUS
Status: CANCELLED | OUTPATIENT
Start: 2019-05-21

## 2019-05-21 RX ORDER — ASPIRIN 300 MG/1
300 SUPPOSITORY RECTAL ONCE
Status: DISCONTINUED | OUTPATIENT
Start: 2019-05-21 | End: 2019-05-21

## 2019-05-21 RX ORDER — HYDROCODONE BITARTRATE AND ACETAMINOPHEN 10; 325 MG/1; MG/1
1 TABLET ORAL EVERY 8 HOURS PRN
Status: ON HOLD | COMMUNITY
End: 2019-06-06 | Stop reason: HOSPADM

## 2019-05-21 RX ORDER — GLUCAGON 1 MG
1 KIT INJECTION
Status: DISCONTINUED | OUTPATIENT
Start: 2019-05-21 | End: 2019-06-07 | Stop reason: HOSPADM

## 2019-05-21 RX ORDER — NICARDIPINE HYDROCHLORIDE 0.2 MG/ML
5 INJECTION INTRAVENOUS CONTINUOUS
Status: DISCONTINUED | OUTPATIENT
Start: 2019-05-21 | End: 2019-05-23

## 2019-05-21 RX ORDER — ASPIRIN 300 MG/1
300 SUPPOSITORY RECTAL DAILY
Status: CANCELLED | OUTPATIENT
Start: 2019-05-22

## 2019-05-21 RX ORDER — POTASSIUM CHLORIDE 7.45 MG/ML
60 INJECTION INTRAVENOUS
Status: CANCELLED | OUTPATIENT
Start: 2019-05-21

## 2019-05-21 RX ORDER — SODIUM CHLORIDE FOR INHALATION 3 %
4 VIAL, NEBULIZER (ML) INHALATION EVERY 8 HOURS
Status: DISCONTINUED | OUTPATIENT
Start: 2019-05-22 | End: 2019-06-07 | Stop reason: HOSPADM

## 2019-05-21 RX ORDER — AMOXICILLIN 250 MG
1 CAPSULE ORAL 2 TIMES DAILY
Status: DISCONTINUED | OUTPATIENT
Start: 2019-05-21 | End: 2019-05-21

## 2019-05-21 RX ORDER — POTASSIUM CHLORIDE 7.45 MG/ML
40 INJECTION INTRAVENOUS
Status: DISCONTINUED | OUTPATIENT
Start: 2019-05-21 | End: 2019-05-22

## 2019-05-21 RX ORDER — ASPIRIN 300 MG/1
300 SUPPOSITORY RECTAL ONCE
Status: COMPLETED | OUTPATIENT
Start: 2019-05-21 | End: 2019-05-21

## 2019-05-21 RX ADMIN — IOHEXOL 100 ML: 350 INJECTION, SOLUTION INTRAVENOUS at 02:05

## 2019-05-21 RX ADMIN — MIDAZOLAM HYDROCHLORIDE 0.5 MG: 1 INJECTION, SOLUTION INTRAMUSCULAR; INTRAVENOUS at 07:05

## 2019-05-21 RX ADMIN — SODIUM CHLORIDE: 0.9 INJECTION, SOLUTION INTRAVENOUS at 09:05

## 2019-05-21 RX ADMIN — NICARDIPINE HYDROCHLORIDE 2.5 MG/HR: 0.2 INJECTION, SOLUTION INTRAVENOUS at 12:05

## 2019-05-21 RX ADMIN — ASPIRIN 300 MG: 300 SUPPOSITORY RECTAL at 02:05

## 2019-05-21 RX ADMIN — NICARDIPINE HYDROCHLORIDE 2.5 MG/HR: 0.2 INJECTION, SOLUTION INTRAVENOUS at 10:05

## 2019-05-21 RX ADMIN — MIDAZOLAM HYDROCHLORIDE 0.5 MG: 1 INJECTION, SOLUTION INTRAMUSCULAR; INTRAVENOUS at 08:05

## 2019-05-21 RX ADMIN — POTASSIUM CHLORIDE 10 MEQ: 7.46 INJECTION, SOLUTION INTRAVENOUS at 01:05

## 2019-05-21 RX ADMIN — POTASSIUM CHLORIDE 10 MEQ: 7.46 INJECTION, SOLUTION INTRAVENOUS at 02:05

## 2019-05-21 RX ADMIN — IODIXANOL 160 ML: 320 INJECTION, SOLUTION INTRAVASCULAR at 08:05

## 2019-05-21 NOTE — ASSESSMENT & PLAN NOTE
Found on last admit 5-2018 was on Eliquis but once out of refills not given  Will need anticoagulation again

## 2019-05-21 NOTE — PROGRESS NOTES
Patient arrived to [facility] from [facility]  Via ambulance  Type of stroke/diagnosis:  L MCA s/p thrombectomy        Thrombectomy  end time 0820    Current symptoms: left gaze preference, right side withdraws to pain, nonverbal     Skin assessment done: Y  Wounds noted: DTI to right ear. Bruise on right foot and ankle, and pressure injury noted between right little toe, moisture associate dermatitis to groin area    NCC notified: Brenda REYES

## 2019-05-21 NOTE — H&P
Inpatient Radiology Pre-procedure Note    History of Present Illness:  Sury Cobos is a 84 y.o. female who presents for cerebral angiogram with possible thrombectomy.  Admission H&P reviewed.  Past Medical History:   Diagnosis Date    Aphasia 5/27/2018    Arthritis     Benign essential HTN     CHF (congestive heart failure)     Closed intertrochanteric fracture of left hip     Coronary artery disease     Dementia with behavioral disturbance 5/27/2018    Diabetes mellitus     Embolic stroke involving right middle cerebral artery 5/27/2018    Gout     High cholesterol     Low potassium syndrome     PEG (percutaneous endoscopic gastrostomy) status 6/4/2018    Placed 6/4/18    Urinary incontinence      Past Surgical History:   Procedure Laterality Date    CARDIAC SURGERY      CORONARY ARTERY BYPASS GRAFT      EGD (ESOPHAGOGASTRODUODENOSCOPY) N/A 6/5/2018    Performed by Madhu Bliss MD at [facility] OR [address] [place] [address]    EGD, WITH PEG TUBE INSERTION N/A 6/5/2018    Performed by Madhu Bliss MD at [facility] OR [address] [place] [address]    reduction & internal fixation of displaced closed comminuted intertrochanteric fx left hip  02/12/2018       Review of Systems:   As documented in primary team H&P    Home Meds:   Prior to Admission medications    Medication Sig Start Date End Date Taking? Authorizing Provider   atorvastatin (LIPITOR) 40 MG tablet 1 tablet (40 mg total) by Per G Tube route once daily.  Patient taking differently: Take 40 mg by mouth once daily. Crush meds 6/12/18 6/12/19  Adrianna Perez PA-C   buPROPion (WELLBUTRIN) 75 MG tablet 2 tablets (150 mg total) by Per G Tube route 2 (two) times daily.  Patient taking differently: Take 150 mg by mouth 2 (two) times daily. Crush meds 6/11/18 6/11/19  Adrianna Perez PA-C   cloNIDine (CATAPRES) 0.1 MG tablet 1 tablet (0.1 mg total) by Per G Tube route 2 (two) times daily.  Patient taking differently: Take 0.1 mg by mouth 2 (two) times daily. Crush meds  6/11/18   Adrianna Perez PA-C   memantine (NAMENDA) 10 MG Tab Take 5 mg by mouth 2 (two) times daily. Crush meds    Historical Provider, MD   traZODone (DESYREL) 50 MG tablet Take 50 mg by mouth nightly as needed for Insomnia.    Historical Provider, MD     Scheduled Meds:   Continuous Infusions:   PRN Meds:  Anticoagulants/Antiplatelets: no anticoagulation    Allergies: Review of patient's allergies indicates:  No Known Allergies  Sedation Hx: have not been any systemic reactions    Labs:  Recent Labs   Lab 05/20/19 2315   INR 1.0       Recent Labs   Lab 05/20/19 2315   WBC 9.30   HGB 11.0*   HCT 34.5*   MCV 90   PLT 96*      Recent Labs   Lab 05/20/19 2315         K 2.8*      CO2 33*   BUN 15   CREATININE 0.8   CALCIUM 9.3   ALT 15   AST 16   ALBUMIN 3.7   BILITOT 0.4         Vitals:  Temp: 97.9 °F (36.6 °C) (05/21/19 0554)  Pulse: (!) 58 (05/21/19 0640)  Resp: 20 (05/21/19 0554)  BP: (!) 166/87 (05/21/19 0640)  SpO2: 99 % (05/21/19 0640)     Physical Exam:  ASA: 3  Mallampati: 2      HEENT: normocephalic, atraumatic  Chest: unlabored breathing  Heart: regular heart rate  Abdomen: nondistended  Neuro: L mCA syndrome, Please see Vascular Neurology H & P for full details.    Plan: Patient presents with L MCA syndrome. MRI intervention protocol shows only modest diffusion changes. Patient was sent for intervention. I tried to contact family multiple time for consent although they were not AVailable. Alfredo Marx and Colton and myself agree this is an emergency, therefore, emergency consent obtained.  Sedation Plan: Up to moderate.    Richy Conroy MD  Department of Radiology  Pager: [phone]

## 2019-05-21 NOTE — ED NOTES
Assumed care:    Patient awake, alert, skin warm and dry, in NAD with family at bedside.  Hep Lock in place, site OK, side rails up, call bell within reach.  Patient arrived via EMS.    Patient identifiers for Sury Cobos checked and correct.  LOC:  Patient is awake and alert. Patient is unable to speak.    APPEARANCE:  Patient resting comfortably and in no acute distress. Patient is clean and well groomed, patient's clothing is properly fastened.  SKIN:  The skin is warm and dry. Patient has normal skin turgor and moist mucus membranes. Skin is intact; no bruising or breakdown noted.  MUSCULOSKELETAL:  Patient is moving all extremities well, no obvious deformities noted. Pulses intact.  Weak  RESPIRATORY:  Airway is open and patent. Respirations are spontaneous and non-labored with normal effort and rate.  CARDIAC:  Patient has a normal rate and rhythm. No peripheral edema noted. Capillary refill < 3 seconds.  ABDOMEN:  No distention noted.  Soft and non-tender upon palpation.  Incontinent.    NEUROLOGICAL:  PERRL. Facial expression is symmetrical. Patient is not following commands.  Patient is moving all extremities.  Allergies reported:  Review of patient's allergies indicates:  No Known Allergies  OTHER NOTES:  Patient was last seen normal around 9pm.  Family states that patient is normally very talkative and ambulates with walker.  Patient fell out of bed, now with weakness to bilateral extremities R>L, patient is unable to follow commands, unable to talk.

## 2019-05-21 NOTE — CONSULTS
Please see consult note from Vascular neurology on 5/21/19.      DAVID Burns-C  [facility] [place] - [id number]  Department of Vascular Neurology   [facility]

## 2019-05-21 NOTE — PLAN OF CARE
05/21/19 1425   Discharge Assessment   Assessment Type Discharge Planning Assessment   Confirmed/corrected address and phone number on facesheet? Yes   Assessment information obtained from? Caregiver  (daughter)   Expected Length of Stay (days) 3   Communicated expected length of stay with patient/caregiver yes   Prior to hospitilization cognitive status: Unable to Assess  (Per Daughter, patient cannot hold conversations at baseline/ can speak)   Prior to hospitalization functional status: Completely Dependent   Current cognitive status: Not Oriented to Place;Not Oriented to Time;Not Oriented to Person   Current Functional Status: Completely Dependent   Lives With child(tiana), adult  (daughter)   Able to Return to Prior Arrangements yes   Is patient able to care for self after discharge? No   Who are your caregiver(s) and their phone number(s)? Lauren Benítez (dtr) [phone]   Patient's perception of discharge disposition other (comments)  (selwyn)   Readmission Within the Last 30 Days no previous admission in last 30 days   Patient currently being followed by outpatient case management? No   Patient currently receives any other outside agency services? No   Equipment Currently Used at Home hospital bed;wheelchair;shower chair;rollator;bedside commode   Do you have any problems affording any of your prescribed medications? No   Is the patient taking medications as prescribed? yes   Does the patient have transportation home? Yes   Transportation Anticipated family or friend will provide   Does the patient receive services at the [facility]? No   Discharge Plan A Skilled Nursing Facility   Discharge Plan B Home with family;Home Health   DME Needed Upon Discharge  other (see comments)  (tbd)   Patient/Family in Agreement with Plan yes         Discharge/ My Health Packet Folder Given to patient/family:      yes      PCP:  Ese Moya MD        Pharmacy:    [facility] [address]  [facility] [address]  Phone: [phone] Fax: [phone]    [facility] - [place], [place] - [address]  [address] [place] [address]  Phone: [phone] Fax: [phone]        Emergency Contacts:  Extended Emergency Contact Information  Primary Emergency Contact: Lauren Benítez   [facility]  Home Phone: [phone]  Relation: Daughter      Insurance:  Payor: [employer] / Plan: [employer] / Product Type: Medicare Advantage /     Suzy Moreno RN, CCRN-K, [facility]  Neuro-Critical Care   [phone]

## 2019-05-21 NOTE — PLAN OF CARE
Problem: SLP Goal  Goal: SLP Goal  Speech Language Pathology Goals  Goals expected to be met by 5/28:  1. Patient will participate in ongoing swallow assessment to determine least restrictive diet recommendations.   2. Patient will participate in full speech, language, and cognitive assessment when appropriate.       Bedside swallow eval completed with implemented plan of care. Patient to remain NPO at this time.   Emily P. Abadie M.S., CCC-SLP  Speech Language Pathologist  [phone]  05/21/2019

## 2019-05-21 NOTE — PROGRESS NOTES
Patient transferred to the MRI bed,, tele, O2 sensor and O2 at 2L,, and BP cuff applied,,  Placed in MRI,, tolerating well,,

## 2019-05-21 NOTE — PT/OT/SLP EVAL
Speech Language Pathology Evaluation  Bedside Swallow    Patient Name:  Sury Cobos   MRN:  [id number]  Admitting Diagnosis: Acute ischemic left MCA stroke    Recommendations:                 General Recommendations:  Speech language evaluation, Cognitive-linguistic evaluation and ongoing swallow assessment  Diet recommendations:  NPO NPO,     Aspiration Precautions: Strict aspiration precautions   General Precautions: Standard, aspiration  Communication strategies:  go to room if call light pushed    History:     Past Medical History:   Diagnosis Date    Aphasia 5/27/2018    Arthritis     Benign essential HTN     CHF (congestive heart failure)     Closed intertrochanteric fracture of left hip     Coronary artery disease     Dementia with behavioral disturbance 5/27/2018    Diabetes mellitus     Embolic stroke involving right middle cerebral artery 5/27/2018    Gout     High cholesterol     Low potassium syndrome     PEG (percutaneous endoscopic gastrostomy) status 6/4/2018    Placed 6/4/18    Urinary incontinence        Past Surgical History:   Procedure Laterality Date    CARDIAC SURGERY      CORONARY ARTERY BYPASS GRAFT      EGD (ESOPHAGOGASTRODUODENOSCOPY) N/A 6/5/2018    Performed by Madhu Bliss MD at [facility] OR [address] [place] [address]    EGD, WITH PEG TUBE INSERTION N/A 6/5/2018    Performed by Madhu Bliss MD at [facility] OR [address] [place] [address]    reduction & internal fixation of displaced closed comminuted intertrochanteric fx left hip  02/12/2018       Prior diet: regular / thin per family.     Subjective     Pt asleep upon ST to room.  Daughter and granddaughter at bedside.     Objective:     Oral Musculature Evaluation  · Oral Musculature: unable to assess due to poor participation/comprehension    Bedside Swallow Eval:   Consistencies Assessed:  · Thin liquids via ice chip x1     ST provided max A for arousal, though not beneficial; patient with no response to sternal rub, auditory or tactile cues,  HOB elevation, or ice chip to labial surface. Patient is not appropriate for PO trials or diet implementation at this time 2/2 current XAVI. ST to continue to follow. Skilled education was provided to  family members re: diet recs for continued NPO and ongoing ST plan of care. Family members verbalized understanding. RN notified.   Assessment:     Sury Cobos is a 84 y.o. female with an SLP diagnosis of Dysphagia.     Goals:   Multidisciplinary Problems     SLP Goals        Problem: SLP Goal    Goal Priority Disciplines Outcome   SLP Goal     SLP    Description:  Speech Language Pathology Goals  Goals expected to be met by 5/28:  1. Patient will participate in ongoing swallow assessment to determine least restrictive diet recommendations.   2. Patient will participate in full speech, language, and cognitive assessment when appropriate.                         Plan:     · Patient to be seen:  5 x/week   · Plan of Care expires:  06/21/19  · Plan of Care reviewed with:  patient, family   · SLP Follow-Up:  Yes       Discharge recommendations:  other (see comments)   Barriers to Discharge:  None    Time Tracking:     SLP Treatment Date:   05/21/19  Speech Start Time:  1145  Speech Stop Time:  1155     Speech Total Time (min):  10 min    Billable Minutes: Eval Swallow and Oral Function 10    Emily Abadie, CCC-SLP  05/21/2019

## 2019-05-21 NOTE — ASSESSMENT & PLAN NOTE
Chronic, holding oral statin therapy as Pt is unable to swallow.  Resume when clinically appropriate.

## 2019-05-21 NOTE — ASSESSMENT & PLAN NOTE
- Patient w/ known history of Afib, off of AC due to inability to refill medication   - TTE pending

## 2019-05-21 NOTE — ASSESSMENT & PLAN NOTE
Chronic, allowing for permissive HTN in light of suspected CVA.  Monitor BP closely.  Do not treat unless BP exceeds 220/110.

## 2019-05-21 NOTE — ASSESSMENT & PLAN NOTE
Pt with significant aphasia and right-sided weakness.  Underwent tele CVA consultation-felt not to be a candidate for tPA due to uncertain clinical exam.  Suspect rather significant CVA.  Pt has not been on Eliquis for an undetermined amount of time, nor she on aspirin and Plavix.  Her last CVA was presumed to be of embolic nature with transient AFib noted on monitor while in Neuro Critical Care.  I discussed case with Dr. Kunz.  Obtaining stat CTA head and neck to rule out large vessel occlusion.  MRI, MRA brain in a.m..  Received rectal aspirin.  Monitor on telemetry.  Obtain TTE.  Neuro consult.  Close monitoring in ICU.  Permissive HTN.  Consult PT, OT, SLP.  Maintain fall and seizure precautions.

## 2019-05-21 NOTE — ED PROVIDER NOTES
Encounter Date: 5/21/2019       History     Chief Complaint   Patient presents with    [place] Transfer- Neuro Transfer     Patient transferred for Neuro Eval. LSN per family at 2000. Patient had a fall, went to ER at 2300. Patient not following commands, has left gaze. Non verbal. Did not receive TPA.      Patient is an 84-year-old female with medical history of aphasia, arthritis, hypertension, CHF, CAD, dementia, gout presenting to the ED as a stroke transfer from [facility]. As per family pt went to bed at approximatly 2000.  Pt family heard something fall at 2300 and found pt laying on the floor unable to speak.    Patient's family heard her fall.  Last known normal was approximately 2200.  Or patient found mg she was unable to speak.  Patient has a history of remote CVA with no deficits.  CTA head and neck showed a left middle cerebral artery stroke.  Stroke code activated upon arrival to the the ED .  Stroke NP aware of patient.  CT head ordered.  Unable to get ROS due to AMS and acquity of condition.      The history is provided by the EMS personnel and medical records.     Review of patient's allergies indicates:  No Known Allergies  Past Medical History:   Diagnosis Date    Acute ischemic left MCA stroke 5/21/2019    Aphasia 5/27/2018    Arthritis     Benign essential HTN     CHF (congestive heart failure)     Closed intertrochanteric fracture of left hip     Coronary artery disease     Dementia with behavioral disturbance 5/27/2018    Diabetes mellitus     Embolic stroke involving right middle cerebral artery 5/27/2018    Gout     High cholesterol     Low potassium syndrome     Paroxysmal atrial fibrillation 6/1/2018    PEG (percutaneous endoscopic gastrostomy) status 6/4/2018    Placed 6/4/18    Urinary incontinence      Past Surgical History:   Procedure Laterality Date    CARDIAC SURGERY      CORONARY ARTERY BYPASS GRAFT      EGD (ESOPHAGOGASTRODUODENOSCOPY) N/A  6/5/2018    Performed [facility] at [facility] 2ND FLR    EGD, WITH PEG TUBE INSERTION N/A 6/5/2018    Performed by [facility] at [facility] 2ND FLR    reduction & internal fixation of displaced closed comminuted intertrochanteric fx left hip  02/12/2018     History reviewed. No pertinent family history.  Social History     Tobacco Use    Smoking status: Current Every Day Smoker     Packs/day: 3.00    Smokeless tobacco: Never Used   Substance Use Topics    Alcohol use: Yes     Comment: socially    Drug use: No     Review of Systems   Unable to perform ROS: Mental status change       Physical Exam     Initial Vitals   BP Pulse Resp Temp SpO2   05/21/19 0630 05/21/19 0554 05/21/19 0554 05/21/19 0554 05/21/19 0554   (!) 177/79 68 20 97.9 °F (36.6 °C) 99 %      MAP       --                Physical Exam    Nursing note and vitals reviewed.  Constitutional: Vital signs are normal. She appears well-developed and well-nourished. She appears lethargic. She is cooperative. She does not have a sickly appearance. She does not appear ill. No distress.   HENT:   Head: Normocephalic and atraumatic.   Mouth/Throat: Uvula is midline, oropharynx is clear and moist and mucous membranes are normal.   Eyes: Conjunctivae, EOM and lids are normal. Pupils are equal, round, and reactive to light.   Pupils equal round reactive 2-1 mm   Neck: Trachea normal and phonation normal. Neck supple.   Cardiovascular: Normal rate and regular rhythm.   Pulses:       Radial pulses are 2+ on the right side, and 2+ on the left side.        Dorsalis pedis pulses are 2+ on the right side, and 2+ on the left side.   Pulmonary/Chest: Effort normal and breath sounds normal.   Abdominal: Soft. Normal appearance and bowel sounds are normal.   Musculoskeletal: Normal range of motion.   Neurological: She is oriented to person, place, and time. She appears lethargic. GCS eye subscore is 4. GCS verbal subscore is 1. GCS motor  subscore is 5.   Skin: Skin is warm, dry and intact. Capillary refill takes more than 3 seconds. No abrasion and no rash noted. No cyanosis. Nails show no clubbing.         ED Course   Procedures  Labs Reviewed   POCT GLUCOSE - Abnormal; Notable for the following components:       Result Value    POCT Glucose 122 (*)     All other components within normal limits        ECG Results          EKG 12-LEAD (Final result)  Result time 06/01/19 09:05:25    Final result by Unknown User (06/01/19 09:05:25)                                 EKG, 12 - Lead (In process)  Result time 05/21/19 12:23:50    In process by Interface, Lab In [facility] (05/21/19 12:23:50)                 Narrative:    Test Reason :     Vent. Rate : 071 BPM     Atrial Rate : 071 BPM     P-R Int : 210 ms          QRS Dur : 094 ms      QT Int : 400 ms       P-R-T Axes : 000 -28 083 degrees     QTc Int : 434 ms    Sinus rhythm with 1st degree A-V block with Premature atrial complexes in a  pattern of bigeminy  Anteroseptal infarct (cited on or before 27-MAY-2018)  Abnormal ECG  When compared with ECG of 20-MAY-2019 23:32,  Previous ECG has undetermined rhythm, needs review    Referred By: STEPHANIE OSORIO           Confirmed By:                                Medical Decision Making:   History:   I obtained history from: someone other than patient, EMS provider and another health care provider.  Old Medical Records: I decided to obtain old medical records.  Old Records Summarized: records from clinic visits, records from previous admission(s), records from another hospital and other records.  Initial Assessment:   Emergent evaluation of a 83 yo female patient presenting to the ER as a stroke transfer. Pt was found laying on her floor at 2300 unable to speak.  Pt family states she went to bed at 8pm at her baseline.  On exam patient is in nonverbal.  No acute distress noted. Pupils equal round reactive 3-2 mm.  Breath sounds clear bilaterally.  Abdomen soft.  Bowel  sounds within normal limits.  Plus two radial and DP pulses noted. NIH stroke scale-25 as per telestroke consult.    Differential Diagnosis:   Differential diagnoses include but are not limited to ischemic CVA, hemorrhagic CVA, TIA, complicated migraine, vasculitis, seizure/post ictal state, and metabolic abnormality.    Clinical Tests:   Radiological Study: Ordered and Reviewed  ED Management:  I will get imaging and reassess.  Stroke team notified will be down to see patient.  I discussed this case with my supervising physician.    Reviewed CTA from outside facility.  Imaging shows a left MCA stroke.  Awaiting further recommendations.    Vascular neurology recommending MRI brain.  Awaiting further recommendations.      Pt signed out to Dr. Flores pending vascular Neurology recommendations.                Attending Attestation:     Physician Attestation Statement for NP/PA:   I have conducted a face to face encounter with this patient in addition to the NP/PA, due to Medical Complexity    Other NP/PA Attestation Additions:    History of Present Illness: 85F with PMH remote CVA (no deficits) presenting as stroke txf from OSH, LKW 10 pm, family heard patient fall and found pt on floor and aphasic. At OSH imaging showed Left MCA occlusion, Code Stroke activated upon arrival.   exam patient is in nonverbal.  No acute distress noted. Pupils equal round reactive 3-2 mm.  Breath sounds clear bilaterally.  Abdomen soft.  Bowel sounds within normal limits.  Plus two radial and DP pulses noted. NIH stroke scale-25 as per telestroke consult.    Differential Diagnosis:      Physical Exam: Alert, aphasic, PERRL, moving all extremities independently, CTAB, RRR   Medical Decision Making: Pt admitted to stroke service for thrombectomy    Attending Note:  Physician Attestation Statement: I have personally seen and examined this patient. As the supervising MD I agree with the above history. As the supervising MD I agree with the  above PE. As the supervising MD I agree with the above treatment, course, plan, and disposition.                       Clinical Impression:       ICD-10-CM ICD-9-CM   1. Acute ischemic left MCA stroke I63.512 434.91   2. Aphasia R47.01 784.3   3. Cerebrovascular accident (CVA), unspecified mechanism I63.9 434.91   4. Fall, initial encounter W19.XXXA E888.9   5. Atrial enlargement, left I51.7 429.3   6. Benign essential HTN I10 401.1   7. Mixed hyperlipidemia E78.2 272.2   8. Paroxysmal atrial fibrillation I48.0 427.31   9. Right spastic hemiparesis G81.11 342.10   10. CVA (cerebral vascular accident) I63.9 434.91   11. Tachycardia R00.0 785.0         Disposition:   Condition: Serious                Maureen Pradhan NP  05/21/19 0623       Bonnie Flores MD  06/07/19 0670

## 2019-05-21 NOTE — H&P
[facility] Hospital Medicine  History & Physical    Patient Name: Sury Cobos  MRN: [id number]  Admission Date: 5/20/2019  Attending Physician: Handy Bedolla MD  Primary Care Provider: Ese Moya MD         Patient information was obtained from relative(s), past medical records and ER records.     Subjective:     Principal Problem:Stroke    Chief Complaint:   Chief Complaint   Patient presents with    Fall     Around 9 got up from bed and fell. Witnessed by family. Pt usually speaks but not verbal since fall.         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  She presented to the ED with her daughter who reports Pt fell out of bed at approximately 9:15 p.m. Patient also has exhibited right-sided weakness, some facial droop with drooling, and aphasia.  Patient's daughter stated at 8:45 p.m. Pt was sitting in a recliner slumped over to her left and when daughter try to converse with her, she responded only and groans and moans.  She states when she tried to get her out of the chair, Pt was too weak to walk.  Pt required 2 person assist to bed or daughter gave her a bed bath and left her to rest.  She states less than 10 min later she heard the Pt fall bed.  She helped her back into bed, however Pt remained aphasic with left-sided facial drooping and drooling that she brought her to the ED for further evaluation.  She reports similar symptoms prior to CVA last year.  She received tPA at that time and was treated in Neuro Critical Care at [facility].  Pt regained full strength and swallowing ability, however briefly required PEG tube and ongoing SLP, PT, OT.  Patient's daughter who she lives with states that Pt has not been on Eliquis for some time as she ran out it was not refilled.  She states that she is compliant with her  physician visits, though she is unsure if she still sees Neurology.  She is not on aspirin or Plavix, however daughter states she takes everything has been prescribed to her.  Per patient's daughter, at baseline she ambulates around the house with a walker.  She is able to feed herself and toilet independently.  She also leaves the house occasionally with her daughter.  She states she is relatively high functioning.  Pt is unable to provide for Hx or contribute to HPI.  Other pertinent medical Hx as below:    Past Medical History:   Diagnosis Date    Aphasia 5/27/2018    Arthritis     Benign essential HTN     CHF (congestive heart failure)     Closed intertrochanteric fracture of left hip     Coronary artery disease     Dementia     Dementia with behavioral disturbance 5/27/2018    Diabetes mellitus     Embolic stroke involving right middle cerebral artery 5/27/2018    Gout     High cholesterol     Low potassium syndrome     PEG (percutaneous endoscopic gastrostomy) status 6/4/2018    Placed 6/4/18    Urinary incontinence        Past Surgical History:   Procedure Laterality Date    CARDIAC SURGERY      CORONARY ARTERY BYPASS GRAFT      EGD (ESOPHAGOGASTRODUODENOSCOPY) N/A 6/5/2018    Performed by Madhu Bliss MD at [facility] OR [address] [place] [address]    EGD, WITH PEG TUBE INSERTION N/A 6/5/2018    Performed by Madhu Bliss MD at [facility] OR 2ND FLR    reduction & internal fixation of displaced closed comminuted intertrochanteric fx left hip  02/12/2018       Review of patient's allergies indicates:  No Known Allergies    No current facility-administered medications on file prior to encounter.      Current Outpatient Medications on File Prior to Encounter   Medication Sig    atorvastatin (LIPITOR) 40 MG tablet 1 tablet (40 mg total) by Per G Tube route once daily. (Patient taking differently: Take 40 mg by mouth once daily. Crush meds)    buPROPion (WELLBUTRIN) 75 MG tablet 2 tablets (150 mg total)  by Per G Tube route 2 (two) times daily. (Patient taking differently: Take 150 mg by mouth 2 (two) times daily. Crush meds)    cloNIDine (CATAPRES) 0.1 MG tablet 1 tablet (0.1 mg total) by Per G Tube route 2 (two) times daily. (Patient taking differently: Take 0.1 mg by mouth 2 (two) times daily. Crush meds)    memantine (NAMENDA) 10 MG Tab Take 5 mg by mouth 2 (two) times daily. Crush meds    traZODone (DESYREL) 50 MG tablet Take 50 mg by mouth nightly as needed for Insomnia.     Family History     None        Tobacco Use    Smoking status: Current Every Day Smoker     Packs/day: 3.00    Smokeless tobacco: Never Used   Substance and Sexual Activity    Alcohol use: Yes     Comment: socially    Drug use: No    Sexual activity: Not on file     Review of Systems   Unable to perform ROS: Patient nonverbal     Objective:     Vital Signs (Most Recent):  Temp: 98.1 °F (36.7 °C) (05/20/19 2306)  Pulse: 68 (05/21/19 0101)  Resp: 16 (05/20/19 2306)  BP: (!) 142/86 (05/21/19 0101)  SpO2: 100 % (05/21/19 0101) Vital Signs (24h Range):  Temp:  [98.1 °F (36.7 °C)] 98.1 °F (36.7 °C)  Pulse:  [63-82] 68  Resp:  [16] 16  SpO2:  [100 %] 100 %  BP: (138-143)/(63-86) 142/86     Weight: 56.7 kg (125 lb)  Body mass index is 22.14 kg/m².    Physical Exam   Constitutional: She appears well-developed and well-nourished. No distress.   HENT:   Head: Normocephalic and atraumatic.   Eyes: Pupils are equal, round, and reactive to light. Conjunctivae are normal.   Leftward gaze.   Neck: Normal range of motion. Neck supple. No thyromegaly present.   Cardiovascular: Normal rate, regular rhythm and intact distal pulses.   Murmur heard.  Pulmonary/Chest: Effort normal and breath sounds normal. No respiratory distress.   Abdominal: Soft. Bowel sounds are normal. She exhibits no distension.   Musculoskeletal: She exhibits edema (BLEs with 2+ pitting edema; slightly increased on RIGHT leg). She exhibits no tenderness.   Pt with no active range  of motion of the right arm or leg.  Some spontaneous slight movements of the right arm, however not purposeful.  RUE Strength 1/5.  RLE strength 1/5.  LUE strength 4/5, LLE strength 3/5.   Neurological: She is alert.   Nonverbal, unable to assess orientation.  Squeezes left hand to command (4/5).  When asked if she could do something, she shakes her head yes.  She does not open her mouth and does not verbalize.  Right neglect noted with leftward gaze.  Wiggles left toes.  Slight movement of right leg when asked, slight movement of right arm, however does not complete movements of either extremity.  Gait not observed due to significant weakness.  Very difficult neuro exam.  Does not perform finger to nose when asked.  Does not perform heel to shin when asked.  Does spontaneously sit up in bed and support herself with left arm.   Skin: Skin is warm and dry. Capillary refill takes less than 2 seconds. No erythema.         CRANIAL NERVES     CN III, IV, VI   Pupils are equal, round, and reactive to light.       Significant Labs:   CBC:   Recent Labs   Lab 05/20/19 2315   WBC 9.30   HGB 11.0*   HCT 34.5*   PLT 96*     CMP:   Recent Labs   Lab 05/20/19 2315      K 2.8*      CO2 33*      BUN 15   CREATININE 0.8   CALCIUM 9.3   PROT 7.0   ALBUMIN 3.7   BILITOT 0.4   ALKPHOS 59   AST 16   ALT 15   ANIONGAP 10   EGFRNONAA >60     Coagulation:   Recent Labs   Lab 05/20/19 2315   INR 1.0     TSH:   Recent Labs   Lab 05/20/19 2315   TSH 2.844       Significant Imaging:  CT Head ([facility]):  --No acute intracranial pathology.  --However, consider brain MRI if there is clinical suspicion for acute stroke, as related findings may  not be immediately apparent by CT.  --Nonacute abnormalities are described above.    Assessment/Plan:     * Stroke  Pt with significant aphasia and right-sided weakness.  Underwent tele CVA consultation-felt not to be a candidate for tPA due to uncertain clinical exam.  Suspect rather  significant CVA.  Pt has not been on Eliquis for an undetermined amount of time, nor she on aspirin and Plavix.  Her last CVA was presumed to be of embolic nature with transient AFib noted on monitor while in Neuro Critical Care.  I discussed case with Dr. Kunz.  Obtaining stat CTA head and neck to rule out large vessel occlusion.  MRI, MRA brain in a.m..  Received rectal aspirin.  Monitor on telemetry.  Obtain TTE.  Neuro consult.  Close monitoring in ICU.  Permissive HTN.  Consult PT, OT, SLP.  Maintain fall and seizure precautions.      Dysphagia  Pt with expressive aphasia and inability to swallow presently.  Keep NPO.  Consult SLP.      Hypokalemia  Replete parenterally.  Follow-up electrolyte panel and continue repletion as clinically indicated.  Monitor on telemetry.  Check magnesium level.      Mixed hyperlipidemia  Chronic, holding oral statin therapy as Pt is unable to swallow.  Resume when clinically appropriate.        Benign essential HTN  Chronic, allowing for permissive HTN in light of suspected CVA.  Monitor BP closely.  Do not treat unless BP exceeds 220/110.      Aphasia  New.  Consult SLP.        VTE Risk Mitigation (From admission, onward)    High: SCD/TEDs.  No antithrombotics until OKAY per neurology.        I spent 16 minutes of face to face discussion regarding advance directives and end of life planning which included patient's daughter, Lauren who identifies as her power of  and with whom the patient lives. Daughter understands the seriousness of their condition and would like to make their end of life decisions known as follows- DNR.  She will bring POA paperwork tomorrow to file on chart.               Mirian Caceres NP  Department of Hospital Medicine   [facility]

## 2019-05-21 NOTE — ASSESSMENT & PLAN NOTE
Replete parenterally.  Follow-up electrolyte panel and continue repletion as clinically indicated.  Monitor on telemetry.  Check magnesium level.

## 2019-05-21 NOTE — ED NOTES
Patient now accepted in transfer to [facility] ED by Dr. Kohler; call report to:  [phone].  Transfer Center arranging STAT transport.

## 2019-05-21 NOTE — SUBJECTIVE & OBJECTIVE
"  Woke up with symptoms?: no    Recent bleeding noted: no  Does the patient take any Blood Thinners? no  Medications: Antiplatelets:  aspirin      Past Medical History: hypertension, diabetes, hyperlipidemia, CHF, stroke, Afib and dementia    Past Surgical History: no major surgeries within the last 2 weeks    Family History: no relevant history    Social History: no smoking, no drinking, no drugs    Allergies: No Known Allergies No known drug allergies    Review of Systems   Unable to perform ROS: Patient unresponsive     Objective:   Vitals: Blood pressure (!) 143/71, pulse 82, temperature 98.1 °F (36.7 °C), temperature source Oral, resp. rate 16, height 5' 3" (1.6 m), weight 56.7 kg (125 lb), SpO2 100 %, not currently breastfeeding. BP: 143/75, Respiratory Rate: 16 and Heart Rate: 77    CT READ: Yes  No hemmorhage. No mass effect. No early infarct signs.     Physical Exam   Constitutional: She appears well-developed. No distress.   HENT:   Head: Normocephalic and atraumatic.   Eyes: Pupils are equal, round, and reactive to light. EOM are normal.   Neck: Normal range of motion. Neck supple.   Cardiovascular: Normal rate.   Pulmonary/Chest: No respiratory distress.   Abdominal: She exhibits no mass.   Genitourinary:   Genitourinary Comments: No tested     Musculoskeletal: She exhibits no edema or deformity.   Neurological: A cranial nerve deficit and sensory deficit is present.   Lethargic, no verbal, does not follow commands   Skin: No rash noted. She is not diaphoretic. No erythema.   Psychiatric:   Unresponsive   Nursing note and vitals reviewed.        "

## 2019-05-21 NOTE — CONSULTS
[facility] Medicine  History & Physical     Patient Name: Sury Cobos  MRN: [id number]  Admission Date: 5/20/2019  Attending Physician: Handy Bedolla MD  Primary Care Provider: Ese Moya MD           Patient information was obtained from relative(s), past medical records and ER records.      Subjective:      Principal Problem:Stroke     Chief Complaint:   Chief Complaint   Patient presents with    Fall       Around 9 got up from bed and fell. Witnessed by family. Pt usually speaks but not verbal since fall.          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  She presented to the ED with her daughter who reports Pt fell out of bed at approximately 9:15 p.m. Patient also has exhibited right-sided weakness, some facial droop with drooling, and aphasia.  Patient's daughter stated at 8:45 p.m. Pt was sitting in a recliner slumped over to her left and when daughter try to converse with her, she responded only and groans and moans.  She states when she tried to get her out of the chair, Pt was too weak to walk.  Pt required 2 person assist to bed or daughter gave her a bed bath and left her to rest.  She states less than 10 min later she heard the Pt fall bed.  She helped her back into bed, however Pt remained aphasic with left-sided facial drooping and drooling that she brought her to the ED for further evaluation.  She reports similar symptoms prior to CVA last year.  She received tPA at that time and was treated in Neuro Critical Care at [facility].  Pt regained full strength and swallowing ability, however briefly required PEG tube and ongoing SLP, PT, OT.  Patient's daughter who she lives with states that Pt has not been on Eliquis for some time as she ran out it was not refilled.  She states that she is compliant with her  physician visits, though she is unsure if she still sees Neurology.  She is not on aspirin or Plavix, however daughter states she takes everything has been prescribed to her.  Per patient's daughter, at baseline she ambulates around the house with a walker.  She is able to feed herself and toilet independently.  She also leaves the house occasionally with her daughter.  She states she is relatively high functioning.  Pt is unable to provide for Hx or contribute to HPI.  Other pertinent medical Hx as below:          Past Medical History:   Diagnosis Date    Aphasia 5/27/2018    Arthritis      Benign essential HTN      CHF (congestive heart failure)      Closed intertrochanteric fracture of left hip      Coronary artery disease      Dementia      Dementia with behavioral disturbance 5/27/2018    Diabetes mellitus      Embolic stroke involving right middle cerebral artery 5/27/2018    Gout      High cholesterol      Low potassium syndrome      PEG (percutaneous endoscopic gastrostomy) status 6/4/2018     Placed 6/4/18    Urinary incontinence                 Past Surgical History:   Procedure Laterality Date    CARDIAC SURGERY        CORONARY ARTERY BYPASS GRAFT        EGD (ESOPHAGOGASTRODUODENOSCOPY) N/A 6/5/2018     Performed by [facility]iss [facility] at [facility] OR [address] [place] [address]    EGD, WITH PEG TUBE INSERTION N/A 6/5/2018     Performed by Madhu Bliss MD at [facility] OR 2ND FLR    reduction & internal fixation of displaced closed comminuted intertrochanteric fx left hip   02/12/2018         Review of patient's allergies indicates:  No Known Allergies     No current facility-administered medications on file prior to encounter.            Current Outpatient Medications on File Prior to Encounter   Medication Sig    atorvastatin (LIPITOR) 40 MG tablet 1 tablet (40 mg total) by Per G Tube route once daily. (Patient taking differently: Take 40 mg by mouth once daily. Crush meds)    buPROPion  (WELLBUTRIN) 75 MG tablet 2 tablets (150 mg total) by Per G Tube route 2 (two) times daily. (Patient taking differently: Take 150 mg by mouth 2 (two) times daily. Crush meds)    cloNIDine (CATAPRES) 0.1 MG tablet 1 tablet (0.1 mg total) by Per G Tube route 2 (two) times daily. (Patient taking differently: Take 0.1 mg by mouth 2 (two) times daily. Crush meds)    memantine (NAMENDA) 10 MG Tab Take 5 mg by mouth 2 (two) times daily. Crush meds    traZODone (DESYREL) 50 MG tablet Take 50 mg by mouth nightly as needed for Insomnia.          Family History      None                Tobacco Use    Smoking status: Current Every Day Smoker       Packs/day: 3.00    Smokeless tobacco: Never Used   Substance and Sexual Activity    Alcohol use: Yes       Comment: socially    Drug use: No    Sexual activity: Not on file      Review of Systems   Unable to perform ROS: Patient nonverbal      Objective:      Vital Signs (Most Recent):  Temp: 98.1 °F (36.7 °C) (05/20/19 2306)  Pulse: 68 (05/21/19 0101)  Resp: 16 (05/20/19 2306)  BP: (!) 142/86 (05/21/19 0101)  SpO2: 100 % (05/21/19 0101) Vital Signs (24h Range):  Temp:  [98.1 °F (36.7 °C)] 98.1 °F (36.7 °C)  Pulse:  [63-82] 68  Resp:  [16] 16  SpO2:  [100 %] 100 %  BP: (138-143)/(63-86) 142/86      Weight: 56.7 kg (125 lb)  Body mass index is 22.14 kg/m².     Physical Exam   Constitutional: She appears well-developed and well-nourished. No distress.   HENT:   Head: Normocephalic and atraumatic.   Eyes: Pupils are equal, round, and reactive to light. Conjunctivae are normal.   Leftward gaze.   Neck: Normal range of motion. Neck supple. No thyromegaly present.   Cardiovascular: Normal rate, regular rhythm and intact distal pulses.   Murmur heard.  Pulmonary/Chest: Effort normal and breath sounds normal. No respiratory distress.   Abdominal: Soft. Bowel sounds are normal. She exhibits no distension.   Musculoskeletal: She exhibits edema (BLEs with 2+ pitting edema; slightly  increased on RIGHT leg). She exhibits no tenderness.   Pt with no active range of motion of the right arm or leg.  Some spontaneous slight movements of the right arm, however not purposeful.  RUE Strength 1/5.  RLE strength 1/5.  LUE strength 4/5, LLE strength 3/5.   Neurological: She is alert.   Nonverbal, unable to assess orientation.  Squeezes left hand to command (4/5).  When asked if she could do something, she shakes her head yes.  She does not open her mouth and does not verbalize.  Right neglect noted with leftward gaze.  Wiggles left toes.  Slight movement of right leg when asked, slight movement of right arm, however does not complete movements of either extremity.  Gait not observed due to significant weakness.  Very difficult neuro exam.  Does not perform finger to nose when asked.  Does not perform heel to shin when asked.  Does spontaneously sit up in bed and support herself with left arm.   Skin: Skin is warm and dry. Capillary refill takes less than 2 seconds. No erythema.            CRANIAL NERVES      CN III, IV, VI   Pupils are equal, round, and reactive to light.        Significant Labs:   CBC:       Recent Labs   Lab 05/20/19 2315   WBC 9.30   HGB 11.0*   HCT 34.5*   PLT 96*      CMP:       Recent Labs   Lab 05/20/19 2315      K 2.8*      CO2 33*      BUN 15   CREATININE 0.8   CALCIUM 9.3   PROT 7.0   ALBUMIN 3.7   BILITOT 0.4   ALKPHOS 59   AST 16   ALT 15   ANIONGAP 10   EGFRNONAA >60      Coagulation:       Recent Labs   Lab 05/20/19 2315   INR 1.0      TSH:       Recent Labs   Lab 05/20/19 2315   TSH 2.844         Significant Imaging:  CT Head ([facility]):  --No acute intracranial pathology.  --However, consider brain MRI if there is clinical suspicion for acute stroke, as related findings may  not be immediately apparent by CT.  --Nonacute abnormalities are described above.     Assessment/Plan:      * Stroke  Pt with significant aphasia and right-sided weakness.  Underwent  tele CVA consultation-felt not to be a candidate for tPA due to uncertain clinical exam.  Suspect rather significant CVA.  Pt has not been on Eliquis for an undetermined amount of time, nor she on aspirin and Plavix.  Her last CVA was presumed to be of embolic nature with transient AFib noted on monitor while in Neuro Critical Care.  I discussed case with Dr. Kunz.  Obtaining stat CTA head and neck to rule out large vessel occlusion.  MRI, MRA brain in a.m..  Received rectal aspirin.  Monitor on telemetry.  Obtain TTE.  Neuro consult.  Close monitoring in ICU.  Permissive HTN.  Consult PT, OT, SLP.  Maintain fall and seizure precautions.        Dysphagia  Pt with expressive aphasia and inability to swallow presently.  Keep NPO.  Consult SLP.        Hypokalemia  Replete parenterally.  Follow-up electrolyte panel and continue repletion as clinically indicated.  Monitor on telemetry.  Check magnesium level.        Mixed hyperlipidemia  Chronic, holding oral statin therapy as Pt is unable to swallow.  Resume when clinically appropriate.          Benign essential HTN  Chronic, allowing for permissive HTN in light of suspected CVA.  Monitor BP closely.  Do not treat unless BP exceeds 220/110.        Aphasia  New.  Consult SLP.               VTE Risk Mitigation (From admission, onward)     High: SCD/TEDs.  No antithrombotics until OKAY per neurology.          I spent 16 minutes of face to face discussion regarding advance directives and end of life planning which included patient's daughter, Lauren who identifies as her power of  and with whom the patient lives. Daughter understands the seriousness of their condition and would like to make their end of life decisions known as follows- DNR.  She will bring POA paperwork tomorrow to file on chart.               Mirian Caceres NP  Department of Hospital Medicine   [facility]        Critical [facility] time spent on the evaluation and treatment of  severe organ dysfunction, review of pertinent labs and imaging studies, discussions with consulting providers and discussions with patient/family 80 minutes

## 2019-05-21 NOTE — ED NOTES
Patient awake, alert, skin warm and dry, in NAD with family at bedside.  Hep Lock in place, site OK, side rails up, call bell within reach.

## 2019-05-21 NOTE — SUBJECTIVE & OBJECTIVE
Past Medical History:   Diagnosis Date    Aphasia 5/27/2018    Arthritis     Benign essential HTN     CHF (congestive heart failure)     Closed intertrochanteric fracture of left hip     Coronary artery disease     Dementia with behavioral disturbance 5/27/2018    Diabetes mellitus     Embolic stroke involving right middle cerebral artery 5/27/2018    Gout     High cholesterol     Low potassium syndrome     PEG (percutaneous endoscopic gastrostomy) status 6/4/2018    Placed 6/4/18    Urinary incontinence      Past Surgical History:   Procedure Laterality Date    CARDIAC SURGERY      CORONARY ARTERY BYPASS GRAFT      EGD (ESOPHAGOGASTRODUODENOSCOPY) N/A 6/5/2018    Performed by [facility] at [facility] OR [address] [place] [address]    EGD, WITH PEG TUBE INSERTION N/A 6/5/2018    Performed by Madhu [facility]iss MD at [facility] OR [address] [place] [address]    reduction & internal fixation of displaced closed comminuted intertrochanteric fx left hip  02/12/2018      Current Facility-Administered Medications on File Prior to Encounter   Medication Dose Route Frequency Provider Last Rate Last Dose    [COMPLETED] aspirin suppository 300 mg  300 mg Rectal Once Mirian Caceres NP   300 mg at 05/21/19 0244    [COMPLETED] iohexol (OMNIPAQUE 350) injection 100 mL  100 mL Intravenous ONCE PRN Kip Nath MD   100 mL at 05/21/19 0215    [DISCONTINUED] aspirin suppository 300 mg  300 mg Rectal Once Mirian Caceres NP        [DISCONTINUED] potassium chloride 10 mEq in 100 mL IVPB  10 mEq Intravenous Q1H Mirian Caceres NP   Stopped at 05/21/19 0425     Current Outpatient Medications on File Prior to Encounter   Medication Sig Dispense Refill    atorvastatin (LIPITOR) 40 MG tablet 1 tablet (40 mg total) by Per G Tube route once daily. (Patient taking differently: Take 40 mg by mouth once daily. Crush meds) 30 tablet 0    buPROPion (WELLBUTRIN) 75 MG tablet 2 tablets (150 mg total) by Per G Tube route 2 (two) times daily.  (Patient taking differently: Take 150 mg by mouth 2 (two) times daily. Crush meds) 120 tablet 0    cloNIDine (CATAPRES) 0.1 MG tablet 1 tablet (0.1 mg total) by Per G Tube route 2 (two) times daily. (Patient taking differently: Take 0.1 mg by mouth 2 (two) times daily. Crush meds)      HYDROcodone-acetaminophen (NORCO)  mg per tablet Take 1 tablet by mouth every 8 (eight) hours as needed for Pain.      memantine (NAMENDA) 10 MG Tab Take 10 mg by mouth 2 (two) times daily. Crush meds      traZODone (DESYREL) 50 MG tablet Take 50 mg by mouth nightly as needed for Insomnia.        Allergies: Patient has no known allergies.    No family history on file.  Social History     Tobacco Use    Smoking status: Current Every Day Smoker     Packs/day: 3.00    Smokeless tobacco: Never Used   Substance Use Topics    Alcohol use: Yes     Comment: socially    Drug use: No     Review of Systems   Unable to perform ROS: Patient nonverbal   Objective:     Vitals:    Temp: 97.9 °F (36.6 °C)  Pulse: 79  Rhythm: normal sinus rhythm  BP: (!) 117/58  MAP (mmHg): 83  Resp: 14  SpO2: 95 %  O2 Device (Oxygen Therapy): nasal cannula    Temp  Min: 97.3 °F (36.3 °C)  Max: 98.1 °F (36.7 °C)  Pulse  Min: 56  Max: 92  BP  Min: 113/54  Max: 210/113  MAP (mmHg)  Min: 71  Max: 112  Resp  Min: 10  Max: 34  SpO2  Min: 92 %  Max: 100 %    No intake/output data recorded.   Unmeasured Output  Urine Occurrence: 1  Pad Count: 1       Physical Exam  Constitutional: Thin elderly cachexia    HENT: Normocephalic and atraumatic.   Eyes: Pupils are equal, round, and reactive to light. L gaze, doesn't cross midline   Neck: Normal range of motion.   Cardiovascular: Irregularly irregular rhythm   Pulmonary/Chest: Effort normal.   Abdominal: Soft.   Neurological: She is drowsy  Skin: Skin is warm and dry. Noted R ear ecchymoses   Nursing note and vitals reviewed.     Neurological Exam:   Drowsy w/ poor attention span  Mute, non-verbal  Unable to test due  to severe aphasia   R Hemianopsia right, left gaze preference, unable to cross midline, PERRL  L facial weakness  Gag  present  Moves left UE and LE spontaneously and is antigravity  RUE moving spontaneously, some movement in RLE, but appears to be significantly weaker than the L side, not able to formally test strength as she is not following commands   Normal tone throughout      NPO at this time. Pending MARYURI once more awake

## 2019-05-21 NOTE — HPI
85 y/o femlae who went to bed at usual time and family found her on the floor beside the bed with aphasia, right sided weakness,. She was taken to [facility] ED for evaluation. She had prior stroke right precentral gyrus 5-2018 that required PEG tube for dysphagia but does not require it now. She is back to baseline but ambulates with walker. She was d/c on eliquis but refills ran out and family did not refill. It was thought to be cardio embolic as AFIB on telemetry.  Telemedicine was done with Dr Reina and neuro exam was very challenging and due to this and no TPA given. CTA head and neck done and reveals L M2 occlusion so Dr Reina called back and he talked with family and despite DNR they want everything done so patient was transferred for possible IR.   Upon arrival patient still aphasic, right sided weakness,   CT scan head unable to a lot of microvascular disease and unable to score ASPECTS so will get MRI brain acute interventional Protocol.  Patient to go to IR, attempted to get in touch with daughter by Dr Wood but no answer.   Risk factors prior stroke, CAD, HTN, DM

## 2019-05-21 NOTE — ASSESSMENT & PLAN NOTE
Patient is an 83 yo female w/ multiple co-morbidities who presents as a transfer from OSH w/ notable L MCA occlusion. No tPA given at OSH. Thrombectomy w/ TICI 3 after 4 passes done at [facility]. Patient admitted to [facility] for higher level of care and exam follow up s/p thrombectomy    - Admit to [facility]  - Neuro checks Q1  - Vital signs Q1  - SBP <160, initially on Cardene, now off  - MRI 24 hours s/p intervention  - HbA1c, TSH and Lipid panel  - TTE pending and ordered  - Vascular Neurology consulted and following, appreciate recommendations

## 2019-05-21 NOTE — ED TRIAGE NOTES
Pt presents via EMS after being transferred from [facility]. EMS reported pt was last seen normal at 2000. Pt was found by family after falling at home. Per EMS report pt has MCA and LCA occlusion. Pt responding to pain at present time

## 2019-05-21 NOTE — HOSPITAL COURSE
05/21/2019 Admitted to [facility] s/p thrombectomy for L MCA M1 occlusion w/ TICI 3 flow restoration.   5/22/2019 Trouble with oxygenation on ventimask, modafinil started. DNR/DNI  05/23/2019: Agitated and hypertensive overnight, weaned down on O2 this morning.  Stable for step down to vascular neurology.  Afebrile.

## 2019-05-21 NOTE — PROGRESS NOTES
Thrombectomy procedure complete. Pt tolerated well, no acute distress noted. Perclose deployed at 0820, flat bedrest until 1020. Report given to ICU RN and patient transported to ICU.

## 2019-05-21 NOTE — CONSULTS
[facility]  Vascular Neurology  Comprehensive Stroke Center  Tele-Consultation Note      Consults    Consulting Provider: STEPHEN OSORIO  Current Providers  No providers found    Patient Location:  [facility] EMERGENCY DEPARTMENT Emergency Department  Spoke hospital nurse at bedside with patient assisting consultant.     Patient information was obtained from relative(s) and ED MD.         Assessment/Plan:     ADDENDUM: CTA head showed distal L M1 occlusion. Discussed with patient's daughter and she said that her mother is DNR but she wishes everything done, thus will transfer to the main campus for CTH upon arrival to determine if still suitable for endovascular intervention. Patient is modified Harrogate Scale score 3 at baseline.        83 y/o with HTN, HLD, DM, CHF, CAD, a fib no taking anticoagulant,  R MCA infarct with residual L hemiparesis and aphasia, advanced dementia, brought in due to altered mental status, no taking, unable to stand up, no moving the R side.  NIHSS 25, CTH without acute abnormality.  Neuro-exam is very challenging.   Differential include a new L MCA infarct, seizures, encephalopathy.  Did not treat with iv alteplase due to uncertainty in her exam. Poor functional status at baseline, no a suitable candidate for endovascular intervention.  Recommend admission, MR/MRA brain, check serologies, UA, EEG.  Neurology consult.      STROKE DOCUMENTATION     Acute Stroke Times:   Acute Stroke Times   Last Known Normal Date: 05/20/19  Last Known Normal Time: 2030  Symptom Onset Date: 05/20/19  Symptom Onset Time: 2030  Stroke Team Called Date: 05/20/19  Stroke Team Called Time: 1733  Stroke Team Arrival Date: 05/20/19  Stroke Team Arrival Time: 2338  CT Interpretation Time: 2338  Decision to Treat Time for Alteplase: (No iv alteplase)  Decision to Treat Time for IR: (No IR candidate)    NIH Scale:  Interval: baseline  1a. Level of Consciousness: 2-->Not alert, requires  "repeated stimulation to attend, or is obtunded and requires strong or painful stimulation to make movements (not stereotyped)  1b. LOC Questions: 2-->Answers neither question correctly  1c. LOC Commands: 2-->Performs neither task correctly  2. Best Gaze: 0-->Normal  3. Visual: 0-->No visual loss  4. Facial Palsy: 2-->Partial paralysis (total or near-total paralysis of lower face)  5a. Motor Arm, Left: 3-->No effort against gravity, limb falls  5b. Motor Arm, Right: 3-->No effort against gravity, limb falls  6a. Motor Leg, Left: 3-->No effort against gravity, leg falls to bed immediately  6b. Motor Leg, Right: 3-->No effort against gravity, leg falls to bed immediately  7. Limb Ataxia: 0-->Absent  8. Sensory: 0-->Normal, no sensory loss  9. Best Language: 3-->Mute, global aphasia, no usable speech or auditory comprehension  10. Dysarthria: 2-->Severe dysarthria, patients speech is so slurred as to be unintelligible in the absence of or out of proportion to any dysphasia, or is mute/anarthric  11. Extinction and Inattention (formerly Neglect): 0-->No abnormality  Total (NIH Stroke Scale): 25     Modified Egan Score: 3  [place] Coma Scale:3   ABCD2 Score:    [id number] Score:8  HAS -BLED Score:4  ICH Score:   Hunt & Burkett Classification:       Diagnoses: AMS.Aphasia.  No new Assessment & Plan notes have been filed under this hospital service since the last note was generated.  Service: Vascular Neurology      Blood pressure (!) 143/71, pulse 82, temperature 98.1 °F (36.7 °C), temperature source Oral, resp. rate 16, height 5' 3" (1.6 m), weight 56.7 kg (125 lb), SpO2 100 %, not currently breastfeeding.  Alteplase Eligible?: No  Alteplase Recommendation: Alteplase not recommended due to Suspected stroke mimic   Possible Interventional Revascularization Candidate? Poor functional status at baseline    Disposition Recommendation: admit to inpatient    Subjective:     History of Present Illness: 85 y/o with HTN, HLD, DM, " "CHF, CAD, a fib no taking anticoagulant,  R MCA infarct with residual L hemiparesis and aphasia, advanced dementia, brought in due to altered mental status, no taking, unable to stand up, no moving the R side.        No notes on file      Woke up with symptoms?: no    Recent bleeding noted: no  Does the patient take any Blood Thinners? no  Medications: Antiplatelets:  aspirin      Past Medical History: hypertension, diabetes, hyperlipidemia, CHF, stroke, Afib and dementia    Past Surgical History: no major surgeries within the last 2 weeks    Family History: no relevant history    Social History: no smoking, no drinking, no drugs    Allergies: No Known Allergies No known drug allergies    Review of Systems   Unable to perform ROS: Patient unresponsive     Objective:   Vitals: Blood pressure (!) 143/71, pulse 82, temperature 98.1 °F (36.7 °C), temperature source Oral, resp. rate 16, height 5' 3" (1.6 m), weight 56.7 kg (125 lb), SpO2 100 %, not currently breastfeeding. BP: 143/75, Respiratory Rate: 16 and Heart Rate: 77    CT READ: Yes  No hemmorhage. No mass effect. No early infarct signs.     Physical Exam   Constitutional: She appears well-developed. No distress.   HENT:   Head: Normocephalic and atraumatic.   Eyes: Pupils are equal, round, and reactive to light. EOM are normal.   Neck: Normal range of motion. Neck supple.   Cardiovascular: Normal rate.   Pulmonary/Chest: No respiratory distress.   Abdominal: She exhibits no mass.   Genitourinary:   Genitourinary Comments: No tested     Musculoskeletal: She exhibits no edema or deformity.   Neurological: A cranial nerve deficit and sensory deficit is present.   Lethargic, no verbal, does not follow commands   Skin: No rash noted. She is not diaphoretic. No erythema.   Psychiatric:   Unresponsive   Nursing note and vitals reviewed.            Recommended the emergency room physician to have a brief discussion with the patient and/or family if available regarding " the risks and benefits of treatment, and to briefly document the occurrence of that discussion in his clinical encounter note.     The attending portion of this evaluation, treatment, and documentation was performed per Iftikhar Lucero MD via audiovisual.    Billing code:  (moderate to severe stroke, large areas of edema, some mimics)    · This patient has a critical neurological condition/illness, with high morbidity and mortality.  · There is a high probability for acute neurological change leading to clinical and possibly life-threatening deterioration requiring highest level of physician preparedness for urgent intervention.  · Care was coordinated with other physicians involved in the patient's care.  · Radiologic studies and laboratory data were reviewed and interpreted, and plan of care was re-assessed based on the results.  · Diagnosis, treatment options and prognosis may have been discussed with the patient and/or family members or caregiver.  · Further advanced medical management and further evaluation is warranted for his care.      In your opinion, this was a: Tier 1 Van Positive    Consult End Time: 11:53 pm    Iftikhar Lucero MD  Comprehensive Stroke Center  Vascular Neurology   [facility]

## 2019-05-21 NOTE — SIGNIFICANT EVENT
0316: Read [facility] report of stat CTA Head/Neck indicating new findings as follows:   --Abrupt truncation at the level of the distal M1 and proximal M2 segments of the left MCA, consistent  with acute thrombotic occlusion, with associated relatively diminished distal left MCA arborization.     Immediately contacted Dr. Kunz, who recommended I contact Dr. Lucero with vascular neurology to ensure no endovascular intervention warranted. If none warranted, requests admission to ICU as planned.    Phoned transfer center at 0318. Per [place], Page sent out to Dr. Lucero- awaiting return call at this time. Patient remains hemodynamically stable at present in ED awaiting disposition pending repeat discussion with vascular neurology.    0345: After discussion with Dr. Lucero who was able to discuss clinical status and baseline functionality fully with patient's daughter, decision made to transfer patient to  [facility] for possible endovascular intervention.  Will go ED to ED per Dr. Lucero's request.  I updated ED physician, Dr. Nath who is contacting transfer center.  Admit order cancelled.  Neurology notified of change in plan of care.

## 2019-05-21 NOTE — PROCEDURES
Radiology Post-Procedure Note    Pre Op Diagnosis: left MCA stroke    Post Op Diagnosis: same    Procedure: Cerebral angiogram and mechanical thrmobectomy    Procedure performed by: Richy Conroy MD    Written Informed Consent Obtained: Yes    Specimen Removed: NO    Estimated Blood Loss: less than 50     Procedure report:     An 8F sheath was placed into the right femoral artery and a 5F Wayne catheter was advanced into the aortic arch.  The left common and internal arteries were subselected and angiography of the brain was performed after injection into each of these vessels.    Preliminary interpretation: Left M1 occlusion. 4 passes made with restoration of TICI 3 flow. Please see Imaging report for full details.    A right femoral artery angiogram was performed, the sheath removed and hemostasis achieved using a perclose device.  No hematoma was present at the time of hemostasis.    The patient tolerated the procedure well.     Richy Conroy MD  Department of Radiology  Pager: [phone]

## 2019-05-21 NOTE — ASSESSMENT & PLAN NOTE
- SBP goal <160 s/p successful thrombectomy   - Intermittent Cardene  - On Cardene at home, 0.1 BID  - Will likely transition to Norvasc from Cardene

## 2019-05-21 NOTE — CONSULTS
[facility]  Vascular Neurology  Comprehensive Stroke Center  Consult Note    Consults  Assessment/Plan:     Patient is a 84 y.o. year old female with:    * Acute ischemic left MCA stroke  85 y/o female with L MCA syndrome with L M2 occlusion due to cardio embolic from afib not on anticoagulation did not receive TPA but went to IR    Antithrombotics: will need to go back on anticaogulation    Statins: Lipitor 40    Aggressive risk factor modification: HTN, HLD, prior stroke, afib     Rehab efforts: The patient has been evaluated by a stroke team provider and the therapy needs have been fully considered based off the presenting complaints and exam findings. The following therapy evaluations are needed: PT evaluate and treat, OT evaluate and treat, SLP evaluate and treat, PM&R evaluate for appropriate placement    Diagnostics ordered/pending: HgbA1C to assess blood glucose levels, TTE to assess cardiac function/status     VTE prophylaxis: Heparin 5000 units SQ every 8 hours    BP parameters: Infarct: No intervention, SBP <220  May change post thrombectomy      Aphasia  Due to stroke  Aggressive therapy    Benign essential HTN  Stroke risk factor  SBP <220 for now till after thrombectomy    Right spastic hemiparesis  Due to stroke  Aggressive therapy    Paroxysmal atrial fibrillation  Found on last admit 5-2018 was on Eliquis but once out of refills not given  Will need anticoagulation again    Mixed hyperlipidemia  Stroke risk factor  LDL 54  Continue home Lipitor 40    Atrial enlargement, left  Stroke risk factor          STROKE DOCUMENTATION     Acute Stroke Times   Last Known Normal Date: 05/20/19  Last Known Normal Time: 2030  Symptom Onset Date: 05/20/19  Symptom Onset Time: 2030  Stroke Team Called Date: 05/20/19  Stroke Team Called Time: 2338  Stroke Team Arrival Date: 05/21/19  Stroke Team Arrival Time: 0605  CT Interpretation Time: 2338    NIH Scale:  1a. Level of Consciousness: 2-->Not  alert, requires repeated stimulation to attend, or is obtunded and requires strong or painful stimulation to make movements (not stereotyped)  1b. LOC Questions: 2-->Answers neither question correctly  1c. LOC Commands: 2-->Performs neither task correctly  2. Best Gaze: 2-->Forced deviation, or total gaze paresis not overcome by the oculocephalic maneuver  3. Visual: 0-->No visual loss  4. Facial Palsy: 2-->Partial paralysis (total or near-total paralysis of lower face)  5a. Motor Arm, Left: 0-->No drift, limb holds 90 (or 45) degrees for full 10 secs  5b. Motor Arm, Right: 4-->No movement  6a. Motor Leg, Left: 0-->No drift, leg holds 30 degree position for full 5 secs  6b. Motor Leg, Right: 4-->No movement  7. Limb Ataxia: 0-->Absent  8. Sensory: 1-->Mild-to-moderate sensory loss, patient feels pinprick is less sharp or is dull on the affected side, or there is a loss of superficial pain with pinprick, but patient is aware of being touched  9. Best Language: 3-->Mute, global aphasia, no usable speech or auditory comprehension  10. Dysarthria: 0-->Normal  11. Extinction and Inattention (formerly Neglect): 0-->No abnormality  Total (NIH Stroke Scale): 22    Modified Angelia Score: 3  [place] Coma Scale:8   ABCD2 Score:    [id number] Score:   HAS -BLED Score:   ICH Score:   Hunt & Burkett Classification:       Thrombolysis Candidate? No, Out of window       Interventional Revascularization Candidate?   Is the patient eligible for mechanical endovascular reperfusion (ROSALIA)?  Yes      Hemorrhagic change of an Ischemic Stroke: Does this patient have an ischemic stroke with hemorrhagic changes? No     Subjective:     History of Present Illness:  83 y/o femlae who went to bed at usual time and family found her on the floor beside the bed with aphasia, right sided weakness,. She was taken to [facility] ED for evaluation. She had prior stroke right precentral gyrus 5-2018 that required PEG tube for dysphagia but does not  require it now. She is back to baseline but ambulates with walker. She was d/c on eliquis but refills ran out and family did not refill. It was thought to be cardio embolic as AFIB on telemetry.  Telemedicine was done with Dr Reina and neuro exam was very challenging and due to this and no TPA given. CTA head and neck done and reveals L M2 occlusion so Dr Reina called back and he talked with family and despite DNR they want everything done so patient was transferred for possible IR.   Upon arrival patient still aphasic, right sided weakness,   CT scan head unable to a lot of microvascular disease and unable to score ASPECTS so will get MRI brain acute interventional Protocol.  Patient to go to IR, attempted to get in touch with daughter by Dr Wood but no answer.   Risk factors prior stroke, CAD, HTN, DM        Past Medical History:   Diagnosis Date    Aphasia 5/27/2018    Arthritis     Benign essential HTN     CHF (congestive heart failure)     Closed intertrochanteric fracture of left hip     Coronary artery disease     Dementia with behavioral disturbance 5/27/2018    Diabetes mellitus     Embolic stroke involving right middle cerebral artery 5/27/2018    Gout     High cholesterol     Low potassium syndrome     PEG (percutaneous endoscopic gastrostomy) status 6/4/2018    Placed 6/4/18    Urinary incontinence      Past Surgical History:   Procedure Laterality Date    CARDIAC SURGERY      CORONARY ARTERY BYPASS GRAFT      EGD (ESOPHAGOGASTRODUODENOSCOPY) N/A 6/5/2018    Performed by Madhu Bliss [facility] at [facility] OR 2ND FLR    EGD, WITH PEG TUBE INSERTION N/A 6/5/2018    Performed by Madhu Bliss MD at [facility] OR 2ND FLR    reduction & internal fixation of displaced closed comminuted intertrochanteric fx left hip  02/12/2018     No family history on file.  Social History     Tobacco Use    Smoking status: Current Every Day Smoker     Packs/day: 3.00    Smokeless tobacco: Never  Used   Substance Use Topics    Alcohol use: Yes     Comment: socially    Drug use: No     Review of patient's allergies indicates:  No Known Allergies    Medications: I have reviewed the current medication administration record.      (Not in a hospital admission)    Review of Systems   Unable to perform ROS: Patient nonverbal     Objective:     Vital Signs (Most Recent):  Temp: 97.9 °F (36.6 °C) (05/21/19 0554)  Pulse: (!) 58 (05/21/19 0640)  Resp: 20 (05/21/19 0554)  BP: (!) 166/87 (05/21/19 0640)  SpO2: 99 % (05/21/19 0640)    Vital Signs Range (Last 24H):  Temp:  [97.9 °F (36.6 °C)-98.1 °F (36.7 °C)]   Pulse:  [58-82]   Resp:  [16-20]   BP: (135-177)/(56-98)   SpO2:  [97 %-100 %]     Physical Exam   Constitutional:   Thin elderly cachexia    HENT:   Head: Normocephalic and atraumatic.   Eyes: Pupils are equal, round, and reactive to light.   Neck: Normal range of motion.   Cardiovascular: Normal rate.   Pulmonary/Chest: Effort normal.   Abdominal: Soft.   Neurological: She is alert.   Right sided weakness, facial droop, hemianopsia, aphasia, gaze preference   Skin: Skin is warm and dry.   Nursing note and vitals reviewed.      Neurological Exam:   LOC: alert  Attention Span: poor  Language: Mute  Articulation: Mute/Anarthric  Orientation: Untestable due to severe aphasia   Visual Fields: Hemianopsia right  EOM (CN III, IV, VI): Gaze preference  left  Pupils (CN II, III): PERRL  Facial Sensation (CN V): Normal  Facial Movement (CN VII): Lower facial weakness on the Right  Gag Reflex: present  Reflexes: flexor plantar responses bilaterally  Motor: moves left side  Cebellar: No evidence of appendicular or axial ataxia  Sensation: Avery-hypoesthesia right  Tone: Flaccid  RUE and RLE       Laboratory:  CMP:   Recent Labs   Lab 05/20/19  2315   CALCIUM 9.3   ALBUMIN 3.7   PROT 7.0      K 2.8*   CO2 33*      BUN 15   CREATININE 0.8   ALKPHOS 59   ALT 15   AST 16   BILITOT 0.4     CBC:   Recent Labs   Lab  05/20/19  2315   WBC 9.30   RBC 3.82*   HGB 11.0*   HCT 34.5*   PLT 96*   MCV 90   MCH 28.9   MCHC 32.0     Lipid Panel:   Recent Labs   Lab 05/20/19  2315   CHOL 124   LDLCALC 59.4*   HDL 53   TRIG 58     Coagulation:   Recent Labs   Lab 05/20/19  2315   INR 1.0     Hgb A1C: No results for input(s): HGBA1C in the last 168 hours.  TSH:   Recent Labs   Lab 05/20/19  2315   TSH 2.844       Diagnostic Results:      Brain imaging:  CT head w/o contrast 5-21-19 results:  1. There are extensive chronic changes including moderate generalized volume loss and ventriculomegaly with marked nonspecific white matter change.  The findings likely reflect sequelae of chronic small vessel disease.  There are multiple bilateral chronic infarctions in both cerebellar hemispheres with a chronic lacunar infarction in the right basal ganglia.  There is also encephalomalacia from a chronic infarction in the posterior right frontal precentral gyrus.  2. There is no hemorrhage, mass or obvious acute infarction.  It should be noted that MRI is more sensitive in the detection of subtle or acute nonhemorrhagic ischemic disease.    Vessel Imaging:  CTA head and neck 5-21-19 results pending    Cardiac Evaluation:   EKG 5-21-19 pending      Brittany Mclean NP  [facility]  Department of Vascular Neurology   [facility]

## 2019-05-21 NOTE — ED PROVIDER NOTES
Encounter Date: 5/20/2019    SCRIBE #1 NOTE: I, Soumya Montejo , am scribing for, and in the presence of,  Dr. Nath . I have scribed the entire note.       History     Chief Complaint   Patient presents with    Fall     Around 9 got up from bed and fell. Witnessed by family. Pt usually speaks but not verbal since fall.        05/20/2019  11:18 PM       The patient is a 84 y.o. female who is presenting to the ED for possible CVA with last known normal time x 2 hours PTA. The pt daughter states that the pt went to bed at baseline mental status but was found by family members by the side of her bed several hours later unable to speak. EMS endorses R sided weakness and admits to hx of embolic stroke involving right middle cerebral artery without residual neurological deficits. Pt is not currently on any anticoagulation including ASA. Pertinent PMHx includes aphasia, CHF, CAD, dementia, DM. Pertinent past surgical hx includes CABG.       The history is provided by medical records, the EMS personnel and a relative.     Review of patient's allergies indicates:  No Known Allergies  Past Medical History:   Diagnosis Date    Aphasia 5/27/2018    Arthritis     Benign essential HTN     CHF (congestive heart failure)     Closed intertrochanteric fracture of left hip     Coronary artery disease     Dementia     Dementia with behavioral disturbance 5/27/2018    Diabetes mellitus     Embolic stroke involving right middle cerebral artery 5/27/2018    Gout     High cholesterol     Low potassium syndrome     PEG (percutaneous endoscopic gastrostomy) status 6/4/2018    Placed 6/4/18    Urinary incontinence      Past Surgical History:   Procedure Laterality Date    CARDIAC SURGERY      CORONARY ARTERY BYPASS GRAFT      EGD (ESOPHAGOGASTRODUODENOSCOPY) N/A 6/5/2018    Performed by Madhu Bliss MD at [facility] OR [address] [place] [address]    EGD, WITH PEG TUBE INSERTION N/A 6/5/2018    Performed by Madhu Bliss MD at  [facility] OR [facility]    reduction & internal fixation of displaced closed comminuted intertrochanteric fx left hip  02/12/2018     No family history on file.  Social History     Tobacco Use    Smoking status: Current Every Day Smoker     Packs/day: 3.00    Smokeless tobacco: Never Used   Substance Use Topics    Alcohol use: Yes     Comment: socially    Drug use: No     Review of Systems   Unable to perform ROS: Mental status change       Physical Exam     Initial Vitals   BP Pulse Resp Temp SpO2   -- -- -- -- --      MAP       --         Physical Exam    Nursing note and vitals reviewed.  HENT:   Head: Normocephalic and atraumatic.   Eyes: Conjunctivae and EOM are normal.   Neck: Normal range of motion. Neck supple. No thyroid mass present.   Cardiovascular: Normal rate, regular rhythm and normal heart sounds. Exam reveals no gallop and no friction rub.    No murmur heard.  Pulmonary/Chest: Breath sounds normal. She has no wheezes. She has no rhonchi. She has no rales.   Abdominal: Soft. Normal appearance and bowel sounds are normal. There is no tenderness.   Musculoskeletal: She exhibits no edema.   Neurological: She is alert and oriented to person, place, and time. No cranial nerve deficit. GCS eye subscore is 4. GCS verbal subscore is 5. GCS motor subscore is 6.   Alert but nonverbal. Able to grunt in response to some commands. Does not answer all questions appropriately. RUE has minimal movement. No facial droop noted.    Skin: Skin is warm and dry. No rash noted. No erythema.   Psychiatric: She has a normal mood and affect. Her speech is normal. Cognition and memory are normal.         ED Course   Procedures  Labs Reviewed - No data to display  EKG Readings: (Independently Interpreted)   Heart Rate: 71.   Sinus rhythm with 1st degree AV block with premature atrial complexes in a pattern of bigeminy, anterolateral infarct, age undetermined.        Imaging Results    None          Medical Decision Making:    History:   Old Medical Records: I decided to obtain old medical records.  Independently Interpreted Test(s):   I have ordered and independently interpreted X-rays - see prior notes.  I have ordered and independently interpreted EKG Reading(s) - see prior notes  Clinical Tests:   Lab Tests: Ordered and Reviewed  Radiological Study: Reviewed and Ordered  Medical Tests: Ordered and Reviewed  ED Management:    Last known normal: 9:00pm   Provider contact time: 11:05 PM    Head CT Read by MD: 11:06 PM  Neurology consult ordered by ED MD: 11:05 PM  Neurologist returned call/TeleStroke in process: 11:30 PM    VAN positive? yes (weakness plus any other cortical finding. If so, call stroke alert out to 24 hours):  Weakness: Yes/No yes     Visual changes: Yes/No - no   Aphasia: Yes/No yes   Neglect: Yes/No no       Patient was interviewed and examined emergently.  Stroke alert was initiated.  Patient has a reported past history of right sided stroke with aphasia that has improved status post speech therapy.  Now she is reporting worsening right upper extremity weakness. Non con CT head indicates no bleed for evidence of mass effect. Teleneurologist evaluating patient does not recommend tPA secondary to concern for stroke mimic but encouraged in house further stroke workup.  Case was discussed with the on-call nurse practitioner who contacted our in-house on-call neurologist, Dr. Kunz, who requested CTA of the head and neck.  An acute left-sided MCA thrombus was noted.  These findings were relayed to the neurologist [facility] [place] who recommends transfer for further evaluation and potential embolectomy.  Family patient updated on plan of care and in agreement.  Patient will be transported per stat ground EMS.  She was improving with R upper extremity mobility and strength at the time of transfer.            Scribe Attestation:   Scribe #1: I performed the above scribed service and the documentation accurately describes the  services I performed. I attest to the accuracy of the note.    Attending Attestation:         Attending Critical Care:   Critical Care Times:   Direct Patient Care (initial evaluation, reassessments, and time considering the case)................................................................25 minutes.   Additional History from reviewing old medical records or taking additional history from the family, EMS, PCP, etc.......................5 minutes.   Ordering, Reviewing, and Interpreting Diagnostic Studies...............................................................................................................5 minutes.   Documentation..................................................................................................................................................................................5 minutes.   Consultation with other Physicians. .................................................................................................................................................10 minutes.   Consultation with the patient's family directly relating to the patient's condition, care, and DNR status (when patient unable)......10 minutes.   Other..................................................................................................................................................................................................5 minutes.   ==============================================================  · Total Critical Care Time - exclusive of procedural time: 65 minutes.  ==============================================================  Critical care was necessary to treat or prevent imminent or life-threatening deterioration of the following conditions: stroke.   The following critical care procedures were done by me (see procedure notes): pulse oximetry.   Critical care was time spent personally by me on the following activities: obtaining history from patient or relative,  examination of patient, review of old charts, ordering lab, x-rays, and/or EKG, development of treatment plan with patient or relative, ordering and performing treatments and interventions, evaluation of patient's response to treatment, discussion with consultants, discussions with primary provider, re-evaluation of patient's conition and end of life discussions.   Critical Care Condition: potentially life-threatening                  Clinical Impression:       ICD-10-CM ICD-9-CM   1. Stroke I63.9 434.91         Disposition:   Disposition: Transferred  Condition: Serious       I, Dr. Kip Nath, personally performed the services described in this documentation. All medical record entries made by the scribe were at my direction and in my presence.  I have reviewed the chart and agree that the record reflects my personal performance and is accurate and complete. Kip Nath MD.  4:59 AM 05/21/2019                   Kip Nath MD  05/21/19 [id number]

## 2019-05-21 NOTE — SUBJECTIVE & OBJECTIVE
Past Medical History:   Diagnosis Date    Aphasia 5/27/2018    Arthritis     Benign essential HTN     CHF (congestive heart failure)     Closed intertrochanteric fracture of left hip     Coronary artery disease     Dementia     Dementia with behavioral disturbance 5/27/2018    Diabetes mellitus     Embolic stroke involving right middle cerebral artery 5/27/2018    Gout     High cholesterol     Low potassium syndrome     PEG (percutaneous endoscopic gastrostomy) status 6/4/2018    Placed 6/4/18    Urinary incontinence        Past Surgical History:   Procedure Laterality Date    CARDIAC SURGERY      CORONARY ARTERY BYPASS GRAFT      EGD (ESOPHAGOGASTRODUODENOSCOPY) N/A 6/5/2018    [facility] OR [address] [place] [address]    EGD, WITH PEG TUBE INSERTION N/A 6/5/2018    [facility]    reduction & internal fixation of displaced closed comminuted intertrochanteric fx left hip  02/12/2018       Review of patient's allergies indicates:  No Known Allergies    No current facility-administered medications on file prior to encounter.      Current Outpatient Medications on File Prior to Encounter   Medication Sig    atorvastatin (LIPITOR) 40 MG tablet 1 tablet (40 mg total) by Per G Tube route once daily. (Patient taking differently: Take 40 mg by mouth once daily. Crush meds)    buPROPion (WELLBUTRIN) 75 MG tablet 2 tablets (150 mg total) by Per G Tube route 2 (two) times daily. (Patient taking differently: Take 150 mg by mouth 2 (two) times daily. Crush meds)    cloNIDine (CATAPRES) 0.1 MG tablet 1 tablet (0.1 mg total) by Per G Tube route 2 (two) times daily. (Patient taking differently: Take 0.1 mg by mouth 2 (two) times daily. Crush meds)    memantine (NAMENDA) 10 MG Tab Take 5 mg by mouth 2 (two) times daily. Crush meds    traZODone (DESYREL) 50 MG tablet Take 50 mg by mouth nightly as needed for Insomnia.     Family History     None         Tobacco Use    Smoking status: Current Every Day Smoker     Packs/day: 3.00    Smokeless tobacco: Never Used   Substance and Sexual Activity    Alcohol use: Yes     Comment: socially    Drug use: No    Sexual activity: Not on file     Review of Systems   Unable to perform ROS: Patient nonverbal     Objective:     Vital Signs (Most Recent):  Temp: 98.1 °F (36.7 °C) (05/20/19 2306)  Pulse: 68 (05/21/19 0101)  Resp: 16 (05/20/19 2306)  BP: (!) 142/86 (05/21/19 0101)  SpO2: 100 % (05/21/19 0101) Vital Signs (24h Range):  Temp:  [98.1 °F (36.7 °C)] 98.1 °F (36.7 °C)  Pulse:  [63-82] 68  Resp:  [16] 16  SpO2:  [100 %] 100 %  BP: (138-143)/(63-86) 142/86     Weight: 56.7 kg (125 lb)  Body mass index is 22.14 kg/m².    Physical Exam   Constitutional: She appears well-developed and well-nourished. No distress.   HENT:   Head: Normocephalic and atraumatic.   Eyes: Pupils are equal, round, and reactive to light. Conjunctivae are normal.   Leftward gaze.   Neck: Normal range of motion. Neck supple. No thyromegaly present.   Cardiovascular: Normal rate, regular rhythm and intact distal pulses.   Murmur heard.  Pulmonary/Chest: Effort normal and breath sounds normal. No respiratory distress.   Abdominal: Soft. Bowel sounds are normal. She exhibits no distension.   Musculoskeletal: She exhibits edema (BLEs with 2+ pitting edema; slightly increased on RIGHT leg). She exhibits no tenderness.   Pt with no active range of motion of the right arm or leg.  Some spontaneous slight movements of the right arm, however not purposeful.  RUE Strength 1/5.  RLE strength 1/5.  LUE strength 4/5, LLE strength 3/5.   Neurological: She is alert.   Nonverbal, unable to assess orientation.  Squeezes left hand to command (4/5).  When asked if she could do something, she shakes her head yes.  She does not open her mouth and does not verbalize.  Right neglect noted with leftward gaze.  Wiggles left toes.  Slight movement of right leg when  asked, slight movement of right arm, however does not complete movements of either extremity.  Gait not observed due to significant weakness.  Very difficult neuro exam.  Does not perform finger to nose when asked.  Does not perform heel to shin when asked.  Does spontaneously sit up in bed and support herself with left arm.   Skin: Skin is warm and dry. Capillary refill takes less than 2 seconds. No erythema.         CRANIAL NERVES     CN III, IV, VI   Pupils are equal, round, and reactive to light.       Significant Labs:   CBC:   Recent Labs   Lab 05/20/19 2315   WBC 9.30   HGB 11.0*   HCT 34.5*   PLT 96*     CMP:   Recent Labs   Lab 05/20/19 2315      K 2.8*      CO2 33*      BUN 15   CREATININE 0.8   CALCIUM 9.3   PROT 7.0   ALBUMIN 3.7   BILITOT 0.4   ALKPHOS 59   AST 16   ALT 15   ANIONGAP 10   EGFRNONAA >60     Coagulation:   Recent Labs   Lab 05/20/19 2315   INR 1.0     TSH:   Recent Labs   Lab 05/20/19 2315   TSH 2.844       Significant Imaging:  CT Head ([facility]):  --No acute intracranial pathology.  --However, consider brain MRI if there is clinical suspicion for acute stroke, as related findings may  not be immediately apparent by CT.  --Nonacute abnormalities are described above.

## 2019-05-21 NOTE — ASSESSMENT & PLAN NOTE
Pt with expressive aphasia and inability to swallow presently.  Keep NPO.  Consult SLP.     Normal for race

## 2019-05-21 NOTE — SIGNIFICANT EVENT
RAPID RESPONSE NURSE IR NOTE     Admit Date: 2019  LOS: 0  Code Status: Full Code   Date of Consult: 2019  : 1934  Age: 84 y.o.  Weight:   Wt Readings from Last 1 Encounters:   19 56.7 kg (125 lb)     Sex: female  Race: Black or    Bed: [address] [place] [address] A:   MRN: [id number]  Time Rapid Response Team notified: 0708  Time Rapid Response Team at Bedside: 0710  Time Rapid Response Team left Bedside: 0850  Was the patient discharged from an ICU this admission?   no  Was the patient discharged from a PACU within last 24 hours?  no  Did the patient receive conscious sedation/general anesthesia within last 24 hours?  no  Was the patient in the ED within the past 24 hours?  yes  Was the patient started on NIPPV within the past 24 hours?  no  Did this progress into an ARC or CPA:  no  Attending Physician: Pankaj Walls MD  Primary Service: Networked reference to record PCT   Consult Requested By: Pankaj Walls MD     SITUATION     Reason for Call: IR thrombectomy    BACKGROUND     Why is the patient in the hospital?: Acute ischemic left MCA stroke  Patient has a past medical history of Aphasia, Arthritis, Benign essential HTN, CHF (congestive heart failure), Closed intertrochanteric fracture of left hip, Coronary artery disease, Dementia with behavioral disturbance, Diabetes mellitus, Embolic stroke involving right middle cerebral artery, Gout, High cholesterol, Low potassium syndrome, PEG (percutaneous endoscopic gastrostomy) status, and Urinary incontinence.    ASSESSMENT     Last know well time: approx 19 2200    VAN positive or negative: positive  VAN Test    tPA decision: no  tPA bolus (mg and time):  tPA infusion (mg and time):  tPA end time:    IR decision: yes  IR arrival: 07  IR end time: 08     BP parameters: SBP < 140    FREQUENT DOCUMENTION     Initial NIH: Completed by ED RN, See flowsheet    VS, Neuro and groin site checks: Q15m x 2H, Q30min x 6H, then hourly    NIH 1  hour post procedure: Completed by Neuro CC RN, see flowsheet    See flowsheets for further documentation.    FOLLOW-UP/CONTINGENCY PLAN     Call the Rapid Response Nurse, Rosy Moya RN at [phone] for additional questions or concerns.    PHYSICIAN ESCALATION     Vascular Neurology provider you spoke with: Baljeet Box NP    Disposition: Tx in ICU bed 2638.

## 2019-05-21 NOTE — PLAN OF CARE
Patient seen and evaluated with staff Dr. Marx. Patient's exam is not significantly improved s/p thrombectomy and out of proportion to imaging findings.     NIHSS: 28 this morning. Gaze deviation to left, right sided weakness.     Recommendations:   Repeat CT Head without contrast  Further recs from consult note 5/21    Jordon Rosario MD   Internal Medicine PGY-2

## 2019-05-21 NOTE — SUBJECTIVE & OBJECTIVE
Past Medical History:   Diagnosis Date    Aphasia 5/27/2018    Arthritis     Benign essential HTN     CHF (congestive heart failure)     Closed intertrochanteric fracture of left hip     Coronary artery disease     Dementia with behavioral disturbance 5/27/2018    Diabetes mellitus     Embolic stroke involving right middle cerebral artery 5/27/2018    Gout     High cholesterol     Low potassium syndrome     PEG (percutaneous endoscopic gastrostomy) status 6/4/2018    Placed 6/4/18    Urinary incontinence      Past Surgical History:   Procedure Laterality Date    CARDIAC SURGERY      CORONARY ARTERY BYPASS GRAFT      EGD (ESOPHAGOGASTRODUODENOSCOPY) N/A 6/5/2018    Performed by Madhu Bliss MD at [facility] OR [address] [place] [address]    EGD, WITH PEG TUBE INSERTION N/A 6/5/2018    [facility]    reduction & internal fixation of displaced closed comminuted intertrochanteric fx left hip  02/12/2018     No family history on file.  Social History     Tobacco Use    Smoking status: Current Every Day Smoker     Packs/day: 3.00    Smokeless tobacco: Never Used   Substance Use Topics    Alcohol use: Yes     Comment: socially    Drug use: No     Review of patient's allergies indicates:  No Known Allergies    Medications: I have reviewed the current medication administration record.      (Not in a hospital admission)    Review of Systems   Unable to perform ROS: Patient nonverbal     Objective:     Vital Signs (Most Recent):  Temp: 97.9 °F (36.6 °C) (05/21/19 0554)  Pulse: (!) 58 (05/21/19 0640)  Resp: 20 (05/21/19 0554)  BP: (!) 166/87 (05/21/19 0640)  SpO2: 99 % (05/21/19 0640)    Vital Signs Range (Last 24H):  Temp:  [97.9 °F (36.6 °C)-98.1 °F (36.7 °C)]   Pulse:  [58-82]   Resp:  [16-20]   BP: (135-177)/(56-98)   SpO2:  [97 %-100 %]     Physical Exam   Constitutional:   Thin elderly cachexia    HENT:   Head: Normocephalic and atraumatic.   Eyes: Pupils are equal,  round, and reactive to light.   Neck: Normal range of motion.   Cardiovascular: Normal rate.   Pulmonary/Chest: Effort normal.   Abdominal: Soft.   Neurological: She is alert.   Right sided weakness, facial droop, hemianopsia, aphasia, gaze preference   Skin: Skin is warm and dry.   Nursing note and vitals reviewed.      Neurological Exam:   LOC: alert  Attention Span: poor  Language: Mute  Articulation: Mute/Anarthric  Orientation: Untestable due to severe aphasia   Visual Fields: Hemianopsia right  EOM (CN III, IV, VI): Gaze preference  left  Pupils (CN II, III): PERRL  Facial Sensation (CN V): Normal  Facial Movement (CN VII): Lower facial weakness on the Right  Gag Reflex: present  Reflexes: flexor plantar responses bilaterally  Motor: moves left side  Cebellar: No evidence of appendicular or axial ataxia  Sensation: Avery-hypoesthesia right  Tone: Flaccid  RUE and RLE       Laboratory:  CMP:   Recent Labs   Lab 05/20/19  2315   CALCIUM 9.3   ALBUMIN 3.7   PROT 7.0      K 2.8*   CO2 33*      BUN 15   CREATININE 0.8   ALKPHOS 59   ALT 15   AST 16   BILITOT 0.4     CBC:   Recent Labs   Lab 05/20/19  2315   WBC 9.30   RBC 3.82*   HGB 11.0*   HCT 34.5*   PLT 96*   MCV 90   MCH 28.9   MCHC 32.0     Lipid Panel:   Recent Labs   Lab 05/20/19  2315   CHOL 124   LDLCALC 59.4*   HDL 53   TRIG 58     Coagulation:   Recent Labs   Lab 05/20/19  2315   INR 1.0     Hgb A1C: No results for input(s): HGBA1C in the last 168 hours.  TSH:   Recent Labs   Lab 05/20/19  2315   TSH 2.844       Diagnostic Results:      Brain imaging:  CT head w/o contrast 5-21-19 results:  1. There are extensive chronic changes including moderate generalized volume loss and ventriculomegaly with marked nonspecific white matter change.  The findings likely reflect sequelae of chronic small vessel disease.  There are multiple bilateral chronic infarctions in both cerebellar hemispheres with a chronic lacunar infarction in the right basal  ganglia.  There is also encephalomalacia from a chronic infarction in the posterior right frontal precentral gyrus.  2. There is no hemorrhage, mass or obvious acute infarction.  It should be noted that MRI is more sensitive in the detection of subtle or acute nonhemorrhagic ischemic disease.    Vessel Imaging:  CTA head and neck 5-21-19 results pending    Cardiac Evaluation:   EKG 5-21-19 pending

## 2019-05-21 NOTE — ASSESSMENT & PLAN NOTE
- Likely the cause of patient's embolic infarct  - Has not been compliant w/ Eliquis after she had ran out of her medication  - Will restart AC once appropriate

## 2019-05-21 NOTE — CONSULTS
Inpatient consult to Physical Medicine Rehab  Consult performed by: DAVID Garrett  Consult ordered by: Handy Brito NP  Reason for consult: assess rehab needs      Consult received.  Reviewed patient history and current admission.  Rehab team following.  Full consult to follow.    TANA Monroe, ALEKSANDRP-C  Physical Medicine & Rehabilitation   05/21/2019  Spectralink: [id number]

## 2019-05-21 NOTE — HPI
Patient is an 85 y/o female w/ past medical history significant for HTN, HLD, DM, CHF, CAD, a fib(no AC, previously on Eliquis, but has not refilled the medication after she ran out), Hx of R MCA infarct with residual L hemiparesis and aphasia, advanced dementia who presented to OSH w/ new onset AMS ( non-verbal, unable to stand, R hemiplegial LS facial droop) after the family noted that she fell out of bed in the evening of 5/20. Patient was evaluated by Vascular Neurology telemedicine service w/ initial NIHSS 25; imaging studies w/o evidence of acute abnormalities. Patient was not treated w/ tPA and transferred to [facility] for possible thrombectomy after CTA revealed abrupt truncation at the level of the distal M1 and proximal M2 segments of the left MCA, consistent  with acute thrombotic occlusion. MRI at OSH revealed only modest diffusion changes.  Patient taken to IR w/ 4 passes made with restoration of TICI 3 flow on 5/21.  Per patient's daughter, at baseline she ambulates around the house with a walker.  She is able to feed herself and toilet independently.   Patient admitted to [facility] for post procedural care and higher level of care.

## 2019-05-21 NOTE — ASSESSMENT & PLAN NOTE
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 The following therapy evaluations are needed: PT evaluate and treat, OT evaluate and treat, SLP evaluate and treat, PM&R evaluate for appropriate placement    Diagnostics ordered/pending: HgbA1C to assess blood glucose levels, TTE to assess cardiac function/status     VTE prophylaxis: Heparin 5000 units SQ every 8 hours    BP parameters: Infarct: No intervention, SBP <220  May change post thrombectomy

## 2019-05-21 NOTE — HPI
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  She presented to the ED with her daughter who reports Pt fell out of bed at approximately 9:15 p.m. Patient also has exhibited right-sided weakness, some facial droop with drooling, and aphasia. Patient's daughter stats that at  8:00 pm she noted patient sitting down in recliner. Patient's daughter stated at 8:45 p.m. She came back inside and noted patient to be  slumped over to her left and when daughter try to converse with her, she responded only with groans and moans.  She states when she tried to get her out of the chair, Pt was too weak to walk.  Pt required 2 person assist to bed or daughter gave her a bed bath and left her to rest.  She states less than 10 min later she heard the Pt fall bed.  She helped her back into bed, however Pt remained aphasic with left-sided facial drooping and drooling that she brought her to the ED for further evaluation.  She reports similar symptoms prior to CVA last year.  She received tPA at that time and was treated in Neuro Critical Care at [facility].  Pt regained full strength and swallowing ability, however briefly required PEG tube and ongoing SLP, PT, OT.  Patient's daughter who she lives with states that Pt has not been on Eliquis for some time as she ran out it was not refilled.  She states that she is compliant with her physician visits, though she is unsure if she still sees Neurology.  She is not on aspirin or Plavix, however daughter states she takes everything has been prescribed to her.  Per patient's daughter, at baseline she ambulates around the house with a walker.  She is able to feed herself and toilet independently.  She also leaves the house occasionally with her daughter.  She states she is relatively high functioning.  Pt is unable to provide for Hx or  contribute to HPI.  Other pertinent medical Hx as below:

## 2019-05-21 NOTE — H&P
[facility]  Neurocritical Care  History & Physical    Admit Date: 5/21/2019  Service Date: 05/21/2019  Length of Stay: 0    Subjective:     Chief Complaint: Acute ischemic left MCA stroke    History of Present Illness: Patient is an 83 y/o female w/ past medical history significant for HTN, HLD, DM, CHF, CAD, a fib(no AC, previously on Eliquis, but has not refilled the medication after she ran out), Hx of R MCA infarct with residual L hemiparesis and aphasia, advanced dementia who presented to OSH w/ new onset AMS ( non-verbal, unable to stand, R hemiplegial LS facial droop) after the family noted that she fell out of bed in the evening of 5/20. Patient was evaluated by Vascular Neurology telemedicine service w/ initial NIHSS 25; imaging studies w/o evidence of acute abnormalities. Patient was not treated w/ tPA and transferred to [facility] for possible thrombectomy after CTA revealed abrupt truncation at the level of the distal M1 and proximal M2 segments of the left MCA, consistent  with acute thrombotic occlusion. MRI at OSH revealed only modest diffusion changes.  Patient taken to IR w/ 4 passes made with restoration of TICI 3 flow on 5/21.  Per patient's daughter, at baseline she ambulates around the house with a walker.  She is able to feed herself and toilet independently.   Patient admitted to [facility] for post procedural care and higher level of care.    Past Medical History:   Diagnosis Date    Aphasia 5/27/2018    Arthritis     Benign essential HTN     CHF (congestive heart failure)     Closed intertrochanteric fracture of left hip     Coronary artery disease     Dementia with behavioral disturbance 5/27/2018    Diabetes mellitus     Embolic stroke involving right middle cerebral artery 5/27/2018    Gout     High cholesterol     Low potassium syndrome     PEG (percutaneous endoscopic gastrostomy) status 6/4/2018    Placed 6/4/18    Urinary incontinence      Past Surgical History:    Procedure Laterality Date    CARDIAC SURGERY      CORONARY ARTERY BYPASS GRAFT      EGD (ESOPHAGOGASTRODUODENOSCOPY) N/A 6/5/2018    Performed by Madhu Bliss MD at [facility] OR 2ND FLR    EGD, WITH PEG TUBE INSERTION N/A 6/5/2018    Performed by Madhu Bliss MD at [facility] OR 2ND FLR    reduction & internal fixation of displaced closed comminuted intertrochanteric fx left hip  02/12/2018      Current Facility-Administered Medications on File Prior to Encounter   Medication Dose Route Frequency Provider Last Rate Last Dose    [COMPLETED] aspirin suppository 300 mg  300 mg Rectal Once Mirian Caceres NP   300 mg at 05/21/19 0244    [COMPLETED] iohexol (OMNIPAQUE 350) injection 100 mL  100 mL Intravenous ONCE PRN Kip Nath MD   100 mL at 05/21/19 0215    [DISCONTINUED] aspirin suppository 300 mg  300 mg Rectal Once Mirian Caceres NP        [DISCONTINUED] potassium chloride 10 mEq in 100 mL IVPB  10 mEq Intravenous Q1H Mirian Caceres NP   Stopped at 05/21/19 0425     Current Outpatient Medications on File Prior to Encounter   Medication Sig Dispense Refill    atorvastatin (LIPITOR) 40 MG tablet 1 tablet (40 mg total) by Per G Tube route once daily. (Patient taking differently: Take 40 mg by mouth once daily. Crush meds) 30 tablet 0    buPROPion (WELLBUTRIN) 75 MG tablet 2 tablets (150 mg total) by Per G Tube route 2 (two) times daily. (Patient taking differently: Take 150 mg by mouth 2 (two) times daily. Crush meds) 120 tablet 0    cloNIDine (CATAPRES) 0.1 MG tablet 1 tablet (0.1 mg total) by Per G Tube route 2 (two) times daily. (Patient taking differently: Take 0.1 mg by mouth 2 (two) times daily. Crush meds)      HYDROcodone-acetaminophen (NORCO)  mg per tablet Take 1 tablet by mouth every 8 (eight) hours as needed for Pain.      memantine (NAMENDA) 10 MG Tab Take 10 mg by mouth 2 (two) times daily. Crush meds      traZODone (DESYREL) 50 MG tablet Take 50 mg by mouth  nightly as needed for Insomnia.        Allergies: Patient has no known allergies.    No family history on file.  Social History     Tobacco Use    Smoking status: Current Every Day Smoker     Packs/day: 3.00    Smokeless tobacco: Never Used   Substance Use Topics    Alcohol use: Yes     Comment: socially    Drug use: No     Review of Systems   Unable to perform ROS: Patient nonverbal   Objective:     Vitals:    Temp: 97.9 °F (36.6 °C)  Pulse: 79  Rhythm: normal sinus rhythm  BP: (!) 117/58  MAP (mmHg): 83  Resp: 14  SpO2: 95 %  O2 Device (Oxygen Therapy): nasal cannula    Temp  Min: 97.3 °F (36.3 °C)  Max: 98.1 °F (36.7 °C)  Pulse  Min: 56  Max: 92  BP  Min: 113/54  Max: 210/113  MAP (mmHg)  Min: 71  Max: 112  Resp  Min: 10  Max: 34  SpO2  Min: 92 %  Max: 100 %    No intake/output data recorded.   Unmeasured Output  Urine Occurrence: 1  Pad Count: 1       Physical Exam  Constitutional: Thin elderly cachexia    HENT: Normocephalic and atraumatic.   Eyes: Pupils are equal, round, and reactive to light. L gaze, doesn't cross midline   Neck: Normal range of motion.   Cardiovascular: Irregularly irregular rhythm   Pulmonary/Chest: Effort normal.   Abdominal: Soft.   Neurological: She is drowsy  Skin: Skin is warm and dry. Noted R ear ecchymoses   Nursing note and vitals reviewed.     Neurological Exam:   Drowsy w/ poor attention span  Mute, non-verbal  Unable to test due to severe aphasia   R Hemianopsia right, left gaze preference, unable to cross midline, PERRL  L facial weakness  Gag  present  Moves left UE and LE spontaneously and is antigravity  RUE moving spontaneously, some movement in RLE, but appears to be significantly weaker than the L side, not able to formally test strength as she is not following commands   Normal tone throughout      NPO at this time. Pending MARYURI once more awake     Assessment/Plan:     Neuro  * Acute ischemic left MCA stroke  Patient is an 83 yo female w/ multiple co-morbidities who  presents as a transfer from OSH w/ notable L MCA occlusion. No tPA given at OSH. Thrombectomy w/ TICI 3 after 4 passes done at [facility]. Patient admitted to [facility] for higher level of care and exam follow up s/p thrombectomy    - Admit to NCC  - Neuro checks Q1  - Vital signs Q1  - SBP <160, initially on Cardene, now off  - MRI 24 hours s/p intervention  - HbA1c, TSH and Lipid panel  - TTE pending and ordered  - Vascular Neurology consulted and following, appreciate recommendations    Aphasia  - 2/2 stroke, unclear how much deficits s/p previous infarct  - SLP eval and treat     Cardiac/Vascular  Paroxysmal atrial fibrillation  - Likely the cause of patient's embolic infarct  - Has not been compliant w/ Eliquis after she had ran out of her medication  - Will restart AC once appropriate     Atrial enlargement, left  - Patient w/ known history of Afib, off of AC due to inability to refill medication   - TTE pending     Mixed hyperlipidemia  - LDL 58.4  - Continue home Atorvastatin     Benign essential HTN  - SBP goal <160 s/p successful thrombectomy   - Intermittent Cardene  - On Cardene at home, 0.1 BID  - Will likely transition to Norvasc from Cardene     Endocrine  Diabetes mellitus  - HbA1c 5.9  - NPO at this time  - POCT Q6  - SSI while NPO  - DM diet once appropriate           The patient is being Prophylaxed for:  Venous Thromboembolism with: Mechanical  Stress Ulcer with: Not Applicable   Ventilator Pneumonia with: not applicable    Activity Orders          Diet NPO: NPO starting at 05/21 0720        Full Code    Sofy Serrato MD  Neurocritical Care  [facility]

## 2019-05-21 NOTE — PLAN OF CARE
05/21/19 1429   Post-Acute Status   Post-Acute Authorization Placement;Other   Post-Acute Placement Status Awaiting Therapy Documentation  (PT/OT ordered 05/21/19)   Discharge Delays (!) Diet Not Ready for Discharge  (Diet recommendations:  NPO NPO,   )       Suzy Moreno, RN, CCRN-K, [facility]  Neuro-Critical Care   [phone]

## 2019-05-22 PROBLEM — R23.9 ALTERATION IN SKIN INTEGRITY DUE TO MOISTURE: Status: ACTIVE | Noted: 2019-05-22

## 2019-05-22 PROBLEM — J69.0 ASPIRATION PNEUMONIA: Status: ACTIVE | Noted: 2019-05-22

## 2019-05-22 PROBLEM — D68.59 HYPERCOAGULABLE STATE: Status: ACTIVE | Noted: 2019-05-22

## 2019-05-22 PROBLEM — R53.81 DEBILITY: Status: ACTIVE | Noted: 2019-05-22

## 2019-05-22 LAB
ALBUMIN SERPL BCP-MCNC: 3.2 G/DL (ref 3.5–5.2)
ALP SERPL-CCNC: 59 U/L (ref 55–135)
ALT SERPL W/O P-5'-P-CCNC: 15 U/L (ref 10–44)
ANION GAP SERPL CALC-SCNC: 12 MMOL/L (ref 8–16)
ANISOCYTOSIS BLD QL SMEAR: SLIGHT
AST SERPL-CCNC: 23 U/L (ref 10–40)
BASOPHILS # BLD AUTO: 0.02 K/UL (ref 0–0.2)
BASOPHILS NFR BLD: 0.1 % (ref 0–1.9)
BILIRUB SERPL-MCNC: 0.8 MG/DL (ref 0.1–1)
BILIRUB UR QL STRIP: NEGATIVE
BUN SERPL-MCNC: 11 MG/DL (ref 8–23)
CALCIUM SERPL-MCNC: 8.6 MG/DL (ref 8.7–10.5)
CHLORIDE SERPL-SCNC: 103 MMOL/L (ref 95–110)
CLARITY UR REFRACT.AUTO: ABNORMAL
CO2 SERPL-SCNC: 28 MMOL/L (ref 23–29)
COLOR UR AUTO: YELLOW
CREAT SERPL-MCNC: 0.7 MG/DL (ref 0.5–1.4)
DIFFERENTIAL METHOD: ABNORMAL
EOSINOPHIL # BLD AUTO: 0 K/UL (ref 0–0.5)
EOSINOPHIL NFR BLD: 0 % (ref 0–8)
ERYTHROCYTE [DISTWIDTH] IN BLOOD BY AUTOMATED COUNT: 13.3 % (ref 11.5–14.5)
EST. GFR  (AFRICAN AMERICAN): >60 ML/MIN/1.73 M^2
EST. GFR  (NON AFRICAN AMERICAN): >60 ML/MIN/1.73 M^2
GLUCOSE SERPL-MCNC: 148 MG/DL (ref 70–110)
GLUCOSE UR QL STRIP: NEGATIVE
HCT VFR BLD AUTO: 32.1 % (ref 37–48.5)
HGB BLD-MCNC: 10.2 G/DL (ref 12–16)
HGB UR QL STRIP: ABNORMAL
HYPOCHROMIA BLD QL SMEAR: ABNORMAL
IMM GRANULOCYTES # BLD AUTO: 0.09 K/UL (ref 0–0.04)
IMM GRANULOCYTES NFR BLD AUTO: 0.5 % (ref 0–0.5)
KETONES UR QL STRIP: NEGATIVE
LEUKOCYTE ESTERASE UR QL STRIP: NEGATIVE
LYMPHOCYTES # BLD AUTO: 1.3 K/UL (ref 1–4.8)
LYMPHOCYTES NFR BLD: 7.1 % (ref 18–48)
MAGNESIUM SERPL-MCNC: 1.1 MG/DL (ref 1.6–2.6)
MAGNESIUM SERPL-MCNC: 1.7 MG/DL (ref 1.6–2.6)
MCH RBC QN AUTO: 29.4 PG (ref 27–31)
MCHC RBC AUTO-ENTMCNC: 31.8 G/DL (ref 32–36)
MCV RBC AUTO: 93 FL (ref 82–98)
MICROSCOPIC COMMENT: NORMAL
MONOCYTES # BLD AUTO: 1.1 K/UL (ref 0.3–1)
MONOCYTES NFR BLD: 6.1 % (ref 4–15)
NEUTROPHILS # BLD AUTO: 16.2 K/UL (ref 1.8–7.7)
NEUTROPHILS NFR BLD: 86.2 % (ref 38–73)
NITRITE UR QL STRIP: NEGATIVE
NRBC BLD-RTO: 0 /100 WBC
OVALOCYTES BLD QL SMEAR: ABNORMAL
PH UR STRIP: 7 [PH] (ref 5–8)
PHOSPHATE SERPL-MCNC: 3.2 MG/DL (ref 2.7–4.5)
PLATELET # BLD AUTO: 95 K/UL (ref 150–350)
PMV BLD AUTO: 12.3 FL (ref 9.2–12.9)
POCT GLUCOSE: 154 MG/DL (ref 70–110)
POCT GLUCOSE: 154 MG/DL (ref 70–110)
POCT GLUCOSE: 179 MG/DL (ref 70–110)
POCT GLUCOSE: 179 MG/DL (ref 70–110)
POIKILOCYTOSIS BLD QL SMEAR: SLIGHT
POLYCHROMASIA BLD QL SMEAR: ABNORMAL
POTASSIUM SERPL-SCNC: 2.7 MMOL/L (ref 3.5–5.1)
POTASSIUM SERPL-SCNC: 2.9 MMOL/L (ref 3.5–5.1)
POTASSIUM SERPL-SCNC: 5.5 MMOL/L (ref 3.5–5.1)
PROT SERPL-MCNC: 6.3 G/DL (ref 6–8.4)
PROT UR QL STRIP: NEGATIVE
RBC # BLD AUTO: 3.47 M/UL (ref 4–5.4)
RBC #/AREA URNS AUTO: 1 /HPF (ref 0–4)
SODIUM SERPL-SCNC: 143 MMOL/L (ref 136–145)
SP GR UR STRIP: 1.03 (ref 1–1.03)
SQUAMOUS #/AREA URNS AUTO: 0 /HPF
URN SPEC COLLECT METH UR: ABNORMAL
WBC # BLD AUTO: 18.72 K/UL (ref 3.9–12.7)
WBC #/AREA URNS AUTO: 3 /HPF (ref 0–5)

## 2019-05-22 PROCEDURE — 25000003 PHARM REV CODE 250: Performed by: PHYSICIAN ASSISTANT

## 2019-05-22 PROCEDURE — 99233 SBSQ HOSP IP/OBS HIGH 50: CPT | Mod: ,,, | Performed by: PHYSICIAN ASSISTANT

## 2019-05-22 PROCEDURE — 99233 PR SUBSEQUENT HOSPITAL CARE,LEVL III: ICD-10-PCS | Mod: ,,, | Performed by: PHYSICIAN ASSISTANT

## 2019-05-22 PROCEDURE — 84100 ASSAY OF PHOSPHORUS: CPT

## 2019-05-22 PROCEDURE — 94640 AIRWAY INHALATION TREATMENT: CPT

## 2019-05-22 PROCEDURE — 20000000 HC ICU ROOM

## 2019-05-22 PROCEDURE — 84132 ASSAY OF SERUM POTASSIUM: CPT | Mod: 91

## 2019-05-22 PROCEDURE — 87070 CULTURE OTHR SPECIMN AEROBIC: CPT

## 2019-05-22 PROCEDURE — 84132 ASSAY OF SERUM POTASSIUM: CPT

## 2019-05-22 PROCEDURE — 83735 ASSAY OF MAGNESIUM: CPT

## 2019-05-22 PROCEDURE — 87205 SMEAR GRAM STAIN: CPT

## 2019-05-22 PROCEDURE — 25000003 PHARM REV CODE 250: Performed by: NURSE PRACTITIONER

## 2019-05-22 PROCEDURE — 80053 COMPREHEN METABOLIC PANEL: CPT

## 2019-05-22 PROCEDURE — 27000221 HC OXYGEN, UP TO 24 HOURS

## 2019-05-22 PROCEDURE — 31720 CLEARANCE OF AIRWAYS: CPT

## 2019-05-22 PROCEDURE — 94761 N-INVAS EAR/PLS OXIMETRY MLT: CPT

## 2019-05-22 PROCEDURE — 25000242 PHARM REV CODE 250 ALT 637 W/ HCPCS: Performed by: PHYSICIAN ASSISTANT

## 2019-05-22 PROCEDURE — 85025 COMPLETE CBC W/AUTO DIFF WBC: CPT

## 2019-05-22 PROCEDURE — 99233 PR SUBSEQUENT HOSPITAL CARE,LEVL III: ICD-10-PCS | Mod: GC,,, | Performed by: PSYCHIATRY & NEUROLOGY

## 2019-05-22 PROCEDURE — 63600175 PHARM REV CODE 636 W HCPCS: Performed by: PHYSICIAN ASSISTANT

## 2019-05-22 PROCEDURE — 99233 SBSQ HOSP IP/OBS HIGH 50: CPT | Mod: GC,,, | Performed by: PSYCHIATRY & NEUROLOGY

## 2019-05-22 PROCEDURE — 83735 ASSAY OF MAGNESIUM: CPT | Mod: 91

## 2019-05-22 PROCEDURE — 25000003 PHARM REV CODE 250: Performed by: PSYCHIATRY & NEUROLOGY

## 2019-05-22 RX ORDER — MODAFINIL 100 MG/1
100 TABLET ORAL
Status: DISCONTINUED | OUTPATIENT
Start: 2019-05-23 | End: 2019-05-23

## 2019-05-22 RX ORDER — POTASSIUM CHLORIDE 20 MEQ/15ML
40 SOLUTION ORAL
Status: DISCONTINUED | OUTPATIENT
Start: 2019-05-22 | End: 2019-05-27

## 2019-05-22 RX ORDER — BUPROPION HYDROCHLORIDE 75 MG/1
150 TABLET ORAL 2 TIMES DAILY
Status: DISCONTINUED | OUTPATIENT
Start: 2019-05-22 | End: 2019-05-22

## 2019-05-22 RX ORDER — POTASSIUM CHLORIDE 20 MEQ/15ML
40 SOLUTION ORAL
Status: DISCONTINUED | OUTPATIENT
Start: 2019-05-22 | End: 2019-05-22

## 2019-05-22 RX ORDER — MODAFINIL 100 MG/1
200 TABLET ORAL DAILY
Status: DISCONTINUED | OUTPATIENT
Start: 2019-05-23 | End: 2019-05-23

## 2019-05-22 RX ORDER — POTASSIUM CHLORIDE 20 MEQ/15ML
30 SOLUTION ORAL ONCE
Status: COMPLETED | OUTPATIENT
Start: 2019-05-22 | End: 2019-05-22

## 2019-05-22 RX ORDER — MODAFINIL 100 MG/1
100 TABLET ORAL ONCE
Status: COMPLETED | OUTPATIENT
Start: 2019-05-22 | End: 2019-05-22

## 2019-05-22 RX ORDER — POTASSIUM CHLORIDE 20 MEQ/15ML
60 SOLUTION ORAL
Status: DISCONTINUED | OUTPATIENT
Start: 2019-05-22 | End: 2019-05-27

## 2019-05-22 RX ORDER — SODIUM,POTASSIUM PHOSPHATES 280-250MG
2 POWDER IN PACKET (EA) ORAL
Status: DISCONTINUED | OUTPATIENT
Start: 2019-05-22 | End: 2019-05-27

## 2019-05-22 RX ORDER — LANOLIN ALCOHOL/MO/W.PET/CERES
800 CREAM (GRAM) TOPICAL
Status: DISCONTINUED | OUTPATIENT
Start: 2019-05-22 | End: 2019-05-27

## 2019-05-22 RX ORDER — MODAFINIL 100 MG/1
100 TABLET ORAL ONCE
Status: DISCONTINUED | OUTPATIENT
Start: 2019-05-22 | End: 2019-05-22

## 2019-05-22 RX ORDER — NAPROXEN SODIUM 220 MG/1
81 TABLET, FILM COATED ORAL ONCE
Status: COMPLETED | OUTPATIENT
Start: 2019-05-22 | End: 2019-05-22

## 2019-05-22 RX ORDER — BUPROPION HYDROCHLORIDE 75 MG/1
150 TABLET ORAL 2 TIMES DAILY
Status: DISCONTINUED | OUTPATIENT
Start: 2019-05-22 | End: 2019-05-29

## 2019-05-22 RX ORDER — POTASSIUM CHLORIDE 7.45 MG/ML
10 INJECTION INTRAVENOUS ONCE
Status: COMPLETED | OUTPATIENT
Start: 2019-05-22 | End: 2019-05-22

## 2019-05-22 RX ORDER — CLONIDINE HYDROCHLORIDE 0.1 MG/1
0.1 TABLET ORAL 2 TIMES DAILY
Status: DISCONTINUED | OUTPATIENT
Start: 2019-05-22 | End: 2019-05-29

## 2019-05-22 RX ADMIN — ACETAMINOPHEN 650 MG: 325 TABLET ORAL at 10:05

## 2019-05-22 RX ADMIN — IPRATROPIUM BROMIDE AND ALBUTEROL SULFATE 3 ML: .5; 3 SOLUTION RESPIRATORY (INHALATION) at 11:05

## 2019-05-22 RX ADMIN — BUPROPION HYDROCHLORIDE 150 MG: 75 TABLET, FILM COATED ORAL at 08:05

## 2019-05-22 RX ADMIN — SODIUM CHLORIDE SOLN NEBU 3% 4 ML: 3 NEBU SOLN at 12:05

## 2019-05-22 RX ADMIN — SODIUM CHLORIDE SOLN NEBU 3% 4 ML: 3 NEBU SOLN at 03:05

## 2019-05-22 RX ADMIN — MODAFINIL 100 MG: 100 TABLET ORAL at 11:05

## 2019-05-22 RX ADMIN — ASPIRIN 81 MG CHEWABLE TABLET 81 MG: 81 TABLET CHEWABLE at 06:05

## 2019-05-22 RX ADMIN — CLONIDINE HYDROCHLORIDE 0.1 MG: 0.1 TABLET ORAL at 08:05

## 2019-05-22 RX ADMIN — IPRATROPIUM BROMIDE AND ALBUTEROL SULFATE 3 ML: .5; 3 SOLUTION RESPIRATORY (INHALATION) at 03:05

## 2019-05-22 RX ADMIN — MAGNESIUM OXIDE TAB 400 MG (241.3 MG ELEMENTAL MG) 800 MG: 400 (241.3 MG) TAB at 03:05

## 2019-05-22 RX ADMIN — SENNOSIDES AND DOCUSATE SODIUM 1 TABLET: 8.6; 5 TABLET ORAL at 08:05

## 2019-05-22 RX ADMIN — POTASSIUM CHLORIDE 10 MEQ: 10 INJECTION, SOLUTION INTRAVENOUS at 12:05

## 2019-05-22 RX ADMIN — POTASSIUM CHLORIDE 40 MEQ: 20 SOLUTION ORAL at 05:05

## 2019-05-22 RX ADMIN — MAGNESIUM OXIDE TAB 400 MG (241.3 MG ELEMENTAL MG) 800 MG: 400 (241.3 MG) TAB at 11:05

## 2019-05-22 RX ADMIN — POTASSIUM CHLORIDE 30 MEQ: 20 SOLUTION ORAL at 01:05

## 2019-05-22 RX ADMIN — ATORVASTATIN CALCIUM 40 MG: 20 TABLET, FILM COATED ORAL at 08:05

## 2019-05-22 RX ADMIN — SODIUM CHLORIDE SOLN NEBU 3% 4 ML: 3 NEBU SOLN at 11:05

## 2019-05-22 RX ADMIN — SODIUM CHLORIDE SOLN NEBU 3% 4 ML: 3 NEBU SOLN at 07:05

## 2019-05-22 RX ADMIN — IPRATROPIUM BROMIDE AND ALBUTEROL SULFATE 3 ML: .5; 3 SOLUTION RESPIRATORY (INHALATION) at 07:05

## 2019-05-22 RX ADMIN — POTASSIUM CHLORIDE 40 MEQ: 20 SOLUTION ORAL at 03:05

## 2019-05-22 RX ADMIN — BUPROPION HYDROCHLORIDE 150 MG: 75 TABLET, FILM COATED ORAL at 10:05

## 2019-05-22 RX ADMIN — MAGNESIUM OXIDE TAB 400 MG (241.3 MG ELEMENTAL MG) 800 MG: 400 (241.3 MG) TAB at 08:05

## 2019-05-22 RX ADMIN — CLONIDINE HYDROCHLORIDE 0.1 MG: 0.1 TABLET ORAL at 10:05

## 2019-05-22 RX ADMIN — IPRATROPIUM BROMIDE AND ALBUTEROL SULFATE 3 ML: .5; 3 SOLUTION RESPIRATORY (INHALATION) at 12:05

## 2019-05-22 NOTE — PT/OT/SLP PROGRESS
Physical Therapy      Patient Name:  Sury Cobos   MRN:  [id number]    PT consult was received. The patient experienced a change in respiratory status overnight.  She is now requiring venti mask and is on intubation watch. PT eval will be held today.  Will follow up when patient is medically appropriate for mobilization.     Zulema Mei, PT   5/22/2019

## 2019-05-22 NOTE — ASSESSMENT & PLAN NOTE
Found on last admit 5-2018.  Previously on Eliquis but ran out and not refilled  Will need to resume AC

## 2019-05-22 NOTE — PROGRESS NOTES
[facility]  Vascular Neurology  Comprehensive Stroke Center  Progress Note    Assessment/Plan:     * Acute ischemic left MCA stroke  83 y/o female with L MCA syndrome with L M2 occlusion due to cardio embolic from afib not on anticoagulation. No TPA but underwent IR thrombectomy. Did not improve afterwards and continues to worsen clinically today. Symptoms out of proportion to imaging findings in patient with limited neurologic reserve.     Antithrombotics:Recommend resuming anticoagulation    Statins: Lipitor 40    Aggressive risk factor modification: HTN, HLD, prior stroke, afib     Rehab efforts: The patient has been evaluated by a stroke team provider and the therapy needs have been fully considered based off the presenting complaints and exam findings. The following therapy evaluations are needed: PT evaluate and treat, OT evaluate and treat, SLP evaluate and treat, PM&R evaluate for appropriate placement    Diagnostics ordered/pending: None    VTE prophylaxis: Heparin 5000 units SQ every 8 hours (stop once AC resumed)    BP parameters: S/p thrombectomy <180      Paroxysmal atrial fibrillation  Found on last admit 5-2018.  Previously on Eliquis but ran out and not refilled  Will need to resume AC    Atrial enlargement, left  Stroke risk factor      Mixed hyperlipidemia  Stroke risk factor  LDL 54, at goal  Continue home Lipitor 40    Benign essential HTN  Stroke risk factor  SBP <180 for now till after thrombectomy    Aphasia  Due to stroke  Aggressive therapy         No notes on file    STROKE DOCUMENTATION   Acute Stroke Times   Last Known Normal Date: 05/20/19  Last Known Normal Time: 2030  Symptom Onset Date: 05/20/19  Symptom Onset Time: 2030  Stroke Team Called Date: 05/20/19  Stroke Team Called Time: 2338  Stroke Team Arrival Date: 05/21/19  Stroke Team Arrival Time: 0605  CT Interpretation Time: 2338    NIH Scale:          Modified Angelia Score: 3  Ivonne Coma Scale:    ABCD2 Score:     [id number] Score:   HAS -BLED Score:   ICH Score:   Hunt & Burkett Classification:      Hemorrhagic change of an Ischemic Stroke: Does this patient have an ischemic stroke with hemorrhagic changes? No     Neurologic Chief Complaint: Aphasia    Subjective:     Interval History: Patient is seen for follow-up neurological assessment and treatment recommendations: Patient has had exam out of proportion to imaging findings with significant L gaze preference and right sided neglect. Diffuse brain atrophy.     Worsening mentation and oxygenation today. Code status changed by primary team after discussion with family.     HPI, Past Medical, Family, and Social History remains the same as documented in the initial encounter.     Review of Systems   Unable to perform ROS: Patient nonverbal     Scheduled Meds:   albuterol-ipratropium  3 mL Nebulization Q8H    atorvastatin  40 mg Per NG tube Daily    buPROPion  150 mg Per NG tube BID    cloNIDine  0.1 mg Per NG tube BID    senna-docusate 8.6-50 mg  1 tablet Per NG tube BID    sodium chloride 3%  4 mL Nebulization Q8H     Continuous Infusions:   sodium chloride 0.9% 75 mL/hr at 05/22/19 1100    niCARdipine Stopped (05/22/19 1130)     PRN Meds:acetaminophen, dextrose 50%, glucagon (human recombinant), insulin aspart U-100, magnesium oxide, magnesium oxide, ondansetron, potassium chloride 10%, potassium chloride 10%, potassium chloride 10%, potassium, sodium phosphates, potassium, sodium phosphates, potassium, sodium phosphates, sodium chloride 0.9%, sodium chloride 0.9%    Objective:     Vital Signs (Most Recent):  Temp: 98.9 °F (37.2 °C) (05/22/19 0705)  Pulse: 88 (05/22/19 1112)  Resp: (!) 27 (05/22/19 1112)  BP: (!) 151/65 (05/22/19 0900)  SpO2: (!) 87 % (05/22/19 1112)  BP Location: Left arm    Vital Signs Range (Last 24H):  Temp:  [97.8 °F (36.6 °C)-98.9 °F (37.2 °C)]   Pulse:  []   Resp:  [12-27]   BP: (108-158)/(51-77)   SpO2:  [87 %-100 %]   BP Location:  Left arm    Physical Exam   Constitutional:   Thin, elderly female with eyes closed   HENT:   Venti mask in place   Eyes: Pupils are equal, round, and reactive to light. EOM are normal.   Cardiovascular: Normal rate and regular rhythm.   Pulmonary/Chest: She is in respiratory distress.   Abdominal: Soft. She exhibits no distension.   Musculoskeletal: She exhibits no edema or deformity.   Neurological:   Keeps eyes closed.   Increased tone, left >right  Spontaneous movement of all extremities, significantly decreased in right upper and lower extremities.   Wakes to pain, no eye opening.   GCS: E1V1M5   Skin: Skin is warm and dry.   Nursing note and vitals reviewed.      Laboratory:  CMP:   Recent Labs   Lab 05/22/19  0136 05/22/19  1100   CALCIUM 8.6*  --    ALBUMIN 3.2*  --    PROT 6.3  --      --    K 2.9* 2.7*   CO2 28  --      --    BUN 11  --    CREATININE 0.7  --    ALKPHOS 59  --    ALT 15  --    AST 23  --    BILITOT 0.8  --      BMP:   Recent Labs   Lab 05/22/19  0136 05/22/19  1100     --    K 2.9* 2.7*     --    CO2 28  --    BUN 11  --    CREATININE 0.7  --    CALCIUM 8.6*  --      CBC:   Recent Labs   Lab 05/22/19  0336   WBC 18.72*   RBC 3.47*   HGB 10.2*   HCT 32.1*   PLT 95*   MCV 93   MCH 29.4   MCHC 31.8*     Lipid Panel:   Recent Labs   Lab 05/21/19  0928   CHOL 118*   LDLCALC 58.4*   HDL 49   TRIG 53     Coagulation:   Recent Labs   Lab 05/21/19 0928   INR 1.0   APTT 39.7*     Hgb A1C:   Recent Labs   Lab 05/21/19 0928   HGBA1C 5.9*     TSH:   Recent Labs   Lab 05/21/19  0928   TSH 2.293       Diagnostic Results     Brain Imaging     MRI 5/22  Evolving scattered areas of acute/recent infarction left MCA territory with new to increased component in the left inferior frontal gyrus now mild moderate in size.  There is no evidence for hemorrhagic conversion or significant mass effect.    Superimposed confluent T2 FLAIR signal abnormality elsewhere supra and infratentorial  parenchyma while nonspecific concerning for advanced chronic microvascular ischemic change versus posttherapy change.    Remote basal gangliar, thalamic and cerebellar infarcts unchanged.    Compensatory enlargement of ventricular system similar to prior without hydrocephalus.    CTA Head and Neck 5/21  1. 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  The right vertebral artery is dominant and patent throughout its course in the neck.  2. There is calcified plaque in the common, internal and external carotid arteries bilaterally.  Stenosis on the right is mild at 50-69% and moderate on the left at approximately 70% without change.  CTA Head    1.  There is occlusion of the distal left M1 and proximal M2 segments of the middle cerebral artery which has occurred since the prior CTA head.  There is subtle increased density within the left middle cerebral artery at the distal M1 and proximal M2 segments on comparison head CT.  This is consistent with thrombus.    2.  The right anterior circulation is normal.    3.  There is developmental variation with fetal origin of the right posterior cerebral artery.    Cardiac Imaging   · Concentric left ventricular remodeling.  · Small left ventricular cavity size.  · Increased (hyperdynamic) left ventricular systolic function. The estimated ejection fraction is 75%  · Grade I (mild) left ventricular diastolic dysfunction consistent with impaired relaxation.  · Moderate left atrial enlargement.  · Mild-to-moderate aortic valve stenosis.  · Aortic valve area is 1.43 cm2; peak velocity is 2.59 m/s; mean gradient is 15.51 mmHg.  · Mild mid-cavity left ventricular obstruction is present.  · Normal left atrial pressure.  · Mild mitral stenosis.      Jordon Rosario MD  Comprehensive Stroke Center  Department of Vascular Neurology   [facility]

## 2019-05-22 NOTE — PLAN OF CARE
Problem: Adult Inpatient Plan of Care  Goal: Plan of Care Review  Outcome: Ongoing (interventions implemented as appropriate)  Plan of care reviewed with Ms. Sury Cobos and daughter at 0500. Unable to determine patient's level of understanding d/t cognitive status. Questions and concerns addressed with daughter, who is in agreement with plan of care. See previous notes for details regarding tonight's shift. MRI completed. MIVF continued at 75 mL/hr; Cardene continued at 5 mg/hr. Full bath completed. Electrolytes replaced per protocol. Patient progressing towards goals. VS and assessments per flowsheets; will continue to monitor.

## 2019-05-22 NOTE — CONSULTS
"  [facility]  Adult Nutrition  Consult Note    SUMMARY     Recommendations    Recommendation/Intervention:   1. As medically able, recommend starting TF. Glucerna 1.5 @ goal rate 40mL/hr.   - Initiate @ 10mL/hr and increase by 10mL q4hrs, or as tolerated, until goal rate is reached.   - Hold for residuals >500mL.   - Provides 1440kcals, 79g protein and 729mL free water.    2. If able to advance diet, recommend Regular (Diabetic restriction if BG elevated) with texture per SLP recommendations.     RD to monitor.    Goals: Pt to receive nutrition by RD follow up  Nutrition Goal Status: new  Communication of RD Recs: reviewed with RN    Reason for Assessment    Reason For Assessment: consult  Diagnosis: stroke/CVA  Relevant Medical History: HTN, HLD, DM, CHF, CAD, a fib  Interdisciplinary Rounds: attended  General Information Comments: Pt NPO per SLP recommendations. NG tube in place.  Pt with hx of PEG tube placed 6/5/18 however no PEG in place during this admission. No family at bedside to provide full nutrition hx and pt nonverbal at this time. Per chart review, pt's weight stable >1.5 years at approx 120-125lb. Pt appears petite and frail however likely related to advanced age and not malnutrition at this time.  Nutrition Discharge Planning: unable to determine at this time    Nutrition Risk Screen    Nutrition Risk Screen: no indicators present    Nutrition/Diet History    Spiritual, Cultural Beliefs, Baptism Practices, Values that Affect Care: no  Factors Affecting Nutritional Intake: NPO, impaired cognitive status/motor control    Anthropometrics    Temp: 98.9 °F (37.2 °C)  Height Method: Stated  Height: 5' 6" (167.6 cm)  Height (inches): 66 in  Weight Method: Bed Scale  Weight: 57.9 kg (127 lb 10.3 oz)  Weight (lb): 127.65 lb  Ideal Body Weight (IBW), Female: 130 lb  % Ideal Body Weight, Female (lb): 96.15 lb  BMI (Calculated): 20.2  BMI Grade: 18.5-24.9 - normal   "     Lab/Procedures/Meds    Pertinent Labs Reviewed: reviewed  Pertinent Labs Comments: HgbA1c 5.9, POCT Glu 132-179  Pertinent Medications Reviewed: reviewed  Pertinent Medications Comments: IVF, cardene    Estimated/Assessed Needs    Weight Used For Calorie Calculations: 57.9 kg (127 lb 10.3 oz)  Energy Calorie Requirements (kcal): 1306  Energy Need Method: [place]-St Jeor(PAL 1.25)  Protein Requirements: 58-70g(1.0-1.2g/kg)  Weight Used For Protein Calculations: 57.9 kg (127 lb 10.3 oz)  Fluid Requirements (mL): 1mL/kcal or per MD     RDA Method (mL): 1306  CHO Requirement: 50% of total kcals      Nutrition Prescription Ordered    Current Diet Order: NPO    Evaluation of Received Nutrient/Fluid Intake    IV Fluid (mL): 1800  I/O: +I/O, good UOP, LBM 5/22  % Intake of Estimated Energy Needs: 0 - 25 %  % Meal Intake: NPO    Nutrition Risk    Level of Risk/Frequency of Follow-up: (f/u 2x/week)     Assessment and Plan    Nutrition Problem  Inadequate energy intake    Related to (etiology):   NPO    Signs and Symptoms (as evidenced by):   Pt receiving <85% EEN and EPN.     Intervention:  Collaboration of nutrition care    Nutrition Diagnosis Status:   New      Monitor and Evaluation    Food and Nutrient Intake: energy intake, food and beverage intake, enteral nutrition intake  Food and Nutrient Adminstration: diet order, enteral and parenteral nutrition administration  Anthropometric Measurements: weight, weight change, body mass index  Biochemical Data, Medical Tests and Procedures: electrolyte and renal panel, gastrointestinal profile, glucose/endocrine profile, inflammatory profile, lipid profile  Nutrition-Focused Physical Findings: overall appearance        Nutrition Follow-Up    RD Follow-up?: Yes

## 2019-05-22 NOTE — PLAN OF CARE
Problem: Adult Inpatient Plan of Care  Goal: Plan of Care Review  Outcome: Ongoing (interventions implemented as appropriate)  Nutrition assessment completed. Please see RD note for details.    Recommendation/Intervention:   1. As medically able, recommend starting TF. Glucerna 1.5 @ goal rate 40mL/hr.   - Initiate @ 10mL/hr and increase by 10mL q4hrs, or as tolerated, until goal rate is reached.   - Hold for residuals >500mL.   - Provides 1440kcals, 79g protein and 729mL free water.    2. If able to advance diet, recommend Regular (Diabetic restriction if BG elevated) with texture per SLP recommendations.     RD to monitor.    Goals: Pt to receive nutrition by RD follow up  Nutrition Goal Status: new  Communication of RD Recs: reviewed with RN

## 2019-05-22 NOTE — ASSESSMENT & PLAN NOTE
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 No TPA but underwent IR thrombectomy. Did not improve afterwards and continues to worsen clinically today. Symptoms out of proportion to imaging findings in patient with limited neurologic reserve.     Antithrombotics:Recommend resuming anticoagulation    Statins: Lipitor 40    Aggressive risk factor modification: HTN, HLD, prior stroke, afib     Rehab efforts: The patient has been evaluated by a stroke team provider and the therapy needs have been fully considered based off the presenting complaints and exam findings. The following therapy evaluations are needed: PT evaluate and treat, OT evaluate and treat, SLP evaluate and treat, PM&R evaluate for appropriate placement    Diagnostics ordered/pending: None    VTE prophylaxis: Heparin 5000 units SQ every 8 hours (stop once AC resumed)    BP parameters: S/p thrombectomy <180

## 2019-05-22 NOTE — PLAN OF CARE
Problem: Adult Inpatient Plan of Care  Goal: Plan of Care Review  Outcome: Ongoing (interventions implemented as appropriate)  POC reviewed with pt and family at 1700. Pt unable to verbalize understanding due to aphasia. Pt's family verbalized understanding. Questions and concerns addressed. Cardene titrated throughout shift for SBP < 160. NS at 75 ml/hr. NGT placed. Will continue to monitor. See flowsheets for full assessment and VS info.

## 2019-05-22 NOTE — PROGRESS NOTES
Patient's oxygen saturation decreased to 80-83%, despite multiple probe and site changes. 5L NC at this time. No acute signs of distress noted. RT Lawrence and ERIC Sunshine called and reported to bedside to assess. Following PA assessment, orders received for ABG, CXR, PRN 3% nebulizer treatments, Q4 NT suctioning and to maintain SpO2 >90%. Orders relayed to RT Lawrence. Venti mask 15L/50% initiated - SpO2 96%; will wean as tolerated. Will continue to monitor.

## 2019-05-22 NOTE — PT/OT/SLP PROGRESS
Occupational Therapy  HOLD      Patient Name:  Sury Cobos   MRN:  [id number]    OT acknowledged orders on this date. Pt currently on venti mask and on intubation watch.   OT to HOLD at this time. Will follow up 5/22 as appropriate.     PATITO Casas  5/22/2019

## 2019-05-22 NOTE — CONSULTS
Wound care consulted for right ear, right little toe and groin dermatitis.   The patient is laying in bed surrounded by family, not responsive to verbal stimuli. The patient was found down at home.  The right ear and right ankle/foot are discolored purple/red-contusion.  Skin is intact/dry.   The right fifth toe skin is discolored black at the base, and moisture noted between the 4th and 5th toes. Cleansed and dried area.  The groin has moisture, pur-wick in place, skin intact.   Discussed wound care with family-verbalized understanding.  Discussed with unit nurse Giovanna.   Recommendations:  1. Groin- cleanse, dry, apply barrier cream.   2. Nursing monitor   3. HAPI bundle       05/22/19 1400        Wound 05/21/19 0850 Contusion Foot   Date First Assessed/Time First Assessed: 05/21/19 0850   Pre-existing: Yes  Primary Wound Type: Contusion  Side: Right  Location: Foot   Wound Image    Wound WDL ex   Dressing Appearance Open to air;No dressing   Drainage Amount None   Appearance Purple;Dry   Tissue loss description Not applicable   Periwound Area Intact;Dry   Wound Edges Undefined   Off Loading Off loading shoe  (heel protector)        Wound 05/21/19 0850 Moisture associated dermatitis Groin   Date First Assessed/Time First Assessed: 05/21/19 0850   Pre-existing: Yes  Primary Wound Type: Moisture associated dermatitis  Location: Groin   Wound WDL ex   Dressing Appearance Open to air;No dressing   Drainage Amount None   Appearance Pink;Moist   Tissue loss description Not applicable   Periwound Area Intact;Dry;Pink        Wound 05/22/19 1400 Other (comment) Ear   Date First Assessed/Time First Assessed: 05/22/19 1400   Primary Wound Type: (c) Other (comment)  Side: Right  Location: Ear   Wound Image    Wound WDL ex   Dressing Appearance No dressing   Drainage Amount None   Appearance Purple;Dry   Tissue loss description Not applicable   Periwound Area Intact;Dry;Pink   Wound Edges Undefined   Wound Length (cm) 3 cm    Wound Width (cm) 2 cm   Wound Depth (cm) 0 cm   Wound Volume (cm^3) 0 cm^3   Wound Surface Area (cm^2) 6 cm^2        Wound 05/22/19 1400 Contusion anterior Toe, fifth   Date First Assessed/Time First Assessed: 05/22/19 1400   Pre-existing: Yes  Primary Wound Type: Contusion  Side: Right  Orientation: anterior  Location: Toe, fifth   Wound Image     Wound WDL ex   Dressing Appearance Open to air;No dressing   Drainage Amount None   Appearance Black;Dry;Moist   Tissue loss description Partial thickness   Periwound Area Intact;Pink;Dry   Wound Edges Undefined   Care Cleansed with:;Sterile normal saline   Skin Interventions   Pressure Reduction Devices pressure-redistributing mattress utilized;positioning supports utilized;heel offloading device utilized   Pressure Reduction Techniques weight shift assistance provided;frequent weight shift encouraged;heels elevated off bed;positioned off wounds;pressure points protected   Skin Protection adhesive use limited;skin sealant/moisture barrier applied;incontinence pads utilized

## 2019-05-22 NOTE — PT/OT/SLP PROGRESS
Speech Language Pathology  Patient not seen      Sury Cobos  MRN: [id number]    Patient not seen today secondary to Nursing hold (intubation watch). Will follow-up at next scheduled treatment date pending appropriatness.      Emily P. Abadie M.S., CCC-SLP  Speech Language Pathologist  [phone]  05/22/2019

## 2019-05-22 NOTE — SUBJECTIVE & OBJECTIVE
Interval History:  Pt placed on ventimask 15 L over night. Trouble with oxygenation this morning. Chest X-Ray with no major changes. After discussion with family patient was made DNR and DNI. Modafinil was given and improved wakefullness and oxygenation improved. Modafinil started daily in the morning and afternoon. Monitor for possible micro-aspirations. ASA started.     Review of Systems   Unable to perform ROS: Patient nonverbal       Objective:     Vitals:  Temp: 98.4 °F (36.9 °C)  Pulse: 105  Rhythm: normal sinus rhythm  BP: (!) 123/58  MAP (mmHg): 83  Resp: 18  SpO2: 100 %  Oxygen Concentration (%): 50  O2 Device (Oxygen Therapy): venti mask    Temp  Min: 97.8 °F (36.6 °C)  Max: 98.9 °F (37.2 °C)  Pulse  Min: 78  Max: 111  BP  Min: 106/47  Max: 151/65  MAP (mmHg)  Min: 68  Max: 97  Resp  Min: 12  Max: 27  SpO2  Min: 87 %  Max: 100 %  Oxygen Concentration (%)  Min: 50  Max: 50    05/21 0701 - 05/22 0700  In: 2027.5 [I.V.:2027.5]  Out: 1100 [Urine:1100]   Unmeasured Output  Urine Occurrence: 1  Stool Occurrence: 0  Pad Count: 1       Physical Exam   Constitutional: She appears well-developed and well-nourished. No distress.   HENT:   Head: Normocephalic and atraumatic.   Eyes: Pupils are equal, round, and reactive to light. EOM are normal.   Cardiovascular: Regular rhythm. Tachycardia present.   Pulmonary/Chest: Effort normal.   On ventimask   Abdominal: Soft. Bowel sounds are normal. She exhibits no distension.   Musculoskeletal: She exhibits no edema or deformity.   Skin: Skin is warm and dry.     Neuro:  --sedation: none  --GCS:  E4 V1 M5  --Mental Status: Awakens to voice, opens eyes spontaneously, aphasic  --PERRL - 3 mm  --Motor: Moves L side spontaneously, moves R upper spontaneously, R lower withdraws  --sensory: appears intact throughout    Medications:  Continuous  sodium chloride 0.9% Last Rate: Stopped (05/22/19 1300)   niCARdipine Last Rate: Stopped (05/22/19 1130)   Scheduled  albuterol-ipratropium  3 mL Q8H   aspirin 81 mg Once   atorvastatin 40 mg Daily   buPROPion 150 mg BID   cloNIDine 0.1 mg BID   [START ON 5/23/2019] modafinil 100 mg After lunch   [START ON 5/23/2019] modafinil 200 mg Daily   senna-docusate 8.6-50 mg 1 tablet BID   sodium chloride 3% 4 mL Q8H   PRN  acetaminophen 650 mg Q6H PRN   dextrose 50% 12.5 g PRN   glucagon (human recombinant) 1 mg PRN   insulin aspart U-100 0-5 Units Q6H PRN   magnesium oxide 800 mg PRN   magnesium oxide 800 mg PRN   ondansetron 4 mg Q8H PRN   potassium chloride 10% 40 mEq PRN   potassium chloride 10% 40 mEq PRN   potassium chloride 10% 60 mEq PRN   potassium, sodium phosphates 2 packet PRN   potassium, sodium phosphates 2 packet PRN   potassium, sodium phosphates 2 packet PRN   sodium chloride 0.9% 10 mL PRN   sodium chloride 0.9% 10 mL PRN     Today I personally reviewed pertinent medications, lines/drains/airways, imaging, cardiology results, laboratory results, microbiology results,     Diet  Diet NPO  Diet NPO

## 2019-05-22 NOTE — SUBJECTIVE & OBJECTIVE
Neurologic Chief Complaint: Aphasia    Subjective:     Interval History: Patient is seen for follow-up neurological assessment and treatment recommendations: Patient has had exam out of proportion to imaging findings with significant L gaze preference and right sided neglect. Diffuse brain atrophy.     Worsening mentation and oxygenation today. Code status changed by primary team after discussion with family.     HPI, Past Medical, Family, and Social History remains the same as documented in the initial encounter.     Review of Systems   Unable to perform ROS: Patient nonverbal     Scheduled Meds:   albuterol-ipratropium  3 mL Nebulization Q8H    atorvastatin  40 mg Per NG tube Daily    buPROPion  150 mg Per NG tube BID    cloNIDine  0.1 mg Per NG tube BID    senna-docusate 8.6-50 mg  1 tablet Per NG tube BID    sodium chloride 3%  4 mL Nebulization Q8H     Continuous Infusions:   sodium chloride 0.9% 75 mL/hr at 05/22/19 1100    niCARdipine Stopped (05/22/19 1130)     PRN Meds:acetaminophen, dextrose 50%, glucagon (human recombinant), insulin aspart U-100, magnesium oxide, magnesium oxide, ondansetron, potassium chloride 10%, potassium chloride 10%, potassium chloride 10%, potassium, sodium phosphates, potassium, sodium phosphates, potassium, sodium phosphates, sodium chloride 0.9%, sodium chloride 0.9%    Objective:     Vital Signs (Most Recent):  Temp: 98.9 °F (37.2 °C) (05/22/19 0705)  Pulse: 88 (05/22/19 1112)  Resp: (!) 27 (05/22/19 1112)  BP: (!) 151/65 (05/22/19 0900)  SpO2: (!) 87 % (05/22/19 1112)  BP Location: Left arm    Vital Signs Range (Last 24H):  Temp:  [97.8 °F (36.6 °C)-98.9 °F (37.2 °C)]   Pulse:  []   Resp:  [12-27]   BP: (108-158)/(51-77)   SpO2:  [87 %-100 %]   BP Location: Left arm    Physical Exam   Constitutional:   Thin, elderly female with eyes closed   HENT:   Venti mask in place   Eyes: Pupils are equal, round, and reactive to light. EOM are normal.   Cardiovascular:  Normal rate and regular rhythm.   Pulmonary/Chest: She is in respiratory distress.   Abdominal: Soft. She exhibits no distension.   Musculoskeletal: She exhibits no edema or deformity.   Neurological:   Keeps eyes closed.   Increased tone, left >right  Spontaneous movement of all extremities, significantly decreased in right upper and lower extremities.   Wakes to pain, no eye opening.   GCS: E1V1M5   Skin: Skin is warm and dry.   Nursing note and vitals reviewed.      Laboratory:  CMP:   Recent Labs   Lab 05/22/19  0136 05/22/19  1100   CALCIUM 8.6*  --    ALBUMIN 3.2*  --    PROT 6.3  --      --    K 2.9* 2.7*   CO2 28  --      --    BUN 11  --    CREATININE 0.7  --    ALKPHOS 59  --    ALT 15  --    AST 23  --    BILITOT 0.8  --      BMP:   Recent Labs   Lab 05/22/19  0136 05/22/19  1100     --    K 2.9* 2.7*     --    CO2 28  --    BUN 11  --    CREATININE 0.7  --    CALCIUM 8.6*  --      CBC:   Recent Labs   Lab 05/22/19  0336   WBC 18.72*   RBC 3.47*   HGB 10.2*   HCT 32.1*   PLT 95*   MCV 93   MCH 29.4   MCHC 31.8*     Lipid Panel:   Recent Labs   Lab 05/21/19  0928   CHOL 118*   LDLCALC 58.4*   HDL 49   TRIG 53     Coagulation:   Recent Labs   Lab 05/21/19  0928   INR 1.0   APTT 39.7*     Hgb A1C:   Recent Labs   Lab 05/21/19  0928   HGBA1C 5.9*     TSH:   Recent Labs   Lab 05/21/19  0928   TSH 2.293       Diagnostic Results     Brain Imaging     MRI 5/22  Evolving scattered areas of acute/recent infarction left MCA territory with new to increased component in the left inferior frontal gyrus now mild moderate in size.  There is no evidence for hemorrhagic conversion or significant mass effect.    Superimposed confluent T2 FLAIR signal abnormality elsewhere supra and infratentorial parenchyma while nonspecific concerning for advanced chronic microvascular ischemic change versus posttherapy change.    Remote basal gangliar, thalamic and cerebellar infarcts unchanged.    Compensatory  enlargement of ventricular system similar to prior without hydrocephalus.    CTA Head and Neck 5/21  1. 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  The right vertebral artery is dominant and patent throughout its course in the neck.  2. There is calcified plaque in the common, internal and external carotid arteries bilaterally.  Stenosis on the right is mild at 50-69% and moderate on the left at approximately 70% without change.  CTA Head    1.  There is occlusion of the distal left M1 and proximal M2 segments of the middle cerebral artery which has occurred since the prior CTA head.  There is subtle increased density within the left middle cerebral artery at the distal M1 and proximal M2 segments on comparison head CT.  This is consistent with thrombus.    2.  The right anterior circulation is normal.    3.  There is developmental variation with fetal origin of the right posterior cerebral artery.    Cardiac Imaging   · Concentric left ventricular remodeling.  · Small left ventricular cavity size.  · Increased (hyperdynamic) left ventricular systolic function. The estimated ejection fraction is 75%  · Grade I (mild) left ventricular diastolic dysfunction consistent with impaired relaxation.  · Moderate left atrial enlargement.  · Mild-to-moderate aortic valve stenosis.  · Aortic valve area is 1.43 cm2; peak velocity is 2.59 m/s; mean gradient is 15.51 mmHg.  · Mild mid-cavity left ventricular obstruction is present.  · Normal left atrial pressure.  · Mild mitral stenosis.

## 2019-05-22 NOTE — PROGRESS NOTES
Patient transported to and from MRI with RN and PCT. Scan completed without incident. Safely returned to room 9089; connected to bedside monitor, bed in lowest position with siderails raised and wheels locked. VSS. Will continue to monitor.

## 2019-05-23 PROBLEM — R93.1 ABNORMAL ECHOCARDIOGRAM: Status: ACTIVE | Noted: 2019-05-23

## 2019-05-23 LAB
ALBUMIN SERPL BCP-MCNC: 2.6 G/DL (ref 3.5–5.2)
ALP SERPL-CCNC: 50 U/L (ref 55–135)
ALT SERPL W/O P-5'-P-CCNC: 15 U/L (ref 10–44)
ANION GAP SERPL CALC-SCNC: 7 MMOL/L (ref 8–16)
ANISOCYTOSIS BLD QL SMEAR: SLIGHT
ANISOCYTOSIS BLD QL SMEAR: SLIGHT
AST SERPL-CCNC: 25 U/L (ref 10–40)
BASOPHILS # BLD AUTO: 0.03 K/UL (ref 0–0.2)
BASOPHILS # BLD AUTO: 0.05 K/UL (ref 0–0.2)
BASOPHILS NFR BLD: 0.3 % (ref 0–1.9)
BASOPHILS NFR BLD: 0.4 % (ref 0–1.9)
BILIRUB SERPL-MCNC: 0.4 MG/DL (ref 0.1–1)
BUN SERPL-MCNC: 16 MG/DL (ref 8–23)
BURR CELLS BLD QL SMEAR: ABNORMAL
CALCIUM SERPL-MCNC: 7.6 MG/DL (ref 8.7–10.5)
CHLORIDE SERPL-SCNC: 114 MMOL/L (ref 95–110)
CO2 SERPL-SCNC: 22 MMOL/L (ref 23–29)
CREAT SERPL-MCNC: 0.7 MG/DL (ref 0.5–1.4)
DIFFERENTIAL METHOD: ABNORMAL
DIFFERENTIAL METHOD: ABNORMAL
EOSINOPHIL # BLD AUTO: 0 K/UL (ref 0–0.5)
EOSINOPHIL # BLD AUTO: 0 K/UL (ref 0–0.5)
EOSINOPHIL NFR BLD: 0 % (ref 0–8)
EOSINOPHIL NFR BLD: 0 % (ref 0–8)
ERYTHROCYTE [DISTWIDTH] IN BLOOD BY AUTOMATED COUNT: 13.9 % (ref 11.5–14.5)
ERYTHROCYTE [DISTWIDTH] IN BLOOD BY AUTOMATED COUNT: 14.1 % (ref 11.5–14.5)
EST. GFR  (AFRICAN AMERICAN): >60 ML/MIN/1.73 M^2
EST. GFR  (NON AFRICAN AMERICAN): >60 ML/MIN/1.73 M^2
GLUCOSE SERPL-MCNC: 135 MG/DL (ref 70–110)
HCT VFR BLD AUTO: 22.1 % (ref 37–48.5)
HCT VFR BLD AUTO: 29.5 % (ref 37–48.5)
HGB BLD-MCNC: 6.8 G/DL (ref 12–16)
HGB BLD-MCNC: 8.7 G/DL (ref 12–16)
HYPOCHROMIA BLD QL SMEAR: ABNORMAL
HYPOCHROMIA BLD QL SMEAR: ABNORMAL
IMM GRANULOCYTES # BLD AUTO: 0.05 K/UL (ref 0–0.04)
IMM GRANULOCYTES # BLD AUTO: 0.13 K/UL (ref 0–0.04)
IMM GRANULOCYTES NFR BLD AUTO: 0.4 % (ref 0–0.5)
IMM GRANULOCYTES NFR BLD AUTO: 1.2 % (ref 0–0.5)
LYMPHOCYTES # BLD AUTO: 1.6 K/UL (ref 1–4.8)
LYMPHOCYTES # BLD AUTO: 1.9 K/UL (ref 1–4.8)
LYMPHOCYTES NFR BLD: 13.8 % (ref 18–48)
LYMPHOCYTES NFR BLD: 16.7 % (ref 18–48)
MAGNESIUM SERPL-MCNC: 1.5 MG/DL (ref 1.6–2.6)
MAGNESIUM SERPL-MCNC: 1.5 MG/DL (ref 1.6–2.6)
MCH RBC QN AUTO: 29.2 PG (ref 27–31)
MCH RBC QN AUTO: 30.1 PG (ref 27–31)
MCHC RBC AUTO-ENTMCNC: 29.5 G/DL (ref 32–36)
MCHC RBC AUTO-ENTMCNC: 30.8 G/DL (ref 32–36)
MCV RBC AUTO: 98 FL (ref 82–98)
MCV RBC AUTO: 99 FL (ref 82–98)
MONOCYTES # BLD AUTO: 0.8 K/UL (ref 0.3–1)
MONOCYTES # BLD AUTO: 0.9 K/UL (ref 0.3–1)
MONOCYTES NFR BLD: 6.7 % (ref 4–15)
MONOCYTES NFR BLD: 7.6 % (ref 4–15)
NEUTROPHILS # BLD AUTO: 8.4 K/UL (ref 1.8–7.7)
NEUTROPHILS # BLD AUTO: 9.1 K/UL (ref 1.8–7.7)
NEUTROPHILS NFR BLD: 75.1 % (ref 38–73)
NEUTROPHILS NFR BLD: 77.8 % (ref 38–73)
NRBC BLD-RTO: 0 /100 WBC
NRBC BLD-RTO: 0 /100 WBC
OVALOCYTES BLD QL SMEAR: ABNORMAL
OVALOCYTES BLD QL SMEAR: ABNORMAL
PHOSPHATE SERPL-MCNC: 1.7 MG/DL (ref 2.7–4.5)
PHOSPHATE SERPL-MCNC: 2.6 MG/DL (ref 2.7–4.5)
PLATELET # BLD AUTO: 61 K/UL (ref 150–350)
PLATELET # BLD AUTO: 72 K/UL (ref 150–350)
PLATELET BLD QL SMEAR: ABNORMAL
PLATELET BLD QL SMEAR: ABNORMAL
PMV BLD AUTO: 12.4 FL (ref 9.2–12.9)
PMV BLD AUTO: 13.3 FL (ref 9.2–12.9)
POCT GLUCOSE: 127 MG/DL (ref 70–110)
POCT GLUCOSE: 184 MG/DL (ref 70–110)
POCT GLUCOSE: 94 MG/DL (ref 70–110)
POCT GLUCOSE: 99 MG/DL (ref 70–110)
POIKILOCYTOSIS BLD QL SMEAR: ABNORMAL
POIKILOCYTOSIS BLD QL SMEAR: SLIGHT
POLYCHROMASIA BLD QL SMEAR: ABNORMAL
POLYCHROMASIA BLD QL SMEAR: ABNORMAL
POTASSIUM SERPL-SCNC: 4.5 MMOL/L (ref 3.5–5.1)
PROT SERPL-MCNC: 5.6 G/DL (ref 6–8.4)
RBC # BLD AUTO: 2.26 M/UL (ref 4–5.4)
RBC # BLD AUTO: 2.98 M/UL (ref 4–5.4)
SCHISTOCYTES BLD QL SMEAR: PRESENT
SODIUM SERPL-SCNC: 143 MMOL/L (ref 136–145)
WBC # BLD AUTO: 11.15 K/UL (ref 3.9–12.7)
WBC # BLD AUTO: 11.64 K/UL (ref 3.9–12.7)

## 2019-05-23 PROCEDURE — 25000003 PHARM REV CODE 250: Performed by: PHYSICIAN ASSISTANT

## 2019-05-23 PROCEDURE — 27000221 HC OXYGEN, UP TO 24 HOURS

## 2019-05-23 PROCEDURE — 83735 ASSAY OF MAGNESIUM: CPT | Mod: 91

## 2019-05-23 PROCEDURE — 92526 ORAL FUNCTION THERAPY: CPT

## 2019-05-23 PROCEDURE — 99233 PR SUBSEQUENT HOSPITAL CARE,LEVL III: ICD-10-PCS | Mod: GC,,, | Performed by: PSYCHIATRY & NEUROLOGY

## 2019-05-23 PROCEDURE — 25000003 PHARM REV CODE 250: Performed by: STUDENT IN AN ORGANIZED HEALTH CARE EDUCATION/TRAINING PROGRAM

## 2019-05-23 PROCEDURE — 97162 PT EVAL MOD COMPLEX 30 MIN: CPT

## 2019-05-23 PROCEDURE — 94640 AIRWAY INHALATION TREATMENT: CPT

## 2019-05-23 PROCEDURE — 25000242 PHARM REV CODE 250 ALT 637 W/ HCPCS: Performed by: PHYSICIAN ASSISTANT

## 2019-05-23 PROCEDURE — 80053 COMPREHEN METABOLIC PANEL: CPT

## 2019-05-23 PROCEDURE — 25000003 PHARM REV CODE 250: Performed by: PSYCHIATRY & NEUROLOGY

## 2019-05-23 PROCEDURE — 31720 CLEARANCE OF AIRWAYS: CPT

## 2019-05-23 PROCEDURE — 97530 THERAPEUTIC ACTIVITIES: CPT

## 2019-05-23 PROCEDURE — 20600001 HC STEP DOWN PRIVATE ROOM

## 2019-05-23 PROCEDURE — 99233 SBSQ HOSP IP/OBS HIGH 50: CPT | Mod: GC,,, | Performed by: PSYCHIATRY & NEUROLOGY

## 2019-05-23 PROCEDURE — 84100 ASSAY OF PHOSPHORUS: CPT

## 2019-05-23 PROCEDURE — 85025 COMPLETE CBC W/AUTO DIFF WBC: CPT | Mod: 91

## 2019-05-23 PROCEDURE — 97166 OT EVAL MOD COMPLEX 45 MIN: CPT

## 2019-05-23 PROCEDURE — 94761 N-INVAS EAR/PLS OXIMETRY MLT: CPT

## 2019-05-23 PROCEDURE — 84100 ASSAY OF PHOSPHORUS: CPT | Mod: 91

## 2019-05-23 PROCEDURE — 83735 ASSAY OF MAGNESIUM: CPT

## 2019-05-23 PROCEDURE — 63600175 PHARM REV CODE 636 W HCPCS: Performed by: STUDENT IN AN ORGANIZED HEALTH CARE EDUCATION/TRAINING PROGRAM

## 2019-05-23 PROCEDURE — 97535 SELF CARE MNGMENT TRAINING: CPT

## 2019-05-23 PROCEDURE — 25000003 PHARM REV CODE 250: Performed by: NURSE PRACTITIONER

## 2019-05-23 PROCEDURE — 36415 COLL VENOUS BLD VENIPUNCTURE: CPT

## 2019-05-23 RX ORDER — MODAFINIL 100 MG/1
200 TABLET ORAL DAILY
Status: DISCONTINUED | OUTPATIENT
Start: 2019-05-24 | End: 2019-05-23

## 2019-05-23 RX ORDER — HEPARIN SODIUM 5000 [USP'U]/ML
5000 INJECTION, SOLUTION INTRAVENOUS; SUBCUTANEOUS EVERY 8 HOURS
Status: DISCONTINUED | OUTPATIENT
Start: 2019-05-23 | End: 2019-05-23

## 2019-05-23 RX ORDER — HYDRALAZINE HYDROCHLORIDE 20 MG/ML
10 INJECTION INTRAMUSCULAR; INTRAVENOUS EVERY 6 HOURS PRN
Status: DISCONTINUED | OUTPATIENT
Start: 2019-05-23 | End: 2019-06-07 | Stop reason: HOSPADM

## 2019-05-23 RX ORDER — MEMANTINE HYDROCHLORIDE 10 MG/1
10 TABLET ORAL 2 TIMES DAILY
Status: DISCONTINUED | OUTPATIENT
Start: 2019-05-23 | End: 2019-05-29

## 2019-05-23 RX ADMIN — SODIUM CHLORIDE SOLN NEBU 3% 4 ML: 3 NEBU SOLN at 04:05

## 2019-05-23 RX ADMIN — BUPROPION HYDROCHLORIDE 150 MG: 75 TABLET, FILM COATED ORAL at 09:05

## 2019-05-23 RX ADMIN — SODIUM CHLORIDE SOLN NEBU 3% 4 ML: 3 NEBU SOLN at 07:05

## 2019-05-23 RX ADMIN — IPRATROPIUM BROMIDE AND ALBUTEROL SULFATE 3 ML: .5; 3 SOLUTION RESPIRATORY (INHALATION) at 07:05

## 2019-05-23 RX ADMIN — CLONIDINE HYDROCHLORIDE 0.1 MG: 0.1 TABLET ORAL at 07:05

## 2019-05-23 RX ADMIN — SENNOSIDES AND DOCUSATE SODIUM 1 TABLET: 8.6; 5 TABLET ORAL at 09:05

## 2019-05-23 RX ADMIN — POTASSIUM & SODIUM PHOSPHATES POWDER PACK 280-160-250 MG 2 PACKET: 280-160-250 PACK at 07:05

## 2019-05-23 RX ADMIN — MAGNESIUM OXIDE TAB 400 MG (241.3 MG ELEMENTAL MG) 800 MG: 400 (241.3 MG) TAB at 07:05

## 2019-05-23 RX ADMIN — POTASSIUM & SODIUM PHOSPHATES POWDER PACK 280-160-250 MG 2 PACKET: 280-160-250 PACK at 01:05

## 2019-05-23 RX ADMIN — MEMANTINE 10 MG: 10 TABLET ORAL at 09:05

## 2019-05-23 RX ADMIN — ATORVASTATIN CALCIUM 40 MG: 20 TABLET, FILM COATED ORAL at 09:05

## 2019-05-23 RX ADMIN — SODIUM CHLORIDE: 0.9 INJECTION, SOLUTION INTRAVENOUS at 09:05

## 2019-05-23 RX ADMIN — IPRATROPIUM BROMIDE AND ALBUTEROL SULFATE 3 ML: .5; 3 SOLUTION RESPIRATORY (INHALATION) at 04:05

## 2019-05-23 RX ADMIN — MAGNESIUM OXIDE TAB 400 MG (241.3 MG ELEMENTAL MG) 800 MG: 400 (241.3 MG) TAB at 03:05

## 2019-05-23 RX ADMIN — HEPARIN SODIUM 5000 UNITS: 5000 INJECTION, SOLUTION INTRAVENOUS; SUBCUTANEOUS at 01:05

## 2019-05-23 RX ADMIN — CLONIDINE HYDROCHLORIDE 0.1 MG: 0.1 TABLET ORAL at 09:05

## 2019-05-23 RX ADMIN — POTASSIUM & SODIUM PHOSPHATES POWDER PACK 280-160-250 MG 2 PACKET: 280-160-250 PACK at 03:05

## 2019-05-23 RX ADMIN — APIXABAN 2.5 MG: 2.5 TABLET, FILM COATED ORAL at 09:05

## 2019-05-23 NOTE — SUBJECTIVE & OBJECTIVE
Neurologic Chief Complaint: ***    Subjective:     Interval History: Patient is seen for follow-up neurological assessment and treatment recommendations: ***    HPI, Past Medical, Family, and Social History remains the same as documented in the initial encounter.     Review of Systems  Scheduled Meds:   albuterol-ipratropium  3 mL Nebulization Q8H    atorvastatin  40 mg Per NG tube Daily    buPROPion  150 mg Per NG tube BID    cloNIDine  0.1 mg Per NG tube BID    heparin (porcine)  5,000 Units Subcutaneous Q8H    memantine  10 mg Per NG tube BID    senna-docusate 8.6-50 mg  1 tablet Per NG tube BID    sodium chloride 3%  4 mL Nebulization Q8H     Continuous Infusions:   sodium chloride 0.9% Stopped (05/22/19 1300)     PRN Meds:acetaminophen, dextrose 50%, glucagon (human recombinant), hydrALAZINE, insulin aspart U-100, magnesium oxide, magnesium oxide, ondansetron, potassium chloride 10%, potassium chloride 10%, potassium chloride 10%, potassium, sodium phosphates, potassium, sodium phosphates, potassium, sodium phosphates, sodium chloride 0.9%, sodium chloride 0.9%    Objective:     Vital Signs (Most Recent):  Temp: 98.4 °F (36.9 °C) (05/23/19 0705)  Pulse: 80 (05/23/19 1000)  Resp: 18 (05/23/19 1000)  BP: (!) 144/63 (05/23/19 1000)  SpO2: 96 % (05/23/19 1000)  BP Location: Left arm    Vital Signs Range (Last 24H):  Temp:  [98.2 °F (36.8 °C)-100 °F (37.8 °C)]   Pulse:  []   Resp:  [11-20]   BP: (105-167)/()   SpO2:  [96 %-100 %]   BP Location: Left arm    Physical Exam    Neurological Exam:   {Neuro Exam:80951}    Laboratory:  {LABS:76846}    Diagnostic Results     Brain Imaging   ***  Vessel Imaging   ***  Cardiac Imaging   ***

## 2019-05-23 NOTE — PT/OT/SLP PROGRESS
Patient Education        Knee: Exercises  Your Care Instructions  Here are some examples of exercises for your knee. Start each exercise slowly. Ease off the exercise if you start to have pain. Your doctor or physical therapist will tell you when you can start these exercises and which ones will work best for you. How to do the exercises  Quad sets    1. Sit with your leg straight and supported on the floor or a firm bed. (If you feel discomfort in the front or back of your knee, place a small towel roll under your knee.)  2. Tighten the muscles on top of your thigh by pressing the back of your knee flat down to the floor. (If you feel discomfort under your kneecap, place a small towel roll under your knee.)  3. Hold for about 6 seconds, then rest for up to 10 seconds. 4. Do 8 to 12 repetitions several times a day. Straight-leg raises to the front    1. Lie on your back with your good knee bent so that your foot rests flat on the floor. Your injured leg should be straight. Make sure that your low back has a normal curve. You should be able to slip your flat hand in between the floor and the small of your back, with your palm touching the floor and your back touching the back of your hand. 2. Tighten the thigh muscles in the injured leg by pressing the back of your knee flat down to the floor. Hold your knee straight. 3. Keeping the thigh muscles tight, lift your injured leg up so that your heel is about 12 inches off the floor. Hold for about 6 seconds and then lower slowly. 4. Do 8 to 12 repetitions, 3 times a day. Straight-leg raises to the outside    1. Lie on your side, with your injured leg on top. 2. Tighten the front thigh muscles of your injured leg to keep your knee straight. 3. Keep your hip and your leg straight in line with the rest of your body, and keep your knee pointing forward. Do not drop your hip back.   4. Lift your injured leg straight up toward the ceiling, about 12 inches off the Speech Language Pathology Treatment    Patient Name:  Sury Cobos   MRN:  [id number]  Admitting Diagnosis: Acute ischemic left MCA stroke    Recommendations:                 General Recommendations:  Dysphagia therapy and Cognitive-linguistic evaluation  Diet recommendations:  NPO, Liquid Diet Level: NPO   Aspiration Precautions: Strict aspiration precautions   General Precautions: Standard, aspiration, aphasia, fall, NPO, vision impaired  Communication strategies:  none    Subjective     Patient asleep in bed. Multiple family members present.     Pain/Comfort:  · Pain Rating 1: 0/10    Objective:     Has the patient been evaluated by SLP for swallowing?   Yes  Keep patient NPO? Yes   Current Respiratory Status: room air      Despite max auditory and tactile attempts for increased alertness (sternal rub, ice chip / puree trial to lip etc, cold rag to face) patient not able to rouse appropriately for safe PO trials.  Patient with no attempts to accept or manipulate trials. Skilled education was provided to patient and family members re: diet recs for strict NPO, risks of PO intake at this time, standard aspiration precautions of which to follow, and ongoing ST plan of care. Family members verbalized understanding.     Assessment:     Sury Cobos is a 84 y.o. female with an SLP diagnosis of Dysphagia.    Patient will participate in full speech, language, and cognitive assessment once appropriate.     Goals:   Multidisciplinary Problems     SLP Goals        Problem: SLP Goal    Goal Priority Disciplines Outcome   SLP Goal     SLP    Description:  Speech Language Pathology Goals  Goals expected to be met by 5/28:  1. Patient will participate in ongoing swallow assessment to determine least restrictive diet recommendations.   2. Patient will participate in full speech, language, and cognitive assessment when appropriate.                         Plan:     · Patient to be seen:  5 x/week   · Plan of Care expires:   06/21/19  · Plan of Care reviewed with:  patient, family   · SLP Follow-Up:  Yes       Discharge recommendations:  other (see comments)   Barriers to Discharge:  None    Time Tracking:     SLP Treatment Date:   05/23/19  Speech Start Time:  1010  Speech Stop Time:  1027     Speech Total Time (min):  17 min    Billable Minutes: Treatment Swallowing Dysfunction 8 and [facility] Care/Home Management Training 9    Emily Abadie, CCC-SLP  05/23/2019   your ankle (not more than 5 pounds). With weight, you do not have to lift your leg more than 12 inches to get a hamstring workout. Shallow standing knee bends    Do this exercise only if you have very little pain; if you have no clicking, locking, or giving way if you have an injured knee; and if it does not hurt while you are doing 8 to 12 repetitions. 1. Stand with your hands lightly resting on a counter or chair in front of you. Put your feet shoulder-width apart. 2. Slowly bend your knees so that you squat down like you are going to sit in a chair. Make sure your knees do not go in front of your toes. 3. Lower yourself about 6 inches. Your heels should remain on the floor at all times. 4. Rise slowly to a standing position. Heel raises    1. Stand with your feet a few inches apart, with your hands lightly resting on a counter or chair in front of you. 2. Slowly raise your heels off the floor while keeping your knees straight. 3. Hold for about 6 seconds, then slowly lower your heels to the floor. 4. Do 8 to 12 repetitions several times during the day. Follow-up care is a key part of your treatment and safety. Be sure to make and go to all appointments, and call your doctor if you are having problems. It's also a good idea to know your test results and keep a list of the medicines you take. Where can you learn more? Go to [email or web address] org and sign in to your [employer] account. Enter V425 in the [employer] box to learn more about \"Knee: Exercises. \"     If you do not have an account, please click on the \"Sign Up Now\" link. Current as of: November 29, 2017  Content Version: 11.7  © 2004-6196 [employer]. Care instructions adapted under license by [address]  If you have questions about a medical condition or this instruction, always ask your healthcare professional. [address]  any warranty or liability for your use of this information.

## 2019-05-23 NOTE — ASSESSMENT & PLAN NOTE
- SBP goal <160 s/p successful thrombectomy   -continue home clonidine   - not requiring cardene since this AM

## 2019-05-23 NOTE — ASSESSMENT & PLAN NOTE
Aspiration highly likely  -monitor for WBC and fever  -CXR prn  -pt requiring high O2 on ventimask to keep oxygenation up

## 2019-05-23 NOTE — PROGRESS NOTES
[facility]  Vascular Neurology  Comprehensive Stroke Center  Progress Note    Assessment/Plan:     * Acute ischemic left MCA stroke  85 y/o female with L MCA syndrome with L M2 occlusion due to cardio embolic from afib not on anticoagulation. No TPA but underwent IR thrombectomy. Did not significantly improve afterwards. Symptoms out of proportion to imaging findings in patient with limited neurologic reserve. When seen in morning and on rounds very somnolent, however    Antithrombotics:Recommend resuming anticoagulation. She will need investigation of affordability of anticoagulation for discharge.     Statins: Lipitor 40    Aggressive risk factor modification: HTN, HLD, prior stroke, afib     Rehab efforts: The patient has been evaluated by a stroke team provider and the therapy needs have been fully considered based off the presenting complaints and exam findings. The following therapy evaluations are needed: PT evaluate and treat, OT evaluate and treat, SLP evaluate and treat, PM&R evaluate for appropriate placement    Diagnostics ordered/pending: None    VTE prophylaxis: Heparin 5000 units SQ every 8 hours (stop once AC resumed)    BP parameters: S/p thrombectomy <180      Paroxysmal atrial fibrillation  Found on last admit 5-2018.  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  Aggressive therapy         No notes on file    STROKE DOCUMENTATION   Acute Stroke Times   Last Known Normal Date: 05/20/19  Last Known Normal Time: 2030  Symptom Onset Date: 05/20/19  Symptom Onset Time: 2030  Stroke Team Called Date: 05/20/19  Stroke Team Called Time: 2338  Stroke Team Arrival Date: 05/21/19  Stroke Team Arrival Time: 0605  CT Interpretation Time:  2338    NIH Scale:          Modified Charles Mix Score: 3  Norris Coma Scale:    ABCD2 Score:    [id number] Score:   HAS -BLED Score:   ICH Score:   Hunt & Burkett Classification:      Hemorrhagic change of an Ischemic Stroke: Does this patient have an ischemic stroke with hemorrhagic changes? No     Neurologic Chief Complaint: Aphasia    Subjective:     Interval History: Patient is seen for follow-up neurological assessment and treatment recommendations: Patient has had exam out of proportion to imaging findings with significant L gaze preference and right sided neglect. Diffuse brain atrophy.     Oxygenation improved today. Still somnolent on exam this morning and not following commands or opening eyes. Moving left arm and leg today     HPI, Past Medical, Family, and Social History remains the same as documented in the initial encounter.     Review of Systems   Unable to perform ROS: Patient nonverbal     Scheduled Meds:   albuterol-ipratropium  3 mL Nebulization Q8H    atorvastatin  40 mg Per NG tube Daily    buPROPion  150 mg Per NG tube BID    cloNIDine  0.1 mg Per NG tube BID    heparin (porcine)  5,000 Units Subcutaneous Q8H    memantine  10 mg Per NG tube BID    senna-docusate 8.6-50 mg  1 tablet Per NG tube BID    sodium chloride 3%  4 mL Nebulization Q8H     Continuous Infusions:   sodium chloride 0.9% Stopped (05/22/19 1300)     PRN Meds:acetaminophen, dextrose 50%, glucagon (human recombinant), hydrALAZINE, insulin aspart U-100, magnesium oxide, magnesium oxide, ondansetron, potassium chloride 10%, potassium chloride 10%, potassium chloride 10%, potassium, sodium phosphates, potassium, sodium phosphates, potassium, sodium phosphates, sodium chloride 0.9%, sodium chloride 0.9%    Objective:     Vital Signs (Most Recent):  Temp: 98.4 °F (36.9 °C) (05/23/19 0705)  Pulse: 89 (05/23/19 1200)  Resp: 18 (05/23/19 1200)  BP: 137/84 (05/23/19 1200)  SpO2: 97 % (05/23/19 1200)  BP Location: Left arm    Vital  Signs Range (Last 24H):  Temp:  [98.2 °F (36.8 °C)-100 °F (37.8 °C)]   Pulse:  []   Resp:  [11-20]   BP: (105-167)/()   SpO2:  [96 %-100 %]   BP Location: Left arm    Physical Exam   Constitutional:   Thin, elderly female with eyes closed   HENT:   Venti mask in place   Eyes: Pupils are equal, round, and reactive to light. EOM are normal.   Cardiovascular: Normal rate and regular rhythm.   Pulmonary/Chest: No respiratory distress.   Abdominal: Soft. She exhibits no distension.   Musculoskeletal: She exhibits no edema or deformity.   Neurological:   Keeps eyes closed.   Increased tone, left >right  Spontaneous movement of all extremities, significantly decreased in right upper and lower extremities.   Wakes to pain, no eye opening.   GCS: E1V1M5   Skin: Skin is warm and dry.   Nursing note and vitals reviewed.      Laboratory:  CMP:   Recent Labs   Lab 05/23/19  0209   CALCIUM 7.6*   ALBUMIN 2.6*   PROT 5.6*      K 4.5   CO2 22*   *   BUN 16   CREATININE 0.7   ALKPHOS 50*   ALT 15   AST 25   BILITOT 0.4     BMP:   Recent Labs   Lab 05/23/19  0209      K 4.5   *   CO2 22*   BUN 16   CREATININE 0.7   CALCIUM 7.6*     CBC:   Recent Labs   Lab 05/23/19  0353   WBC 11.64   RBC 2.98*   HGB 8.7*   HCT 29.5*   PLT 72*   MCV 99*   MCH 29.2   MCHC 29.5*     Lipid Panel:   Recent Labs   Lab 05/21/19 0928   CHOL 118*   LDLCALC 58.4*   HDL 49   TRIG 53     Coagulation:   Recent Labs   Lab 05/21/19 0928   INR 1.0   APTT 39.7*     Hgb A1C:   Recent Labs   Lab 05/21/19 0928   HGBA1C 5.9*     TSH:   Recent Labs   Lab 05/21/19 0928   TSH 2.293       Diagnostic Results     Brain Imaging     MRI 5/22  Evolving scattered areas of acute/recent infarction left MCA territory with new to increased component in the left inferior frontal gyrus now mild moderate in size.  There is no evidence for hemorrhagic conversion or significant mass effect.    Superimposed confluent T2 FLAIR signal abnormality  elsewhere supra and infratentorial parenchyma while nonspecific concerning for advanced chronic microvascular ischemic change versus posttherapy change.    Remote basal gangliar, thalamic and cerebellar infarcts unchanged.    Compensatory enlargement of ventricular system similar to prior without hydrocephalus.    CTA Head and Neck 5/21  1. 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  The right vertebral artery is dominant and patent throughout its course in the neck.  2. There is calcified plaque in the common, internal and external carotid arteries bilaterally.  Stenosis on the right is mild at 50-69% and moderate on the left at approximately 70% without change.  CTA Head    1.  There is occlusion of the distal left M1 and proximal M2 segments of the middle cerebral artery which has occurred since the prior CTA head.  There is subtle increased density within the left middle cerebral artery at the distal M1 and proximal M2 segments on comparison head CT.  This is consistent with thrombus.    2.  The right anterior circulation is normal.    3.  There is developmental variation with fetal origin of the right posterior cerebral artery.    Cardiac Imaging   · Concentric left ventricular remodeling.  · Small left ventricular cavity size.  · Increased (hyperdynamic) left ventricular systolic function. The estimated ejection fraction is 75%  · Grade I (mild) left ventricular diastolic dysfunction consistent with impaired relaxation.  · Moderate left atrial enlargement.  · Mild-to-moderate aortic valve stenosis.  · Aortic valve area is 1.43 cm2; peak velocity is 2.59 m/s; mean gradient is 15.51 mmHg.  · Mild mid-cavity left ventricular obstruction is present.  · Normal left atrial pressure.  · Mild mitral stenosis.      Jordon Rosario MD  [facility]  Department of Vascular Neurology   [facility]  [place]

## 2019-05-23 NOTE — PLAN OF CARE
Problem: SLP Goal  Goal: SLP Goal  Speech Language Pathology Goals  Goals expected to be met by 5/28:  1. Patient will participate in ongoing swallow assessment to determine least restrictive diet recommendations.   2. Patient will participate in full speech, language, and cognitive assessment when appropriate.        Goals remain appropriate.   Emily P. Abadie M.S., CCC-SLP  Speech Language Pathologist  [phone]  05/23/2019

## 2019-05-23 NOTE — PLAN OF CARE
Problem: Adult Inpatient Plan of Care  Goal: Plan of Care Review  Outcome: Ongoing (interventions implemented as appropriate)  POC reviewed with pt at 0500. Pt unable to verbalize understanding. No acute events overnight. Tmax 100.0F; tylenol given x1. 1 BM. Phos and mag being replaced. Pt respiratory status improving. Syst BP maintained <160. CBC recollected. H&H improved; platelets still decreased. Pt progressing toward goals. Will continue to monitor. See flowsheets for full assessment and VS info

## 2019-05-23 NOTE — PROGRESS NOTES
[facility]  Neurocritical Care  Progress Note    Admit Date: 5/21/2019  Service Date: 05/23/2019  Length of Stay: 2    Subjective:     Chief Complaint: Acute ischemic left MCA stroke    History of Present Illness: Patient is an 83 y/o female w/ past medical history significant for HTN, HLD, DM, CHF, CAD, a fib(no AC, previously on Eliquis, but has not refilled the medication after she ran out), Hx of R MCA infarct with residual L hemiparesis and aphasia, advanced dementia who presented to OSH w/ new onset AMS ( non-verbal, unable to stand, R hemiplegial LS facial droop) after the family noted that she fell out of bed in the evening of 5/20. Patient was evaluated by Vascular Neurology telemedicine service w/ initial NIHSS 25; imaging studies w/o evidence of acute abnormalities. Patient was not treated w/ tPA and transferred to [facility] for possible thrombectomy after CTA revealed abrupt truncation at the level of the distal M1 and proximal M2 segments of the left MCA, consistent  with acute thrombotic occlusion. MRI at OSH revealed only modest diffusion changes.  Patient taken to IR w/ 4 passes made with restoration of TICI 3 flow on 5/21.  Per patient's daughter, at baseline she ambulates around the house with a walker.  She is able to feed herself and toilet independently.   Patient admitted to [facility] for post procedural care and higher level of care.    Hospital Course: 05/21/2019 Admitted to [facility] s/p thrombectomy for L MCA M1 occlusion w/ TICI 3 flow restoration.   5/22/2019 Trouble with oxygenation on ventimask, modafinil started. DNR/DNI  05/23/2019: Agitated and hypertensive overnight, weaned down on O2 this morning.  Stable for step down to vascular neurology.  Afebrile.      Interval History:    Agitated and hypertensive overnight, weaned down on O2 this morning.  Stable for step down to vascular neurology.  Afebrile.    Review of Systems   Constitutional: Negative for fever.   Neurological:  Positive for speech difficulty.   Psychiatric/Behavioral: Positive for confusion.     Unable to obtain a complete ROS due to level of consciousness.    Objective:     Vitals:  Temp: 98.4 °F (36.9 °C)  Pulse: 89  Rhythm: normal sinus rhythm  BP: 137/84  MAP (mmHg): 104  Resp: 18  SpO2: 97 %  Oxygen Concentration (%): 50  O2 Device (Oxygen Therapy): nasal cannula    Temp  Min: 98.2 °F (36.8 °C)  Max: 100 °F (37.8 °C)  Pulse  Min: 76  Max: 109  BP  Min: 105/44  Max: 167/71  MAP (mmHg)  Min: 64  Max: 130  Resp  Min: 11  Max: 20  SpO2  Min: 96 %  Max: 100 %  Oxygen Concentration (%)  Min: 50  Max: 50    05/22 0701 - 05/23 0700  In: 777.1 [I.V.:677.1]  Out: 500 [Urine:500]   Unmeasured Output  Urine Occurrence: 1  Stool Occurrence: 0  Pad Count: 1       Physical Exam   Constitutional: She appears well-developed.   Cardiovascular: Normal rate and regular rhythm.   No murmur heard.  Pulmonary/Chest:   Coarse breath sounds b/l   Abdominal: Soft. She exhibits no distension. There is no tenderness.   Musculoskeletal: She exhibits no edema.   Neurological: She is alert.   Skin: Skin is warm.     Unable to test orientation, language, memory, judgment, insight, fund of knowledge, hearing, shoulder shrug, tongue protrusion, coordination, gait due to level of consciousness.    E4V2M5    alert, does not follow commands on resident exam    PERRL  Tracks across the room  Blinks to threat b/l  Face symmetric at rest    Moving all extremities vigorously    2+ b/l biceps, brachioradialis    Medications:  Continuous  sodium chloride 0.9% Last Rate: Stopped (05/22/19 1300)   Scheduled  albuterol-ipratropium 3 mL Q8H   atorvastatin 40 mg Daily   buPROPion 150 mg BID   cloNIDine 0.1 mg BID   heparin (porcine) 5,000 Units Q8H   memantine 10 mg BID   senna-docusate 8.6-50 mg 1 tablet BID   sodium chloride 3% 4 mL Q8H   PRN  acetaminophen 650 mg Q6H PRN   dextrose 50% 12.5 g PRN   glucagon (human recombinant) 1 mg PRN   hydrALAZINE 10 mg Q6H  PRN   insulin aspart U-100 0-5 Units Q6H PRN   magnesium oxide 800 mg PRN   magnesium oxide 800 mg PRN   ondansetron 4 mg Q8H PRN   potassium chloride 10% 40 mEq PRN   potassium chloride 10% 40 mEq PRN   potassium chloride 10% 60 mEq PRN   potassium, sodium phosphates 2 packet PRN   potassium, sodium phosphates 2 packet PRN   potassium, sodium phosphates 2 packet PRN   sodium chloride 0.9% 10 mL PRN   sodium chloride 0.9% 10 mL PRN     Today I personally reviewed pertinent medications, lines/drains/airways, imaging, cardiology results, laboratory results, microbiology results, notably:    Diet  Diet NPO  Diet NPO    Recent Results (from the past 24 hour(s))   Culture, Respiratory with Gram Stain    Collection Time: 05/22/19  5:25 PM   Result Value Ref Range    Respiratory Culture Insufficient incubation, culture in progress     Gram Stain (Respiratory) >10 epithelial cells per low power field     Gram Stain (Respiratory) Few WBC's     Gram Stain (Respiratory) Many Gram positive cocci      Gram Stain (Respiratory) Moderate Gram positive rods     Gram Stain (Respiratory) Few Gram negative rods    Magnesium    Collection Time: 05/22/19  5:25 PM   Result Value Ref Range    Magnesium 1.7 1.6 - 2.6 mg/dL   Potassium    Collection Time: 05/22/19  5:25 PM   Result Value Ref Range    Potassium 5.5 (H) 3.5 - 5.1 mmol/L   POCT glucose    Collection Time: 05/22/19  5:28 PM   Result Value Ref Range    POCT Glucose 179 (H) 70 - 110 mg/dL   POCT glucose    Collection Time: 05/22/19 11:57 PM   Result Value Ref Range    POCT Glucose 184 (H) 70 - 110 mg/dL   Comprehensive metabolic panel    Collection Time: 05/23/19  2:09 AM   Result Value Ref Range    Sodium 143 136 - 145 mmol/L    Potassium 4.5 3.5 - 5.1 mmol/L    Chloride 114 (H) 95 - 110 mmol/L    CO2 22 (L) 23 - 29 mmol/L    Glucose 135 (H) 70 - 110 mg/dL    BUN, Bld 16 8 - 23 mg/dL    Creatinine 0.7 0.5 - 1.4 mg/dL    Calcium 7.6 (L) 8.7 - 10.5 mg/dL    Total Protein 5.6 (L)  6.0 - 8.4 g/dL    Albumin 2.6 (L) 3.5 - 5.2 g/dL    Total Bilirubin 0.4 0.1 - 1.0 mg/dL    Alkaline Phosphatase 50 (L) 55 - 135 U/L    AST 25 10 - 40 U/L    ALT 15 10 - 44 U/L    Anion Gap 7 (L) 8 - 16 mmol/L    eGFR if African American >60.0 >60 mL/min/1.73 m^2    eGFR if non African American >60.0 >60 mL/min/1.73 m^2   CBC auto differential    Collection Time: 05/23/19  2:09 AM   Result Value Ref Range    WBC 11.15 3.90 - 12.70 K/uL    RBC 2.26 (L) 4.00 - 5.40 M/uL    Hemoglobin 6.8 (L) 12.0 - 16.0 g/dL    Hematocrit 22.1 (L) 37.0 - 48.5 %    Mean Corpuscular Volume 98 82 - 98 fL    Mean Corpuscular Hemoglobin 30.1 27.0 - 31.0 pg    Mean Corpuscular Hemoglobin Conc 30.8 (L) 32.0 - 36.0 g/dL    RDW 14.1 11.5 - 14.5 %    Platelets 61 (L) 150 - 350 K/uL    MPV 13.3 (H) 9.2 - 12.9 fL    Immature Granulocytes 1.2 (H) 0.0 - 0.5 %    Gran # (ANC) 8.4 (H) 1.8 - 7.7 K/uL    Immature Grans (Abs) 0.13 (H) 0.00 - 0.04 K/uL    Lymph # 1.9 1.0 - 4.8 K/uL    Mono # 0.8 0.3 - 1.0 K/uL    Eos # 0.0 0.0 - 0.5 K/uL    Baso # 0.03 0.00 - 0.20 K/uL    nRBC 0 0 /100 WBC    Gran% 75.1 (H) 38.0 - 73.0 %    Lymph% 16.7 (L) 18.0 - 48.0 %    Mono% 6.7 4.0 - 15.0 %    Eosinophil% 0.0 0.0 - 8.0 %    Basophil% 0.3 0.0 - 1.9 %    Platelet Estimate Decreased (A)     Aniso Slight     Poik Moderate     Poly Occasional     Hypo Occasional     Ovalocytes Moderate     [place] Cells Occasional     Schistocytes Present     Differential Method Automated    Magnesium    Collection Time: 05/23/19  2:09 AM   Result Value Ref Range    Magnesium 1.5 (L) 1.6 - 2.6 mg/dL   Phosphorus    Collection Time: 05/23/19  2:09 AM   Result Value Ref Range    Phosphorus 1.7 (L) 2.7 - 4.5 mg/dL   CBC auto differential    Collection Time: 05/23/19  3:53 AM   Result Value Ref Range    WBC 11.64 3.90 - 12.70 K/uL    RBC 2.98 (L) 4.00 - 5.40 M/uL    Hemoglobin 8.7 (L) 12.0 - 16.0 g/dL    Hematocrit 29.5 (L) 37.0 - 48.5 %    Mean Corpuscular Volume 99 (H) 82 - 98 fL    Mean  Corpuscular Hemoglobin 29.2 27.0 - 31.0 pg    Mean Corpuscular Hemoglobin Conc 29.5 (L) 32.0 - 36.0 g/dL    RDW 13.9 11.5 - 14.5 %    Platelets 72 (L) 150 - 350 K/uL    MPV 12.4 9.2 - 12.9 fL    Immature Granulocytes 0.4 0.0 - 0.5 %    Gran # (ANC) 9.1 (H) 1.8 - 7.7 K/uL    Immature Grans (Abs) 0.05 (H) 0.00 - 0.04 K/uL    Lymph # 1.6 1.0 - 4.8 K/uL    Mono # 0.9 0.3 - 1.0 K/uL    Eos # 0.0 0.0 - 0.5 K/uL    Baso # 0.05 0.00 - 0.20 K/uL    nRBC 0 0 /100 WBC    Gran% 77.8 (H) 38.0 - 73.0 %    Lymph% 13.8 (L) 18.0 - 48.0 %    Mono% 7.6 4.0 - 15.0 %    Eosinophil% 0.0 0.0 - 8.0 %    Basophil% 0.4 0.0 - 1.9 %    Platelet Estimate Decreased (A)     Aniso Slight     Poik Slight     Poly Occasional     Hypo Occasional     Ovalocytes Occasional     Differential Method Automated    POCT glucose    Collection Time: 05/23/19  5:53 AM   Result Value Ref Range    POCT Glucose 127 (H) 70 - 110 mg/dL       Microbiology Results (last 7 days)     Procedure Component Value Units Date/Time    Culture, Respiratory with Gram Stain [[id number]] Collected:  05/22/19 9082    Order Status:  Completed Specimen:  Respiratory from Sputum Updated:  05/23/19 1231     Respiratory Culture Insufficient incubation, culture in progress     Gram Stain (Respiratory) >10 epithelial cells per low power field     Gram Stain (Respiratory) Few WBC's     Gram Stain (Respiratory) Many Gram positive cocci      Gram Stain (Respiratory) Moderate Gram positive rods     Gram Stain (Respiratory) Few Gram negative rods    Narrative:       Please try to NT suction for sample        Imaging  5/22/19 CXR: Per independent resident review and interpretation,  Opacified R lower lung base, blunted costophrenic angle.        Assessment/Plan:     Neuro  * Acute ischemic left MCA stroke  Patient is an 83 yo female w/ multiple co-morbidities who presents as a transfer from OSH w/ notable L MCA occlusion. No tPA given at OSH. Thrombectomy w/ TICI 3 after 4 passes done at [facility] [place].  Patient admitted to [facility] for higher level of care and exam follow up s/p thrombectomy    - SBP goal <160  - Anticoagulation when able per stroke  - Vascular Neurology consulted and following, appreciate recommendations  - PT/OT/ST  - DNR/DNI      Dementia with behavioral disturbance  -resume home memantine  -Discontinue modafinil 2/2 agitation    Aphasia  - 2/2 stroke, unclear how much deficits s/p previous infarct  - SLP eval and treat     Pulmonary  Aspiration pneumonia  Aspiration highly likely  -monitor for WBC and fever  -CXR prn      Cardiac/Vascular  Abnormal echocardiogram  Follow up left ventricular obstruction noted on TTE    Paroxysmal atrial fibrillation  - Likely the cause of patient's embolic infarct  - Has not been compliant w/ Eliquis after she had ran out of her medication  - Anticoagulation per stroke     Atrial enlargement, left  - Patient w/ known history of Afib, off of AC due to inability to refill medication     Mixed hyperlipidemia  - LDL 58.4  - Continue home Atorvastatin     Essential hypertension  - SBP goal <160 s/p successful thrombectomy   - continue home clonidine   - Discontinue modafinil     Endocrine  Diabetes mellitus  - HbA1c 5.9  - SSI      Other  Debility  See stroke          The patient is being Prophylaxed for:  Venous Thromboembolism with: Mechanical or Chemical  Stress Ulcer with: Not Applicable   Ventilator Pneumonia with: not applicable    Activity Orders          Diet NPO: NPO starting at 05/21 0720        DNR    Frankie Mackay MD  Neurocritical Care  [facility]

## 2019-05-23 NOTE — PLAN OF CARE
Problem: Occupational Therapy Goal  Goal: Occupational Therapy Goal  Goals to be met by: 6/6/2019     Patient will increase functional independence with ADLs by performing:    Pt will engage in functional task x2 min duration with 0 added cues/facilitation.  Sitting at edge of bed x10 minutes for functional task with Minimal Assistance for postural control.  Rolling to Bilateral with Minimal Assistance and use of bedrail as needed.   Supine to sit with Moderate Assistance.  Stand pivot transfers with Moderate Assistance.  CG demo understanding for s/s of stroke.   CG demo understanding for B UE positioning and ROM exercises while pt in supine.   CG demo understanding for pressure relief techniques.     Outcome: Ongoing (interventions implemented as appropriate)  OT eval completed. Pt would best benefit from longterm SNF placement pending family choice. OT to follow up 3x/w at this time. PATITO Casas 5/23/2019

## 2019-05-23 NOTE — CONSULTS
Consult no longer needed.  RN able to start PIV overnight and pt to step down off critical care today.

## 2019-05-23 NOTE — ASSESSMENT & PLAN NOTE
Patient is an 85 yo female w/ multiple co-morbidities who presents as a transfer from OSH w/ notable L MCA occlusion. No tPA given at OSH. Thrombectomy w/ TICI 3 after 4 passes done at [facility] [place]. Patient admitted to [facility] for higher level of care and exam follow up s/p thrombectomy    - SBP goal <160  - Anticoagulation when able per stroke  - Vascular Neurology consulted and following, appreciate recommendations  - PT/OT/ST  - DNR/DNI

## 2019-05-23 NOTE — ASSESSMENT & PLAN NOTE
Patient is an 83 yo female w/ multiple co-morbidities who presents as a transfer from OSH w/ notable L MCA occlusion. No tPA given at OSH. Thrombectomy w/ TICI 3 after 4 passes done at [facility] [place] [place]. Patient admitted to [facility] for higher level of care and exam follow up s/p thrombectomy    - Admit to [facility]  - Neuro checks Q1  - Vital signs Q1  - SBP goal <160  - HbA1c, TSH and Lipid panel  - TTE complete   - Vascular Neurology consulted and following, appreciate recommendations  -SLP  -DNR/DNI

## 2019-05-23 NOTE — PROGRESS NOTES
Nursing Transfer Note       Transfer from 9089 to 906.    Transfer via bed.    Transfer with chart and personal belongings.     Transported by RN X 2    Medicines sent: No    Chart sent with patient: Yes    Notified: PAOLA Mittal    Bedside Neuro assessment performed: Yes    Bedside Handoff given to: PAOLA Mittal    Upon arrival to floor: pt oriented to new room, bed in lowest position, bed alarm on, side rails up X 3, and bilateral soft wrist restraints in place. NC at 3L. Pt's family and RN at bedside.

## 2019-05-23 NOTE — PROGRESS NOTES
[facility]  Neurocritical Care  Progress Note    Admit Date: 5/21/2019  Service Date: 05/22/2019  Length of Stay: 1    Subjective:     Chief Complaint: Acute ischemic left MCA stroke    History of Present Illness: Patient is an 83 y/o female w/ past medical history significant for HTN, HLD, DM, CHF, CAD, a fib(no AC, previously on Eliquis, but has not refilled the medication after she ran out), Hx of R MCA infarct with residual L hemiparesis and aphasia, advanced dementia who presented to OSH w/ new onset AMS ( non-verbal, unable to stand, R hemiplegial LS facial droop) after the family noted that she fell out of bed in the evening of 5/20. Patient was evaluated by Vascular Neurology telemedicine service w/ initial NIHSS 25; imaging studies w/o evidence of acute abnormalities. Patient was not treated w/ tPA and transferred to [facility] for possible thrombectomy after CTA revealed abrupt truncation at the level of the distal M1 and proximal M2 segments of the left MCA, consistent  with acute thrombotic occlusion. MRI at OSH revealed only modest diffusion changes.  Patient taken to IR w/ 4 passes made with restoration of TICI 3 flow on 5/21.  Per patient's daughter, at baseline she ambulates around the house with a walker.  She is able to feed herself and toilet independently.   Patient admitted to [facility] for post procedural care and higher level of care.    Hospital Course: 05/21/2019 Admitted to [facility] s/p thrombectomy for L MCA M1 occlusion w/ TICI 3 flow restoration.   5/22/2019 Trouble with oxygenation on ventimask, modafinil started. DNR/DNI    Interval History:  Pt placed on ventimask 15 L over night. Trouble with oxygenation this morning. Chest X-Ray with no major changes. After discussion with family patient was made DNR and DNI. Modafinil was given and improved wakefullness and oxygenation improved. Modafinil started daily in the morning and afternoon. Monitor for possible micro-aspirations. ASA  started.     Review of Systems   Unable to perform ROS: Patient nonverbal       Objective:     Vitals:  Temp: 98.4 °F (36.9 °C)  Pulse: 105  Rhythm: normal sinus rhythm  BP: (!) 123/58  MAP (mmHg): 83  Resp: 18  SpO2: 100 %  Oxygen Concentration (%): 50  O2 Device (Oxygen Therapy): venti mask    Temp  Min: 97.8 °F (36.6 °C)  Max: 98.9 °F (37.2 °C)  Pulse  Min: 78  Max: 111  BP  Min: 106/47  Max: 151/65  MAP (mmHg)  Min: 68  Max: 97  Resp  Min: 12  Max: 27  SpO2  Min: 87 %  Max: 100 %  Oxygen Concentration (%)  Min: 50  Max: 50    05/21 0701 - 05/22 0700  In: 2027.5 [I.V.:2027.5]  Out: 1100 [Urine:1100]   Unmeasured Output  Urine Occurrence: 1  Stool Occurrence: 0  Pad Count: 1       Physical Exam   Constitutional: She appears well-developed and well-nourished. No distress.   HENT:   Head: Normocephalic and atraumatic.   Eyes: Pupils are equal, round, and reactive to light. EOM are normal.   Cardiovascular: Regular rhythm. Tachycardia present.   Pulmonary/Chest: Effort normal.   On ventimask   Abdominal: Soft. Bowel sounds are normal. She exhibits no distension.   Musculoskeletal: She exhibits no edema or deformity.   Skin: Skin is warm and dry.     Neuro:  --sedation: none  --GCS:  E4 V1 M5  --Mental Status: Awakens to voice, opens eyes spontaneously, aphasic  --PERRL - 3 mm  --Motor: Moves L side spontaneously, moves R upper spontaneously, R lower withdraws  --sensory: appears intact throughout    Medications:  Continuous  sodium chloride 0.9% Last Rate: Stopped (05/22/19 1300)   niCARdipine Last Rate: Stopped (05/22/19 1130)   Scheduled  albuterol-ipratropium 3 mL Q8H   aspirin 81 mg Once   atorvastatin 40 mg Daily   buPROPion 150 mg BID   cloNIDine 0.1 mg BID   [START ON 5/23/2019] modafinil 100 mg After lunch   [START ON 5/23/2019] modafinil 200 mg Daily   senna-docusate 8.6-50 mg 1 tablet BID   sodium chloride 3% 4 mL Q8H   PRN  acetaminophen 650 mg Q6H PRN   dextrose 50% 12.5 g PRN   glucagon (human  recombinant) 1 mg PRN   insulin aspart U-100 0-5 Units Q6H PRN   magnesium oxide 800 mg PRN   magnesium oxide 800 mg PRN   ondansetron 4 mg Q8H PRN   potassium chloride 10% 40 mEq PRN   potassium chloride 10% 40 mEq PRN   potassium chloride 10% 60 mEq PRN   potassium, sodium phosphates 2 packet PRN   potassium, sodium phosphates 2 packet PRN   potassium, sodium phosphates 2 packet PRN   sodium chloride 0.9% 10 mL PRN   sodium chloride 0.9% 10 mL PRN     Today I personally reviewed pertinent medications, lines/drains/airways, imaging, cardiology results, laboratory results, microbiology results,     Diet  Diet NPO  Diet NPO        Assessment/Plan:     Neuro  * Acute ischemic left MCA stroke  Patient is an 83 yo female w/ multiple co-morbidities who presents as a transfer from OSH w/ notable L MCA occlusion. No tPA given at OSH. Thrombectomy w/ TICI 3 after 4 passes done at [facility]. Patient admitted to [facility] for higher level of care and exam follow up s/p thrombectomy    - Admit to NCC  - Neuro checks Q1  - Vital signs Q1  - SBP goal <160  - HbA1c, TSH and Lipid panel  - TTE complete   - Vascular Neurology consulted and following, appreciate recommendations  -SLP  -DNR/DNI    Aphasia  - 2/2 stroke, unclear how much deficits s/p previous infarct  - SLP eval and treat     Pulmonary  Aspiration pneumonia  Aspiration highly likely  -monitor for WBC and fever  -CXR prn  -pt requiring high O2 on ventimask to keep oxygenation up    Cardiac/Vascular  Paroxysmal atrial fibrillation  - Likely the cause of patient's embolic infarct  - Has not been compliant w/ Eliquis after she had ran out of her medication  - Will restart AC once appropriate     Atrial enlargement, left  - Patient w/ known history of Afib, off of AC due to inability to refill medication   - TTE   · Concentric left ventricular remodeling.  · Small left ventricular cavity size.  · Increased (hyperdynamic) left ventricular systolic function. The estimated ejection  fraction is 75%  · Grade I (mild) left ventricular diastolic dysfunction consistent with impaired relaxation.  · Moderate left atrial enlargement.  · Mild-to-moderate aortic valve stenosis.  · Aortic valve area is 1.43 cm2; peak velocity is 2.59 m/s; mean gradient is 15.51 mmHg.  · Mild mid-cavity left ventricular obstruction is present.  · Normal left atrial pressure.  · Mild mitral stenosis.         Mixed hyperlipidemia  - LDL 58.4  - Continue home Atorvastatin     Benign essential HTN  - SBP goal <160 s/p successful thrombectomy   -continue home clonidine   - not requiring cardene since this AM    Hematology  Hypercoagulable state  Pt with high risk for developing DVT due to hypercoaguable state with multiple CVA's     Endocrine  Diabetes mellitus  - HbA1c 5.9  - NPO at this time  - POCT glucose Q6  - SSI      Other  Debility  See stroke          The patient is being Prophylaxed for:  Venous Thromboembolism with: Mechanical  Stress Ulcer with: None  Ventilator Pneumonia with: not applicable    Activity Orders          Diet NPO: NPO starting at 05/21 0720        DNR    Crissy Jaramillo PA-C  Neurocritical Care  [facility]

## 2019-05-23 NOTE — PLAN OF CARE
Problem: Physical Therapy Goal  Goal: Physical Therapy Goal  Goals to be met by: 6/3/2019       Patient will increase functional independence with mobility by performin. Supine to sit with Moderate Assistance  2. Sit to supine with Moderate Assistance  3. Rolling to Left and Right with Moderate Assistance.  4. Sit to stand transfer with Moderate Assistance  5. Bed to chair transfer with Maximum Assistance using squat pivot technique.   6. Sitting at edge of bed x15 minutes with Contact Guard Assistance and bilateral upper extremity support  7. Lower extremity exercise program x20 reps per handout, with assistance as needed to improve strength and increase activity tolerance.     Outcome: Ongoing (interventions implemented as appropriate)  Evaluation completed, initiated plan of care.   Soumya Rodrigues, PT  2019

## 2019-05-23 NOTE — PROGRESS NOTES
PA notified of pt's low hgb & hmt. No consents in chart. Instructed to call back at 0600 to get family's consent for blood transfusion. Will redraw CBC. Pt hemodynamically stable. Will continue to monitor.

## 2019-05-23 NOTE — ASSESSMENT & PLAN NOTE
83 y/o female with L MCA syndrome with L M2 occlusion due to cardio embolic from afib not on anticoagulation. No TPA but underwent IR thrombectomy. Did not significantly improve afterwards. Symptoms out of proportion to imaging findings in patient with limited neurologic reserve. When seen in morning and on rounds very somnolent, however    Antithrombotics:Recommend resuming anticoagulation. She will need investigation of affordability of anticoagulation for discharge.     Statins: Lipitor 40    Aggressive risk factor modification: HTN, HLD, prior stroke, afib     Rehab efforts: The patient has been evaluated by a stroke team provider and the therapy needs have been fully considered based off the presenting complaints and exam findings. The following therapy evaluations are needed: PT evaluate and treat, OT evaluate and treat, SLP evaluate and treat, PM&R evaluate for appropriate placement    Diagnostics ordered/pending: None    VTE prophylaxis: Heparin 5000 units SQ every 8 hours (stop once AC resumed)    BP parameters: S/p thrombectomy <180

## 2019-05-23 NOTE — SUBJECTIVE & OBJECTIVE
Interval History:    Agitated and hypertensive overnight, weaned down on O2 this morning.  Stable for step down to vascular neurology.  Afebrile.    Review of Systems   Constitutional: Negative for fever.   Neurological: Positive for speech difficulty.   Psychiatric/Behavioral: Positive for confusion.     Unable to obtain a complete ROS due to level of consciousness.    Objective:     Vitals:  Temp: 98.4 °F (36.9 °C)  Pulse: 89  Rhythm: normal sinus rhythm  BP: 137/84  MAP (mmHg): 104  Resp: 18  SpO2: 97 %  Oxygen Concentration (%): 50  O2 Device (Oxygen Therapy): nasal cannula    Temp  Min: 98.2 °F (36.8 °C)  Max: 100 °F (37.8 °C)  Pulse  Min: 76  Max: 109  BP  Min: 105/44  Max: 167/71  MAP (mmHg)  Min: 64  Max: 130  Resp  Min: 11  Max: 20  SpO2  Min: 96 %  Max: 100 %  Oxygen Concentration (%)  Min: 50  Max: 50    05/22 0701 - 05/23 0700  In: 777.1 [I.V.:677.1]  Out: 500 [Urine:500]   Unmeasured Output  Urine Occurrence: 1  Stool Occurrence: 0  Pad Count: 1       Physical Exam   Constitutional: She appears well-developed.   Cardiovascular: Normal rate and regular rhythm.   No murmur heard.  Pulmonary/Chest:   Coarse breath sounds b/l   Abdominal: Soft. She exhibits no distension. There is no tenderness.   Musculoskeletal: She exhibits no edema.   Neurological: She is alert.   Skin: Skin is warm.     Unable to test orientation, language, memory, judgment, insight, fund of knowledge, hearing, shoulder shrug, tongue protrusion, coordination, gait due to level of consciousness.    E4V2M5    alert, does not follow commands on resident exam    PERRL  Tracks across the room  Blinks to threat b/l  Face symmetric at rest    Moving all extremities vigorously    2+ b/l biceps, brachioradialis    Medications:  Continuous  sodium chloride 0.9% Last Rate: Stopped (05/22/19 1300)   Scheduled  albuterol-ipratropium 3 mL Q8H   atorvastatin 40 mg Daily   buPROPion 150 mg BID   cloNIDine 0.1 mg BID   heparin (porcine) 5,000 Units Q8H    memantine 10 mg BID   senna-docusate 8.6-50 mg 1 tablet BID   sodium chloride 3% 4 mL Q8H   PRN  acetaminophen 650 mg Q6H PRN   dextrose 50% 12.5 g PRN   glucagon (human recombinant) 1 mg PRN   hydrALAZINE 10 mg Q6H PRN   insulin aspart U-100 0-5 Units Q6H PRN   magnesium oxide 800 mg PRN   magnesium oxide 800 mg PRN   ondansetron 4 mg Q8H PRN   potassium chloride 10% 40 mEq PRN   potassium chloride 10% 40 mEq PRN   potassium chloride 10% 60 mEq PRN   potassium, sodium phosphates 2 packet PRN   potassium, sodium phosphates 2 packet PRN   potassium, sodium phosphates 2 packet PRN   sodium chloride 0.9% 10 mL PRN   sodium chloride 0.9% 10 mL PRN     Today I personally reviewed pertinent medications, lines/drains/airways, imaging, cardiology results, laboratory results, microbiology results, notably:    Diet  Diet NPO  Diet NPO    Recent Results (from the past 24 hour(s))   Culture, Respiratory with Gram Stain    Collection Time: 05/22/19  5:25 PM   Result Value Ref Range    Respiratory Culture Insufficient incubation, culture in progress     Gram Stain (Respiratory) >10 epithelial cells per low power field     Gram Stain (Respiratory) Few WBC's     Gram Stain (Respiratory) Many Gram positive cocci      Gram Stain (Respiratory) Moderate Gram positive rods     Gram Stain (Respiratory) Few Gram negative rods    Magnesium    Collection Time: 05/22/19  5:25 PM   Result Value Ref Range    Magnesium 1.7 1.6 - 2.6 mg/dL   Potassium    Collection Time: 05/22/19  5:25 PM   Result Value Ref Range    Potassium 5.5 (H) 3.5 - 5.1 mmol/L   POCT glucose    Collection Time: 05/22/19  5:28 PM   Result Value Ref Range    POCT Glucose 179 (H) 70 - 110 mg/dL   POCT glucose    Collection Time: 05/22/19 11:57 PM   Result Value Ref Range    POCT Glucose 184 (H) 70 - 110 mg/dL   Comprehensive metabolic panel    Collection Time: 05/23/19  2:09 AM   Result Value Ref Range    Sodium 143 136 - 145 mmol/L    Potassium 4.5 3.5 - 5.1 mmol/L     Chloride 114 (H) 95 - 110 mmol/L    CO2 22 (L) 23 - 29 mmol/L    Glucose 135 (H) 70 - 110 mg/dL    BUN, Bld 16 8 - 23 mg/dL    Creatinine 0.7 0.5 - 1.4 mg/dL    Calcium 7.6 (L) 8.7 - 10.5 mg/dL    Total Protein 5.6 (L) 6.0 - 8.4 g/dL    Albumin 2.6 (L) 3.5 - 5.2 g/dL    Total Bilirubin 0.4 0.1 - 1.0 mg/dL    Alkaline Phosphatase 50 (L) 55 - 135 U/L    AST 25 10 - 40 U/L    ALT 15 10 - 44 U/L    Anion Gap 7 (L) 8 - 16 mmol/L    eGFR if African American >60.0 >60 mL/min/1.73 m^2    eGFR if non African American >60.0 >60 mL/min/1.73 m^2   CBC auto differential    Collection Time: 05/23/19  2:09 AM   Result Value Ref Range    WBC 11.15 3.90 - 12.70 K/uL    RBC 2.26 (L) 4.00 - 5.40 M/uL    Hemoglobin 6.8 (L) 12.0 - 16.0 g/dL    Hematocrit 22.1 (L) 37.0 - 48.5 %    Mean Corpuscular Volume 98 82 - 98 fL    Mean Corpuscular Hemoglobin 30.1 27.0 - 31.0 pg    Mean Corpuscular Hemoglobin Conc 30.8 (L) 32.0 - 36.0 g/dL    RDW 14.1 11.5 - 14.5 %    Platelets 61 (L) 150 - 350 K/uL    MPV 13.3 (H) 9.2 - 12.9 fL    Immature Granulocytes 1.2 (H) 0.0 - 0.5 %    Gran # (ANC) 8.4 (H) 1.8 - 7.7 K/uL    Immature Grans (Abs) 0.13 (H) 0.00 - 0.04 K/uL    Lymph # 1.9 1.0 - 4.8 K/uL    Mono # 0.8 0.3 - 1.0 K/uL    Eos # 0.0 0.0 - 0.5 K/uL    Baso # 0.03 0.00 - 0.20 K/uL    nRBC 0 0 /100 WBC    Gran% 75.1 (H) 38.0 - 73.0 %    Lymph% 16.7 (L) 18.0 - 48.0 %    Mono% 6.7 4.0 - 15.0 %    Eosinophil% 0.0 0.0 - 8.0 %    Basophil% 0.3 0.0 - 1.9 %    Platelet Estimate Decreased (A)     Aniso Slight     Poik Moderate     Poly Occasional     Hypo Occasional     Ovalocytes Moderate     Louise Cells Occasional     Schistocytes Present     Differential Method Automated    Magnesium    Collection Time: 05/23/19  2:09 AM   Result Value Ref Range    Magnesium 1.5 (L) 1.6 - 2.6 mg/dL   Phosphorus    Collection Time: 05/23/19  2:09 AM   Result Value Ref Range    Phosphorus 1.7 (L) 2.7 - 4.5 mg/dL   CBC auto differential    Collection Time: 05/23/19  3:53 AM    Result Value Ref Range    WBC 11.64 3.90 - 12.70 K/uL    RBC 2.98 (L) 4.00 - 5.40 M/uL    Hemoglobin 8.7 (L) 12.0 - 16.0 g/dL    Hematocrit 29.5 (L) 37.0 - 48.5 %    Mean Corpuscular Volume 99 (H) 82 - 98 fL    Mean Corpuscular Hemoglobin 29.2 27.0 - 31.0 pg    Mean Corpuscular Hemoglobin Conc 29.5 (L) 32.0 - 36.0 g/dL    RDW 13.9 11.5 - 14.5 %    Platelets 72 (L) 150 - 350 K/uL    MPV 12.4 9.2 - 12.9 fL    Immature Granulocytes 0.4 0.0 - 0.5 %    Gran # (ANC) 9.1 (H) 1.8 - 7.7 K/uL    Immature Grans (Abs) 0.05 (H) 0.00 - 0.04 K/uL    Lymph # 1.6 1.0 - 4.8 K/uL    Mono # 0.9 0.3 - 1.0 K/uL    Eos # 0.0 0.0 - 0.5 K/uL    Baso # 0.05 0.00 - 0.20 K/uL    nRBC 0 0 /100 WBC    Gran% 77.8 (H) 38.0 - 73.0 %    Lymph% 13.8 (L) 18.0 - 48.0 %    Mono% 7.6 4.0 - 15.0 %    Eosinophil% 0.0 0.0 - 8.0 %    Basophil% 0.4 0.0 - 1.9 %    Platelet Estimate Decreased (A)     Aniso Slight     Poik Slight     Poly Occasional     Hypo Occasional     Ovalocytes Occasional     Differential Method Automated    POCT glucose    Collection Time: 05/23/19  5:53 AM   Result Value Ref Range    POCT Glucose 127 (H) 70 - 110 mg/dL       Microbiology Results (last 7 days)     Procedure Component Value Units Date/Time    Culture, Respiratory with Gram Stain [[id number]] Collected:  05/22/19 2731    Order Status:  Completed Specimen:  Respiratory from Sputum Updated:  05/23/19 1231     Respiratory Culture Insufficient incubation, culture in progress     Gram Stain (Respiratory) >10 epithelial cells per low power field     Gram Stain (Respiratory) Few WBC's     Gram Stain (Respiratory) Many Gram positive cocci      Gram Stain (Respiratory) Moderate Gram positive rods     Gram Stain (Respiratory) Few Gram negative rods    Narrative:       Please try to NT suction for sample        Imaging  5/22/19 CXR: Per independent resident review and interpretation,  Opacified R lower lung base, blunted costophrenic angle.

## 2019-05-23 NOTE — ASSESSMENT & PLAN NOTE
- Likely the cause of patient's embolic infarct  - Has not been compliant w/ Eliquis after she had ran out of her medication  - Anticoagulation per stroke

## 2019-05-23 NOTE — PLAN OF CARE
Problem: Adult Inpatient Plan of Care  Goal: Plan of Care Review  Outcome: Ongoing (interventions implemented as appropriate)  POC reviewed with pt and family at 1500. Pt unable to verbalize understanding due to aphasia. Pt's family verbalized understanding. Questions and concerns addressed. K+ replaced and redraw 5.5, NCC team aware. No new orders. Aspirin started at 1730 per MD order. SBP < 160 with cardene off. NS discontinued per MD order. Pt respiratory status improving. Will continue to monitor. See flowsheets for full assessment and VS info.

## 2019-05-23 NOTE — CARE UPDATE
10:21 AM  Spoke with Jordon with stroke service, accepted pt for transfer to their service.  Will place transfer orders.    Frankie Mackay MD

## 2019-05-23 NOTE — ASSESSMENT & PLAN NOTE
- SBP goal <160 s/p successful thrombectomy   - continue home clonidine   - Discontinue modafinil

## 2019-05-23 NOTE — PROGRESS NOTES
PA notified of CBC redraw results. Blood or platelet transfusions not needed at this time. No active bleeding present. Pt hemodynamically stable. Will continue to monitor.

## 2019-05-23 NOTE — PT/OT/SLP EVAL
Physical Therapy Evaluation    Patient Name:  Sury Cobos   MRN:  [id number]    Recommendations:     Discharge Recommendations:  nursing facility, skilled(long term skilled nursing vs home with 24 hour caregiver support)   Discharge Equipment Recommendations: (pressure relief equipment)   Barriers to discharge: patient below functional baseline, level of skilled care required    Assessment:     Sury Cobos is a 84 y.o. female admitted with a medical diagnosis of Acute ischemic left MCA stroke.  She presents with the following impairments/functional limitations:  weakness, impaired endurance, impaired self care skills, impaired functional mobilty, gait instability, impaired balance, decreased lower extremity function, decreased upper extremity function, decreased coordination, impaired cognition, decreased safety awareness, abnormal tone, decreased ROM, impaired fine motor, impaired coordination, impaired cardiopulmonary response to activity, impaired joint extensibility. The patient has L gaze preference with R hemiparesis, global aphasia, she is unable to follow commands or demonstrate purposeful mobility at this time. Performed bed mobility with total assistance, she sat edge of bed with variable support from contact guard assist to maximum assistance to maintain balance. She needed constant cuing to maintain her eyes open ~30% of treatment, she did not demonstrate purposeful tracking. She had variable flexor tone in R upper and lower extremity with attempts at PROM.     Rehab Prognosis: Fair; patient would benefit from acute skilled PT services to address these deficits and reach maximum level of function.    Recent Surgery: * No surgery found *      Plan:     During this hospitalization, patient to be seen 3 x/week to address the identified rehab impairments via therapeutic activities, neuromuscular re-education and progress toward the following goals:    · Plan of Care Expires:  06/23/19    Subjective      Chief Complaint: unable to state  Patient/Family Comments/goals: patient's family encouraging mobility with therapy  Pain/Comfort:  · Pain Rating 1: (unable to state, no indication of pain)  · Pain Rating Post-Intervention 1: (remained)    Patients cultural, spiritual, Confucianism conflicts given the current situation: no    Living Environment:  Patient unable to provide. Per her daughter's report, the patient lives with her daughter in a [facility], she has a tub shower. She was independent with mobility in the community needing a RW and supervision assistance from her daughter. The patient reports that her cognition varied, but she was oriented to herself and situation as well as able to identify family members. The patient has only had 1 recent fall due to stroke. Distant history of R hip fracture from fall.   Prior to admission, patients level of function was assist from daughter for ADLs, dressing.  Equipment used at home: hospital bed, rollator, grab bar, wheelchair.  DME owned (not currently used): none.  Upon discharge, patient will have assistance from family.    Objective:     Communicated with RN prior to session.  Patient found HOB elevated with blood pressure cuff, peripheral IV, NG tube, pressure relief boots, restraints, SCD, pulse ox (continuous), PureWick, bed alarm, oxygen(3L O2 )  upon PT entry to room.    General Precautions: Standard, aphasia, aspiration, fall, NPO, vision impaired   Orthopedic Precautions:N/A   Braces: N/A     Exams:    Cognitive Exam  Patient is A&O x0 and follows 0% of one -step commands; unable to participate in functional mobility. Eyes open ~30% of treatment with cuing, no tracking, L upward gaze preference.    Fine Motor Coordination   -       Impaired due to hemiparesis, unable to assess due to cognition     Postural Exam Patient presented with the following abnormalities:    -       Rounded shoulders  -       Forward head  -       Kyphosis  -       Posterior pelvic tilt  -        Weight shift posterior   Sensation    -       Light touch unable to assess   Skin Integrity/Edema     -       Skin integrity: visibly intact  -       Edema: R hand mild   R LE ROM Increased tone in R hamstring and gastroc/soleus; lacking ~30 deg knee extension, lacking 15 deg DF to neutral   R LE Strength Unable to assess, no spontaneous movement   L LE ROM WFL   L LE Strength  grossly 3/5 hip flexion, knee ext/flex, and ankle DF/PF based on spontaneous movement      Balance          Static Sitting contact guard assist to maximum assistance   Dynamic Sitting maximum assistance with attempts at PROM to R leg        Sitting edge of bed 15 minutes, contact guard assist to maximum assistance, weight shift posteriorly  -Kyphotic posture, posterior pelvic tilt, head rotated to L  -Visual/verbal/manual cues for midline orientation, thoracic and cervical extension  -Hand over hand placement of bilateral hands in weightbearing  -Cues for visual tracking to midline, cues to maintain eyes open throughout treatment         Functional Mobility:    Bed Mobility  Rolling to R and L: total assistance  Supine to Sit:  total assistance   Transfers Scooting to edge of bed: maximum assistance x2  Sit to Stand:  Did not test due to weakness in legs, inability to follow cues          Therapeutic Activities and Exercises:   Patient and family educated on role of therapy, goals of session, benefits of out of bed mobility. Patient agreeable to mobilize with therapy.  Discussed PT plan of care during hospitalization. Whiteboard updated as appropriate. Patient educated on how their diagnosis impacts their mobility within PT scope of practice. Performed PROM to R leg in all planes, 10 reps, increased tone in R calf and hamstrings. Positioned with bilateral wedges to maintain midline trunk alignment, upper extremities elevated, heels floated, neck supported in neutral.     AM-PAC 6 CLICK MOBILITY  Total Score:8     Patient left HOB  elevated with all lines intact, call button in reach, bed alarm on, restraints reapplied at end of session and RN notified.    GOALS:   Multidisciplinary Problems     Physical Therapy Goals        Problem: Physical Therapy Goal    Goal Priority Disciplines Outcome Goal Variances Interventions   Physical Therapy Goal     PT, PT/OT Ongoing (interventions implemented as appropriate)     Description:  Goals to be met by: 6/3/2019       Patient will increase functional independence with mobility by performin. Supine to sit with Moderate Assistance  2. Sit to supine with Moderate Assistance  3. Rolling to Left and Right with Moderate Assistance.  4. Sit to stand transfer with Moderate Assistance  5. Bed to chair transfer with Maximum Assistance using squat pivot technique.   6. Sitting at edge of bed x15 minutes with Contact Guard Assistance and bilateral upper extremity support  7. Lower extremity exercise program x20 reps per handout, with assistance as needed to improve strength and increase activity tolerance.                       History:     Past Medical History:   Diagnosis Date    Aphasia 2018    Arthritis     Benign essential HTN     CHF (congestive heart failure)     Closed intertrochanteric fracture of left hip     Coronary artery disease     Dementia with behavioral disturbance 2018    Diabetes mellitus     Embolic stroke involving right middle cerebral artery 2018    Gout     High cholesterol     Low potassium syndrome     PEG (percutaneous endoscopic gastrostomy) status 2018    Placed 18    Urinary incontinence        Past Surgical History:   Procedure Laterality Date    CARDIAC SURGERY      CORONARY ARTERY BYPASS GRAFT      EGD (ESOPHAGOGASTRODUODENOSCOPY) N/A 2018    Per[facility] 2ND FLR    EGD, WITH PEG TUBE INSERTION N/A 2018    Performed by [facility] at [facility] OR 2ND FLR    reduction & internal  fixation of displaced closed comminuted intertrochanteric fx left hip  02/12/2018       Time Tracking:     PT Received On: 05/23/19  PT Start Time: 1150     PT Stop Time: 1220  PT Total Time (min): 30 min     Billable Minutes: Evaluation 20 and Therapeutic Activity 10      Soumya Rodrigues, PT  05/23/2019

## 2019-05-23 NOTE — ASSESSMENT & PLAN NOTE
- Patient w/ known history of Afib, off of AC due to inability to refill medication   - TTE   · Concentric left ventricular remodeling.  · Small left ventricular cavity size.  · Increased (hyperdynamic) left ventricular systolic function. The estimated ejection fraction is 75%  · Grade I (mild) left ventricular diastolic dysfunction consistent with impaired relaxation.  · Moderate left atrial enlargement.  · Mild-to-moderate aortic valve stenosis.  · Aortic valve area is 1.43 cm2; peak velocity is 2.59 m/s; mean gradient is 15.51 mmHg.  · Mild mid-cavity left ventricular obstruction is present.  · Normal left atrial pressure.  · Mild mitral stenosis.

## 2019-05-23 NOTE — PT/OT/SLP EVAL
Occupational Therapy   Evaluation    Name: Sury Cobos  MRN: [id number]  Admitting Diagnosis:  Acute ischemic left MCA stroke      Recommendations:     Discharge Recommendations: nursing facility, skilled(longterm; vs 24 hour care)  Discharge Equipment Recommendations:  (pressure relief equipment)  Barriers to discharge:  Other (Comment)(level of skilled and physical assistance required at this time)    Assessment:     Sury Cobos is a 84 y.o. female with a medical diagnosis of Acute ischemic left MCA stroke.  She presents with a significant decline in her level of functional indep at this time. Pt requiring total care for all ADLs/functional tasks at this time. She would best benefit from long term placement or 24 hour care at d/c. OT to follow up 3x/w in acute setting to address stated goals listed below. She would greatly benefit from pressure relief schedule and progressive mobility with nursing staff for bed level exercises. Performance deficits affecting function: weakness, impaired endurance, impaired sensation, impaired self care skills, impaired functional mobilty, gait instability, impaired balance, visual deficits, impaired cognition, decreased upper extremity function, decreased lower extremity function, impaired coordination, impaired fine motor, impaired cardiopulmonary response to activity.      Rehab Prognosis: Limited/FAir; patient would benefit from acute skilled OT services to address these deficits and reach maximum level of function.       Plan:     Patient to be seen 3 x/week to address the above listed problems via self-care/home management, therapeutic exercises, therapeutic activities, neuromuscular re-education, cognitive retraining, sensory integration  · Plan of Care Expires: 06/20/19  · Plan of Care Reviewed with: patient, daughter, family    Subjective     Chief Complaint: none reported   Patient/Family Comments/goals: for her to talk     Occupational Profile: per daughter at bedside  2/2 pt's cognitive status  Living Environment: Pt lives with daughter in [facility] with ramp to enter. Tub/shower combo.   Previous level of function: using rollator for mobility within the home. Able to self feed with set up with R hand. Able to assist with UB dressing. Daughter assisted pt with LB dressing and bathing (in bath tub). Pt no longer working/driving/completing home mgmt tasks/IADLs. She wears glasses. This is 1 fall this year.   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 Completed with PT in ICU setting. Pt's daughter and granddaughter present throughout.  Patient found HOB elevated with bed alarm, blood pressure cuff, oxygen, PureWick, SCD, telemetry, pulse ox (continuous), peripheral IV, restraints upon OT entry to room.    General Precautions: Standard, aphasia, aspiration, fall, NPO, vision impaired(DNR)   Orthopedic Precautions:N/A   Braces: N/A     Occupational Performance:  Bed Mobility:    · Patient completed Rolling/Turning to Left with  maximal assistance  · Patient completed Rolling/Turning to Right with maximal assistance  · Patient completed Scooting/Bridging with total assistance  · Patient completed Supine to Sit with maximal assistance  · Patient completed Sit to Supine with maximal assistance    Functional Mobility/Transfers:  · Not appropriate 2/2 impaired sitting balance     Activities of Daily Living:  · Feeding:  NPO     · Grooming: total assistance    · Upper Body Dressing: total assistance    · Lower Body Dressing: total assistance      Cognitive/Visual  Perceptual:  Cognitive/Psychosocial Skills:     -       Oriented to: unable to report   -       Follows Commands/attention:Inattentive/nodding yes x3 in session   -       Communication: nonverbal throughout  -       Safety awareness/insight to disability: impaired   Visual/Perceptual:      -Impaired  no visual attention with tasks/activities; R inattention      Physical Exam:  Balance:    -       Impaired static and dynamic   Postural examination/scapula alignment:    -       Rounded shoulders  -       Forward head  -       Posterior pelvic tilt  Skin integrity: Thin and Dry  Edema:  None noted  Motor Planning:    -       impaired R  Dominant hand:    -       R  Upper Extremity Range of Motion:     -       Right Upper Extremity: AAROM WFL; resistive and mild flexor tone noted  -       Left Upper Extremity: AAROM WFL  Unable to complete 2/2 cognition: MMT for Upper Extremity Strength;Fine Motor Coordination;Gross motor coordination    AMPAC 6 Click ADL:  AMPAC Total Score: 6   Body mass index is 21.17 kg/m².  Vitals:    05/23/19 1200   BP: 137/84   Pulse: 89   Resp: 18   Temp:      Treatment & Education:  -Pt requiring mod simulation for alertness upon arrival ; 0 visual attention noted  -[facility] pt/family on OT role in care; OT to turn on lights in room   -EOB x15 min duration with initial max(A) for postural control 2/2 extensor response; progression to CGA at time; majority of time mod(A)  *R inattention noted throughout with cervical rotation preference to L  -return to supine in chair like position ; B UE in elevation; cervical spine positioned to encourage midline  -Communication board updated; questions/concerns addressed within OT scope of practice  Education:    Patient left HOB elevated with all lines intact, call button in reach, bed alarm on, restraints reapplied at end of session and RN notified    GOALS:   Multidisciplinary Problems     Occupational Therapy Goals        Problem: Occupational Therapy Goal     Goal Priority Disciplines Outcome Interventions   Occupational Therapy Goal     OT, PT/OT Ongoing (interventions implemented as appropriate)    Description:  Goals to be met by: 6/6/2019     Patient will increase functional independence with ADLs by performing:    Pt will engage in functional task x2 min duration with 0 added cues/facilitation.  Sitting at edge of bed x10 minutes for functional task with Minimal Assistance for postural control.  Rolling to Bilateral with Minimal Assistance and use of bedrail as needed.   Supine to sit with Moderate Assistance.  Stand pivot transfers with Moderate Assistance.  CG demo understanding for s/s of stroke.   CG demo understanding for B UE positioning and ROM exercises while pt in supine.   CG demo understanding for pressure relief techniques.                       History:     Past Medical History:   Diagnosis Date    Aphasia 5/27/2018    Arthritis     Benign essential HTN     CHF (congestive heart failure)     Closed intertrochanteric fracture of left hip     Coronary artery disease     Dementia with behavioral disturbance 5/27/2018    Diabetes mellitus     Embolic stroke involving right middle cerebral artery 5/27/2018    Gout     High cholesterol     Low potassium syndrome     PEG (percutaneous endoscopic gastrostomy) status 6/4/2018    Placed 6/4/18    Urinary incontinence        Past Surgical History:   Procedure Laterality Date    CARDIAC SURGERY      CORONARY ARTERY BYPASS GRAFT      EGD (ESOPHAGOGASTRODUODENOSCOPY) N/A 6/5/2018    [facility] 2ND FLR    EGD, WITH PEG TUBE INSERTION N/A 6/5/2018    Per[facility] at [facility] 2ND FLR    reduction & internal fixation of displaced closed comminuted intertrochanteric fx left hip  02/12/2018       Time Tracking:     OT Date of Treatment: 05/23/19  OT Start Time: 1152  OT Stop Time: 1218  OT Total Time (min): 26 min    Billable  Minutes:Evaluation 18  Therapeutic Activity 8    PATITO Casas  5/23/2019

## 2019-05-23 NOTE — SUBJECTIVE & OBJECTIVE
Neurologic Chief Complaint: Aphasia    Subjective:     Interval History: Patient is seen for follow-up neurological assessment and treatment recommendations: Patient has had exam out of proportion to imaging findings with significant L gaze preference and right sided neglect. Diffuse brain atrophy.     Oxygenation improved today. Still somnolent on exam this morning and not following commands or opening eyes. Moving left arm and leg today     HPI, Past Medical, Family, and Social History remains the same as documented in the initial encounter.     Review of Systems   Unable to perform ROS: Patient nonverbal     Scheduled Meds:   albuterol-ipratropium  3 mL Nebulization Q8H    atorvastatin  40 mg Per NG tube Daily    buPROPion  150 mg Per NG tube BID    cloNIDine  0.1 mg Per NG tube BID    heparin (porcine)  5,000 Units Subcutaneous Q8H    memantine  10 mg Per NG tube BID    senna-docusate 8.6-50 mg  1 tablet Per NG tube BID    sodium chloride 3%  4 mL Nebulization Q8H     Continuous Infusions:   sodium chloride 0.9% Stopped (05/22/19 1300)     PRN Meds:acetaminophen, dextrose 50%, glucagon (human recombinant), hydrALAZINE, insulin aspart U-100, magnesium oxide, magnesium oxide, ondansetron, potassium chloride 10%, potassium chloride 10%, potassium chloride 10%, potassium, sodium phosphates, potassium, sodium phosphates, potassium, sodium phosphates, sodium chloride 0.9%, sodium chloride 0.9%    Objective:     Vital Signs (Most Recent):  Temp: 98.4 °F (36.9 °C) (05/23/19 0705)  Pulse: 89 (05/23/19 1200)  Resp: 18 (05/23/19 1200)  BP: 137/84 (05/23/19 1200)  SpO2: 97 % (05/23/19 1200)  BP Location: Left arm    Vital Signs Range (Last 24H):  Temp:  [98.2 °F (36.8 °C)-100 °F (37.8 °C)]   Pulse:  []   Resp:  [11-20]   BP: (105-167)/()   SpO2:  [96 %-100 %]   BP Location: Left arm    Physical Exam   Constitutional:   Thin, elderly female with eyes closed   HENT:   Venti mask in place   Eyes: Pupils  are equal, round, and reactive to light. EOM are normal.   Cardiovascular: Normal rate and regular rhythm.   Pulmonary/Chest: No respiratory distress.   Abdominal: Soft. She exhibits no distension.   Musculoskeletal: She exhibits no edema or deformity.   Neurological:   Keeps eyes closed.   Increased tone, left >right  Spontaneous movement of all extremities, significantly decreased in right upper and lower extremities.   Wakes to pain, no eye opening.   GCS: E1V1M5   Skin: Skin is warm and dry.   Nursing note and vitals reviewed.      Laboratory:  CMP:   Recent Labs   Lab 05/23/19 0209   CALCIUM 7.6*   ALBUMIN 2.6*   PROT 5.6*      K 4.5   CO2 22*   *   BUN 16   CREATININE 0.7   ALKPHOS 50*   ALT 15   AST 25   BILITOT 0.4     BMP:   Recent Labs   Lab 05/23/19 0209      K 4.5   *   CO2 22*   BUN 16   CREATININE 0.7   CALCIUM 7.6*     CBC:   Recent Labs   Lab 05/23/19  0353   WBC 11.64   RBC 2.98*   HGB 8.7*   HCT 29.5*   PLT 72*   MCV 99*   MCH 29.2   MCHC 29.5*     Lipid Panel:   Recent Labs   Lab 05/21/19  0928   CHOL 118*   LDLCALC 58.4*   HDL 49   TRIG 53     Coagulation:   Recent Labs   Lab 05/21/19 0928   INR 1.0   APTT 39.7*     Hgb A1C:   Recent Labs   Lab 05/21/19 0928   HGBA1C 5.9*     TSH:   Recent Labs   Lab 05/21/19  0928   TSH 2.293       Diagnostic Results     Brain Imaging     MRI 5/22  Evolving scattered areas of acute/recent infarction left MCA territory with new to increased component in the left inferior frontal gyrus now mild moderate in size.  There is no evidence for hemorrhagic conversion or significant mass effect.    Superimposed confluent T2 FLAIR signal abnormality elsewhere supra and infratentorial parenchyma while nonspecific concerning for advanced chronic microvascular ischemic change versus posttherapy change.    Remote basal gangliar, thalamic and cerebellar infarcts unchanged.    Compensatory enlargement of ventricular system similar to prior without  hydrocephalus.    CTA Head and Neck 5/21  1. 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  The right vertebral artery is dominant and patent throughout its course in the neck.  2. There is calcified plaque in the common, internal and external carotid arteries bilaterally.  Stenosis on the right is mild at 50-69% and moderate on the left at approximately 70% without change.  CTA Head    1.  There is occlusion of the distal left M1 and proximal M2 segments of the middle cerebral artery which has occurred since the prior CTA head.  There is subtle increased density within the left middle cerebral artery at the distal M1 and proximal M2 segments on comparison head CT.  This is consistent with thrombus.    2.  The right anterior circulation is normal.    3.  There is developmental variation with fetal origin of the right posterior cerebral artery.    Cardiac Imaging   · Concentric left ventricular remodeling.  · Small left ventricular cavity size.  · Increased (hyperdynamic) left ventricular systolic function. The estimated ejection fraction is 75%  · Grade I (mild) left ventricular diastolic dysfunction consistent with impaired relaxation.  · Moderate left atrial enlargement.  · Mild-to-moderate aortic valve stenosis.  · Aortic valve area is 1.43 cm2; peak velocity is 2.59 m/s; mean gradient is 15.51 mmHg.  · Mild mid-cavity left ventricular obstruction is present.  · Normal left atrial pressure.  · Mild mitral stenosis.

## 2019-05-24 PROBLEM — I63.412 EMBOLIC STROKE INVOLVING LEFT MIDDLE CEREBRAL ARTERY: Status: ACTIVE | Noted: 2019-05-21

## 2019-05-24 LAB
ALBUMIN SERPL BCP-MCNC: 2.7 G/DL (ref 3.5–5.2)
ALP SERPL-CCNC: 76 U/L (ref 55–135)
ALT SERPL W/O P-5'-P-CCNC: 53 U/L (ref 10–44)
ANION GAP SERPL CALC-SCNC: 8 MMOL/L (ref 8–16)
ANISOCYTOSIS BLD QL SMEAR: SLIGHT
AST SERPL-CCNC: 70 U/L (ref 10–40)
BASOPHILS # BLD AUTO: 0.03 K/UL (ref 0–0.2)
BASOPHILS NFR BLD: 0.3 % (ref 0–1.9)
BILIRUB SERPL-MCNC: 0.6 MG/DL (ref 0.1–1)
BUN SERPL-MCNC: 14 MG/DL (ref 8–23)
CALCIUM SERPL-MCNC: 8.9 MG/DL (ref 8.7–10.5)
CHLORIDE SERPL-SCNC: 109 MMOL/L (ref 95–110)
CO2 SERPL-SCNC: 25 MMOL/L (ref 23–29)
CREAT SERPL-MCNC: 0.7 MG/DL (ref 0.5–1.4)
DIFFERENTIAL METHOD: ABNORMAL
EOSINOPHIL # BLD AUTO: 0 K/UL (ref 0–0.5)
EOSINOPHIL NFR BLD: 0 % (ref 0–8)
ERYTHROCYTE [DISTWIDTH] IN BLOOD BY AUTOMATED COUNT: 13.7 % (ref 11.5–14.5)
EST. GFR  (AFRICAN AMERICAN): >60 ML/MIN/1.73 M^2
EST. GFR  (NON AFRICAN AMERICAN): >60 ML/MIN/1.73 M^2
GLUCOSE SERPL-MCNC: 104 MG/DL (ref 70–110)
HCT VFR BLD AUTO: 27.1 % (ref 37–48.5)
HGB BLD-MCNC: 8.7 G/DL (ref 12–16)
IMM GRANULOCYTES # BLD AUTO: 0.04 K/UL (ref 0–0.04)
IMM GRANULOCYTES NFR BLD AUTO: 0.4 % (ref 0–0.5)
LYMPHOCYTES # BLD AUTO: 1.4 K/UL (ref 1–4.8)
LYMPHOCYTES NFR BLD: 13.7 % (ref 18–48)
MAGNESIUM SERPL-MCNC: 1.6 MG/DL (ref 1.6–2.6)
MCH RBC QN AUTO: 30.1 PG (ref 27–31)
MCHC RBC AUTO-ENTMCNC: 32.1 G/DL (ref 32–36)
MCV RBC AUTO: 94 FL (ref 82–98)
MONOCYTES # BLD AUTO: 0.6 K/UL (ref 0.3–1)
MONOCYTES NFR BLD: 5.7 % (ref 4–15)
NEUTROPHILS # BLD AUTO: 8.1 K/UL (ref 1.8–7.7)
NEUTROPHILS NFR BLD: 79.9 % (ref 38–73)
NRBC BLD-RTO: 0 /100 WBC
OVALOCYTES BLD QL SMEAR: ABNORMAL
PHOSPHATE SERPL-MCNC: 2.5 MG/DL (ref 2.7–4.5)
PLATELET # BLD AUTO: 91 K/UL (ref 150–350)
PMV BLD AUTO: 14 FL (ref 9.2–12.9)
POCT GLUCOSE: 157 MG/DL (ref 70–110)
POCT GLUCOSE: 96 MG/DL (ref 70–110)
POIKILOCYTOSIS BLD QL SMEAR: SLIGHT
POLYCHROMASIA BLD QL SMEAR: ABNORMAL
POTASSIUM SERPL-SCNC: 4 MMOL/L (ref 3.5–5.1)
PROT SERPL-MCNC: 6.2 G/DL (ref 6–8.4)
RBC # BLD AUTO: 2.89 M/UL (ref 4–5.4)
SODIUM SERPL-SCNC: 142 MMOL/L (ref 136–145)
WBC # BLD AUTO: 10.13 K/UL (ref 3.9–12.7)

## 2019-05-24 PROCEDURE — 36415 COLL VENOUS BLD VENIPUNCTURE: CPT

## 2019-05-24 PROCEDURE — 25000003 PHARM REV CODE 250: Performed by: PHYSICIAN ASSISTANT

## 2019-05-24 PROCEDURE — 85025 COMPLETE CBC W/AUTO DIFF WBC: CPT

## 2019-05-24 PROCEDURE — 25000242 PHARM REV CODE 250 ALT 637 W/ HCPCS: Performed by: PHYSICIAN ASSISTANT

## 2019-05-24 PROCEDURE — 31720 CLEARANCE OF AIRWAYS: CPT

## 2019-05-24 PROCEDURE — 99233 PR SUBSEQUENT HOSPITAL CARE,LEVL III: ICD-10-PCS | Mod: ,,, | Performed by: PHYSICIAN ASSISTANT

## 2019-05-24 PROCEDURE — 99233 SBSQ HOSP IP/OBS HIGH 50: CPT | Mod: ,,, | Performed by: PHYSICIAN ASSISTANT

## 2019-05-24 PROCEDURE — 83735 ASSAY OF MAGNESIUM: CPT

## 2019-05-24 PROCEDURE — 94640 AIRWAY INHALATION TREATMENT: CPT

## 2019-05-24 PROCEDURE — 25000003 PHARM REV CODE 250: Performed by: PSYCHIATRY & NEUROLOGY

## 2019-05-24 PROCEDURE — 25000003 PHARM REV CODE 250: Performed by: STUDENT IN AN ORGANIZED HEALTH CARE EDUCATION/TRAINING PROGRAM

## 2019-05-24 PROCEDURE — 92523 SPEECH SOUND LANG COMPREHEN: CPT

## 2019-05-24 PROCEDURE — 84100 ASSAY OF PHOSPHORUS: CPT

## 2019-05-24 PROCEDURE — 27000221 HC OXYGEN, UP TO 24 HOURS

## 2019-05-24 PROCEDURE — 80053 COMPREHEN METABOLIC PANEL: CPT

## 2019-05-24 PROCEDURE — 25000003 PHARM REV CODE 250: Performed by: NURSE PRACTITIONER

## 2019-05-24 PROCEDURE — 92526 ORAL FUNCTION THERAPY: CPT

## 2019-05-24 PROCEDURE — 94761 N-INVAS EAR/PLS OXIMETRY MLT: CPT

## 2019-05-24 PROCEDURE — 63600175 PHARM REV CODE 636 W HCPCS: Performed by: PHYSICIAN ASSISTANT

## 2019-05-24 PROCEDURE — 20600001 HC STEP DOWN PRIVATE ROOM

## 2019-05-24 RX ORDER — HEPARIN SODIUM 5000 [USP'U]/ML
5000 INJECTION, SOLUTION INTRAVENOUS; SUBCUTANEOUS EVERY 8 HOURS
Status: DISCONTINUED | OUTPATIENT
Start: 2019-05-24 | End: 2019-05-28

## 2019-05-24 RX ORDER — ACETAMINOPHEN 325 MG/1
650 TABLET ORAL EVERY 6 HOURS PRN
Status: DISCONTINUED | OUTPATIENT
Start: 2019-05-24 | End: 2019-05-29

## 2019-05-24 RX ORDER — ASPIRIN 81 MG/1
81 TABLET ORAL DAILY
Status: DISCONTINUED | OUTPATIENT
Start: 2019-05-24 | End: 2019-05-28

## 2019-05-24 RX ADMIN — BUPROPION HYDROCHLORIDE 150 MG: 75 TABLET, FILM COATED ORAL at 10:05

## 2019-05-24 RX ADMIN — CLONIDINE HYDROCHLORIDE 0.1 MG: 0.1 TABLET ORAL at 09:05

## 2019-05-24 RX ADMIN — MEMANTINE 10 MG: 10 TABLET ORAL at 09:05

## 2019-05-24 RX ADMIN — SODIUM CHLORIDE SOLN NEBU 3% 4 ML: 3 NEBU SOLN at 09:05

## 2019-05-24 RX ADMIN — BUPROPION HYDROCHLORIDE 150 MG: 75 TABLET, FILM COATED ORAL at 09:05

## 2019-05-24 RX ADMIN — SENNOSIDES AND DOCUSATE SODIUM 1 TABLET: 8.6; 5 TABLET ORAL at 09:05

## 2019-05-24 RX ADMIN — IPRATROPIUM BROMIDE AND ALBUTEROL SULFATE 3 ML: .5; 3 SOLUTION RESPIRATORY (INHALATION) at 11:05

## 2019-05-24 RX ADMIN — MEMANTINE 10 MG: 10 TABLET ORAL at 10:05

## 2019-05-24 RX ADMIN — IPRATROPIUM BROMIDE AND ALBUTEROL SULFATE 3 ML: .5; 3 SOLUTION RESPIRATORY (INHALATION) at 12:05

## 2019-05-24 RX ADMIN — IPRATROPIUM BROMIDE AND ALBUTEROL SULFATE 3 ML: .5; 3 SOLUTION RESPIRATORY (INHALATION) at 03:05

## 2019-05-24 RX ADMIN — SODIUM CHLORIDE SOLN NEBU 3% 4 ML: 3 NEBU SOLN at 11:05

## 2019-05-24 RX ADMIN — APIXABAN 2.5 MG: 2.5 TABLET, FILM COATED ORAL at 09:05

## 2019-05-24 RX ADMIN — HEPARIN SODIUM 5000 UNITS: 5000 INJECTION, SOLUTION INTRAVENOUS; SUBCUTANEOUS at 01:05

## 2019-05-24 RX ADMIN — IPRATROPIUM BROMIDE AND ALBUTEROL SULFATE 3 ML: .5; 3 SOLUTION RESPIRATORY (INHALATION) at 09:05

## 2019-05-24 RX ADMIN — ASPIRIN 81 MG: 81 TABLET, COATED ORAL at 11:05

## 2019-05-24 RX ADMIN — SODIUM CHLORIDE SOLN NEBU 3% 4 ML: 3 NEBU SOLN at 03:05

## 2019-05-24 RX ADMIN — ATORVASTATIN CALCIUM 40 MG: 20 TABLET, FILM COATED ORAL at 09:05

## 2019-05-24 RX ADMIN — ACETAMINOPHEN 650 MG: 325 TABLET ORAL at 12:05

## 2019-05-24 RX ADMIN — CLONIDINE HYDROCHLORIDE 0.1 MG: 0.1 TABLET ORAL at 10:05

## 2019-05-24 RX ADMIN — SENNOSIDES AND DOCUSATE SODIUM 1 TABLET: 8.6; 5 TABLET ORAL at 10:05

## 2019-05-24 RX ADMIN — HEPARIN SODIUM 5000 UNITS: 5000 INJECTION, SOLUTION INTRAVENOUS; SUBCUTANEOUS at 10:05

## 2019-05-24 RX ADMIN — SODIUM CHLORIDE SOLN NEBU 3% 4 ML: 3 NEBU SOLN at 12:05

## 2019-05-24 NOTE — PLAN OF CARE
Problem: SLP Goal  Goal: SLP Goal  Speech Language Pathology Goals  Goals expected to be met by 5/28:  1. Patient will participate in ongoing swallow assessment to determine least restrictive diet recommendations.   2. Patient will participate in full speech, language, and cognitive assessment when appropriate.        Outcome: Ongoing (interventions implemented as appropriate)  Remain npo with strict aspiration precautions.    Paula Pal MA/[facility]-SLP  Speech Language Pathologist  Pager [phone]  5/24/2019

## 2019-05-24 NOTE — ASSESSMENT & PLAN NOTE
Patient was reported to be back to baseline and ambulating with walker prior to this hospitalization  Currently aphasic and not following commands, but able to TOSCANO spontaneously  PT/OT now recommending SNF or home with 24 hour support  Plans to discuss goals of care with family

## 2019-05-24 NOTE — PHYSICIAN QUERY
PT Name: Sury Cobos  MR #: [id number]     PHYSICIAN QUERY -  ELECTROLYTE CLARIFICATION      CDS: Zahra Topete RN, CCDS       Contact information: [email or web address]  This form is a permanent document in the medical record.     Query Date: May 24, 2019    By submitting this query, we are merely seeking further clarification of documentation to reflect the severity of illness of your patient. Please utilize your independent clinical judgment when addressing the question(s) below.    The Medical record reflects the following:     Indicators   Supporting Clinical Findings Location in Medical Record   X    X    X Lab Value(s) Potassium=2.7    Magnesium=1.1    Phosphorus=1.7   5/22-Labs    5/22-Labs    5/23-Labs   X                X                X Treatment                    Medication potassium chloride 10 mEq in 100 mL IVPB  Ordered Dose: 10 mEq   Route: Intravenous   Frequency: Once @ 100 mL/hr  Administration Dose: 10 mEq     Scheduled Start Date/Time: 05/22/19 1330   End Date/Time: 05/22/19 1259 after 1 doses     magnesium oxide tablet 800 mg  Ordered Dose: 800 mg   Route: Per NG tube   Frequency: As needed (PRN) for if magnesium level is 1.4 mg/dL or less give 800 mg 4 hours x 3 doses  Administration Dose: 800 mg     Scheduled Start Date/Time: 05/22/19 0411       potassium, sodium phosphates 280-160-250 mg packet 2 packet  Ordered Dose: 2 packet   Route: Per NG tube   Frequency: As needed (PRN) for if phosphorous level is 1.6-2.2 mg/dL give 2 packets every 4 hours x 3 doses  Administration Dose: 2 packet     Scheduled Start Date/Time: 05/22/19 0411    5/22-MAR                5/22-MAR                5/23-MAR    Other       Provider, please specify the diagnosis or diagnoses that correspond(s) to the above indicators. Paul all that apply:    [ x  ] Hypokalemia   [x   ] Hypomagnesemia   [ x  ] Hypophosphatemia   [ x  ] Other electrolyte disturbance (please specify): _______________________   [   ]  Clinically Undetermined       Please document in your progress notes daily for the duration of treatment until resolved, and include in your discharge summary.

## 2019-05-24 NOTE — PLAN OF CARE
Problem: Adult Inpatient Plan of Care  Goal: Plan of Care Review  Review plan of care with pt prior to initiation

## 2019-05-24 NOTE — PROGRESS NOTES
[facility]  Vascular Neurology  Comprehensive Stroke Center  Progress Note    Assessment/Plan:     * Embolic stroke involving left middle cerebral artery  85 y/o female with cardioembolic L MCA infarct 2/2 a fib (not compliant with anticoagulation at home. Patient was out of window for tPA, but when to IR for L M1 thrombectomy, TICI 3 flow restored.     Antithrombotics: Plans to resume anticoagulation after decision is made regarding peg tube. She will need investigation of affordability of anticoagulation for discharge. Will continue with asa 81 only for now    Statins: Lipitor 40    Aggressive risk factor modification: HTN, HLD, prior stroke, afib     Rehab efforts: The patient has been evaluated by a stroke team provider and the therapy needs have been fully considered based off the presenting complaints and exam findings. The following therapy evaluations are needed: PT evaluate and treat, OT evaluate and treat, SLP evaluate and treat, PM&R evaluate for appropriate placement    Diagnostics ordered/pending: None    VTE prophylaxis: Heparin 5000 units SQ every 8 hours     BP parameters: S/p thrombectomy <180      Debility  Patient was reported to be back to baseline and ambulating with walker prior to this hospitalization  Currently aphasic and not following commands, but able to TOSCANO spontaneously  PT/OT now recommending SNF or home with 24 hour support  Plans to discuss goals of care with family    Diabetes mellitus  A1C 5.9  Currently on tube feeds via NG tube  Continue q6h glucose and SSI      Paroxysmal atrial fibrillation  Found on last admit 5-2018.  Previously on Eliquis but ran out and not refilled  Will need to resume AC once decision is made regarding PEG tube    Dysphagia  SLP to evaluate and treat  Currently with NG tube in place  High chance for aspiration PNA, will monitor for signs of infection or worsening respiratory status  Plans to discuss PEG to placement with patient's  family    Dementia with behavioral disturbance  Continue home memantine  Delirium precautions    Mixed hyperlipidemia  Stroke risk factor  LDL 54, at goal  Continue home Lipitor 40    Essential hypertension  Stroke risk factor  SBP <180  BP at goal on home clonidine    Aphasia  Due to stroke  SLP to evaluate and treat         5/24 patient stepped down overnight to stroke unit, currently with NG tube in place, patient will likely need PEG tube placed, will attempt to discuss goals of care with family, need to restart anticoagulation but will hold for now until PEG decision is made    STROKE DOCUMENTATION   Acute Stroke Times   Last Known Normal Date: 05/20/19  Last Known Normal Time: 2030  Symptom Onset Date: 05/20/19  Symptom Onset Time: 2030  Stroke Team Called Date: 05/20/19  Stroke Team Called Time: 2338  Stroke Team Arrival Date: 05/21/19  Stroke Team Arrival Time: 0605  CT Interpretation Time: 2338    NIH Scale:  1a. Level of Consciousness: 1-->Not alert, but arousable by minor stimulation to obey, answer, or respond  1b. LOC Questions: 2-->Answers neither question correctly  1c. LOC Commands: 2-->Performs neither task correctly  2. Best Gaze: 2-->Forced deviation, or total gaze paresis not overcome by the oculocephalic maneuver  3. Visual: 2-->Complete hemianopia  4. Facial Palsy: 1-->Minor paralysis (flattened nasolabial fold, asymmetry on smiling)  5a. Motor Arm, Left: 1-->Drift, limb holds 90 (or 45) degrees, but drifts down before full 10 seconds, does not hit bed or other support  5b. Motor Arm, Right: 1-->Drift, limb holds 90 (or 45) degrees, but drifts down before full 10 secs, does not hit bed or other support  6a. Motor Leg, Left: 1-->Drift, leg falls by the end of the 5-sec period but does not hit bed  6b. Motor Leg, Right: 1-->Drift, leg falls by the end of the 5-sec period but does not hit bed  7. Limb Ataxia: 0-->Absent  8. Sensory: 0-->Normal, no sensory loss  9. Best Language: 3-->Mute, global  aphasia, no usable speech or auditory comprehension  10. Dysarthria: 2-->Severe dysarthria, patients speech is so slurred as to be unintelligible in the absence of or out of proportion to any dysphasia, or is mute/anarthric  11. Extinction and Inattention (formerly Neglect): 0-->No abnormality  Total (NIH Stroke Scale): 19       Modified Coal Score: 3  Ivonne Coma Scale:    ABCD2 Score:    [id number] Score:   HAS -BLED Score:   ICH Score:   Hunt & Burkett Classification:      Hemorrhagic change of an Ischemic Stroke: Does this patient have an ischemic stroke with hemorrhagic changes? No     Neurologic Chief Complaint: Aphasia    Subjective:     Interval History: Patient is seen for follow-up neurological assessment and treatment recommendations: NAVIN, remains aphasic    HPI, Past Medical, Family, and Social History remains the same as documented in the initial encounter.     Review of Systems   Constitutional: Negative for chills and fever.   Eyes: Positive for visual disturbance.   Gastrointestinal: Negative for vomiting.   Neurological: Positive for facial asymmetry, speech difficulty and weakness.     Scheduled Meds:   albuterol-ipratropium  3 mL Nebulization Q8H    apixaban  2.5 mg Per NG tube BID    atorvastatin  40 mg Per NG tube Daily    buPROPion  150 mg Per NG tube BID    cloNIDine  0.1 mg Per NG tube BID    memantine  10 mg Per NG tube BID    senna-docusate 8.6-50 mg  1 tablet Per NG tube BID    sodium chloride 3%  4 mL Nebulization Q8H     Continuous Infusions:   sodium chloride 0.9% 75 mL/hr at 05/23/19 2100     PRN Meds:acetaminophen, dextrose 50%, glucagon (human recombinant), hydrALAZINE, insulin aspart U-100, magnesium oxide, magnesium oxide, ondansetron, potassium chloride 10%, potassium chloride 10%, potassium chloride 10%, potassium, sodium phosphates, potassium, sodium phosphates, potassium, sodium phosphates, sodium chloride 0.9%, sodium chloride 0.9%    Objective:     Vital Signs  (Most Recent):  Temp: 98 °F (36.7 °C) (05/24/19 0730)  Pulse: 68 (05/24/19 0927)  Resp: 16 (05/24/19 0927)  BP: 138/76 (05/24/19 0730)  SpO2: 96 % (05/24/19 0927)  BP Location: Right arm    Vital Signs Range (Last 24H):  Temp:  [97.5 °F (36.4 °C)-100.1 °F (37.8 °C)]   Pulse:  []   Resp:  [16-20]   BP: (116-156)/(65-84)   SpO2:  [93 %-100 %]   BP Location: Right arm    Physical Exam   Constitutional: She appears well-developed and well-nourished. No distress.   HENT:   Head: Normocephalic and atraumatic.   Cardiovascular: Normal rate.   Pulmonary/Chest: No respiratory distress.   Increased secretions   Skin: Skin is warm and dry.   Vitals reviewed.    Neurological Exam:   LOC: drowsy  Attention Span: poor  Language: Global aphasia  Articulation: mute   Orientation: Untestable due to severe aphasia   Visual Fields: Hemianopsia right  EOM (CN III, IV, VI): Full/intact  Facial Movement (CN VII): Lower facial weakness on the Left  Motor: Arm left  Normal  4/5  Leg left  Normal  4/5  Arm right  Paresis:  4/5  Leg right Normal 4/5  Sensation: withdraws from painful stimuli in all 4 extremities     Laboratory:  CMP:   Recent Labs   Lab 05/24/19 0357   CALCIUM 8.9   ALBUMIN 2.7*   PROT 6.2      K 4.0   CO2 25      BUN 14   CREATININE 0.7   ALKPHOS 76   ALT 53*   AST 70*   BILITOT 0.6     BMP:   Recent Labs   Lab 05/24/19 0357      K 4.0      CO2 25   BUN 14   CREATININE 0.7   CALCIUM 8.9     CBC:   Recent Labs   Lab 05/24/19 0357   WBC 10.13   RBC 2.89*   HGB 8.7*   HCT 27.1*   PLT 91*   MCV 94   MCH 30.1   MCHC 32.1     Lipid Panel:   Recent Labs   Lab 05/21/19 0928   CHOL 118*   LDLCALC 58.4*   HDL 49   TRIG 53     Coagulation:   Recent Labs   Lab 05/21/19 0928   INR 1.0   APTT 39.7*     Hgb A1C:   Recent Labs   Lab 05/21/19  0928   HGBA1C 5.9*     TSH:   Recent Labs   Lab 05/21/19  0928   TSH 2.293       Diagnostic Results     Brain Imaging     MRI 5/22  Evolving scattered areas of  acute/recent infarction left MCA territory with new to increased component in the left inferior frontal gyrus now mild moderate in size.  There is no evidence for hemorrhagic conversion or significant mass effect.    Superimposed confluent T2 FLAIR signal abnormality elsewhere supra and infratentorial parenchyma while nonspecific concerning for advanced chronic microvascular ischemic change versus posttherapy change.    Remote basal gangliar, thalamic and cerebellar infarcts unchanged.    Compensatory enlargement of ventricular system similar to prior without hydrocephalus.    CTA Head and Neck 5/21  1. 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  The right vertebral artery is dominant and patent throughout its course in the neck.  2. There is calcified plaque in the common, internal and external carotid arteries bilaterally.  Stenosis on the right is mild at 50-69% and moderate on the left at approximately 70% without change.  CTA Head    1.  There is occlusion of the distal left M1 and proximal M2 segments of the middle cerebral artery which has occurred since the prior CTA head.  There is subtle increased density within the left middle cerebral artery at the distal M1 and proximal M2 segments on comparison head CT.  This is consistent with thrombus.    2.  The right anterior circulation is normal.    3.  There is developmental variation with fetal origin of the right posterior cerebral artery.    Cardiac Imaging   · Concentric left ventricular remodeling.  · Small left ventricular cavity size.  · Increased (hyperdynamic) left ventricular systolic function. The estimated ejection fraction is 75%  · Grade I (mild) left ventricular diastolic dysfunction consistent with impaired relaxation.  · Moderate left atrial enlargement.  · Mild-to-moderate aortic valve stenosis.  · Aortic valve area is 1.43 cm2; peak velocity is 2.59 m/s;  mean gradient is 15.51 mmHg.  · Mild mid-cavity left ventricular obstruction is present.  · Normal left atrial pressure.  · Mild mitral stenosis.                Adrianna Rascon PA-C  [facility]  Department of Vascular Neurology   [facility]

## 2019-05-24 NOTE — SUBJECTIVE & OBJECTIVE
Neurologic Chief Complaint: Aphasia    Subjective:     Interval History: Patient is seen for follow-up neurological assessment and treatment recommendations: NAVIN, remains aphasic    HPI, Past Medical, Family, and Social History remains the same as documented in the initial encounter.     Review of Systems   Constitutional: Negative for chills and fever.   Eyes: Positive for visual disturbance.   Gastrointestinal: Negative for vomiting.   Neurological: Positive for facial asymmetry, speech difficulty and weakness.     Scheduled Meds:   albuterol-ipratropium  3 mL Nebulization Q8H    apixaban  2.5 mg Per NG tube BID    atorvastatin  40 mg Per NG tube Daily    buPROPion  150 mg Per NG tube BID    cloNIDine  0.1 mg Per NG tube BID    memantine  10 mg Per NG tube BID    senna-docusate 8.6-50 mg  1 tablet Per NG tube BID    sodium chloride 3%  4 mL Nebulization Q8H     Continuous Infusions:   sodium chloride 0.9% 75 mL/hr at 05/23/19 2100     PRN Meds:acetaminophen, dextrose 50%, glucagon (human recombinant), hydrALAZINE, insulin aspart U-100, magnesium oxide, magnesium oxide, ondansetron, potassium chloride 10%, potassium chloride 10%, potassium chloride 10%, potassium, sodium phosphates, potassium, sodium phosphates, potassium, sodium phosphates, sodium chloride 0.9%, sodium chloride 0.9%    Objective:     Vital Signs (Most Recent):  Temp: 98 °F (36.7 °C) (05/24/19 0730)  Pulse: 68 (05/24/19 0927)  Resp: 16 (05/24/19 0927)  BP: 138/76 (05/24/19 0730)  SpO2: 96 % (05/24/19 0927)  BP Location: Right arm    Vital Signs Range (Last 24H):  Temp:  [97.5 °F (36.4 °C)-100.1 °F (37.8 °C)]   Pulse:  []   Resp:  [16-20]   BP: (116-156)/(65-84)   SpO2:  [93 %-100 %]   BP Location: Right arm    Physical Exam   Constitutional: She appears well-developed and well-nourished. No distress.   HENT:   Head: Normocephalic and atraumatic.   Cardiovascular: Normal rate.   Pulmonary/Chest: No respiratory distress.   Increased  secretions   Skin: Skin is warm and dry.   Vitals reviewed.    Neurological Exam:   LOC: drowsy  Attention Span: poor  Language: Global aphasia  Articulation: mute   Orientation: Untestable due to severe aphasia   Visual Fields: Hemianopsia right  EOM (CN III, IV, VI): Full/intact  Facial Movement (CN VII): Lower facial weakness on the Left  Motor: Arm left  Normal 4/5  Leg left  Normal 4/5  Arm right  Paresis: 4/5  Leg right Normal 4/5  Sensation: withdraws from painful stimuli in all 4 extremities     Laboratory:  CMP:   Recent Labs   Lab 05/24/19 0357   CALCIUM 8.9   ALBUMIN 2.7*   PROT 6.2      K 4.0   CO2 25      BUN 14   CREATININE 0.7   ALKPHOS 76   ALT 53*   AST 70*   BILITOT 0.6     BMP:   Recent Labs   Lab 05/24/19 0357      K 4.0      CO2 25   BUN 14   CREATININE 0.7   CALCIUM 8.9     CBC:   Recent Labs   Lab 05/24/19 0357   WBC 10.13   RBC 2.89*   HGB 8.7*   HCT 27.1*   PLT 91*   MCV 94   MCH 30.1   MCHC 32.1     Lipid Panel:   Recent Labs   Lab 05/21/19 0928   CHOL 118*   LDLCALC 58.4*   HDL 49   TRIG 53     Coagulation:   Recent Labs   Lab 05/21/19 0928   INR 1.0   APTT 39.7*     Hgb A1C:   Recent Labs   Lab 05/21/19 0928   HGBA1C 5.9*     TSH:   Recent Labs   Lab 05/21/19  0928   TSH 2.293       Diagnostic Results     Brain Imaging     MRI 5/22  Evolving scattered areas of acute/recent infarction left MCA territory with new to increased component in the left inferior frontal gyrus now mild moderate in size.  There is no evidence for hemorrhagic conversion or significant mass effect.    Superimposed confluent T2 FLAIR signal abnormality elsewhere supra and infratentorial parenchyma while nonspecific concerning for advanced chronic microvascular ischemic change versus posttherapy change.    Remote basal gangliar, thalamic and cerebellar infarcts unchanged.    Compensatory enlargement of ventricular system similar to prior without hydrocephalus.    CTA Head and Neck 5/21  1.  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  The right vertebral artery is dominant and patent throughout its course in the neck.  2. There is calcified plaque in the common, internal and external carotid arteries bilaterally.  Stenosis on the right is mild at 50-69% and moderate on the left at approximately 70% without change.  CTA Head    1.  There is occlusion of the distal left M1 and proximal M2 segments of the middle cerebral artery which has occurred since the prior CTA head.  There is subtle increased density within the left middle cerebral artery at the distal M1 and proximal M2 segments on comparison head CT.  This is consistent with thrombus.    2.  The right anterior circulation is normal.    3.  There is developmental variation with fetal origin of the right posterior cerebral artery.    Cardiac Imaging   · Concentric left ventricular remodeling.  · Small left ventricular cavity size.  · Increased (hyperdynamic) left ventricular systolic function. The estimated ejection fraction is 75%  · Grade I (mild) left ventricular diastolic dysfunction consistent with impaired relaxation.  · Moderate left atrial enlargement.  · Mild-to-moderate aortic valve stenosis.  · Aortic valve area is 1.43 cm2; peak velocity is 2.59 m/s; mean gradient is 15.51 mmHg.  · Mild mid-cavity left ventricular obstruction is present.  · Normal left atrial pressure.  · Mild mitral stenosis.

## 2019-05-24 NOTE — PT/OT/SLP EVAL
Speech Language Pathology Evaluation  Cognitive Communication    Patient Name:  Sury Cobos   MRN:  [id number]  Admitting Diagnosis: Acute ischemic left MCA stroke    Recommendations:     Recommendations:                General Recommendations:  Speech/language therapy/ongoing swallow assessment  Diet recommendations:  NPO, NPO   Aspiration Precautions: Strict aspiration precautions   General Precautions: Standard, aspiration, aphasia, NPO  Communication strategies:  none    History:     Past Medical History:   Diagnosis Date    Acute ischemic left MCA stroke 5/21/2019    Aphasia 5/27/2018    Arthritis     Benign essential HTN     CHF (congestive heart failure)     Closed intertrochanteric fracture of left hip     Coronary artery disease     Dementia with behavioral disturbance 5/27/2018    Diabetes mellitus     Embolic stroke involving right middle cerebral artery 5/27/2018    Gout     High cholesterol     Low potassium syndrome     Paroxysmal atrial fibrillation 6/1/2018    PEG (percutaneous endoscopic gastrostomy) status 6/4/2018    Placed 6/4/18    Urinary incontinence        Past Surgical History:   Procedure Laterality Date    CARDIAC SURGERY      CORONARY ARTERY BYPASS GRAFT      EGD (ESOPHAGOGASTRODUODENOSCOPY) N/A 6/5/2018    Performed by Madhu Bliss [facility] at [facility] OR [address] [place] [address]    EGD, WITH PEG TUBE INSERTION N/A 6/5/2018    Performed by Madhu Bliss MD at [facility] OR [address] [place] [address]    reduction & internal fixation of displaced closed comminuted intertrochanteric fx left hip  02/12/2018           Prior diet: regular with thin      Subjective     No family present  Patient goals: selwyn    Pain/Comfort:  · Pain Rating 1: 0/10  · Pain Rating Post-Intervention 1: 0/10    Objective:   Cognitive Status:    Pt. Alert though eye contact was poor with no initiation of communication     Receptive Language:   Comprehension:   Pt. Did not follow or model simple commands.  She made no response to  simple yes/no questions and did not initiate pointing.     Expressive Language:  Verbal:    No verbalizations elicited spontaneously or on command      Motor Speech:  Non vocal    Voice:   No voice elicited; breath sounds appeared wet    Visual-Spatial:  tba    Reading:   tba     Written Expression:   tba    Treatment: ICe presented to pt's lips with no response elicited.  She did not attempt to remove 1/2 teaspoon of water from a spoon when presented.      Assessment:   Sury Cobos is a 84 y.o. female with an SLP diagnosis of Dysphagia, Dysarthria and Cognitive-Linguistic Impairment.      Goals:   Multidisciplinary Problems     SLP Goals        Problem: SLP Goal    Goal Priority Disciplines Outcome   SLP Goal     SLP Ongoing (interventions implemented as appropriate)   Description:  Speech Language Pathology Goals  Goals expected to be met by 5/28:  1. Patient will participate in ongoing swallow assessment to determine least restrictive diet recommendations.   2. Patient will participate in full speech, language, and cognitive assessment when appropriate.                         Plan:   · Patient to be seen:  4 x/week   · Plan of Care expires:  06/21/19  · Plan of Care reviewed with:  patient   · SLP Follow-Up:  Yes       Discharge recommendations:  Discharge Facility/Level of Care Needs: nursing facility, skilled       Time Tracking:   SLP Treatment Date:   05/24/19  Speech Start Time:  1001  Speech Stop Time:  1017     Speech Total Time (min):  16 min    Billable Minutes: Eval 8  and Treatment Swallowing Dysfunction 8    Paula Pal MA, CCC-SLP  05/24/2019

## 2019-05-24 NOTE — PHYSICIAN QUERY
"PT Name: Sury Cobos  MR #: [id number]    CDS: Zahra Topete RN, CCDS       Contact information: [email or web address]  This form is a permanent document in the medical record.     Query Date: May 24, 2019    By submitting this query, we are merely seeking further clarification of documentation. Please utilize your independent clinical judgment when addressing the question(s) below.    The medical record contains the following   Indicators     Supporting Clinical Findings Location in Medical Record    BNP      EF      Radiology findings     X Echo Results · Concentric left ventricular remodeling.  · Small left ventricular cavity size.  · Increased (hyperdynamic) left ventricular systolic function. The estimated ejection fraction is 75%  · Grade I (mild) left ventricular diastolic dysfunction consistent with impaired relaxation.  · Moderate left atrial enlargement.  · Mild-to-moderate aortic valve stenosis.  · Aortic valve area is 1.43 cm2; peak velocity is 2.59 m/s; mean gradient is 15.51 mmHg.  · Mild mid-cavity left ventricular obstruction is present.  · Normal left atrial pressure.  · Mild mitral stenosis.   5/21-TTE    "Ascites" documented      "SOB" or "MESA" documented      "Hypoxia" documented     X Heart Failure documented Past Medical History:  CHF (congestive heart failure)    5/21-Vascular Neuro Consult    "Edema" documented      Diuretics/Meds      Treatment:     X Other:  Benign essential HTN  Paroxysmal atrial fibrillation  Atrial enlargement, left   5/21-Vascular Neuro Consult   Heart failure (HF) can be acute, chronic or both. It is generally further specificed as systolic, diastolic, or combined. Lastly, it is important to identify an underlying etiology if known or suspected.     Common clues to acute exacerbation:  Rapidly progressive symptoms (w/in 2 weeks of presentation), using IV diuretics to treat, using supplemental O2, pulmonary edema on Xray, MI w/in 4 weeks, and/or BNP >500    Systolic " Heart Failure: is defined as chart documentation of a left ventricular ejection fraction (LVEF) less than 40%     Diastolic Heart Failure: is defined as a left ventricular ejection fraction (LVEF) greater than 40%   +      Evidence of diastolic dysfunction on echocardiography OR    Right heart catheterization wedge pressure above 12 mm Hg OR    Left heart catheterization left ventricular end diastolic pressure 18 mm Hg or above.    References: *American Heart Association    The clinical guidelines noted below are only system guidelines, and do not replace the providers clinical judgment.     Provider, please specify the diagnosis associated with above clinical findings                             [   ] Chronic Diastolic Heart Failure - Pre-existing diastolic HF diagnosis.  EF > 40%  without  acute HF symptoms documented             [ x  ] Chronic Combined Systolic and Diastolic Heart Failure  [   ] Other Type of Heart Failure (please specify type): _________________________  [   ] Heart Failure Ruled Out  [   ] Other (please specify): ___________________________________  [  ] Clinically Undetermined                          Please document in your progress notes daily for the duration of treatment until resolved and include in your discharge summary.

## 2019-05-24 NOTE — ASSESSMENT & PLAN NOTE
Found on last admit 5-2018.  Previously on Eliquis but ran out and not refilled  Will need to resume AC once decision is made regarding PEG tube

## 2019-05-24 NOTE — CONSULTS
Radiology Consult    Sury Cobos is a 84 y.o. female with a history of left MCA stroke with dysphagia. IR consulted for PEG tube placement.     Past Medical History:   Diagnosis Date    Acute ischemic left MCA stroke 5/21/2019    Aphasia 5/27/2018    Arthritis     Benign essential HTN     CHF (congestive heart failure)     Closed intertrochanteric fracture of left hip     Coronary artery disease     Dementia with behavioral disturbance 5/27/2018    Diabetes mellitus     Embolic stroke involving right middle cerebral artery 5/27/2018    Gout     High cholesterol     Low potassium syndrome     Paroxysmal atrial fibrillation 6/1/2018    PEG (percutaneous endoscopic gastrostomy) status 6/4/2018    Placed 6/4/18    Urinary incontinence      Past Surgical History:   Procedure Laterality Date    CARDIAC SURGERY      CORONARY ARTERY BYPASS GRAFT      EGD (ESOPHAGOGASTRODUODENOSCOPY) N/A 6/5/2018    Performed by Madhu Bliss MD at [facility] [address] [place] [address]    EGD, WITH PEG TUBE INSERTION N/A 6/5/2018    Performed by Madhu Bliss MD at [facility] [address] [place] [address]    reduction & internal fixation of displaced closed comminuted intertrochanteric fx left hip  02/12/2018       Discussed with primary team, ERIC Rodas.     Procedure: PEG tube placement.    Scheduled Meds:    albuterol-ipratropium  3 mL Nebulization Q8H    aspirin  81 mg Per NG tube Daily    atorvastatin  40 mg Per NG tube Daily    buPROPion  150 mg Per NG tube BID    cloNIDine  0.1 mg Per NG tube BID    heparin (porcine)  5,000 Units Subcutaneous Q8H    memantine  10 mg Per NG tube BID    senna-docusate 8.6-50 mg  1 tablet Per NG tube BID    sodium chloride 3%  4 mL Nebulization Q8H    tuberculin  5 Units Intradermal Once     Continuous Infusions:    sodium chloride 0.9% 75 mL/hr at 05/23/19 2100     PRN Meds:acetaminophen, dextrose 50%, glucagon (human recombinant), hydrALAZINE, insulin aspart U-100, magnesium oxide, magnesium  oxide, ondansetron, potassium chloride 10%, potassium chloride 10%, potassium chloride 10%, potassium, sodium phosphates, potassium, sodium phosphates, potassium, sodium phosphates, sodium chloride 0.9%, sodium chloride 0.9%    Allergies: Review of patient's allergies indicates:  No Known Allergies    Labs:  Recent Labs   Lab 05/21/19  0928   INR 1.0       Recent Labs   Lab 05/24/19  0357   WBC 10.13   HGB 8.7*   HCT 27.1*   MCV 94   PLT 91*      Recent Labs   Lab 05/24/19  0357         K 4.0      CO2 25   BUN 14   CREATININE 0.7   CALCIUM 8.9   MG 1.6   ALT 53*   AST 70*   ALBUMIN 2.7*   BILITOT 0.6         Vitals (Most Recent):  Temp: 99.1 °F (37.3 °C) (05/24/19 1128)  Pulse: 88 (05/24/19 1544)  Resp: 20 (05/24/19 1544)  BP: (!) 147/64 (05/24/19 1128)  SpO2: 96 % (05/24/19 1544)      Plan:   - CT abdomen without contrast. Will tentatively plan on placing the G-tube on Monday after reviewing the images.   - Oral barium at midnight on Sunday.   - Needs an NG tube.       Sandy Starks MD   PGY-2 Radiology

## 2019-05-24 NOTE — HOSPITAL COURSE
5/24 patient stepped down overnight to stroke unit, currently with NG tube in place, patient will likely need PEG tube placed, will attempt to discuss goals of care with family, need to restart anticoagulation but will hold for now until PEG decision is made  5/25 no acute events overnight. IR consulted. Work up complete. NPO and barium Sunday night. Plan for PEG Monday 5/27.   5/26 NAEON. Odorous urine reported by RN; UA w/ reflex ordered. NPO and barium at midnight for PEG placement tomorrow.   5/27: UA collected yesterday indicative of UTI, rocephin started.  Patient had afib with RVR today, metoprolol 12.5mg BID started. PEG placement pending.   5/28: PEG placed yesterday- site clean and dry; will initiate TF and anticoagulation at 24 hour jessy. Nursing attempting to remove restraints, abdominal binder ordered to secure PEG. Patient's HR controlled adequately with metoprolol. Will discharge to NH, when restraint free for 72 hours.   5/29: Patient tolerating TF well; will be at goal by end of the day. Patient free of restraints as of 5am this morning.   5/30: NAEON. Patient more alert during exam today. TF at goal, patient tolerating well. She remains free of restraints.   5/31 - Restraint free since 5/29 @ 0500. Waiting on placement. Arnoldo checked Eliquis - 3.80 for 30 day supply.  6/1 - NAEON. Waiting on placement.   6/4 - Neurologically stable. WBC trending down. Completed course of antibiotics. Should repeat CBC in 1 week @ SNF (6/10).  6/5 - waiting placement    See above for detailed hospital course.   Ms Cobos discharged to SNF

## 2019-05-24 NOTE — ASSESSMENT & PLAN NOTE
SLP to evaluate and treat  Currently with NG tube in place  High chance for aspiration PNA, will monitor for signs of infection or worsening respiratory status  Plans to discuss PEG to placement with patient's family

## 2019-05-24 NOTE — PLAN OF CARE
SNF list provided. CM will followup for choices.     05/24/19 8365   Post-Acute Status   Post-Acute Authorization Placement   Post-Acute Placement Status Patient List Provided

## 2019-05-24 NOTE — ASSESSMENT & PLAN NOTE
83 y/o female with cardioembolic L MCA infarct 2/2 a fib (not compliant with anticoagulation at home. Patient was out of window for tPA, but when to IR for L M1 thrombectomy, TICI 3 flow restored.     Antithrombotics: Plans to resume anticoagulation after decision is made regarding peg tube. She will need investigation of affordability of anticoagulation for discharge. Will continue with asa 81 only for now    Statins: Lipitor 40    Aggressive risk factor modification: HTN, HLD, prior stroke, afib     Rehab efforts: The patient has been evaluated by a stroke team provider and the therapy needs have been fully considered based off the presenting complaints and exam findings. The following therapy evaluations are needed: PT evaluate and treat, OT evaluate and treat, SLP evaluate and treat, PM&R evaluate for appropriate placement    Diagnostics ordered/pending: None    VTE prophylaxis: Heparin 5000 units SQ every 8 hours     BP parameters: S/p thrombectomy <180

## 2019-05-24 NOTE — PLAN OF CARE
Problem: SLP Goal  Goal: SLP Goal  Speech Language Pathology Goals  Goals expected to be met by 5/28:  1. Patient will participate in ongoing swallow assessment to determine least restrictive diet recommendations.   2. Patient will participate in full speech, language, and cognitive assessment when appropriate.        Outcome: Ongoing (interventions implemented as appropriate)  Remain npo with strict aspiration precautions.  Consider ng tube placment.    Paula Pal MA/CHER-SLP  Speech Language Pathologist  Pager [phone]  5/24/2019

## 2019-05-25 LAB
ALBUMIN SERPL BCP-MCNC: 2.5 G/DL (ref 3.5–5.2)
ALP SERPL-CCNC: 86 U/L (ref 55–135)
ALT SERPL W/O P-5'-P-CCNC: 58 U/L (ref 10–44)
ANION GAP SERPL CALC-SCNC: 7 MMOL/L (ref 8–16)
ANISOCYTOSIS BLD QL SMEAR: SLIGHT
AST SERPL-CCNC: 59 U/L (ref 10–40)
BACTERIA SPEC AEROBE CULT: NORMAL
BACTERIA SPEC AEROBE CULT: NORMAL
BASOPHILS # BLD AUTO: 0.04 K/UL (ref 0–0.2)
BASOPHILS NFR BLD: 0.4 % (ref 0–1.9)
BILIRUB SERPL-MCNC: 0.7 MG/DL (ref 0.1–1)
BUN SERPL-MCNC: 11 MG/DL (ref 8–23)
CALCIUM SERPL-MCNC: 9.5 MG/DL (ref 8.7–10.5)
CHLORIDE SERPL-SCNC: 106 MMOL/L (ref 95–110)
CO2 SERPL-SCNC: 26 MMOL/L (ref 23–29)
CREAT SERPL-MCNC: 0.6 MG/DL (ref 0.5–1.4)
DIFFERENTIAL METHOD: ABNORMAL
EOSINOPHIL # BLD AUTO: 0 K/UL (ref 0–0.5)
EOSINOPHIL NFR BLD: 0.1 % (ref 0–8)
ERYTHROCYTE [DISTWIDTH] IN BLOOD BY AUTOMATED COUNT: 13.7 % (ref 11.5–14.5)
EST. GFR  (AFRICAN AMERICAN): >60 ML/MIN/1.73 M^2
EST. GFR  (NON AFRICAN AMERICAN): >60 ML/MIN/1.73 M^2
GLUCOSE SERPL-MCNC: 103 MG/DL (ref 70–110)
GRAM STN SPEC: NORMAL
HCT VFR BLD AUTO: 25.8 % (ref 37–48.5)
HGB BLD-MCNC: 8.1 G/DL (ref 12–16)
IMM GRANULOCYTES # BLD AUTO: 0.06 K/UL (ref 0–0.04)
IMM GRANULOCYTES NFR BLD AUTO: 0.5 % (ref 0–0.5)
LYMPHOCYTES # BLD AUTO: 1.8 K/UL (ref 1–4.8)
LYMPHOCYTES NFR BLD: 16.7 % (ref 18–48)
MAGNESIUM SERPL-MCNC: 1.6 MG/DL (ref 1.6–2.6)
MCH RBC QN AUTO: 29.5 PG (ref 27–31)
MCHC RBC AUTO-ENTMCNC: 31.4 G/DL (ref 32–36)
MCV RBC AUTO: 94 FL (ref 82–98)
MONOCYTES # BLD AUTO: 0.8 K/UL (ref 0.3–1)
MONOCYTES NFR BLD: 7.5 % (ref 4–15)
NEUTROPHILS # BLD AUTO: 8.2 K/UL (ref 1.8–7.7)
NEUTROPHILS NFR BLD: 74.8 % (ref 38–73)
NRBC BLD-RTO: 0 /100 WBC
PHOSPHATE SERPL-MCNC: 2.5 MG/DL (ref 2.7–4.5)
PLATELET # BLD AUTO: 114 K/UL (ref 150–350)
PLATELET BLD QL SMEAR: ABNORMAL
PMV BLD AUTO: 14.1 FL (ref 9.2–12.9)
POCT GLUCOSE: 121 MG/DL (ref 70–110)
POCT GLUCOSE: 154 MG/DL (ref 70–110)
POTASSIUM SERPL-SCNC: 3.7 MMOL/L (ref 3.5–5.1)
PROT SERPL-MCNC: 6.4 G/DL (ref 6–8.4)
RBC # BLD AUTO: 2.75 M/UL (ref 4–5.4)
SODIUM SERPL-SCNC: 139 MMOL/L (ref 136–145)
WBC # BLD AUTO: 10.97 K/UL (ref 3.9–12.7)

## 2019-05-25 PROCEDURE — 25000003 PHARM REV CODE 250: Performed by: PHYSICIAN ASSISTANT

## 2019-05-25 PROCEDURE — 99233 SBSQ HOSP IP/OBS HIGH 50: CPT | Mod: ,,, | Performed by: PSYCHIATRY & NEUROLOGY

## 2019-05-25 PROCEDURE — 84100 ASSAY OF PHOSPHORUS: CPT

## 2019-05-25 PROCEDURE — 20600001 HC STEP DOWN PRIVATE ROOM

## 2019-05-25 PROCEDURE — 25000242 PHARM REV CODE 250 ALT 637 W/ HCPCS: Performed by: PHYSICIAN ASSISTANT

## 2019-05-25 PROCEDURE — 63600175 PHARM REV CODE 636 W HCPCS: Performed by: PHYSICIAN ASSISTANT

## 2019-05-25 PROCEDURE — 27000221 HC OXYGEN, UP TO 24 HOURS

## 2019-05-25 PROCEDURE — 85025 COMPLETE CBC W/AUTO DIFF WBC: CPT

## 2019-05-25 PROCEDURE — 25000003 PHARM REV CODE 250: Performed by: STUDENT IN AN ORGANIZED HEALTH CARE EDUCATION/TRAINING PROGRAM

## 2019-05-25 PROCEDURE — 25000003 PHARM REV CODE 250: Performed by: PSYCHIATRY & NEUROLOGY

## 2019-05-25 PROCEDURE — 80053 COMPREHEN METABOLIC PANEL: CPT

## 2019-05-25 PROCEDURE — 94640 AIRWAY INHALATION TREATMENT: CPT

## 2019-05-25 PROCEDURE — 36415 COLL VENOUS BLD VENIPUNCTURE: CPT

## 2019-05-25 PROCEDURE — 99233 PR SUBSEQUENT HOSPITAL CARE,LEVL III: ICD-10-PCS | Mod: ,,, | Performed by: PSYCHIATRY & NEUROLOGY

## 2019-05-25 PROCEDURE — 83735 ASSAY OF MAGNESIUM: CPT

## 2019-05-25 PROCEDURE — 94761 N-INVAS EAR/PLS OXIMETRY MLT: CPT

## 2019-05-25 RX ADMIN — CLONIDINE HYDROCHLORIDE 0.1 MG: 0.1 TABLET ORAL at 09:05

## 2019-05-25 RX ADMIN — BUPROPION HYDROCHLORIDE 150 MG: 75 TABLET, FILM COATED ORAL at 09:05

## 2019-05-25 RX ADMIN — MEMANTINE 10 MG: 10 TABLET ORAL at 09:05

## 2019-05-25 RX ADMIN — SODIUM CHLORIDE SOLN NEBU 3% 4 ML: 3 NEBU SOLN at 03:05

## 2019-05-25 RX ADMIN — IPRATROPIUM BROMIDE AND ALBUTEROL SULFATE 3 ML: .5; 3 SOLUTION RESPIRATORY (INHALATION) at 07:05

## 2019-05-25 RX ADMIN — SENNOSIDES AND DOCUSATE SODIUM 1 TABLET: 8.6; 5 TABLET ORAL at 09:05

## 2019-05-25 RX ADMIN — ASPIRIN 81 MG: 81 TABLET, COATED ORAL at 09:05

## 2019-05-25 RX ADMIN — SODIUM CHLORIDE SOLN NEBU 3% 4 ML: 3 NEBU SOLN at 07:05

## 2019-05-25 RX ADMIN — HEPARIN SODIUM 5000 UNITS: 5000 INJECTION, SOLUTION INTRAVENOUS; SUBCUTANEOUS at 01:05

## 2019-05-25 RX ADMIN — HEPARIN SODIUM 5000 UNITS: 5000 INJECTION, SOLUTION INTRAVENOUS; SUBCUTANEOUS at 09:05

## 2019-05-25 RX ADMIN — ATORVASTATIN CALCIUM 40 MG: 20 TABLET, FILM COATED ORAL at 09:05

## 2019-05-25 RX ADMIN — HEPARIN SODIUM 5000 UNITS: 5000 INJECTION, SOLUTION INTRAVENOUS; SUBCUTANEOUS at 06:05

## 2019-05-25 RX ADMIN — IPRATROPIUM BROMIDE AND ALBUTEROL SULFATE 3 ML: .5; 3 SOLUTION RESPIRATORY (INHALATION) at 03:05

## 2019-05-25 NOTE — PLAN OF CARE
Problem: Eating/Swallowing Impairment (Stroke, Ischemic/Transient Ischemic Attack)  Goal: Oral Intake without Aspiration    Intervention: Optimize Eating and Swallowing  Patient family called to check on patient.   Instructed on NPO and tube feeding related to NPO status and swallowing difficulties.   Instructed of feeding and residuals.   Family verbalized above.   Will cont POC and treatment for tube feeds.

## 2019-05-25 NOTE — ASSESSMENT & PLAN NOTE
Found on last admit 5-2018.  Previously on Eliquis but ran out and not refilled  Will need to resume AC once PEG is placed

## 2019-05-25 NOTE — ASSESSMENT & PLAN NOTE
A1C 5.9  Currently on tube feeds via NG tube  Continue q6h glucose and SSI     Chief complaint:   Chief Complaint   Patient presents with   • Surgical Followup       Vitals:  Visit Vitals  /60   Ht 5' 6\" (1.676 m)   Wt 75.3 kg   BMI 26.79 kg/m²       HISTORY OF PRESENT ILLNESS      Referring Provider:   Eva King MD  Primary Provider Edelmira Webb MD      Juliet Briceno is a 79 year old female who presented at the kind request ofSN [place]  for initial visit to GYN Oncology for further evaluation and treatment options regarding biopsy proven endometrial cancer grade 1.    Juliet Briceno is a 79 year old  who presented to Dr. Eva King for evaluation of postmenopausal bleeding. She reported her last period was in her late 40s. She began experiencing bleeding again in september. She is unsure if it is from her vagina or from her bladder.  She reports she has been treated 3 times for uti since September. 1/3 cultures positive for infection, but  All 3 uas have blood in them.      On 12/10/2018  obtained an EMB.  Final results revealed a Grade 1 endometrioid adenocarcinoma.      Only imaging was from 2018 when the patient had a CT of C/A/P with the following results:  IMPRESSION:    1.  Mildly prominent superior and middle mediastinal lymph nodes, probably  reactive nodes.  2.  No acute abnormality of the chest, abdomen or pelvis  3.  Cardiomegaly.  Coronary artery disease.  Enlarged main pulmonary artery  with fusiform dilatation, severe mitral valve calcifications.  Probable  pulmonary arterial hypertension  4.  Multinodular goiter.  5.  Mild bilateral renal cortical atrophy upper and lower poles, left  greater than right.  6.  Stable calcified granulomas in the chest and abdomen.    Abdominal surgeries include and appendectomy, open gall bladder, and tubal ligation .  She has undergone CABG.  Her last ECHO was 2018 with EF 60%.    Patient's past medical history is complicated with Stage 3 ESRD, CAD, history of PE and is on Eliquest.     On  2/19/2019 she underwent robot-assisted total laparoscopic hysterectomy, bilateral salpingo-oophorectomy, bilateral pelvic common and periaortic lymphadenectomy utilizing a sentinel lymph node technique. Final pathology revealed a 1A, grade 1 endometrioid adenocarcinoma.    Unfortunately, her postop course was complicated with bowel obstruction, however this required no further surgical intervention.    Subjective:  She presents today 4/25/2019 for a post op visit.  At today's visit she states she feels well. She no longer requires pain medication. She denies vaginal bleeding, vaginal discharge, problems or changes with bowel or bladder function, fever, chills, chest pain, shortness of breath, nausea, vomiting, lower extremity edema or pain. She denies increase in abdominal girth, abdominal bloating, or early satiety. She denies all other subjective complaints.    Additional subjective affirmative responses noted in ROS.           Other significant problems:  Patient Active Problem List    Diagnosis Date Noted   • Endometrial cancer (CMS/[facility]) 12/16/2018     Priority: High   • Anemia 09/16/2014     Priority: Medium   • Acute deep vein thrombosis (DVT) of other specified vein of right lower extremity (CMS/[facility]) 04/15/2019     Priority: Low   • Partial small bowel obstruction (CMS/[facility]) 03/03/2019     Priority: Low   • S/P laparoscopic hysterectomy 02/20/2019     Priority: Low   • History of pulmonary embolus (PE) 02/20/2019     Priority: Low     And DVT     • Abnormal stress test 02/04/2019     Priority: Low   • S/P coronary artery stent placement      Priority: Low   • Macular edema 01/09/2019     Priority: Low   • Keratitis sicca, bilateral 01/09/2019     Priority: Low   • Fuchs' corneal dystrophy 10/03/2018     Priority: Low   • Chronic congestive heart failure (CMS/[facility]) 01/17/2017     Priority: Low   • 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   PLAN:   The patient to be admitted and monitored overnight. Creatinine to be monitored overnight. Integrilin for 6 hours renal dose; the patient was loaded with Plavix.       • History of cardiac catheterization 09/16/2014     Priority: Low     9/8/2014 SUMMARY:   1. Severe 3-vessel coronary artery disease.   2. Patent bypass graft, left internal mammary artery to left anterior descending.   3. Patent bypass graft aorta to acute marginal, sequential posterior descending artery, sequential obtuse marginal 2.   RECOMMENDATIONS:   Severe disease in proximal circumflex artery . The patient will undergo PTCA and stent of the circumflex. Please refer to separate dictation by Dr. Floyd .       • Chronic kidney disease, stage III (moderate) (CMS/[facility]) 04/08/2014     Priority: Low   • Spinal stenosis, other region 04/24/2013     Priority: Low   • Lumbago 04/17/2013     Priority: Low   • Polyneuropathy in diabetes(357.2) 09/14/2011     Priority: Low   • CAD (coronary artery disease) 08/02/2011     Priority: Low   • S/P CABG (coronary artery bypass graft) 08/02/2011     Priority: Low     2008  open revascularization with a left mammary to the LAD and a triple sequential vein graft to the acute marginal and posterior descending branches of the right and then on to the second obtuse marginal of the circumflex. Good clinical results were achieved.      • Murmur, cardiac 08/02/2011     Priority: Low   • Osteoarthrosis, unspecified whether generalized or localized, lower leg 07/27/2011     Priority: Low   • Type II or unspecified type diabetes mellitus without mention of  complication, not stated as uncontrolled 03/05/2011     Priority: Low   • Essential hypertension 03/05/2011     Priority: Low   • Other and unspecified hyperlipidemia 03/05/2011     Priority: Low       PAST MEDICAL, FAMILY AND SOCIAL HISTORY     Medications:  Current Outpatient Medications   Medication   • pantoprazole (PROTONIX) 40 MG tablet   • warfarin (COUMADIN) 5 MG tablet   • furosemide (LASIX) 20 MG tablet   • ferrous sulfate 324 (65 Fe) MG EC tablet   • bisacodyl (DULCOLAX) 10 MG suppository   • magnesium hydroxide (MILK OF MAGNESIA) 400 MG/5ML suspension   • acetaminophen (TYLENOL) 650 MG suppository   • acetaminophen (TYLENOL) 325 MG tablet   • metoPROLOL tartrate (LOPRESSOR) 25 MG tablet   • cycloSPORINE (RESTASIS) 0.05 % ophthalmic emulsion   • nitroGLYcerin (NITROSTAT) 0.4 MG sublingual tablet   • allopurinol (ZYLOPRIM) 100 MG tablet   • oxybutynin (DITROPAN) 5 MG tablet   • escitalopram (LEXAPRO) 10 MG tablet   • potassium chloride 10 MEQ ER tablet   • simvastatin (ZOCOR) 40 MG tablet   • ketoconazole (NIZORAL) 2 % shampoo   • Polyethyl Glycol-Propyl Glycol (SYSTANE ULTRA OP)   • fluticasone (FLONASE) 50 MCG/ACT nasal spray   • nystatin (MYCOSTATIN) powder   • Mouthwashes (BIOTENE DRY MOUTH) LIQD   • cyanocobalamin 100 MCG tablet   • vitamin - therapeutic multivitamins w/minerals (CENTRUM SILVER,THERA-M) TABS   • Cholecalciferol (D3-1000 PO)   • Biotin 10 MG TABS   • Cetirizine HCl (ZYRTEC PO)     No current facility-administered medications for this visit.        Allergies:  ALLERGIES:   Allergen Reactions   • Zofran Odt ANXIETY     Patient became extremely delirious unaware of person, place, time. Severe anxiety.   • Hydrocodone-Acetaminophen Other (See Comments)     hallucinations   • Lipitor [Atorvastatin Calcium] GI UPSET       Past Medical  History/Surgeries:  Past Medical History:   Diagnosis Date   • Anemia    • Anxiety    • Arthritis     multiple joints   • Blood clot associated with vein wall  inflammation 1996    P.E. x 2   • Carpal tunnel syndrome, bilateral    • Cataract     s/p cataract surgery   • Cerebral infarction (CMS/[facility])     TIAs last 5 years ago   • Chronic kidney disease, stage III (moderate) (CMS/[facility]) 4/8/2014   • Chronic pain     back   • Congestive cardiac failure (CMS/[facility])    • Coronary artery disease     stent   • Depression     dealing with  who has Alzheimer's   • DVT (deep venous thrombosis) (CMS/[facility]) 04/10/2019    Right leg   • Dysphagia     with previous TIA - resolved   • Fracture     orbital floor   • Fracture, foot     chip fracture foot left   • Fractures     distal femur   • Fuchs' corneal dystrophy    • Gastroesophageal reflux disease    • Gout    • History of cardiac catheterization 9/16/2014 9/8/2014 SUMMARY:  1. Severe 3-vessel coronary artery disease.  2. Patent bypass graft, left internal mammary artery to left anterior descending.  3. Patent bypass graft aorta to acute marginal, sequential posterior descending artery, sequential obtuse marginal 2.  RECOMMENDATIONS:  Severe disease in proximal circumflex artery . The patient will undergo PTCA and stent of the circumflex. Please refer to separate dictation by Dr. Floyd .     • Malignant neoplasm (CMS/[facility])     endometrial   • Multinodular goiter    • Myocardial infarction (CMS/[facility])    • Neuromuscular disorder (CMS/[facility])    • Osteoporosis    • Other and unspecified hyperlipidemia 3/5/2011   • Personal history of traumatic fracture     ankle, sacrum, toe   • Plantar fasciitis    • Pulmonary embolism (CMS/[facility]) 2016   • S/P CABG (coronary artery bypass graft) 2008      open revascularization with a left mammary to the LAD and a triple sequential vein graft to the acute marginal and posterior descending branches of the right and then on to the second obtuse marginal of the circumflex. Good clinical results were achieved.     • S/P knee surgery 2008    total knee replacement bilaterally   • Sleep apnea     not using her  cpap, lost 80 lbs and no problems since   • Stented coronary artery 9/16/2014 9/8/2014 IMPRESSION:  Successful stenting of the posterior descending artery, proximal circumflex as described above.  PLAN:  The patient to be admitted and monitored overnight. Creatinine to be monitored overnight. Integrilin for 6 hours renal dose; the patient was loaded with Plavix.     • Type II or unspecified type diabetes mellitus without mention of complication, not stated as uncontrolled 03/05/2011    since weight loss no diabetes.  HA1c 5.6   • Unspecified essential hypertension 3/5/2011   • Urinary incontinence    • Vertigo        Past Surgical History:   Procedure Laterality Date   • Appendectomy  early 1980's   • Arthroplasty Left 12/11/2015    thumb arthroplasty with    • Cardiac catherization      stent   • Cardiac catheterization/possible ptca/possible stent  02/08/2019   • Cardiac surgery  2008    CABG   • Cataract extraction w/ intraocular lens implant  10/2/2013    LEFT    right 10/30/2013   • Colonoscopy     • Colonoscopy diagnostic  01/26/2018    Affi, dx abn wt loss, no evidence of any significant pathology   • Esophagogastroduodenoscopy transoral flex diag  9/2/16    Dysphagia   • Esophagogastroduodenoscopy transoral flex diag  01/26/2018    Affi, dx abn wt loss/dysphagia, no evidence of any significant pathology   • Joint replacement Bilateral     knees   • Laparoscopic hysterectomy  02/19/2019    Dr. Muller. Endometrial cancer. TLH, BSO, nodes.    • Removal gallbladder  late 1970's    • Total knee replacement  2005, ~2009    BILATERAL    • Tubal ligation         Family History:  Family History   Problem Relation Age of Onset   • Stroke Mother    • Diabetes Mother    • Heart Father    • Heart disease Father         MI   • Heart Brother    • Diabetes Brother    • Heart disease Brother         MI   • Diabetes Maternal Grandmother    • Cancer Brother         prostate (contained, NOT metastatic)   •  Dementia/Alzheimers Brother    • Glaucoma Paternal Grandmother    • Cancer Other         Stomach, mGreatAunt       Social History:  Social History     Tobacco Use   • Smoking status: Former Smoker     Packs/day: 2.50     Years: 15.00     Pack years: 37.50     Types: Cigarettes     Last attempt to quit: 1971     Years since quittin.3   • Smokeless tobacco: Never Used   Substance Use Topics   • Alcohol use: No     Alcohol/week: 0.0 oz       REVIEW OF SYSTEMS     Review of Systems   Constitutional: Negative for activity change, appetite change, chills, fatigue, fever and unexpected weight change.   HENT: Negative for congestion, mouth sores, postnasal drip, rhinorrhea and sneezing.    Eyes: Negative for visual disturbance.   Respiratory: Negative for cough, shortness of breath and wheezing.    Cardiovascular: Negative for chest pain and leg swelling.   Gastrointestinal: Negative for abdominal distention, abdominal pain, blood in stool, constipation, diarrhea, nausea and vomiting.   Genitourinary: Negative for decreased urine volume, difficulty urinating, dyspareunia, dysuria, frequency, hematuria, pelvic pain, urgency, vaginal bleeding, vaginal discharge and vaginal pain.   Musculoskeletal: Negative for arthralgias and myalgias.   Skin: Negative for color change and rash.   Neurological: Negative for weakness, light-headedness, numbness and headaches.   Hematological: Negative for adenopathy. Does not bruise/bleed easily.   Psychiatric/Behavioral: Negative for dysphoric mood and sleep disturbance. The patient is not nervous/anxious.        PHYSICAL EXAM   Patient's ECOG Perfomance Status is 1    Physical Exam   Constitutional: She is oriented to person, place, and time. She appears well-developed and well-nourished. No distress.   HENT:   Head: Normocephalic and atraumatic.   Eyes: Pupils are equal, round, and reactive to light. EOM are normal.   Neck: Normal range of motion. Neck supple. No thyromegaly  present.   Cardiovascular: Normal rate, regular rhythm and normal heart sounds.   Pulmonary/Chest: Effort normal and breath sounds normal.   Abdominal: Soft. Bowel sounds are normal. She exhibits no distension. There is no hepatosplenomegaly. No hernia.       Genitourinary:   Genitourinary Comments: deferred   Musculoskeletal: Normal range of motion. She exhibits no edema.   Lymphadenopathy:     She has no cervical adenopathy.     She has no axillary adenopathy.        Right: No supraclavicular adenopathy present.        Left: No supraclavicular adenopathy present.   Neurological: She is alert and oriented to person, place, and time.   Skin: Skin is warm and dry. No rash noted. No erythema.   Psychiatric: She has a normal mood and affect. Her speech is normal and behavior is normal. Judgment and thought content normal. Cognition and memory are normal.   Nursing note and vitals reviewed.    Data reviewed:  -Op note and pathology from 2/19/19    ASSESSMENT/PLAN     Juliet Briceno is a 79 year old female with Endometrial Cancer.  Surgical Date: 2/19/2019  Surgery: Robot-assisted total laparoscopic hysterectomy, bilateral salpingo-oophorectomy, bilateral pelvic common and periaortic lymphadenectomy utilizing a sentinel lymph node technique.   Final Pathology: 1A, grade 1 endometrioid adenocarcinoma.  Treatment: Surveillance     Stage IA grade 1 Endometrial Cancer:  - Final pathology discussed with the patient in detail today  - Surveillance will include GYN exam every 3-4 months for 2 years, then every 6 months until 5 years, then annually  - Imaging will be symptom based  - Signs and symptoms of reoccurrence were discussed today  -I've informed the patient that she is low risk for recurrence and she can therefore follow up with her primary gynecologist for the surveillance visits, Dr. Eva King.      Post-Op:  -She is healing appropriately.  -For 8 weeks from surgery, I am recommending she continue to refrain from  sexual activity as well as strenuous exercise.    All of the patients questions have been answered. She verbalizes an understanding and agreement of the above mentioned plan of care. She states she has no further questions at this time. She is certainly encouraged to contact our clinic should there be any additional questions, concerns or issues arise.    She will return to the clinic on an as-needed basis. She will follow-up with gynecology, Dr. King for her surveillance visits. She will therefore see her in 4 months.    The patient was seen and evaluated along with Dr Muller who agrees with the above assessment and plan.    Rachael GLOVER

## 2019-05-25 NOTE — PROGRESS NOTES
[facility]  Vascular Neurology  Comprehensive Stroke Center  Progress Note    Assessment/Plan:     * Embolic stroke involving left middle cerebral artery  83 y/o female with cardioembolic L MCA infarct 2/2 a fib (not compliant with anticoagulation at home. Patient was out of window for tPA, but when to IR for L M1 thrombectomy, TICI 3 flow restored.     Antithrombotics: Plans to resume anticoagulation after peg tube is placed. She will need investigation of affordability of anticoagulation for discharge. Will continue with asa 81 only for now    Statins: Lipitor 40    Aggressive risk factor modification: HTN, HLD, prior stroke, afib     Rehab efforts: The patient has been evaluated by a stroke team provider and the therapy needs have been fully considered based off the presenting complaints and exam findings. The following therapy evaluations are needed: PT evaluate and treat, OT evaluate and treat, SLP evaluate and treat, PM&R evaluate for appropriate placement    Diagnostics ordered/pending: None    VTE prophylaxis: Heparin 5000 units SQ every 8 hours , SCDs    BP parameters: S/p thrombectomy <180      Debility  Patient was reported to be back to baseline and ambulating with walker prior to this hospitalization  Currently aphasic and not following commands, but able to TOSCANO spontaneously  PT/OT now recommending SNF or home with 24 hour support  Plans to discuss goals of care with family    Diabetes mellitus  A1C 5.9  Currently on tube feeds via NG tube  Continue q6h glucose and SSI      Paroxysmal atrial fibrillation  Found on last admit 5-2018.  Previously on Eliquis but ran out and not refilled  Will need to resume AC once PEG is placed    Dysphagia  SLP to evaluate and treat  Currently with NG tube in place  High chance for aspiration PNA, will monitor for signs of infection or worsening respiratory status  Plan for PEG Monday 5/27. NPO and barium Sunday night.     Dementia with behavioral  disturbance  Continue home memantine  Delirium precautions    Mixed hyperlipidemia  Stroke risk factor  LDL 54, at goal  Continue home Lipitor 40    Essential hypertension  Stroke risk factor  SBP <180  BP at goal on home clonidine    Aphasia  Due to stroke  SLP to evaluate and treat          5/24 patient stepped down overnight to stroke unit, currently with NG tube in place, patient will likely need PEG tube placed, will attempt to discuss goals of care with family, need to restart anticoagulation but will hold for now until PEG decision is made  5/25 no acute events overnight. IR consulted. Work up complete. NPO and barium Dave night. Plan for PEG Monday 5/27.     STROKE DOCUMENTATION   Acute Stroke Times   Last Known Normal Date: 05/20/19  Last Known Normal Time: 2030  Symptom Onset Date: 05/20/19  Symptom Onset Time: 2030  Stroke Team Called Date: 05/20/19  Stroke Team Called Time: 2338  Stroke Team Arrival Date: 05/21/19  Stroke Team Arrival Time: 0605  CT Interpretation Time: 2338    NIH Scale:  1a. Level of Consciousness: 1-->Not alert, but arousable by minor stimulation to obey, answer, or respond  1b. LOC Questions: 2-->Answers neither question correctly  1c. LOC Commands: 2-->Performs neither task correctly  2. Best Gaze: 2-->Forced deviation, or total gaze paresis not overcome by the oculocephalic maneuver  3. Visual: 2-->Complete hemianopia  4. Facial Palsy: 1-->Minor paralysis (flattened nasolabial fold, asymmetry on smiling)  5a. Motor Arm, Left: 1-->Drift, limb holds 90 (or 45) degrees, but drifts down before full 10 seconds, does not hit bed or other support  5b. Motor Arm, Right: 1-->Drift, limb holds 90 (or 45) degrees, but drifts down before full 10 secs, does not hit bed or other support  6a. Motor Leg, Left: 1-->Drift, leg falls by the end of the 5-sec period but does not hit bed  6b. Motor Leg, Right: 1-->Drift, leg falls by the end of the 5-sec period but does not hit bed  7. Limb Ataxia:  0-->Absent  8. Sensory: 0-->Normal, no sensory loss  9. Best Language: 3-->Mute, global aphasia, no usable speech or auditory comprehension  10. Dysarthria: 2-->Severe dysarthria, patients speech is so slurred as to be unintelligible in the absence of or out of proportion to any dysphasia, or is mute/anarthric  11. Extinction and Inattention (formerly Neglect): 0-->No abnormality  Total (NIH Stroke Scale): 19       Modified Portage Score: 3  Ivonne Coma Scale:    ABCD2 Score:    [id number] Score:   HAS -BLED Score:   ICH Score:   Hunt & Burkett Classification:      Hemorrhagic change of an Ischemic Stroke: Does this patient have an ischemic stroke with hemorrhagic changes? No     Neurologic Chief Complaint: Aphasia    Subjective:     Interval History: Patient is seen for follow-up neurological assessment and treatment recommendations: no acute events overnight. IR consulted. Work up complete. NPO and barium Sunday night. Plan for PEG Monday 5/27.     HPI, Past Medical, Family, and Social History remains the same as documented in the initial encounter.     Review of Systems   Constitutional: Negative for chills and fever.   Eyes: Positive for visual disturbance.   Gastrointestinal: Negative for vomiting.   Neurological: Positive for facial asymmetry, speech difficulty and weakness.     Scheduled Meds:   albuterol-ipratropium  3 mL Nebulization Q8H    aspirin  81 mg Per NG tube Daily    atorvastatin  40 mg Per NG tube Daily    buPROPion  150 mg Per NG tube BID    cloNIDine  0.1 mg Per NG tube BID    heparin (porcine)  5,000 Units Subcutaneous Q8H    memantine  10 mg Per NG tube BID    senna-docusate 8.6-50 mg  1 tablet Per NG tube BID    sodium chloride 3%  4 mL Nebulization Q8H    tuberculin  5 Units Intradermal Once     Continuous Infusions:   sodium chloride 0.9% 75 mL/hr at 05/23/19 2100     PRN Meds:acetaminophen, dextrose 50%, glucagon (human recombinant), hydrALAZINE, insulin aspart U-100, magnesium  oxide, magnesium oxide, ondansetron, potassium chloride 10%, potassium chloride 10%, potassium chloride 10%, potassium, sodium phosphates, potassium, sodium phosphates, potassium, sodium phosphates, sodium chloride 0.9%, sodium chloride 0.9%    Objective:     Vital Signs (Most Recent):  Temp: 98.7 °F (37.1 °C) (05/25/19 1149)  Pulse: 75 (05/25/19 1149)  Resp: 20 (05/25/19 0754)  BP: (!) 145/65 (05/25/19 1149)  SpO2: 99 % (05/25/19 1149)  BP Location: Left leg    Vital Signs Range (Last 24H):  Temp:  [96.4 °F (35.8 °C)-98.7 °F (37.1 °C)]   Pulse:  []   Resp:  [18-20]   BP: (145-181)/(65-91)   SpO2:  [92 %-99 %]   BP Location: Left leg    Physical Exam   Constitutional: She appears well-developed and well-nourished. No distress.   HENT:   Head: Normocephalic and atraumatic.   Cardiovascular: Normal rate.   Pulmonary/Chest: No respiratory distress.   Increased secretions   Neurological:   See below   Skin: Skin is warm and dry.   Psychiatric: She is noncommunicative. She is inattentive.   Vitals reviewed.    Neurological Exam:   LOC: drowsy  Attention Span: poor  Language: Global aphasia  Articulation: mute   Orientation: Untestable due to severe aphasia   Visual Fields: Hemianopsia right  EOM (CN III, IV, VI): Full/intact  Facial Movement (CN VII): Lower facial weakness on the Left  Motor: Arm left  Normal 4/5  Leg left  Normal 4/5  Arm right  Paresis: 4/5  Leg right Normal 4/5  Sensation: withdraws from painful stimuli in all 4 extremities     Laboratory:  CMP:   Recent Labs   Lab 05/25/19  0401   CALCIUM 9.5   ALBUMIN 2.5*   PROT 6.4      K 3.7   CO2 26      BUN 11   CREATININE 0.6   ALKPHOS 86   ALT 58*   AST 59*   BILITOT 0.7     BMP:   Recent Labs   Lab 05/25/19  0401      K 3.7      CO2 26   BUN 11   CREATININE 0.6   CALCIUM 9.5     CBC:   Recent Labs   Lab 05/25/19  0401   WBC 10.97   RBC 2.75*   HGB 8.1*   HCT 25.8*   *   MCV 94   MCH 29.5   MCHC 31.4*     Lipid Panel:    Recent Labs   Lab 05/21/19 0928   CHOL 118*   LDLCALC 58.4*   HDL 49   TRIG 53     Coagulation:   Recent Labs   Lab 05/21/19 0928   INR 1.0   APTT 39.7*     Hgb A1C:   Recent Labs   Lab 05/21/19 0928   HGBA1C 5.9*     TSH:   Recent Labs   Lab 05/21/19 0928   TSH 2.293       Diagnostic Results     Brain Imaging     MRI 5/22  Evolving scattered areas of acute/recent infarction left MCA territory with new to increased component in the left inferior frontal gyrus now mild moderate in size.  There is no evidence for hemorrhagic conversion or significant mass effect.    Superimposed confluent T2 FLAIR signal abnormality elsewhere supra and infratentorial parenchyma while nonspecific concerning for advanced chronic microvascular ischemic change versus posttherapy change.    Remote basal gangliar, thalamic and cerebellar infarcts unchanged.    Compensatory enlargement of ventricular system similar to prior without hydrocephalus.    CTA Head and Neck 5/21  1. 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  The right vertebral artery is dominant and patent throughout its course in the neck.  2. There is calcified plaque in the common, internal and external carotid arteries bilaterally.  Stenosis on the right is mild at 50-69% and moderate on the left at approximately 70% without change.  CTA Head    1.  There is occlusion of the distal left M1 and proximal M2 segments of the middle cerebral artery which has occurred since the prior CTA head.  There is subtle increased density within the left middle cerebral artery at the distal M1 and proximal M2 segments on comparison head CT.  This is consistent with thrombus.    2.  The right anterior circulation is normal.    3.  There is developmental variation with fetal origin of the right posterior cerebral artery.    Cardiac Imaging   · Concentric left ventricular remodeling.  · Small left  ventricular cavity size.  · Increased (hyperdynamic) left ventricular systolic function. The estimated ejection fraction is 75%  · Grade I (mild) left ventricular diastolic dysfunction consistent with impaired relaxation.  · Moderate left atrial enlargement.  · Mild-to-moderate aortic valve stenosis.  · Aortic valve area is 1.43 cm2; peak velocity is 2.59 m/s; mean gradient is 15.51 mmHg.  · Mild mid-cavity left ventricular obstruction is present.  · Normal left atrial pressure.  · Mild mitral stenosis.      Baljeet Womack NP  [facility]  Department of Vascular Neurology   [facility]

## 2019-05-25 NOTE — ASSESSMENT & PLAN NOTE
SLP to evaluate and treat  Currently with NG tube in place  High chance for aspiration PNA, will monitor for signs of infection or worsening respiratory status  Plan for PEG Monday 5/27. NPO and barium Dave night.

## 2019-05-25 NOTE — SUBJECTIVE & OBJECTIVE
Neurologic Chief Complaint: Aphasia    Subjective:     Interval History: Patient is seen for follow-up neurological assessment and treatment recommendations: no acute events overnight. IR consulted. Work up complete. NPO and barium Sunday night. Plan for PEG Monday 5/27.     HPI, Past Medical, Family, and Social History remains the same as documented in the initial encounter.     Review of Systems   Constitutional: Negative for chills and fever.   Eyes: Positive for visual disturbance.   Gastrointestinal: Negative for vomiting.   Neurological: Positive for facial asymmetry, speech difficulty and weakness.     Scheduled Meds:   albuterol-ipratropium  3 mL Nebulization Q8H    aspirin  81 mg Per NG tube Daily    atorvastatin  40 mg Per NG tube Daily    buPROPion  150 mg Per NG tube BID    cloNIDine  0.1 mg Per NG tube BID    heparin (porcine)  5,000 Units Subcutaneous Q8H    memantine  10 mg Per NG tube BID    senna-docusate 8.6-50 mg  1 tablet Per NG tube BID    sodium chloride 3%  4 mL Nebulization Q8H    tuberculin  5 Units Intradermal Once     Continuous Infusions:   sodium chloride 0.9% 75 mL/hr at 05/23/19 2100     PRN Meds:acetaminophen, dextrose 50%, glucagon (human recombinant), hydrALAZINE, insulin aspart U-100, magnesium oxide, magnesium oxide, ondansetron, potassium chloride 10%, potassium chloride 10%, potassium chloride 10%, potassium, sodium phosphates, potassium, sodium phosphates, potassium, sodium phosphates, sodium chloride 0.9%, sodium chloride 0.9%    Objective:     Vital Signs (Most Recent):  Temp: 98.7 °F (37.1 °C) (05/25/19 1149)  Pulse: 75 (05/25/19 1149)  Resp: 20 (05/25/19 0754)  BP: (!) 145/65 (05/25/19 1149)  SpO2: 99 % (05/25/19 1149)  BP Location: Left leg    Vital Signs Range (Last 24H):  Temp:  [96.4 °F (35.8 °C)-98.7 °F (37.1 °C)]   Pulse:  []   Resp:  [18-20]   BP: (145-181)/(65-91)   SpO2:  [92 %-99 %]   BP Location: Left leg    Physical Exam   Constitutional: She  appears well-developed and well-nourished. No distress.   HENT:   Head: Normocephalic and atraumatic.   Cardiovascular: Normal rate.   Pulmonary/Chest: No respiratory distress.   Increased secretions   Neurological:   See below   Skin: Skin is warm and dry.   Psychiatric: She is noncommunicative. She is inattentive.   Vitals reviewed.    Neurological Exam:   LOC: drowsy  Attention Span: poor  Language: Global aphasia  Articulation: mute   Orientation: Untestable due to severe aphasia   Visual Fields: Hemianopsia right  EOM (CN III, IV, VI): Full/intact  Facial Movement (CN VII): Lower facial weakness on the Left  Motor: Arm left  Normal 4/5  Leg left  Normal 4/5  Arm right  Paresis: 4/5  Leg right Normal 4/5  Sensation: withdraws from painful stimuli in all 4 extremities     Laboratory:  CMP:   Recent Labs   Lab 05/25/19  0401   CALCIUM 9.5   ALBUMIN 2.5*   PROT 6.4      K 3.7   CO2 26      BUN 11   CREATININE 0.6   ALKPHOS 86   ALT 58*   AST 59*   BILITOT 0.7     BMP:   Recent Labs   Lab 05/25/19  0401      K 3.7      CO2 26   BUN 11   CREATININE 0.6   CALCIUM 9.5     CBC:   Recent Labs   Lab 05/25/19 0401   WBC 10.97   RBC 2.75*   HGB 8.1*   HCT 25.8*   *   MCV 94   MCH 29.5   MCHC 31.4*     Lipid Panel:   Recent Labs   Lab 05/21/19  0928   CHOL 118*   LDLCALC 58.4*   HDL 49   TRIG 53     Coagulation:   Recent Labs   Lab 05/21/19  0928   INR 1.0   APTT 39.7*     Hgb A1C:   Recent Labs   Lab 05/21/19  0928   HGBA1C 5.9*     TSH:   Recent Labs   Lab 05/21/19  0928   TSH 2.293       Diagnostic Results     Brain Imaging     MRI 5/22  Evolving scattered areas of acute/recent infarction left MCA territory with new to increased component in the left inferior frontal gyrus now mild moderate in size.  There is no evidence for hemorrhagic conversion or significant mass effect.    Superimposed confluent T2 FLAIR signal abnormality elsewhere supra and infratentorial parenchyma while nonspecific  concerning for advanced chronic microvascular ischemic change versus posttherapy change.    Remote basal gangliar, thalamic and cerebellar infarcts unchanged.    Compensatory enlargement of ventricular system similar to prior without hydrocephalus.    CTA Head and Neck 5/21  1. 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  The right vertebral artery is dominant and patent throughout its course in the neck.  2. There is calcified plaque in the common, internal and external carotid arteries bilaterally.  Stenosis on the right is mild at 50-69% and moderate on the left at approximately 70% without change.  CTA Head    1.  There is occlusion of the distal left M1 and proximal M2 segments of the middle cerebral artery which has occurred since the prior CTA head.  There is subtle increased density within the left middle cerebral artery at the distal M1 and proximal M2 segments on comparison head CT.  This is consistent with thrombus.    2.  The right anterior circulation is normal.    3.  There is developmental variation with fetal origin of the right posterior cerebral artery.    Cardiac Imaging   · Concentric left ventricular remodeling.  · Small left ventricular cavity size.  · Increased (hyperdynamic) left ventricular systolic function. The estimated ejection fraction is 75%  · Grade I (mild) left ventricular diastolic dysfunction consistent with impaired relaxation.  · Moderate left atrial enlargement.  · Mild-to-moderate aortic valve stenosis.  · Aortic valve area is 1.43 cm2; peak velocity is 2.59 m/s; mean gradient is 15.51 mmHg.  · Mild mid-cavity left ventricular obstruction is present.  · Normal left atrial pressure.  · Mild mitral stenosis.

## 2019-05-25 NOTE — ASSESSMENT & PLAN NOTE
85 y/o female with cardioembolic L MCA infarct 2/2 a fib (not compliant with anticoagulation at home. Patient was out of window for tPA, but when to IR for L M1 thrombectomy, TICI 3 flow restored.     Antithrombotics: Plans to resume anticoagulation after peg tube is placed. She will need investigation of affordability of anticoagulation for discharge. Will continue with asa 81 only for now    Statins: Lipitor 40    Aggressive risk factor modification: HTN, HLD, prior stroke, afib     Rehab efforts: The patient has been evaluated by a stroke team provider and the therapy needs have been fully considered based off the presenting complaints and exam findings. The following therapy evaluations are needed: PT evaluate and treat, OT evaluate and treat, SLP evaluate and treat, PM&R evaluate for appropriate placement    Diagnostics ordered/pending: None    VTE prophylaxis: Heparin 5000 units SQ every 8 hours , SCDs    BP parameters: S/p thrombectomy <180

## 2019-05-26 LAB
ALBUMIN SERPL BCP-MCNC: 2.6 G/DL (ref 3.5–5.2)
ALP SERPL-CCNC: 94 U/L (ref 55–135)
ALT SERPL W/O P-5'-P-CCNC: 61 U/L (ref 10–44)
ANION GAP SERPL CALC-SCNC: 10 MMOL/L (ref 8–16)
ANISOCYTOSIS BLD QL SMEAR: SLIGHT
AST SERPL-CCNC: 54 U/L (ref 10–40)
BACTERIA #/AREA URNS AUTO: ABNORMAL /HPF
BASOPHILS # BLD AUTO: 0.06 K/UL (ref 0–0.2)
BASOPHILS NFR BLD: 0.5 % (ref 0–1.9)
BILIRUB SERPL-MCNC: 0.6 MG/DL (ref 0.1–1)
BILIRUB UR QL STRIP: NEGATIVE
BUN SERPL-MCNC: 9 MG/DL (ref 8–23)
BURR CELLS BLD QL SMEAR: ABNORMAL
CALCIUM SERPL-MCNC: 9.4 MG/DL (ref 8.7–10.5)
CHLORIDE SERPL-SCNC: 104 MMOL/L (ref 95–110)
CLARITY UR REFRACT.AUTO: ABNORMAL
CO2 SERPL-SCNC: 27 MMOL/L (ref 23–29)
COLOR UR AUTO: YELLOW
CREAT SERPL-MCNC: 0.6 MG/DL (ref 0.5–1.4)
DIFFERENTIAL METHOD: ABNORMAL
EOSINOPHIL # BLD AUTO: 0.1 K/UL (ref 0–0.5)
EOSINOPHIL NFR BLD: 0.6 % (ref 0–8)
ERYTHROCYTE [DISTWIDTH] IN BLOOD BY AUTOMATED COUNT: 13.4 % (ref 11.5–14.5)
EST. GFR  (AFRICAN AMERICAN): >60 ML/MIN/1.73 M^2
EST. GFR  (NON AFRICAN AMERICAN): >60 ML/MIN/1.73 M^2
GLUCOSE SERPL-MCNC: 78 MG/DL (ref 70–110)
GLUCOSE UR QL STRIP: NEGATIVE
HCT VFR BLD AUTO: 23.4 % (ref 37–48.5)
HGB BLD-MCNC: 7.9 G/DL (ref 12–16)
HGB UR QL STRIP: ABNORMAL
HYPOCHROMIA BLD QL SMEAR: ABNORMAL
IMM GRANULOCYTES # BLD AUTO: 0.14 K/UL (ref 0–0.04)
IMM GRANULOCYTES NFR BLD AUTO: 1.1 % (ref 0–0.5)
KETONES UR QL STRIP: NEGATIVE
LEUKOCYTE ESTERASE UR QL STRIP: ABNORMAL
LYMPHOCYTES # BLD AUTO: 1.8 K/UL (ref 1–4.8)
LYMPHOCYTES NFR BLD: 14.7 % (ref 18–48)
MAGNESIUM SERPL-MCNC: 1.6 MG/DL (ref 1.6–2.6)
MCH RBC QN AUTO: 30.4 PG (ref 27–31)
MCHC RBC AUTO-ENTMCNC: 33.8 G/DL (ref 32–36)
MCV RBC AUTO: 90 FL (ref 82–98)
MICROSCOPIC COMMENT: ABNORMAL
MONOCYTES # BLD AUTO: 1.1 K/UL (ref 0.3–1)
MONOCYTES NFR BLD: 8.7 % (ref 4–15)
NEUTROPHILS # BLD AUTO: 9.1 K/UL (ref 1.8–7.7)
NEUTROPHILS NFR BLD: 74.4 % (ref 38–73)
NITRITE UR QL STRIP: NEGATIVE
NRBC BLD-RTO: 0 /100 WBC
OVALOCYTES BLD QL SMEAR: ABNORMAL
PH UR STRIP: 7 [PH] (ref 5–8)
PHOSPHATE SERPL-MCNC: 3.3 MG/DL (ref 2.7–4.5)
PLATELET # BLD AUTO: 127 K/UL (ref 150–350)
PLATELET BLD QL SMEAR: ABNORMAL
PMV BLD AUTO: 14 FL (ref 9.2–12.9)
POCT GLUCOSE: 118 MG/DL (ref 70–110)
POCT GLUCOSE: 165 MG/DL (ref 70–110)
POCT GLUCOSE: 88 MG/DL (ref 70–110)
POCT GLUCOSE: 98 MG/DL (ref 70–110)
POIKILOCYTOSIS BLD QL SMEAR: SLIGHT
POTASSIUM SERPL-SCNC: 4 MMOL/L (ref 3.5–5.1)
PROT SERPL-MCNC: 6.8 G/DL (ref 6–8.4)
PROT UR QL STRIP: NEGATIVE
RBC # BLD AUTO: 2.6 M/UL (ref 4–5.4)
RBC #/AREA URNS AUTO: 38 /HPF (ref 0–4)
SCHISTOCYTES BLD QL SMEAR: ABNORMAL
SODIUM SERPL-SCNC: 141 MMOL/L (ref 136–145)
SP GR UR STRIP: 1.01 (ref 1–1.03)
URN SPEC COLLECT METH UR: ABNORMAL
WBC # BLD AUTO: 12.19 K/UL (ref 3.9–12.7)
WBC #/AREA URNS AUTO: 97 /HPF (ref 0–5)
WBC CLUMPS UR QL AUTO: ABNORMAL

## 2019-05-26 PROCEDURE — 25000242 PHARM REV CODE 250 ALT 637 W/ HCPCS: Performed by: PHYSICIAN ASSISTANT

## 2019-05-26 PROCEDURE — 25000003 PHARM REV CODE 250: Performed by: PSYCHIATRY & NEUROLOGY

## 2019-05-26 PROCEDURE — 94640 AIRWAY INHALATION TREATMENT: CPT

## 2019-05-26 PROCEDURE — 87077 CULTURE AEROBIC IDENTIFY: CPT

## 2019-05-26 PROCEDURE — 81001 URINALYSIS AUTO W/SCOPE: CPT

## 2019-05-26 PROCEDURE — 25000003 PHARM REV CODE 250: Performed by: PHYSICIAN ASSISTANT

## 2019-05-26 PROCEDURE — 94761 N-INVAS EAR/PLS OXIMETRY MLT: CPT

## 2019-05-26 PROCEDURE — 80053 COMPREHEN METABOLIC PANEL: CPT

## 2019-05-26 PROCEDURE — 87086 URINE CULTURE/COLONY COUNT: CPT

## 2019-05-26 PROCEDURE — 63600175 PHARM REV CODE 636 W HCPCS: Performed by: PHYSICIAN ASSISTANT

## 2019-05-26 PROCEDURE — 36415 COLL VENOUS BLD VENIPUNCTURE: CPT

## 2019-05-26 PROCEDURE — 99233 PR SUBSEQUENT HOSPITAL CARE,LEVL III: ICD-10-PCS | Mod: ,,, | Performed by: PSYCHIATRY & NEUROLOGY

## 2019-05-26 PROCEDURE — 83735 ASSAY OF MAGNESIUM: CPT

## 2019-05-26 PROCEDURE — 20600001 HC STEP DOWN PRIVATE ROOM

## 2019-05-26 PROCEDURE — 99233 SBSQ HOSP IP/OBS HIGH 50: CPT | Mod: ,,, | Performed by: PSYCHIATRY & NEUROLOGY

## 2019-05-26 PROCEDURE — 87088 URINE BACTERIA CULTURE: CPT

## 2019-05-26 PROCEDURE — 87186 SC STD MICRODIL/AGAR DIL: CPT

## 2019-05-26 PROCEDURE — 84100 ASSAY OF PHOSPHORUS: CPT

## 2019-05-26 PROCEDURE — 25000003 PHARM REV CODE 250: Performed by: STUDENT IN AN ORGANIZED HEALTH CARE EDUCATION/TRAINING PROGRAM

## 2019-05-26 PROCEDURE — 27000221 HC OXYGEN, UP TO 24 HOURS

## 2019-05-26 PROCEDURE — 31720 CLEARANCE OF AIRWAYS: CPT

## 2019-05-26 PROCEDURE — 85025 COMPLETE CBC W/AUTO DIFF WBC: CPT

## 2019-05-26 RX ADMIN — SENNOSIDES AND DOCUSATE SODIUM 1 TABLET: 8.6; 5 TABLET ORAL at 09:05

## 2019-05-26 RX ADMIN — BUPROPION HYDROCHLORIDE 150 MG: 75 TABLET, FILM COATED ORAL at 10:05

## 2019-05-26 RX ADMIN — HEPARIN SODIUM 5000 UNITS: 5000 INJECTION, SOLUTION INTRAVENOUS; SUBCUTANEOUS at 10:05

## 2019-05-26 RX ADMIN — SODIUM CHLORIDE SOLN NEBU 3% 4 ML: 3 NEBU SOLN at 12:05

## 2019-05-26 RX ADMIN — HEPARIN SODIUM 5000 UNITS: 5000 INJECTION, SOLUTION INTRAVENOUS; SUBCUTANEOUS at 05:05

## 2019-05-26 RX ADMIN — IPRATROPIUM BROMIDE AND ALBUTEROL SULFATE 3 ML: .5; 3 SOLUTION RESPIRATORY (INHALATION) at 12:05

## 2019-05-26 RX ADMIN — CLONIDINE HYDROCHLORIDE 0.1 MG: 0.1 TABLET ORAL at 09:05

## 2019-05-26 RX ADMIN — SODIUM CHLORIDE SOLN NEBU 3% 4 ML: 3 NEBU SOLN at 09:05

## 2019-05-26 RX ADMIN — BUPROPION HYDROCHLORIDE 150 MG: 75 TABLET, FILM COATED ORAL at 09:05

## 2019-05-26 RX ADMIN — IPRATROPIUM BROMIDE AND ALBUTEROL SULFATE 3 ML: .5; 3 SOLUTION RESPIRATORY (INHALATION) at 09:05

## 2019-05-26 RX ADMIN — ATORVASTATIN CALCIUM 40 MG: 20 TABLET, FILM COATED ORAL at 09:05

## 2019-05-26 RX ADMIN — ASPIRIN 81 MG: 81 TABLET, COATED ORAL at 09:05

## 2019-05-26 RX ADMIN — MEMANTINE 10 MG: 10 TABLET ORAL at 09:05

## 2019-05-26 RX ADMIN — CLONIDINE HYDROCHLORIDE 0.1 MG: 0.1 TABLET ORAL at 10:05

## 2019-05-26 RX ADMIN — SENNOSIDES AND DOCUSATE SODIUM 1 TABLET: 8.6; 5 TABLET ORAL at 10:05

## 2019-05-26 RX ADMIN — HEPARIN SODIUM 5000 UNITS: 5000 INJECTION, SOLUTION INTRAVENOUS; SUBCUTANEOUS at 01:05

## 2019-05-26 RX ADMIN — MEMANTINE 10 MG: 10 TABLET ORAL at 10:05

## 2019-05-26 NOTE — PLAN OF CARE
Problem: Adult Inpatient Plan of Care  Goal: Plan of Care Review  Outcome: Ongoing (interventions implemented as appropriate)  POC and meds reviewed with patient & family. Questions encouraged and answered. Family verbalized understanding. V/S stable throughout shift; please see flowsheets for V/S information and full assessment data. Bilateral wrist restraints still necessary to prevent pulling of NGT. NAEO. Siderails up x2, bed locked in lowest position, call bell in reach, bed alarm on, O2 & suction at bedside, WCTM.

## 2019-05-26 NOTE — ASSESSMENT & PLAN NOTE
SLP to evaluate and treat  Currently with NG tube in place  High chance for aspiration PNA, will monitor for signs of infection or worsening respiratory status  Plan for PEG Monday 5/27. NPO and barium ordered Dave night.

## 2019-05-26 NOTE — PROGRESS NOTES
[facility]  Vascular Neurology  Comprehensive Stroke Center  Progress Note    Assessment/Plan:     * Embolic stroke involving left middle cerebral artery  85 y/o female with cardioembolic L MCA infarct 2/2 a fib (not compliant with anticoagulation at home. Patient was out of window for tPA, but when to IR for L M1 thrombectomy, TICI 3 flow restored.     Antithrombotics: Plans to resume anticoagulation after peg tube is placed. She will need investigation of affordability of anticoagulation for discharge. Will continue with asa 81 only for now    Statins: Lipitor 40    Aggressive risk factor modification: HTN, HLD, prior stroke, afib     Rehab efforts: The patient has been evaluated by a stroke team provider and the therapy needs have been fully considered based off the presenting complaints and exam findings. The following therapy evaluations are needed: PT evaluate and treat, OT evaluate and treat, SLP evaluate and treat, PM&R evaluate for appropriate placement    Diagnostics ordered/pending: None    VTE prophylaxis: Heparin 5000 units SQ every 8 hours , SCDs    BP parameters: S/p thrombectomy <180      Debility  Patient was reported to be back to baseline and ambulating with walker prior to this hospitalization  Currently aphasic and not following commands, but able to TOSCANO spontaneously  PT/OT now recommending SNF or home with 24 hour support  Plans to discuss goals of care with family    Diabetes mellitus  A1C 5.9  Currently on tube feeds via NG tube  Continue q6h glucose and SSI      Paroxysmal atrial fibrillation  Found on last admit 5-2018.  Previously on Eliquis but ran out and not refilled. (Concerns of noncompliance due to price. Please price check choice of AC prior to starting)  Will need to resume AC once PEG is placed    Dysphagia  SLP to evaluate and treat  Currently with NG tube in place  High chance for aspiration PNA, will monitor for signs of infection or worsening respiratory  status  Plan for PEG Monday 5/27. NPO and barium ordered Dave night.     Dementia with behavioral disturbance  Continue home memantine  Delirium precautions    Mixed hyperlipidemia  Stroke risk factor  LDL 54, at goal  Continue home Lipitor 40    Essential hypertension  Stroke risk factor  SBP <180  BP at goal on home clonidine    Aphasia  Due to stroke  SLP to evaluate and treat          5/24 patient stepped down overnight to stroke unit, currently with NG tube in place, patient will likely need PEG tube placed, will attempt to discuss goals of care with family, need to restart anticoagulation but will hold for now until PEG decision is made  5/25 no acute events overnight. IR consulted. Work up complete. NPO and barium Dave night. Plan for PEG Monday 5/27.   5/26 NAEON. Odorous urine reported by RN; UA w/ reflex ordered. NPO and barium at midnight for PEG placement tomorrow.     STROKE DOCUMENTATION   Acute Stroke Times   Last Known Normal Date: 05/20/19  Last Known Normal Time: 2030  Symptom Onset Date: 05/20/19  Symptom Onset Time: 2030  Stroke Team Called Date: 05/20/19  Stroke Team Called Time: 2338  Stroke Team Arrival Date: 05/21/19  Stroke Team Arrival Time: 0605  CT Interpretation Time: 2338    NIH Scale:  1a. Level of Consciousness: 1-->Not alert, but arousable by minor stimulation to obey, answer, or respond  1b. LOC Questions: 2-->Answers neither question correctly  1c. LOC Commands: 2-->Performs neither task correctly  2. Best Gaze: 2-->Forced deviation, or total gaze paresis not overcome by the oculocephalic maneuver  3. Visual: 2-->Complete hemianopia  4. Facial Palsy: 1-->Minor paralysis (flattened nasolabial fold, asymmetry on smiling)  5a. Motor Arm, Left: 1-->Drift, limb holds 90 (or 45) degrees, but drifts down before full 10 seconds, does not hit bed or other support  5b. Motor Arm, Right: 1-->Drift, limb holds 90 (or 45) degrees, but drifts down before full 10 secs, does not hit bed or  other support  6a. Motor Leg, Left: 1-->Drift, leg falls by the end of the 5-sec period but does not hit bed  6b. Motor Leg, Right: 1-->Drift, leg falls by the end of the 5-sec period but does not hit bed  7. Limb Ataxia: 0-->Absent  8. Sensory: 0-->Normal, no sensory loss  9. Best Language: 3-->Mute, global aphasia, no usable speech or auditory comprehension  10. Dysarthria: 2-->Severe dysarthria, patients speech is so slurred as to be unintelligible in the absence of or out of proportion to any dysphasia, or is mute/anarthric  11. Extinction and Inattention (formerly Neglect): 0-->No abnormality  Total (NIH Stroke Scale): 19       Modified Belmont Score: 3  Austin Coma Scale:    ABCD2 Score:    [id number] Score:   HAS -BLED Score:   ICH Score:   Hunt & Burkett Classification:      Hemorrhagic change of an Ischemic Stroke: Does this patient have an ischemic stroke with hemorrhagic changes? No     Neurologic Chief Complaint: Aphasia    Subjective:     Interval History: Patient is seen for follow-up neurological assessment and treatment recommendations: NAEON. Odorous urine reported by RN; UA w/ reflex ordered. NPO and barium at midnight for PEG placement tomorrow.     HPI, Past Medical, Family, and Social History remains the same as documented in the initial encounter.     Review of Systems   Constitutional: Negative for chills and fever.   Eyes: Positive for visual disturbance.   Gastrointestinal: Negative for vomiting.   Neurological: Positive for facial asymmetry, speech difficulty and weakness.     Scheduled Meds:   albuterol-ipratropium  3 mL Nebulization Q8H    aspirin  81 mg Per NG tube Daily    atorvastatin  40 mg Per NG tube Daily    buPROPion  150 mg Per NG tube BID    cloNIDine  0.1 mg Per NG tube BID    heparin (porcine)  5,000 Units Subcutaneous Q8H    memantine  10 mg Per NG tube BID    senna-docusate 8.6-50 mg  1 tablet Per NG tube BID    sodium chloride 3%  4 mL Nebulization Q8H     tuberculin  5 Units Intradermal Once     Continuous Infusions:   sodium chloride 0.9% 75 mL/hr at 05/23/19 2100     PRN Meds:acetaminophen, [START ON 5/27/2019] barium, dextrose 50%, glucagon (human recombinant), hydrALAZINE, insulin aspart U-100, magnesium oxide, magnesium oxide, ondansetron, potassium chloride 10%, potassium chloride 10%, potassium chloride 10%, potassium, sodium phosphates, potassium, sodium phosphates, potassium, sodium phosphates, sodium chloride 0.9%, sodium chloride 0.9%    Objective:     Vital Signs (Most Recent):  Temp: 97.4 °F (36.3 °C) (05/26/19 1124)  Pulse: 85 (05/26/19 1128)  Resp: 20 (05/26/19 0904)  BP: (!) 141/96 (05/26/19 1124)  SpO2: 95 % (05/26/19 1124)  BP Location: Left arm    Vital Signs Range (Last 24H):  Temp:  [96.7 °F (35.9 °C)-99.3 °F (37.4 °C)]   Pulse:  []   Resp:  [16-21]   BP: (123-142)/(56-96)   SpO2:  [95 %-100 %]   BP Location: Left arm    Physical Exam   Constitutional: She appears well-developed and well-nourished. No distress.   HENT:   Head: Normocephalic and atraumatic.   Cardiovascular: Normal rate.   Pulmonary/Chest: No respiratory distress.   Increased secretions   Neurological:   See below   Skin: Skin is warm and dry.   Psychiatric: She is noncommunicative. She is inattentive.   Vitals reviewed.    Neurological Exam:   LOC: drowsy  Attention Span: poor  Language: Global aphasia  Articulation: mute   Orientation: Untestable due to severe aphasia   Visual Fields: Hemianopsia right  EOM (CN III, IV, VI): Full/intact  Facial Movement (CN VII): Lower facial weakness on the Left  Motor: Arm left  Normal 4/5  Leg left  Normal 4/5  Arm right  Paresis: 4/5  Leg right Normal 4/5  Sensation: withdraws from painful stimuli in all 4 extremities     Laboratory:  CMP:   Recent Labs   Lab 05/26/19  0626   CALCIUM 9.4   ALBUMIN 2.6*   PROT 6.8      K 4.0   CO2 27      BUN 9   CREATININE 0.6   ALKPHOS 94   ALT 61*   AST 54*   BILITOT 0.6     BMP:    Recent Labs   Lab 05/26/19 0626      K 4.0      CO2 27   BUN 9   CREATININE 0.6   CALCIUM 9.4     CBC:   Recent Labs   Lab 05/26/19 0626   WBC 12.19   RBC 2.60*   HGB 7.9*   HCT 23.4*   *   MCV 90   MCH 30.4   MCHC 33.8     Lipid Panel:   Recent Labs   Lab 05/21/19 0928   CHOL 118*   LDLCALC 58.4*   HDL 49   TRIG 53     Coagulation:   Recent Labs   Lab 05/21/19 0928   INR 1.0   APTT 39.7*     Hgb A1C:   Recent Labs   Lab 05/21/19 0928   HGBA1C 5.9*     TSH:   Recent Labs   Lab 05/21/19 0928   TSH 2.293       Diagnostic Results     Brain Imaging     MRI 5/22  Evolving scattered areas of acute/recent infarction left MCA territory with new to increased component in the left inferior frontal gyrus now mild moderate in size.  There is no evidence for hemorrhagic conversion or significant mass effect.    Superimposed confluent T2 FLAIR signal abnormality elsewhere supra and infratentorial parenchyma while nonspecific concerning for advanced chronic microvascular ischemic change versus posttherapy change.    Remote basal gangliar, thalamic and cerebellar infarcts unchanged.    Compensatory enlargement of ventricular system similar to prior without hydrocephalus.    CTA Head and Neck 5/21  1. 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  The right vertebral artery is dominant and patent throughout its course in the neck.  2. There is calcified plaque in the common, internal and external carotid arteries bilaterally.  Stenosis on the right is mild at 50-69% and moderate on the left at approximately 70% without change.  CTA Head    1.  There is occlusion of the distal left M1 and proximal M2 segments of the middle cerebral artery which has occurred since the prior CTA head.  There is subtle increased density within the left middle cerebral artery at the distal M1 and proximal M2 segments on comparison head CT.  This  is consistent with thrombus.    2.  The right anterior circulation is normal.    3.  There is developmental variation with fetal origin of the right posterior cerebral artery.    Cardiac Imaging   · Concentric left ventricular remodeling.  · Small left ventricular cavity size.  · Increased (hyperdynamic) left ventricular systolic function. The estimated ejection fraction is 75%  · Grade I (mild) left ventricular diastolic dysfunction consistent with impaired relaxation.  · Moderate left atrial enlargement.  · Mild-to-moderate aortic valve stenosis.  · Aortic valve area is 1.43 cm2; peak velocity is 2.59 m/s; mean gradient is 15.51 mmHg.  · Mild mid-cavity left ventricular obstruction is present.  · Normal left atrial pressure.  · Mild mitral stenosis.      Baljeet Womack NP  [facility]  Department of Vascular Neurology   [facility]

## 2019-05-26 NOTE — PLAN OF CARE
Problem: Urinary Elimination Impaired (Stroke, Ischemic/Transient Ischemic Attack)  Goal: Effective Urinary Elimination    Intervention: Promote Effective Bladder Elimination  Patient and family educated on the importance of maintaining dry jeremiah area related to incontinence.    IOnstructed family on the importance of turning the patient to prevent further break down.   Family verbalized understanding.

## 2019-05-26 NOTE — ASSESSMENT & PLAN NOTE
Found on last admit 5-2018.  Previously on Eliquis but ran out and not refilled. (Concerns of noncompliance due to price. Please price check choice of AC prior to starting)  Will need to resume AC once PEG is placed

## 2019-05-26 NOTE — SUBJECTIVE & OBJECTIVE
Neurologic Chief Complaint: Aphasia    Subjective:     Interval History: Patient is seen for follow-up neurological assessment and treatment recommendations: NAEON. Odorous urine reported by RN; UA w/ reflex ordered. NPO and barium at midnight for PEG placement tomorrow.     HPI, Past Medical, Family, and Social History remains the same as documented in the initial encounter.     Review of Systems   Constitutional: Negative for chills and fever.   Eyes: Positive for visual disturbance.   Gastrointestinal: Negative for vomiting.   Neurological: Positive for facial asymmetry, speech difficulty and weakness.     Scheduled Meds:   albuterol-ipratropium  3 mL Nebulization Q8H    aspirin  81 mg Per NG tube Daily    atorvastatin  40 mg Per NG tube Daily    buPROPion  150 mg Per NG tube BID    cloNIDine  0.1 mg Per NG tube BID    heparin (porcine)  5,000 Units Subcutaneous Q8H    memantine  10 mg Per NG tube BID    senna-docusate 8.6-50 mg  1 tablet Per NG tube BID    sodium chloride 3%  4 mL Nebulization Q8H    tuberculin  5 Units Intradermal Once     Continuous Infusions:   sodium chloride 0.9% 75 mL/hr at 05/23/19 2100     PRN Meds:acetaminophen, [START ON 5/27/2019] barium, dextrose 50%, glucagon (human recombinant), hydrALAZINE, insulin aspart U-100, magnesium oxide, magnesium oxide, ondansetron, potassium chloride 10%, potassium chloride 10%, potassium chloride 10%, potassium, sodium phosphates, potassium, sodium phosphates, potassium, sodium phosphates, sodium chloride 0.9%, sodium chloride 0.9%    Objective:     Vital Signs (Most Recent):  Temp: 97.4 °F (36.3 °C) (05/26/19 1124)  Pulse: 85 (05/26/19 1128)  Resp: 20 (05/26/19 0904)  BP: (!) 141/96 (05/26/19 1124)  SpO2: 95 % (05/26/19 1124)  BP Location: Left arm    Vital Signs Range (Last 24H):  Temp:  [96.7 °F (35.9 °C)-99.3 °F (37.4 °C)]   Pulse:  []   Resp:  [16-21]   BP: (123-142)/(56-96)   SpO2:  [95 %-100 %]   BP Location: Left arm    Physical  Exam   Constitutional: She appears well-developed and well-nourished. No distress.   HENT:   Head: Normocephalic and atraumatic.   Cardiovascular: Normal rate.   Pulmonary/Chest: No respiratory distress.   Increased secretions   Neurological:   See below   Skin: Skin is warm and dry.   Psychiatric: She is noncommunicative. She is inattentive.   Vitals reviewed.    Neurological Exam:   LOC: drowsy  Attention Span: poor  Language: Global aphasia  Articulation: mute   Orientation: Untestable due to severe aphasia   Visual Fields: Hemianopsia right  EOM (CN III, IV, VI): Full/intact  Facial Movement (CN VII): Lower facial weakness on the Left  Motor: Arm left  Normal 4/5  Leg left  Normal 4/5  Arm right  Paresis: 4/5  Leg right Normal 4/5  Sensation: withdraws from painful stimuli in all 4 extremities     Laboratory:  CMP:   Recent Labs   Lab 05/26/19 0626   CALCIUM 9.4   ALBUMIN 2.6*   PROT 6.8      K 4.0   CO2 27      BUN 9   CREATININE 0.6   ALKPHOS 94   ALT 61*   AST 54*   BILITOT 0.6     BMP:   Recent Labs   Lab 05/26/19 0626      K 4.0      CO2 27   BUN 9   CREATININE 0.6   CALCIUM 9.4     CBC:   Recent Labs   Lab 05/26/19 0626   WBC 12.19   RBC 2.60*   HGB 7.9*   HCT 23.4*   *   MCV 90   MCH 30.4   MCHC 33.8     Lipid Panel:   Recent Labs   Lab 05/21/19 0928   CHOL 118*   LDLCALC 58.4*   HDL 49   TRIG 53     Coagulation:   Recent Labs   Lab 05/21/19 0928   INR 1.0   APTT 39.7*     Hgb A1C:   Recent Labs   Lab 05/21/19 0928   HGBA1C 5.9*     TSH:   Recent Labs   Lab 05/21/19 0928   TSH 2.293       Diagnostic Results     Brain Imaging     MRI 5/22  Evolving scattered areas of acute/recent infarction left MCA territory with new to increased component in the left inferior frontal gyrus now mild moderate in size.  There is no evidence for hemorrhagic conversion or significant mass effect.    Superimposed confluent T2 FLAIR signal abnormality elsewhere supra and infratentorial  parenchyma while nonspecific concerning for advanced chronic microvascular ischemic change versus posttherapy change.    Remote basal gangliar, thalamic and cerebellar infarcts unchanged.    Compensatory enlargement of ventricular system similar to prior without hydrocephalus.    CTA Head and Neck 5/21  1. 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  The right vertebral artery is dominant and patent throughout its course in the neck.  2. There is calcified plaque in the common, internal and external carotid arteries bilaterally.  Stenosis on the right is mild at 50-69% and moderate on the left at approximately 70% without change.  CTA Head    1.  There is occlusion of the distal left M1 and proximal M2 segments of the middle cerebral artery which has occurred since the prior CTA head.  There is subtle increased density within the left middle cerebral artery at the distal M1 and proximal M2 segments on comparison head CT.  This is consistent with thrombus.    2.  The right anterior circulation is normal.    3.  There is developmental variation with fetal origin of the right posterior cerebral artery.    Cardiac Imaging   · Concentric left ventricular remodeling.  · Small left ventricular cavity size.  · Increased (hyperdynamic) left ventricular systolic function. The estimated ejection fraction is 75%  · Grade I (mild) left ventricular diastolic dysfunction consistent with impaired relaxation.  · Moderate left atrial enlargement.  · Mild-to-moderate aortic valve stenosis.  · Aortic valve area is 1.43 cm2; peak velocity is 2.59 m/s; mean gradient is 15.51 mmHg.  · Mild mid-cavity left ventricular obstruction is present.  · Normal left atrial pressure.  · Mild mitral stenosis.

## 2019-05-27 LAB
BASOPHILS # BLD AUTO: 0.04 K/UL (ref 0–0.2)
BASOPHILS NFR BLD: 0.3 % (ref 0–1.9)
DIFFERENTIAL METHOD: ABNORMAL
EOSINOPHIL # BLD AUTO: 0.1 K/UL (ref 0–0.5)
EOSINOPHIL NFR BLD: 0.9 % (ref 0–8)
ERYTHROCYTE [DISTWIDTH] IN BLOOD BY AUTOMATED COUNT: 13.2 % (ref 11.5–14.5)
HCT VFR BLD AUTO: 22.8 % (ref 37–48.5)
HGB BLD-MCNC: 7.4 G/DL (ref 12–16)
IMM GRANULOCYTES # BLD AUTO: 0.2 K/UL (ref 0–0.04)
IMM GRANULOCYTES NFR BLD AUTO: 1.6 % (ref 0–0.5)
LYMPHOCYTES # BLD AUTO: 1.9 K/UL (ref 1–4.8)
LYMPHOCYTES NFR BLD: 14.7 % (ref 18–48)
MCH RBC QN AUTO: 30.2 PG (ref 27–31)
MCHC RBC AUTO-ENTMCNC: 32.5 G/DL (ref 32–36)
MCV RBC AUTO: 93 FL (ref 82–98)
MONOCYTES # BLD AUTO: 1.1 K/UL (ref 0.3–1)
MONOCYTES NFR BLD: 8.9 % (ref 4–15)
NEUTROPHILS # BLD AUTO: 9.3 K/UL (ref 1.8–7.7)
NEUTROPHILS NFR BLD: 73.6 % (ref 38–73)
NRBC BLD-RTO: 0 /100 WBC
PLATELET # BLD AUTO: 182 K/UL (ref 150–350)
PMV BLD AUTO: 13.3 FL (ref 9.2–12.9)
POCT GLUCOSE: 140 MG/DL (ref 70–110)
POCT GLUCOSE: 70 MG/DL (ref 70–110)
RBC # BLD AUTO: 2.45 M/UL (ref 4–5.4)
WBC # BLD AUTO: 12.69 K/UL (ref 3.9–12.7)

## 2019-05-27 PROCEDURE — 99233 SBSQ HOSP IP/OBS HIGH 50: CPT | Mod: ,,, | Performed by: PSYCHIATRY & NEUROLOGY

## 2019-05-27 PROCEDURE — 63600175 PHARM REV CODE 636 W HCPCS: Performed by: RADIOLOGY

## 2019-05-27 PROCEDURE — 93010 EKG 12-LEAD: ICD-10-PCS | Mod: ,,, | Performed by: INTERNAL MEDICINE

## 2019-05-27 PROCEDURE — 63600175 PHARM REV CODE 636 W HCPCS: Performed by: NURSE PRACTITIONER

## 2019-05-27 PROCEDURE — 25000003 PHARM REV CODE 250: Performed by: STUDENT IN AN ORGANIZED HEALTH CARE EDUCATION/TRAINING PROGRAM

## 2019-05-27 PROCEDURE — A9698 NON-RAD CONTRAST MATERIALNOC: HCPCS | Performed by: FAMILY MEDICINE

## 2019-05-27 PROCEDURE — 97530 THERAPEUTIC ACTIVITIES: CPT

## 2019-05-27 PROCEDURE — 25000003 PHARM REV CODE 250: Performed by: PHYSICIAN ASSISTANT

## 2019-05-27 PROCEDURE — 85025 COMPLETE CBC W/AUTO DIFF WBC: CPT

## 2019-05-27 PROCEDURE — 20600001 HC STEP DOWN PRIVATE ROOM

## 2019-05-27 PROCEDURE — 25000003 PHARM REV CODE 250: Performed by: PSYCHIATRY & NEUROLOGY

## 2019-05-27 PROCEDURE — 27000221 HC OXYGEN, UP TO 24 HOURS

## 2019-05-27 PROCEDURE — 99233 PR SUBSEQUENT HOSPITAL CARE,LEVL III: ICD-10-PCS | Mod: ,,, | Performed by: PSYCHIATRY & NEUROLOGY

## 2019-05-27 PROCEDURE — 25500020 PHARM REV CODE 255: Performed by: FAMILY MEDICINE

## 2019-05-27 PROCEDURE — 93010 ELECTROCARDIOGRAM REPORT: CPT | Mod: ,,, | Performed by: INTERNAL MEDICINE

## 2019-05-27 PROCEDURE — 94640 AIRWAY INHALATION TREATMENT: CPT

## 2019-05-27 PROCEDURE — 94761 N-INVAS EAR/PLS OXIMETRY MLT: CPT

## 2019-05-27 PROCEDURE — 25000003 PHARM REV CODE 250: Performed by: NURSE PRACTITIONER

## 2019-05-27 PROCEDURE — 36415 COLL VENOUS BLD VENIPUNCTURE: CPT

## 2019-05-27 PROCEDURE — 93005 ELECTROCARDIOGRAM TRACING: CPT

## 2019-05-27 PROCEDURE — 25000242 PHARM REV CODE 250 ALT 637 W/ HCPCS: Performed by: PHYSICIAN ASSISTANT

## 2019-05-27 PROCEDURE — 63600175 PHARM REV CODE 636 W HCPCS: Performed by: PHYSICIAN ASSISTANT

## 2019-05-27 RX ORDER — HYDROCODONE BITARTRATE AND ACETAMINOPHEN 5; 325 MG/1; MG/1
1 TABLET ORAL EVERY 4 HOURS PRN
Status: CANCELLED | OUTPATIENT
Start: 2019-05-27

## 2019-05-27 RX ORDER — CEFTRIAXONE 1 G/1
1 INJECTION, POWDER, FOR SOLUTION INTRAMUSCULAR; INTRAVENOUS
Status: DISCONTINUED | OUTPATIENT
Start: 2019-05-27 | End: 2019-05-28

## 2019-05-27 RX ORDER — GLUCAGON 1 MG
KIT INJECTION CODE/TRAUMA/SEDATION MEDICATION
Status: COMPLETED | OUTPATIENT
Start: 2019-05-27 | End: 2019-05-27

## 2019-05-27 RX ORDER — CEFAZOLIN SODIUM 1 G/50ML
SOLUTION INTRAVENOUS
Status: COMPLETED | OUTPATIENT
Start: 2019-05-27 | End: 2019-05-27

## 2019-05-27 RX ORDER — FENTANYL CITRATE 50 UG/ML
INJECTION, SOLUTION INTRAMUSCULAR; INTRAVENOUS CODE/TRAUMA/SEDATION MEDICATION
Status: COMPLETED | OUTPATIENT
Start: 2019-05-27 | End: 2019-05-27

## 2019-05-27 RX ORDER — MIDAZOLAM HYDROCHLORIDE 1 MG/ML
INJECTION INTRAMUSCULAR; INTRAVENOUS CODE/TRAUMA/SEDATION MEDICATION
Status: COMPLETED | OUTPATIENT
Start: 2019-05-27 | End: 2019-05-27

## 2019-05-27 RX ADMIN — Medication 450 ML: at 12:05

## 2019-05-27 RX ADMIN — MEMANTINE 10 MG: 10 TABLET ORAL at 09:05

## 2019-05-27 RX ADMIN — CEFTRIAXONE SODIUM 1 G: 1 INJECTION, POWDER, FOR SOLUTION INTRAMUSCULAR; INTRAVENOUS at 09:05

## 2019-05-27 RX ADMIN — HEPARIN SODIUM 5000 UNITS: 5000 INJECTION, SOLUTION INTRAVENOUS; SUBCUTANEOUS at 02:05

## 2019-05-27 RX ADMIN — SODIUM CHLORIDE 1000 ML: 0.9 INJECTION, SOLUTION INTRAVENOUS at 11:05

## 2019-05-27 RX ADMIN — SENNOSIDES AND DOCUSATE SODIUM 1 TABLET: 8.6; 5 TABLET ORAL at 09:05

## 2019-05-27 RX ADMIN — HEPARIN SODIUM 5000 UNITS: 5000 INJECTION, SOLUTION INTRAVENOUS; SUBCUTANEOUS at 05:05

## 2019-05-27 RX ADMIN — IPRATROPIUM BROMIDE AND ALBUTEROL SULFATE 3 ML: .5; 3 SOLUTION RESPIRATORY (INHALATION) at 08:05

## 2019-05-27 RX ADMIN — CEFAZOLIN SODIUM 1 G: 1 SOLUTION INTRAVENOUS at 05:05

## 2019-05-27 RX ADMIN — CLONIDINE HYDROCHLORIDE 0.1 MG: 0.1 TABLET ORAL at 09:05

## 2019-05-27 RX ADMIN — ATORVASTATIN CALCIUM 40 MG: 20 TABLET, FILM COATED ORAL at 09:05

## 2019-05-27 RX ADMIN — SODIUM CHLORIDE SOLN NEBU 3% 4 ML: 3 NEBU SOLN at 12:05

## 2019-05-27 RX ADMIN — BUPROPION HYDROCHLORIDE 150 MG: 75 TABLET, FILM COATED ORAL at 09:05

## 2019-05-27 RX ADMIN — FENTANYL CITRATE 25 MCG: 50 INJECTION, SOLUTION INTRAMUSCULAR; INTRAVENOUS at 05:05

## 2019-05-27 RX ADMIN — IPRATROPIUM BROMIDE AND ALBUTEROL SULFATE 3 ML: .5; 3 SOLUTION RESPIRATORY (INHALATION) at 12:05

## 2019-05-27 RX ADMIN — Medication 12.5 MG: at 01:05

## 2019-05-27 RX ADMIN — SODIUM CHLORIDE SOLN NEBU 3% 4 ML: 3 NEBU SOLN at 08:05

## 2019-05-27 RX ADMIN — HEPARIN SODIUM 5000 UNITS: 5000 INJECTION, SOLUTION INTRAVENOUS; SUBCUTANEOUS at 09:05

## 2019-05-27 RX ADMIN — Medication 12.5 MG: at 09:05

## 2019-05-27 RX ADMIN — MIDAZOLAM HYDROCHLORIDE 1 MG: 1 INJECTION, SOLUTION INTRAMUSCULAR; INTRAVENOUS at 05:05

## 2019-05-27 RX ADMIN — ASPIRIN 81 MG: 81 TABLET, COATED ORAL at 01:05

## 2019-05-27 RX ADMIN — GLUCAGON HYDROCHLORIDE 1 MG: KIT at 05:05

## 2019-05-27 NOTE — PROGRESS NOTES
Pt transferred back to room via transport and with RN. NSU RN notified of patients arrival and at the bedside to assume care.

## 2019-05-27 NOTE — PT/OT/SLP PROGRESS
Occupational Therapy   Treatment    Name: Sury Cobos  MRN: [id number]  Admitting Diagnosis:  Embolic stroke involving left middle cerebral artery       Recommendations:     Discharge Recommendations: nursing facility, skilled(longterm; vs 24 hour care)  Discharge Equipment Recommendations:  (pressure relief equipment)  Barriers to discharge:  Other (Comment)(level of skilled and physical assistance required at this time)    Assessment:     Sury Cobos is a 84 y.o. female with a medical diagnosis of Embolic stroke involving left middle cerebral artery.  She cont to demo limitations with functional engagement and participation with tasks/activities/mobility. She cont to require total care at this time. Cont to rec longterm SNF placement vs home with family and 24 hour care. Performance deficits affecting function are weakness, impaired endurance, impaired sensation, impaired self care skills, impaired functional mobilty, gait instability, impaired balance, visual deficits, impaired cognition, decreased upper extremity function, decreased lower extremity function, impaired coordination, impaired fine motor, impaired cardiopulmonary response to activity.     Rehab Prognosis:  Fair; patient would benefit from acute skilled OT services to address these deficits and reach maximum level of function.       Plan:     Patient to be seen 3 x/week to address the above listed problems via self-care/home management, therapeutic exercises, therapeutic activities, neuromuscular re-education, cognitive retraining, sensory integration  · Plan of Care Expires: 06/20/19  · Plan of Care Reviewed with: patient    Subjective   Pt with no verbalizations throughout.  Pain/Comfort:  · Pain Rating 1: 0/10(unable to report; no indications made)  · Pain Rating Post-Intervention 1: 0/10    Objective:     Communicated with: RN prior to session. Completed with PT.  Patient found HOB elevated with NG; B wrist restraints, peripheral IV, bed alarm,  telemetry, PureWick upon OT entry to room.    General Precautions: Standard, aphasia, aspiration, fall, NPO, vision impaired(DNR)   Orthopedic Precautions:N/A   Braces: N/A     Occupational Performance:   Bed Mobility:    · Patient completed Rolling/Turning to Left with  maximal assistance  · Patient completed Rolling/Turning to Right with maximal assistance  · Patient completed Scooting/Bridging with total assistance  · Patient completed Supine to Sit with maximal assistance  · Patient completed Sit to Supine with maximal assistance     Functional Mobility/Transfers:  · Lateral scooting with total(A)  · Assist in part 2/2 resistive to task    Activities of Daily Living:  · Feeding:  NPO     · Grooming: total(A); seated EOB- hand over hand to complete (application of lotion to B UE, wiping mouth, washing face)     · Upper Body Dressing: maximal assistance EOB  · Toileting: total assistance - bed level perineal care    [facility] 6 Click ADL: 6    Treatment & Education:  -Pt alert with eyes open throughout; poor/0% functional visual tracking/scanning/sustained visual fixation to task  -EOB x10 min duration with varied level of assistance required for postural control (CGA-max(A))  *OT to facilitate B LE grounding and functional use of B UE for tasks  *facilitation for > R side attention with max cues  -return to supine in upright position; R UE in elevation and extension with abduction ; cervical positioning aid to facilitate midline   -Communication board updated; no family at bedside for education       Patient left with bed in chair position with all lines intact, call button in reach, bed alarm on, restraints reapplied at end of session and RN notifiedEducation:      GOALS:   Multidisciplinary Problems     Occupational Therapy Goals        Problem: Occupational Therapy Goal    Goal Priority Disciplines Outcome Interventions   Occupational Therapy Goal     OT, PT/OT Ongoing (interventions implemented as appropriate)     Description:  Goals to be met by: 6/6/2019     Patient will increase functional independence with ADLs by performing:    Pt will engage in functional task x2 min duration with 0 added cues/facilitation.  Sitting at edge of bed x10 minutes for functional task with Minimal Assistance for postural control.  Rolling to Bilateral with Minimal Assistance and use of bedrail as needed.   Supine to sit with Moderate Assistance.  Stand pivot transfers with Moderate Assistance.  CG demo understanding for s/s of stroke.   CG demo understanding for B UE positioning and ROM exercises while pt in supine.   CG demo understanding for pressure relief techniques.                       Time Tracking:     OT Date of Treatment: 05/27/19  OT Start Time: 1000  OT Stop Time: 1020  OT Total Time (min): 20 min    Billable Minutes:Therapeutic Activity 15    PATITO Casas  5/27/2019

## 2019-05-27 NOTE — SUBJECTIVE & OBJECTIVE
Neurologic Chief Complaint: Aphasia    Subjective:     Interval History: Patient is seen for follow-up neurological assessment and treatment recommendations:  UA collected yesterday indicative of UTI, rocephin started.  Patient had afib with RVR today, metoprolol 12.5mg BID started. PEG placement pending.     HPI, Past Medical, Family, and Social History remains the same as documented in the initial encounter.     Review of Systems   Constitutional: Negative for chills and fever.   Eyes: Positive for visual disturbance.   Gastrointestinal: Negative for vomiting.   Neurological: Positive for facial asymmetry, speech difficulty and weakness.     Scheduled Meds:   albuterol-ipratropium  3 mL Nebulization Q8H    aspirin  81 mg Per NG tube Daily    atorvastatin  40 mg Per NG tube Daily    buPROPion  150 mg Per NG tube BID    cefTRIAXone (ROCEPHIN) IVPB  1 g Intravenous Q24H    cloNIDine  0.1 mg Per NG tube BID    heparin (porcine)  5,000 Units Subcutaneous Q8H    memantine  10 mg Per NG tube BID    metoprolol  12.5 mg Per NG tube BID    senna-docusate 8.6-50 mg  1 tablet Per NG tube BID    sodium chloride 3%  4 mL Nebulization Q8H    tuberculin  5 Units Intradermal Once     Continuous Infusions:   sodium chloride 0.9% 75 mL/hr at 05/23/19 2100     PRN Meds:acetaminophen, dextrose 50%, glucagon (human recombinant), hydrALAZINE, insulin aspart U-100, magnesium oxide, magnesium oxide, ondansetron, potassium chloride 10%, potassium chloride 10%, potassium chloride 10%, potassium, sodium phosphates, potassium, sodium phosphates, potassium, sodium phosphates, sodium chloride 0.9%, sodium chloride 0.9%    Objective:     Vital Signs (Most Recent):  Temp: 97.7 °F (36.5 °C) (05/27/19 1125)  Pulse: (!) 140 (05/27/19 1125)  Resp: 19 (05/27/19 1125)  BP: 130/84 (05/27/19 1125)  SpO2: 98 % (05/27/19 1125)  BP Location: Right arm    Vital Signs Range (Last 24H):  Temp:  [97.7 °F (36.5 °C)-99.5 °F (37.5 °C)]   Pulse:   []   Resp:  [14-19]   BP: (130-189)/(60-84)   SpO2:  [76 %-99 %]   BP Location: Right arm    Physical Exam   Constitutional: She appears well-developed and well-nourished. No distress.   HENT:   Head: Normocephalic and atraumatic.   Cardiovascular: Normal rate.   Pulmonary/Chest: No respiratory distress.   Skin: Skin is warm and dry.   Psychiatric: She is noncommunicative. She is inattentive.   Vitals reviewed.    Neurological Exam:   LOC: drowsy  Attention Span: poor  Language: Global aphasia  Articulation: mute   Orientation: Untestable due to severe aphasia   Visual Fields: Hemianopsia right  EOM (CN III, IV, VI): Full/intact  Facial Movement (CN VII): Lower facial weakness on the Left  Motor: Arm left  Normal 4/5  Leg left  Normal 4/5  Arm right  Paresis: 4/5  Leg right Normal 4/5       Laboratory:  CMP:   No results for input(s): GLUCOSE, CALCIUM, ALBUMIN, PROT, NA, K, CO2, CL, BUN, CREATININE, ALKPHOS, ALT, AST, BILITOT in the last 24 hours.  BMP:   Recent Labs   Lab 05/26/19  0626      K 4.0      CO2 27   BUN 9   CREATININE 0.6   CALCIUM 9.4     CBC:   Recent Labs   Lab 05/27/19  0309   WBC 12.69   RBC 2.45*   HGB 7.4*   HCT 22.8*      MCV 93   MCH 30.2   MCHC 32.5     Lipid Panel:   Recent Labs   Lab 05/21/19  0928   CHOL 118*   LDLCALC 58.4*   HDL 49   TRIG 53     Coagulation:   Recent Labs   Lab 05/21/19  0928   INR 1.0   APTT 39.7*     Hgb A1C:   Recent Labs   Lab 05/21/19  0928   HGBA1C 5.9*     TSH:   Recent Labs   Lab 05/21/19  0928   TSH 2.293       Diagnostic Results     Brain Imaging     MRI 5/22  Evolving scattered areas of acute/recent infarction left MCA territory with new to increased component in the left inferior frontal gyrus now mild moderate in size.  There is no evidence for hemorrhagic conversion or significant mass effect.    Superimposed confluent T2 FLAIR signal abnormality elsewhere supra and infratentorial parenchyma while nonspecific concerning for advanced  chronic microvascular ischemic change versus posttherapy change.    Remote basal gangliar, thalamic and cerebellar infarcts unchanged.    Compensatory enlargement of ventricular system similar to prior without hydrocephalus.    CTA Head and Neck 5/21  1. 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  The right vertebral artery is dominant and patent throughout its course in the neck.  2. There is calcified plaque in the common, internal and external carotid arteries bilaterally.  Stenosis on the right is mild at 50-69% and moderate on the left at approximately 70% without change.  CTA Head    1.  There is occlusion of the distal left M1 and proximal M2 segments of the middle cerebral artery which has occurred since the prior CTA head.  There is subtle increased density within the left middle cerebral artery at the distal M1 and proximal M2 segments on comparison head CT.  This is consistent with thrombus.    2.  The right anterior circulation is normal.    3.  There is developmental variation with fetal origin of the right posterior cerebral artery.    Cardiac Imaging   · Concentric left ventricular remodeling.  · Small left ventricular cavity size.  · Increased (hyperdynamic) left ventricular systolic function. The estimated ejection fraction is 75%  · Grade I (mild) left ventricular diastolic dysfunction consistent with impaired relaxation.  · Moderate left atrial enlargement.  · Mild-to-moderate aortic valve stenosis.  · Aortic valve area is 1.43 cm2; peak velocity is 2.59 m/s; mean gradient is 15.51 mmHg.  · Mild mid-cavity left ventricular obstruction is present.  · Normal left atrial pressure.  · Mild mitral stenosis.

## 2019-05-27 NOTE — PT/OT/SLP PROGRESS
Physical Therapy Treatment    Patient Name:  Sury Cobos   MRN:  [id number]    Recommendations:     Discharge Recommendations:  nursing facility, skilled(long-term vs home with 24 hour caregiver assist)   Discharge Equipment Recommendations: (pressure relief equipment)   Barriers to discharge: Inaccessible home, Decreased caregiver support and level of skilled assist patient requires    Assessment:     Sury Cobos is a 84 y.o. female admitted with a medical diagnosis of Embolic stroke involving left middle cerebral artery.  She presents with the following impairments/functional limitations:  weakness, impaired endurance, impaired self care skills, impaired functional mobilty, gait instability, impaired balance, decreased coordination, decreased lower extremity function, decreased upper extremity function, impaired cognition, decreased safety awareness, abnormal tone, impaired coordination, impaired fine motor, decreased ROM, impaired cardiopulmonary response to activity, impaired joint extensibility. The patient showed improvement in visual tracking, but continues to have R inattention with inability to track to midline. She has some bilateral spontaneous upper and lower extremity movement L>R, but she is unable to follow cues to actively participate in treatment. She sat edge of bed for 10 minutes with variable assist from contact guard assist to maximum assistance due to posterior lean.    Rehab Prognosis: Fair; patient would benefit from acute skilled PT services to address these deficits and reach maximum level of function.    Recent Surgery: * No surgery found *      Plan:     During this hospitalization, patient to be seen 3 x/week to address the identified rehab impairments via therapeutic activities, therapeutic exercises, neuromuscular re-education and progress toward the following goals:    · Plan of Care Expires:  06/23/19    Subjective     Chief Complaint: unable to state  Patient/Family Comments/goals:  unable to state  Pain/Comfort:  · Pain Rating 1: 0/10(unable to report, no evidence of pain during treatment)      Objective:     Communicated with RN prior to session.  Patient found HOB elevated with telemetry, peripheral IV, restraints, bed alarm, NG tube, PureWick upon PT entry to room.     General Precautions: Standard, aphasia, aspiration, fall, NPO   Orthopedic Precautions:N/A   Braces: N/A     Functional Mobility:    Bed Mobility  Rolling to R and L: maximum assistance  Supine to Sit:  maximum assistance   Sit to supine: maximum assistance x2  Scooting to head of bed: total assistance x2   Transfers Lateral scooting edge of bed: total assistance due to resistance     Sitting edge of bed 10 minutes, maximum assistance to contact guard assist, weight shift posterior and L   -Posterior pelvic tilt, kyphotic posture, L cervical and trunk rotation  -Visual/verbal/manual cues for midline orientation, thoracic and cervical extension  -Hand over hand placement of R hand in weightbearing, cued repeatedly to maintain L hand in lap or holding rail  -Cues for visual tracking to midline  -Performed self care tasks sitting edge of bed with OT          AM-PAC 6 CLICK MOBILITY  Turning over in bed (including adjusting bedclothes, sheets and blankets)?: 2  Sitting down on and standing up from a chair with arms (e.g., wheelchair, bedside commode, etc.): 1  Moving from lying on back to sitting on the side of the bed?: 2  Moving to and from a bed to a chair (including a wheelchair)?: 1  Need to walk in hospital room?: 1  Climbing 3-5 steps with a railing?: 1  Basic Mobility Total Score: 8       Therapeutic Activities and Exercises:  Rolled side to side for pericare following BM. Patient educated on role of therapy, goals of session, benefits of out of bed mobility. Patient agreeable to mobilize with therapy.  Discussed PT plan of care during hospitalization. Patient educated that they need to call for assistance to mobilize out  of bed. Whiteboard updated as appropriate. Patient educated on how their diagnosis impacts their mobility within PT scope of practice. Positioned in midline alignment, neck supported on L to maintain neutral alignent, heels floated, R upper extremity elevated.     Patient left HOB elevated with all lines intact, call button in reach, bed alarm on and restraints reapplied at end of session..    GOALS:   Multidisciplinary Problems     Physical Therapy Goals        Problem: Physical Therapy Goal    Goal Priority Disciplines Outcome Goal Variances Interventions   Physical Therapy Goal     PT, PT/OT Ongoing (interventions implemented as appropriate)     Description:  Goals to be met by: 6/3/2019       Patient will increase functional independence with mobility by performin. Supine to sit with Moderate Assistance  2. Sit to supine with Moderate Assistance  3. Rolling to Left and Right with Moderate Assistance.  4. Sit to stand transfer with Moderate Assistance  5. Bed to chair transfer with Maximum Assistance using squat pivot technique.   6. Sitting at edge of bed x15 minutes with Contact Guard Assistance and bilateral upper extremity support  7. Lower extremity exercise program x20 reps per handout, with assistance as needed to improve strength and increase activity tolerance.                       Time Tracking:     PT Received On: 19  PT Start Time: 958     PT Stop Time: 1020  PT Total Time (min): 22 min     Billable Minutes: Therapeutic Activity 22    Treatment Type: Treatment  PT/PTA: PT     PTA Visit Number: 0     Soumya Rodrigues, PT  2019

## 2019-05-27 NOTE — PROCEDURES
[facility]  Interventional Radiology  High Risk Procedure - Inpatient    Date: 05/27/2019 Time: 5:30 PM    Pre-Op Diagnosis: CVA, dysphagia    Post-Op Diagnosis: CVA    Procedure Performed by: Rafia    Assistant: Gregg    Procedure: G tube placement    Specimen/Tissue Removed: N/A    Estimated Blood Loss: Less than 50L    Procedure Note/Findings: 18F cook Entuit gastrostomy tube placed.  Patient tolerated procedure well.      Please refer to dictated report for additional details.

## 2019-05-27 NOTE — PLAN OF CARE
Problem: Adult Inpatient Plan of Care  Goal: Plan of Care Review  Assessment and Plan  Nutrition Problem  Difficulty swallowing     Related to (etiology):   stroke     Signs and Symptoms (as evidenced by):   Pt having PEG tube placed today     Interventions/Recommendations (treatment strategy):  Collaboration of care.     Nutrition Diagnosis Status:   Continues    Recommendations     Recommendation: 1. As medically able, recommend starting TF. Glucerna 1.5 @ goal rate 40mL/hr. - Initiate @ 10mL/hr and increase by 10mL q4hrs, or as tolerated, until goal rate is reached. - Hold for residuals >500mL. - Provides 1440kcals, 79g protein and 729mL free water. - Hold for residuals >500mL.      Goals: Pt to receive nutrition by RD follow up  Nutrition Goal Status: progressing towards goal  Communication of RD Recs: (POC)

## 2019-05-27 NOTE — NURSING
Patient off unit to interventional radiology for PEG placement at this time. Will await post-op report and safe return of patient.

## 2019-05-27 NOTE — PT/OT/SLP PROGRESS
Speech Language Pathology  Patient Not Seen      Sury Cobos  MRN: [id number]    Patient not seen today secondary to Nursing hold Pt to have PEG placed. Will follow-up at next scheduled treatment date.     Emily P. Abadie M.S., CCC-SLP  Speech Language Pathologist  [phone]  05/27/2019

## 2019-05-27 NOTE — PLAN OF CARE
Problem: Occupational Therapy Goal  Goal: Occupational Therapy Goal  Goals to be met by: 6/6/2019     Patient will increase functional independence with ADLs by performing:    Pt will engage in functional task x2 min duration with 0 added cues/facilitation.  Sitting at edge of bed x10 minutes for functional task with Minimal Assistance for postural control.  Rolling to Bilateral with Minimal Assistance and use of bedrail as needed.   Supine to sit with Moderate Assistance.  Stand pivot transfers with Moderate Assistance.  CG demo understanding for s/s of stroke.   CG demo understanding for B UE positioning and ROM exercises while pt in supine.   CG demo understanding for pressure relief techniques.      Outcome: Ongoing (interventions implemented as appropriate)  Goals remain appropriate. PATITO Casas 5/27/2019

## 2019-05-27 NOTE — CARE UPDATE
Rapid Response Nurse Chart Check     Chart check completed, abnormal VS noted, bedside RN, Birgit contacted, no concerns   verbalized at this time. MD placed orders for metoprolol instructed to call [phone] for further concerns or assistance.

## 2019-05-27 NOTE — ASSESSMENT & PLAN NOTE
SLP to evaluate and treat  Currently with NG tube in place  High chance for aspiration PNA, will monitor for signs of infection or worsening respiratory status    5/27- PEG placement pending

## 2019-05-27 NOTE — ASSESSMENT & PLAN NOTE
83 y/o female with cardioembolic L MCA infarct 2/2 a fib (not compliant with anticoagulation at home. Patient was out of window for tPA, but when to IR for L M1 thrombectomy, TICI 3 flow restored.     Antithrombotics: Plans to resume anticoagulation after peg tube is placed. She will need investigation of affordability of anticoagulation for discharge. Will continue with asa 81 only for now    Statins: Lipitor 40    Aggressive risk factor modification: HTN, HLD, prior stroke, afib     Rehab efforts: The patient has been evaluated by a stroke team provider and the therapy needs have been fully considered based off the presenting complaints and exam findings. The following therapy evaluations are needed: PT evaluate and treat, OT evaluate and treat, SLP evaluate and treat, PM&R evaluate for appropriate placement    Diagnostics ordered/pending: None    VTE prophylaxis: Heparin 5000 units SQ every 8 hours , SCDs    BP parameters: S/p thrombectomy <180

## 2019-05-27 NOTE — ASSESSMENT & PLAN NOTE
Found on last admit 5-2018.  Previously on Eliquis but ran out and not refilled. (Concerns of noncompliance due to price. Please price check choice of AC prior to starting)  Will need to resume AC once PEG is placed    Patient went into Afib with RVR on 05/27- metoprolol 12.5 mg BID started

## 2019-05-27 NOTE — PROGRESS NOTES
[facility]  Vascular Neurology  Comprehensive Stroke Center  Progress Note    Assessment/Plan:     * Embolic stroke involving left middle cerebral artery  85 y/o female with cardioembolic L MCA infarct 2/2 a fib (not compliant with anticoagulation at home. Patient was out of window for tPA, but when to IR for L M1 thrombectomy, TICI 3 flow restored.     Antithrombotics: Plans to resume anticoagulation after peg tube is placed. She will need investigation of affordability of anticoagulation for discharge. Will continue with asa 81 only for now    Statins: Lipitor 40    Aggressive risk factor modification: HTN, HLD, prior stroke, afib     Rehab efforts: The patient has been evaluated by a stroke team provider and the therapy needs have been fully considered based off the presenting complaints and exam findings. The following therapy evaluations are needed: PT evaluate and treat, OT evaluate and treat, SLP evaluate and treat, PM&R evaluate for appropriate placement    Diagnostics ordered/pending: None    VTE prophylaxis: Heparin 5000 units SQ every 8 hours , SCDs    BP parameters: S/p thrombectomy <180      Debility  Patient was reported to be back to baseline and ambulating with walker prior to this hospitalization  Currently aphasic and not following commands, but able to TOSCANO spontaneously  PT/OT now recommending SNF or home with 24 hour support  Plans to discuss goals of care with family    Diabetes mellitus  A1C 5.9  Currently NPO pending PEG placement  Continue q6h glucose and SSI      Paroxysmal atrial fibrillation  Found on last admit 5-2018.  Previously on Eliquis but ran out and not refilled. (Concerns of noncompliance due to price. Please price check choice of AC prior to starting)  Will need to resume AC once PEG is placed    Patient went into Afib with RVR on 05/27- metoprolol 12.5 mg BID started    Dysphagia  SLP to evaluate and treat  Currently with NG tube in place  High chance for  aspiration PNA, will monitor for signs of infection or worsening respiratory status    5/27- PEG placement pending    Dementia with behavioral disturbance  Continue home memantine  Delirium precautions    Mixed hyperlipidemia  Stroke risk factor  LDL 54, at goal  Continue home Lipitor 40    Essential hypertension  Stroke risk factor  SBP <180  BP at goal on home clonidine    Aphasia  Due to stroke  SLP to evaluate and treat          5/24 patient stepped down overnight to stroke unit, currently with NG tube in place, patient will likely need PEG tube placed, will attempt to discuss goals of care with family, need to restart anticoagulation but will hold for now until PEG decision is made  5/25 no acute events overnight. IR consulted. Work up complete. NPO and barium Sunday night. Plan for PEG Monday 5/27.   5/26 NAEON. Odorous urine reported by RN; UA w/ reflex ordered. NPO and barium at midnight for PEG placement tomorrow.   5/27: UA collected yesterday indicative of UTI, rocephin started.  Patient had afib with RVR today, metoprolol 12.5mg BID started. PEG placement pending.     STROKE DOCUMENTATION   Acute Stroke Times   Last Known Normal Date: 05/20/19  Last Known Normal Time: 2030  Symptom Onset Date: 05/20/19  Symptom Onset Time: 2030  Stroke Team Called Date: 05/20/19  Stroke Team Called Time: 2338  Stroke Team Arrival Date: 05/21/19  Stroke Team Arrival Time: 0605  CT Interpretation Time: 2338    NIH Scale:  1a. Level of Consciousness: 1-->Not alert, but arousable by minor stimulation to obey, answer, or respond  1b. LOC Questions: 2-->Answers neither question correctly  1c. LOC Commands: 2-->Performs neither task correctly  2. Best Gaze: 0-->Normal  3. Visual: 2-->Complete hemianopia  4. Facial Palsy: 1-->Minor paralysis (flattened nasolabial fold, asymmetry on smiling)  5a. Motor Arm, Left: 1-->Drift, limb holds 90 (or 45) degrees, but drifts down before full 10 seconds, does not hit bed or other  support  5b. Motor Arm, Right: 1-->Drift, limb holds 90 (or 45) degrees, but drifts down before full 10 secs, does not hit bed or other support  6a. Motor Leg, Left: 1-->Drift, leg falls by the end of the 5-sec period but does not hit bed  6b. Motor Leg, Right: 1-->Drift, leg falls by the end of the 5-sec period but does not hit bed  7. Limb Ataxia: 0-->Absent  8. Sensory: 0-->Normal, no sensory loss  9. Best Language: 3-->Mute, global aphasia, no usable speech or auditory comprehension  10. Dysarthria: 2-->Severe dysarthria, patients speech is so slurred as to be unintelligible in the absence of or out of proportion to any dysphasia, or is mute/anarthric  11. Extinction and Inattention (formerly Neglect): 0-->No abnormality  Total (NIH Stroke Scale): 17       Modified [place] Score: 3  Blaine Coma Scale:    ABCD2 Score:    [id number] Score:   HAS -BLED Score:   ICH Score:   Hunt & Burkett Classification:      Hemorrhagic change of an Ischemic Stroke: Does this patient have an ischemic stroke with hemorrhagic changes? No     Neurologic Chief Complaint: Aphasia    Subjective:     Interval History: Patient is seen for follow-up neurological assessment and treatment recommendations:  UA collected yesterday indicative of UTI, rocephin started.  Patient had afib with RVR today, metoprolol 12.5mg BID started. PEG placement pending.     HPI, Past Medical, Family, and Social History remains the same as documented in the initial encounter.     Review of Systems   Constitutional: Negative for chills and fever.   Eyes: Positive for visual disturbance.   Gastrointestinal: Negative for vomiting.   Neurological: Positive for facial asymmetry, speech difficulty and weakness.     Scheduled Meds:   albuterol-ipratropium  3 mL Nebulization Q8H    aspirin  81 mg Per NG tube Daily    atorvastatin  40 mg Per NG tube Daily    buPROPion  150 mg Per NG tube BID    cefTRIAXone (ROCEPHIN) IVPB  1 g Intravenous Q24H    cloNIDine  0.1 mg  Per NG tube BID    heparin (porcine)  5,000 Units Subcutaneous Q8H    memantine  10 mg Per NG tube BID    metoprolol  12.5 mg Per NG tube BID    senna-docusate 8.6-50 mg  1 tablet Per NG tube BID    sodium chloride 3%  4 mL Nebulization Q8H    tuberculin  5 Units Intradermal Once     Continuous Infusions:   sodium chloride 0.9% 75 mL/hr at 05/23/19 2100     PRN Meds:acetaminophen, dextrose 50%, glucagon (human recombinant), hydrALAZINE, insulin aspart U-100, magnesium oxide, magnesium oxide, ondansetron, potassium chloride 10%, potassium chloride 10%, potassium chloride 10%, potassium, sodium phosphates, potassium, sodium phosphates, potassium, sodium phosphates, sodium chloride 0.9%, sodium chloride 0.9%    Objective:     Vital Signs (Most Recent):  Temp: 97.7 °F (36.5 °C) (05/27/19 1125)  Pulse: (!) 140 (05/27/19 1125)  Resp: 19 (05/27/19 1125)  BP: 130/84 (05/27/19 1125)  SpO2: 98 % (05/27/19 1125)  BP Location: Right arm    Vital Signs Range (Last 24H):  Temp:  [97.7 °F (36.5 °C)-99.5 °F (37.5 °C)]   Pulse:  []   Resp:  [14-19]   BP: (130-189)/(60-84)   SpO2:  [76 %-99 %]   BP Location: Right arm    Physical Exam   Constitutional: She appears well-developed and well-nourished. No distress.   HENT:   Head: Normocephalic and atraumatic.   Cardiovascular: Normal rate.   Pulmonary/Chest: No respiratory distress.   Skin: Skin is warm and dry.   Psychiatric: She is noncommunicative. She is inattentive.   Vitals reviewed.    Neurological Exam:   LOC: drowsy  Attention Span: poor  Language: Global aphasia  Articulation: mute   Orientation: Untestable due to severe aphasia   Visual Fields: Hemianopsia right  EOM (CN III, IV, VI): Full/intact  Facial Movement (CN VII): Lower facial weakness on the Left  Motor: Arm left  Normal 4/5  Leg left  Normal 4/5  Arm right  Paresis: 4/5  Leg right Normal 4/5       Laboratory:  CMP:   No results for input(s): GLUCOSE, CALCIUM, ALBUMIN, PROT, NA, K, CO2, CL, BUN,  CREATININE, ALKPHOS, ALT, AST, BILITOT in the last 24 hours.  BMP:   Recent Labs   Lab 05/26/19  0626      K 4.0      CO2 27   BUN 9   CREATININE 0.6   CALCIUM 9.4     CBC:   Recent Labs   Lab 05/27/19  0309   WBC 12.69   RBC 2.45*   HGB 7.4*   HCT 22.8*      MCV 93   MCH 30.2   MCHC 32.5     Lipid Panel:   Recent Labs   Lab 05/21/19 0928   CHOL 118*   LDLCALC 58.4*   HDL 49   TRIG 53     Coagulation:   Recent Labs   Lab 05/21/19 0928   INR 1.0   APTT 39.7*     Hgb A1C:   Recent Labs   Lab 05/21/19 0928   HGBA1C 5.9*     TSH:   Recent Labs   Lab 05/21/19 0928   TSH 2.293       Diagnostic Results     Brain Imaging     MRI 5/22  Evolving scattered areas of acute/recent infarction left MCA territory with new to increased component in the left inferior frontal gyrus now mild moderate in size.  There is no evidence for hemorrhagic conversion or significant mass effect.    Superimposed confluent T2 FLAIR signal abnormality elsewhere supra and infratentorial parenchyma while nonspecific concerning for advanced chronic microvascular ischemic change versus posttherapy change.    Remote basal gangliar, thalamic and cerebellar infarcts unchanged.    Compensatory enlargement of ventricular system similar to prior without hydrocephalus.    CTA Head and Neck 5/21  1. 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  The right vertebral artery is dominant and patent throughout its course in the neck.  2. There is calcified plaque in the common, internal and external carotid arteries bilaterally.  Stenosis on the right is mild at 50-69% and moderate on the left at approximately 70% without change.  CTA Head    1.  There is occlusion of the distal left M1 and proximal M2 segments of the middle cerebral artery which has occurred since the prior CTA head.  There is subtle increased density within the left middle cerebral artery at  the distal M1 and proximal M2 segments on comparison head CT.  This is consistent with thrombus.    2.  The right anterior circulation is normal.    3.  There is developmental variation with fetal origin of the right posterior cerebral artery.    Cardiac Imaging   · Concentric left ventricular remodeling.  · Small left ventricular cavity size.  · Increased (hyperdynamic) left ventricular systolic function. The estimated ejection fraction is 75%  · Grade I (mild) left ventricular diastolic dysfunction consistent with impaired relaxation.  · Moderate left atrial enlargement.  · Mild-to-moderate aortic valve stenosis.  · Aortic valve area is 1.43 cm2; peak velocity is 2.59 m/s; mean gradient is 15.51 mmHg.  · Mild mid-cavity left ventricular obstruction is present.  · Normal left atrial pressure.  · Mild mitral stenosis.      Leo Sauer NP  [facility]  Department of Vascular Neurology   [facility]

## 2019-05-27 NOTE — PROGRESS NOTES
"[facility]  Adult Nutrition  Progress Note    SUMMARY       Recommendations    Recommendation: 1. As medically able, recommend starting TF. Glucerna 1.5 @ goal rate 40mL/hr. - Initiate @ 10mL/hr and increase by 10mL q4hrs, or as tolerated, until goal rate is reached. - Hold for residuals >500mL. - Provides 1440kcals, 79g protein and 729mL free water. - Hold for residuals >500mL.     Goals: Pt to receive nutrition by RD follow up  Nutrition Goal Status: progressing towards goal  Communication of RD Recs: (POC)    Reason for Assessment    Reason For Assessment: RD follow-up  Diagnosis: stroke/CVA  Relevant Medical History: HTN, HLD, DM, CHF, CAD, a fib  Interdisciplinary Rounds: did not attend  General Information Comments: Pt NPO for PEG placement. Pt has been on Isosource 1.5 @ 30ml/hr. Recommend increase.  Nutrition Discharge Planning: Pt with adequate enteral feeding to meet nutrition needs.    Nutrition Risk Screen    Nutrition Risk Screen: tube feeding or parenteral nutrition    Nutrition/Diet History    Spiritual, Cultural Beliefs, Gnosticism Practices, Values that Affect Care: no  Factors Affecting Nutritional Intake: NPO, impaired cognitive status/motor control    Anthropometrics    Temp: 97.7 °F (36.5 °C)  Height Method: Stated  Height: 5' 6" (167.6 cm)  Height (inches): 66 in  Weight Method: Standard Scale  Weight: 59.5 kg (131 lb 2.8 oz)  Weight (lb): 131.18 lb  Ideal Body Weight (IBW), Female: 130 lb  % Ideal Body Weight, Female (lb): 96.15 lb  BMI (Calculated): 20.2  BMI Grade: 18.5-24.9 - normal       Lab/Procedures/Meds    Pertinent Labs Reviewed: reviewed  Pertinent Labs Comments: A1c 5.9%, H/H 7.4/22.8, Alb 2.6  Pertinent Medications Reviewed: reviewed  Pertinent Medications Comments: ---      Estimated/Assessed Needs    Weight Used For Calorie Calculations: 57.9 kg (127 lb 10.3 oz)  Energy Calorie Requirements (kcal): 1306  Energy Need Method: Walnut-St Jeor(PAL 1.25)  Protein " Requirements: 58-70g(1.0-1.2g/kg)  Weight Used For Protein Calculations: 57.9 kg (127 lb 10.3 oz)  Fluid Requirements (mL): 1mL/kcal or per MD     RDA Method (mL): 1306  CHO Requirement: 50% of total kcals      Nutrition Prescription Ordered    Current Diet Order: NPO    Evaluation of Received Nutrient/Fluid Intake    IV Fluid (mL): 0  I/O: +4.5L since admission  % Intake of Estimated Energy Needs: 75 - 100 %  % Meal Intake: NPO    Nutrition Risk    Level of Risk/Frequency of Follow-up: moderate     Assessment and Plan  Nutrition Problem  Difficulty swallowing    Related to (etiology):   stroke    Signs and Symptoms (as evidenced by):   Pt having PEG tube placed today    Interventions/Recommendations (treatment strategy):  Collaboration of care.    Nutrition Diagnosis Status:   Continues         Monitor and Evaluation    Food and Nutrient Intake: energy intake, enteral nutrition intake  Food and Nutrient Adminstration: enteral and parenteral nutrition administration  Anthropometric Measurements: weight, weight change, body mass index  Biochemical Data, Medical Tests and Procedures: electrolyte and renal panel, gastrointestinal profile, glucose/endocrine profile, inflammatory profile, lipid profile  Nutrition-Focused Physical Findings: overall appearance     Malnutrition Assessment     Pt does not meet criteria for malnutrition at this time.    Nutrition Follow-Up    RD Follow-up?: Yes

## 2019-05-27 NOTE — PLAN OF CARE
1433-CM spoke to daughter and she would like to review the list of SNFs. She will contact SELINA once she has decided.     1214-CM left message for patient's daughter in reference to SNF choices.      05/27/19 1214   Post-Acute Status   Post-Acute Authorization Placement   Post-Acute Placement Status Patient List Provided

## 2019-05-27 NOTE — PLAN OF CARE
Problem: Physical Therapy Goal  Goal: Physical Therapy Goal  Goals to be met by: 6/3/2019       Patient will increase functional independence with mobility by performin. Supine to sit with Moderate Assistance  2. Sit to supine with Moderate Assistance  3. Rolling to Left and Right with Moderate Assistance.  4. Sit to stand transfer with Moderate Assistance  5. Bed to chair transfer with Maximum Assistance using squat pivot technique.   6. Sitting at edge of bed x15 minutes with Contact Guard Assistance and bilateral upper extremity support  7. Lower extremity exercise program x20 reps per handout, with assistance as needed to improve strength and increase activity tolerance.      Outcome: Ongoing (interventions implemented as appropriate)  Continue with plan of care.   Soumya Rodrigues, PT  2019

## 2019-05-27 NOTE — NURSING
At approximately 1038 this nurse contacted Leo Sauer NP to report that patient is sustaining heart rates of 140-150's, with a maximum rate of 160 noted. Patient does not appear to be in distress at this time, but continues to present with tachycardia. MAYRA Sauer NP verbalized that she would be to bedside to assess patient. This nurse accompanied MAYRA Sauer NP to bedside in which vitals of 130/73, heart rate of 160, respirations of 19, 100% on 3L per NC, and temperature of 97.7 degrees Farenheit were obtained. New orders received to administer 1L bolus of Normal Saline, and stat EKG. New orders to be carried out. Will continue to monitor patient, and update plan of care.

## 2019-05-27 NOTE — H&P
Radiology History & Physical      SUBJECTIVE:     Chief Complaint: L MCA CVA.    History of Present Illness:  Sury Cobos is a 84 y.o. female with history of left MCA stroke with dysphagia. IR consulted for PEG tube placement.     Past Medical History:   Diagnosis Date    Acute ischemic left MCA stroke 5/21/2019    Aphasia 5/27/2018    Arthritis     Benign essential HTN     CHF (congestive heart failure)     Closed intertrochanteric fracture of left hip     Coronary artery disease     Dementia with behavioral disturbance 5/27/2018    Diabetes mellitus     Embolic stroke involving right middle cerebral artery 5/27/2018    Gout     High cholesterol     Low potassium syndrome     Paroxysmal atrial fibrillation 6/1/2018    PEG (percutaneous endoscopic gastrostomy) status 6/4/2018    Placed 6/4/18    Urinary incontinence      Past Surgical History:   Procedure Laterality Date    CARDIAC SURGERY      CORONARY ARTERY BYPASS GRAFT      EGD (ESOPHAGOGASTRODUODENOSCOPY) N/A 6/5/2018    Performed by Madhu Bliss MD at [facility] OR [address] [place] [address]    EGD, WITH PEG TUBE INSERTION N/A 6/5/2018    Performed by Madhu Bliss MD at [facility] OR [address] [place] [address]    reduction & internal fixation of displaced closed comminuted intertrochanteric fx left hip  02/12/2018       Home Meds:   Prior to Admission medications    Medication Sig Start Date End Date Taking? Authorizing Provider   atorvastatin (LIPITOR) 40 MG tablet 1 tablet (40 mg total) by Per G Tube route once daily.  Patient taking differently: Take 40 mg by mouth once daily. Crush meds 6/12/18 6/12/19 Yes Adrianna Perez PA-C   buPROPion (WELLBUTRIN) 75 MG tablet 2 tablets (150 mg total) by Per G Tube route 2 (two) times daily.  Patient taking differently: Take 75 mg by mouth 2 (two) times daily. Crush meds 6/11/18 6/11/19 Yes Adrianna Perez PA-C   cloNIDine (CATAPRES) 0.1 MG tablet 1 tablet (0.1 mg total) by Per G Tube route 2 (two) times daily.  Patient  taking differently: Take 0.1 mg by mouth 2 (two) times daily. Crush meds 6/11/18  Yes Adrianna Perez PA-C   HYDROcodone-acetaminophen (NORCO)  mg per tablet Take 1 tablet by mouth every 8 (eight) hours as needed for Pain.   Yes Historical Provider, MD   memantine (NAMENDA) 10 MG Tab Take 10 mg by mouth 2 (two) times daily. Crush meds   Yes Historical Provider, MD   traZODone (DESYREL) 50 MG tablet Take 50 mg by mouth nightly as needed for Insomnia.   Yes Historical Provider, MD     Anticoagulants/Antiplatelets: no anticoagulation    Allergies: Review of patient's allergies indicates:  No Known Allergies  Sedation History:  no adverse reactions    Review of Systems:   Hematological: no known coagulopathies  Respiratory: no shortness of breath  Cardiovascular: no chest pain  Gastrointestinal: no abdominal pain  Genito-Urinary: no dysuria  Musculoskeletal: negative  Neurological: no TIA or stroke symptoms         OBJECTIVE:     Vital Signs (Most Recent)  Temp: 99.5 °F (37.5 °C) (05/27/19 0737)  Pulse: 72 (05/27/19 0835)  Resp: 16 (05/27/19 0835)  BP: (!) 189/82 (05/27/19 0737)  SpO2: 96 % (05/27/19 0835)    Physical Exam:  ASA: 3  Mallampati: 2    General: no acute distress  Mental Status: alert and oriented to person, place and time  HEENT: normocephalic, atraumatic  Chest: unlabored breathing  Heart: regular heart rate  Abdomen: nondistended  Extremity: moves all extremities    Laboratory  Lab Results   Component Value Date    INR 1.0 05/21/2019       Lab Results   Component Value Date    WBC 12.69 05/27/2019    HGB 7.4 (L) 05/27/2019    HCT 22.8 (L) 05/27/2019    MCV 93 05/27/2019     05/27/2019      Lab Results   Component Value Date    GLU 78 05/26/2019     05/26/2019    K 4.0 05/26/2019     05/26/2019    CO2 27 05/26/2019    BUN 9 05/26/2019    CREATININE 0.6 05/26/2019    CALCIUM 9.4 05/26/2019    MG 1.6 05/26/2019    ALT 61 (H) 05/26/2019    AST 54 (H) 05/26/2019    ALBUMIN 2.6 (L)  05/26/2019    BILITOT 0.6 05/26/2019       ASSESSMENT/PLAN:     Sedation Plan: Moderate.  Patient will undergo PEG tube placement.    Warren Márquez MD  PGY-II Radiology Resident  [address] [facility]  Pager: [phone]

## 2019-05-28 LAB
ALBUMIN SERPL BCP-MCNC: 2.6 G/DL (ref 3.5–5.2)
ALP SERPL-CCNC: 92 U/L (ref 55–135)
ALT SERPL W/O P-5'-P-CCNC: 51 U/L (ref 10–44)
ANION GAP SERPL CALC-SCNC: 13 MMOL/L (ref 8–16)
AST SERPL-CCNC: 37 U/L (ref 10–40)
BACTERIA UR CULT: NORMAL
BASOPHILS # BLD AUTO: 0.07 K/UL (ref 0–0.2)
BASOPHILS NFR BLD: 0.5 % (ref 0–1.9)
BILIRUB SERPL-MCNC: 0.6 MG/DL (ref 0.1–1)
BUN SERPL-MCNC: 10 MG/DL (ref 8–23)
CALCIUM SERPL-MCNC: 9.1 MG/DL (ref 8.7–10.5)
CHLORIDE SERPL-SCNC: 101 MMOL/L (ref 95–110)
CO2 SERPL-SCNC: 28 MMOL/L (ref 23–29)
CREAT SERPL-MCNC: 0.6 MG/DL (ref 0.5–1.4)
DIFFERENTIAL METHOD: ABNORMAL
EOSINOPHIL # BLD AUTO: 0.1 K/UL (ref 0–0.5)
EOSINOPHIL NFR BLD: 0.9 % (ref 0–8)
ERYTHROCYTE [DISTWIDTH] IN BLOOD BY AUTOMATED COUNT: 13.6 % (ref 11.5–14.5)
EST. GFR  (AFRICAN AMERICAN): >60 ML/MIN/1.73 M^2
EST. GFR  (NON AFRICAN AMERICAN): >60 ML/MIN/1.73 M^2
GLUCOSE SERPL-MCNC: 56 MG/DL (ref 70–110)
HCT VFR BLD AUTO: 24.6 % (ref 37–48.5)
HGB BLD-MCNC: 7.8 G/DL (ref 12–16)
IMM GRANULOCYTES # BLD AUTO: 0.27 K/UL (ref 0–0.04)
IMM GRANULOCYTES NFR BLD AUTO: 2.1 % (ref 0–0.5)
LYMPHOCYTES # BLD AUTO: 2.3 K/UL (ref 1–4.8)
LYMPHOCYTES NFR BLD: 17.4 % (ref 18–48)
MCH RBC QN AUTO: 29.5 PG (ref 27–31)
MCHC RBC AUTO-ENTMCNC: 31.7 G/DL (ref 32–36)
MCV RBC AUTO: 93 FL (ref 82–98)
MONOCYTES # BLD AUTO: 1.2 K/UL (ref 0.3–1)
MONOCYTES NFR BLD: 8.9 % (ref 4–15)
NEUTROPHILS # BLD AUTO: 9.1 K/UL (ref 1.8–7.7)
NEUTROPHILS NFR BLD: 70.2 % (ref 38–73)
NRBC BLD-RTO: 0 /100 WBC
PLATELET # BLD AUTO: 185 K/UL (ref 150–350)
PLATELET BLD QL SMEAR: ABNORMAL
PMV BLD AUTO: 13.6 FL (ref 9.2–12.9)
POCT GLUCOSE: 142 MG/DL (ref 70–110)
POCT GLUCOSE: 61 MG/DL (ref 70–110)
POCT GLUCOSE: 67 MG/DL (ref 70–110)
POTASSIUM SERPL-SCNC: 3.2 MMOL/L (ref 3.5–5.1)
PROT SERPL-MCNC: 6.8 G/DL (ref 6–8.4)
RBC # BLD AUTO: 2.64 M/UL (ref 4–5.4)
SODIUM SERPL-SCNC: 142 MMOL/L (ref 136–145)
WBC # BLD AUTO: 12.94 K/UL (ref 3.9–12.7)

## 2019-05-28 PROCEDURE — 27000221 HC OXYGEN, UP TO 24 HOURS

## 2019-05-28 PROCEDURE — 94640 AIRWAY INHALATION TREATMENT: CPT

## 2019-05-28 PROCEDURE — 36415 COLL VENOUS BLD VENIPUNCTURE: CPT

## 2019-05-28 PROCEDURE — 25000003 PHARM REV CODE 250: Performed by: PHYSICIAN ASSISTANT

## 2019-05-28 PROCEDURE — 25000003 PHARM REV CODE 250: Performed by: NURSE PRACTITIONER

## 2019-05-28 PROCEDURE — 25000242 PHARM REV CODE 250 ALT 637 W/ HCPCS: Performed by: PHYSICIAN ASSISTANT

## 2019-05-28 PROCEDURE — 85025 COMPLETE CBC W/AUTO DIFF WBC: CPT

## 2019-05-28 PROCEDURE — 99233 SBSQ HOSP IP/OBS HIGH 50: CPT | Mod: ,,, | Performed by: PSYCHIATRY & NEUROLOGY

## 2019-05-28 PROCEDURE — 20600001 HC STEP DOWN PRIVATE ROOM

## 2019-05-28 PROCEDURE — 80053 COMPREHEN METABOLIC PANEL: CPT

## 2019-05-28 PROCEDURE — 99233 PR SUBSEQUENT HOSPITAL CARE,LEVL III: ICD-10-PCS | Mod: ,,, | Performed by: PSYCHIATRY & NEUROLOGY

## 2019-05-28 PROCEDURE — 25000003 PHARM REV CODE 250: Performed by: PSYCHIATRY & NEUROLOGY

## 2019-05-28 PROCEDURE — 63600175 PHARM REV CODE 636 W HCPCS: Performed by: NURSE PRACTITIONER

## 2019-05-28 PROCEDURE — 94761 N-INVAS EAR/PLS OXIMETRY MLT: CPT

## 2019-05-28 PROCEDURE — 25000003 PHARM REV CODE 250: Performed by: STUDENT IN AN ORGANIZED HEALTH CARE EDUCATION/TRAINING PROGRAM

## 2019-05-28 PROCEDURE — 63600175 PHARM REV CODE 636 W HCPCS: Performed by: PHYSICIAN ASSISTANT

## 2019-05-28 PROCEDURE — 92507 TX SP LANG VOICE COMM INDIV: CPT

## 2019-05-28 RX ORDER — AMOXICILLIN AND CLAVULANATE POTASSIUM 500; 125 MG/1; MG/1
1 TABLET, FILM COATED ORAL 2 TIMES DAILY
Status: DISCONTINUED | OUTPATIENT
Start: 2019-05-28 | End: 2019-05-28

## 2019-05-28 RX ORDER — QUETIAPINE FUMARATE 25 MG/1
25 TABLET, FILM COATED ORAL NIGHTLY
Status: DISCONTINUED | OUTPATIENT
Start: 2019-05-28 | End: 2019-05-29

## 2019-05-28 RX ORDER — OLMESARTAN MEDOXOMIL 5 MG/1
5 TABLET ORAL DAILY
Status: DISCONTINUED | OUTPATIENT
Start: 2019-05-28 | End: 2019-05-29

## 2019-05-28 RX ORDER — AMOXICILLIN AND CLAVULANATE POTASSIUM 250; 62.5 MG/5ML; MG/5ML
500 POWDER, FOR SUSPENSION ORAL EVERY 12 HOURS
Status: COMPLETED | OUTPATIENT
Start: 2019-05-28 | End: 2019-06-03

## 2019-05-28 RX ORDER — POTASSIUM CHLORIDE 20 MEQ/15ML
40 SOLUTION ORAL ONCE
Status: COMPLETED | OUTPATIENT
Start: 2019-05-28 | End: 2019-05-28

## 2019-05-28 RX ADMIN — AMOXICILLIN AND CLAVULANATE POTASSIUM 500 MG: 250; 62.5 POWDER, FOR SUSPENSION ORAL at 09:05

## 2019-05-28 RX ADMIN — IPRATROPIUM BROMIDE AND ALBUTEROL SULFATE 3 ML: .5; 3 SOLUTION RESPIRATORY (INHALATION) at 07:05

## 2019-05-28 RX ADMIN — CEFTRIAXONE SODIUM 1 G: 1 INJECTION, POWDER, FOR SOLUTION INTRAMUSCULAR; INTRAVENOUS at 08:05

## 2019-05-28 RX ADMIN — MEMANTINE 10 MG: 10 TABLET ORAL at 09:05

## 2019-05-28 RX ADMIN — POTASSIUM CHLORIDE 40 MEQ: 20 SOLUTION ORAL at 08:05

## 2019-05-28 RX ADMIN — SODIUM CHLORIDE SOLN NEBU 3% 4 ML: 3 NEBU SOLN at 07:05

## 2019-05-28 RX ADMIN — CLONIDINE HYDROCHLORIDE 0.1 MG: 0.1 TABLET ORAL at 08:05

## 2019-05-28 RX ADMIN — SENNOSIDES AND DOCUSATE SODIUM 1 TABLET: 8.6; 5 TABLET ORAL at 09:05

## 2019-05-28 RX ADMIN — HEPARIN SODIUM 5000 UNITS: 5000 INJECTION, SOLUTION INTRAVENOUS; SUBCUTANEOUS at 02:05

## 2019-05-28 RX ADMIN — BUPROPION HYDROCHLORIDE 150 MG: 75 TABLET, FILM COATED ORAL at 08:05

## 2019-05-28 RX ADMIN — OLMESARTAN MEDOXOMIL 5 MG: 5 TABLET, FILM COATED ORAL at 04:05

## 2019-05-28 RX ADMIN — IPRATROPIUM BROMIDE AND ALBUTEROL SULFATE 3 ML: .5; 3 SOLUTION RESPIRATORY (INHALATION) at 03:05

## 2019-05-28 RX ADMIN — SODIUM CHLORIDE SOLN NEBU 3% 4 ML: 3 NEBU SOLN at 03:05

## 2019-05-28 RX ADMIN — ASPIRIN 81 MG: 81 TABLET, COATED ORAL at 08:05

## 2019-05-28 RX ADMIN — HYDRALAZINE HYDROCHLORIDE 10 MG: 20 INJECTION INTRAMUSCULAR; INTRAVENOUS at 12:05

## 2019-05-28 RX ADMIN — QUETIAPINE FUMARATE 25 MG: 25 TABLET ORAL at 09:05

## 2019-05-28 RX ADMIN — Medication 12.5 MG: at 09:05

## 2019-05-28 RX ADMIN — IPRATROPIUM BROMIDE AND ALBUTEROL SULFATE 3 ML: .5; 3 SOLUTION RESPIRATORY (INHALATION) at 12:05

## 2019-05-28 RX ADMIN — SODIUM CHLORIDE SOLN NEBU 3% 4 ML: 3 NEBU SOLN at 12:05

## 2019-05-28 RX ADMIN — SENNOSIDES AND DOCUSATE SODIUM 1 TABLET: 8.6; 5 TABLET ORAL at 08:05

## 2019-05-28 RX ADMIN — ATORVASTATIN CALCIUM 40 MG: 20 TABLET, FILM COATED ORAL at 08:05

## 2019-05-28 RX ADMIN — APIXABAN 2.5 MG: 2.5 TABLET, FILM COATED ORAL at 05:05

## 2019-05-28 RX ADMIN — AMOXICILLIN AND CLAVULANATE POTASSIUM 500 MG: 250; 62.5 POWDER, FOR SUSPENSION ORAL at 12:05

## 2019-05-28 RX ADMIN — Medication 12.5 MG: at 08:05

## 2019-05-28 RX ADMIN — MEMANTINE 10 MG: 10 TABLET ORAL at 08:05

## 2019-05-28 RX ADMIN — CLONIDINE HYDROCHLORIDE 0.1 MG: 0.1 TABLET ORAL at 09:05

## 2019-05-28 RX ADMIN — DEXTROSE MONOHYDRATE 12.5 G: 25 INJECTION, SOLUTION INTRAVENOUS at 10:05

## 2019-05-28 RX ADMIN — HEPARIN SODIUM 5000 UNITS: 5000 INJECTION, SOLUTION INTRAVENOUS; SUBCUTANEOUS at 05:05

## 2019-05-28 RX ADMIN — BUPROPION HYDROCHLORIDE 150 MG: 75 TABLET, FILM COATED ORAL at 09:05

## 2019-05-28 NOTE — ASSESSMENT & PLAN NOTE
SLP to evaluate and treat  High chance for aspiration PNA, will monitor for signs of infection or worsening respiratory status    S/p PEG placement, will restart TF at 24 hour jessy.

## 2019-05-28 NOTE — PROGRESS NOTES
[facility]  Vascular Neurology  Comprehensive Stroke Center  Progress Note    Assessment/Plan:     * Embolic stroke involving left middle cerebral artery  83 y/o female with cardioembolic L MCA infarct 2/2 a fib (not compliant with anticoagulation at home. Patient was out of window for tPA, but when to IR for L M1 thrombectomy, TICI 3 flow restored.     Antithrombotics: Plans to resume anticoagulation after peg tube is placed. She will need investigation of affordability of anticoagulation for discharge. Will continue with asa 81 only for now    Statins: Lipitor 40    Aggressive risk factor modification: HTN, HLD, prior stroke, afib     Rehab efforts: The patient has been evaluated by a stroke team provider and the therapy needs have been fully considered based off the presenting complaints and exam findings. The following therapy evaluations are needed: PT evaluate and treat, OT evaluate and treat, SLP evaluate and treat, PM&R evaluate for appropriate placement    Diagnostics ordered/pending: None    VTE prophylaxis: Heparin 5000 units SQ every 8 hours , SCDs    BP parameters: S/p thrombectomy <180      Debility  Patient was reported to be back to baseline and ambulating with walker prior to this hospitalization  Currently aphasic and not following commands, but able to TOSCANO spontaneously  PT/OT now recommending SNF or home with 24 hour support  Plans to discuss goals of care with family    Diabetes mellitus  A1C 5.9  Currently NPO pending PEG placement  Continue q6h glucose and SSI      S/P percutaneous endoscopic gastrostomy (PEG) tube placement  PEG placed on 05/27 at 1730.    Site clean and dry.     Paroxysmal atrial fibrillation  Found on last admit 5-2018.  Previously on Eliquis but ran out and not refilled. (Concerns of noncompliance due to price. Please price check choice of AC prior to starting)    Will resume AC 24 hours after PEG placement    Patient went into Afib with RVR on 05/27-  metoprolol 12.5 mg BID started    Dysphagia  SLP to evaluate and treat  High chance for aspiration PNA, will monitor for signs of infection or worsening respiratory status    S/p PEG placement, will restart TF at 24 hour jessy.    Dementia with behavioral disturbance  Continue home memantine  Delirium precautions    Mixed hyperlipidemia  Stroke risk factor  LDL 54, at goal  Continue home Lipitor 40    Essential hypertension  Stroke risk factor  SBP <180  BP at goal on home clonidine    Aphasia  Due to stroke  SLP to evaluate and treat          5/24 patient stepped down overnight to stroke unit, currently with NG tube in place, patient will likely need PEG tube placed, will attempt to discuss goals of care with family, need to restart anticoagulation but will hold for now until PEG decision is made  5/25 no acute events overnight. IR consulted. Work up complete. NPO and barium Dave night. Plan for PEG Monday 5/27.   5/26 NAEON. Odorous urine reported by RN; UA w/ reflex ordered. NPO and barium at midnight for PEG placement tomorrow.   5/27: UA collected yesterday indicative of UTI, rocephin started.  Patient had afib with RVR today, metoprolol 12.5mg BID started. PEG placement pending.   5/28: PEG placed yesterday- site clean and dry; will initiate TF and anticoagulation at 24 hour jessy. Nursing attempting to remove restraints, abdominal binder ordered to secure PEG. Patient's HR controlled adequately with metoprolol. Will discharge to NH, when restraint free for 72 hours.     STROKE DOCUMENTATION   Acute Stroke Times   Last Known Normal Date: 05/20/19  Last Known Normal Time: 2030  Symptom Onset Date: 05/20/19  Symptom Onset Time: 2030  Stroke Team Called Date: 05/20/19  Stroke Team Called Time: 2338  Stroke Team Arrival Date: 05/21/19  Stroke Team Arrival Time: 0605  CT Interpretation Time: 2338    NIH Scale:  1a. Level of Consciousness: 1-->Not alert, but arousable by minor stimulation to obey, answer, or  respond  1b. LOC Questions: 2-->Answers neither question correctly  1c. LOC Commands: 2-->Performs neither task correctly  2. Best Gaze: 0-->Normal  3. Visual: 2-->Complete hemianopia  4. Facial Palsy: 1-->Minor paralysis (flattened nasolabial fold, asymmetry on smiling)  5a. Motor Arm, Left: 1-->Drift, limb holds 90 (or 45) degrees, but drifts down before full 10 seconds, does not hit bed or other support  5b. Motor Arm, Right: 1-->Drift, limb holds 90 (or 45) degrees, but drifts down before full 10 secs, does not hit bed or other support  6a. Motor Leg, Left: 2-->Some effort against gravity, leg falls to bed by 5 secs, but has some effort against gravity  6b. Motor Leg, Right: 2-->Some effort against gravity, leg falls to bed by 5 secs, but has some effort against gravity  7. Limb Ataxia: 0-->Absent  8. Sensory: 0-->Normal, no sensory loss  9. Best Language: 3-->Mute, global aphasia, no usable speech or auditory comprehension  10. Dysarthria: 2-->Severe dysarthria, patients speech is so slurred as to be unintelligible in the absence of or out of proportion to any dysphasia, or is mute/anarthric  11. Extinction and Inattention (formerly Neglect): 0-->No abnormality  Total (NIH Stroke Scale): 19       Modified Angelia Score: 3  Kenney Coma Scale:    ABCD2 Score:    [id number] Score:   HAS -BLED Score:   ICH Score:   Hunt & Burkett Classification:      Hemorrhagic change of an Ischemic Stroke: Does this patient have an ischemic stroke with hemorrhagic changes? No     Neurologic Chief Complaint: Aphasia    Subjective:     Interval History: Patient is seen for follow-up neurological assessment and treatment recommendations: PEG placed yesterday- site clean and dry; will initiate TF and anticoagulation at 24 hour jessy. Nursing attempting to remove restraints, abdominal binder ordered to secure PEG. Patient's HR controlled adequately with metoprolol. Will discharge to NH, when restraint free for 72 hours.     HPI, Past  Medical, Family, and Social History remains the same as documented in the initial encounter.     Review of Systems   Constitutional: Negative for chills and fever.   Eyes: Positive for visual disturbance.   Gastrointestinal: Negative for vomiting.   Neurological: Positive for facial asymmetry, speech difficulty and weakness.     Scheduled Meds:   albuterol-ipratropium  3 mL Nebulization Q8H    amoxicillin-pot clavulanate 250-62.5 mg/5ml  500 mg Per G Tube Q12H    aspirin  81 mg Per NG tube Daily    atorvastatin  40 mg Per NG tube Daily    buPROPion  150 mg Per NG tube BID    cloNIDine  0.1 mg Per NG tube BID    heparin (porcine)  5,000 Units Subcutaneous Q8H    memantine  10 mg Per NG tube BID    metoprolol  12.5 mg Per NG tube BID    senna-docusate 8.6-50 mg  1 tablet Per NG tube BID    sodium chloride 3%  4 mL Nebulization Q8H    tuberculin  5 Units Intradermal Once     Continuous Infusions:   sodium chloride 0.9% 75 mL/hr at 05/23/19 2100     PRN Meds:acetaminophen, dextrose 50%, glucagon (human recombinant), hydrALAZINE, insulin aspart U-100, ondansetron, sodium chloride 0.9%, sodium chloride 0.9%    Objective:     Vital Signs (Most Recent):  Temp: 97.4 °F (36.3 °C) (05/28/19 1126)  Pulse: 71 (05/28/19 1126)  Resp: 17 (05/28/19 1126)  BP: (!) 186/78 (05/28/19 1126)  SpO2: 97 % (05/28/19 1126)  BP Location: Left arm    Vital Signs Range (Last 24H):  Temp:  [96.2 °F (35.7 °C)-99.5 °F (37.5 °C)]   Pulse:  [64-88]   Resp:  [16-25]   BP: (125-195)/(52-80)   SpO2:  [96 %-100 %]   BP Location: Left arm    Physical Exam   Constitutional: She appears well-developed and well-nourished. No distress.   HENT:   Head: Normocephalic and atraumatic.   Cardiovascular: Normal rate.   Pulmonary/Chest: No respiratory distress.   Abdominal:   S/p PEG- site clean and dry   Skin: Skin is warm and dry.   Psychiatric: She is noncommunicative. She is inattentive.   Vitals reviewed.    Neurological Exam:   LOC:  drowsy  Attention Span: poor  Language: Global aphasia  Articulation: mute   Orientation: Untestable due to severe aphasia   Visual Fields: Hemianopsia right  EOM (CN III, IV, VI): Full/intact  Facial Movement (CN VII): Lower facial weakness on the Left  Motor: Arm left  Normal 4/5  Leg left  Normal 4/5  Arm right  Paresis: 4/5  Leg right Normal 4/5       Laboratory:  CMP:   Recent Labs   Lab 05/28/19 0420   CALCIUM 9.1   ALBUMIN 2.6*   PROT 6.8      K 3.2*   CO2 28      BUN 10   CREATININE 0.6   ALKPHOS 92   ALT 51*   AST 37   BILITOT 0.6     BMP:   Recent Labs   Lab 05/28/19 0420      K 3.2*      CO2 28   BUN 10   CREATININE 0.6   CALCIUM 9.1     CBC:   Recent Labs   Lab 05/28/19 0420   WBC 12.94*   RBC 2.64*   HGB 7.8*   HCT 24.6*      MCV 93   MCH 29.5   MCHC 31.7*     Lipid Panel:   No results for input(s): CHOL, LDLCALC, HDL, TRIG in the last 168 hours.  Coagulation:   No results for input(s): PT, INR, APTT in the last 168 hours.  Hgb A1C:   No results for input(s): HGBA1C in the last 168 hours.  TSH:   No results for input(s): TSH in the last 168 hours.    Diagnostic Results     Brain Imaging     MRI 5/22  Evolving scattered areas of acute/recent infarction left MCA territory with new to increased component in the left inferior frontal gyrus now mild moderate in size.  There is no evidence for hemorrhagic conversion or significant mass effect.    Superimposed confluent T2 FLAIR signal abnormality elsewhere supra and infratentorial parenchyma while nonspecific concerning for advanced chronic microvascular ischemic change versus posttherapy change.    Remote basal gangliar, thalamic and cerebellar infarcts unchanged.    Compensatory enlargement of ventricular system similar to prior without hydrocephalus.    CTA Head and Neck 5/21  1. 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  The right  vertebral artery is dominant and patent throughout its course in the neck.  2. There is calcified plaque in the common, internal and external carotid arteries bilaterally.  Stenosis on the right is mild at 50-69% and moderate on the left at approximately 70% without change.  CTA Head    1.  There is occlusion of the distal left M1 and proximal M2 segments of the middle cerebral artery which has occurred since the prior CTA head.  There is subtle increased density within the left middle cerebral artery at the distal M1 and proximal M2 segments on comparison head CT.  This is consistent with thrombus.    2.  The right anterior circulation is normal.    3.  There is developmental variation with fetal origin of the right posterior cerebral artery.    Cardiac Imaging   · Concentric left ventricular remodeling.  · Small left ventricular cavity size.  · Increased (hyperdynamic) left ventricular systolic function. The estimated ejection fraction is 75%  · Grade I (mild) left ventricular diastolic dysfunction consistent with impaired relaxation.  · Moderate left atrial enlargement.  · Mild-to-moderate aortic valve stenosis.  · Aortic valve area is 1.43 cm2; peak velocity is 2.59 m/s; mean gradient is 15.51 mmHg.  · Mild mid-cavity left ventricular obstruction is present.  · Normal left atrial pressure.  · Mild mitral stenosis.      Leo Sauer NP  [facility]  Department of Vascular Neurology   [facility]

## 2019-05-28 NOTE — PLAN OF CARE
Problem: SLP Goal  Goal: SLP Goal  Speech Language Pathology Goals  Goals expected to be met by 5/28:  1. Patient will participate in ongoing swallow assessment to determine least restrictive diet recommendations.   2. Patient will participate in full speech, language, and cognitive assessment when appropriate.        Pt produced vocalizations but unable to complete any automatic speech tasks.    SERENA Joe, CCC-SLP  5/28/2019

## 2019-05-28 NOTE — PLAN OF CARE
05/28/19 1035   Post-Acute Status   Post-Acute Authorization Placement   Post-Acute Placement Status Pending State Certification       PASSR completed and 142 called in to the state. Awaiting 142 to be sent to the  to complete referrals for NH placement.   in contact with CM and Medical staff. Will continue to follow and offer support as needed.     Rufino Bernstein LMSW  [facility]   Ext. [phone]

## 2019-05-28 NOTE — PROGRESS NOTES
Pharmacist Renal Dose Adjustment Note    Sury Cobos is a 84 y.o. female being treated with the medication eliquis    Patient Data:    Vital Signs (Most Recent):  Temp: 97.4 °F (36.3 °C) (05/28/19 1126)  Pulse: 78 (05/28/19 1525)  Resp: 17 (05/28/19 1126)  BP: 136/61 (05/28/19 1312)  SpO2: 97 % (05/28/19 1126) Vital Signs (72h Range):  Temp:  [96.2 °F (35.7 °C)-99.5 °F (37.5 °C)]   Pulse:  []   Resp:  [14-25]   BP: (123-195)/(52-96)   SpO2:  [76 %-100 %]      Recent Labs   Lab 05/25/19  0401 05/26/19  0626 05/28/19  0420   CREATININE 0.6 0.6 0.6     Serum creatinine: 0.6 mg/dL 05/28/19 0420  Estimated creatinine clearance: 65.3 mL/min    Medication:eliquis dose adjusted to 2.5mg bid due to age (>81 yo) and weight (<60)    Pharmacist's Name: Albert Quispe  Pharmacist's Extension: [phone]

## 2019-05-28 NOTE — PLAN OF CARE
05/28/19 1041   Post-Acute Status   Post-Acute Authorization Placement   Post-Acute Placement Status Referrals Sent       Referrals sent to [facility]. Awaiting acceptance. SW in contact with CM and Medical staff. Will continue to follow and offer support as needed.     Rufino Bernstein, CINDY  [facility]   Ext. [phone]

## 2019-05-28 NOTE — PLAN OF CARE
Problem: Adult Inpatient Plan of Care  Goal: Plan of Care Review  Outcome: Ongoing (interventions implemented as appropriate)  POC and meds reviewed with patient; patient is disoriented x4 and requires reinforcement.  V/S stable throughout shift; please see flowsheets for V/S information and full assessment data. Bilateral wrist restraints still necessary to prevent pulling of NGT. NAEO. Siderails up x2, bed locked in lowest position, call bell in reach, bed alarm on, O2 & suction at bedside, WCTM.

## 2019-05-28 NOTE — PLAN OF CARE
CM left message for patient's daughter Lauren again today in reference to SNF choices.        05/28/19 1015   Post-Acute Status   Post-Acute Authorization Placement   Post-Acute Placement Status Patient List Provided

## 2019-05-28 NOTE — PLAN OF CARE
05/28/19 1444   Post-Acute Status   Post-Acute Authorization Placement   Post-Acute Placement Status Referrals Sent       142 received. Sent to referrals. SW in contact with CM and Medical staff. Will continue to follow and offer support as needed.     Rufino Bernstein LMSW  [facility]   Ext. [phone]

## 2019-05-28 NOTE — ASSESSMENT & PLAN NOTE
Found on last admit 5-2018.  Previously on Eliquis but ran out and not refilled. (Concerns of noncompliance due to price. Please price check choice of AC prior to starting)    Will resume AC 24 hours after PEG placement    Patient went into Afib with RVR on 05/27- metoprolol 12.5 mg BID started

## 2019-05-28 NOTE — PT/OT/SLP PROGRESS
Speech Language Pathology Treatment    Patient Name:  Sury Cobos   MRN:  [id number]  Admitting Diagnosis: Embolic stroke involving left middle cerebral artery    Recommendations:                 General Recommendations:  Dysphagia therapy and Speech/language therapy  Diet recommendations:  NPO, Liquid Diet Level: NPO   Aspiration Precautions: Strict aspiration precautions   General Precautions: Standard, aphasia, aspiration, fall, NPO  Communication strategies:  yes/no questions only and provide increased time to answer    Subjective     n/a      Pain/Comfort:  · Pain Rating 1: 0/10  · Pain Rating Post-Intervention 1: 0/10    Objective:     Has the patient been evaluated by SLP for swallowing?   Yes  Keep patient NPO? No   Current Respiratory Status: room air      Pt awake/alert and noted to produce groaning during session. She did not attempt to count, sing or model vowel sounds given max cues. She did not attempt to model any commands or respond via any modality to simple y/n questions. Pt did attempt to ID object in a f=2 x1 but was incorrect. Pt restless and moving around throughout the session. Pt with large cough response noted with wet/congested cough. Orally suctioning provided. Right neglect noted with no attention to right side despite max cues. White board updated.      Assessment:     Sury Cobos is a 84 y.o. female with an SLP diagnosis of Aphasia and Dysphagia.  She presents with slow progress towards goals.    Goals:   Multidisciplinary Problems     SLP Goals        Problem: SLP Goal    Goal Priority Disciplines Outcome   SLP Goal     SLP Ongoing (interventions implemented as appropriate)   Description:  Speech Language Pathology Goals  Goals expected to be met by 5/28:  1. Patient will participate in ongoing swallow assessment to determine least restrictive diet recommendations.   2. Patient will participate in full speech, language, and cognitive assessment when appropriate.                          Plan:     · Patient to be seen:  4 x/week   · Plan of Care expires:  06/21/19  · Plan of Care reviewed with:  patient   · SLP Follow-Up:  Yes       Discharge recommendations:  nursing facility, skilled       Time Tracking:     SLP Treatment Date:   05/28/19  Speech Start Time:  1102  Speech Stop Time:  1112     Speech Total Time (min):  10 min    Billable Minutes: Speech Therapy Individual 10    SERENA Joe, CCC-SLP  05/28/2019

## 2019-05-28 NOTE — NURSING
At approximately 1617 this nurse contacted the provider Leo Sauer NP in regards to the above mentioned patient's elevated blood pressure. Patient's blood pressure is currently 170/72, and is not currently within the window or treatment to use PRN hydralazine. Provider verbalized understanding, and noted that she would review the patient's chart and add an addition antihypertensive. Will await new order and updated plan of care.

## 2019-05-28 NOTE — SUBJECTIVE & OBJECTIVE
Neurologic Chief Complaint: Aphasia    Subjective:     Interval History: Patient is seen for follow-up neurological assessment and treatment recommendations: PEG placed yesterday- site clean and dry; will initiate TF and anticoagulation at 24 hour jessy. Nursing attempting to remove restraints, abdominal binder ordered to secure PEG. Patient's HR controlled adequately with metoprolol. Will discharge to NH, when restraint free for 72 hours.     HPI, Past Medical, Family, and Social History remains the same as documented in the initial encounter.     Review of Systems   Constitutional: Negative for chills and fever.   Eyes: Positive for visual disturbance.   Gastrointestinal: Negative for vomiting.   Neurological: Positive for facial asymmetry, speech difficulty and weakness.     Scheduled Meds:   albuterol-ipratropium  3 mL Nebulization Q8H    amoxicillin-pot clavulanate 250-62.5 mg/5ml  500 mg Per G Tube Q12H    aspirin  81 mg Per NG tube Daily    atorvastatin  40 mg Per NG tube Daily    buPROPion  150 mg Per NG tube BID    cloNIDine  0.1 mg Per NG tube BID    heparin (porcine)  5,000 Units Subcutaneous Q8H    memantine  10 mg Per NG tube BID    metoprolol  12.5 mg Per NG tube BID    senna-docusate 8.6-50 mg  1 tablet Per NG tube BID    sodium chloride 3%  4 mL Nebulization Q8H    tuberculin  5 Units Intradermal Once     Continuous Infusions:   sodium chloride 0.9% 75 mL/hr at 05/23/19 2100     PRN Meds:acetaminophen, dextrose 50%, glucagon (human recombinant), hydrALAZINE, insulin aspart U-100, ondansetron, sodium chloride 0.9%, sodium chloride 0.9%    Objective:     Vital Signs (Most Recent):  Temp: 97.4 °F (36.3 °C) (05/28/19 1126)  Pulse: 71 (05/28/19 1126)  Resp: 17 (05/28/19 1126)  BP: (!) 186/78 (05/28/19 1126)  SpO2: 97 % (05/28/19 1126)  BP Location: Left arm    Vital Signs Range (Last 24H):  Temp:  [96.2 °F (35.7 °C)-99.5 °F (37.5 °C)]   Pulse:  [64-88]   Resp:  [16-25]   BP: (125-195)/(52-80)    SpO2:  [96 %-100 %]   BP Location: Left arm    Physical Exam   Constitutional: She appears well-developed and well-nourished. No distress.   HENT:   Head: Normocephalic and atraumatic.   Cardiovascular: Normal rate.   Pulmonary/Chest: No respiratory distress.   Abdominal:   S/p PEG- site clean and dry   Skin: Skin is warm and dry.   Psychiatric: She is noncommunicative. She is inattentive.   Vitals reviewed.    Neurological Exam:   LOC: drowsy  Attention Span: poor  Language: Global aphasia  Articulation: mute   Orientation: Untestable due to severe aphasia   Visual Fields: Hemianopsia right  EOM (CN III, IV, VI): Full/intact  Facial Movement (CN VII): Lower facial weakness on the Left  Motor: Arm left  Normal 4/5  Leg left  Normal 4/5  Arm right  Paresis: 4/5  Leg right Normal 4/5       Laboratory:  CMP:   Recent Labs   Lab 05/28/19  0420   CALCIUM 9.1   ALBUMIN 2.6*   PROT 6.8      K 3.2*   CO2 28      BUN 10   CREATININE 0.6   ALKPHOS 92   ALT 51*   AST 37   BILITOT 0.6     BMP:   Recent Labs   Lab 05/28/19  0420      K 3.2*      CO2 28   BUN 10   CREATININE 0.6   CALCIUM 9.1     CBC:   Recent Labs   Lab 05/28/19 0420   WBC 12.94*   RBC 2.64*   HGB 7.8*   HCT 24.6*      MCV 93   MCH 29.5   MCHC 31.7*     Lipid Panel:   No results for input(s): CHOL, LDLCALC, HDL, TRIG in the last 168 hours.  Coagulation:   No results for input(s): PT, INR, APTT in the last 168 hours.  Hgb A1C:   No results for input(s): HGBA1C in the last 168 hours.  TSH:   No results for input(s): TSH in the last 168 hours.    Diagnostic Results     Brain Imaging     MRI 5/22  Evolving scattered areas of acute/recent infarction left MCA territory with new to increased component in the left inferior frontal gyrus now mild moderate in size.  There is no evidence for hemorrhagic conversion or significant mass effect.    Superimposed confluent T2 FLAIR signal abnormality elsewhere supra and infratentorial parenchyma  while nonspecific concerning for advanced chronic microvascular ischemic change versus posttherapy change.    Remote basal gangliar, thalamic and cerebellar infarcts unchanged.    Compensatory enlargement of ventricular system similar to prior without hydrocephalus.    CTA Head and Neck 5/21  1. 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  The right vertebral artery is dominant and patent throughout its course in the neck.  2. There is calcified plaque in the common, internal and external carotid arteries bilaterally.  Stenosis on the right is mild at 50-69% and moderate on the left at approximately 70% without change.  CTA Head    1.  There is occlusion of the distal left M1 and proximal M2 segments of the middle cerebral artery which has occurred since the prior CTA head.  There is subtle increased density within the left middle cerebral artery at the distal M1 and proximal M2 segments on comparison head CT.  This is consistent with thrombus.    2.  The right anterior circulation is normal.    3.  There is developmental variation with fetal origin of the right posterior cerebral artery.    Cardiac Imaging   · Concentric left ventricular remodeling.  · Small left ventricular cavity size.  · Increased (hyperdynamic) left ventricular systolic function. The estimated ejection fraction is 75%  · Grade I (mild) left ventricular diastolic dysfunction consistent with impaired relaxation.  · Moderate left atrial enlargement.  · Mild-to-moderate aortic valve stenosis.  · Aortic valve area is 1.43 cm2; peak velocity is 2.59 m/s; mean gradient is 15.51 mmHg.  · Mild mid-cavity left ventricular obstruction is present.  · Normal left atrial pressure.  · Mild mitral stenosis.

## 2019-05-29 LAB
ALBUMIN SERPL BCP-MCNC: 2.5 G/DL (ref 3.5–5.2)
ALP SERPL-CCNC: 94 U/L (ref 55–135)
ALT SERPL W/O P-5'-P-CCNC: 46 U/L (ref 10–44)
ANION GAP SERPL CALC-SCNC: 13 MMOL/L (ref 8–16)
AST SERPL-CCNC: 42 U/L (ref 10–40)
BASOPHILS # BLD AUTO: 0.1 K/UL (ref 0–0.2)
BASOPHILS NFR BLD: 0.9 % (ref 0–1.9)
BILIRUB SERPL-MCNC: 0.6 MG/DL (ref 0.1–1)
BUN SERPL-MCNC: 12 MG/DL (ref 8–23)
CALCIUM SERPL-MCNC: 9.4 MG/DL (ref 8.7–10.5)
CHLORIDE SERPL-SCNC: 104 MMOL/L (ref 95–110)
CO2 SERPL-SCNC: 25 MMOL/L (ref 23–29)
CREAT SERPL-MCNC: 0.6 MG/DL (ref 0.5–1.4)
DIFFERENTIAL METHOD: ABNORMAL
EOSINOPHIL # BLD AUTO: 0.1 K/UL (ref 0–0.5)
EOSINOPHIL NFR BLD: 1 % (ref 0–8)
ERYTHROCYTE [DISTWIDTH] IN BLOOD BY AUTOMATED COUNT: 13.6 % (ref 11.5–14.5)
EST. GFR  (AFRICAN AMERICAN): >60 ML/MIN/1.73 M^2
EST. GFR  (NON AFRICAN AMERICAN): >60 ML/MIN/1.73 M^2
GLUCOSE SERPL-MCNC: 78 MG/DL (ref 70–110)
HCT VFR BLD AUTO: 22.6 % (ref 37–48.5)
HGB BLD-MCNC: 7.1 G/DL (ref 12–16)
IMM GRANULOCYTES # BLD AUTO: 0.16 K/UL (ref 0–0.04)
IMM GRANULOCYTES NFR BLD AUTO: 1.5 % (ref 0–0.5)
LYMPHOCYTES # BLD AUTO: 2 K/UL (ref 1–4.8)
LYMPHOCYTES NFR BLD: 18.9 % (ref 18–48)
MCH RBC QN AUTO: 29.7 PG (ref 27–31)
MCHC RBC AUTO-ENTMCNC: 31.4 G/DL (ref 32–36)
MCV RBC AUTO: 95 FL (ref 82–98)
MONOCYTES # BLD AUTO: 1.1 K/UL (ref 0.3–1)
MONOCYTES NFR BLD: 10.4 % (ref 4–15)
NEUTROPHILS # BLD AUTO: 7.1 K/UL (ref 1.8–7.7)
NEUTROPHILS NFR BLD: 67.3 % (ref 38–73)
NRBC BLD-RTO: 0 /100 WBC
PLATELET # BLD AUTO: 291 K/UL (ref 150–350)
PMV BLD AUTO: 12 FL (ref 9.2–12.9)
POCT GLUCOSE: 117 MG/DL (ref 70–110)
POCT GLUCOSE: 160 MG/DL (ref 70–110)
POCT GLUCOSE: 75 MG/DL (ref 70–110)
POCT GLUCOSE: 82 MG/DL (ref 70–110)
POTASSIUM SERPL-SCNC: 3.5 MMOL/L (ref 3.5–5.1)
PROT SERPL-MCNC: 7.2 G/DL (ref 6–8.4)
RBC # BLD AUTO: 2.39 M/UL (ref 4–5.4)
SODIUM SERPL-SCNC: 142 MMOL/L (ref 136–145)
WBC # BLD AUTO: 10.63 K/UL (ref 3.9–12.7)

## 2019-05-29 PROCEDURE — 99233 PR SUBSEQUENT HOSPITAL CARE,LEVL III: ICD-10-PCS | Mod: ,,, | Performed by: PSYCHIATRY & NEUROLOGY

## 2019-05-29 PROCEDURE — 94640 AIRWAY INHALATION TREATMENT: CPT

## 2019-05-29 PROCEDURE — 92507 TX SP LANG VOICE COMM INDIV: CPT

## 2019-05-29 PROCEDURE — 20600001 HC STEP DOWN PRIVATE ROOM

## 2019-05-29 PROCEDURE — 85025 COMPLETE CBC W/AUTO DIFF WBC: CPT

## 2019-05-29 PROCEDURE — 25000242 PHARM REV CODE 250 ALT 637 W/ HCPCS: Performed by: PHYSICIAN ASSISTANT

## 2019-05-29 PROCEDURE — 99233 SBSQ HOSP IP/OBS HIGH 50: CPT | Mod: ,,, | Performed by: PSYCHIATRY & NEUROLOGY

## 2019-05-29 PROCEDURE — 80053 COMPREHEN METABOLIC PANEL: CPT

## 2019-05-29 PROCEDURE — 97530 THERAPEUTIC ACTIVITIES: CPT

## 2019-05-29 PROCEDURE — 94761 N-INVAS EAR/PLS OXIMETRY MLT: CPT

## 2019-05-29 PROCEDURE — 36415 COLL VENOUS BLD VENIPUNCTURE: CPT

## 2019-05-29 PROCEDURE — 92526 ORAL FUNCTION THERAPY: CPT

## 2019-05-29 PROCEDURE — 25000003 PHARM REV CODE 250: Performed by: NURSE PRACTITIONER

## 2019-05-29 PROCEDURE — 27000221 HC OXYGEN, UP TO 24 HOURS

## 2019-05-29 RX ORDER — OLMESARTAN MEDOXOMIL 5 MG/1
5 TABLET ORAL DAILY
Status: DISCONTINUED | OUTPATIENT
Start: 2019-05-29 | End: 2019-05-31

## 2019-05-29 RX ORDER — ATORVASTATIN CALCIUM 20 MG/1
40 TABLET, FILM COATED ORAL DAILY
Status: DISCONTINUED | OUTPATIENT
Start: 2019-05-29 | End: 2019-06-07 | Stop reason: HOSPADM

## 2019-05-29 RX ORDER — ACETAMINOPHEN 325 MG/1
650 TABLET ORAL EVERY 6 HOURS PRN
Status: DISCONTINUED | OUTPATIENT
Start: 2019-05-29 | End: 2019-06-07 | Stop reason: HOSPADM

## 2019-05-29 RX ORDER — CLONIDINE HYDROCHLORIDE 0.1 MG/1
0.1 TABLET ORAL 2 TIMES DAILY
Status: DISCONTINUED | OUTPATIENT
Start: 2019-05-29 | End: 2019-06-07 | Stop reason: HOSPADM

## 2019-05-29 RX ORDER — BUPROPION HYDROCHLORIDE 75 MG/1
150 TABLET ORAL 2 TIMES DAILY
Status: DISCONTINUED | OUTPATIENT
Start: 2019-05-29 | End: 2019-06-07 | Stop reason: HOSPADM

## 2019-05-29 RX ORDER — AMOXICILLIN 250 MG
1 CAPSULE ORAL 2 TIMES DAILY
Status: DISCONTINUED | OUTPATIENT
Start: 2019-05-29 | End: 2019-06-07 | Stop reason: HOSPADM

## 2019-05-29 RX ORDER — MEMANTINE HYDROCHLORIDE 10 MG/1
10 TABLET ORAL 2 TIMES DAILY
Status: DISCONTINUED | OUTPATIENT
Start: 2019-05-29 | End: 2019-06-07 | Stop reason: HOSPADM

## 2019-05-29 RX ORDER — QUETIAPINE FUMARATE 25 MG/1
25 TABLET, FILM COATED ORAL NIGHTLY
Status: DISCONTINUED | OUTPATIENT
Start: 2019-05-29 | End: 2019-06-07 | Stop reason: HOSPADM

## 2019-05-29 RX ADMIN — SODIUM CHLORIDE SOLN NEBU 3% 4 ML: 3 NEBU SOLN at 08:05

## 2019-05-29 RX ADMIN — IPRATROPIUM BROMIDE AND ALBUTEROL SULFATE 3 ML: .5; 3 SOLUTION RESPIRATORY (INHALATION) at 07:05

## 2019-05-29 RX ADMIN — BUPROPION HYDROCHLORIDE 150 MG: 75 TABLET, FILM COATED ORAL at 09:05

## 2019-05-29 RX ADMIN — APIXABAN 2.5 MG: 2.5 TABLET, FILM COATED ORAL at 08:05

## 2019-05-29 RX ADMIN — Medication 12.5 MG: at 09:05

## 2019-05-29 RX ADMIN — CLONIDINE HYDROCHLORIDE 0.1 MG: 0.1 TABLET ORAL at 08:05

## 2019-05-29 RX ADMIN — SODIUM CHLORIDE SOLN NEBU 3% 4 ML: 3 NEBU SOLN at 01:05

## 2019-05-29 RX ADMIN — AMOXICILLIN AND CLAVULANATE POTASSIUM 500 MG: 250; 62.5 POWDER, FOR SUSPENSION ORAL at 08:05

## 2019-05-29 RX ADMIN — CLONIDINE HYDROCHLORIDE 0.1 MG: 0.1 TABLET ORAL at 09:05

## 2019-05-29 RX ADMIN — QUETIAPINE FUMARATE 25 MG: 25 TABLET ORAL at 09:05

## 2019-05-29 RX ADMIN — ATORVASTATIN CALCIUM 40 MG: 20 TABLET, FILM COATED ORAL at 08:05

## 2019-05-29 RX ADMIN — AMOXICILLIN AND CLAVULANATE POTASSIUM 500 MG: 250; 62.5 POWDER, FOR SUSPENSION ORAL at 09:05

## 2019-05-29 RX ADMIN — BUPROPION HYDROCHLORIDE 150 MG: 75 TABLET, FILM COATED ORAL at 08:05

## 2019-05-29 RX ADMIN — OLMESARTAN MEDOXOMIL 5 MG: 5 TABLET, FILM COATED ORAL at 08:05

## 2019-05-29 RX ADMIN — MEMANTINE 10 MG: 10 TABLET ORAL at 08:05

## 2019-05-29 RX ADMIN — Medication 12.5 MG: at 08:05

## 2019-05-29 RX ADMIN — IPRATROPIUM BROMIDE AND ALBUTEROL SULFATE 3 ML: .5; 3 SOLUTION RESPIRATORY (INHALATION) at 12:05

## 2019-05-29 RX ADMIN — MEMANTINE 10 MG: 10 TABLET ORAL at 09:05

## 2019-05-29 RX ADMIN — SENNOSIDES,DOCUSATE SODIUM 1 TABLET: 8.6; 5 TABLET, FILM COATED ORAL at 09:05

## 2019-05-29 RX ADMIN — SENNOSIDES,DOCUSATE SODIUM 1 TABLET: 8.6; 5 TABLET, FILM COATED ORAL at 08:05

## 2019-05-29 RX ADMIN — APIXABAN 2.5 MG: 2.5 TABLET, FILM COATED ORAL at 09:05

## 2019-05-29 RX ADMIN — IPRATROPIUM BROMIDE AND ALBUTEROL SULFATE 3 ML: .5; 3 SOLUTION RESPIRATORY (INHALATION) at 04:05

## 2019-05-29 RX ADMIN — SODIUM CHLORIDE SOLN NEBU 3% 4 ML: 3 NEBU SOLN at 04:05

## 2019-05-29 NOTE — PLAN OF CARE
Problem: Occupational Therapy Goal  Goal: Occupational Therapy Goal  Goals to be met by: 6/6/2019     Patient will increase functional independence with ADLs by performing:    Pt will engage in functional task x2 min duration with 0 added cues/facilitation.  Sitting at edge of bed x10 minutes for functional task with Minimal Assistance for postural control. MET  Rolling to Bilateral with Minimal Assistance and use of bedrail as needed.   Supine to sit with Moderate Assistance.  Stand pivot transfers with Moderate Assistance.  CG demo understanding for s/s of stroke.   CG demo understanding for B UE positioning and ROM exercises while pt in supine.   CG demo understanding for pressure relief techniques.      Outcome: Ongoing (interventions implemented as appropriate)  Goals remain appropriate. Cont to rec 24 hour care/NH for d/c planning. PATITO Casas 5/29/2019

## 2019-05-29 NOTE — PLAN OF CARE
Problem: SLP Goal  Goal: SLP Goal  Speech Language Pathology Goals  Goals expected to be met by 5/28:  1. Patient will participate in ongoing swallow assessment to determine least restrictive diet recommendations.   2. Patient will participate in full speech, language, and cognitive assessment when appropriate.        Goals remain appropriate.   Emily P. Abadie M.S., CCC-SLP  Speech Language Pathologist  [phone]  05/29/2019

## 2019-05-29 NOTE — SUBJECTIVE & OBJECTIVE
-Denies HI today (12/20/2017)     Neurologic Chief Complaint: Aphasia    Subjective:     Interval History: Patient is seen for follow-up neurological assessment and treatment recommendations: Patient tolerating TF well; will be at goal by end of the day. Patient free of restraints as of 5am this morning.     HPI, Past Medical, Family, and Social History remains the same as documented in the initial encounter.     Review of Systems   Constitutional: Negative for chills and fever.   Eyes: Positive for visual disturbance.   Gastrointestinal: Negative for vomiting.   Neurological: Positive for facial asymmetry, speech difficulty and weakness.     Scheduled Meds:   albuterol-ipratropium  3 mL Nebulization Q8H    amoxicillin-pot clavulanate 250-62.5 mg/5ml  500 mg Per G Tube Q12H    apixaban  2.5 mg Per G Tube BID    atorvastatin  40 mg Per G Tube Daily    buPROPion  150 mg Per G Tube BID    cloNIDine  0.1 mg Per G Tube BID    memantine  10 mg Per G Tube BID    metoprolol  12.5 mg Per G Tube BID    olmesartan  5 mg Per G Tube Daily    QUEtiapine  25 mg Per G Tube QHS    senna-docusate 8.6-50 mg  1 tablet Per G Tube BID    sodium chloride 3%  4 mL Nebulization Q8H    tuberculin  5 Units Intradermal Once     Continuous Infusions:    PRN Meds:acetaminophen, dextrose 50%, glucagon (human recombinant), hydrALAZINE, insulin aspart U-100, ondansetron, sodium chloride 0.9%, sodium chloride 0.9%    Objective:     Vital Signs (Most Recent):  Temp: 98.7 °F (37.1 °C) (05/29/19 1128)  Pulse: 72 (05/29/19 1129)  Resp: 18 (05/29/19 0803)  BP: (!) 142/60 (05/29/19 1128)  SpO2: 100 % (05/29/19 1128)  BP Location: Left arm    Vital Signs Range (Last 24H):  Temp:  [97 °F (36.1 °C)-99 °F (37.2 °C)]   Pulse:  [62-85]   Resp:  [14-20]   BP: (123-220)/(60-87)   SpO2:  [92 %-100 %]   BP Location: Left arm    Physical Exam   Constitutional: She appears well-developed and well-nourished. No distress.   HENT:   Head: Normocephalic and atraumatic.   Cardiovascular:  Normal rate.   Pulmonary/Chest: No respiratory distress.   Abdominal:   S/p PEG- site clean and dry   Skin: Skin is warm and dry.   Psychiatric: She is noncommunicative. She is inattentive.   Vitals reviewed.    Neurological Exam:   LOC: drowsy  Attention Span: poor  Language: Global aphasia  Articulation: mute   Orientation: Untestable due to severe aphasia   Visual Fields: Hemianopsia right  EOM (CN III, IV, VI): Full/intact  Facial Movement (CN VII): Lower facial weakness on the Left  Motor: Arm left  Normal 4/5  Leg left  Normal 3/5  Arm right  Paresis: 4/5  Leg right Normal 3/5       Laboratory:  CMP:   Recent Labs   Lab 05/29/19  0908   CALCIUM 9.4   ALBUMIN 2.5*   PROT 7.2      K 3.5   CO2 25      BUN 12   CREATININE 0.6   ALKPHOS 94   ALT 46*   AST 42*   BILITOT 0.6     BMP:   Recent Labs   Lab 05/29/19  0908      K 3.5      CO2 25   BUN 12   CREATININE 0.6   CALCIUM 9.4     CBC:   Recent Labs   Lab 05/29/19  0355   WBC 10.63   RBC 2.39*   HGB 7.1*   HCT 22.6*      MCV 95   MCH 29.7   MCHC 31.4*     Lipid Panel:   No results for input(s): CHOL, LDLCALC, HDL, TRIG in the last 168 hours.  Coagulation:   No results for input(s): PT, INR, APTT in the last 168 hours.  Hgb A1C:   No results for input(s): HGBA1C in the last 168 hours.  TSH:   No results for input(s): TSH in the last 168 hours.    Diagnostic Results     Brain Imaging     MRI 5/22  Evolving scattered areas of acute/recent infarction left MCA territory with new to increased component in the left inferior frontal gyrus now mild moderate in size.  There is no evidence for hemorrhagic conversion or significant mass effect.    Superimposed confluent T2 FLAIR signal abnormality elsewhere supra and infratentorial parenchyma while nonspecific concerning for advanced chronic microvascular ischemic change versus posttherapy change.    Remote basal gangliar, thalamic and cerebellar infarcts unchanged.    Compensatory enlargement  of ventricular system similar to prior without hydrocephalus.    CTA Head and Neck 5/21  1. 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  The right vertebral artery is dominant and patent throughout its course in the neck.  2. There is calcified plaque in the common, internal and external carotid arteries bilaterally.  Stenosis on the right is mild at 50-69% and moderate on the left at approximately 70% without change.  CTA Head    1.  There is occlusion of the distal left M1 and proximal M2 segments of the middle cerebral artery which has occurred since the prior CTA head.  There is subtle increased density within the left middle cerebral artery at the distal M1 and proximal M2 segments on comparison head CT.  This is consistent with thrombus.    2.  The right anterior circulation is normal.    3.  There is developmental variation with fetal origin of the right posterior cerebral artery.    Cardiac Imaging   · Concentric left ventricular remodeling.  · Small left ventricular cavity size.  · Increased (hyperdynamic) left ventricular systolic function. The estimated ejection fraction is 75%  · Grade I (mild) left ventricular diastolic dysfunction consistent with impaired relaxation.  · Moderate left atrial enlargement.  · Mild-to-moderate aortic valve stenosis.  · Aortic valve area is 1.43 cm2; peak velocity is 2.59 m/s; mean gradient is 15.51 mmHg.  · Mild mid-cavity left ventricular obstruction is present.  · Normal left atrial pressure.  · Mild mitral stenosis.

## 2019-05-29 NOTE — PT/OT/SLP PROGRESS
Physical Therapy Treatment    Patient Name:  Sury Cobos   MRN:  [id number]    Recommendations:     Discharge Recommendations:  nursing facility, skilled   Discharge Equipment Recommendations: (pressure relief equipment)   Barriers to discharge: Inaccessible home, Decreased caregiver support and level of assist patient requires    Assessment:     Sury Cobos is a 84 y.o. female admitted with a medical diagnosis of Embolic stroke involving left middle cerebral artery.  She presents with the following impairments/functional limitations:  weakness, impaired endurance, impaired sensation, impaired self care skills, impaired balance, visual deficits, gait instability, impaired functional mobilty, impaired cognition, decreased coordination, decreased upper extremity function, decreased lower extremity function, decreased ROM, abnormal tone, decreased safety awareness, impaired coordination, impaired fine motor, impaired joint extensibility, impaired cardiopulmonary response to activity. The patient presents with R hemiparesis upper>lower extremity, flexor tone in R upper extremity, R inattention. The patient nods inconsistent responses, followed 1 motor command during treatment, eyes open 25% during treatment with cuing. Patient with low arousal that increased upon sitting edge of bed. Patient demonstrated some minimal R lower extremity spontaneous movements that were not purposeful. She sat edge of bed 10 minutes with variable assist from moderate assistance to contact guard assist with posterior lean.     Rehab Prognosis: Fair; patient would benefit from acute skilled PT services to address these deficits and reach maximum level of function.    Recent Surgery: * No surgery found *      Plan:     During this hospitalization, patient to be seen 3 x/week to address the identified rehab impairments via therapeutic activities, therapeutic exercises, neuromuscular re-education and progress toward the following  goals:    · Plan of Care Expires:  06/23/19    Subjective     Chief Complaint: unable to state  Patient/Family Comments/goals: unable to state  Pain/Comfort:  · Pain Rating 1: 0/10(no evidence of pain, patient unable to state)      Objective:     Communicated with RN prior to session.  Patient found HOB elevated with peripheral IV, telemetry, bed alarm, PureWick,  PEG tube upon PT entry to room.     General Precautions: Standard, aphasia, aspiration, fall, NPO   Orthopedic Precautions:N/A   Braces: N/A     Functional Mobility:    Bed Mobility  Rolling to L: maximum assistance  Supine to Sit:  maximum assistance   Lateral scooting at edge of bed: maximum assistance   Transfers Deferred due to lower extremity weakness, impaired trunk control, inability to follow motor cues     Sitting edge of bed 10 minutes, contact guard assist to moderate assistance, weight shift posteriorly  -Cervical rotation L, kyphotic posture, posterior pelvic tilt  -Visual/verbal/manual cues for midline orientation, thoracic and cervical extension  -Hand over hand placement of R hand in weightbearing, cued repeatedly to maintain L hand in lap or holding rail  -Cues for visual tracking to midline  -Attempted weight shifting activity, patient unable to follow cues to participate actively  -Trialed decreasing trunk support, patient able to maintain trunk control for 20 sec intervals with stand by assistance           AM-PAC 6 CLICK MOBILITY  Turning over in bed (including adjusting bedclothes, sheets and blankets)?: 2  Sitting down on and standing up from a chair with arms (e.g., wheelchair, bedside commode, etc.): 1  Moving from lying on back to sitting on the side of the bed?: 2  Moving to and from a bed to a chair (including a wheelchair)?: 1  Need to walk in hospital room?: 1  Climbing 3-5 steps with a railing?: 1  Basic Mobility Total Score: 8       Therapeutic Activities and Exercises:   Patient educated on role of therapy, goals of  session, benefits of out of bed mobility. Patient agreeable to mobilize with therapy. Discussed PT plan of care during hospitalization. Patient educated that they need to call for assistance to mobilize out of bed. Whiteboard updated as appropriate. Patient educated on how their diagnosis impacts their mobility within PT scope of practice.   Performed PROM to R and AAROM to L lower extremities in all planes, 10 reps each    Patient left sitting edge of bed with OT present to begin therapy session with all lines intact and call button in reach..    GOALS:   Multidisciplinary Problems     Physical Therapy Goals        Problem: Physical Therapy Goal    Goal Priority Disciplines Outcome Goal Variances Interventions   Physical Therapy Goal     PT, PT/OT Ongoing (interventions implemented as appropriate)     Description:  Goals to be met by: 6/3/2019       Patient will increase functional independence with mobility by performin. Supine to sit with Moderate Assistance  2. Sit to supine with Moderate Assistance  3. Rolling to Left and Right with Moderate Assistance.  4. Sit to stand transfer with Moderate Assistance  5. Bed to chair transfer with Maximum Assistance using squat pivot technique.   6. Sitting at edge of bed x15 minutes with Contact Guard Assistance and bilateral upper extremity support  7. Lower extremity exercise program x20 reps per handout, with assistance as needed to improve strength and increase activity tolerance.                       Time Tracking:     PT Received On: 19  PT Start Time: 1030     PT Stop Time: 1050  PT Total Time (min): 20 min     Billable Minutes: Therapeutic Activity 20    Treatment Type: Treatment  PT/PTA: PT     PTA Visit Number: 0     Soumya Rodrigues, PT  2019

## 2019-05-29 NOTE — PLAN OF CARE
05/29/19 0911   Post-Acute Status   Post-Acute Authorization Placement       Updates sent to NH's.  SW in contact with CM and Medical staff. Will continue to follow and offer support as needed.     Rufino Bernstein LMSW  [facility]   Ext. [phone]

## 2019-05-29 NOTE — ASSESSMENT & PLAN NOTE
83 y/o female with cardioembolic L MCA infarct 2/2 a fib (not compliant with anticoagulation at home. Patient was out of window for tPA, but when to IR for L M1 thrombectomy, TICI 3 flow restored.     Antithrombotics: Apixaban 2.5 mg BID    Statins: Lipitor 40    Aggressive risk factor modification: HTN, HLD, prior stroke, afib     Rehab efforts: The patient has been evaluated by a stroke team provider and the therapy needs have been fully considered based off the presenting complaints and exam findings. The following therapy evaluations are needed: PT evaluate and treat, OT evaluate and treat, SLP evaluate and treat, PM&R evaluate for appropriate placement- Recommending SNF    Diagnostics ordered/pending: None    VTE prophylaxis: Heparin 5000 units SQ every 8 hours , SCDs    BP parameters: S/p thrombectomy <180

## 2019-05-29 NOTE — PT/OT/SLP PROGRESS
Occupational Therapy   Treatment    Name: Sury Cobos  MRN: [id number]  Admitting Diagnosis:  Embolic stroke involving left middle cerebral artery       Recommendations:     Discharge Recommendations: nursing facility, skilled(longterm; vs 24 hour care)  Discharge Equipment Recommendations:  (pressure relief equipment)  Barriers to discharge:  Other (Comment)(level of skilled and physical assistance required at this time)    Assessment:     Sury Cobos is a 84 y.o. female with a medical diagnosis of Embolic stroke involving left middle cerebral artery.  She presents with a significant decline in her functional engagement and participation in daily tasks/activities. Following 0% of commands for ADLs/self care. She does demo an improvement in her static sitting balance EOB and tolerated for activity on this date. Performance deficits affecting function are weakness, impaired endurance, impaired sensation, impaired self care skills, impaired functional mobilty, gait instability, impaired balance, visual deficits, impaired cognition, decreased upper extremity function, decreased lower extremity function, impaired coordination, impaired fine motor, impaired cardiopulmonary response to activity.     Rehab Prognosis:  Limited; patient would benefit from acute skilled OT services to address these deficits and reach maximum level of function.       Plan:     Patient to be seen 3 x/week to address the above listed problems via self-care/home management, therapeutic exercises, therapeutic activities, neuromuscular re-education, cognitive retraining, sensory integration  · Plan of Care Expires: 06/20/19  · Plan of Care Reviewed with: patient    Subjective     Pain/Comfort:  · Pain Rating 1: 0/10(pt unable to report; no indications )  · Pain Rating Post-Intervention 1: 0/10    Objective:     Communicated with: RN prior to session. Pt with high BP at 0937; given medication. RN to approve session at stated time.  Patient found  seated EOB with PT with peripheral IV, bed alarm, telemetry, PureWick upon OT entry to room.    General Precautions: Standard, aphasia, aspiration, fall, NPO, vision impaired(DNR)   Orthopedic Precautions:N/A   Braces: N/A     Occupational Performance:   Bed Mobility:    · Patient completed Scooting/Bridging with total assistance  · To HOB  · Patient completed Sit to Supine with total assistance     Activities of Daily Living:  · Grooming: total assistance hand over hand with R hand    AMPAC 6 Click ADL: 6   Body mass index is 21.17 kg/m².  Vitals:    05/29/19 1129   BP:    Pulse: 72   Resp:    Temp:        Treatment & Education:  -Pt alert with eyes open throughout session; non verbal throughout; occasionally making gestures and grunts at communication with yes/no  -re edu on OT role in care  -EOB x13 min duration with CGA-min(A) for postural control   *attempts at functional tracking and visual fixation with 0% response  -Communication board updated; no family at bedside for education       Patient left HOB elevated with all lines intact, call button in reach, bed alarm on and RN notifiedEducation:      GOALS:   Multidisciplinary Problems     Occupational Therapy Goals        Problem: Occupational Therapy Goal    Goal Priority Disciplines Outcome Interventions   Occupational Therapy Goal     OT, PT/OT Ongoing (interventions implemented as appropriate)    Description:  Goals to be met by: 6/6/2019     Patient will increase functional independence with ADLs by performing:    Pt will engage in functional task x2 min duration with 0 added cues/facilitation.  Sitting at edge of bed x10 minutes for functional task with Minimal Assistance for postural control. MET  Rolling to Bilateral with Minimal Assistance and use of bedrail as needed.   Supine to sit with Moderate Assistance.  Stand pivot transfers with Moderate Assistance.  CG demo understanding for s/s of stroke.   CG demo understanding for B UE positioning and ROM  exercises while pt in supine.   CG demo understanding for pressure relief techniques.                        Time Tracking:     OT Date of Treatment: 05/29/19  OT Start Time: 1043  OT Stop Time: 1102  OT Total Time (min): 19 min    Billable Minutes:Therapeutic Activity 18    PATITO Casas  5/29/2019

## 2019-05-29 NOTE — ASSESSMENT & PLAN NOTE
SLP to evaluate and treat  High chance for aspiration PNA, will monitor for signs of infection or worsening respiratory status    05/29- TF started, tolerating well

## 2019-05-29 NOTE — PT/OT/SLP PROGRESS
Speech Language Pathology Treatment    Patient Name:  Sury Cobos   MRN:  [id number]  Admitting Diagnosis: Embolic stroke involving left middle cerebral artery    Recommendations:      General Recommendations:  Dysphagia therapy and Speech/language therapy  Diet recommendations:  NPO, Liquid Diet Level: NPO   Aspiration Precautions: Strict aspiration precautions   General Precautions: Standard, aphasia, aspiration, fall, NPO  Communication strategies:  yes/no questions only and provide increased time to answer               Subjective     OT present.     Pain/Comfort:  · Pain Rating 1: 0/10    Objective:     Has the patient been evaluated by SLP for swallowing?   Yes  Keep patient NPO? No   Current Respiratory Status: room air      Patient seated on EOB with OT upon ST entry to room. ST presented puree trials via tsp dipped in chocolate pudding. Patient with no attempt to accept or manipulate trial despite max A. Patient inconsistently grunting appropriately in response to ST and OT utterances, though overall limited.   Patient did not attempt to ID objects in a field choice of 2 despite max A and tactile stim.  Patient with increasing evidence of fatigue as session progressed -  returned to reclined position. Cont ST POC.     Assessment:     Sury Cobos is a 84 y.o. female with an SLP diagnosis of Aphasia and Dysphagia.     Goals:   Multidisciplinary Problems     SLP Goals        Problem: SLP Goal    Goal Priority Disciplines Outcome   SLP Goal     SLP Ongoing (interventions implemented as appropriate)   Description:  Speech Language Pathology Goals  Goals expected to be met by 5/28:  1. Patient will participate in ongoing swallow assessment to determine least restrictive diet recommendations.   2. Patient will participate in full speech, language, and cognitive assessment when appropriate.                         Plan:     · Patient to be seen:  4 x/week   · Plan of Care expires:  06/21/19  · Plan of Care  reviewed with:  patient   · SLP Follow-Up:  Yes       Discharge recommendations:  nursing facility, skilled   Barriers to Discharge:  None    Time Tracking:     SLP Treatment Date:   05/29/19  Speech Start Time:  1100  Speech Stop Time:  1116     Speech Total Time (min):  16 min    Billable Minutes: Speech Therapy Individual 8 and Treatment Swallowing Dysfunction 8    Emily Abadie, CCC-SLP  05/29/2019

## 2019-05-29 NOTE — PHYSICIAN QUERY
PT Name: Sury Cobos  MR #: [id number]    Physician Query Form - Cause and Effect Relationship Clarification      CDS: Zahra Topete RN, CCDS       Contact information: [email or web address]    This form is a permanent document in the medical record.     Query Date: May 29, 2019    By submitting this query, we are merely seeking further clarification of documentation. Please utilize your independent clinical judgment when addressing the question(s) below.    The Medical record contains the following:  Supporting Clinical Findings   Location in record        Odorous urine reported by RN; UA w/ reflex ordered.    UA collected yesterday indicative of UTI, rocephin started.                                                                                             5/26-[facility]  5/27-[facility]     Urine Culture, Routine   ESCHERICHIA COLI   >100,000 cfu/ml                                                                                                                                                                5/26-Microbiology         Provider, please clarify if there is any correlation between ___Escherichia Coli___ and ____UTI_____.           Are the conditions:      [x ] Due to or associated with each other   [  ] Unrelated to each other   [  ] Other (Please Specify): _________________________   [  ] Clinically Undetermined

## 2019-05-29 NOTE — PROGRESS NOTES
[facility]  Vascular Neurology  Comprehensive Stroke Center  Progress Note    Assessment/Plan:     * Embolic stroke involving left middle cerebral artery  83 y/o female with cardioembolic L MCA infarct 2/2 a fib (not compliant with anticoagulation at home. Patient was out of window for tPA, but when to IR for L M1 thrombectomy, TICI 3 flow restored.     Antithrombotics: Apixaban 2.5 mg BID    Statins: Lipitor 40    Aggressive risk factor modification: HTN, HLD, prior stroke, afib     Rehab efforts: The patient has been evaluated by a stroke team provider and the therapy needs have been fully considered based off the presenting complaints and exam findings. The following therapy evaluations are needed: PT evaluate and treat, OT evaluate and treat, SLP evaluate and treat, PM&R evaluate for appropriate placement- Recommending SNF    Diagnostics ordered/pending: None    VTE prophylaxis: Heparin 5000 units SQ every 8 hours , SCDs    BP parameters: S/p thrombectomy <180      Debility  Patient was reported to be back to baseline and ambulating with walker prior to this hospitalization  Currently aphasic and not following commands, but able to TOSCANO spontaneously  PT/OT now recommending SNF or home with 24 hour support  Plans to discuss goals of care with family    Diabetes mellitus  A1C 5.9  Currently NPO pending PEG placement  Continue q6h glucose and SSI      S/P percutaneous endoscopic gastrostomy (PEG) tube placement  PEG placed on 05/27 at 1730.    Site clean and dry.     Paroxysmal atrial fibrillation  Found on last admit 5-2018.  Previously on Eliquis but ran out and not refilled. (Concerns of noncompliance due to price. Please price check choice of AC prior to starting)    Will resume AC 24 hours after PEG placement    Patient went into Afib with RVR on 05/27- metoprolol 12.5 mg BID started    Dysphagia  SLP to evaluate and treat  High chance for aspiration PNA, will monitor for signs of infection  or worsening respiratory status    05/29- TF started, tolerating well    Dementia with behavioral disturbance  Continue home memantine  Delirium precautions    Seroquel 25 mg nightly started on 05/28.     Mixed hyperlipidemia  Stroke risk factor  LDL 54, at goal  Continue home Lipitor 40    Essential hypertension  Stroke risk factor  SBP <180    Olmesartan 5 mg daily started on 5/28.     Aphasia  Due to stroke  SLP to evaluate and treat          5/24 patient stepped down overnight to stroke unit, currently with NG tube in place, patient will likely need PEG tube placed, will attempt to discuss goals of care with family, need to restart anticoagulation but will hold for now until PEG decision is made  5/25 no acute events overnight. IR consulted. Work up complete. NPO and barium Sunday night. Plan for PEG Monday 5/27.   5/26 NAEON. Odorous urine reported by RN; UA w/ reflex ordered. NPO and barium at midnight for PEG placement tomorrow.   5/27: UA collected yesterday indicative of UTI, rocephin started.  Patient had afib with RVR today, metoprolol 12.5mg BID started. PEG placement pending.   5/28: PEG placed yesterday- site clean and dry; will initiate TF and anticoagulation at 24 hour jessy. Nursing attempting to remove restraints, abdominal binder ordered to secure PEG. Patient's HR controlled adequately with metoprolol. Will discharge to NH, when restraint free for 72 hours.   5/29: Patient tolerating TF well; will be at goal by end of the day. Patient free of restraints as of 5am this morning.     STROKE DOCUMENTATION   Acute Stroke Times   Last Known Normal Date: 05/20/19  Last Known Normal Time: 2030  Symptom Onset Date: 05/20/19  Symptom Onset Time: 2030  Stroke Team Called Date: 05/20/19  Stroke Team Called Time: 2338  Stroke Team Arrival Date: 05/21/19  Stroke Team Arrival Time: 0605  CT Interpretation Time: 2338    NIH Scale:  1a. Level of Consciousness: 1-->Not alert, but arousable by minor stimulation to  obey, answer, or respond  1b. LOC Questions: 2-->Answers neither question correctly  1c. LOC Commands: 2-->Performs neither task correctly  2. Best Gaze: 0-->Normal  3. Visual: 2-->Complete hemianopia  4. Facial Palsy: 1-->Minor paralysis (flattened nasolabial fold, asymmetry on smiling)  5a. Motor Arm, Left: 1-->Drift, limb holds 90 (or 45) degrees, but drifts down before full 10 seconds, does not hit bed or other support  5b. Motor Arm, Right: 1-->Drift, limb holds 90 (or 45) degrees, but drifts down before full 10 secs, does not hit bed or other support  6a. Motor Leg, Left: 2-->Some effort against gravity, leg falls to bed by 5 secs, but has some effort against gravity  6b. Motor Leg, Right: 2-->Some effort against gravity, leg falls to bed by 5 secs, but has some effort against gravity  7. Limb Ataxia: 0-->Absent  8. Sensory: 0-->Normal, no sensory loss  9. Best Language: 3-->Mute, global aphasia, no usable speech or auditory comprehension  10. Dysarthria: 2-->Severe dysarthria, patients speech is so slurred as to be unintelligible in the absence of or out of proportion to any dysphasia, or is mute/anarthric  11. Extinction and Inattention (formerly Neglect): 0-->No abnormality  Total (NIH Stroke Scale): 19       Modified [place] Score: 3  [place]vonne Coma Scale:    ABCD2 Score:    [id number] Score:   HAS -BLED Score:   ICH Score:   Hunt & Burkett Classification:      Hemorrhagic change of an Ischemic Stroke: Does this patient have an ischemic stroke with hemorrhagic changes? No     Neurologic Chief Complaint: Aphasia    Subjective:     Interval History: Patient is seen for follow-up neurological assessment and treatment recommendations: Patient tolerating TF well; will be at goal by end of the day. Patient free of restraints as of 5am this morning.     HPI, Past Medical, Family, and Social History remains the same as documented in the initial encounter.     Review of Systems   Constitutional: Negative for chills and  fever.   Eyes: Positive for visual disturbance.   Gastrointestinal: Negative for vomiting.   Neurological: Positive for facial asymmetry, speech difficulty and weakness.     Scheduled Meds:   albuterol-ipratropium  3 mL Nebulization Q8H    amoxicillin-pot clavulanate 250-62.5 mg/5ml  500 mg Per G Tube Q12H    apixaban  2.5 mg Per G Tube BID    atorvastatin  40 mg Per G Tube Daily    buPROPion  150 mg Per G Tube BID    cloNIDine  0.1 mg Per G Tube BID    memantine  10 mg Per G Tube BID    metoprolol  12.5 mg Per G Tube BID    olmesartan  5 mg Per G Tube Daily    QUEtiapine  25 mg Per G Tube QHS    senna-docusate 8.6-50 mg  1 tablet Per G Tube BID    sodium chloride 3%  4 mL Nebulization Q8H    tuberculin  5 Units Intradermal Once     Continuous Infusions:    PRN Meds:acetaminophen, dextrose 50%, glucagon (human recombinant), hydrALAZINE, insulin aspart U-100, ondansetron, sodium chloride 0.9%, sodium chloride 0.9%    Objective:     Vital Signs (Most Recent):  Temp: 98.7 °F (37.1 °C) (05/29/19 1128)  Pulse: 72 (05/29/19 1129)  Resp: 18 (05/29/19 0803)  BP: (!) 142/60 (05/29/19 1128)  SpO2: 100 % (05/29/19 1128)  BP Location: Left arm    Vital Signs Range (Last 24H):  Temp:  [97 °F (36.1 °C)-99 °F (37.2 °C)]   Pulse:  [62-85]   Resp:  [14-20]   BP: (123-220)/(60-87)   SpO2:  [92 %-100 %]   BP Location: Left arm    Physical Exam   Constitutional: She appears well-developed and well-nourished. No distress.   HENT:   Head: Normocephalic and atraumatic.   Cardiovascular: Normal rate.   Pulmonary/Chest: No respiratory distress.   Abdominal:   S/p PEG- site clean and dry   Skin: Skin is warm and dry.   Psychiatric: She is noncommunicative. She is inattentive.   Vitals reviewed.    Neurological Exam:   LOC: drowsy  Attention Span: poor  Language: Global aphasia  Articulation: mute   Orientation: Untestable due to severe aphasia   Visual Fields: Hemianopsia right  EOM (CN III, IV, VI): Full/intact  Facial Movement  (CN VII): Lower facial weakness on the Left  Motor: Arm left  Normal 4/5  Leg left  Normal  3/5  Arm right  Paresis: 4/5  Leg right Normal 3/5       Laboratory:  CMP:   Recent Labs   Lab 05/29/19  0908   CALCIUM 9.4   ALBUMIN 2.5*   PROT 7.2      K 3.5   CO2 25      BUN 12   CREATININE 0.6   ALKPHOS 94   ALT 46*   AST 42*   BILITOT 0.6     BMP:   Recent Labs   Lab 05/29/19  0908      K 3.5      CO2 25   BUN 12   CREATININE 0.6   CALCIUM 9.4     CBC:   Recent Labs   Lab 05/29/19  0355   WBC 10.63   RBC 2.39*   HGB 7.1*   HCT 22.6*      MCV 95   MCH 29.7   MCHC 31.4*     Lipid Panel:   No results for input(s): CHOL, LDLCALC, HDL, TRIG in the last 168 hours.  Coagulation:   No results for input(s): PT, INR, APTT in the last 168 hours.  Hgb A1C:   No results for input(s): HGBA1C in the last 168 hours.  TSH:   No results for input(s): TSH in the last 168 hours.    Diagnostic Results     Brain Imaging     MRI 5/22  Evolving scattered areas of acute/recent infarction left MCA territory with new to increased component in the left inferior frontal gyrus now mild moderate in size.  There is no evidence for hemorrhagic conversion or significant mass effect.    Superimposed confluent T2 FLAIR signal abnormality elsewhere supra and infratentorial parenchyma while nonspecific concerning for advanced chronic microvascular ischemic change versus posttherapy change.    Remote basal gangliar, thalamic and cerebellar infarcts unchanged.    Compensatory enlargement of ventricular system similar to prior without hydrocephalus.    CTA Head and Neck 5/21  1. 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  The right vertebral artery is dominant and patent throughout its course in the neck.  2. There is calcified plaque in the common, internal and external carotid arteries bilaterally.  Stenosis on the right is mild at 50-69%  and moderate on the left at approximately 70% without change.  CTA Head    1.  There is occlusion of the distal left M1 and proximal M2 segments of the middle cerebral artery which has occurred since the prior CTA head.  There is subtle increased density within the left middle cerebral artery at the distal M1 and proximal M2 segments on comparison head CT.  This is consistent with thrombus.    2.  The right anterior circulation is normal.    3.  There is developmental variation with fetal origin of the right posterior cerebral artery.    Cardiac Imaging   · Concentric left ventricular remodeling.  · Small left ventricular cavity size.  · Increased (hyperdynamic) left ventricular systolic function. The estimated ejection fraction is 75%  · Grade I (mild) left ventricular diastolic dysfunction consistent with impaired relaxation.  · Moderate left atrial enlargement.  · Mild-to-moderate aortic valve stenosis.  · Aortic valve area is 1.43 cm2; peak velocity is 2.59 m/s; mean gradient is 15.51 mmHg.  · Mild mid-cavity left ventricular obstruction is present.  · Normal left atrial pressure.  · Mild mitral stenosis.      Leo Sauer NP  [facility] Stroke [facility]  Department of Vascular Neurology   [facility]

## 2019-05-29 NOTE — NURSING
Soft restraints discontinued , pt. Is calm not pulling at medical equipment, will continue to monitor to be harm to one self, and to prevent pt. From inteferring in her  medical treatment

## 2019-05-30 PROBLEM — E44.0 MODERATE MALNUTRITION: Status: ACTIVE | Noted: 2019-05-30

## 2019-05-30 LAB
ALBUMIN SERPL BCP-MCNC: 2.3 G/DL (ref 3.5–5.2)
ALBUMIN SERPL BCP-MCNC: 2.4 G/DL (ref 3.5–5.2)
ALBUMIN SERPL BCP-MCNC: 2.4 G/DL (ref 3.5–5.2)
ALP SERPL-CCNC: 81 U/L (ref 55–135)
ALP SERPL-CCNC: 83 U/L (ref 55–135)
ALP SERPL-CCNC: 83 U/L (ref 55–135)
ALT SERPL W/O P-5'-P-CCNC: 44 U/L (ref 10–44)
ALT SERPL W/O P-5'-P-CCNC: 45 U/L (ref 10–44)
ALT SERPL W/O P-5'-P-CCNC: 47 U/L (ref 10–44)
ANION GAP SERPL CALC-SCNC: 10 MMOL/L (ref 8–16)
ANION GAP SERPL CALC-SCNC: 7 MMOL/L (ref 8–16)
ANION GAP SERPL CALC-SCNC: 8 MMOL/L (ref 8–16)
AST SERPL-CCNC: 37 U/L (ref 10–40)
AST SERPL-CCNC: 42 U/L (ref 10–40)
AST SERPL-CCNC: 42 U/L (ref 10–40)
BASOPHILS # BLD AUTO: 0.08 K/UL (ref 0–0.2)
BASOPHILS NFR BLD: 0.7 % (ref 0–1.9)
BILIRUB SERPL-MCNC: 0.4 MG/DL (ref 0.1–1)
BILIRUB SERPL-MCNC: 0.4 MG/DL (ref 0.1–1)
BILIRUB SERPL-MCNC: 0.5 MG/DL (ref 0.1–1)
BUN SERPL-MCNC: 15 MG/DL (ref 8–23)
BUN SERPL-MCNC: 16 MG/DL (ref 8–23)
BUN SERPL-MCNC: 16 MG/DL (ref 8–23)
CALCIUM SERPL-MCNC: 9.1 MG/DL (ref 8.7–10.5)
CALCIUM SERPL-MCNC: 9.5 MG/DL (ref 8.7–10.5)
CALCIUM SERPL-MCNC: 9.7 MG/DL (ref 8.7–10.5)
CHLORIDE SERPL-SCNC: 103 MMOL/L (ref 95–110)
CHLORIDE SERPL-SCNC: 104 MMOL/L (ref 95–110)
CHLORIDE SERPL-SCNC: 105 MMOL/L (ref 95–110)
CO2 SERPL-SCNC: 28 MMOL/L (ref 23–29)
CO2 SERPL-SCNC: 28 MMOL/L (ref 23–29)
CO2 SERPL-SCNC: 31 MMOL/L (ref 23–29)
CREAT SERPL-MCNC: 0.7 MG/DL (ref 0.5–1.4)
DIFFERENTIAL METHOD: ABNORMAL
EOSINOPHIL # BLD AUTO: 0.2 K/UL (ref 0–0.5)
EOSINOPHIL NFR BLD: 1.9 % (ref 0–8)
ERYTHROCYTE [DISTWIDTH] IN BLOOD BY AUTOMATED COUNT: 13.9 % (ref 11.5–14.5)
EST. GFR  (AFRICAN AMERICAN): >60 ML/MIN/1.73 M^2
EST. GFR  (NON AFRICAN AMERICAN): >60 ML/MIN/1.73 M^2
GLUCOSE SERPL-MCNC: 128 MG/DL (ref 70–110)
GLUCOSE SERPL-MCNC: 128 MG/DL (ref 70–110)
GLUCOSE SERPL-MCNC: 151 MG/DL (ref 70–110)
HCT VFR BLD AUTO: 24.9 % (ref 37–48.5)
HGB BLD-MCNC: 7.7 G/DL (ref 12–16)
IMM GRANULOCYTES # BLD AUTO: 0.24 K/UL (ref 0–0.04)
IMM GRANULOCYTES NFR BLD AUTO: 2.1 % (ref 0–0.5)
LYMPHOCYTES # BLD AUTO: 2.7 K/UL (ref 1–4.8)
LYMPHOCYTES NFR BLD: 23.5 % (ref 18–48)
MCH RBC QN AUTO: 29.4 PG (ref 27–31)
MCHC RBC AUTO-ENTMCNC: 30.9 G/DL (ref 32–36)
MCV RBC AUTO: 95 FL (ref 82–98)
MONOCYTES # BLD AUTO: 0.7 K/UL (ref 0.3–1)
MONOCYTES NFR BLD: 6.4 % (ref 4–15)
NEUTROPHILS # BLD AUTO: 7.4 K/UL (ref 1.8–7.7)
NEUTROPHILS NFR BLD: 65.4 % (ref 38–73)
NRBC BLD-RTO: 0 /100 WBC
PLATELET # BLD AUTO: 325 K/UL (ref 150–350)
PMV BLD AUTO: 11.9 FL (ref 9.2–12.9)
POCT GLUCOSE: 159 MG/DL (ref 70–110)
POCT GLUCOSE: 192 MG/DL (ref 70–110)
POCT GLUCOSE: 200 MG/DL (ref 70–110)
POTASSIUM SERPL-SCNC: 3.1 MMOL/L (ref 3.5–5.1)
POTASSIUM SERPL-SCNC: 3.3 MMOL/L (ref 3.5–5.1)
POTASSIUM SERPL-SCNC: 3.9 MMOL/L (ref 3.5–5.1)
PROT SERPL-MCNC: 6.5 G/DL (ref 6–8.4)
PROT SERPL-MCNC: 6.7 G/DL (ref 6–8.4)
PROT SERPL-MCNC: 6.7 G/DL (ref 6–8.4)
RBC # BLD AUTO: 2.62 M/UL (ref 4–5.4)
SODIUM SERPL-SCNC: 140 MMOL/L (ref 136–145)
SODIUM SERPL-SCNC: 142 MMOL/L (ref 136–145)
SODIUM SERPL-SCNC: 142 MMOL/L (ref 136–145)
WBC # BLD AUTO: 11.34 K/UL (ref 3.9–12.7)

## 2019-05-30 PROCEDURE — 36415 COLL VENOUS BLD VENIPUNCTURE: CPT

## 2019-05-30 PROCEDURE — 63600175 PHARM REV CODE 636 W HCPCS: Performed by: PHYSICIAN ASSISTANT

## 2019-05-30 PROCEDURE — 99233 SBSQ HOSP IP/OBS HIGH 50: CPT | Mod: ,,, | Performed by: PSYCHIATRY & NEUROLOGY

## 2019-05-30 PROCEDURE — 99233 PR SUBSEQUENT HOSPITAL CARE,LEVL III: ICD-10-PCS | Mod: ,,, | Performed by: PSYCHIATRY & NEUROLOGY

## 2019-05-30 PROCEDURE — 80053 COMPREHEN METABOLIC PANEL: CPT | Mod: 91

## 2019-05-30 PROCEDURE — 25000003 PHARM REV CODE 250: Performed by: NURSE PRACTITIONER

## 2019-05-30 PROCEDURE — 94640 AIRWAY INHALATION TREATMENT: CPT

## 2019-05-30 PROCEDURE — 94761 N-INVAS EAR/PLS OXIMETRY MLT: CPT

## 2019-05-30 PROCEDURE — 80053 COMPREHEN METABOLIC PANEL: CPT

## 2019-05-30 PROCEDURE — 85025 COMPLETE CBC W/AUTO DIFF WBC: CPT

## 2019-05-30 PROCEDURE — 25000242 PHARM REV CODE 250 ALT 637 W/ HCPCS: Performed by: PHYSICIAN ASSISTANT

## 2019-05-30 PROCEDURE — 27000221 HC OXYGEN, UP TO 24 HOURS

## 2019-05-30 PROCEDURE — 92507 TX SP LANG VOICE COMM INDIV: CPT

## 2019-05-30 PROCEDURE — 20600001 HC STEP DOWN PRIVATE ROOM

## 2019-05-30 RX ORDER — POTASSIUM CHLORIDE 20 MEQ/15ML
40 SOLUTION ORAL ONCE
Status: COMPLETED | OUTPATIENT
Start: 2019-05-30 | End: 2019-05-30

## 2019-05-30 RX ADMIN — SENNOSIDES,DOCUSATE SODIUM 1 TABLET: 8.6; 5 TABLET, FILM COATED ORAL at 08:05

## 2019-05-30 RX ADMIN — IPRATROPIUM BROMIDE AND ALBUTEROL SULFATE 3 ML: .5; 3 SOLUTION RESPIRATORY (INHALATION) at 05:05

## 2019-05-30 RX ADMIN — OLMESARTAN MEDOXOMIL 5 MG: 5 TABLET, FILM COATED ORAL at 09:05

## 2019-05-30 RX ADMIN — MEMANTINE 10 MG: 10 TABLET ORAL at 08:05

## 2019-05-30 RX ADMIN — APIXABAN 2.5 MG: 2.5 TABLET, FILM COATED ORAL at 08:05

## 2019-05-30 RX ADMIN — IPRATROPIUM BROMIDE AND ALBUTEROL SULFATE 3 ML: .5; 3 SOLUTION RESPIRATORY (INHALATION) at 12:05

## 2019-05-30 RX ADMIN — AMOXICILLIN AND CLAVULANATE POTASSIUM 500 MG: 250; 62.5 POWDER, FOR SUSPENSION ORAL at 11:05

## 2019-05-30 RX ADMIN — SODIUM CHLORIDE SOLN NEBU 3% 4 ML: 3 NEBU SOLN at 08:05

## 2019-05-30 RX ADMIN — SODIUM CHLORIDE SOLN NEBU 3% 4 ML: 3 NEBU SOLN at 05:05

## 2019-05-30 RX ADMIN — QUETIAPINE FUMARATE 25 MG: 25 TABLET ORAL at 08:05

## 2019-05-30 RX ADMIN — POTASSIUM CHLORIDE 40 MEQ: 20 SOLUTION ORAL at 09:05

## 2019-05-30 RX ADMIN — SODIUM CHLORIDE SOLN NEBU 3% 4 ML: 3 NEBU SOLN at 12:05

## 2019-05-30 RX ADMIN — BUPROPION HYDROCHLORIDE 150 MG: 75 TABLET, FILM COATED ORAL at 09:05

## 2019-05-30 RX ADMIN — SENNOSIDES,DOCUSATE SODIUM 1 TABLET: 8.6; 5 TABLET, FILM COATED ORAL at 09:05

## 2019-05-30 RX ADMIN — HYDRALAZINE HYDROCHLORIDE 10 MG: 20 INJECTION INTRAMUSCULAR; INTRAVENOUS at 07:05

## 2019-05-30 RX ADMIN — IPRATROPIUM BROMIDE AND ALBUTEROL SULFATE 3 ML: .5; 3 SOLUTION RESPIRATORY (INHALATION) at 08:05

## 2019-05-30 RX ADMIN — APIXABAN 2.5 MG: 2.5 TABLET, FILM COATED ORAL at 09:05

## 2019-05-30 RX ADMIN — ATORVASTATIN CALCIUM 40 MG: 20 TABLET, FILM COATED ORAL at 09:05

## 2019-05-30 RX ADMIN — MEMANTINE 10 MG: 10 TABLET ORAL at 09:05

## 2019-05-30 RX ADMIN — CLONIDINE HYDROCHLORIDE 0.1 MG: 0.1 TABLET ORAL at 08:05

## 2019-05-30 RX ADMIN — Medication 12.5 MG: at 09:05

## 2019-05-30 RX ADMIN — CLONIDINE HYDROCHLORIDE 0.1 MG: 0.1 TABLET ORAL at 09:05

## 2019-05-30 RX ADMIN — BUPROPION HYDROCHLORIDE 150 MG: 75 TABLET, FILM COATED ORAL at 08:05

## 2019-05-30 RX ADMIN — AMOXICILLIN AND CLAVULANATE POTASSIUM 500 MG: 250; 62.5 POWDER, FOR SUSPENSION ORAL at 08:05

## 2019-05-30 NOTE — PLAN OF CARE
Problem: SLP Goal  Goal: SLP Goal  Speech Language Pathology Goals  Goals expected to be met by 5/28:  1. Patient will participate in ongoing swallow assessment to determine least restrictive diet recommendations.   2. Patient will participate in full speech, language, and cognitive assessment when appropriate.        Pt lethargic with no acceptance of po trials.     SERENA Joe, CCC-SLP  5/30/2019

## 2019-05-30 NOTE — PROGRESS NOTES
[facility]  Vascular Neurology  Comprehensive Stroke Center  Progress Note    Assessment/Plan:     * Embolic stroke involving left middle cerebral artery  85 y/o female with cardioembolic L MCA infarct 2/2 a fib (not compliant with anticoagulation at home. Patient was out of window for tPA, but when to IR for L M1 thrombectomy, TICI 3 flow restored.     Antithrombotics: Apixaban 2.5 mg BID    Statins: Lipitor 40    Aggressive risk factor modification: HTN, HLD, prior stroke, afib     Rehab efforts: The patient has been evaluated by a stroke team provider and the therapy needs have been fully considered based off the presenting complaints and exam findings. The following therapy evaluations are needed: PT evaluate and treat, OT evaluate and treat, SLP evaluate and treat, PM&R evaluate for appropriate placement- Recommending SNF    Diagnostics ordered/pending: None    VTE prophylaxis: Heparin 5000 units SQ every 8 hours , SCDs    BP parameters: S/p thrombectomy <180      Debility  Patient was reported to be back to baseline and ambulating with walker prior to this hospitalization  Currently aphasic and not following commands, but able to TOSCANO spontaneously  PT/OT now recommending SNF or home with 24 hour support  Plans to discuss goals of care with family    Family is amenable to patient discharging to SNF.     Diabetes mellitus  A1C 5.9  Currently NPO pending PEG placement  Continue q6h glucose and SSI      S/P percutaneous endoscopic gastrostomy (PEG) tube placement  PEG placed on 05/27 at 1730.    Site clean and dry.     Paroxysmal atrial fibrillation  Found on last admit 5-2018.  Previously on Eliquis but ran out and not refilled. (Concerns of noncompliance due to price. Please price check choice of AC prior to starting)    Will resume AC 24 hours after PEG placement    Patient went into Afib with RVR on 05/27- metoprolol 12.5 mg BID started.     Dysphagia  SLP to evaluate and treat  High chance  for aspiration PNA, will monitor for signs of infection or worsening respiratory status    05/29- TF started, tolerating well    Dementia with behavioral disturbance  Continue home memantine  Delirium precautions    Seroquel 25 mg nightly started on 05/28. Patient doing well on nightly Seroquel. She is free of restraints and alert during AM assessment.    Mixed hyperlipidemia  Stroke risk factor  LDL 54, at goal  Continue home Lipitor 40    Essential hypertension  Stroke risk factor  SBP <180    Olmesartan 5 mg daily started on 5/28.     Aphasia  Due to stroke  SLP to evaluate and treat          5/24 patient stepped down overnight to stroke unit, currently with NG tube in place, patient will likely need PEG tube placed, will attempt to discuss goals of care with family, need to restart anticoagulation but will hold for now until PEG decision is made  5/25 no acute events overnight. IR consulted. Work up complete. NPO and barium Dave night. Plan for PEG Monday 5/27.   5/26 NAEON. Odorous urine reported by RN; UA w/ reflex ordered. NPO and barium at midnight for PEG placement tomorrow.   5/27: UA collected yesterday indicative of UTI, rocephin started.  Patient had afib with RVR today, metoprolol 12.5mg BID started. PEG placement pending.   5/28: PEG placed yesterday- site clean and dry; will initiate TF and anticoagulation at 24 hour jessy. Nursing attempting to remove restraints, abdominal binder ordered to secure PEG. Patient's HR controlled adequately with metoprolol. Will discharge to NH, when restraint free for 72 hours.   5/29: Patient tolerating TF well; will be at goal by end of the day. Patient free of restraints as of 5am this morning.   5/30: NAEON. Patient more alert during exam today. TF at goal, patient tolerating well. She remains free of restraints.     STROKE DOCUMENTATION   Acute Stroke Times   Last Known Normal Date: 05/20/19  Last Known Normal Time: 2030  Symptom Onset Date: 05/20/19  Symptom  Onset Time: 2030  Stroke Team Called Date: 05/20/19  Stroke Team Called Time: 2338  Stroke Team Arrival Date: 05/21/19  Stroke Team Arrival Time: 0605  CT Interpretation Time: 2338    NIH Scale:  1a. Level of Consciousness: 0-->Alert, keenly responsive  1b. LOC Questions: 2-->Answers neither question correctly  1c. LOC Commands: 2-->Performs neither task correctly  2. Best Gaze: 0-->Normal  3. Visual: 2-->Complete hemianopia  4. Facial Palsy: 1-->Minor paralysis (flattened nasolabial fold, asymmetry on smiling)  5a. Motor Arm, Left: 1-->Drift, limb holds 90 (or 45) degrees, but drifts down before full 10 seconds, does not hit bed or other support  5b. Motor Arm, Right: 1-->Drift, limb holds 90 (or 45) degrees, but drifts down before full 10 secs, does not hit bed or other support  6a. Motor Leg, Left: 1-->Drift, leg falls by the end of the 5-sec period but does not hit bed  6b. Motor Leg, Right: 1-->Drift, leg falls by the end of the 5-sec period but does not hit bed  7. Limb Ataxia: 0-->Absent  8. Sensory: 0-->Normal, no sensory loss  9. Best Language: 3-->Mute, global aphasia, no usable speech or auditory comprehension  10. Dysarthria: 2-->Severe dysarthria, patients speech is so slurred as to be unintelligible in the absence of or out of proportion to any dysphasia, or is mute/anarthric  11. Extinction and Inattention (formerly Neglect): 0-->No abnormality  Total (NIH Stroke Scale): 16       Modified [place] Score: 3  Robeline Coma Scale:    ABCD2 Score:    [id number] Score:   HAS -BLED Score:   ICH Score:   Hunt & Burkett Classification:      Hemorrhagic change of an Ischemic Stroke: Does this patient have an ischemic stroke with hemorrhagic changes? No     Neurologic Chief Complaint: Aphasia    Subjective:     Interval History: Patient is seen for follow-up neurological assessment and treatment recommendations:NAEON. Patient more alert during exam today. TF at goal, patient tolerating well. She remains free of  restraints.     HPI, Past Medical, Family, and Social History remains the same as documented in the initial encounter.     Review of Systems   Constitutional: Negative for chills and fever.   Eyes: Positive for visual disturbance.   Gastrointestinal: Negative for vomiting.   Neurological: Positive for facial asymmetry, speech difficulty and weakness.     Scheduled Meds:   albuterol-ipratropium  3 mL Nebulization Q8H    amoxicillin-pot clavulanate 250-62.5 mg/5ml  500 mg Per G Tube Q12H    apixaban  2.5 mg Per G Tube BID    atorvastatin  40 mg Per G Tube Daily    buPROPion  150 mg Per G Tube BID    cloNIDine  0.1 mg Per G Tube BID    memantine  10 mg Per G Tube BID    metoprolol  12.5 mg Per G Tube BID    olmesartan  5 mg Per G Tube Daily    QUEtiapine  25 mg Per G Tube QHS    senna-docusate 8.6-50 mg  1 tablet Per G Tube BID    sodium chloride 3%  4 mL Nebulization Q8H    tuberculin  5 Units Intradermal Once     Continuous Infusions:    PRN Meds:acetaminophen, dextrose 50%, glucagon (human recombinant), hydrALAZINE, insulin aspart U-100, ondansetron, sodium chloride 0.9%, sodium chloride 0.9%    Objective:     Vital Signs (Most Recent):  Temp: 98.1 °F (36.7 °C) (05/30/19 0730)  Pulse: 74 (05/30/19 0857)  Resp: 16 (05/30/19 0857)  BP: 124/60 (05/30/19 0922)  SpO2: 97 % (05/30/19 0857)  BP Location: Left arm    Vital Signs Range (Last 24H):  Temp:  [96.6 °F (35.9 °C)-98.7 °F (37.1 °C)]   Pulse:  [63-89]   Resp:  [16-19]   BP: (124-209)/(59-83)   SpO2:  [93 %-100 %]   BP Location: Left arm    Physical Exam   Constitutional: She appears well-developed and well-nourished. No distress.   HENT:   Head: Normocephalic and atraumatic.   Cardiovascular: Normal rate.   Pulmonary/Chest: No respiratory distress.   Abdominal:   S/p PEG- site clean and dry   Skin: Skin is warm and dry.   Psychiatric: She is noncommunicative. She is inattentive.   Vitals reviewed.    Neurological Exam:   LOC: alert  Attention Span:  poor  Language: Global aphasia  Articulation: mute   Orientation: Untestable due to severe aphasia   Visual Fields: Hemianopsia right  EOM (CN III, IV, VI): Full/intact  Facial Movement (CN VII): Lower facial weakness on the Left  Motor: Arm left  Normal 4/5  Leg left  Normal  4/5  Arm right  Paresis: 4/5  Leg right Normal 4/5       Laboratory:  CMP:   Recent Labs   Lab 05/30/19  0331   CALCIUM 9.1  9.5   ALBUMIN 2.4*  2.3*   PROT 6.7  6.5     142   K 3.1*  3.3*   CO2 31*  28     104   BUN 16  16   CREATININE 0.7  0.7   ALKPHOS 83  81   ALT 45*  44   AST 42*  42*   BILITOT 0.4  0.5     BMP:   Recent Labs   Lab 05/30/19  0331     142   K 3.1*  3.3*     104   CO2 31*  28   BUN 16  16   CREATININE 0.7  0.7   CALCIUM 9.1  9.5     CBC:   Recent Labs   Lab 05/30/19 0331   WBC 11.34   RBC 2.62*   HGB 7.7*   HCT 24.9*      MCV 95   MCH 29.4   MCHC 30.9*     Lipid Panel:   No results for input(s): CHOL, LDLCALC, HDL, TRIG in the last 168 hours.  Coagulation:   No results for input(s): PT, INR, APTT in the last 168 hours.  Hgb A1C:   No results for input(s): HGBA1C in the last 168 hours.  TSH:   No results for input(s): TSH in the last 168 hours.    Diagnostic Results     Brain Imaging     MRI 5/22  Evolving scattered areas of acute/recent infarction left MCA territory with new to increased component in the left inferior frontal gyrus now mild moderate in size.  There is no evidence for hemorrhagic conversion or significant mass effect.    Superimposed confluent T2 FLAIR signal abnormality elsewhere supra and infratentorial parenchyma while nonspecific concerning for advanced chronic microvascular ischemic change versus posttherapy change.    Remote basal gangliar, thalamic and cerebellar infarcts unchanged.    Compensatory enlargement of ventricular system similar to prior without hydrocephalus.    CTA Head and Neck 5/21  1. There is chronic occlusion of the left vertebral  artery at its origin with some reconstitution very distal left vertebral artery.  The right vertebral artery is dominant and patent throughout its course in the neck.  2. There is calcified plaque in the common, internal and external carotid arteries bilaterally.  Stenosis on the right is mild at 50-69% and moderate on the left at approximately 70% without change.  CTA Head    1.  There is occlusion of the distal left M1 and proximal M2 segments of the middle cerebral artery which has occurred since the prior CTA head.  There is subtle increased density within the left middle cerebral artery at the distal M1 and proximal M2 segments on comparison head CT.  This is consistent with thrombus.    2.  The right anterior circulation is normal.    3.  There is developmental variation with fetal origin of the right posterior cerebral artery.    Cardiac Imaging   · Concentric left ventricular remodeling.  · Small left ventricular cavity size.  · Increased (hyperdynamic) left ventricular systolic function. The estimated ejection fraction is 75%  · Grade I (mild) left ventricular diastolic dysfunction consistent with impaired relaxation.  · Moderate left atrial enlargement.  · Mild-to-moderate aortic valve stenosis.  · Aortic valve area is 1.43 cm2; peak velocity is 2.59 m/s; mean gradient is 15.51 mmHg.  · Mild mid-cavity left ventricular obstruction is present.  · Normal left atrial pressure.  · Mild mitral stenosis.      Leo Sauer NP  [facility] Stroke [facility]  Department of Vascular Neurology   [facility]

## 2019-05-30 NOTE — PLAN OF CARE
Problem: Adult Inpatient Plan of Care  Goal: Plan of Care Review  Assessment and Plan    Moderate Protein-Calorie Malnutrition  Malnutrition in the context of Acute Illness/Injury    Related to (etiology):  stroke    Signs and Symptoms (as evidenced by):    Body Fat Depletion: moderate depletion of orbitals, triceps   Muscle Mass Depletion: moderate depletion of temples, clavicle region, lower extremities     Interventions/Recommendations (treatment strategy):  Collaboration of care.  Enteral feeding: modified to increase kcal/protein    Nutrition Diagnosis Status:  New    Recommendations     Recommendation: 1. Increase Isosource 1.5 to new goal rate 48ml/hr to meet pt's needs. Provides 1728 kcal, 78g pro,  876ml free water.Water flush per MD.   Goals: 1. Pt's intake to meet % EEN and EPN x 7 days.  Nutrition Goal Status: progressing towards goal  Communication of RD Recs: reviewed with physician

## 2019-05-30 NOTE — ASSESSMENT & PLAN NOTE
Continue home memantine  Delirium precautions    Seroquel 25 mg nightly started on 05/28. Patient doing well on nightly Seroquel. She is free of restraints and alert during AM assessment.

## 2019-05-30 NOTE — ASSESSMENT & PLAN NOTE
Patient was reported to be back to baseline and ambulating with walker prior to this hospitalization  Currently aphasic and not following commands, but able to TOSCANO spontaneously  PT/OT now recommending SNF or home with 24 hour support  Plans to discuss goals of care with family    Family is amenable to patient discharging to SNF.

## 2019-05-30 NOTE — SUBJECTIVE & OBJECTIVE
Neurologic Chief Complaint: Aphasia    Subjective:     Interval History: Patient is seen for follow-up neurological assessment and treatment recommendations:NAVIN. Patient more alert during exam today. TF at goal, patient tolerating well. She remains free of restraints.     HPI, Past Medical, Family, and Social History remains the same as documented in the initial encounter.     Review of Systems   Constitutional: Negative for chills and fever.   Eyes: Positive for visual disturbance.   Gastrointestinal: Negative for vomiting.   Neurological: Positive for facial asymmetry, speech difficulty and weakness.     Scheduled Meds:   albuterol-ipratropium  3 mL Nebulization Q8H    amoxicillin-pot clavulanate 250-62.5 mg/5ml  500 mg Per G Tube Q12H    apixaban  2.5 mg Per G Tube BID    atorvastatin  40 mg Per G Tube Daily    buPROPion  150 mg Per G Tube BID    cloNIDine  0.1 mg Per G Tube BID    memantine  10 mg Per G Tube BID    metoprolol  12.5 mg Per G Tube BID    olmesartan  5 mg Per G Tube Daily    QUEtiapine  25 mg Per G Tube QHS    senna-docusate 8.6-50 mg  1 tablet Per G Tube BID    sodium chloride 3%  4 mL Nebulization Q8H    tuberculin  5 Units Intradermal Once     Continuous Infusions:    PRN Meds:acetaminophen, dextrose 50%, glucagon (human recombinant), hydrALAZINE, insulin aspart U-100, ondansetron, sodium chloride 0.9%, sodium chloride 0.9%    Objective:     Vital Signs (Most Recent):  Temp: 98.1 °F (36.7 °C) (05/30/19 0730)  Pulse: 74 (05/30/19 0857)  Resp: 16 (05/30/19 0857)  BP: 124/60 (05/30/19 0922)  SpO2: 97 % (05/30/19 0857)  BP Location: Left arm    Vital Signs Range (Last 24H):  Temp:  [96.6 °F (35.9 °C)-98.7 °F (37.1 °C)]   Pulse:  [63-89]   Resp:  [16-19]   BP: (124-209)/(59-83)   SpO2:  [93 %-100 %]   BP Location: Left arm    Physical Exam   Constitutional: She appears well-developed and well-nourished. No distress.   HENT:   Head: Normocephalic and atraumatic.   Cardiovascular: Normal  rate.   Pulmonary/Chest: No respiratory distress.   Abdominal:   S/p PEG- site clean and dry   Skin: Skin is warm and dry.   Psychiatric: She is noncommunicative. She is inattentive.   Vitals reviewed.    Neurological Exam:   LOC: alert  Attention Span: poor  Language: Global aphasia  Articulation: mute   Orientation: Untestable due to severe aphasia   Visual Fields: Hemianopsia right  EOM (CN III, IV, VI): Full/intact  Facial Movement (CN VII): Lower facial weakness on the Left  Motor: Arm left  Normal 4/5  Leg left  Normal 4/5  Arm right  Paresis: 4/5  Leg right Normal 4/5       Laboratory:  CMP:   Recent Labs   Lab 05/30/19  0331   CALCIUM 9.1  9.5   ALBUMIN 2.4*  2.3*   PROT 6.7  6.5     142   K 3.1*  3.3*   CO2 31*  28     104   BUN 16  16   CREATININE 0.7  0.7   ALKPHOS 83  81   ALT 45*  44   AST 42*  42*   BILITOT 0.4  0.5     BMP:   Recent Labs   Lab 05/30/19  0331     142   K 3.1*  3.3*     104   CO2 31*  28   BUN 16  16   CREATININE 0.7  0.7   CALCIUM 9.1  9.5     CBC:   Recent Labs   Lab 05/30/19  0331   WBC 11.34   RBC 2.62*   HGB 7.7*   HCT 24.9*      MCV 95   MCH 29.4   MCHC 30.9*     Lipid Panel:   No results for input(s): CHOL, LDLCALC, HDL, TRIG in the last 168 hours.  Coagulation:   No results for input(s): PT, INR, APTT in the last 168 hours.  Hgb A1C:   No results for input(s): HGBA1C in the last 168 hours.  TSH:   No results for input(s): TSH in the last 168 hours.    Diagnostic Results     Brain Imaging     MRI 5/22  Evolving scattered areas of acute/recent infarction left MCA territory with new to increased component in the left inferior frontal gyrus now mild moderate in size.  There is no evidence for hemorrhagic conversion or significant mass effect.    Superimposed confluent T2 FLAIR signal abnormality elsewhere supra and infratentorial parenchyma while nonspecific concerning for advanced chronic microvascular ischemic change versus  posttherapy change.    Remote basal gangliar, thalamic and cerebellar infarcts unchanged.    Compensatory enlargement of ventricular system similar to prior without hydrocephalus.    CTA Head and Neck 5/21  1. 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  The right vertebral artery is dominant and patent throughout its course in the neck.  2. There is calcified plaque in the common, internal and external carotid arteries bilaterally.  Stenosis on the right is mild at 50-69% and moderate on the left at approximately 70% without change.  CTA Head    1.  There is occlusion of the distal left M1 and proximal M2 segments of the middle cerebral artery which has occurred since the prior CTA head.  There is subtle increased density within the left middle cerebral artery at the distal M1 and proximal M2 segments on comparison head CT.  This is consistent with thrombus.    2.  The right anterior circulation is normal.    3.  There is developmental variation with fetal origin of the right posterior cerebral artery.    Cardiac Imaging   · Concentric left ventricular remodeling.  · Small left ventricular cavity size.  · Increased (hyperdynamic) left ventricular systolic function. The estimated ejection fraction is 75%  · Grade I (mild) left ventricular diastolic dysfunction consistent with impaired relaxation.  · Moderate left atrial enlargement.  · Mild-to-moderate aortic valve stenosis.  · Aortic valve area is 1.43 cm2; peak velocity is 2.59 m/s; mean gradient is 15.51 mmHg.  · Mild mid-cavity left ventricular obstruction is present.  · Normal left atrial pressure.  · Mild mitral stenosis.

## 2019-05-30 NOTE — PLAN OF CARE
05/30/19 1149   Discharge Reassessment   Assessment Type Discharge Planning Reassessment   Provided patient/caregiver education on the expected discharge date and the discharge plan Yes   Discharge Plan A Skilled Nursing Facility   Discharge Plan B Rehab

## 2019-05-30 NOTE — PLAN OF CARE
"Problem: Adult Inpatient Plan of Care  Goal: Plan of Care Review  Outcome: Ongoing (interventions implemented as appropriate)  Vitals stable.  Pt was hypertensive this AM but resolved after scheduled BP meds given.  BG WDL.  Tolerating TF well, new goal 48 ml/hr, will begin to increase.  Turned q2, and pt also moves around frequently in bed.  She says "yes/no" at times and nods, but she is minimally communicative.  Attempts to follow commands. BM x2.      "

## 2019-05-30 NOTE — PT/OT/SLP PROGRESS
Speech Language Pathology Treatment    Patient Name:  Sury Cobos   MRN:  [id number]  Admitting Diagnosis: Embolic stroke involving left middle cerebral artery    Recommendations:                 General Recommendations:  Dysphagia therapy and Speech/language therapy  Diet recommendations:  NPO, Liquid Diet Level: NPO   Aspiration Precautions: Strict aspiration precautions   General Precautions: Standard, aphasia, aspiration, NPO, fall  Communication strategies:  yes/no questions only and provide increased time to answer    Subjective     nonverbal  Patient goals: [place]    Pain/Comfort:  · Pain Rating 1: 0/10  · Pain Rating Post-Intervention 1: 0/10    Objective:     Has the patient been evaluated by SLP for swallowing?   Yes  Keep patient NPO? Yes   Current Respiratory Status: room air      Pt seen bedside with her niece present. Pt was lethargic and kept her eyes closed for most of the session. She did produce some groaning but no other attempt to verbalize. She did not attempt to count,sing or complete automatic speech task. She did not model any commands or respond to any questions.  Ice was placed to pt's lips but pt withdrew and did not accept despite multiple attempts.     Assessment:     Sury Cobos is a 84 y.o. female with an SLP diagnosis of Aphasia and Dysphagia.  She presents with decreased alertness    Goals:   Multidisciplinary Problems     SLP Goals        Problem: SLP Goal    Goal Priority Disciplines Outcome   SLP Goal     SLP Ongoing (interventions implemented as appropriate)   Description:  Speech Language Pathology Goals  Goals expected to be met by 5/28:  1. Patient will participate in ongoing swallow assessment to determine least restrictive diet recommendations.   2. Patient will participate in full speech, language, and cognitive assessment when appropriate.                         Plan:     · Patient to be seen:  4 x/week   · Plan of Care expires:  06/21/19  · Plan of Care reviewed with:   patient, family   · SLP Follow-Up:  Yes       Discharge recommendations:  nursing facility, skilled     Time Tracking:     SLP Treatment Date:   05/30/19  Speech Start Time:  1116  Speech Stop Time:  1124     Speech Total Time (min):  8 min    Billable Minutes: Speech Therapy Individual 8    SERENA Joe, CCC-SLP  05/30/2019

## 2019-05-30 NOTE — PROGRESS NOTES
"[facility]  Adult Nutrition  Progress Note    SUMMARY       Recommendations    Recommendation: 1. Increase Isosource 1.5 to new goal rate 48ml/hr to meet pt's needs. Provides 1728 kcal, 78g pro,  876ml free water.Water flush per MD.   Goals: 1. Pt's intake to meet % EEN and EPN x 7 days.  Nutrition Goal Status: progressing towards goal  Communication of RD Recs: reviewed with physician    Reason for Assessment    Reason For Assessment: RD follow-up  Diagnosis: stroke/CVA  Relevant Medical History: HTN, HLD, DM, CHF, CAD, a fib  Interdisciplinary Rounds: attended  General Information Comments: Pt s/p PEG placement 5/27 is tolerating TF well. Per chart, pt's weight has been stable. However, completed NFPE and pt has moderate muscle wasting and fat depletion and meets criteria for acute moderate protein-calorie malnutrition.  Nutrition Discharge Planning: Pt with adequate enteral feeding to meet nutrition needs.    Nutrition Risk Screen    Nutrition Risk Screen: tube feeding or parenteral nutrition    Nutrition/Diet History    Spiritual, Cultural Beliefs, Religion Practices, Values that Affect Care: no  Factors Affecting Nutritional Intake: NPO, impaired cognitive status/motor control    Anthropometrics    Temp: 97.9 °F (36.6 °C)  Height Method: Stated  Height: 5' 6" (167.6 cm)  Height (inches): 66 in  Weight Method: Standard Scale  Weight: 59.5 kg (131 lb 2.8 oz)  Weight (lb): 131.18 lb  Ideal Body Weight (IBW), Female: 130 lb  % Ideal Body Weight, Female (lb): 96.15 lb  BMI (Calculated): 20.2  BMI Grade: 18.5-24.9 - normal       Lab/Procedures/Meds    Pertinent Labs Reviewed: reviewed  Pertinent Labs Comments: A1c 5.9%  Pertinent Medications Reviewed: reviewed  Pertinent Medications Comments: statin    Estimated/Assessed Needs    Weight Used For Calorie Calculations: 57.9 kg (127 lb 10.3 oz)(used admission wt to update kcal needs)  Energy Calorie Requirements (kcal): 1737 kcal  Energy Need " Method: Kcal/kg(30 kcal/kg)  Protein Requirements: 75-87g  Weight Used For Protein Calculations: 57.9 kg (127 lb 10.3 oz)  Fluid Requirements (mL): 1mL/kcal or per MD     RDA Method (mL): 1737  CHO Requirement: 50% of total kcals      Nutrition Prescription Ordered    Current Diet Order: NPO  Current Nutrition Support Formula Ordered: Isosource 1.5  Current Nutrition Support Rate Ordered: 30 (ml)  Current Nutrition Support Frequency Ordered: ml/hr    Evaluation of Received Nutrient/Fluid Intake    Enteral Calories (kcal): 1080  Enteral Protein (gm): 49  Enteral (Free Water) Fluid (mL): 550  % Kcal Needs: 62  % Protein Needs: 63  IV Fluid (mL): 0  I/O: +4.6L since admission  Comments: LBM 5/27  % Intake of Estimated Energy Needs: 50 - 75 %  % Meal Intake: NPO    Nutrition Risk    Level of Risk/Frequency of Follow-up: low     Assessment and Plan    Moderate Protein-Calorie Malnutrition  Malnutrition in the context of Acute Illness/Injury    Related to (etiology):  stroke    Signs and Symptoms (as evidenced by):    Body Fat Depletion: moderate depletion of orbitals, triceps   Muscle Mass Depletion: moderate depletion of temples, clavicle region, lower extremities     Interventions/Recommendations (treatment strategy):  Collaboration of care.  Enteral feeding: modified to increase kcal/protein    Nutrition Diagnosis Status:  New    Monitor and Evaluation    Food and Nutrient Intake: energy intake, enteral nutrition intake  Food and Nutrient Adminstration: enteral and parenteral nutrition administration  Anthropometric Measurements: weight, weight change, body mass index  Biochemical Data, Medical Tests and Procedures: electrolyte and renal panel, gastrointestinal profile, glucose/endocrine profile, inflammatory profile, lipid profile  Nutrition-Focused Physical Findings: overall appearance     Malnutrition Assessment                 Orbital Region (Subcutaneous Fat Loss): moderate depletion  Upper Arm Region  (Subcutaneous Fat Loss): moderate depletion   Amish Region (Muscle Loss): moderate depletion  Clavicle Bone Region (Muscle Loss): moderate depletion  Patellar Region (Muscle Loss): moderate depletion  Anterior Thigh Region (Muscle Loss): moderate depletion  Posterior Calf Region (Muscle Loss): moderate depletion                 Nutrition Follow-Up    RD Follow-up?: Yes

## 2019-05-30 NOTE — PLAN OF CARE
SELINA left message for Laz at [facility].     05/30/19 2532   Post-Acute Status   Post-Acute Authorization Placement   Post-Acute Placement Status Pending Post-Acute Clinical Review

## 2019-05-30 NOTE — ASSESSMENT & PLAN NOTE
Found on last admit 5-2018.  Previously on Eliquis but ran out and not refilled. (Concerns of noncompliance due to price. Please price check choice of AC prior to starting)    Will resume AC 24 hours after PEG placement    Patient went into Afib with RVR on 05/27- metoprolol 12.5 mg BID started.

## 2019-05-30 NOTE — ASSESSMENT & PLAN NOTE
85 y/o female with cardioembolic L MCA infarct 2/2 a fib (not compliant with anticoagulation at home. Patient was out of window for tPA, but when to IR for L M1 thrombectomy, TICI 3 flow restored.     Antithrombotics: Apixaban 2.5 mg BID    Statins: Lipitor 40    Aggressive risk factor modification: HTN, HLD, prior stroke, afib     Rehab efforts: The patient has been evaluated by a stroke team provider and the therapy needs have been fully considered based off the presenting complaints and exam findings. The following therapy evaluations are needed: PT evaluate and treat, OT evaluate and treat, SLP evaluate and treat, PM&R evaluate for appropriate placement- Recommending SNF    Diagnostics ordered/pending: None    VTE prophylaxis: Heparin 5000 units SQ every 8 hours , SCDs    BP parameters: S/p thrombectomy <180

## 2019-05-31 LAB
ALBUMIN SERPL BCP-MCNC: 2.5 G/DL (ref 3.5–5.2)
ALP SERPL-CCNC: 79 U/L (ref 55–135)
ALT SERPL W/O P-5'-P-CCNC: 41 U/L (ref 10–44)
ANION GAP SERPL CALC-SCNC: 7 MMOL/L (ref 8–16)
ANISOCYTOSIS BLD QL SMEAR: SLIGHT
AST SERPL-CCNC: 30 U/L (ref 10–40)
BASOPHILS # BLD AUTO: 0.1 K/UL (ref 0–0.2)
BASOPHILS NFR BLD: 0.7 % (ref 0–1.9)
BILIRUB SERPL-MCNC: 0.4 MG/DL (ref 0.1–1)
BUN SERPL-MCNC: 13 MG/DL (ref 8–23)
CALCIUM SERPL-MCNC: 9.4 MG/DL (ref 8.7–10.5)
CHLORIDE SERPL-SCNC: 104 MMOL/L (ref 95–110)
CO2 SERPL-SCNC: 29 MMOL/L (ref 23–29)
CREAT SERPL-MCNC: 0.7 MG/DL (ref 0.5–1.4)
DIFFERENTIAL METHOD: ABNORMAL
EOSINOPHIL # BLD AUTO: 0.3 K/UL (ref 0–0.5)
EOSINOPHIL NFR BLD: 2.1 % (ref 0–8)
ERYTHROCYTE [DISTWIDTH] IN BLOOD BY AUTOMATED COUNT: 14 % (ref 11.5–14.5)
EST. GFR  (AFRICAN AMERICAN): >60 ML/MIN/1.73 M^2
EST. GFR  (NON AFRICAN AMERICAN): >60 ML/MIN/1.73 M^2
GLUCOSE SERPL-MCNC: 125 MG/DL (ref 70–110)
HCT VFR BLD AUTO: 26.7 % (ref 37–48.5)
HGB BLD-MCNC: 8.3 G/DL (ref 12–16)
HYPOCHROMIA BLD QL SMEAR: ABNORMAL
IMM GRANULOCYTES # BLD AUTO: 0.32 K/UL (ref 0–0.04)
IMM GRANULOCYTES NFR BLD AUTO: 2.2 % (ref 0–0.5)
LYMPHOCYTES # BLD AUTO: 2.4 K/UL (ref 1–4.8)
LYMPHOCYTES NFR BLD: 16.8 % (ref 18–48)
MCH RBC QN AUTO: 29.4 PG (ref 27–31)
MCHC RBC AUTO-ENTMCNC: 31.1 G/DL (ref 32–36)
MCV RBC AUTO: 95 FL (ref 82–98)
MONOCYTES # BLD AUTO: 0.9 K/UL (ref 0.3–1)
MONOCYTES NFR BLD: 5.9 % (ref 4–15)
NEUTROPHILS # BLD AUTO: 10.5 K/UL (ref 1.8–7.7)
NEUTROPHILS NFR BLD: 72.3 % (ref 38–73)
NRBC BLD-RTO: 0 /100 WBC
OVALOCYTES BLD QL SMEAR: ABNORMAL
PLATELET # BLD AUTO: 325 K/UL (ref 150–350)
PMV BLD AUTO: 11.1 FL (ref 9.2–12.9)
POCT GLUCOSE: 144 MG/DL (ref 70–110)
POCT GLUCOSE: 166 MG/DL (ref 70–110)
POCT GLUCOSE: 169 MG/DL (ref 70–110)
POCT GLUCOSE: 172 MG/DL (ref 70–110)
POIKILOCYTOSIS BLD QL SMEAR: SLIGHT
POLYCHROMASIA BLD QL SMEAR: ABNORMAL
POTASSIUM SERPL-SCNC: 3.9 MMOL/L (ref 3.5–5.1)
PROT SERPL-MCNC: 6.8 G/DL (ref 6–8.4)
RBC # BLD AUTO: 2.82 M/UL (ref 4–5.4)
SODIUM SERPL-SCNC: 140 MMOL/L (ref 136–145)
WBC # BLD AUTO: 14.47 K/UL (ref 3.9–12.7)

## 2019-05-31 PROCEDURE — 25000003 PHARM REV CODE 250: Performed by: NURSE PRACTITIONER

## 2019-05-31 PROCEDURE — 31720 CLEARANCE OF AIRWAYS: CPT

## 2019-05-31 PROCEDURE — 99233 SBSQ HOSP IP/OBS HIGH 50: CPT | Mod: ,,, | Performed by: PSYCHIATRY & NEUROLOGY

## 2019-05-31 PROCEDURE — 80053 COMPREHEN METABOLIC PANEL: CPT

## 2019-05-31 PROCEDURE — 36415 COLL VENOUS BLD VENIPUNCTURE: CPT

## 2019-05-31 PROCEDURE — 99233 PR SUBSEQUENT HOSPITAL CARE,LEVL III: ICD-10-PCS | Mod: ,,, | Performed by: PSYCHIATRY & NEUROLOGY

## 2019-05-31 PROCEDURE — 25000242 PHARM REV CODE 250 ALT 637 W/ HCPCS: Performed by: PHYSICIAN ASSISTANT

## 2019-05-31 PROCEDURE — 85025 COMPLETE CBC W/AUTO DIFF WBC: CPT

## 2019-05-31 PROCEDURE — 94761 N-INVAS EAR/PLS OXIMETRY MLT: CPT

## 2019-05-31 PROCEDURE — 27000221 HC OXYGEN, UP TO 24 HOURS

## 2019-05-31 PROCEDURE — 97530 THERAPEUTIC ACTIVITIES: CPT

## 2019-05-31 PROCEDURE — 20600001 HC STEP DOWN PRIVATE ROOM

## 2019-05-31 PROCEDURE — 94640 AIRWAY INHALATION TREATMENT: CPT

## 2019-05-31 RX ORDER — OLMESARTAN MEDOXOMIL 5 MG/1
10 TABLET ORAL DAILY
Status: DISCONTINUED | OUTPATIENT
Start: 2019-06-01 | End: 2019-06-07 | Stop reason: HOSPADM

## 2019-05-31 RX ADMIN — ATORVASTATIN CALCIUM 40 MG: 20 TABLET, FILM COATED ORAL at 08:05

## 2019-05-31 RX ADMIN — OLMESARTAN MEDOXOMIL 5 MG: 5 TABLET, FILM COATED ORAL at 08:05

## 2019-05-31 RX ADMIN — BUPROPION HYDROCHLORIDE 150 MG: 75 TABLET, FILM COATED ORAL at 08:05

## 2019-05-31 RX ADMIN — SENNOSIDES,DOCUSATE SODIUM 1 TABLET: 8.6; 5 TABLET, FILM COATED ORAL at 08:05

## 2019-05-31 RX ADMIN — SODIUM CHLORIDE SOLN NEBU 3% 4 ML: 3 NEBU SOLN at 04:05

## 2019-05-31 RX ADMIN — AMOXICILLIN AND CLAVULANATE POTASSIUM 500 MG: 250; 62.5 POWDER, FOR SUSPENSION ORAL at 10:05

## 2019-05-31 RX ADMIN — APIXABAN 2.5 MG: 2.5 TABLET, FILM COATED ORAL at 08:05

## 2019-05-31 RX ADMIN — MEMANTINE 10 MG: 10 TABLET ORAL at 08:05

## 2019-05-31 RX ADMIN — Medication 12.5 MG: at 10:05

## 2019-05-31 RX ADMIN — QUETIAPINE FUMARATE 25 MG: 25 TABLET ORAL at 08:05

## 2019-05-31 RX ADMIN — AMOXICILLIN AND CLAVULANATE POTASSIUM 500 MG: 250; 62.5 POWDER, FOR SUSPENSION ORAL at 08:05

## 2019-05-31 RX ADMIN — CLONIDINE HYDROCHLORIDE 0.1 MG: 0.1 TABLET ORAL at 08:05

## 2019-05-31 RX ADMIN — SODIUM CHLORIDE SOLN NEBU 3% 4 ML: 3 NEBU SOLN at 08:05

## 2019-05-31 RX ADMIN — IPRATROPIUM BROMIDE AND ALBUTEROL SULFATE 3 ML: .5; 3 SOLUTION RESPIRATORY (INHALATION) at 12:05

## 2019-05-31 RX ADMIN — IPRATROPIUM BROMIDE AND ALBUTEROL SULFATE 3 ML: .5; 3 SOLUTION RESPIRATORY (INHALATION) at 04:05

## 2019-05-31 RX ADMIN — SODIUM CHLORIDE SOLN NEBU 3% 4 ML: 3 NEBU SOLN at 12:05

## 2019-05-31 RX ADMIN — IPRATROPIUM BROMIDE AND ALBUTEROL SULFATE 3 ML: .5; 3 SOLUTION RESPIRATORY (INHALATION) at 08:05

## 2019-05-31 NOTE — PLAN OF CARE
05/31/19 1215   Post-Acute Status   Post-Acute Authorization Placement   Post-Acute Placement Status Pending Post-Acute Clinical Review       Called the admission dept at [place]. Awaiting a return phone call.  SW in contact with CM and Medical staff. Will continue to follow and offer support as needed.     Rufino Bernstein, LMSW [facility]   Ext. [phone]

## 2019-05-31 NOTE — ASSESSMENT & PLAN NOTE
Found on last admit 5-2018.  Previously on Eliquis but ran out and not refilled. (Concerns of noncompliance due to price)    Eliquis 2.5 mg started after PEG placement    Patient went into Afib with RVR on 05/27- metoprolol 12.5 mg BID started.

## 2019-05-31 NOTE — ASSESSMENT & PLAN NOTE
Patient with intermittent elevation of WBC over past few days  UA collected w/ reflex - patient started on Amoxicillin BID X 7 days (end date 6/4)  WBC today 14.47  Patient afebrile - continue to monitor    nylon

## 2019-05-31 NOTE — PLAN OF CARE
Problem: Adult Inpatient Plan of Care  Goal: Plan of Care Review  Outcome: Ongoing (interventions implemented as appropriate)  VItals stable.  BG WDL.  BM x2.  Repositioned q2.  She is slightly less interactive than yesterday, other than that neuro exam remains unchanged.  She is spontaneously moving all extremities.  Family visited.

## 2019-05-31 NOTE — PLAN OF CARE
Problem: SLP Goal  Goal: SLP Goal  Speech Language Pathology Goals  Goals expected to be met by 5/28:  1. Patient will participate in ongoing swallow assessment to determine least restrictive diet recommendations.   2. Patient will participate in full speech, language, and cognitive assessment when appropriate.        Goals remain appropriate.   Emily P. Abadie M.S., CCC-SLP  Speech Language Pathologist  [phone]  05/31/2019

## 2019-05-31 NOTE — PHYSICIAN QUERY
PT Name: Sury Cobos  MR #: [id number]    Physician Query Form - Nutrition Clarification     CDS: Zahra Topete RN, CCDS       Contact information: [email or web address]    This form is a permanent document in the medical record.     Query Date: May 31, 2019    By submitting this query, we are merely seeking further clarification of documentation.. Please utilize your independent clinical judgment when addressing the question(s) below.    The Medical record contains the following:   Indicators  Supporting Clinical Findings Location in Medical Record    % of Estimated Energy Intake over a time frame from p.o., TF, or TPN      Weight Status over a time frame     X Subcutaneous Fat and/or Muscle Loss Body Fat Depletion: moderate depletion of orbitals, triceps   Muscle Mass Depletion: moderate depletion of temples, clavicle region, lower extremities  5/30-RD POC    Fluid Accumulation or Edema      Reduced  Strength      Wt / BMI / Usual Body Weight      Delayed Wound Healing / Failure to Thrive      Acute or Chronic Illness      Medication     X      X Treatment Recommendation: 1. Increase Isosource 1.5 to new goal rate 48ml/hr to meet pt's needs     S/P percutaneous endoscopic gastrostomy (PEG) tube placement  PEG placed on 05/27 at 1730.  05/29- TF started and currently at goal, tolerating well.   5/30-RD POC      5/31-PN   X Other Completed NFPE and pt has moderate muscle wasting and fat depletion and meets criteria for acute moderate protein-calorie malnutrition.   5/30-RD PN     AND / ASPEN Clinical Characteristics (October 2011)  A minimum of two characteristics is recommended for diagnosing either moderate or severe malnutrition   Mild Malnutrition Moderate Malnutrition Severe Malnutrition   Energy Intake from p.o., TF or TPN. < 75% intake of estimated energy needs for less than 7 days < 75% intake of estimated energy needs for greater than 7 days < 50% intake of estimated energy needs for > 5 days    Weight Loss 1-2% in 1 month  5% in 3 months  7.5% in 6 months  10% in 1 year 1-2 % in 1 week  5% in 1 month  7.5% in 3 months  10% in 6 months  20% in 1 year > 2% in 1 week  > 5% in 1 month  > 7.5% in 3 months  > 10% in 6 months  > 20% in 1 year   Physical Findings     None *Mild subcutaneous fat and/or muscle loss  *Mild fluid accumulation  *Stage II decubitus  *Surgical wound or non-healing wound *Mod/severe subcutaneous fat and/or muscle loss  *Mod/severe fluid accumulation  *Stage III or IV decubitus  *Non-healing surgical wound     Provider, please specify diagnosis or diagnoses associated with above clinical findings.    [x  ] Moderate Protein-Calorie Malnutrition   [  ] Underweight   [  ] Other Nutritional Diagnosis (please specify): ___________________________________   [  ] Other: ______________________________   [  ] Clinically Undetermined       Please document in your progress notes daily for the duration of treatment until resolved and include in your discharge summary.

## 2019-05-31 NOTE — SUBJECTIVE & OBJECTIVE
Neurologic Chief Complaint: Aphasia    Subjective:     Interval History: Patient is seen for follow-up neurological assessment and treatment recommendations:    Restraint free since 5/29 @ 0500. Waiting on placement.    HPI, Past Medical, Family, and Social History remains the same as documented in the initial encounter.     Review of Systems   Constitutional: Negative for chills and fever.   Eyes: Positive for visual disturbance.   Gastrointestinal: Negative for vomiting.   Neurological: Positive for facial asymmetry, speech difficulty and weakness.     Scheduled Meds:   albuterol-ipratropium  3 mL Nebulization Q8H    amoxicillin-pot clavulanate 250-62.5 mg/5ml  500 mg Per G Tube Q12H    apixaban  2.5 mg Per G Tube BID    atorvastatin  40 mg Per G Tube Daily    buPROPion  150 mg Per G Tube BID    cloNIDine  0.1 mg Per G Tube BID    memantine  10 mg Per G Tube BID    metoprolol  12.5 mg Per G Tube BID    olmesartan  5 mg Per G Tube Daily    QUEtiapine  25 mg Per G Tube QHS    senna-docusate 8.6-50 mg  1 tablet Per G Tube BID    sodium chloride 3%  4 mL Nebulization Q8H    tuberculin  5 Units Intradermal Once     Continuous Infusions:    PRN Meds:acetaminophen, dextrose 50%, glucagon (human recombinant), hydrALAZINE, insulin aspart U-100, ondansetron, sodium chloride 0.9%, sodium chloride 0.9%    Objective:     Vital Signs (Most Recent):  Temp: 97.8 °F (36.6 °C) (05/31/19 0727)  Pulse: 75 (05/31/19 0830)  Resp: 18 (05/31/19 0830)  BP: (!) 160/74 (05/31/19 0727)  SpO2: 97 % (05/31/19 0821)  BP Location: Right arm    Vital Signs Range (Last 24H):  Temp:  [97 °F (36.1 °C)-97.9 °F (36.6 °C)]   Pulse:  [64-78]   Resp:  [16-18]   BP: (127-183)/(56-74)   SpO2:  [93 %-99 %]   BP Location: Right arm    Physical Exam   Constitutional: She appears well-developed and well-nourished. No distress.   HENT:   Head: Normocephalic and atraumatic.   Cardiovascular: Normal rate.   Pulmonary/Chest: No respiratory distress.    Abdominal:   S/p PEG- site clean and dry   Skin: Skin is warm and dry.   Psychiatric: She is noncommunicative. She is inattentive.   Vitals reviewed.    Neurological Exam:   LOC: alert  Attention Span: poor  Language: Global aphasia  Articulation: mute   Orientation: Untestable due to severe aphasia   Visual Fields: Hemianopsia right  EOM (CN III, IV, VI): Full/intact  Facial Movement (CN VII): Lower facial weakness on the Left  Motor: Arm left  Normal 5/5  Leg left  Normal 5/5  Arm right  Paresis: 4/5  Leg right Normal 3/5       Laboratory:  CMP:   Recent Labs   Lab 05/31/19 0356   CALCIUM 9.4   ALBUMIN 2.5*   PROT 6.8      K 3.9   CO2 29      BUN 13   CREATININE 0.7   ALKPHOS 79   ALT 41   AST 30   BILITOT 0.4     BMP:   Recent Labs   Lab 05/31/19 0356      K 3.9      CO2 29   BUN 13   CREATININE 0.7   CALCIUM 9.4     CBC:   Recent Labs   Lab 05/31/19 0356   WBC 14.47*   RBC 2.82*   HGB 8.3*   HCT 26.7*      MCV 95   MCH 29.4   MCHC 31.1*     Lipid Panel:   No results for input(s): CHOL, LDLCALC, HDL, TRIG in the last 168 hours.  Coagulation:   No results for input(s): PT, INR, APTT in the last 168 hours.  Hgb A1C:   No results for input(s): HGBA1C in the last 168 hours.  TSH:   No results for input(s): TSH in the last 168 hours.    Diagnostic Results     Brain Imaging     MRI 5/22  Evolving scattered areas of acute/recent infarction left MCA territory with new to increased component in the left inferior frontal gyrus now mild moderate in size.  There is no evidence for hemorrhagic conversion or significant mass effect.    Superimposed confluent T2 FLAIR signal abnormality elsewhere supra and infratentorial parenchyma while nonspecific concerning for advanced chronic microvascular ischemic change versus posttherapy change.    Remote basal gangliar, thalamic and cerebellar infarcts unchanged.    Compensatory enlargement of ventricular system similar to prior without  hydrocephalus.    CTA Head and Neck 5/21  1. 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  The right vertebral artery is dominant and patent throughout its course in the neck.  2. There is calcified plaque in the common, internal and external carotid arteries bilaterally.  Stenosis on the right is mild at 50-69% and moderate on the left at approximately 70% without change.  CTA Head    1.  There is occlusion of the distal left M1 and proximal M2 segments of the middle cerebral artery which has occurred since the prior CTA head.  There is subtle increased density within the left middle cerebral artery at the distal M1 and proximal M2 segments on comparison head CT.  This is consistent with thrombus.    2.  The right anterior circulation is normal.    3.  There is developmental variation with fetal origin of the right posterior cerebral artery.    Cardiac Imaging   · Concentric left ventricular remodeling.  · Small left ventricular cavity size.  · Increased (hyperdynamic) left ventricular systolic function. The estimated ejection fraction is 75%  · Grade I (mild) left ventricular diastolic dysfunction consistent with impaired relaxation.  · Moderate left atrial enlargement.  · Mild-to-moderate aortic valve stenosis.  · Aortic valve area is 1.43 cm2; peak velocity is 2.59 m/s; mean gradient is 15.51 mmHg.  · Mild mid-cavity left ventricular obstruction is present.  · Normal left atrial pressure.  · Mild mitral stenosis.

## 2019-05-31 NOTE — ASSESSMENT & PLAN NOTE
Patient was reported to be back to baseline and ambulating with walker prior to this hospitalization  Currently aphasic and not following commands, but able to TOSCANO spontaneously  PT/OT now recommending SNF or home with 24 hour support    Family is amenable to patient discharging to SNF.

## 2019-05-31 NOTE — PT/OT/SLP PROGRESS
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     Pain/Comfort:  Pain Rating 1: 0/10    Objective:     Has the patient been evaluated by SLP for swallowing?   Yes  Keep patient NPO? Yes   Current Respiratory Status: room air       ST presented puree trials via tsp dipped in chocolate pudding along with ice chip to lip. Patient with no attempt to accept or manipulate trial despite max A. Patient inconsistently grunting appropriately in response to ST.   Patient did not attempt to ID objects in a field choice of 2 despite max A and tactile stim. No response elicited despite max A during simple yes/no questions or auto speech tasks.  Cont ST POC.     Assessment:     Sury Cobos is a 84 y.o. female with an SLP diagnosis of Aphasia and Dysphagia. Limited progress toward goals.     Goals:   Multidisciplinary Problems     SLP Goals        Problem: SLP Goal    Goal Priority Disciplines Outcome   SLP Goal     SLP Ongoing (interventions implemented as appropriate)   Description:  Speech Language Pathology Goals  Goals expected to be met by 5/28:  1. Patient will participate in ongoing swallow assessment to determine least restrictive diet recommendations.   2. Patient will participate in full speech, language, and cognitive assessment when appropriate.                         Plan:     · Patient to be seen:  3 x/week   · Plan of Care expires:  06/21/19  · Plan of Care reviewed with:  patient    · SLP Follow-Up:  Yes       Discharge recommendations:  nursing facility, skilled   Barriers to Discharge:  None    Time Tracking:     SLP Treatment Date:   05/31/19  Speech Start Time:  0900  Speech Stop Time:  0916     Speech Total Time (min):  16 min    Billable Minutes: Speech Therapy Individual 8 and Treatment Swallowing Dysfunction 8    Emily Abadie, CCC-SLP  05/31/2019

## 2019-05-31 NOTE — ASSESSMENT & PLAN NOTE
SLP to evaluate and treat  High chance for aspiration PNA, will monitor for signs of infection or worsening respiratory status    05/29- TF started and currently at goal, tolerating well

## 2019-05-31 NOTE — PT/OT/SLP PROGRESS
Occupational Therapy   Treatment    Name: Sury Cobos  MRN: [id number]  Admitting Diagnosis:  Embolic stroke involving left middle cerebral artery       Recommendations:     Discharge Recommendations: nursing facility, skilled(longterm; vs 24 hour care)  Discharge Equipment Recommendations:  (pressure relief equipment)  Barriers to discharge:  Other (Comment)(level of skilled and physical assistance required at this time)    Assessment:     Sury Cobos is a 84 y.o. female with a medical diagnosis of Embolic stroke involving left middle cerebral artery.  She presents with increased lethargy and requiring added assistance EOB from previous OT session. She cont to require total care for functional tasks/activities/ADLs. She would best benefit from 24 hour care/longterm SNF. Performance deficits affecting function are weakness, impaired endurance, impaired sensation, impaired self care skills, impaired functional mobilty, gait instability, impaired balance, visual deficits, impaired cognition, decreased upper extremity function, decreased lower extremity function, impaired coordination, impaired fine motor, impaired cardiopulmonary response to activity.     Rehab Prognosis:  Limited; patient would benefit from acute skilled OT services to address these deficits and reach maximum level of function.       Plan:     Patient to be seen 3 x/week to address the above listed problems via self-care/home management, therapeutic exercises, therapeutic activities, neuromuscular re-education, cognitive retraining, sensory integration  · Plan of Care Expires: 06/20/19  · Plan of Care Reviewed with: patient, family    Subjective     Pain/Comfort:  · Pain Rating 1: 0/10(unable to report; no indications)  · Pain Rating Post-Intervention 1: 0/10    Objective:     Communicated with: RN prior to session. Pt's niece at bedside throughout.  Patient found HOB elevated with peripheral IV, bed alarm, telemetry, PureWick upon OT entry to  room.    General Precautions: Standard, aphasia, aspiration, fall, NPO, vision impaired(DNR)   Orthopedic Precautions:N/A     Occupational Performance:   Bed Mobility:    · Patient completed Rolling/Turning to Left with  maximal assistance  · Patient completed Supine to Sit with maximal assistance  · Patient completed Sit to Supine with maximal assistance   · Total assist to scoot to HOB    Activities of Daily Living:  · Total care; hand over hand    AMPAC 6 Click ADL: 6    Treatment & Education:  -Pt alert with eyes open 60% of session; re edu on OT role in care; provided verbal daily orientation   -EOB X8.5 min with varied level of assistance- max-min(A)  *Pt with R lateral and posterior lean  *requiring max cues for R side attention  *facilitation for B UE in supportive weight bearing position  -Lateral scooting to HOB x3 trials (to L) with total(A)  -Communication board updated; questions/concerns addressed within OT scope of practice  -Discussed no therapy over weekend to pt's family member at bedside    Patient left right sidelying with all lines intact, call button in reach, bed alarm on and family presentEducation:      GOALS:   Multidisciplinary Problems     Occupational Therapy Goals        Problem: Occupational Therapy Goal    Goal Priority Disciplines Outcome Interventions   Occupational Therapy Goal     OT, PT/OT Ongoing (interventions implemented as appropriate)    Description:  Goals to be met by: 6/6/2019     Patient will increase functional independence with ADLs by performing:    Pt will engage in functional task x2 min duration with 0 added cues/facilitation.  Sitting at edge of bed x10 minutes for functional task with Minimal Assistance for postural control. MET; not met 5/31  Rolling to Bilateral with Minimal Assistance and use of bedrail as needed.   Supine to sit with Moderate Assistance.  Stand pivot transfers with Moderate Assistance.  CG demo understanding for s/s of stroke.   CG demo  understanding for B UE positioning and ROM exercises while pt in supine.   CG demo understanding for pressure relief techniques.                         Time Tracking:     OT Date of Treatment: 05/31/19  OT Start Time: 1123  OT Stop Time: 1143  OT Total Time (min): 20 min    Billable Minutes:Therapeutic Activity 20    PATITO Casas  5/31/2019

## 2019-05-31 NOTE — PROGRESS NOTES
[facility]  Vascular Neurology  Comprehensive Stroke Center  Progress Note    Assessment/Plan:     * Embolic stroke involving left middle cerebral artery  85 y/o female with cardioembolic L MCA infarct 2/2 a fib (not compliant with anticoagulation at home. Patient was out of window for tPA, but when to IR for L M1 thrombectomy, TICI 3 flow restored.     Antithrombotics: Apixaban 2.5 mg BID    Statins: Lipitor 40    Aggressive risk factor modification: HTN, HLD, prior stroke, afib     Rehab efforts: The patient has been evaluated by a stroke team provider and the therapy needs have been fully considered based off the presenting complaints and exam findings. The following therapy evaluations are needed: PT evaluate and treat, OT evaluate and treat, SLP evaluate and treat, PM&R evaluate for appropriate placement- Recommending SNF    Diagnostics ordered/pending: None    VTE prophylaxis: Heparin 5000 units SQ every 8 hours , SCDs    BP parameters: S/p thrombectomy <180      Debility  Patient was reported to be back to baseline and ambulating with walker prior to this hospitalization  Currently aphasic and not following commands, but able to TOSCANO spontaneously  PT/OT now recommending SNF or home with 24 hour support    Family is amenable to patient discharging to SNF.     Diabetes mellitus  A1C 5.9  Continue q6h glucose and SSI  Glucose within goal past 48 hrs       S/P percutaneous endoscopic gastrostomy (PEG) tube placement  PEG placed on 05/27 at 1730.    Site clean and dry.     Leukocytosis  Patient with intermittent elevation of WBC over past few days  UA collected w/ reflex - patient started on Amoxicillin BID X 7 days (end date 6/4)  WBC today 14.47  Patient afebrile - continue to monitor     Paroxysmal atrial fibrillation  Found on last admit 5-2018.  Previously on Eliquis but ran out and not refilled. (Concerns of noncompliance due to price)    Eliquis 2.5 mg started after PEG  placement    Patient went into Afib with RVR on 05/27- metoprolol 12.5 mg BID started.     Dysphagia  SLP to evaluate and treat  High chance for aspiration PNA, will monitor for signs of infection or worsening respiratory status    05/29- TF started and currently at goal, tolerating well    Cytotoxic cerebral edema  Area of cytotoxic cerebral edema identified when reviewing brain imaging in the territory of the L middle cerebral artery. There is not mass effect associated with it. We will continue to monitor the patients clinical exam for any worsening of symptoms which may indicate expansion of the stroke or the area of the edema resulting in the clinical change. The pattern is suggestive of cardioembolic etiology        Dementia with behavioral disturbance  Continue home memantine  Delirium precautions    Seroquel 25 mg nightly started on 05/28. Patient doing well on nightly Seroquel. She is free of restraints and alert during AM assessment.    Mixed hyperlipidemia  Stroke risk factor  LDL 54, at goal  Continue home Lipitor 40    Essential hypertension  Stroke risk factor  SBP <180    Olmesartan 5 mg daily started on 5/28 - increased to 10 mg 5/31    Aphasia  Due to stroke  SLP to evaluate and treat        5/24 patient stepped down overnight to stroke unit, currently with NG tube in place, patient will likely need PEG tube placed, will attempt to discuss goals of care with family, need to restart anticoagulation but will hold for now until PEG decision is made  5/25 no acute events overnight. IR consulted. Work up complete. NPO and barium Sunday night. Plan for PEG Monday 5/27.   5/26 NAEON. Odorous urine reported by RN; UA w/ reflex ordered. NPO and barium at midnight for PEG placement tomorrow.   5/27: UA collected yesterday indicative of UTI, rocephin started.  Patient had afib with RVR today, metoprolol 12.5mg BID started. PEG placement pending.   5/28: PEG placed yesterday- site clean and dry; will initiate  TF and anticoagulation at 24 hour jessy. Nursing attempting to remove restraints, abdominal binder ordered to secure PEG. Patient's HR controlled adequately with metoprolol. Will discharge to NH, when restraint free for 72 hours.   5/29: Patient tolerating TF well; will be at goal by end of the day. Patient free of restraints as of 5am this morning.   5/30: NAEON. Patient more alert during exam today. TF at goal, patient tolerating well. She remains free of restraints.   5/31 - Restraint free since 5/29 @ 0500. Waiting on placement. Arnoldo checked Eliquis - 3.80 for 30 day supply/     STROKE DOCUMENTATION   Acute Stroke Times   Last Known Normal Date: 05/20/19  Last Known Normal Time: 2030  Symptom Onset Date: 05/20/19  Symptom Onset Time: 2030  Stroke Team Called Date: 05/20/19  Stroke Team Called Time: 2338  Stroke Team Arrival Date: 05/21/19  Stroke Team Arrival Time: 0605  CT Interpretation Time: 2338    NIH Scale:  1a. Level of Consciousness: 1-->Not alert, but arousable by minor stimulation to obey, answer, or respond  1b. LOC Questions: 2-->Answers neither question correctly  1c. LOC Commands: 2-->Performs neither task correctly  2. Best Gaze: 1-->Partial gaze palsy, gaze is abnormal in one or both eyes, but forced deviation or total gaze paresis is not present  3. Visual: 2-->Complete hemianopia  4. Facial Palsy: 1-->Minor paralysis (flattened nasolabial fold, asymmetry on smiling)  5a. Motor Arm, Left: 0-->No drift, limb holds 90 (or 45) degrees for full 10 secs  5b. Motor Arm, Right: 1-->Drift, limb holds 90 (or 45) degrees, but drifts down before full 10 secs, does not hit bed or other support  6a. Motor Leg, Left: 0-->No drift, leg holds 30 degree position for full 5 secs  6b. Motor Leg, Right: 2-->Some effort against gravity, leg falls to bed by 5 secs, but has some effort against gravity  7. Limb Ataxia: 0-->Absent  8. Sensory: 0-->Normal, no sensory loss  9. Best Language: 3-->Mute, global aphasia, no  usable speech or auditory comprehension  10. Dysarthria: 2-->Severe dysarthria, patients speech is so slurred as to be unintelligible in the absence of or out of proportion to any dysphasia, or is mute/anarthric  11. Extinction and Inattention (formerly Neglect): 0-->No abnormality  Total (NIH Stroke Scale): 17       Modified [place] Score: 3  [place] Coma Scale:    ABCD2 Score:    [id number] Score:   HAS -BLED Score:   ICH Score:   Hunt & Burkett Classification:      Hemorrhagic change of an Ischemic Stroke: Does this patient have an ischemic stroke with hemorrhagic changes? No     Neurologic Chief Complaint: Aphasia    Subjective:     Interval History: Patient is seen for follow-up neurological assessment and treatment recommendations:    Restraint free since 5/29 @ 0500. Waiting on placement.    HPI, Past Medical, Family, and Social History remains the same as documented in the initial encounter.     Review of Systems   Constitutional: Negative for chills and fever.   Eyes: Positive for visual disturbance.   Gastrointestinal: Negative for vomiting.   Neurological: Positive for facial asymmetry, speech difficulty and weakness.     Scheduled Meds:   albuterol-ipratropium  3 mL Nebulization Q8H    amoxicillin-pot clavulanate 250-62.5 mg/5ml  500 mg Per G Tube Q12H    apixaban  2.5 mg Per G Tube BID    atorvastatin  40 mg Per G Tube Daily    buPROPion  150 mg Per G Tube BID    cloNIDine  0.1 mg Per G Tube BID    memantine  10 mg Per G Tube BID    metoprolol  12.5 mg Per G Tube BID    olmesartan  5 mg Per G Tube Daily    QUEtiapine  25 mg Per G Tube QHS    senna-docusate 8.6-50 mg  1 tablet Per G Tube BID    sodium chloride 3%  4 mL Nebulization Q8H    tuberculin  5 Units Intradermal Once     Continuous Infusions:    PRN Meds:acetaminophen, dextrose 50%, glucagon (human recombinant), hydrALAZINE, insulin aspart U-100, ondansetron, sodium chloride 0.9%, sodium chloride 0.9%    Objective:     Vital Signs  (Most Recent):  Temp: 97.8 °F (36.6 °C) (05/31/19 0727)  Pulse: 75 (05/31/19 0830)  Resp: 18 (05/31/19 0830)  BP: (!) 160/74 (05/31/19 0727)  SpO2: 97 % (05/31/19 0821)  BP Location: Right arm    Vital Signs Range (Last 24H):  Temp:  [97 °F (36.1 °C)-97.9 °F (36.6 °C)]   Pulse:  [64-78]   Resp:  [16-18]   BP: (127-183)/(56-74)   SpO2:  [93 %-99 %]   BP Location: Right arm    Physical Exam   Constitutional: She appears well-developed and well-nourished. No distress.   HENT:   Head: Normocephalic and atraumatic.   Cardiovascular: Normal rate.   Pulmonary/Chest: No respiratory distress.   Abdominal:   S/p PEG- site clean and dry   Skin: Skin is warm and dry.   Psychiatric: She is noncommunicative. She is inattentive.   Vitals reviewed.    Neurological Exam:   LOC: alert  Attention Span: poor  Language: Global aphasia  Articulation: mute   Orientation: Untestable due to severe aphasia   Visual Fields: Hemianopsia right  EOM (CN III, IV, VI): Full/intact  Facial Movement (CN VII): Lower facial weakness on the Left  Motor: Arm left  Normal  5/5  Leg left  Normal  5/5  Arm right  Paresis: 4/5  Leg right Normal 3/5       Laboratory:  CMP:   Recent Labs   Lab 05/31/19 0356   CALCIUM 9.4   ALBUMIN 2.5*   PROT 6.8      K 3.9   CO2 29      BUN 13   CREATININE 0.7   ALKPHOS 79   ALT 41   AST 30   BILITOT 0.4     BMP:   Recent Labs   Lab 05/31/19 0356      K 3.9      CO2 29   BUN 13   CREATININE 0.7   CALCIUM 9.4     CBC:   Recent Labs   Lab 05/31/19 0356   WBC 14.47*   RBC 2.82*   HGB 8.3*   HCT 26.7*      MCV 95   MCH 29.4   MCHC 31.1*     Lipid Panel:   No results for input(s): CHOL, LDLCALC, HDL, TRIG in the last 168 hours.  Coagulation:   No results for input(s): PT, INR, APTT in the last 168 hours.  Hgb A1C:   No results for input(s): HGBA1C in the last 168 hours.  TSH:   No results for input(s): TSH in the last 168 hours.    Diagnostic Results     Brain Imaging     MRI 5/22  Evolving  scattered areas of acute/recent infarction left MCA territory with new to increased component in the left inferior frontal gyrus now mild moderate in size.  There is no evidence for hemorrhagic conversion or significant mass effect.    Superimposed confluent T2 FLAIR signal abnormality elsewhere supra and infratentorial parenchyma while nonspecific concerning for advanced chronic microvascular ischemic change versus posttherapy change.    Remote basal gangliar, thalamic and cerebellar infarcts unchanged.    Compensatory enlargement of ventricular system similar to prior without hydrocephalus.    CTA Head and Neck 5/21  1. 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  The right vertebral artery is dominant and patent throughout its course in the neck.  2. There is calcified plaque in the common, internal and external carotid arteries bilaterally.  Stenosis on the right is mild at 50-69% and moderate on the left at approximately 70% without change.  CTA Head    1.  There is occlusion of the distal left M1 and proximal M2 segments of the middle cerebral artery which has occurred since the prior CTA head.  There is subtle increased density within the left middle cerebral artery at the distal M1 and proximal M2 segments on comparison head CT.  This is consistent with thrombus.    2.  The right anterior circulation is normal.    3.  There is developmental variation with fetal origin of the right posterior cerebral artery.    Cardiac Imaging   · Concentric left ventricular remodeling.  · Small left ventricular cavity size.  · Increased (hyperdynamic) left ventricular systolic function. The estimated ejection fraction is 75%  · Grade I (mild) left ventricular diastolic dysfunction consistent with impaired relaxation.  · Moderate left atrial enlargement.  · Mild-to-moderate aortic valve stenosis.  · Aortic valve area is 1.43 cm2; peak  velocity is 2.59 m/s; mean gradient is 15.51 mmHg.  · Mild mid-cavity left ventricular obstruction is present.  · Normal left atrial pressure.  · Mild mitral stenosis.      Leah Fan NP  [facility]  Department of Vascular Neurology   [facility]

## 2019-05-31 NOTE — PLAN OF CARE
Problem: Occupational Therapy Goal  Goal: Occupational Therapy Goal  Goals to be met by: 6/6/2019     Patient will increase functional independence with ADLs by performing:    Pt will engage in functional task x2 min duration with 0 added cues/facilitation.  Sitting at edge of bed x10 minutes for functional task with Minimal Assistance for postural control. MET; not met 5/31  Rolling to Bilateral with Minimal Assistance and use of bedrail as needed.   Supine to sit with Moderate Assistance.  Stand pivot transfers with Moderate Assistance.  CG demo understanding for s/s of stroke.   CG demo understanding for B UE positioning and ROM exercises while pt in supine.   CG demo understanding for pressure relief techniques.       Outcome: Ongoing (interventions implemented as appropriate)  Goals remain appropriate. Pt with increased lethargy and requiring added assist for EOB. PATITO Casas 5/31/2019

## 2019-05-31 NOTE — ASSESSMENT & PLAN NOTE
Area of cytotoxic cerebral edema identified when reviewing brain imaging in the territory of the L middle cerebral artery. There is not mass effect associated with it. We will continue to monitor the patients clinical exam for any worsening of symptoms which may indicate expansion of the stroke or the area of the edema resulting in the clinical change. The pattern is suggestive of cardioembolic etiology

## 2019-06-01 LAB
BASOPHILS # BLD AUTO: 0.13 K/UL (ref 0–0.2)
BASOPHILS NFR BLD: 0.8 % (ref 0–1.9)
DIFFERENTIAL METHOD: ABNORMAL
EOSINOPHIL # BLD AUTO: 0.4 K/UL (ref 0–0.5)
EOSINOPHIL NFR BLD: 2.3 % (ref 0–8)
ERYTHROCYTE [DISTWIDTH] IN BLOOD BY AUTOMATED COUNT: 14.2 % (ref 11.5–14.5)
HCT VFR BLD AUTO: 27.1 % (ref 37–48.5)
HGB BLD-MCNC: 8.4 G/DL (ref 12–16)
IMM GRANULOCYTES # BLD AUTO: 0.37 K/UL (ref 0–0.04)
IMM GRANULOCYTES NFR BLD AUTO: 2.3 % (ref 0–0.5)
LYMPHOCYTES # BLD AUTO: 3.6 K/UL (ref 1–4.8)
LYMPHOCYTES NFR BLD: 22.2 % (ref 18–48)
MCH RBC QN AUTO: 29.4 PG (ref 27–31)
MCHC RBC AUTO-ENTMCNC: 31 G/DL (ref 32–36)
MCV RBC AUTO: 95 FL (ref 82–98)
MONOCYTES # BLD AUTO: 0.7 K/UL (ref 0.3–1)
MONOCYTES NFR BLD: 4.5 % (ref 4–15)
NEUTROPHILS # BLD AUTO: 11.1 K/UL (ref 1.8–7.7)
NEUTROPHILS NFR BLD: 67.9 % (ref 38–73)
NRBC BLD-RTO: 0 /100 WBC
PLATELET # BLD AUTO: 331 K/UL (ref 150–350)
PMV BLD AUTO: 11.5 FL (ref 9.2–12.9)
POCT GLUCOSE: 112 MG/DL (ref 70–110)
POCT GLUCOSE: 159 MG/DL (ref 70–110)
RBC # BLD AUTO: 2.86 M/UL (ref 4–5.4)
WBC # BLD AUTO: 16.32 K/UL (ref 3.9–12.7)

## 2019-06-01 PROCEDURE — 99233 SBSQ HOSP IP/OBS HIGH 50: CPT | Mod: ,,, | Performed by: PSYCHIATRY & NEUROLOGY

## 2019-06-01 PROCEDURE — 25000003 PHARM REV CODE 250: Performed by: NURSE PRACTITIONER

## 2019-06-01 PROCEDURE — 36415 COLL VENOUS BLD VENIPUNCTURE: CPT

## 2019-06-01 PROCEDURE — 94761 N-INVAS EAR/PLS OXIMETRY MLT: CPT

## 2019-06-01 PROCEDURE — 25000242 PHARM REV CODE 250 ALT 637 W/ HCPCS: Performed by: PHYSICIAN ASSISTANT

## 2019-06-01 PROCEDURE — 20600001 HC STEP DOWN PRIVATE ROOM

## 2019-06-01 PROCEDURE — 99233 PR SUBSEQUENT HOSPITAL CARE,LEVL III: ICD-10-PCS | Mod: ,,, | Performed by: PSYCHIATRY & NEUROLOGY

## 2019-06-01 PROCEDURE — 94640 AIRWAY INHALATION TREATMENT: CPT

## 2019-06-01 PROCEDURE — 27000221 HC OXYGEN, UP TO 24 HOURS

## 2019-06-01 PROCEDURE — 85025 COMPLETE CBC W/AUTO DIFF WBC: CPT

## 2019-06-01 RX ADMIN — IPRATROPIUM BROMIDE AND ALBUTEROL SULFATE 3 ML: .5; 3 SOLUTION RESPIRATORY (INHALATION) at 08:06

## 2019-06-01 RX ADMIN — SODIUM CHLORIDE SOLN NEBU 3% 4 ML: 3 NEBU SOLN at 08:06

## 2019-06-01 RX ADMIN — MEMANTINE 10 MG: 10 TABLET ORAL at 10:06

## 2019-06-01 RX ADMIN — SODIUM CHLORIDE SOLN NEBU 3% 4 ML: 3 NEBU SOLN at 11:06

## 2019-06-01 RX ADMIN — SODIUM CHLORIDE SOLN NEBU 3% 4 ML: 3 NEBU SOLN at 03:06

## 2019-06-01 RX ADMIN — APIXABAN 2.5 MG: 2.5 TABLET, FILM COATED ORAL at 10:06

## 2019-06-01 RX ADMIN — BUPROPION HYDROCHLORIDE 150 MG: 75 TABLET, FILM COATED ORAL at 10:06

## 2019-06-01 RX ADMIN — IPRATROPIUM BROMIDE AND ALBUTEROL SULFATE 3 ML: .5; 3 SOLUTION RESPIRATORY (INHALATION) at 03:06

## 2019-06-01 RX ADMIN — APIXABAN 2.5 MG: 2.5 TABLET, FILM COATED ORAL at 09:06

## 2019-06-01 RX ADMIN — ATORVASTATIN CALCIUM 40 MG: 20 TABLET, FILM COATED ORAL at 09:06

## 2019-06-01 RX ADMIN — IPRATROPIUM BROMIDE AND ALBUTEROL SULFATE 3 ML: .5; 3 SOLUTION RESPIRATORY (INHALATION) at 11:06

## 2019-06-01 RX ADMIN — IPRATROPIUM BROMIDE AND ALBUTEROL SULFATE 3 ML: .5; 3 SOLUTION RESPIRATORY (INHALATION) at 12:06

## 2019-06-01 RX ADMIN — MEMANTINE 10 MG: 10 TABLET ORAL at 09:06

## 2019-06-01 RX ADMIN — Medication 12.5 MG: at 09:06

## 2019-06-01 RX ADMIN — BUPROPION HYDROCHLORIDE 150 MG: 75 TABLET, FILM COATED ORAL at 09:06

## 2019-06-01 RX ADMIN — CLONIDINE HYDROCHLORIDE 0.1 MG: 0.1 TABLET ORAL at 10:06

## 2019-06-01 RX ADMIN — QUETIAPINE FUMARATE 25 MG: 25 TABLET ORAL at 10:06

## 2019-06-01 RX ADMIN — SODIUM CHLORIDE SOLN NEBU 3% 4 ML: 3 NEBU SOLN at 12:06

## 2019-06-01 RX ADMIN — Medication 12.5 MG: at 10:06

## 2019-06-01 RX ADMIN — CLONIDINE HYDROCHLORIDE 0.1 MG: 0.1 TABLET ORAL at 09:06

## 2019-06-01 RX ADMIN — SENNOSIDES,DOCUSATE SODIUM 1 TABLET: 8.6; 5 TABLET, FILM COATED ORAL at 09:06

## 2019-06-01 RX ADMIN — AMOXICILLIN AND CLAVULANATE POTASSIUM 500 MG: 250; 62.5 POWDER, FOR SUSPENSION ORAL at 09:06

## 2019-06-01 RX ADMIN — SENNOSIDES,DOCUSATE SODIUM 1 TABLET: 8.6; 5 TABLET, FILM COATED ORAL at 10:06

## 2019-06-01 RX ADMIN — AMOXICILLIN AND CLAVULANATE POTASSIUM 500 MG: 250; 62.5 POWDER, FOR SUSPENSION ORAL at 10:06

## 2019-06-01 RX ADMIN — OLMESARTAN MEDOXOMIL 10 MG: 5 TABLET, FILM COATED ORAL at 09:06

## 2019-06-01 NOTE — PLAN OF CARE
Problem: Adult Inpatient Plan of Care  Goal: Plan of Care Review  Outcome: Ongoing (interventions implemented as appropriate)  No family present to review plan of care. Pt with global aphasia.  Goal: Absence of Hospital-Acquired Illness or Injury  Outcome: Ongoing (interventions implemented as appropriate)  No illness exacerbation or injuries overnight.  Intervention: Identify and Manage Fall Risk  Fall precautions maintained.  Intervention: Prevent Skin Injury  No new skin breakdown.  Intervention: Prevent Infection  No s/s of infection.

## 2019-06-01 NOTE — ASSESSMENT & PLAN NOTE
83 y/o female with cardioembolic L MCA infarct 2/2 a fib (not compliant with anticoagulation at home. Patient was out of window for tPA, but when to IR for L M1 thrombectomy, TICI 3 flow restored.     Antithrombotics: Apixaban 2.5 mg BID    Statins: Lipitor 40    Aggressive risk factor modification: HTN, HLD, prior stroke, afib     Rehab efforts: The patient has been evaluated by a stroke team provider and the therapy needs have been fully considered based off the presenting complaints and exam findings. The following therapy evaluations are needed: PT evaluate and treat, OT evaluate and treat, SLP evaluate and treat, PM&R evaluate for appropriate placement- Recommending SNF    Diagnostics ordered/pending: None    VTE prophylaxis: Heparin 5000 units SQ every 8 hours , SCDs    BP parameters: hospital day 11 -normotensive

## 2019-06-01 NOTE — SUBJECTIVE & OBJECTIVE
Neurologic Chief Complaint: Aphasia    Subjective:     Interval History: Patient is seen for follow-up neurological assessment and treatment recommendations:    No events overnight   Awaiting placement     HPI, Past Medical, Family, and Social History remains the same as documented in the initial encounter.     Review of Systems   Constitutional: Positive for fatigue. Negative for chills and fever.   Eyes: Positive for visual disturbance.   Gastrointestinal: Negative for vomiting.   Neurological: Positive for facial asymmetry, speech difficulty and weakness.     Scheduled Meds:   albuterol-ipratropium  3 mL Nebulization Q8H    amoxicillin-pot clavulanate 250-62.5 mg/5ml  500 mg Per G Tube Q12H    apixaban  2.5 mg Per G Tube BID    atorvastatin  40 mg Per G Tube Daily    buPROPion  150 mg Per G Tube BID    cloNIDine  0.1 mg Per G Tube BID    memantine  10 mg Per G Tube BID    metoprolol  12.5 mg Per G Tube BID    olmesartan  10 mg Per G Tube Daily    QUEtiapine  25 mg Per G Tube QHS    senna-docusate 8.6-50 mg  1 tablet Per G Tube BID    sodium chloride 3%  4 mL Nebulization Q8H    tuberculin  5 Units Intradermal Once     Continuous Infusions:    PRN Meds:acetaminophen, dextrose 50%, glucagon (human recombinant), hydrALAZINE, insulin aspart U-100, ondansetron, sodium chloride 0.9%, sodium chloride 0.9%    Objective:     Vital Signs (Most Recent):  Temp: 97.7 °F (36.5 °C) (06/01/19 1613)  Pulse: 73 (06/01/19 1613)  Resp: 17 (06/01/19 1613)  BP: (!) 179/72 (06/01/19 1613)  SpO2: 95 % (06/01/19 1613)  BP Location: Right arm    Vital Signs Range (Last 24H):  Temp:  [97.3 °F (36.3 °C)-98.5 °F (36.9 °C)]   Pulse:  [62-75]   Resp:  [16-19]   BP: (142-179)/(61-74)   SpO2:  [92 %-99 %]   BP Location: Right arm    Physical Exam   Constitutional: She appears well-developed and well-nourished. No distress.   HENT:   Head: Normocephalic and atraumatic.   Cardiovascular: Normal rate.   Pulmonary/Chest: Effort normal.  No respiratory distress.   Abdominal:   S/p PEG- site clean and dry   Skin: Skin is warm and dry.   Psychiatric: She is noncommunicative. She is inattentive.   Vitals reviewed.    Neurological Exam:   LOC: drowsy but arouses   Attention Span: poor  Language: Global aphasia  Articulation: mute   Orientation: Untestable due to severe aphasia   Visual Fields: Hemianopsia right  EOM (CN III, IV, VI): Full/intact  Facial Movement (CN VII): Lower facial weakness on the Left  Motor: Arm left  Normal 5/5  Leg left  Normal 5/5  Arm right  Paresis: 4/5  Leg right Normal 3/5       Laboratory:  CMP:   No results for input(s): GLUCOSE, CALCIUM, ALBUMIN, PROT, NA, K, CO2, CL, BUN, CREATININE, ALKPHOS, ALT, AST, BILITOT in the last 24 hours.  BMP:   Recent Labs   Lab 05/31/19  0356      K 3.9      CO2 29   BUN 13   CREATININE 0.7   CALCIUM 9.4     CBC:   Recent Labs   Lab 06/01/19  0358   WBC 16.32*   RBC 2.86*   HGB 8.4*   HCT 27.1*      MCV 95   MCH 29.4   MCHC 31.0*     Lipid Panel:   No results for input(s): CHOL, LDLCALC, HDL, TRIG in the last 168 hours.  Coagulation:   No results for input(s): PT, INR, APTT in the last 168 hours.  Hgb A1C:   No results for input(s): HGBA1C in the last 168 hours.  TSH:   No results for input(s): TSH in the last 168 hours.    Diagnostic Results     Brain Imaging     MRI 5/22  Evolving scattered areas of acute/recent infarction left MCA territory with new to increased component in the left inferior frontal gyrus now mild moderate in size.  There is no evidence for hemorrhagic conversion or significant mass effect.    Superimposed confluent T2 FLAIR signal abnormality elsewhere supra and infratentorial parenchyma while nonspecific concerning for advanced chronic microvascular ischemic change versus posttherapy change.    Remote basal gangliar, thalamic and cerebellar infarcts unchanged.    Compensatory enlargement of ventricular system similar to prior without  hydrocephalus.    CTA Head and Neck 5/21  1. 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  The right vertebral artery is dominant and patent throughout its course in the neck.  2. There is calcified plaque in the common, internal and external carotid arteries bilaterally.  Stenosis on the right is mild at 50-69% and moderate on the left at approximately 70% without change.  CTA Head    1.  There is occlusion of the distal left M1 and proximal M2 segments of the middle cerebral artery which has occurred since the prior CTA head.  There is subtle increased density within the left middle cerebral artery at the distal M1 and proximal M2 segments on comparison head CT.  This is consistent with thrombus.    2.  The right anterior circulation is normal.    3.  There is developmental variation with fetal origin of the right posterior cerebral artery.    Cardiac Imaging   · Concentric left ventricular remodeling.  · Small left ventricular cavity size.  · Increased (hyperdynamic) left ventricular systolic function. The estimated ejection fraction is 75%  · Grade I (mild) left ventricular diastolic dysfunction consistent with impaired relaxation.  · Moderate left atrial enlargement.  · Mild-to-moderate aortic valve stenosis.  · Aortic valve area is 1.43 cm2; peak velocity is 2.59 m/s; mean gradient is 15.51 mmHg.  · Mild mid-cavity left ventricular obstruction is present.  · Normal left atrial pressure.  · Mild mitral stenosis.

## 2019-06-01 NOTE — ASSESSMENT & PLAN NOTE
Patient with intermittent elevation of WBC over past few days  UA collected w/ reflex - patient started on Amoxicillin BID X 7 days (end date 6/4)  WBC today 16 - continue to monitor   Patient afebrile - continue to monitor

## 2019-06-01 NOTE — PROGRESS NOTES
[facility]  Vascular Neurology  Comprehensive Stroke Center  Progress Note    Assessment/Plan:     * Embolic stroke involving left middle cerebral artery  83 y/o female with cardioembolic L MCA infarct 2/2 a fib (not compliant with anticoagulation at home. Patient was out of window for tPA, but when to IR for L M1 thrombectomy, TICI 3 flow restored.     Antithrombotics: Apixaban 2.5 mg BID    Statins: Lipitor 40    Aggressive risk factor modification: HTN, HLD, prior stroke, afib     Rehab efforts: The patient has been evaluated by a stroke team provider and the therapy needs have been fully considered based off the presenting complaints and exam findings. The following therapy evaluations are needed: PT evaluate and treat, OT evaluate and treat, SLP evaluate and treat, PM&R evaluate for appropriate placement- Recommending SNF    Diagnostics ordered/pending: None    VTE prophylaxis: Heparin 5000 units SQ every 8 hours , SCDs    BP parameters: hospital day 11 -normotensive       Debility  Patient was reported to be back to baseline and ambulating with walker prior to this hospitalization  Currently aphasic and not following commands, but able to TOSCANO spontaneously  PT/OT now recommending SNF or home with 24 hour support    Family is amenable to patient discharging to SNF.     Diabetes mellitus  A1C 5.9  Continue q6h glucose and SSI  Glucose within goal past 48 hrs       S/P percutaneous endoscopic gastrostomy (PEG) tube placement  PEG placed on 05/27 at 1730.    Site clean and dry.     Leukocytosis  Patient with intermittent elevation of WBC over past few days  UA collected w/ reflex - patient started on Amoxicillin BID X 7 days (end date 6/4)  WBC today 16 - continue to monitor   Patient afebrile - continue to monitor     Paroxysmal atrial fibrillation  Found on last admit 5-2018.  Previously on Eliquis but ran out and not refilled. (Concerns of noncompliance due to price)    Eliquis 2.5 mg  started after PEG placement    Patient went into Afib with RVR on 05/27- metoprolol 12.5 mg BID started.     Dysphagia  SLP to evaluate and treat  High chance for aspiration PNA, will monitor for signs of infection or worsening respiratory status    05/29- TF started and currently at goal, tolerating well    Cytotoxic cerebral edema  Area of cytotoxic cerebral edema identified when reviewing brain imaging in the territory of the L middle cerebral artery. There is not mass effect associated with it. We will continue to monitor the patients clinical exam for any worsening of symptoms which may indicate expansion of the stroke or the area of the edema resulting in the clinical change. The pattern is suggestive of cardioembolic etiology        Dementia with behavioral disturbance  Continue home memantine  Delirium precautions    Seroquel 25 mg nightly started on 05/28. Patient doing well on nightly Seroquel. She is free of restraints and alert during AM assessment.    Mixed hyperlipidemia  Stroke risk factor  LDL 54, at goal  Continue home Lipitor 40    Essential hypertension  Stroke risk factor  SBP <180    Olmesartan 5 mg daily started on 5/28 - increased to 10 mg 5/31    Aphasia  Due to stroke  SLP to evaluate and treat   PEG in place          5/24 patient stepped down overnight to stroke unit, currently with NG tube in place, patient will likely need PEG tube placed, will attempt to discuss goals of care with family, need to restart anticoagulation but will hold for now until PEG decision is made  5/25 no acute events overnight. IR consulted. Work up complete. NPO and barium Sunday night. Plan for PEG Monday 5/27.   5/26 NAEON. Odorous urine reported by RN; UA w/ reflex ordered. NPO and barium at midnight for PEG placement tomorrow.   5/27: UA collected yesterday indicative of UTI, rocephin started.  Patient had afib with RVR today, metoprolol 12.5mg BID started. PEG placement pending.   5/28: PEG placed yesterday-  site clean and dry; will initiate TF and anticoagulation at 24 hour jessy. Nursing attempting to remove restraints, abdominal binder ordered to secure PEG. Patient's HR controlled adequately with metoprolol. Will discharge to NH, when restraint free for 72 hours.   5/29: Patient tolerating TF well; will be at goal by end of the day. Patient free of restraints as of 5am this morning.   5/30: NAEON. Patient more alert during exam today. TF at goal, patient tolerating well. She remains free of restraints.   5/31 - Restraint free since 5/29 @ 0500. Waiting on placement. [place] checked Eliquis - 3.80 for 30 day supply.    STROKE DOCUMENTATION   Acute Stroke Times   Last Known Normal Date: 05/20/19  Last Known Normal Time: 2030  Symptom Onset Date: 05/20/19  Symptom Onset Time: 2030  Stroke Team Called Date: 05/20/19  Stroke Team Called Time: 2338  Stroke Team Arrival Date: 05/21/19  Stroke Team Arrival Time: 0605  CT Interpretation Time: 2338    NIH Scale:  1a. Level of Consciousness: 2-->Not alert, requires repeated stimulation to attend, or is obtunded and requires strong or painful stimulation to make movements (not stereotyped)  1b. LOC Questions: 2-->Answers neither question correctly  1c. LOC Commands: 2-->Performs neither task correctly  2. Best Gaze: 1-->Partial gaze palsy, gaze is abnormal in one or both eyes, but forced deviation or total gaze paresis is not present  3. Visual: 2-->Complete hemianopia  4. Facial Palsy: 1-->Minor paralysis (flattened nasolabial fold, asymmetry on smiling)  5a. Motor Arm, Left: 0-->No drift, limb holds 90 (or 45) degrees for full 10 secs  5b. Motor Arm, Right: 1-->Drift, limb holds 90 (or 45) degrees, but drifts down before full 10 secs, does not hit bed or other support  6a. Motor Leg, Left: 0-->No drift, leg holds 30 degree position for full 5 secs  6b. Motor Leg, Right: 2-->Some effort against gravity, leg falls to bed by 5 secs, but has some effort against gravity  7. Limb  Ataxia: 0-->Absent  8. Sensory: 0-->Normal, no sensory loss  9. Best Language: 3-->Mute, global aphasia, no usable speech or auditory comprehension  10. Dysarthria: 2-->Severe dysarthria, patients speech is so slurred as to be unintelligible in the absence of or out of proportion to any dysphasia, or is mute/anarthric  11. Extinction and Inattention (formerly Neglect): 0-->No abnormality  Total (NIH Stroke Scale): 18       Modified [place] Score: 3  Ivonne Coma Scale:    ABCD2 Score:    [id number] Score:   HAS -BLED Score:   ICH Score:   Hunt & Burkett Classification:      Hemorrhagic change of an Ischemic Stroke: Does this patient have an ischemic stroke with hemorrhagic changes? No     Neurologic Chief Complaint: Aphasia    Subjective:     Interval History: Patient is seen for follow-up neurological assessment and treatment recommendations:    No events overnight   Awaiting placement     HPI, Past Medical, Family, and Social History remains the same as documented in the initial encounter.     Review of Systems   Constitutional: Positive for fatigue. Negative for chills and fever.   Eyes: Positive for visual disturbance.   Gastrointestinal: Negative for vomiting.   Neurological: Positive for facial asymmetry, speech difficulty and weakness.     Scheduled Meds:   albuterol-ipratropium  3 mL Nebulization Q8H    amoxicillin-pot clavulanate 250-62.5 mg/5ml  500 mg Per G Tube Q12H    apixaban  2.5 mg Per G Tube BID    atorvastatin  40 mg Per G Tube Daily    buPROPion  150 mg Per G Tube BID    cloNIDine  0.1 mg Per G Tube BID    memantine  10 mg Per G Tube BID    metoprolol  12.5 mg Per G Tube BID    olmesartan  10 mg Per G Tube Daily    QUEtiapine  25 mg Per G Tube QHS    senna-docusate 8.6-50 mg  1 tablet Per G Tube BID    sodium chloride 3%  4 mL Nebulization Q8H    tuberculin  5 Units Intradermal Once     Continuous Infusions:    PRN Meds:acetaminophen, dextrose 50%, glucagon (human recombinant),  hydrALAZINE, insulin aspart U-100, ondansetron, sodium chloride 0.9%, sodium chloride 0.9%    Objective:     Vital Signs (Most Recent):  Temp: 97.7 °F (36.5 °C) (06/01/19 1613)  Pulse: 73 (06/01/19 1613)  Resp: 17 (06/01/19 1613)  BP: (!) 179/72 (06/01/19 1613)  SpO2: 95 % (06/01/19 1613)  BP Location: Right arm    Vital Signs Range (Last 24H):  Temp:  [97.3 °F (36.3 °C)-98.5 °F (36.9 °C)]   Pulse:  [62-75]   Resp:  [16-19]   BP: (142-179)/(61-74)   SpO2:  [92 %-99 %]   BP Location: Right arm    Physical Exam   Constitutional: She appears well-developed and well-nourished. No distress.   HENT:   Head: Normocephalic and atraumatic.   Cardiovascular: Normal rate.   Pulmonary/Chest: Effort normal. No respiratory distress.   Abdominal:   S/p PEG- site clean and dry   Skin: Skin is warm and dry.   Psychiatric: She is noncommunicative. She is inattentive.   Vitals reviewed.    Neurological Exam:   LOC: drowsy but arouses   Attention Span: poor  Language: Global aphasia  Articulation: mute   Orientation: Untestable due to severe aphasia   Visual Fields: Hemianopsia right  EOM (CN III, IV, VI): Full/intact  Facial Movement (CN VII): Lower facial weakness on the Left  Motor: Arm left  Normal 5/5  Leg left  Normal 5/5  Arm right  Paresis: 4/5  Leg right Normal 3/5       Laboratory:  CMP:   No results for input(s): GLUCOSE, CALCIUM, ALBUMIN, PROT, NA, K, CO2, CL, BUN, CREATININE, ALKPHOS, ALT, AST, BILITOT in the last 24 hours.  BMP:   Recent Labs   Lab 05/31/19  0356      K 3.9      CO2 29   BUN 13   CREATININE 0.7   CALCIUM 9.4     CBC:   Recent Labs   Lab 06/01/19  0358   WBC 16.32*   RBC 2.86*   HGB 8.4*   HCT 27.1*      MCV 95   MCH 29.4   MCHC 31.0*     Lipid Panel:   No results for input(s): CHOL, LDLCALC, HDL, TRIG in the last 168 hours.  Coagulation:   No results for input(s): PT, INR, APTT in the last 168 hours.  Hgb A1C:   No results for input(s): HGBA1C in the last 168 hours.  TSH:   No results  for input(s): TSH in the last 168 hours.    Diagnostic Results     Brain Imaging     MRI 5/22  Evolving scattered areas of acute/recent infarction left MCA territory with new to increased component in the left inferior frontal gyrus now mild moderate in size.  There is no evidence for hemorrhagic conversion or significant mass effect.    Superimposed confluent T2 FLAIR signal abnormality elsewhere supra and infratentorial parenchyma while nonspecific concerning for advanced chronic microvascular ischemic change versus posttherapy change.    Remote basal gangliar, thalamic and cerebellar infarcts unchanged.    Compensatory enlargement of ventricular system similar to prior without hydrocephalus.    CTA Head and Neck 5/21  1. 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  The right vertebral artery is dominant and patent throughout its course in the neck.  2. There is calcified plaque in the common, internal and external carotid arteries bilaterally.  Stenosis on the right is mild at 50-69% and moderate on the left at approximately 70% without change.  CTA Head    1.  There is occlusion of the distal left M1 and proximal M2 segments of the middle cerebral artery which has occurred since the prior CTA head.  There is subtle increased density within the left middle cerebral artery at the distal M1 and proximal M2 segments on comparison head CT.  This is consistent with thrombus.    2.  The right anterior circulation is normal.    3.  There is developmental variation with fetal origin of the right posterior cerebral artery.    Cardiac Imaging   · Concentric left ventricular remodeling.  · Small left ventricular cavity size.  · Increased (hyperdynamic) left ventricular systolic function. The estimated ejection fraction is 75%  · Grade I (mild) left ventricular diastolic dysfunction consistent with impaired relaxation.  · Moderate left  atrial enlargement.  · Mild-to-moderate aortic valve stenosis.  · Aortic valve area is 1.43 cm2; peak velocity is 2.59 m/s; mean gradient is 15.51 mmHg.  · Mild mid-cavity left ventricular obstruction is present.  · Normal left atrial pressure.  · Mild mitral stenosis.      ERIC Aguilar  [facility]  Department of Vascular Neurology   [facility]

## 2019-06-02 LAB
BASOPHILS # BLD AUTO: 0.12 K/UL (ref 0–0.2)
BASOPHILS NFR BLD: 0.9 % (ref 0–1.9)
DIFFERENTIAL METHOD: ABNORMAL
EOSINOPHIL # BLD AUTO: 0.3 K/UL (ref 0–0.5)
EOSINOPHIL NFR BLD: 2.4 % (ref 0–8)
ERYTHROCYTE [DISTWIDTH] IN BLOOD BY AUTOMATED COUNT: 14.2 % (ref 11.5–14.5)
HCT VFR BLD AUTO: 25.3 % (ref 37–48.5)
HGB BLD-MCNC: 7.9 G/DL (ref 12–16)
IMM GRANULOCYTES # BLD AUTO: 0.18 K/UL (ref 0–0.04)
IMM GRANULOCYTES NFR BLD AUTO: 1.3 % (ref 0–0.5)
LYMPHOCYTES # BLD AUTO: 2.6 K/UL (ref 1–4.8)
LYMPHOCYTES NFR BLD: 19.2 % (ref 18–48)
MCH RBC QN AUTO: 29.6 PG (ref 27–31)
MCHC RBC AUTO-ENTMCNC: 31.2 G/DL (ref 32–36)
MCV RBC AUTO: 95 FL (ref 82–98)
MONOCYTES # BLD AUTO: 0.7 K/UL (ref 0.3–1)
MONOCYTES NFR BLD: 5 % (ref 4–15)
NEUTROPHILS # BLD AUTO: 9.7 K/UL (ref 1.8–7.7)
NEUTROPHILS NFR BLD: 71.2 % (ref 38–73)
NRBC BLD-RTO: 0 /100 WBC
PLATELET # BLD AUTO: 297 K/UL (ref 150–350)
PMV BLD AUTO: 11 FL (ref 9.2–12.9)
POCT GLUCOSE: 155 MG/DL (ref 70–110)
POCT GLUCOSE: 176 MG/DL (ref 70–110)
POCT GLUCOSE: 190 MG/DL (ref 70–110)
RBC # BLD AUTO: 2.67 M/UL (ref 4–5.4)
WBC # BLD AUTO: 13.62 K/UL (ref 3.9–12.7)

## 2019-06-02 PROCEDURE — 25000003 PHARM REV CODE 250: Performed by: NURSE PRACTITIONER

## 2019-06-02 PROCEDURE — 36415 COLL VENOUS BLD VENIPUNCTURE: CPT

## 2019-06-02 PROCEDURE — 99233 PR SUBSEQUENT HOSPITAL CARE,LEVL III: ICD-10-PCS | Mod: ,,, | Performed by: PSYCHIATRY & NEUROLOGY

## 2019-06-02 PROCEDURE — 85025 COMPLETE CBC W/AUTO DIFF WBC: CPT

## 2019-06-02 PROCEDURE — 25000242 PHARM REV CODE 250 ALT 637 W/ HCPCS: Performed by: PHYSICIAN ASSISTANT

## 2019-06-02 PROCEDURE — 94640 AIRWAY INHALATION TREATMENT: CPT

## 2019-06-02 PROCEDURE — 63600175 PHARM REV CODE 636 W HCPCS: Performed by: PHYSICIAN ASSISTANT

## 2019-06-02 PROCEDURE — 20600001 HC STEP DOWN PRIVATE ROOM

## 2019-06-02 PROCEDURE — 99233 SBSQ HOSP IP/OBS HIGH 50: CPT | Mod: ,,, | Performed by: PSYCHIATRY & NEUROLOGY

## 2019-06-02 PROCEDURE — 94761 N-INVAS EAR/PLS OXIMETRY MLT: CPT

## 2019-06-02 RX ADMIN — SENNOSIDES,DOCUSATE SODIUM 1 TABLET: 8.6; 5 TABLET, FILM COATED ORAL at 10:06

## 2019-06-02 RX ADMIN — IPRATROPIUM BROMIDE AND ALBUTEROL SULFATE 3 ML: .5; 3 SOLUTION RESPIRATORY (INHALATION) at 08:06

## 2019-06-02 RX ADMIN — OLMESARTAN MEDOXOMIL 10 MG: 5 TABLET, FILM COATED ORAL at 10:06

## 2019-06-02 RX ADMIN — APIXABAN 2.5 MG: 2.5 TABLET, FILM COATED ORAL at 09:06

## 2019-06-02 RX ADMIN — Medication 12.5 MG: at 09:06

## 2019-06-02 RX ADMIN — HYDRALAZINE HYDROCHLORIDE 10 MG: 20 INJECTION INTRAMUSCULAR; INTRAVENOUS at 09:06

## 2019-06-02 RX ADMIN — QUETIAPINE FUMARATE 25 MG: 25 TABLET ORAL at 09:06

## 2019-06-02 RX ADMIN — SENNOSIDES,DOCUSATE SODIUM 1 TABLET: 8.6; 5 TABLET, FILM COATED ORAL at 09:06

## 2019-06-02 RX ADMIN — Medication 12.5 MG: at 10:06

## 2019-06-02 RX ADMIN — MEMANTINE 10 MG: 10 TABLET ORAL at 10:06

## 2019-06-02 RX ADMIN — IPRATROPIUM BROMIDE AND ALBUTEROL SULFATE 3 ML: .5; 3 SOLUTION RESPIRATORY (INHALATION) at 03:06

## 2019-06-02 RX ADMIN — MEMANTINE 10 MG: 10 TABLET ORAL at 09:06

## 2019-06-02 RX ADMIN — AMOXICILLIN AND CLAVULANATE POTASSIUM 500 MG: 250; 62.5 POWDER, FOR SUSPENSION ORAL at 10:06

## 2019-06-02 RX ADMIN — BUPROPION HYDROCHLORIDE 150 MG: 75 TABLET, FILM COATED ORAL at 10:06

## 2019-06-02 RX ADMIN — SODIUM CHLORIDE SOLN NEBU 3% 4 ML: 3 NEBU SOLN at 03:06

## 2019-06-02 RX ADMIN — ATORVASTATIN CALCIUM 40 MG: 20 TABLET, FILM COATED ORAL at 10:06

## 2019-06-02 RX ADMIN — CLONIDINE HYDROCHLORIDE 0.1 MG: 0.1 TABLET ORAL at 09:06

## 2019-06-02 RX ADMIN — BUPROPION HYDROCHLORIDE 150 MG: 75 TABLET, FILM COATED ORAL at 09:06

## 2019-06-02 RX ADMIN — APIXABAN 2.5 MG: 2.5 TABLET, FILM COATED ORAL at 10:06

## 2019-06-02 RX ADMIN — SODIUM CHLORIDE SOLN NEBU 3% 4 ML: 3 NEBU SOLN at 08:06

## 2019-06-02 RX ADMIN — AMOXICILLIN AND CLAVULANATE POTASSIUM 500 MG: 250; 62.5 POWDER, FOR SUSPENSION ORAL at 09:06

## 2019-06-02 RX ADMIN — CLONIDINE HYDROCHLORIDE 0.1 MG: 0.1 TABLET ORAL at 10:06

## 2019-06-02 NOTE — ASSESSMENT & PLAN NOTE
Continue home memantine  Delirium precautions    Seroquel 25 mg nightly started on 05/28. Patient doing well on nightly Seroquel. She is free of restraints

## 2019-06-02 NOTE — ASSESSMENT & PLAN NOTE
Patient with intermittent elevation of WBC over past few days  UA collected w/ reflex - patient started on Amoxicillin BID X 7 days (end date 6/4)  WBC today 13 - trending down  Patient afebrile - continue to monitor

## 2019-06-02 NOTE — ASSESSMENT & PLAN NOTE
85 y/o female with cardioembolic L MCA infarct 2/2 a fib (not compliant with anticoagulation at home. Patient was out of window for tPA, but when to IR for L M1 thrombectomy, TICI 3 flow restored.     Antithrombotics: Apixaban 2.5 mg BID    Statins: Lipitor 40    Aggressive risk factor modification: HTN, HLD, prior stroke, afib     Rehab efforts: The patient has been evaluated by a stroke team provider and the therapy needs have been fully considered based off the presenting complaints and exam findings. The following therapy evaluations are needed: PT evaluate and treat, OT evaluate and treat, SLP evaluate and treat, PM&R evaluate for appropriate placement- Recommending SNF    Diagnostics ordered/pending: None    VTE prophylaxis: Heparin 5000 units SQ every 8 hours , SCDs    BP parameters: hospital day 12 -normotensive , sbp last 24 hours 120-186

## 2019-06-02 NOTE — PROGRESS NOTES
[facility]  Vascular Neurology  Comprehensive Stroke Center  Progress Note    Assessment/Plan:     * Embolic stroke involving left middle cerebral artery  85 y/o female with cardioembolic L MCA infarct 2/2 a fib (not compliant with anticoagulation at home. Patient was out of window for tPA, but when to IR for L M1 thrombectomy, TICI 3 flow restored.     Antithrombotics: Apixaban 2.5 mg BID    Statins: Lipitor 40    Aggressive risk factor modification: HTN, HLD, prior stroke, afib     Rehab efforts: The patient has been evaluated by a stroke team provider and the therapy needs have been fully considered based off the presenting complaints and exam findings. The following therapy evaluations are needed: PT evaluate and treat, OT evaluate and treat, SLP evaluate and treat, PM&R evaluate for appropriate placement- Recommending SNF    Diagnostics ordered/pending: None    VTE prophylaxis: Heparin 5000 units SQ every 8 hours , SCDs    BP parameters: hospital day 12 -normotensive , sbp last 24 hours 120-186      Debility  Patient was reported to be back to baseline and ambulating with walker prior to this hospitalization  Currently aphasic and not following commands, but able to TOSCANO spontaneously  PT/OT now recommending SNF or home with 24 hour support    Family is amenable to patient discharging to SNF.     Diabetes mellitus  A1C 5.9  Continue q6h glucose and SSI  Glucose within goal past 48 hrs       S/P percutaneous endoscopic gastrostomy (PEG) tube placement  PEG placed on 05/27 at 1730.    Site clean and dry.     Leukocytosis  Patient with intermittent elevation of WBC over past few days  UA collected w/ reflex - patient started on Amoxicillin BID X 7 days (end date 6/4)  WBC today 13 - trending down  Patient afebrile - continue to monitor     Paroxysmal atrial fibrillation  Found on last admit 5-2018.  Previously on Eliquis but ran out and not refilled. (Concerns of noncompliance due to  price)    Eliquis 2.5 mg started after PEG placement    Patient went into Afib with RVR on 05/27- metoprolol 12.5 mg BID started.     Dysphagia  SLP to evaluate and treat  High chance for aspiration PNA, will monitor for signs of infection or worsening respiratory status    05/29- TF started and currently at goal, tolerating well    Cytotoxic cerebral edema  Area of cytotoxic cerebral edema identified when reviewing brain imaging in the territory of the L middle cerebral artery. There is not mass effect associated with it. We will continue to monitor the patients clinical exam for any worsening of symptoms which may indicate expansion of the stroke or the area of the edema resulting in the clinical change. The pattern is suggestive of cardioembolic etiology        Dementia with behavioral disturbance  Continue home memantine  Delirium precautions    Seroquel 25 mg nightly started on 05/28. Patient doing well on nightly Seroquel. She is free of restraints     Mixed hyperlipidemia  Stroke risk factor  LDL 54, at goal  Continue home Lipitor 40    Essential hypertension  Stroke risk factor  SBP <180    Olmesartan 5 mg daily started on 5/28 - increased to 10 mg 5/31    Aphasia  Due to stroke  SLP to evaluate and treat   PEG in place          5/24 patient stepped down overnight to stroke unit, currently with NG tube in place, patient will likely need PEG tube placed, will attempt to discuss goals of care with family, need to restart anticoagulation but will hold for now until PEG decision is made  5/25 no acute events overnight. IR consulted. Work up complete. NPO and barium Sunday night. Plan for PEG Monday 5/27.   5/26 NAEON. Odorous urine reported by RN; UA w/ reflex ordered. NPO and barium at midnight for PEG placement tomorrow.   5/27: UA collected yesterday indicative of UTI, rocephin started.  Patient had afib with RVR today, metoprolol 12.5mg BID started. PEG placement pending.   5/28: PEG placed yesterday- site  clean and dry; will initiate TF and anticoagulation at 24 hour jessy. Nursing attempting to remove restraints, abdominal binder ordered to secure PEG. Patient's HR controlled adequately with metoprolol. Will discharge to NH, when restraint free for 72 hours.   5/29: Patient tolerating TF well; will be at goal by end of the day. Patient free of restraints as of 5am this morning.   5/30: NAEON. Patient more alert during exam today. TF at goal, patient tolerating well. She remains free of restraints.   5/31 - Restraint free since 5/29 @ 0500. Waiting on placement. [place] checked Eliquis - 3.[employer] for 30 day supply.    STROKE DOCUMENTATION   Acute Stroke Times   Last Known Normal Date: 05/20/19  Last Known Normal Time: 2030  Symptom Onset Date: 05/20/19  Symptom Onset Time: 2030  Stroke Team Called Date: 05/20/19  Stroke Team Called Time: 2338  Stroke Team Arrival Date: 05/21/19  Stroke Team Arrival Time: 0605  CT Interpretation Time: 2338    NIH Scale:  1a. Level of Consciousness: 2-->Not alert, requires repeated stimulation to attend, or is obtunded and requires strong or painful stimulation to make movements (not stereotyped)  1b. LOC Questions: 2-->Answers neither question correctly  1c. LOC Commands: 2-->Performs neither task correctly  2. Best Gaze: 1-->Partial gaze palsy, gaze is abnormal in one or both eyes, but forced deviation or total gaze paresis is not present  3. Visual: 2-->Complete hemianopia  4. Facial Palsy: 1-->Minor paralysis (flattened nasolabial fold, asymmetry on smiling)  5a. Motor Arm, Left: 0-->No drift, limb holds 90 (or 45) degrees for full 10 secs  5b. Motor Arm, Right: 1-->Drift, limb holds 90 (or 45) degrees, but drifts down before full 10 secs, does not hit bed or other support  6a. Motor Leg, Left: 0-->No drift, leg holds 30 degree position for full 5 secs  6b. Motor Leg, Right: 2-->Some effort against gravity, leg falls to bed by 5 secs, but has some effort against gravity  7. Limb Ataxia:  0-->Absent  8. Sensory: 0-->Normal, no sensory loss  9. Best Language: 3-->Mute, global aphasia, no usable speech or auditory comprehension  10. Dysarthria: 2-->Severe dysarthria, patients speech is so slurred as to be unintelligible in the absence of or out of proportion to any dysphasia, or is mute/anarthric  11. Extinction and Inattention (formerly Neglect): 0-->No abnormality  Total (NIH Stroke Scale): 18       Modified Johnston Score: 3  Ivonne Coma Scale:    ABCD2 Score:    [id number] Score:   HAS -BLED Score:   ICH Score:   Hunt & Burkett Classification:      Hemorrhagic change of an Ischemic Stroke: Does this patient have an ischemic stroke with hemorrhagic changes? No     Neurologic Chief Complaint: Aphasia    Subjective:     Interval History: Patient is seen for follow-up neurological assessment and treatment recommendations:    No acute changes or events overnight   WBC trending down     HPI, Past Medical, Family, and Social History remains the same as documented in the initial encounter.     Review of Systems   Constitutional: Positive for fatigue. Negative for chills and fever.   Eyes: Positive for visual disturbance.   Gastrointestinal: Negative for vomiting.   Neurological: Positive for facial asymmetry, speech difficulty and weakness.     Scheduled Meds:   albuterol-ipratropium  3 mL Nebulization Q8H    amoxicillin-pot clavulanate 250-62.5 mg/5ml  500 mg Per G Tube Q12H    apixaban  2.5 mg Per G Tube BID    atorvastatin  40 mg Per G Tube Daily    buPROPion  150 mg Per G Tube BID    cloNIDine  0.1 mg Per G Tube BID    memantine  10 mg Per G Tube BID    metoprolol  12.5 mg Per G Tube BID    olmesartan  10 mg Per G Tube Daily    QUEtiapine  25 mg Per G Tube QHS    senna-docusate 8.6-50 mg  1 tablet Per G Tube BID    sodium chloride 3%  4 mL Nebulization Q8H    tuberculin  5 Units Intradermal Once     Continuous Infusions:    PRN Meds:acetaminophen, dextrose 50%, glucagon (human recombinant),  hydrALAZINE, insulin aspart U-100, ondansetron, sodium chloride 0.9%, sodium chloride 0.9%    Objective:     Vital Signs (Most Recent):  Temp: 97.3 °F (36.3 °C) (06/02/19 0813)  Pulse: 64 (06/02/19 0813)  Resp: 12 (06/02/19 0808)  BP: (!) 186/73 (06/02/19 0813)  SpO2: 96 % (06/02/19 0813)  BP Location: Right arm    Vital Signs Range (Last 24H):  Temp:  [97.3 °F (36.3 °C)-97.8 °F (36.6 °C)]   Pulse:  [64-88]   Resp:  [12-18]   BP: (120-186)/(61-88)   SpO2:  [90 %-96 %]   BP Location: Right arm    Physical Exam   Constitutional: She appears well-developed and well-nourished. No distress.   HENT:   Head: Normocephalic and atraumatic.   Cardiovascular: Normal rate.   Pulmonary/Chest: Effort normal. No respiratory distress.   Skin: Skin is warm and dry.   Psychiatric: She is noncommunicative. She is inattentive.   Vitals reviewed.    Neurological Exam:   LOC: drowsy  Attention Span: poor  Language: Global aphasia  Articulation: mute   Orientation: Untestable due to severe aphasia   Visual Fields: Hemianopsia right  EOM (CN III, IV, VI): gaze left   Facial Movement (CN VII): Lower facial weakness on the Left  Motor: Arm left  Normal 5/5  Leg left  Normal 5/5  Arm right  Paresis: 4/5  Leg right Normal 3/5       Laboratory:  CMP:   No results for input(s): GLUCOSE, CALCIUM, ALBUMIN, PROT, NA, K, CO2, CL, BUN, CREATININE, ALKPHOS, ALT, AST, BILITOT in the last 24 hours.  BMP:   Recent Labs   Lab 05/31/19  0356      K 3.9      CO2 29   BUN 13   CREATININE 0.7   CALCIUM 9.4     CBC:   Recent Labs   Lab 06/02/19  0450   WBC 13.62*   RBC 2.67*   HGB 7.9*   HCT 25.3*      MCV 95   MCH 29.6   MCHC 31.2*     Lipid Panel:   No results for input(s): CHOL, LDLCALC, HDL, TRIG in the last 168 hours.  Coagulation:   No results for input(s): PT, INR, APTT in the last 168 hours.  Hgb A1C:   No results for input(s): HGBA1C in the last 168 hours.  TSH:   No results for input(s): TSH in the last 168 hours.    Diagnostic  Results     Brain Imaging     MRI 5/22  Evolving scattered areas of acute/recent infarction left MCA territory with new to increased component in the left inferior frontal gyrus now mild moderate in size.  There is no evidence for hemorrhagic conversion or significant mass effect.    Superimposed confluent T2 FLAIR signal abnormality elsewhere supra and infratentorial parenchyma while nonspecific concerning for advanced chronic microvascular ischemic change versus posttherapy change.    Remote basal gangliar, thalamic and cerebellar infarcts unchanged.    Compensatory enlargement of ventricular system similar to prior without hydrocephalus.    CTA Head and Neck 5/21  1. 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  The right vertebral artery is dominant and patent throughout its course in the neck.  2. There is calcified plaque in the common, internal and external carotid arteries bilaterally.  Stenosis on the right is mild at 50-69% and moderate on the left at approximately 70% without change.  CTA Head    1.  There is occlusion of the distal left M1 and proximal M2 segments of the middle cerebral artery which has occurred since the prior CTA head.  There is subtle increased density within the left middle cerebral artery at the distal M1 and proximal M2 segments on comparison head CT.  This is consistent with thrombus.    2.  The right anterior circulation is normal.    3.  There is developmental variation with fetal origin of the right posterior cerebral artery.    Cardiac Imaging   · Concentric left ventricular remodeling.  · Small left ventricular cavity size.  · Increased (hyperdynamic) left ventricular systolic function. The estimated ejection fraction is 75%  · Grade I (mild) left ventricular diastolic dysfunction consistent with impaired relaxation.  · Moderate left atrial enlargement.  · Mild-to-moderate aortic valve  stenosis.  · Aortic valve area is 1.43 cm2; peak velocity is 2.59 m/s; mean gradient is 15.51 mmHg.  · Mild mid-cavity left ventricular obstruction is present.  · Normal left atrial pressure.  · Mild mitral stenosis.      ERIC Aguilar  [facility]  Department of Vascular Neurology   [facility]

## 2019-06-02 NOTE — SUBJECTIVE & OBJECTIVE
Neurologic Chief Complaint: Aphasia    Subjective:     Interval History: Patient is seen for follow-up neurological assessment and treatment recommendations:    No acute changes or events overnight   WBC trending down     HPI, Past Medical, Family, and Social History remains the same as documented in the initial encounter.     Review of Systems   Constitutional: Positive for fatigue. Negative for chills and fever.   Eyes: Positive for visual disturbance.   Gastrointestinal: Negative for vomiting.   Neurological: Positive for facial asymmetry, speech difficulty and weakness.     Scheduled Meds:   albuterol-ipratropium  3 mL Nebulization Q8H    amoxicillin-pot clavulanate 250-62.5 mg/5ml  500 mg Per G Tube Q12H    apixaban  2.5 mg Per G Tube BID    atorvastatin  40 mg Per G Tube Daily    buPROPion  150 mg Per G Tube BID    cloNIDine  0.1 mg Per G Tube BID    memantine  10 mg Per G Tube BID    metoprolol  12.5 mg Per G Tube BID    olmesartan  10 mg Per G Tube Daily    QUEtiapine  25 mg Per G Tube QHS    senna-docusate 8.6-50 mg  1 tablet Per G Tube BID    sodium chloride 3%  4 mL Nebulization Q8H    tuberculin  5 Units Intradermal Once     Continuous Infusions:    PRN Meds:acetaminophen, dextrose 50%, glucagon (human recombinant), hydrALAZINE, insulin aspart U-100, ondansetron, sodium chloride 0.9%, sodium chloride 0.9%    Objective:     Vital Signs (Most Recent):  Temp: 97.3 °F (36.3 °C) (06/02/19 0813)  Pulse: 64 (06/02/19 0813)  Resp: 12 (06/02/19 0808)  BP: (!) 186/73 (06/02/19 0813)  SpO2: 96 % (06/02/19 0813)  BP Location: Right arm    Vital Signs Range (Last 24H):  Temp:  [97.3 °F (36.3 °C)-97.8 °F (36.6 °C)]   Pulse:  [64-88]   Resp:  [12-18]   BP: (120-186)/(61-88)   SpO2:  [90 %-96 %]   BP Location: Right arm    Physical Exam   Constitutional: She appears well-developed and well-nourished. No distress.   HENT:   Head: Normocephalic and atraumatic.   Cardiovascular: Normal rate.   Pulmonary/Chest:  Effort normal. No respiratory distress.   Skin: Skin is warm and dry.   Psychiatric: She is noncommunicative. She is inattentive.   Vitals reviewed.    Neurological Exam:   LOC: drowsy  Attention Span: poor  Language: Global aphasia  Articulation: mute   Orientation: Untestable due to severe aphasia   Visual Fields: Hemianopsia right  EOM (CN III, IV, VI): gaze left   Facial Movement (CN VII): Lower facial weakness on the Left  Motor: Arm left  Normal 5/5  Leg left  Normal 5/5  Arm right  Paresis: 4/5  Leg right Normal 3/5       Laboratory:  CMP:   No results for input(s): GLUCOSE, CALCIUM, ALBUMIN, PROT, NA, K, CO2, CL, BUN, CREATININE, ALKPHOS, ALT, AST, BILITOT in the last 24 hours.  BMP:   Recent Labs   Lab 05/31/19  0356      K 3.9      CO2 29   BUN 13   CREATININE 0.7   CALCIUM 9.4     CBC:   Recent Labs   Lab 06/02/19  0450   WBC 13.62*   RBC 2.67*   HGB 7.9*   HCT 25.3*      MCV 95   MCH 29.6   MCHC 31.2*     Lipid Panel:   No results for input(s): CHOL, LDLCALC, HDL, TRIG in the last 168 hours.  Coagulation:   No results for input(s): PT, INR, APTT in the last 168 hours.  Hgb A1C:   No results for input(s): HGBA1C in the last 168 hours.  TSH:   No results for input(s): TSH in the last 168 hours.    Diagnostic Results     Brain Imaging     MRI 5/22  Evolving scattered areas of acute/recent infarction left MCA territory with new to increased component in the left inferior frontal gyrus now mild moderate in size.  There is no evidence for hemorrhagic conversion or significant mass effect.    Superimposed confluent T2 FLAIR signal abnormality elsewhere supra and infratentorial parenchyma while nonspecific concerning for advanced chronic microvascular ischemic change versus posttherapy change.    Remote basal gangliar, thalamic and cerebellar infarcts unchanged.    Compensatory enlargement of ventricular system similar to prior without hydrocephalus.    CTA Head and Neck 5/21  1. 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  The right vertebral artery is dominant and patent throughout its course in the neck.  2. There is calcified plaque in the common, internal and external carotid arteries bilaterally.  Stenosis on the right is mild at 50-69% and moderate on the left at approximately 70% without change.  CTA Head    1.  There is occlusion of the distal left M1 and proximal M2 segments of the middle cerebral artery which has occurred since the prior CTA head.  There is subtle increased density within the left middle cerebral artery at the distal M1 and proximal M2 segments on comparison head CT.  This is consistent with thrombus.    2.  The right anterior circulation is normal.    3.  There is developmental variation with fetal origin of the right posterior cerebral artery.    Cardiac Imaging   · Concentric left ventricular remodeling.  · Small left ventricular cavity size.  · Increased (hyperdynamic) left ventricular systolic function. The estimated ejection fraction is 75%  · Grade I (mild) left ventricular diastolic dysfunction consistent with impaired relaxation.  · Moderate left atrial enlargement.  · Mild-to-moderate aortic valve stenosis.  · Aortic valve area is 1.43 cm2; peak velocity is 2.59 m/s; mean gradient is 15.51 mmHg.  · Mild mid-cavity left ventricular obstruction is present.  · Normal left atrial pressure.  · Mild mitral stenosis.

## 2019-06-03 LAB
BASOPHILS # BLD AUTO: 0.09 K/UL (ref 0–0.2)
BASOPHILS NFR BLD: 0.6 % (ref 0–1.9)
DIFFERENTIAL METHOD: ABNORMAL
EOSINOPHIL # BLD AUTO: 0.3 K/UL (ref 0–0.5)
EOSINOPHIL NFR BLD: 2.1 % (ref 0–8)
ERYTHROCYTE [DISTWIDTH] IN BLOOD BY AUTOMATED COUNT: 14.4 % (ref 11.5–14.5)
HCT VFR BLD AUTO: 25.9 % (ref 37–48.5)
HGB BLD-MCNC: 7.9 G/DL (ref 12–16)
IMM GRANULOCYTES # BLD AUTO: 0.16 K/UL (ref 0–0.04)
IMM GRANULOCYTES NFR BLD AUTO: 1.1 % (ref 0–0.5)
LYMPHOCYTES # BLD AUTO: 2.5 K/UL (ref 1–4.8)
LYMPHOCYTES NFR BLD: 16.9 % (ref 18–48)
MCH RBC QN AUTO: 29.6 PG (ref 27–31)
MCHC RBC AUTO-ENTMCNC: 30.5 G/DL (ref 32–36)
MCV RBC AUTO: 97 FL (ref 82–98)
MONOCYTES # BLD AUTO: 0.8 K/UL (ref 0.3–1)
MONOCYTES NFR BLD: 5.1 % (ref 4–15)
NEUTROPHILS # BLD AUTO: 11.2 K/UL (ref 1.8–7.7)
NEUTROPHILS NFR BLD: 74.2 % (ref 38–73)
NRBC BLD-RTO: 0 /100 WBC
PLATELET # BLD AUTO: 273 K/UL (ref 150–350)
PMV BLD AUTO: 11.1 FL (ref 9.2–12.9)
POCT GLUCOSE: 170 MG/DL (ref 70–110)
POCT GLUCOSE: 182 MG/DL (ref 70–110)
POCT GLUCOSE: 209 MG/DL (ref 70–110)
RBC # BLD AUTO: 2.67 M/UL (ref 4–5.4)
WBC # BLD AUTO: 14.32 K/UL (ref 3.9–12.7)
WBC # BLD AUTO: 15.03 K/UL (ref 3.9–12.7)

## 2019-06-03 PROCEDURE — 99233 PR SUBSEQUENT HOSPITAL CARE,LEVL III: ICD-10-PCS | Mod: ,,, | Performed by: PSYCHIATRY & NEUROLOGY

## 2019-06-03 PROCEDURE — 36415 COLL VENOUS BLD VENIPUNCTURE: CPT

## 2019-06-03 PROCEDURE — 99233 SBSQ HOSP IP/OBS HIGH 50: CPT | Mod: ,,, | Performed by: PSYCHIATRY & NEUROLOGY

## 2019-06-03 PROCEDURE — 25000003 PHARM REV CODE 250: Performed by: NURSE PRACTITIONER

## 2019-06-03 PROCEDURE — 94761 N-INVAS EAR/PLS OXIMETRY MLT: CPT

## 2019-06-03 PROCEDURE — 97530 THERAPEUTIC ACTIVITIES: CPT

## 2019-06-03 PROCEDURE — 25000242 PHARM REV CODE 250 ALT 637 W/ HCPCS: Performed by: PHYSICIAN ASSISTANT

## 2019-06-03 PROCEDURE — 85048 AUTOMATED LEUKOCYTE COUNT: CPT

## 2019-06-03 PROCEDURE — 94640 AIRWAY INHALATION TREATMENT: CPT

## 2019-06-03 PROCEDURE — 92526 ORAL FUNCTION THERAPY: CPT

## 2019-06-03 PROCEDURE — 85025 COMPLETE CBC W/AUTO DIFF WBC: CPT

## 2019-06-03 PROCEDURE — 20600001 HC STEP DOWN PRIVATE ROOM

## 2019-06-03 RX ORDER — SODIUM CHLORIDE 9 MG/ML
INJECTION, SOLUTION INTRAVENOUS CONTINUOUS
Status: ACTIVE | OUTPATIENT
Start: 2019-06-03 | End: 2019-06-03

## 2019-06-03 RX ADMIN — SODIUM CHLORIDE SOLN NEBU 3% 4 ML: 3 NEBU SOLN at 12:06

## 2019-06-03 RX ADMIN — Medication 12.5 MG: at 09:06

## 2019-06-03 RX ADMIN — APIXABAN 2.5 MG: 2.5 TABLET, FILM COATED ORAL at 09:06

## 2019-06-03 RX ADMIN — QUETIAPINE FUMARATE 25 MG: 25 TABLET ORAL at 09:06

## 2019-06-03 RX ADMIN — SODIUM CHLORIDE SOLN NEBU 3% 4 ML: 3 NEBU SOLN at 08:06

## 2019-06-03 RX ADMIN — SODIUM CHLORIDE SOLN NEBU 3% 4 ML: 3 NEBU SOLN at 03:06

## 2019-06-03 RX ADMIN — IPRATROPIUM BROMIDE AND ALBUTEROL SULFATE 3 ML: .5; 3 SOLUTION RESPIRATORY (INHALATION) at 08:06

## 2019-06-03 RX ADMIN — ATORVASTATIN CALCIUM 40 MG: 20 TABLET, FILM COATED ORAL at 09:06

## 2019-06-03 RX ADMIN — IPRATROPIUM BROMIDE AND ALBUTEROL SULFATE 3 ML: .5; 3 SOLUTION RESPIRATORY (INHALATION) at 12:06

## 2019-06-03 RX ADMIN — BUPROPION HYDROCHLORIDE 150 MG: 75 TABLET, FILM COATED ORAL at 09:06

## 2019-06-03 RX ADMIN — MEMANTINE 10 MG: 10 TABLET ORAL at 09:06

## 2019-06-03 RX ADMIN — OLMESARTAN MEDOXOMIL 10 MG: 5 TABLET, FILM COATED ORAL at 09:06

## 2019-06-03 RX ADMIN — IPRATROPIUM BROMIDE AND ALBUTEROL SULFATE 3 ML: .5; 3 SOLUTION RESPIRATORY (INHALATION) at 11:06

## 2019-06-03 RX ADMIN — CLONIDINE HYDROCHLORIDE 0.1 MG: 0.1 TABLET ORAL at 09:06

## 2019-06-03 RX ADMIN — SENNOSIDES,DOCUSATE SODIUM 1 TABLET: 8.6; 5 TABLET, FILM COATED ORAL at 09:06

## 2019-06-03 RX ADMIN — AMOXICILLIN AND CLAVULANATE POTASSIUM 500 MG: 250; 62.5 POWDER, FOR SUSPENSION ORAL at 09:06

## 2019-06-03 RX ADMIN — SODIUM CHLORIDE: 0.9 INJECTION, SOLUTION INTRAVENOUS at 10:06

## 2019-06-03 RX ADMIN — IPRATROPIUM BROMIDE AND ALBUTEROL SULFATE 3 ML: .5; 3 SOLUTION RESPIRATORY (INHALATION) at 03:06

## 2019-06-03 RX ADMIN — SODIUM CHLORIDE SOLN NEBU 3% 4 ML: 3 NEBU SOLN at 11:06

## 2019-06-03 NOTE — ASSESSMENT & PLAN NOTE
Patient with intermittent elevation of WBC over past few days  UA collected w/ reflex - patient started on Amoxicillin BID X 7 days (end date 6/4)  WBC today 15  Chest Xray ordered and no abnormality noted

## 2019-06-03 NOTE — PLAN OF CARE
Problem: SLP Goal  Goal: SLP Goal  Speech Language Pathology Goals  Goals expected to be met by 5/28:  1. Patient will participate in ongoing swallow assessment to determine least restrictive diet recommendations.   2. Patient will participate in full speech, language, and cognitive assessment when appropriate.        Limited progress toward goals.   Emily P. Abadie M.S., CCC-SLP  Speech Language Pathologist  [phone]  06/03/2019

## 2019-06-03 NOTE — PLAN OF CARE
CM spoke to Laz at [facility]-they are reviewing referral today. Updates have been sent in .     06/03/19 1111   Post-Acute Status   Post-Acute Authorization Placement   Post-Acute Placement Status Pending Post-Acute Clinical Review

## 2019-06-03 NOTE — PLAN OF CARE
Problem: Adult Inpatient Plan of Care  Goal: Plan of Care Review  Outcome: Ongoing (interventions implemented as appropriate)  Report from

## 2019-06-03 NOTE — PT/OT/SLP PROGRESS
"Physical Therapy Treatment    Patient Name:  Sury Cobos   MRN:  [id number]    Recommendations:     Discharge Recommendations:  nursing facility, skilled   Discharge Equipment Recommendations: (pressure relief equipment)   Barriers to discharge: Inaccessible home, Decreased caregiver support and level of assist required    Assessment:     Sury Cobos is a 84 y.o. female admitted with a medical diagnosis of Embolic stroke involving left middle cerebral artery.  She presents with the following impairments/functional limitations:  weakness, impaired endurance, gait instability, impaired functional mobilty, impaired self care skills, impaired balance, impaired cognition, decreased lower extremity function, decreased upper extremity function, decreased coordination, decreased safety awareness, decreased ROM, impaired coordination, impaired fine motor, impaired joint extensibility. The patient demonstrates significant spontaneous movement L>R extremities, L gaze preference, followed occasional simple motor commands. The patient demonstrates increased visual tracking, improvement in trunk control and balance in sitting edge of bed. Lateral scooting maximum assistance, patient resisting attempt to stand.    Rehab Prognosis: Fair; patient would benefit from acute skilled PT services to address these deficits and reach maximum level of function.    Recent Surgery: * No surgery found *      Plan:     During this hospitalization, patient to be seen 3 x/week to address the identified rehab impairments via therapeutic activities, therapeutic exercises, neuromuscular re-education and progress toward the following goals:    · Plan of Care Expires:  06/23/19    Subjective     Chief Complaint: unable to state  Patient/Family Comments/goals: unable to state  When asked why she is called "Big D", patient shrugged her shoulders, otherwise not nodding her head to questions.   Pain/Comfort:  · Pain Rating 1: 0/10      Objective: "     Communicated with RN prior to session.  Patient found HOB elevated with peripheral IV, telemetry, SCD, PEG Tube upon PT entry to room.     General Precautions: Standard, aphasia, aspiration, NPO, fall   Orthopedic Precautions:N/A   Braces: N/A     Functional Mobility:    Bed Mobility  Rolling to R and L: maximum assistance for pericare  Supine to Sit:  moderate assistance  Scoot laterally to head of bed: maximum assistance   Transfers Sit to Stand:  attempted with total assistance, patient resisting transfer with posterior weight shift     Sitting edge of bed 10 minutes, contact guard assist, occasional minimum assistance, weight shift posteriorly  -Posterior pelvic tilt, kyphotic posture, c/s rotation to L  -Visual/verbal/manual cues for midline orientation, thoracic and cervical extension        -Hand over hand placement of R hand in weightbearing               AM-PAC 6 CLICK MOBILITY  Turning over in bed (including adjusting bedclothes, sheets and blankets)?: 2  Sitting down on and standing up from a chair with arms (e.g., wheelchair, bedside commode, etc.): 1  Moving from lying on back to sitting on the side of the bed?: 2  Moving to and from a bed to a chair (including a wheelchair)?: 1  Need to walk in hospital room?: 1  Climbing 3-5 steps with a railing?: 1  Basic Mobility Total Score: 8       Therapeutic Activities and Exercises:   Patient educated on role of therapy, goals of session, benefits of out of bed mobility. Patient agreeable to mobilize with therapy.  Discussed PT plan of care during hospitalization. Patient educated that they need to call for assistance to mobilize out of bed. Whiteboard updated as appropriate. Patient educated on how their diagnosis impacts their mobility within PT scope of practice.   Performed AAROM bilateral lower extremity: hip/knee flex/ext (knee crepitus noted in extension L>R, lacking ~20 deg knee extension zaida), hip internal/external rotaion, ankle DF/PF; 10 reps  ea.    Patient left HOB elevated with all lines intact, call button in reach and bed alarm on..    GOALS:   Multidisciplinary Problems     Physical Therapy Goals        Problem: Physical Therapy Goal    Goal Priority Disciplines Outcome Goal Variances Interventions   Physical Therapy Goal     PT, PT/OT Ongoing (interventions implemented as appropriate)     Description:  Goals to be met by: 2019 (10 days)       Patient will increase functional independence with mobility by performin. Supine to sit with Moderate Assistance-Met  Supine to sit with minimum assistance   2. Sit to supine with Moderate Assistance  3. Rolling to Left and Right with Moderate Assistance.  4. Sit to stand transfer with Moderate Assistance  5. Bed to chair transfer with Maximum Assistance using squat pivot technique.   6. Sitting at edge of bed x15 minutes with Contact Guard Assistance and bilateral upper extremity support  7. Lower extremity exercise program x20 reps per handout, with assistance as needed to improve strength and increase activity tolerance.                        Time Tracking:     PT Received On: 19  PT Start Time: 1555     PT Stop Time: 1612  PT Total Time (min): 17 min     Billable Minutes: Therapeutic Activity 17    Treatment Type: Treatment  PT/PTA: PT     PTA Visit Number: 0     Soumya Rodrigues, PT  2019

## 2019-06-03 NOTE — PROGRESS NOTES
[facility]  Vascular Neurology  Comprehensive Stroke Center  Progress Note    Assessment/Plan:     * Embolic stroke involving left middle cerebral artery  83 y/o female with cardioembolic L MCA infarct 2/2 a fib (not compliant with anticoagulation at home. Patient was out of window for tPA, but when to IR for L M1 thrombectomy, TICI 3 flow restored.     Antithrombotics: Apixaban 2.5 mg BID    Statins: Lipitor 40    Aggressive risk factor modification: HTN, HLD, prior stroke, afib     Rehab efforts: The patient has been evaluated by a stroke team provider and the therapy needs have been fully considered based off the presenting complaints and exam findings. The following therapy evaluations are needed: PT evaluate and treat, OT evaluate and treat, SLP evaluate and treat, PM&R evaluate for appropriate placement- Recommending SNF    Diagnostics ordered/pending: None    VTE prophylaxis: Heparin 5000 units SQ every 8 hours , SCDs    BP parameters: hospital day 12 -normotensive goal     Debility  Patient was reported to be back to baseline and ambulating with walker prior to this hospitalization  Currently aphasic and not following commands, but able to TOSCANO spontaneously  PT/OT now recommending SNF or home with 24 hour support    Family is amenable to patient discharging to SNF.     Diabetes mellitus  A1C 5.9  Continue q6h glucose and SSI  Glucose within goal past 48 hrs       S/P percutaneous endoscopic gastrostomy (PEG) tube placement  PEG placed on 05/27 at 1730.    Site clean and dry.     Leukocytosis  Patient with intermittent elevation of WBC over past few days  UA collected w/ reflex - patient started on Amoxicillin BID X 7 days (end date 6/4)  WBC today 15  Chest Xray ordered and no abnormality noted       Paroxysmal atrial fibrillation  Found on last admit 5-2018.  Previously on Eliquis but ran out and not refilled. (Concerns of noncompliance due to price)    Eliquis 2.5 mg started after PEG  placement    Patient went into Afib with RVR on 05/27- metoprolol 12.5 mg BID started.     Dysphagia  SLP to evaluate and treat  High chance for aspiration PNA, will monitor for signs of infection or worsening respiratory status    05/29- TF started and currently at goal, tolerating well    Cytotoxic cerebral edema  Area of cytotoxic cerebral edema identified when reviewing brain imaging in the territory of the L middle cerebral artery. There is not mass effect associated with it. We will continue to monitor the patients clinical exam for any worsening of symptoms which may indicate expansion of the stroke or the area of the edema resulting in the clinical change. The pattern is suggestive of cardioembolic etiology        Dementia with behavioral disturbance  Continue home memantine  Delirium precautions    Seroquel 25 mg nightly started on 05/28. Patient doing well on nightly Seroquel. She is free of restraints     Mixed hyperlipidemia  Stroke risk factor  LDL 54, at goal  Continue home Lipitor 40    Essential hypertension  Stroke risk factor  SBP goal <180    Olmesartan 5 mg daily started on 5/28 - increased to 10 mg 5/31    Aphasia  Due to stroke  SLP to evaluate and treat   PEG in place          5/24 patient stepped down overnight to stroke unit, currently with NG tube in place, patient will likely need PEG tube placed, will attempt to discuss goals of care with family, need to restart anticoagulation but will hold for now until PEG decision is made  5/25 no acute events overnight. IR consulted. Work up complete. NPO and barium Sunday night. Plan for PEG Monday 5/27.   5/26 NAEON. Odorous urine reported by RN; UA w/ reflex ordered. NPO and barium at midnight for PEG placement tomorrow.   5/27: UA collected yesterday indicative of UTI, rocephin started.  Patient had afib with RVR today, metoprolol 12.5mg BID started. PEG placement pending.   5/28: PEG placed yesterday- site clean and dry; will initiate TF and  anticoagulation at 24 hour jessy. Nursing attempting to remove restraints, abdominal binder ordered to secure PEG. Patient's HR controlled adequately with metoprolol. Will discharge to NH, when restraint free for 72 hours.   5/29: Patient tolerating TF well; will be at goal by end of the day. Patient free of restraints as of 5am this morning.   5/30: NAEON. Patient more alert during exam today. TF at goal, patient tolerating well. She remains free of restraints.   5/31 - Restraint free since 5/29 @ 0500. Waiting on placement. [place] checked Eliquis - 3.80 for 30 day supply.  6/1 - NAEON. Waiting on placement.     STROKE DOCUMENTATION   Acute Stroke Times   Last Known Normal Date: 05/20/19  Last Known Normal Time: 2030  Symptom Onset Date: 05/20/19  Symptom Onset Time: 2030  Stroke Team Called Date: 05/20/19  Stroke Team Called Time: 2338  Stroke Team Arrival Date: 05/21/19  Stroke Team Arrival Time: 0605  CT Interpretation Time: 2338    NIH Scale:  1a. Level of Consciousness: 1-->Not alert, but arousable by minor stimulation to obey, answer, or respond  1b. LOC Questions: 2-->Answers neither question correctly  1c. LOC Commands: 2-->Performs neither task correctly  2. Best Gaze: 1-->Partial gaze palsy, gaze is abnormal in one or both eyes, but forced deviation or total gaze paresis is not present  3. Visual: 2-->Complete hemianopia  4. Facial Palsy: 1-->Minor paralysis (flattened nasolabial fold, asymmetry on smiling)  5a. Motor Arm, Left: 0-->No drift, limb holds 90 (or 45) degrees for full 10 secs  5b. Motor Arm, Right: 1-->Drift, limb holds 90 (or 45) degrees, but drifts down before full 10 secs, does not hit bed or other support  6a. Motor Leg, Left: 0-->No drift, leg holds 30 degree position for full 5 secs  6b. Motor Leg, Right: 2-->Some effort against gravity, leg falls to bed by 5 secs, but has some effort against gravity  7. Limb Ataxia: 0-->Absent  8. Sensory: 0-->Normal, no sensory loss  9. Best Language:  3-->Mute, global aphasia, no usable speech or auditory comprehension  10. Dysarthria: 2-->Severe dysarthria, patients speech is so slurred as to be unintelligible in the absence of or out of proportion to any dysphasia, or is mute/anarthric  11. Extinction and Inattention (formerly Neglect): 0-->No abnormality  Total (NIH Stroke Scale): 17       Modified Angelia Score: 3  Richmond Coma Scale:    ABCD2 Score:    [id number] Score:   HAS -BLED Score:   ICH Score:   Hunt & Burkett Classification:      Hemorrhagic change of an Ischemic Stroke: Does this patient have an ischemic stroke with hemorrhagic changes? No     Neurologic Chief Complaint: Aphasia    Subjective:     Interval History: Patient is seen for follow-up neurological assessment and treatment recommendations:    NAVIN. Waiting on placement.     HPI, Past Medical, Family, and Social History remains the same as documented in the initial encounter.     Review of Systems   Constitutional: Positive for fatigue. Negative for chills and fever.   Eyes: Positive for visual disturbance.   Gastrointestinal: Negative for vomiting.   Neurological: Positive for facial asymmetry, speech difficulty and weakness.     Scheduled Meds:   albuterol-ipratropium  3 mL Nebulization Q8H    amoxicillin-pot clavulanate 250-62.5 mg/5ml  500 mg Per G Tube Q12H    apixaban  2.5 mg Per G Tube BID    atorvastatin  40 mg Per G Tube Daily    buPROPion  150 mg Per G Tube BID    cloNIDine  0.1 mg Per G Tube BID    memantine  10 mg Per G Tube BID    metoprolol  12.5 mg Per G Tube BID    olmesartan  10 mg Per G Tube Daily    QUEtiapine  25 mg Per G Tube QHS    senna-docusate 8.6-50 mg  1 tablet Per G Tube BID    sodium chloride 3%  4 mL Nebulization Q8H    tuberculin  5 Units Intradermal Once     Continuous Infusions:   sodium chloride 0.9%       PRN Meds:acetaminophen, dextrose 50%, glucagon (human recombinant), hydrALAZINE, insulin aspart U-100, ondansetron, sodium chloride 0.9%,  sodium chloride 0.9%    Objective:     Vital Signs (Most Recent):  Temp: 97 °F (36.1 °C) (06/03/19 0841)  Pulse: 77 (06/03/19 0841)  Resp: 18 (06/03/19 0841)  BP: 131/78 (06/03/19 0841)  SpO2: 96 % (06/03/19 0841)  BP Location: Right arm    Vital Signs Range (Last 24H):  Temp:  [96.7 °F (35.9 °C)-98.3 °F (36.8 °C)]   Pulse:  [67-82]   Resp:  [12-18]   BP: (111-186)/(61-94)   SpO2:  [93 %-100 %]   BP Location: Right arm    Physical Exam   Constitutional: She appears well-developed and well-nourished. No distress.   HENT:   Head: Normocephalic and atraumatic.   Cardiovascular: Normal rate.   Pulmonary/Chest: Effort normal. No respiratory distress.   Skin: Skin is warm and dry.   Psychiatric: She is noncommunicative. She is inattentive.   Vitals reviewed.    Neurological Exam:   LOC: drowsy  Attention Span: poor  Language: Global aphasia  Articulation: mute   Orientation: Untestable due to severe aphasia   Visual Fields: Hemianopsia right  EOM (CN III, IV, VI): gaze left   Facial Movement (CN VII): Lower facial weakness on the Left  Motor: Arm left  Normal 5/5  Leg left  Normal 5/5  Arm right  Paresis: 4/5  Leg right Normal 3/5       Laboratory:  CMP:   No results for input(s): GLUCOSE, CALCIUM, ALBUMIN, PROT, NA, K, CO2, CL, BUN, CREATININE, ALKPHOS, ALT, AST, BILITOT in the last 24 hours.  BMP:   Recent Labs   Lab 05/31/19  0356      K 3.9      CO2 29   BUN 13   CREATININE 0.7   CALCIUM 9.4     CBC:   Recent Labs   Lab 06/03/19  0351   WBC 15.03*   RBC 2.67*   HGB 7.9*   HCT 25.9*      MCV 97   MCH 29.6   MCHC 30.5*     Lipid Panel:   No results for input(s): CHOL, LDLCALC, HDL, TRIG in the last 168 hours.  Coagulation:   No results for input(s): PT, INR, APTT in the last 168 hours.  Hgb A1C:   No results for input(s): HGBA1C in the last 168 hours.  TSH:   No results for input(s): TSH in the last 168 hours.    Diagnostic Results     Brain Imaging     MRI 5/22  Evolving scattered areas of  acute/recent infarction left MCA territory with new to increased component in the left inferior frontal gyrus now mild moderate in size.  There is no evidence for hemorrhagic conversion or significant mass effect.    Superimposed confluent T2 FLAIR signal abnormality elsewhere supra and infratentorial parenchyma while nonspecific concerning for advanced chronic microvascular ischemic change versus posttherapy change.    Remote basal gangliar, thalamic and cerebellar infarcts unchanged.    Compensatory enlargement of ventricular system similar to prior without hydrocephalus.    CTA Head and Neck 5/21  1. 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  The right vertebral artery is dominant and patent throughout its course in the neck.  2. There is calcified plaque in the common, internal and external carotid arteries bilaterally.  Stenosis on the right is mild at 50-69% and moderate on the left at approximately 70% without change.  CTA Head    1.  There is occlusion of the distal left M1 and proximal M2 segments of the middle cerebral artery which has occurred since the prior CTA head.  There is subtle increased density within the left middle cerebral artery at the distal M1 and proximal M2 segments on comparison head CT.  This is consistent with thrombus.    2.  The right anterior circulation is normal.    3.  There is developmental variation with fetal origin of the right posterior cerebral artery.    Cardiac Imaging   · Concentric left ventricular remodeling.  · Small left ventricular cavity size.  · Increased (hyperdynamic) left ventricular systolic function. The estimated ejection fraction is 75%  · Grade I (mild) left ventricular diastolic dysfunction consistent with impaired relaxation.  · Moderate left atrial enlargement.  · Mild-to-moderate aortic valve stenosis.  · Aortic valve area is 1.43 cm2; peak velocity is 2.59 m/s;  mean gradient is 15.51 mmHg.  · Mild mid-cavity left ventricular obstruction is present.  · Normal left atrial pressure.  · Mild mitral stenosis.      Leah Fan NP  [facility]  Department of Vascular Neurology   [facility]

## 2019-06-03 NOTE — SUBJECTIVE & OBJECTIVE
Neurologic Chief Complaint: Aphasia    Subjective:     Interval History: Patient is seen for follow-up neurological assessment and treatment recommendations:    NAVIN. Waiting on placement.     HPI, Past Medical, Family, and Social History remains the same as documented in the initial encounter.     Review of Systems   Constitutional: Positive for fatigue. Negative for chills and fever.   Eyes: Positive for visual disturbance.   Gastrointestinal: Negative for vomiting.   Neurological: Positive for facial asymmetry, speech difficulty and weakness.     Scheduled Meds:   albuterol-ipratropium  3 mL Nebulization Q8H    amoxicillin-pot clavulanate 250-62.5 mg/5ml  500 mg Per G Tube Q12H    apixaban  2.5 mg Per G Tube BID    atorvastatin  40 mg Per G Tube Daily    buPROPion  150 mg Per G Tube BID    cloNIDine  0.1 mg Per G Tube BID    memantine  10 mg Per G Tube BID    metoprolol  12.5 mg Per G Tube BID    olmesartan  10 mg Per G Tube Daily    QUEtiapine  25 mg Per G Tube QHS    senna-docusate 8.6-50 mg  1 tablet Per G Tube BID    sodium chloride 3%  4 mL Nebulization Q8H    tuberculin  5 Units Intradermal Once     Continuous Infusions:   sodium chloride 0.9%       PRN Meds:acetaminophen, dextrose 50%, glucagon (human recombinant), hydrALAZINE, insulin aspart U-100, ondansetron, sodium chloride 0.9%, sodium chloride 0.9%    Objective:     Vital Signs (Most Recent):  Temp: 97 °F (36.1 °C) (06/03/19 0841)  Pulse: 77 (06/03/19 0841)  Resp: 18 (06/03/19 0841)  BP: 131/78 (06/03/19 0841)  SpO2: 96 % (06/03/19 0841)  BP Location: Right arm    Vital Signs Range (Last 24H):  Temp:  [96.7 °F (35.9 °C)-98.3 °F (36.8 °C)]   Pulse:  [67-82]   Resp:  [12-18]   BP: (111-186)/(61-94)   SpO2:  [93 %-100 %]   BP Location: Right arm    Physical Exam   Constitutional: She appears well-developed and well-nourished. No distress.   HENT:   Head: Normocephalic and atraumatic.   Cardiovascular: Normal rate.   Pulmonary/Chest:  Effort normal. No respiratory distress.   Skin: Skin is warm and dry.   Psychiatric: She is noncommunicative. She is inattentive.   Vitals reviewed.    Neurological Exam:   LOC: drowsy  Attention Span: poor  Language: Global aphasia  Articulation: mute   Orientation: Untestable due to severe aphasia   Visual Fields: Hemianopsia right  EOM (CN III, IV, VI): gaze left   Facial Movement (CN VII): Lower facial weakness on the Left  Motor: Arm left  Normal 5/5  Leg left  Normal 5/5  Arm right  Paresis: 4/5  Leg right Normal 3/5       Laboratory:  CMP:   No results for input(s): GLUCOSE, CALCIUM, ALBUMIN, PROT, NA, K, CO2, CL, BUN, CREATININE, ALKPHOS, ALT, AST, BILITOT in the last 24 hours.  BMP:   Recent Labs   Lab 05/31/19  0356      K 3.9      CO2 29   BUN 13   CREATININE 0.7   CALCIUM 9.4     CBC:   Recent Labs   Lab 06/03/19  0351   WBC 15.03*   RBC 2.67*   HGB 7.9*   HCT 25.9*      MCV 97   MCH 29.6   MCHC 30.5*     Lipid Panel:   No results for input(s): CHOL, LDLCALC, HDL, TRIG in the last 168 hours.  Coagulation:   No results for input(s): PT, INR, APTT in the last 168 hours.  Hgb A1C:   No results for input(s): HGBA1C in the last 168 hours.  TSH:   No results for input(s): TSH in the last 168 hours.    Diagnostic Results     Brain Imaging     MRI 5/22  Evolving scattered areas of acute/recent infarction left MCA territory with new to increased component in the left inferior frontal gyrus now mild moderate in size.  There is no evidence for hemorrhagic conversion or significant mass effect.    Superimposed confluent T2 FLAIR signal abnormality elsewhere supra and infratentorial parenchyma while nonspecific concerning for advanced chronic microvascular ischemic change versus posttherapy change.    Remote basal gangliar, thalamic and cerebellar infarcts unchanged.    Compensatory enlargement of ventricular system similar to prior without hydrocephalus.    CTA Head and Neck 5/21  1. 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  The right vertebral artery is dominant and patent throughout its course in the neck.  2. There is calcified plaque in the common, internal and external carotid arteries bilaterally.  Stenosis on the right is mild at 50-69% and moderate on the left at approximately 70% without change.  CTA Head    1.  There is occlusion of the distal left M1 and proximal M2 segments of the middle cerebral artery which has occurred since the prior CTA head.  There is subtle increased density within the left middle cerebral artery at the distal M1 and proximal M2 segments on comparison head CT.  This is consistent with thrombus.    2.  The right anterior circulation is normal.    3.  There is developmental variation with fetal origin of the right posterior cerebral artery.    Cardiac Imaging   · Concentric left ventricular remodeling.  · Small left ventricular cavity size.  · Increased (hyperdynamic) left ventricular systolic function. The estimated ejection fraction is 75%  · Grade I (mild) left ventricular diastolic dysfunction consistent with impaired relaxation.  · Moderate left atrial enlargement.  · Mild-to-moderate aortic valve stenosis.  · Aortic valve area is 1.43 cm2; peak velocity is 2.59 m/s; mean gradient is 15.51 mmHg.  · Mild mid-cavity left ventricular obstruction is present.  · Normal left atrial pressure.  · Mild mitral stenosis.

## 2019-06-03 NOTE — ASSESSMENT & PLAN NOTE
83 y/o female with cardioembolic L MCA infarct 2/2 a fib (not compliant with anticoagulation at home. Patient was out of window for tPA, but when to IR for L M1 thrombectomy, TICI 3 flow restored.     Antithrombotics: Apixaban 2.5 mg BID    Statins: Lipitor 40    Aggressive risk factor modification: HTN, HLD, prior stroke, afib     Rehab efforts: The patient has been evaluated by a stroke team provider and the therapy needs have been fully considered based off the presenting complaints and exam findings. The following therapy evaluations are needed: PT evaluate and treat, OT evaluate and treat, SLP evaluate and treat, PM&R evaluate for appropriate placement- Recommending SNF    Diagnostics ordered/pending: None    VTE prophylaxis: Heparin 5000 units SQ every 8 hours , SCDs    BP parameters: hospital day 12 -normotensive goal

## 2019-06-03 NOTE — PLAN OF CARE
Problem: Adult Inpatient Plan of Care  Goal: Plan of Care Review  Patient initially only responded to touch/light shaking without any eye opening later @ 1308 neuro-assessment patient alert with spontaneous eye opening.  Calm and cooperative with appropriate affect.  No acute distress noted, respirations even and unlabored.  Plan of care reviewed with patient.  Due to global aphasia reinforcement needed.  Incontinent x2; no bowel movement noted this shift, incontinent care performed as needed throughout shift with q2h turns. NPO maintained with continuous tube feedings going at 48ml/hr.  No signs/symptoms noted of pain.   No change in neuro assessment and vitals remained within parameters post medication administration (see flowsheet).  Safety precautions maintained.  Will continue to monitor for any changes this shift and report to oncoming tour.

## 2019-06-03 NOTE — PT/OT/SLP PROGRESS
Speech Language Pathology Treatment    Patient Name:  Sury Cobos   MRN:  [id number]  Admitting Diagnosis: Embolic stroke involving left middle cerebral artery    Recommendations:      General Recommendations:  Dysphagia therapy and Speech/language therapy  Diet recommendations:  NPO, Liquid Diet Level: NPO   Aspiration Precautions: Strict aspiration precautions   General Precautions: Standard, aphasia, aspiration, fall, NPO  Communication strategies:  yes/no questions only and provide increased time to answer               Subjective     Pt asleep, no family members present.     Pain/Comfort:  Pain Rating 1: 0/10    Objective:     Has the patient been evaluated by SLP for swallowing?   Yes  Keep patient NPO? Yes   Current Respiratory Status: room air       Upon ST entry to room, patient with poor positioning in bed. Positioned optimally for session with HOB elevation. Oral care provided prior to PO trials - coughing noted during oral care.  ST presented puree trials via tsp dipped in chocolate pudding along with ice chip to lip. Patient with no attempt to accept or manipulate trial despite max A. Patient inconsistently grunting in response to ST.   Patient did not attempt to ID objects in a field choice of 2 despite max A and tactile stim. No response elicited despite max A during simple yes/no questions or auto speech tasks.  Cont ST POC.     Assessment:     Sury Cobos is a 84 y.o. female with an SLP diagnosis of Aphasia and Dysphagia. Limited progress toward goals.     Goals:   Multidisciplinary Problems     SLP Goals        Problem: SLP Goal    Goal Priority Disciplines Outcome   SLP Goal     SLP Ongoing (interventions implemented as appropriate)   Description:  Speech Language Pathology Goals  Goals expected to be met by 5/28:  1. Patient will participate in ongoing swallow assessment to determine least restrictive diet recommendations.   2. Patient will participate in full speech, language, and cognitive  assessment when appropriate.                         Plan:     · Patient to be seen:  3 x/week   · Plan of Care expires:  06/21/19  · Plan of Care reviewed with:  patient   · SLP Follow-Up:  Yes       Discharge recommendations:  nursing facility, skilled   Barriers to Discharge:  None    Time Tracking:     SLP Treatment Date:   06/03/19  Speech Start Time:  1035  Speech Stop Time:  1043     Speech Total Time (min):  8 min    Billable Minutes: Speech Therapy Individual 8 and Treatment Swallowing Dysfunction 8    Emily Abadie, CCC-SLP  06/03/2019

## 2019-06-03 NOTE — PLAN OF CARE
Problem: Physical Therapy Goal  Goal: Physical Therapy Goal  Goals to be met by: 2019 (10 days)       Patient will increase functional independence with mobility by performin. Supine to sit with Moderate Assistance-Met  Supine to sit with minimum assistance   2. Sit to supine with Moderate Assistance  3. Rolling to Left and Right with Moderate Assistance.  4. Sit to stand transfer with Moderate Assistance  5. Bed to chair transfer with Maximum Assistance using squat pivot technique.   6. Sitting at edge of bed x15 minutes with Contact Guard Assistance and bilateral upper extremity support  7. Lower extremity exercise program x20 reps per handout, with assistance as needed to improve strength and increase activity tolerance.      Outcome: Ongoing (interventions implemented as appropriate)  Updated plan of care to reflect patient's progress. Continue with plan of care.   Soumya Rodrigues, PT  6/3/2019

## 2019-06-03 NOTE — ASSESSMENT & PLAN NOTE
Stroke risk factor  SBP goal <180    Olmesartan 5 mg daily started on 5/28 - increased to 10 mg 5/31

## 2019-06-04 LAB
POCT GLUCOSE: 133 MG/DL (ref 70–110)
POCT GLUCOSE: 161 MG/DL (ref 70–110)
POCT GLUCOSE: 162 MG/DL (ref 70–110)
POCT GLUCOSE: 184 MG/DL (ref 70–110)

## 2019-06-04 PROCEDURE — 97112 NEUROMUSCULAR REEDUCATION: CPT

## 2019-06-04 PROCEDURE — 94640 AIRWAY INHALATION TREATMENT: CPT

## 2019-06-04 PROCEDURE — 25000003 PHARM REV CODE 250: Performed by: NURSE PRACTITIONER

## 2019-06-04 PROCEDURE — 25000242 PHARM REV CODE 250 ALT 637 W/ HCPCS: Performed by: PHYSICIAN ASSISTANT

## 2019-06-04 PROCEDURE — 94761 N-INVAS EAR/PLS OXIMETRY MLT: CPT

## 2019-06-04 PROCEDURE — 97530 THERAPEUTIC ACTIVITIES: CPT

## 2019-06-04 PROCEDURE — 20600001 HC STEP DOWN PRIVATE ROOM

## 2019-06-04 RX ADMIN — SODIUM CHLORIDE SOLN NEBU 3% 4 ML: 3 NEBU SOLN at 04:06

## 2019-06-04 RX ADMIN — SODIUM CHLORIDE SOLN NEBU 3% 4 ML: 3 NEBU SOLN at 08:06

## 2019-06-04 RX ADMIN — CLONIDINE HYDROCHLORIDE 0.1 MG: 0.1 TABLET ORAL at 08:06

## 2019-06-04 RX ADMIN — ATORVASTATIN CALCIUM 40 MG: 20 TABLET, FILM COATED ORAL at 08:06

## 2019-06-04 RX ADMIN — IPRATROPIUM BROMIDE AND ALBUTEROL SULFATE 3 ML: .5; 3 SOLUTION RESPIRATORY (INHALATION) at 11:06

## 2019-06-04 RX ADMIN — MEMANTINE 10 MG: 10 TABLET ORAL at 08:06

## 2019-06-04 RX ADMIN — IPRATROPIUM BROMIDE AND ALBUTEROL SULFATE 3 ML: .5; 3 SOLUTION RESPIRATORY (INHALATION) at 08:06

## 2019-06-04 RX ADMIN — BUPROPION HYDROCHLORIDE 150 MG: 75 TABLET, FILM COATED ORAL at 09:06

## 2019-06-04 RX ADMIN — BUPROPION HYDROCHLORIDE 150 MG: 75 TABLET, FILM COATED ORAL at 08:06

## 2019-06-04 RX ADMIN — APIXABAN 2.5 MG: 2.5 TABLET, FILM COATED ORAL at 09:06

## 2019-06-04 RX ADMIN — CLONIDINE HYDROCHLORIDE 0.1 MG: 0.1 TABLET ORAL at 09:06

## 2019-06-04 RX ADMIN — MEMANTINE 10 MG: 10 TABLET ORAL at 09:06

## 2019-06-04 RX ADMIN — OLMESARTAN MEDOXOMIL 10 MG: 5 TABLET, FILM COATED ORAL at 08:06

## 2019-06-04 RX ADMIN — SODIUM CHLORIDE SOLN NEBU 3% 4 ML: 3 NEBU SOLN at 11:06

## 2019-06-04 RX ADMIN — Medication 12.5 MG: at 08:06

## 2019-06-04 RX ADMIN — APIXABAN 2.5 MG: 2.5 TABLET, FILM COATED ORAL at 08:06

## 2019-06-04 RX ADMIN — Medication 12.5 MG: at 09:06

## 2019-06-04 RX ADMIN — QUETIAPINE FUMARATE 25 MG: 25 TABLET ORAL at 09:06

## 2019-06-04 RX ADMIN — IPRATROPIUM BROMIDE AND ALBUTEROL SULFATE 3 ML: .5; 3 SOLUTION RESPIRATORY (INHALATION) at 04:06

## 2019-06-04 NOTE — SUBJECTIVE & OBJECTIVE
Neurologic Chief Complaint: Aphasia    Subjective:     Interval History: Patient is seen for follow-up neurological assessment and treatment recommendations:    Neurologically stable. WBC trending down. Completed course of antibiotics. Should repeat CBC in 1 week @ SNF (6/10)..     HPI, Past Medical, Family, and Social History remains the same as documented in the initial encounter.     Review of Systems   Constitutional: Positive for fatigue. Negative for chills and fever.   Eyes: Positive for visual disturbance.   Gastrointestinal: Negative for vomiting.   Neurological: Positive for facial asymmetry, speech difficulty and weakness.     Scheduled Meds:   albuterol-ipratropium  3 mL Nebulization Q8H    apixaban  2.5 mg Per G Tube BID    atorvastatin  40 mg Per G Tube Daily    buPROPion  150 mg Per G Tube BID    cloNIDine  0.1 mg Per G Tube BID    memantine  10 mg Per G Tube BID    metoprolol  12.5 mg Per G Tube BID    olmesartan  10 mg Per G Tube Daily    QUEtiapine  25 mg Per G Tube QHS    senna-docusate 8.6-50 mg  1 tablet Per G Tube BID    sodium chloride 3%  4 mL Nebulization Q8H    tuberculin  5 Units Intradermal Once     Continuous Infusions:    PRN Meds:acetaminophen, dextrose 50%, glucagon (human recombinant), hydrALAZINE, insulin aspart U-100, ondansetron, sodium chloride 0.9%, sodium chloride 0.9%    Objective:     Vital Signs (Most Recent):  Temp: 98.1 °F (36.7 °C) (06/04/19 0438)  Pulse: 77 (06/04/19 0837)  Resp: 18 (06/04/19 0837)  BP: (!) 149/88 (06/04/19 0438)  SpO2: 97 % (06/04/19 0837)  BP Location: Right arm    Vital Signs Range (Last 24H):  Temp:  [97.6 °F (36.4 °C)-98.1 °F (36.7 °C)]   Pulse:  [63-78]   Resp:  [13-18]   BP: (141-170)/(63-88)   SpO2:  [93 %-98 %]   BP Location: Right arm    Physical Exam   Constitutional: She appears well-developed and well-nourished. No distress.   HENT:   Head: Normocephalic and atraumatic.   Cardiovascular: Normal rate.   Pulmonary/Chest: Effort  normal. No respiratory distress.   Skin: Skin is warm and dry.   Psychiatric: She is noncommunicative. She is inattentive.   Vitals reviewed.    Neurological Exam:   LOC: drowsy  Attention Span: poor  Language: Global aphasia  Articulation: mute   Orientation: Untestable due to severe aphasia   Visual Fields: Hemianopsia right  EOM (CN III, IV, VI): gaze left   Facial Movement (CN VII): Lower facial weakness on the Left  Motor: Arm left  Normal 5/5  Leg left  Normal 5/5  Arm right  Paresis: 4/5  Leg right Normal 3/5       Laboratory:  CMP:   No results for input(s): GLUCOSE, CALCIUM, ALBUMIN, PROT, NA, K, CO2, CL, BUN, CREATININE, ALKPHOS, ALT, AST, BILITOT in the last 24 hours.  BMP:   Recent Labs   Lab 05/31/19  0356      K 3.9      CO2 29   BUN 13   CREATININE 0.7   CALCIUM 9.4     CBC:   Recent Labs   Lab 06/03/19  0351 06/03/19  1631   WBC 15.03* 14.32*   RBC 2.67*  --    HGB 7.9*  --    HCT 25.9*  --      --    MCV 97  --    MCH 29.6  --    MCHC 30.5*  --      Lipid Panel:   No results for input(s): CHOL, LDLCALC, HDL, TRIG in the last 168 hours.  Coagulation:   No results for input(s): PT, INR, APTT in the last 168 hours.  Hgb A1C:   No results for input(s): HGBA1C in the last 168 hours.  TSH:   No results for input(s): TSH in the last 168 hours.    Diagnostic Results     Brain Imaging     MRI 5/22  Evolving scattered areas of acute/recent infarction left MCA territory with new to increased component in the left inferior frontal gyrus now mild moderate in size.  There is no evidence for hemorrhagic conversion or significant mass effect.    Superimposed confluent T2 FLAIR signal abnormality elsewhere supra and infratentorial parenchyma while nonspecific concerning for advanced chronic microvascular ischemic change versus posttherapy change.    Remote basal gangliar, thalamic and cerebellar infarcts unchanged.    Compensatory enlargement of ventricular system similar to prior without  hydrocephalus.      CTA Head and Neck 5/21  1. 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  The right vertebral artery is dominant and patent throughout its course in the neck.  2. There is calcified plaque in the common, internal and external carotid arteries bilaterally.  Stenosis on the right is mild at 50-69% and moderate on the left at approximately 70% without change.  CTA Head    1.  There is occlusion of the distal left M1 and proximal M2 segments of the middle cerebral artery which has occurred since the prior CTA head.  There is subtle increased density within the left middle cerebral artery at the distal M1 and proximal M2 segments on comparison head CT.  This is consistent with thrombus.    2.  The right anterior circulation is normal.    3.  There is developmental variation with fetal origin of the right posterior cerebral artery.    Cardiac Imaging   · Concentric left ventricular remodeling.  · Small left ventricular cavity size.  · Increased (hyperdynamic) left ventricular systolic function. The estimated ejection fraction is 75%  · Grade I (mild) left ventricular diastolic dysfunction consistent with impaired relaxation.  · Moderate left atrial enlargement.  · Mild-to-moderate aortic valve stenosis.  · Aortic valve area is 1.43 cm2; peak velocity is 2.59 m/s; mean gradient is 15.51 mmHg.  · Mild mid-cavity left ventricular obstruction is present.  · Normal left atrial pressure.  · Mild mitral stenosis.

## 2019-06-04 NOTE — PLAN OF CARE
Problem: Occupational Therapy Goal  Goal: Occupational Therapy Goal  Goals to be met by: 6/6/2019     Patient will increase functional independence with ADLs by performing:    Pt will engage in functional task x2 min duration with 0 added cues/facilitation.  Sitting at edge of bed x10 minutes for functional task with Minimal Assistance for postural control. MET; not met 5/31  Rolling to Bilateral with Minimal Assistance and use of bedrail as needed.   Supine to sit with Moderate Assistance.  Stand pivot transfers with Moderate Assistance.  CG demo understanding for s/s of stroke.   CG demo understanding for B UE positioning and ROM exercises while pt in supine.   CG demo understanding for pressure relief techniques.        Outcome: Ongoing (interventions implemented as appropriate)  Continue OT POC.  Elisha Cota OT  6/4/2019

## 2019-06-04 NOTE — PT/OT/SLP PROGRESS
Occupational Therapy   Treatment    Name: Sury Cobos  MRN: [id number]  Admitting Diagnosis:  Embolic stroke involving left middle cerebral artery       Recommendations:     Discharge Recommendations: nursing facility, skilled  Discharge Equipment Recommendations:  other (see comments)(pressure relief equipment )  Barriers to discharge:       Assessment:     Sury Cobos is a 84 y.o. female with a medical diagnosis of Embolic stroke involving left middle cerebral artery.  She presents with impaired ADL and functional mobility performance as limited by decreased endurance, impaired cognition, and RUE/RLE hemiparesis. Pt lethargic this morning with sternal rub performed as well as face washing with cold cloth for stimulation. Pt was total (A) throughout session tolerating EOB ~10 min with associated UE PROM and given simple, 1 step commands. Pt unable to visually track with either eyes with still preferred L sided gaze however attempted to stick out tongue and smile. Zo Ng present and given demo and education regarding PROM and positioning for pt. Performance deficits affecting function are weakness, impaired endurance, impaired self care skills, impaired functional mobilty, impaired balance, visual deficits, impaired cognition, gait instability, decreased lower extremity function, decreased upper extremity function, decreased coordination, decreased safety awareness, decreased ROM, impaired fine motor, impaired skin, impaired joint extensibility, impaired muscle length. Pt would benefit from continued OT skilled services 3x/wk to improve daily living skills to optimize QOL. Pt is recommended to discharge to LT-SNF at this time.      Rehab Prognosis:  Fair; patient would benefit from acute skilled OT services to address these deficits and reach maximum level of function.       Plan:     Patient to be seen 3 x/week to address the above listed problems via self-care/home management, therapeutic activities,  therapeutic exercises, neuromuscular re-education, cognitive retraining, sensory integration  · Plan of Care Expires: 06/20/19  · Plan of Care Reviewed with: patient, other (see comments)(spencer Ng)    Subjective     Pain/Comfort:  · Pain Rating 1: 0/10    Objective:     Communicated with: RN prior to session.  Patient found HOB elevated with peripheral IV, telemetry, SCD, PEG Tube upon OT entry to room.    General Precautions: Standard, aspiration, aphasia, NPO, fall   Orthopedic Precautions:N/A   Braces: N/A     Occupational Performance:     Bed Mobility:    · Patient completed Rolling/Turning to Right with total assistance  · Patient completed Scooting/Bridging with total assistance  · Patient completed Supine to Sit with total assistance  · Patient completed Sit to Supine with total assistance     Activities of Daily Living:  · Grooming: total assistance with [place] attempted for washing face however pt unable to  with either hands d/t lethargy.       [facility] 6 Click ADL: 6    Treatment & Education:  Pt educated on role of occupational therapy, POC, and safety during ADLs and functional mobility. Pt unable to verbalize understanding d/t lethargy and pt with noted decline in speech. Pt's niece and OT discussed importance of safe, continued mobility to optimize daily living skills. Pt's niece verbalized understanding.   Pt completed the following during session: bed mobility, EOB sitting tolerance for 10 min in bed with noted posterior leaning/poor trunk control, facial grooming, and education for spencer Berenice. Pt also performed PROM of the following, 1x10 each, to promote UE function: shoulder flexion/ext, elbow flex/ext, wrist flex/ext, forearm pronation/supination, finger flex/ext.White board updated during session. Pt given instruction to call for medical staff/nurse for assistance.       Patient left HOB elevated with all lines intact, call button in reach, bed alarm on, nurse Muñoz notified and spencer gN  presentEducation:      GOALS:   Multidisciplinary Problems     Occupational Therapy Goals        Problem: Occupational Therapy Goal    Goal Priority Disciplines Outcome Interventions   Occupational Therapy Goal     OT, PT/OT Ongoing (interventions implemented as appropriate)    Description:  Goals to be met by: 6/6/2019     Patient will increase functional independence with ADLs by performing:    Pt will engage in functional task x2 min duration with 0 added cues/facilitation.  Sitting at edge of bed x10 minutes for functional task with Minimal Assistance for postural control. MET; not met 5/31  Rolling to Bilateral with Minimal Assistance and use of bedrail as needed.   Supine to sit with Moderate Assistance.  Stand pivot transfers with Moderate Assistance.  CG demo understanding for s/s of stroke.   CG demo understanding for B UE positioning and ROM exercises while pt in supine.   CG demo understanding for pressure relief techniques.                         Time Tracking:     OT Date of Treatment: 06/04/19  OT Start Time: 1024  OT Stop Time: 1053  OT Total Time (min): 29 min    Billable Minutes:Therapeutic Activity 15 min  Neuromuscular Re-education 14 min    Elisha Cota OT  6/4/2019

## 2019-06-04 NOTE — ASSESSMENT & PLAN NOTE
85 y/o female with cardioembolic L MCA infarct 2/2 a fib (not compliant with anticoagulation at home. Patient was out of window for tPA, but when to IR for L M1 thrombectomy, TICI 3 flow restored.     Antithrombotics: Apixaban 2.5 mg BID    Statins: Lipitor 40    Aggressive risk factor modification: HTN, HLD, prior stroke, afib     Rehab efforts: The patient has been evaluated by a stroke team provider and the therapy needs have been fully considered based off the presenting complaints and exam findings. The following therapy evaluations are needed: PT evaluate and treat, OT evaluate and treat, SLP evaluate and treat, PM&R evaluate for appropriate placement- Recommending SNF    Diagnostics ordered/pending: None    VTE prophylaxis: Heparin 5000 units SQ every 8 hours , SCDs    BP parameters: hospital day 12 -normotensive goal

## 2019-06-04 NOTE — ASSESSMENT & PLAN NOTE
Patient with intermittent elevation of WBC over past few days  UA collected w/ reflex - patient started on Amoxicillin BID X 7 days (end date 6/4)  Chest Xray ordered and no abnormality noted   WBC trending down - repeat CBC in 1 week (6/10)

## 2019-06-04 NOTE — PHYSICIAN QUERY
PT Name: Sury Cobos  MR #: [id number]    Physician Query Form -Present on Admission (POA) Diagnosis Clarification     CDS: Zahra Topete RN, CCDS       Contact information: [email or web address]    This form is a permanent document in the medical record.     Query Date: June 4, 2019    By submitting this query, we are merely seeking further clarification of documentation. Please utilize your independent clinical judgment when addressing the question(s) below.       The Medical record contains the following:    Diagnosis      Supporting Clinical Information   Location in Medical Record   Cytotoxic cerebral edema         Area of cytotoxic cerebral edema identified when reviewing brain imaging in the territory of the L middle cerebral artery. There is not mass effect associated with it. We will continue to monitor the patients clinical exam for any worsening of symptoms which may indicate expansion of the stroke or the area of the edema resulting in the clinical change. The pattern is suggestive of cardioembolic etiology.    Acute ischemic left MCA stroke  83 y/o female with L MCA syndrome with L M2 occlusion due to cardio embolic from afib not on anticoagulation did not receive TPA but went to IR.    CT head w/o contrast 5-21-19 results:  1. There are extensive chronic changes including moderate generalized volume loss and ventriculomegaly with marked nonspecific white matter change.  The findings likely reflect sequelae of chronic small vessel disease.  There are multiple bilateral chronic infarctions in both cerebellar hemispheres with a chronic lacunar infarction in the right basal ganglia.  There is also encephalomalacia from a chronic infarction in the posterior right frontal precentral gyrus.  2. There is no hemorrhage, mass or obvious acute infarction.  It should be noted that MRI is more sensitive in the detection of subtle or acute nonhemorrhagic ischemic disease.    5/31-Vascular Neuro  PN                5/21-Vascular Neuro Consult        5/21-Vascular Neuro Consult         Doctor, Please specify Present On Admission (POA) status of ___Cytotoxic cerebral edema___.    [ x ] Present on Admission    [  ] Not Present on Admission   [  ] Clinically Undetermined

## 2019-06-04 NOTE — PROGRESS NOTES
[facility]  Vascular Neurology  Comprehensive Stroke Center  Progress Note    Assessment/Plan:     * Embolic stroke involving left middle cerebral artery  83 y/o female with cardioembolic L MCA infarct 2/2 a fib (not compliant with anticoagulation at home. Patient was out of window for tPA, but when to IR for L M1 thrombectomy, TICI 3 flow restored.     Antithrombotics: Apixaban 2.5 mg BID    Statins: Lipitor 40    Aggressive risk factor modification: HTN, HLD, prior stroke, afib     Rehab efforts: The patient has been evaluated by a stroke team provider and the therapy needs have been fully considered based off the presenting complaints and exam findings. The following therapy evaluations are needed: PT evaluate and treat, OT evaluate and treat, SLP evaluate and treat, PM&R evaluate for appropriate placement- Recommending SNF    Diagnostics ordered/pending: None    VTE prophylaxis: Heparin 5000 units SQ every 8 hours , SCDs    BP parameters: hospital day 12 -normotensive goal     Debility  Patient was reported to be back to baseline and ambulating with walker prior to this hospitalization  Currently aphasic and not following commands, but able to TOSCANO spontaneously  PT/OT now recommending SNF or home with 24 hour support    Family is amenable to patient discharging to SNF.     Diabetes mellitus  A1C 5.9  Continue q6h glucose and SSI  Glucose within goal past 48 hrs       S/P percutaneous endoscopic gastrostomy (PEG) tube placement  PEG placed on 05/27 at 1730.    Site clean and dry.     Leukocytosis  Patient with intermittent elevation of WBC over past few days  UA collected w/ reflex - patient started on Amoxicillin BID X 7 days (end date 6/4)  Chest Xray ordered and no abnormality noted   WBC trending down - repeat CBC in 1 week (6/10)    Paroxysmal atrial fibrillation  Found on last admit 5-2018.  Previously on Eliquis but ran out and not refilled. (Concerns of noncompliance due to  price)    Eliquis 2.5 mg started after PEG placement    Patient went into Afib with RVR on 05/27- metoprolol 12.5 mg BID started.     Dysphagia  SLP to evaluate and treat  High chance for aspiration PNA, will monitor for signs of infection or worsening respiratory status    05/29- TF started and currently at goal, tolerating well    Cytotoxic cerebral edema  Area of cytotoxic cerebral edema identified when reviewing brain imaging in the territory of the L middle cerebral artery. There is not mass effect associated with it. We will continue to monitor the patients clinical exam for any worsening of symptoms which may indicate expansion of the stroke or the area of the edema resulting in the clinical change. The pattern is suggestive of cardioembolic etiology        Dementia with behavioral disturbance  Continue home memantine  Delirium precautions    Seroquel 25 mg nightly started on 05/28. Patient doing well on nightly Seroquel. She is free of restraints     Mixed hyperlipidemia  Stroke risk factor  LDL 54, at goal  Continue home Lipitor 40    Essential hypertension  Stroke risk factor  SBP goal <180    Olmesartan 5 mg daily started on 5/28 - increased to 10 mg 5/31    Aphasia  Due to stroke  SLP to evaluate and treat   PEG in place          5/24 patient stepped down overnight to stroke unit, currently with NG tube in place, patient will likely need PEG tube placed, will attempt to discuss goals of care with family, need to restart anticoagulation but will hold for now until PEG decision is made  5/25 no acute events overnight. IR consulted. Work up complete. NPO and barium Sunday night. Plan for PEG Monday 5/27.   5/26 NAEON. Odorous urine reported by RN; UA w/ reflex ordered. NPO and barium at midnight for PEG placement tomorrow.   5/27: UA collected yesterday indicative of UTI, rocephin started.  Patient had afib with RVR today, metoprolol 12.5mg BID started. PEG placement pending.   5/28: PEG placed yesterday-  site clean and dry; will initiate TF and anticoagulation at 24 hour jessy. Nursing attempting to remove restraints, abdominal binder ordered to secure PEG. Patient's HR controlled adequately with metoprolol. Will discharge to NH, when restraint free for 72 hours.   5/29: Patient tolerating TF well; will be at goal by end of the day. Patient free of restraints as of 5am this morning.   5/30: NAEON. Patient more alert during exam today. TF at goal, patient tolerating well. She remains free of restraints.   5/31 - Restraint free since 5/29 @ 0500. Waiting on placement. [place] checked Eliquis - 3.80 for 30 day supply.  6/1 - NAEON. Waiting on placement.   6/4 - Neurologically stable. WBC trending down. Completed course of antibiotics. Should repeat CBC in 1 week @ SNF (6/10).    STROKE DOCUMENTATION   Acute Stroke Times   Last Known Normal Date: 05/20/19  Last Known Normal Time: 2030  Symptom Onset Date: 05/20/19  Symptom Onset Time: 2030  Stroke Team Called Date: 05/20/19  Stroke Team Called Time: 2338  Stroke Team Arrival Date: 05/21/19  Stroke Team Arrival Time: 0605  CT Interpretation Time: 2338    NIH Scale:  1a. Level of Consciousness: 0-->Alert, keenly responsive  1b. LOC Questions: 2-->Answers neither question correctly  1c. LOC Commands: 2-->Performs neither task correctly  2. Best Gaze: 1-->Partial gaze palsy, gaze is abnormal in one or both eyes, but forced deviation or total gaze paresis is not present  3. Visual: 2-->Complete hemianopia  4. Facial Palsy: 1-->Minor paralysis (flattened nasolabial fold, asymmetry on smiling)  5a. Motor Arm, Left: 0-->No drift, limb holds 90 (or 45) degrees for full 10 secs  5b. Motor Arm, Right: 1-->Drift, limb holds 90 (or 45) degrees, but drifts down before full 10 secs, does not hit bed or other support  6a. Motor Leg, Left: 0-->No drift, leg holds 30 degree position for full 5 secs  6b. Motor Leg, Right: 2-->Some effort against gravity, leg falls to bed by 5 secs, but has  some effort against gravity  7. Limb Ataxia: 0-->Absent  8. Sensory: 0-->Normal, no sensory loss  9. Best Language: 3-->Mute, global aphasia, no usable speech or auditory comprehension  10. Dysarthria: 2-->Severe dysarthria, patients speech is so slurred as to be unintelligible in the absence of or out of proportion to any dysphasia, or is mute/anarthric  11. Extinction and Inattention (formerly Neglect): 0-->No abnormality  Total (NIH Stroke Scale): 16       Modified Angelia Score: 3  W[place]ton Coma Scale:    ABCD2 Score:    [id number] Score:   HAS -BLED Score:   ICH Score:   Hunt & Burkett Classification:      Hemorrhagic change of an Ischemic Stroke: Does this patient have an ischemic stroke with hemorrhagic changes? No     Neurologic Chief Complaint: Aphasia    Subjective:     Interval History: Patient is seen for follow-up neurological assessment and treatment recommendations:    Neurologically stable. WBC trending down. Completed course of antibiotics. Should repeat CBC in 1 week @ SNF (6/10)..     HPI, Past Medical, Family, and Social History remains the same as documented in the initial encounter.     Review of Systems   Constitutional: Positive for fatigue. Negative for chills and fever.   Eyes: Positive for visual disturbance.   Gastrointestinal: Negative for vomiting.   Neurological: Positive for facial asymmetry, speech difficulty and weakness.     Scheduled Meds:   albuterol-ipratropium  3 mL Nebulization Q8H    apixaban  2.5 mg Per G Tube BID    atorvastatin  40 mg Per G Tube Daily    buPROPion  150 mg Per G Tube BID    cloNIDine  0.1 mg Per G Tube BID    memantine  10 mg Per G Tube BID    metoprolol  12.5 mg Per G Tube BID    olmesartan  10 mg Per G Tube Daily    QUEtiapine  25 mg Per G Tube QHS    senna-docusate 8.6-50 mg  1 tablet Per G Tube BID    sodium chloride 3%  4 mL Nebulization Q8H    tuberculin  5 Units Intradermal Once     Continuous Infusions:    PRN Meds:acetaminophen, dextrose  50%, glucagon (human recombinant), hydrALAZINE, insulin aspart U-100, ondansetron, sodium chloride 0.9%, sodium chloride 0.9%    Objective:     Vital Signs (Most Recent):  Temp: 98.1 °F (36.7 °C) (06/04/19 0438)  Pulse: 77 (06/04/19 0837)  Resp: 18 (06/04/19 0837)  BP: (!) 149/88 (06/04/19 0438)  SpO2: 97 % (06/04/19 0837)  BP Location: Right arm    Vital Signs Range (Last 24H):  Temp:  [97.6 °F (36.4 °C)-98.1 °F (36.7 °C)]   Pulse:  [63-78]   Resp:  [13-18]   BP: (141-170)/(63-88)   SpO2:  [93 %-98 %]   BP Location: Right arm    Physical Exam   Constitutional: She appears well-developed and well-nourished. No distress.   HENT:   Head: Normocephalic and atraumatic.   Cardiovascular: Normal rate.   Pulmonary/Chest: Effort normal. No respiratory distress.   Skin: Skin is warm and dry.   Psychiatric: She is noncommunicative. She is inattentive.   Vitals reviewed.    Neurological Exam:   LOC: drowsy  Attention Span: poor  Language: Global aphasia  Articulation: mute   Orientation: Untestable due to severe aphasia   Visual Fields: Hemianopsia right  EOM (CN III, IV, VI): gaze left   Facial Movement (CN VII): Lower facial weakness on the Left  Motor: Arm left  Normal 5/5  Leg left  Normal 5/5  Arm right  Paresis: 4/5  Leg right Normal 3/5       Laboratory:  CMP:   No results for input(s): GLUCOSE, CALCIUM, ALBUMIN, PROT, NA, K, CO2, CL, BUN, CREATININE, ALKPHOS, ALT, AST, BILITOT in the last 24 hours.  BMP:   Recent Labs   Lab 05/31/19  0356      K 3.9      CO2 29   BUN 13   CREATININE 0.7   CALCIUM 9.4     CBC:   Recent Labs   Lab 06/03/19  0351 06/03/19  1631   WBC 15.03* 14.32*   RBC 2.67*  --    HGB 7.9*  --    HCT 25.9*  --      --    MCV 97  --    MCH 29.6  --    MCHC 30.5*  --      Lipid Panel:   No results for input(s): CHOL, LDLCALC, HDL, TRIG in the last 168 hours.  Coagulation:   No results for input(s): PT, INR, APTT in the last 168 hours.  Hgb A1C:   No results for input(s): HGBA1C in the  last 168 hours.  TSH:   No results for input(s): TSH in the last 168 hours.    Diagnostic Results     Brain Imaging     MRI 5/22  Evolving scattered areas of acute/recent infarction left MCA territory with new to increased component in the left inferior frontal gyrus now mild moderate in size.  There is no evidence for hemorrhagic conversion or significant mass effect.    Superimposed confluent T2 FLAIR signal abnormality elsewhere supra and infratentorial parenchyma while nonspecific concerning for advanced chronic microvascular ischemic change versus posttherapy change.    Remote basal gangliar, thalamic and cerebellar infarcts unchanged.    Compensatory enlargement of ventricular system similar to prior without hydrocephalus.      CTA Head and Neck 5/21  1. 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  The right vertebral artery is dominant and patent throughout its course in the neck.  2. There is calcified plaque in the common, internal and external carotid arteries bilaterally.  Stenosis on the right is mild at 50-69% and moderate on the left at approximately 70% without change.  CTA Head    1.  There is occlusion of the distal left M1 and proximal M2 segments of the middle cerebral artery which has occurred since the prior CTA head.  There is subtle increased density within the left middle cerebral artery at the distal M1 and proximal M2 segments on comparison head CT.  This is consistent with thrombus.    2.  The right anterior circulation is normal.    3.  There is developmental variation with fetal origin of the right posterior cerebral artery.    Cardiac Imaging   · Concentric left ventricular remodeling.  · Small left ventricular cavity size.  · Increased (hyperdynamic) left ventricular systolic function. The estimated ejection fraction is 75%  · Grade I (mild) left ventricular diastolic dysfunction consistent with  impaired relaxation.  · Moderate left atrial enlargement.  · Mild-to-moderate aortic valve stenosis.  · Aortic valve area is 1.43 cm2; peak velocity is 2.59 m/s; mean gradient is 15.51 mmHg.  · Mild mid-cavity left ventricular obstruction is present.  · Normal left atrial pressure.  · Mild mitral stenosis.      Leah Fan NP  [facility]  Department of Vascular Neurology   [facility]

## 2019-06-04 NOTE — PT/OT/SLP PROGRESS
Speech Language Pathology      Sury Cobos  MRN: [id number]    Attempted to see pt but pt somnolent. She did not rouse with max verbal and tactile stimulation. Pt's granddaughter present and reported pt had been sleepy all am. Ice was placed to pt's lips but pt withdrew. She produced occasional groans but did not open eyes. No charges posted to pt's account. Will follow up per poc.    SERENA Joe, CCC-SLP  6/4/2019

## 2019-06-05 LAB
ANION GAP SERPL CALC-SCNC: 7 MMOL/L (ref 8–16)
BUN SERPL-MCNC: 20 MG/DL (ref 8–23)
CALCIUM SERPL-MCNC: 9.2 MG/DL (ref 8.7–10.5)
CHLORIDE SERPL-SCNC: 104 MMOL/L (ref 95–110)
CO2 SERPL-SCNC: 27 MMOL/L (ref 23–29)
CREAT SERPL-MCNC: 0.7 MG/DL (ref 0.5–1.4)
EST. GFR  (AFRICAN AMERICAN): >60 ML/MIN/1.73 M^2
EST. GFR  (NON AFRICAN AMERICAN): >60 ML/MIN/1.73 M^2
GLUCOSE SERPL-MCNC: 148 MG/DL (ref 70–110)
POCT GLUCOSE: 148 MG/DL (ref 70–110)
POCT GLUCOSE: 154 MG/DL (ref 70–110)
POTASSIUM SERPL-SCNC: 4.4 MMOL/L (ref 3.5–5.1)
SODIUM SERPL-SCNC: 138 MMOL/L (ref 136–145)

## 2019-06-05 PROCEDURE — 80048 BASIC METABOLIC PNL TOTAL CA: CPT

## 2019-06-05 PROCEDURE — 20600001 HC STEP DOWN PRIVATE ROOM

## 2019-06-05 PROCEDURE — 94761 N-INVAS EAR/PLS OXIMETRY MLT: CPT

## 2019-06-05 PROCEDURE — 99233 SBSQ HOSP IP/OBS HIGH 50: CPT | Mod: ,,, | Performed by: PSYCHIATRY & NEUROLOGY

## 2019-06-05 PROCEDURE — 97530 THERAPEUTIC ACTIVITIES: CPT

## 2019-06-05 PROCEDURE — 25000242 PHARM REV CODE 250 ALT 637 W/ HCPCS: Performed by: PHYSICIAN ASSISTANT

## 2019-06-05 PROCEDURE — 94640 AIRWAY INHALATION TREATMENT: CPT

## 2019-06-05 PROCEDURE — 36415 COLL VENOUS BLD VENIPUNCTURE: CPT

## 2019-06-05 PROCEDURE — 99233 PR SUBSEQUENT HOSPITAL CARE,LEVL III: ICD-10-PCS | Mod: ,,, | Performed by: PSYCHIATRY & NEUROLOGY

## 2019-06-05 PROCEDURE — 92526 ORAL FUNCTION THERAPY: CPT

## 2019-06-05 PROCEDURE — 25000003 PHARM REV CODE 250: Performed by: NURSE PRACTITIONER

## 2019-06-05 PROCEDURE — 86580 TB INTRADERMAL TEST: CPT | Performed by: PHYSICIAN ASSISTANT

## 2019-06-05 PROCEDURE — 30200315 PPD INTRADERMAL TEST REV CODE 302: Performed by: PHYSICIAN ASSISTANT

## 2019-06-05 RX ADMIN — OLMESARTAN MEDOXOMIL 10 MG: 5 TABLET, FILM COATED ORAL at 10:06

## 2019-06-05 RX ADMIN — MEMANTINE 10 MG: 10 TABLET ORAL at 10:06

## 2019-06-05 RX ADMIN — Medication 12.5 MG: at 09:06

## 2019-06-05 RX ADMIN — SENNOSIDES,DOCUSATE SODIUM 1 TABLET: 8.6; 5 TABLET, FILM COATED ORAL at 10:06

## 2019-06-05 RX ADMIN — SODIUM CHLORIDE SOLN NEBU 3% 4 ML: 3 NEBU SOLN at 04:06

## 2019-06-05 RX ADMIN — TUBERCULIN PURIFIED PROTEIN DERIVATIVE 5 UNITS: 5 INJECTION, SOLUTION INTRADERMAL at 04:06

## 2019-06-05 RX ADMIN — CLONIDINE HYDROCHLORIDE 0.1 MG: 0.1 TABLET ORAL at 10:06

## 2019-06-05 RX ADMIN — ATORVASTATIN CALCIUM 40 MG: 20 TABLET, FILM COATED ORAL at 10:06

## 2019-06-05 RX ADMIN — Medication 12.5 MG: at 10:06

## 2019-06-05 RX ADMIN — APIXABAN 2.5 MG: 2.5 TABLET, FILM COATED ORAL at 10:06

## 2019-06-05 RX ADMIN — BUPROPION HYDROCHLORIDE 150 MG: 75 TABLET, FILM COATED ORAL at 10:06

## 2019-06-05 RX ADMIN — QUETIAPINE FUMARATE 25 MG: 25 TABLET ORAL at 10:06

## 2019-06-05 RX ADMIN — SODIUM CHLORIDE SOLN NEBU 3% 4 ML: 3 NEBU SOLN at 08:06

## 2019-06-05 RX ADMIN — IPRATROPIUM BROMIDE AND ALBUTEROL SULFATE 3 ML: .5; 3 SOLUTION RESPIRATORY (INHALATION) at 08:06

## 2019-06-05 RX ADMIN — IPRATROPIUM BROMIDE AND ALBUTEROL SULFATE 3 ML: .5; 3 SOLUTION RESPIRATORY (INHALATION) at 04:06

## 2019-06-05 NOTE — PLAN OF CARE
Problem: Physical Therapy Goal  Goal: Physical Therapy Goal  Goals to be met by: 2019 (10 days)       Patient will increase functional independence with mobility by performin. Supine to sit with Moderate Assistance-Met  Supine to sit with minimum assistance   2. Sit to supine with Moderate Assistance  3. Rolling to Left and Right with Moderate Assistance.  4. Sit to stand transfer with Moderate Assistance  5. Bed to chair transfer with Maximum Assistance using squat pivot technique.   6. Sitting at edge of bed x15 minutes with Contact Guard Assistance and bilateral upper extremity support  7. Lower extremity exercise program x20 reps per handout, with assistance as needed to improve strength and increase activity tolerance.       Outcome: Ongoing (interventions implemented as appropriate)  Continue with plan of care.   Soumya Rodrigues, PT  2019

## 2019-06-05 NOTE — PT/OT/SLP PROGRESS
Speech Language Pathology Treatment    Patient Name:  Sury Cobos   MRN:  [id number]  Admitting Diagnosis: Embolic stroke involving left middle cerebral artery    Recommendations:      General Recommendations:  Dysphagia therapy and Speech/language therapy  Diet recommendations:  NPO, Liquid Diet Level: NPO   Aspiration Precautions: Strict aspiration precautions   General Precautions: Standard, aphasia, aspiration, fall, NPO  Communication strategies:  yes/no questions only and provide increased time to answer               Subjective     Pt asleep, no family members present.     Pain/Comfort:  Pain Rating 1: 0/10    Objective:     Has the patient been evaluated by SLP for swallowing?   Yes  Keep patient NPO? Yes   Current Respiratory Status: room air       Upon ST entry to room, patient with poor positioning in bed. Positioned optimally for session with HOB elevation.  ST presented puree trials via tsp dipped in vanilla pudding along with ice chip to lip. Patient with no attempt to accept or manipulate trial despite max A. Patient inconsistently grunting in response to ST.   Patient did not attempt to ID objects in a field choice of 2 despite max A and tactile stim. No response elicited despite max A during simple yes/no questions or auto speech tasks.  Cont ST POC.     Assessment:     Sury Cobos is a 84 y.o. female with an SLP diagnosis of Aphasia and Dysphagia. Limited progress toward goals.     Goals:   Multidisciplinary Problems     SLP Goals        Problem: SLP Goal    Goal Priority Disciplines Outcome   SLP Goal     SLP Ongoing (interventions implemented as appropriate)   Description:  Speech Language Pathology Goals  Goals expected to be met by 5/28:  1. Patient will participate in ongoing swallow assessment to determine least restrictive diet recommendations.   2. Patient will participate in full speech, language, and cognitive assessment when appropriate.                         Plan:     · Patient  to be seen:  3 x/week   · Plan of Care expires:  06/21/19  · Plan of Care reviewed with:  patient   · SLP Follow-Up:  Yes       Discharge recommendations:  nursing facility, skilled   Barriers to Discharge:  None    Time Tracking:     SLP Treatment Date:   06/05/19  Speech Start Time:  1103  Speech Stop Time:  1111     Speech Total Time (min):  8 min    Billable Minutes: Speech Therapy Individual 8 and Treatment Swallowing Dysfunction 8    Emily Abadie, CCC-SLP  06/05/2019

## 2019-06-05 NOTE — PLAN OF CARE
06/05/19 1155   Post-Acute Status   Post-Acute Authorization Placement   Post-Acute Placement Status Referrals Sent       Referral sen to [facility].  SW in contact with CM and Medical staff. Will continue to follow and offer support as needed.     Rufino Bernstein LMSW  [facility]   Ext. [phone]

## 2019-06-05 NOTE — PROGRESS NOTES
[facility]  Vascular Neurology  Comprehensive Stroke Center  Progress Note    Assessment/Plan:     * Embolic stroke involving left middle cerebral artery  83 y/o female with cardioembolic L MCA infarct 2/2 a fib (not compliant with anticoagulation at home. Patient was out of window for tPA, but when to IR for L M1 thrombectomy, TICI 3 flow restored. Etiology cardioembolic    Antithrombotics: Apixaban 2.5 mg BID    Statins: Lipitor 40    Aggressive risk factor modification: HTN, HLD, prior stroke, afib     Rehab efforts: The patient has been evaluated by a stroke team provider and the therapy needs have been fully considered based off the presenting complaints and exam findings. The following therapy evaluations are needed: PT evaluate and treat, OT evaluate and treat, SLP evaluate and treat, PM&R evaluate for appropriate placement- Recommending SNF    Diagnostics ordered/pending: None    VTE prophylaxis: Heparin 5000 units SQ every 8 hours , SCDs    BP parameters: SBP <140    Debility  Patient was reported to be back to baseline and ambulating with walker prior to this hospitalization  Currently aphasic and not following commands, but able to TOSCANO spontaneously  PT/OT now recommending SNF or home with 24 hour support    Family is amenable to patient discharging to SNF.     Diabetes mellitus  A1C 5.9  Continue q6h glucose and SSI  Glucose within goal past 48 hrs       S/P percutaneous endoscopic gastrostomy (PEG) tube placement  PEG placed on 05/27     Leukocytosis  Patient with intermittent elevation of WBC over past few days  UA collected w/ reflex - patient started on Amoxicillin BID X 7 days (end date 6/4)  Chest Xray ordered and no abnormality noted   WBC trending down - repeat CBC in 1 week (6/10)    Paroxysmal atrial fibrillation  Found on last admit 5-2018.  Previously on Eliquis but ran out and not refilled. (Concerns of noncompliance due to price)    Eliquis 2.5 mg started after PEG  placement    Patient went into Afib with RVR on 05/27- metoprolol 12.5 mg BID started.     Dysphagia  SLP to evaluate and treat  High chance for aspiration PNA, will monitor for signs of infection or worsening respiratory status    05/29- TF started and currently at goal, tolerating well    Cytotoxic cerebral edema  Area of cytotoxic cerebral edema identified when reviewing brain imaging in the territory of the L middle cerebral artery. There is not mass effect associated with it. We will continue to monitor the patients clinical exam for any worsening of symptoms which may indicate expansion of the stroke or the area of the edema resulting in the clinical change. The pattern is suggestive of cardioembolic etiology        Dementia with behavioral disturbance  Continue home memantine  Delirium precautions    Seroquel 25 mg nightly started on 05/28. Patient doing well on nightly Seroquel. She is free of restraints     Mixed hyperlipidemia  Stroke risk factor  LDL 54, at goal  Continue home Lipitor 40    Essential hypertension  Stroke risk factor  SBP goal <140    Continue clonidine and olmesartan    Aphasia  Due to stroke  SLP to evaluate and treat   PEG in place          5/24 patient stepped down overnight to stroke unit, currently with NG tube in place, patient will likely need PEG tube placed, will attempt to discuss goals of care with family, need to restart anticoagulation but will hold for now until PEG decision is made  5/25 no acute events overnight. IR consulted. Work up complete. NPO and barium Sunday night. Plan for PEG Monday 5/27.   5/26 NAEON. Odorous urine reported by RN; UA w/ reflex ordered. NPO and barium at midnight for PEG placement tomorrow.   5/27: UA collected yesterday indicative of UTI, rocephin started.  Patient had afib with RVR today, metoprolol 12.5mg BID started. PEG placement pending.   5/28: PEG placed yesterday- site clean and dry; will initiate TF and anticoagulation at 24 hour jessy.  Nursing attempting to remove restraints, abdominal binder ordered to secure PEG. Patient's HR controlled adequately with metoprolol. Will discharge to NH, when restraint free for 72 hours.   5/29: Patient tolerating TF well; will be at goal by end of the day. Patient free of restraints as of 5am this morning.   5/30: NAEON. Patient more alert during exam today. TF at goal, patient tolerating well. She remains free of restraints.   5/31 - Restraint free since 5/29 @ 0500. Waiting on placement. [place] checked Eliquis - 3.80 for 30 day supply.  6/1 - NAEON. Waiting on placement.   6/4 - Neurologically stable. WBC trending down. Completed course of antibiotics. Should repeat CBC in 1 week @ SNF (6/10).  6/5 - waiting placement    STROKE DOCUMENTATION   Acute Stroke Times   Last Known Normal Date: 05/20/19  Last Known Normal Time: 2030  Symptom Onset Date: 05/20/19  Symptom Onset Time: 2030  Stroke Team Called Date: 05/20/19  Stroke Team Called Time: 2338  Stroke Team Arrival Date: 05/21/19  Stroke Team Arrival Time: 0605  CT Interpretation Time: 2338    NIH Scale:  1a. Level of Consciousness: 1-->Not alert, but arousable by minor stimulation to obey, answer, or respond  1b. LOC Questions: 2-->Answers neither question correctly  1c. LOC Commands: 2-->Performs neither task correctly  2. Best Gaze: 1-->Partial gaze palsy, gaze is abnormal in one or both eyes, but forced deviation or total gaze paresis is not present  3. Visual: 2-->Complete hemianopia  4. Facial Palsy: 1-->Minor paralysis (flattened nasolabial fold, asymmetry on smiling)  5a. Motor Arm, Left: 1-->Drift, limb holds 90 (or 45) degrees, but drifts down before full 10 seconds, does not hit bed or other support  5b. Motor Arm, Right: 1-->Drift, limb holds 90 (or 45) degrees, but drifts down before full 10 secs, does not hit bed or other support  6a. Motor Leg, Left: 2-->Some effort against gravity, leg falls to bed by 5 secs, but has some effort against  gravity  6b. Motor Leg, Right: 2-->Some effort against gravity, leg falls to bed by 5 secs, but has some effort against gravity  7. Limb Ataxia: 0-->Absent  8. Sensory: 0-->Normal, no sensory loss  9. Best Language: 3-->Mute, global aphasia, no usable speech or auditory comprehension  10. Dysarthria: 2-->Severe dysarthria, patients speech is so slurred as to be unintelligible in the absence of or out of proportion to any dysphasia, or is mute/anarthric  11. Extinction and Inattention (formerly Neglect): 0-->No abnormality  Total (NIH Stroke Scale): 20       Modified Maury Score: 3  Ivonne Coma Scale:    ABCD2 Score:    [id number] Score:   HAS -BLED Score:   ICH Score:   Hunt & Burkett Classification:      Hemorrhagic change of an Ischemic Stroke: Does this patient have an ischemic stroke with hemorrhagic changes? No     Neurologic Chief Complaint: Aphasia    Subjective:     Interval History: Patient is seen for follow-up neurological assessment and treatment recommendations: NAVIN, remains aphasic    HPI, Past Medical, Family, and Social History remains the same as documented in the initial encounter.     Review of Systems   Constitutional: Positive for fatigue. Negative for chills and fever.   Eyes: Positive for visual disturbance.   Gastrointestinal: Negative for vomiting.   Neurological: Positive for facial asymmetry, speech difficulty and weakness.     Scheduled Meds:   albuterol-ipratropium  3 mL Nebulization Q8H    apixaban  2.5 mg Per G Tube BID    atorvastatin  40 mg Per G Tube Daily    buPROPion  150 mg Per G Tube BID    cloNIDine  0.1 mg Per G Tube BID    memantine  10 mg Per G Tube BID    metoprolol  12.5 mg Per G Tube BID    olmesartan  10 mg Per G Tube Daily    QUEtiapine  25 mg Per G Tube QHS    senna-docusate 8.6-50 mg  1 tablet Per G Tube BID    sodium chloride 3%  4 mL Nebulization Q8H    tuberculin  5 Units Intradermal Once     Continuous Infusions:    PRN Meds:acetaminophen, dextrose  50%, glucagon (human recombinant), hydrALAZINE, insulin aspart U-100, ondansetron, sodium chloride 0.9%, sodium chloride 0.9%    Objective:     Vital Signs (Most Recent):  Temp: 96.4 °F (35.8 °C) (06/05/19 0541)  Pulse: 64 (06/05/19 0823)  Resp: 16 (06/05/19 0823)  BP: (!) 161/67 (06/05/19 0541)  SpO2: 96 % (06/05/19 0823)  BP Location: Right arm    Vital Signs Range (Last 24H):  Temp:  [96.2 °F (35.7 °C)-97.8 °F (36.6 °C)]   Pulse:  [60-75]   Resp:  [16-18]   BP: (106-161)/(56-79)   SpO2:  [93 %-98 %]   BP Location: Right arm    Physical Exam   Constitutional: She appears well-developed and well-nourished. No distress.   HENT:   Head: Normocephalic and atraumatic.   Cardiovascular: Normal rate.   Pulmonary/Chest: Effort normal. No respiratory distress.   Skin: Skin is warm and dry.   Psychiatric: She is noncommunicative. She is inattentive.   Vitals reviewed.    Neurological Exam:   LOC: drowsy  Attention Span: poor  Language: Global aphasia  Articulation: mute   Orientation: Untestable due to severe aphasia   Visual Fields: Hemianopsia right  EOM (CN III, IV, VI): gaze left   Facial Movement (CN VII): Lower facial weakness on the Left  Motor: Arm left  Normal  4/5  Leg left  Normal  4/5  Arm right  Paresis:  3/5  Leg right Normal 3/5       Laboratory:  CMP:   Recent Labs   Lab 06/05/19  0407   CALCIUM 9.2      K 4.4   CO2 27      BUN 20   CREATININE 0.7     BMP:   Recent Labs   Lab 06/05/19  0407      K 4.4      CO2 27   BUN 20   CREATININE 0.7   CALCIUM 9.2     CBC:   Recent Labs   Lab 06/03/19  0351 06/03/19  1631   WBC 15.03* 14.32*   RBC 2.67*  --    HGB 7.9*  --    HCT 25.9*  --      --    MCV 97  --    MCH 29.6  --    MCHC 30.5*  --      Lipid Panel:   No results for input(s): CHOL, LDLCALC, HDL, TRIG in the last 168 hours.  Coagulation:   No results for input(s): PT, INR, APTT in the last 168 hours.  Hgb A1C:   No results for input(s): HGBA1C in the last 168 hours.  TSH:   No  results for input(s): TSH in the last 168 hours.    Diagnostic Results     Brain Imaging     MRI 5/22  Evolving scattered areas of acute/recent infarction left MCA territory with new to increased component in the left inferior frontal gyrus now mild moderate in size.  There is no evidence for hemorrhagic conversion or significant mass effect.    Superimposed confluent T2 FLAIR signal abnormality elsewhere supra and infratentorial parenchyma while nonspecific concerning for advanced chronic microvascular ischemic change versus posttherapy change.    Remote basal gangliar, thalamic and cerebellar infarcts unchanged.    Compensatory enlargement of ventricular system similar to prior without hydrocephalus.      CTA Head and Neck 5/21  1. 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  The right vertebral artery is dominant and patent throughout its course in the neck.  2. There is calcified plaque in the common, internal and external carotid arteries bilaterally.  Stenosis on the right is mild at 50-69% and moderate on the left at approximately 70% without change.  CTA Head    1.  There is occlusion of the distal left M1 and proximal M2 segments of the middle cerebral artery which has occurred since the prior CTA head.  There is subtle increased density within the left middle cerebral artery at the distal M1 and proximal M2 segments on comparison head CT.  This is consistent with thrombus.    2.  The right anterior circulation is normal.    3.  There is developmental variation with fetal origin of the right posterior cerebral artery.    Cardiac Imaging   · Concentric left ventricular remodeling.  · Small left ventricular cavity size.  · Increased (hyperdynamic) left ventricular systolic function. The estimated ejection fraction is 75%  · Grade I (mild) left ventricular diastolic dysfunction consistent with impaired relaxation.  · Moderate  left atrial enlargement.  · Mild-to-moderate aortic valve stenosis.  · Aortic valve area is 1.43 cm2; peak velocity is 2.59 m/s; mean gradient is 15.51 mmHg.  · Mild mid-cavity left ventricular obstruction is present.  · Normal left atrial pressure.  · Mild mitral stenosis.            Adrianna Rascon PA-C  [facility]  Department of Vascular Neurology   [facility]

## 2019-06-05 NOTE — PT/OT/SLP PROGRESS
Physical Therapy Treatment    Patient Name:  Sury Cobos   MRN:  [id number]    Recommendations:     Discharge Recommendations:  nursing facility, skilled   Discharge Equipment Recommendations: (pressure relief equipment)   Barriers to discharge: Inaccessible home, Decreased caregiver support and level of assist required    Assessment:     Sury Cobos is a 84 y.o. female admitted with a medical diagnosis of Embolic stroke involving left middle cerebral artery.  She presents with the following impairments/functional limitations:  weakness, impaired endurance, impaired self care skills, gait instability, impaired functional mobilty, impaired balance, impaired cognition, decreased lower extremity function, decreased upper extremity function, decreased coordination, decreased safety awareness, decreased ROM, abnormal tone, impaired coordination, impaired fine motor, impaired cardiopulmonary response to activity, impaired joint extensibility. Patient unable to follow cues and was unable to visually track, patient with L gaze preference and cervical rotation to L. Bed mobility with total assist, patient sat edge of bed 10 minutes with minimum assistance to contact guard assist.     Rehab Prognosis: Good; patient would benefit from acute skilled PT services to address these deficits and reach maximum level of function.    Recent Surgery: * No surgery found *      Plan:     During this hospitalization, patient to be seen 3 x/week to address the identified rehab impairments via therapeutic activities, therapeutic exercises, neuromuscular re-education and progress toward the following goals:    · Plan of Care Expires:  06/23/19    Subjective     Chief Complaint: unable to state  Patient/Family Comments/goals: unable to state  Pain/Comfort:  · Pain Rating 1: (no indication of pain during treatment, patient unable to state)      Objective:     Communicated with RN prior to session.  Patient found HOB elevated with peripheral  IV, telemetry, SCD, PEG Tube upon PT entry to room.     General Precautions: Standard, aphasia, aspiration, fall, NPO   Orthopedic Precautions:N/A   Braces: N/A     Functional Mobility:    Bed Mobility  Rolling to R and L: total assistance for pericare  Supine to Sit:  total assistance   Transfers Lateral scooting: total assistance     Sitting edge of bed 10 minutes, minimum assistance to moderate assistance , weight shift posterior  -Patient with posterior pelvic tilt, kyphosis, R upper extremity in flexor synergy  -Visual/verbal/manual cues for midline orientation, thoracic and cervical extension  -Hand over hand placement of R hand in weightbearing  -Cues for visual tracking to midline          AM-PAC 6 CLICK MOBILITY  Turning over in bed (including adjusting bedclothes, sheets and blankets)?: 2  Sitting down on and standing up from a chair with arms (e.g., wheelchair, bedside commode, etc.): 1  Moving from lying on back to sitting on the side of the bed?: 2  Moving to and from a bed to a chair (including a wheelchair)?: 1  Need to walk in hospital room?: 1  Climbing 3-5 steps with a railing?: 1  Basic Mobility Total Score: 8       Therapeutic Activities and Exercises:   Patient educated on role of therapy, goals of session, benefits of out of bed mobility. Patient agreeable to mobilize with therapy.  Discussed PT plan of care during hospitalization. Patient educated that they need to call for assistance to mobilize out of bed. Whiteboard updated as appropriate. Patient educated on how their diagnosis impacts their mobility within PT scope of practice. Performed PROM bilaterally: hip flex/ext, knee flex/ext, ankle DF/PF, wrist/finger extension, elbow flex/extension; 10 reps each. Patient positioned in midline alignment, heels floating.     Patient left supine with all lines intact, call button in reach and bed alarm on..    GOALS:   Multidisciplinary Problems     Physical Therapy Goals        Problem: Physical  Therapy Goal    Goal Priority Disciplines Outcome Goal Variances Interventions   Physical Therapy Goal     PT, PT/OT Ongoing (interventions implemented as appropriate)     Description:  Goals to be met by: 2019 (10 days)       Patient will increase functional independence with mobility by performin. Supine to sit with Moderate Assistance-Met  Supine to sit with minimum assistance   2. Sit to supine with Moderate Assistance  3. Rolling to Left and Right with Moderate Assistance.  4. Sit to stand transfer with Moderate Assistance  5. Bed to chair transfer with Maximum Assistance using squat pivot technique.   6. Sitting at edge of bed x15 minutes with Contact Guard Assistance and bilateral upper extremity support  7. Lower extremity exercise program x20 reps per handout, with assistance as needed to improve strength and increase activity tolerance.                        Time Tracking:     PT Received On: 19  PT Start Time: 1115     PT Stop Time: 1130  PT Total Time (min): 15 min     Billable Minutes: Therapeutic Activity 15    Treatment Type: Treatment  PT/PTA: PT     PTA Visit Number: 0     Soumya Rodrigues, TESSA  2019   (0) independent

## 2019-06-05 NOTE — ASSESSMENT & PLAN NOTE
85 y/o female with cardioembolic L MCA infarct 2/2 a fib (not compliant with anticoagulation at home. Patient was out of window for tPA, but when to IR for L M1 thrombectomy, TICI 3 flow restored. Etiology cardioembolic    Antithrombotics: Apixaban 2.5 mg BID    Statins: Lipitor 40    Aggressive risk factor modification: HTN, HLD, prior stroke, afib     Rehab efforts: The patient has been evaluated by a stroke team provider and the therapy needs have been fully considered based off the presenting complaints and exam findings. The following therapy evaluations are needed: PT evaluate and treat, OT evaluate and treat, SLP evaluate and treat, PM&R evaluate for appropriate placement- Recommending SNF    Diagnostics ordered/pending: None    VTE prophylaxis: Heparin 5000 units SQ every 8 hours , SCDs    BP parameters: SBP <140

## 2019-06-05 NOTE — PLAN OF CARE
Problem: SLP Goal  Goal: SLP Goal  Speech Language Pathology Goals  Goals expected to be met by 5/28:  1. Patient will participate in ongoing swallow assessment to determine least restrictive diet recommendations.   2. Patient will participate in full speech, language, and cognitive assessment when appropriate.        Limited progress toward goals.   Emily P. Abadie M.S., CCC-SLP  Speech Language Pathologist  [phone]  06/05/2019

## 2019-06-05 NOTE — SUBJECTIVE & OBJECTIVE
Neurologic Chief Complaint: Aphasia    Subjective:     Interval History: Patient is seen for follow-up neurological assessment and treatment recommendations: NAVIN, remains aphasic    HPI, Past Medical, Family, and Social History remains the same as documented in the initial encounter.     Review of Systems   Constitutional: Positive for fatigue. Negative for chills and fever.   Eyes: Positive for visual disturbance.   Gastrointestinal: Negative for vomiting.   Neurological: Positive for facial asymmetry, speech difficulty and weakness.     Scheduled Meds:   albuterol-ipratropium  3 mL Nebulization Q8H    apixaban  2.5 mg Per G Tube BID    atorvastatin  40 mg Per G Tube Daily    buPROPion  150 mg Per G Tube BID    cloNIDine  0.1 mg Per G Tube BID    memantine  10 mg Per G Tube BID    metoprolol  12.5 mg Per G Tube BID    olmesartan  10 mg Per G Tube Daily    QUEtiapine  25 mg Per G Tube QHS    senna-docusate 8.6-50 mg  1 tablet Per G Tube BID    sodium chloride 3%  4 mL Nebulization Q8H    tuberculin  5 Units Intradermal Once     Continuous Infusions:    PRN Meds:acetaminophen, dextrose 50%, glucagon (human recombinant), hydrALAZINE, insulin aspart U-100, ondansetron, sodium chloride 0.9%, sodium chloride 0.9%    Objective:     Vital Signs (Most Recent):  Temp: 96.4 °F (35.8 °C) (06/05/19 0541)  Pulse: 64 (06/05/19 0823)  Resp: 16 (06/05/19 0823)  BP: (!) 161/67 (06/05/19 0541)  SpO2: 96 % (06/05/19 0823)  BP Location: Right arm    Vital Signs Range (Last 24H):  Temp:  [96.2 °F (35.7 °C)-97.8 °F (36.6 °C)]   Pulse:  [60-75]   Resp:  [16-18]   BP: (106-161)/(56-79)   SpO2:  [93 %-98 %]   BP Location: Right arm    Physical Exam   Constitutional: She appears well-developed and well-nourished. No distress.   HENT:   Head: Normocephalic and atraumatic.   Cardiovascular: Normal rate.   Pulmonary/Chest: Effort normal. No respiratory distress.   Skin: Skin is warm and dry.   Psychiatric: She is noncommunicative.  She is inattentive.   Vitals reviewed.    Neurological Exam:   LOC: drowsy  Attention Span: poor  Language: Global aphasia  Articulation: mute   Orientation: Untestable due to severe aphasia   Visual Fields: Hemianopsia right  EOM (CN III, IV, VI): gaze left   Facial Movement (CN VII): Lower facial weakness on the Left  Motor: Arm left  Normal 4/5  Leg left  Normal 4/5  Arm right  Paresis: 3/5  Leg right Normal 3/5       Laboratory:  CMP:   Recent Labs   Lab 06/05/19  0407   CALCIUM 9.2      K 4.4   CO2 27      BUN 20   CREATININE 0.7     BMP:   Recent Labs   Lab 06/05/19  0407      K 4.4      CO2 27   BUN 20   CREATININE 0.7   CALCIUM 9.2     CBC:   Recent Labs   Lab 06/03/19  0351 06/03/19  1631   WBC 15.03* 14.32*   RBC 2.67*  --    HGB 7.9*  --    HCT 25.9*  --      --    MCV 97  --    MCH 29.6  --    MCHC 30.5*  --      Lipid Panel:   No results for input(s): CHOL, LDLCALC, HDL, TRIG in the last 168 hours.  Coagulation:   No results for input(s): PT, INR, APTT in the last 168 hours.  Hgb A1C:   No results for input(s): HGBA1C in the last 168 hours.  TSH:   No results for input(s): TSH in the last 168 hours.    Diagnostic Results     Brain Imaging     MRI 5/22  Evolving scattered areas of acute/recent infarction left MCA territory with new to increased component in the left inferior frontal gyrus now mild moderate in size.  There is no evidence for hemorrhagic conversion or significant mass effect.    Superimposed confluent T2 FLAIR signal abnormality elsewhere supra and infratentorial parenchyma while nonspecific concerning for advanced chronic microvascular ischemic change versus posttherapy change.    Remote basal gangliar, thalamic and cerebellar infarcts unchanged.    Compensatory enlargement of ventricular system similar to prior without hydrocephalus.      CTA Head and Neck 5/21  1. 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  The right vertebral artery is dominant and patent throughout its course in the neck.  2. There is calcified plaque in the common, internal and external carotid arteries bilaterally.  Stenosis on the right is mild at 50-69% and moderate on the left at approximately 70% without change.  CTA Head    1.  There is occlusion of the distal left M1 and proximal M2 segments of the middle cerebral artery which has occurred since the prior CTA head.  There is subtle increased density within the left middle cerebral artery at the distal M1 and proximal M2 segments on comparison head CT.  This is consistent with thrombus.    2.  The right anterior circulation is normal.    3.  There is developmental variation with fetal origin of the right posterior cerebral artery.    Cardiac Imaging   · Concentric left ventricular remodeling.  · Small left ventricular cavity size.  · Increased (hyperdynamic) left ventricular systolic function. The estimated ejection fraction is 75%  · Grade I (mild) left ventricular diastolic dysfunction consistent with impaired relaxation.  · Moderate left atrial enlargement.  · Mild-to-moderate aortic valve stenosis.  · Aortic valve area is 1.43 cm2; peak velocity is 2.59 m/s; mean gradient is 15.51 mmHg.  · Mild mid-cavity left ventricular obstruction is present.  · Normal left atrial pressure.  · Mild mitral stenosis.

## 2019-06-05 NOTE — PLAN OF CARE
SELINA notified by [facility] that they are unable to accept patient; cannot meet needs. SELINA contacted patient's daughter Lauren to notify and to get additional SNF choices. She asked for a referral to be sent to the [facility] in [place]. Notified STORMY.

## 2019-06-06 VITALS
WEIGHT: 131.19 LBS | HEART RATE: 79 BPM | BODY MASS INDEX: 21.08 KG/M2 | OXYGEN SATURATION: 93 % | TEMPERATURE: 98 F | RESPIRATION RATE: 18 BRPM | SYSTOLIC BLOOD PRESSURE: 150 MMHG | HEIGHT: 66 IN | DIASTOLIC BLOOD PRESSURE: 61 MMHG

## 2019-06-06 LAB — POCT GLUCOSE: 149 MG/DL (ref 70–110)

## 2019-06-06 PROCEDURE — 94640 AIRWAY INHALATION TREATMENT: CPT

## 2019-06-06 PROCEDURE — 25000003 PHARM REV CODE 250: Performed by: NURSE PRACTITIONER

## 2019-06-06 PROCEDURE — 20600001 HC STEP DOWN PRIVATE ROOM

## 2019-06-06 PROCEDURE — 97112 NEUROMUSCULAR REEDUCATION: CPT

## 2019-06-06 PROCEDURE — 99233 SBSQ HOSP IP/OBS HIGH 50: CPT | Mod: ,,, | Performed by: PSYCHIATRY & NEUROLOGY

## 2019-06-06 PROCEDURE — 94761 N-INVAS EAR/PLS OXIMETRY MLT: CPT

## 2019-06-06 PROCEDURE — 92526 ORAL FUNCTION THERAPY: CPT

## 2019-06-06 PROCEDURE — 92507 TX SP LANG VOICE COMM INDIV: CPT

## 2019-06-06 PROCEDURE — 99233 PR SUBSEQUENT HOSPITAL CARE,LEVL III: ICD-10-PCS | Mod: ,,, | Performed by: PSYCHIATRY & NEUROLOGY

## 2019-06-06 PROCEDURE — 25000242 PHARM REV CODE 250 ALT 637 W/ HCPCS: Performed by: PHYSICIAN ASSISTANT

## 2019-06-06 PROCEDURE — 97530 THERAPEUTIC ACTIVITIES: CPT

## 2019-06-06 RX ORDER — MEMANTINE HYDROCHLORIDE 10 MG/1
10 TABLET ORAL 2 TIMES DAILY
Qty: 60 TABLET | Refills: 11
Start: 2019-06-06 | End: 2020-06-05

## 2019-06-06 RX ORDER — INSULIN ASPART 100 [IU]/ML
0-5 INJECTION, SOLUTION INTRAVENOUS; SUBCUTANEOUS EVERY 6 HOURS PRN
Refills: 0
Start: 2019-06-06 | End: 2020-06-05

## 2019-06-06 RX ORDER — METOPROLOL TARTRATE 25 MG/1
12.5 TABLET, FILM COATED ORAL 2 TIMES DAILY
Qty: 30 TABLET | Refills: 11
Start: 2019-06-06 | End: 2020-06-05

## 2019-06-06 RX ORDER — AMOXICILLIN 250 MG
1 CAPSULE ORAL 2 TIMES DAILY
Start: 2019-06-06

## 2019-06-06 RX ORDER — ATORVASTATIN CALCIUM 40 MG/1
40 TABLET, FILM COATED ORAL DAILY
Qty: 30 TABLET | Refills: 0 | Status: SHIPPED | OUTPATIENT
Start: 2019-06-06 | End: 2020-06-05

## 2019-06-06 RX ORDER — ACETAMINOPHEN 325 MG/1
650 TABLET ORAL EVERY 6 HOURS PRN
Refills: 0
Start: 2019-06-06

## 2019-06-06 RX ORDER — ONDANSETRON 2 MG/ML
4 INJECTION INTRAMUSCULAR; INTRAVENOUS EVERY 8 HOURS PRN
Start: 2019-06-06

## 2019-06-06 RX ORDER — QUETIAPINE FUMARATE 25 MG/1
25 TABLET, FILM COATED ORAL NIGHTLY
Qty: 30 TABLET | Refills: 11
Start: 2019-06-06 | End: 2020-06-05

## 2019-06-06 RX ORDER — CLONIDINE HYDROCHLORIDE 0.1 MG/1
0.1 TABLET ORAL 2 TIMES DAILY
Start: 2019-06-06

## 2019-06-06 RX ORDER — OLMESARTAN MEDOXOMIL 5 MG/1
10 TABLET ORAL DAILY
Qty: 180 TABLET | Refills: 3 | Status: SHIPPED | OUTPATIENT
Start: 2019-06-07 | End: 2020-06-06

## 2019-06-06 RX ORDER — BUPROPION HYDROCHLORIDE 75 MG/1
150 TABLET ORAL 2 TIMES DAILY
Qty: 120 TABLET | Refills: 0 | Status: SHIPPED | OUTPATIENT
Start: 2019-06-06 | End: 2020-06-05

## 2019-06-06 RX ADMIN — CLONIDINE HYDROCHLORIDE 0.1 MG: 0.1 TABLET ORAL at 09:06

## 2019-06-06 RX ADMIN — MEMANTINE 10 MG: 10 TABLET ORAL at 09:06

## 2019-06-06 RX ADMIN — SENNOSIDES,DOCUSATE SODIUM 1 TABLET: 8.6; 5 TABLET, FILM COATED ORAL at 09:06

## 2019-06-06 RX ADMIN — QUETIAPINE FUMARATE 25 MG: 25 TABLET ORAL at 09:06

## 2019-06-06 RX ADMIN — Medication 12.5 MG: at 09:06

## 2019-06-06 RX ADMIN — IPRATROPIUM BROMIDE AND ALBUTEROL SULFATE 3 ML: .5; 3 SOLUTION RESPIRATORY (INHALATION) at 03:06

## 2019-06-06 RX ADMIN — SODIUM CHLORIDE SOLN NEBU 3% 4 ML: 3 NEBU SOLN at 01:06

## 2019-06-06 RX ADMIN — SODIUM CHLORIDE SOLN NEBU 3% 4 ML: 3 NEBU SOLN at 07:06

## 2019-06-06 RX ADMIN — BUPROPION HYDROCHLORIDE 150 MG: 75 TABLET, FILM COATED ORAL at 09:06

## 2019-06-06 RX ADMIN — SODIUM CHLORIDE SOLN NEBU 3% 4 ML: 3 NEBU SOLN at 03:06

## 2019-06-06 RX ADMIN — OLMESARTAN MEDOXOMIL 10 MG: 5 TABLET, FILM COATED ORAL at 09:06

## 2019-06-06 RX ADMIN — IPRATROPIUM BROMIDE AND ALBUTEROL SULFATE 3 ML: .5; 3 SOLUTION RESPIRATORY (INHALATION) at 01:06

## 2019-06-06 RX ADMIN — APIXABAN 2.5 MG: 2.5 TABLET, FILM COATED ORAL at 09:06

## 2019-06-06 RX ADMIN — ATORVASTATIN CALCIUM 40 MG: 20 TABLET, FILM COATED ORAL at 09:06

## 2019-06-06 RX ADMIN — IPRATROPIUM BROMIDE AND ALBUTEROL SULFATE 3 ML: .5; 3 SOLUTION RESPIRATORY (INHALATION) at 07:06

## 2019-06-06 NOTE — PT/OT/SLP PROGRESS
Speech Language Pathology Treatment    Patient Name:  Sury Cobos   MRN:  [id number]  Admitting Diagnosis: Embolic stroke involving left middle cerebral artery    Recommendations:                 General Recommendations:  Dysphagia therapy and Speech/language therapy  Diet recommendations:  NPO, Liquid Diet Level: NPO   Aspiration Precautions: Strict aspiration precautions   General Precautions: Standard, aphasia, aspiration, NPO, fall  Communication strategies:  yes/no questions only and provide increased time to answer    Subjective     nonverbal    Pain/Comfort:  · Pain Rating 1: 0/10  · Pain Rating Post-Intervention 1: 0/10    Objective:     Has the patient been evaluated by SLP for swallowing?   Yes  Keep patient NPO? Yes   Current Respiratory Status: room air      Pt seen sitting up in a chair with nursing performing oral care. Pt noted to shake her head yes x1 in response to nurse. Pt with improved alertness at beginning of session but did become more tired at end of session. She did not attempt to count,sing or model vowel sounds with therapist. She did produce a sigh and grunt x2. Pt continued to have congested, hoarse cough. Pt did not attempt to id objects in f =2 with max cues. She did not model any commands or respond via any modality to simple y/n questions. Ice placed to pt's lips several times but pt made not attempt to take the ice. Spoon dipped in pudding also placed to pt's lips but pt did not attempt to take bolus. When small amount of pudding placed on pt's lips,. She did not attempt to lick her lips or move bolus. All bolus was removed from oral cavity by therapist. Pt with left gaze preference and did not attend to therapist on the right given cues.  Education provided but pt did not express understanding. White board updated.     Assessment:     Sury Cobos is a 84 y.o. female with an SLP diagnosis of Aphasia and Dysphagia.  She presents with improved alertness    Goals:   Multidisciplinary  Problems     SLP Goals        Problem: SLP Goal    Goal Priority Disciplines Outcome   SLP Goal     SLP Ongoing (interventions implemented as appropriate)   Description:  Speech Language Pathology Goals  Goals expected to be met by 6/12  1. Patient will participate in ongoing swallow assessment to determine least restrictive diet recommendations.   2. Patient will complete automatic speech task x1 given max cues  3 pt will respond via any modality to simple y/n questions x1 given max cues  4 pt                        Plan:     · Patient to be seen:  3 x/week   · Plan of Care expires:  06/21/19  · Plan of Care reviewed with:  patient   · SLP Follow-Up:  Yes       Discharge recommendations:  nursing facility, skilled       Time Tracking:     SLP Treatment Date:   06/06/19  Speech Start Time:  0900  Speech Stop Time:  0916     Speech Total Time (min):  16 min    Billable Minutes: Speech Therapy Individual 8 and Treatment Swallowing Dysfunction 8    SERENA Joe, CCC-SLP  06/06/2019

## 2019-06-06 NOTE — PLAN OF CARE
06/06/19 1504   Post-Acute Status   Post-Acute Authorization Other   Other Status See Comments       Auth received from [facility] for ambulance.  Auth #[id number]. Will setup transportation SW in contact with CM and Medical staff. Will continue to follow and offer support as needed.     Rufino Bernstein, CINDY  [facility]   Ext. [phone]

## 2019-06-06 NOTE — PT/OT/SLP PROGRESS
Occupational Therapy      Patient Name:  Sury Cobos   MRN:  [id number]    Pt remains appropriate for therapy & will be seen on 6/7/19 if not discharged from hospital on this date as planned.    PATITO Cuevas  6/6/2019

## 2019-06-06 NOTE — PLAN OF CARE
06/06/19 1546   Post-Acute Status   Post-Acute Authorization Placement   Post-Acute Placement Status Set-up Complete       Pt has been accepted by [facility]. Nurse can call report to Paula at [phone]. Transport setup with ambulance for 5pm,  SW in contact with CM and Medical staff. Will continue to follow and offer support as needed.     Rufino Bernstein, CINDY  [facility]   Ext. [phone]

## 2019-06-06 NOTE — PLAN OF CARE
06/06/19 1053   Post-Acute Status   Post-Acute Authorization Placement       SW received a call from the [facility]. They stated the daughter will be going to sign the paperwork today. Once the paperwork is signed patient should be able to go to the NH. SW in contact with CM and Medical staff. Will continue to follow and offer support as needed.     Rufino Bernstein, CINDY  [facility]   Ext. [phone]

## 2019-06-06 NOTE — PT/OT/SLP PROGRESS
Physical Therapy Treatment    Patient Name:  Sury Cobos   MRN:  [id number]  Admitting Diagnosis: Embolic stroke involving left middle cerebral artery  Recent Surgery: * No surgery found *      Recommendations:     Discharge Recommendations:  nursing facility, skilled   Discharge Equipment Recommendations: hospital bed, commode, shower chair, transfer board, wheelchair, manual   Barriers to discharge: Inaccessible home and Decreased caregiver support    Plan:     During this hospitalization, patient to be seen 3 x/week to address the listed problems via gait training, neuromuscular re-education, wheelchair management/training, therapeutic exercises, therapeutic activities  · Plan of Care Expires:  06/23/19   Plan of Care Reviewed with: patient    Assessment:     Sury Cobos is a 84 y.o. female admitted with a medical diagnosis of Embolic stroke involving left middle cerebral artery. Pt remains functionally limited due to decreased functional mobility, decreased safety awareness, decreased ability to transfer, and current cognitive status. Pt required increased stimulation and verbal cueing to participate during treatment. Transferred from bed to chair today and brought out into hallway to increase stimulation. Remained inattentive to R side throughout treatment. Pt exhibited decreased joint extensibility through hands that improved after passive stretching. Pt exhibiting spasticity throughout all four extremities which results in an increased risk for contracture. Pt continues to be a good candidate for skilled PT services to improve functional mobility, receive education on proper positioning in an attempt to decrease risk of contracture, and reduce fall risk. SNF upon discharge appropriate to increase pt's strength and mobility to decrease care giver burden, regain PLOF, and improve quality of life.     Problem List: weakness, impaired self care skills, impaired balance, decreased safety awareness, decreased  ROM, impaired endurance, impaired functional mobilty, visual deficits, decreased upper extremity function, impaired coordination, impaired muscle length, impaired sensation, gait instability, decreased lower extremity function, impaired cardiopulmonary response to activity.  Rehab Prognosis: Fair     GOALS:   Multidisciplinary Problems     Physical Therapy Goals        Problem: Physical Therapy Goal    Goal Priority Disciplines Outcome Goal Variances Interventions   Physical Therapy Goal     PT, PT/OT Ongoing (interventions implemented as appropriate)     Description:  Goals to be met by: 2019 (10 days)       Patient will increase functional independence with mobility by performin. Supine to sit with Moderate Assistance-Met  Supine to sit with minimum assistance   2. Sit to supine with Moderate Assistance  3. Rolling to Left and Right with Moderate Assistance.  4. Sit to stand transfer with Moderate Assistance  5. Bed to chair transfer with Maximum Assistance using squat pivot technique.- met  CC  6. Sitting at edge of bed x15 minutes with Contact Guard Assistance and bilateral upper extremity support  7. Lower extremity exercise program x20 reps per handout, with assistance as needed to improve strength and increase activity tolerance.                         Subjective     Communicated with RN prior to session.  Patient found supine in bed upon PT entry to room. Pt found with purewick out of place and had urinated in bed. Pt cleaned and dried by PT to maintain skin integrity. Pt's alone present during session.  Pt unable to communicate majority of session. Grunted classically.     Pain/Comfort:  · Unable to assess secondary to pt's inability to communicate effectively.    Objective:     Patient found with: peripheral IV, telemetry, SCD, PEG Tube, blood pressure cuff, PureWick   Mental Status: Patient is oriented to AxOx1 and unable to single-step commands. Pt nodded head a few times throughout  treatment when questioned. Patient is Lethargic and Flat affect during session.    General Precautions: Standard, Cardiac fall   Orthopedic Precautions:N/A   Braces:     Body mass index is 21.17 kg/m².  Oxygen Device: Room Air    Outcome Measures:  AM-PAC 6 CLICK MOBILITY  Turning over in bed (including adjusting bedclothes, sheets and blankets)?: 2  Sitting down on and standing up from a chair with arms (e.g., wheelchair, bedside commode, etc.): 2  Moving from lying on back to sitting on the side of the bed?: 2  Moving to and from a bed to a chair (including a wheelchair)?: 2  Need to walk in hospital room?: 1  Climbing 3-5 steps with a railing?: 1  Basic Mobility Total Score: 10     Functional Mobility:  Additional staff present: PT, SPT  Bed Mobility:   · Rolling/Turning to Left: maximal assistance  X 2 person for pericare  · Rolling/Turning to Right: maximal assistance x 2 person for pericare  · Supine to Sit: maximal assistance; from Left side of bed  · Scooting anteriorly to EOB to have both feet planted on floor: maximal assistance. Scooted asymmetrically.   · Pt returned to bedside chair with pillows on each side for support. Heels elevated. Chair alarm in place.     Sitting Balance at Edge of Bed:   Assistance Level Required: Contact Guard Assistance   Time: 10 minutes   Postural deviations noted: slouched posture, rounded shoulders, forward head, poor midline awareness, trunk deviated right, increased kyphosis, anterior pelvic tilt and increased cervical flexion   Encouraged: Upright posturing, midline awareness.   Comments: EOB sit with B UE and LE supported. CG at knees to ensure pt did not slide forward. Attempted to increase stimulation of pt's right with little success. Pt exhibited R side inattentiveness throughout treatment.     Transfers:   · Sit <> Stand Transfer: total assistance with no assistive device   · Stand <> Sit Transfer: total assistance with no assistive device   · [id number] from  EOB and [id number] from bedside chair  · Bed <> Chair Transfer: Squat Pivot technique with total assistance with no assistive device x 2 person for safety.  · Chair on patient's Left    Therapeutic Activities & Exercises:   Pt rolled in bed side chair to window out in hallway in an attempt to increase stimulation. Verbal cueing required for participation and to keep eyes open. Pt received PROM x 10 reps to B ankle, knee, hip, wrist, elbow. Spasticity noted with PROM. Stretching of finger flexors to prevent contractures of hand to assist with transfers when appropriate.     Education:  Upright posturing to improve functional mobility and core strength. Repetitions needed secondary to pt's current cognitive status.    Patient left up in chair, with head in midline, neutral pelvis & heels floated for skin protection with all lines intact, call button in reach and RN notified. Chair alarm in place, and pillows on either side for trunk support.       Time Tracking:     PT Received On: 06/06/19  PT Start Time: 0856     PT Stop Time: 0936  PT Total Time (min): 40 min       Billable Minutes:   · Therapeutic Activity 25 and Neuromuscular Re-education 15    Treatment Type: Treatment  PT/PTA: PT       Trino Stinson, SPT  6/6/2019

## 2019-06-06 NOTE — PROGRESS NOTES
[facility]  Vascular Neurology  Comprehensive Stroke Center  Progress Note    Assessment/Plan:     * Embolic stroke involving left middle cerebral artery  85 y/o female with cardioembolic L MCA infarct 2/2 a fib (not compliant with anticoagulation at home. Patient was out of window for tPA, but when to IR for L M1 thrombectomy, TICI 3 flow restored. Etiology cardioembolic    Antithrombotics: Apixaban 2.5 mg BID    Statins: Lipitor 40    Aggressive risk factor modification: HTN, HLD, prior stroke, afib     Rehab efforts: The patient has been evaluated by a stroke team provider and the therapy needs have been fully considered based off the presenting complaints and exam findings. The following therapy evaluations are needed: PT evaluate and treat, OT evaluate and treat, SLP evaluate and treat, PM&R evaluate for appropriate placement- Recommending SNF    Diagnostics ordered/pending: None    VTE prophylaxis: Heparin 5000 units SQ every 8 hours , SCDs    BP parameters: SBP <140    Debility  Patient was reported to be back to baseline and ambulating with walker prior to this hospitalization  Currently aphasic and not following commands, but able to TOSCANO spontaneously  PT/OT now recommending SNF or home with 24 hour support    Family is amenable to patient discharging to SNF.     Diabetes mellitus  A1C 5.9  Continue q6h glucose and SSI  Glucose within goal past 48 hrs       S/P percutaneous endoscopic gastrostomy (PEG) tube placement  PEG placed on 05/27     Leukocytosis  Patient with intermittent elevation of WBC over past few days  UA collected w/ reflex - patient started on Amoxicillin BID X 7 days (end date 6/4)  Chest Xray ordered and no abnormality noted   WBC trending down - repeat CBC in 1 week (6/10)    Paroxysmal atrial fibrillation  Found on last admit 5-2018.  Previously on Eliquis but ran out and not refilled. (Concerns of noncompliance due to price)    Eliquis 2.5 mg started after PEG  placement    Patient went into Afib with RVR on 05/27- metoprolol 12.5 mg BID started.     Dysphagia  SLP to evaluate and treat  High chance for aspiration PNA, will monitor for signs of infection or worsening respiratory status    05/29- TF started and currently at goal, tolerating well    Cytotoxic cerebral edema  Area of cytotoxic cerebral edema identified when reviewing brain imaging in the territory of the L middle cerebral artery. There is not mass effect associated with it. We will continue to monitor the patients clinical exam for any worsening of symptoms which may indicate expansion of the stroke or the area of the edema resulting in the clinical change. The pattern is suggestive of cardioembolic etiology        Dementia with behavioral disturbance  Continue home memantine  Delirium precautions    Seroquel 25 mg nightly started on 05/28. Patient doing well on nightly Seroquel. She is free of restraints     Mixed hyperlipidemia  Stroke risk factor  LDL 54, at goal  Continue home Lipitor 40    Essential hypertension  Stroke risk factor  SBP goal <140    Continue clonidine and olmesartan    Aphasia  Due to stroke  SLP to evaluate and treat   PEG in place          5/24 patient stepped down overnight to stroke unit, currently with NG tube in place, patient will likely need PEG tube placed, will attempt to discuss goals of care with family, need to restart anticoagulation but will hold for now until PEG decision is made  5/25 no acute events overnight. IR consulted. Work up complete. NPO and barium Sunday night. Plan for PEG Monday 5/27.   5/26 NAEON. Odorous urine reported by RN; UA w/ reflex ordered. NPO and barium at midnight for PEG placement tomorrow.   5/27: UA collected yesterday indicative of UTI, rocephin started.  Patient had afib with RVR today, metoprolol 12.5mg BID started. PEG placement pending.   5/28: PEG placed yesterday- site clean and dry; will initiate TF and anticoagulation at 24 hour jessy.  Nursing attempting to remove restraints, abdominal binder ordered to secure PEG. Patient's HR controlled adequately with metoprolol. Will discharge to NH, when restraint free for 72 hours.   5/29: Patient tolerating TF well; will be at goal by end of the day. Patient free of restraints as of 5am this morning.   5/30: NAEON. Patient more alert during exam today. TF at goal, patient tolerating well. She remains free of restraints.   5/31 - Restraint free since 5/29 @ 0500. Waiting on placement. [place] checked Eliquis - 3.80 for 30 day supply.  6/1 - NAEON. Waiting on placement.   6/4 - Neurologically stable. WBC trending down. Completed course of antibiotics. Should repeat CBC in 1 week @ SNF (6/10).  6/5 - waiting placement    STROKE DOCUMENTATION   Acute Stroke Times   Last Known Normal Date: 05/20/19  Last Known Normal Time: 2030  Symptom Onset Date: 05/20/19  Symptom Onset Time: 2030  Stroke Team Called Date: 05/20/19  Stroke Team Called Time: 2338  Stroke Team Arrival Date: 05/21/19  Stroke Team Arrival Time: 0605  CT Interpretation Time: 2338    NIH Scale:  1a. Level of Consciousness: 2-->Not alert, requires repeated stimulation to attend, or is obtunded and requires strong or painful stimulation to make movements (not stereotyped)  1b. LOC Questions: 2-->Answers neither question correctly  1c. LOC Commands: 2-->Performs neither task correctly  2. Best Gaze: 1-->Partial gaze palsy, gaze is abnormal in one or both eyes, but forced deviation or total gaze paresis is not present  3. Visual: 2-->Complete hemianopia  4. Facial Palsy: 1-->Minor paralysis (flattened nasolabial fold, asymmetry on smiling)  5a. Motor Arm, Left: 2-->Some effort against gravity, limb cannot get to or maintain (if cued) 90 (or 45) degrees, drifts down to bed, but has some effort against gravity  5b. Motor Arm, Right: 2-->Some effort against gravity, limb cannot get to or maintain (if cued) 90 (or 45) degrees, drifts down to bed, but has some  effort against gravity  6a. Motor Leg, Left: 2-->Some effort against gravity, leg falls to bed by 5 secs, but has some effort against gravity  6b. Motor Leg, Right: 2-->Some effort against gravity, leg falls to bed by 5 secs, but has some effort against gravity  7. Limb Ataxia: 0-->Absent  8. Sensory: 0-->Normal, no sensory loss  9. Best Language: 3-->Mute, global aphasia, no usable speech or auditory comprehension  10. Dysarthria: 2-->Severe dysarthria, patients speech is so slurred as to be unintelligible in the absence of or out of proportion to any dysphasia, or is mute/anarthric  11. Extinction and Inattention (formerly Neglect): 0-->No abnormality  Total (NIH Stroke Scale): 23       Modified Logan Score: 3  Ivonne Coma Scale:    ABCD2 Score:    [id number] Score:   HAS -BLED Score:   ICH Score:   Hunt & Burkett Classification:      Hemorrhagic change of an Ischemic Stroke: Does this patient have an ischemic stroke with hemorrhagic changes? No     Neurologic Chief Complaint: Aphasia    Subjective:     Interval History: Patient is seen for follow-up neurological assessment and treatment recommendations: drowsy but unchanged     HPI, Past Medical, Family, and Social History remains the same as documented in the initial encounter.     Review of Systems   Constitutional: Positive for fatigue. Negative for chills and fever.   Eyes: Positive for visual disturbance.   Gastrointestinal: Negative for vomiting.   Neurological: Positive for facial asymmetry, speech difficulty and weakness.     Scheduled Meds:   albuterol-ipratropium  3 mL Nebulization Q8H    apixaban  2.5 mg Per G Tube BID    atorvastatin  40 mg Per G Tube Daily    buPROPion  150 mg Per G Tube BID    cloNIDine  0.1 mg Per G Tube BID    memantine  10 mg Per G Tube BID    metoprolol  12.5 mg Per G Tube BID    olmesartan  10 mg Per G Tube Daily    QUEtiapine  25 mg Per G Tube QHS    senna-docusate 8.6-50 mg  1 tablet Per G Tube BID    sodium  chloride 3%  4 mL Nebulization Q8H     Continuous Infusions:    PRN Meds:acetaminophen, dextrose 50%, glucagon (human recombinant), hydrALAZINE, insulin aspart U-100, ondansetron, sodium chloride 0.9%, sodium chloride 0.9%    Objective:     Vital Signs (Most Recent):  Temp: 97.1 °F (36.2 °C) (06/06/19 0758)  Pulse: (!) 57 (06/06/19 1111)  Resp: 17 (06/06/19 0758)  BP: 135/60 (06/06/19 0758)  SpO2: 96 % (06/06/19 0758)  BP Location: Right arm    Vital Signs Range (Last 24H):  Temp:  [96.8 °F (36 °C)-97.7 °F (36.5 °C)]   Pulse:  [57-72]   Resp:  [14-18]   BP: (123-177)/(58-69)   SpO2:  [95 %-100 %]   BP Location: Right arm    Physical Exam   Constitutional: She appears well-developed and well-nourished. No distress.   HENT:   Head: Normocephalic and atraumatic.   Cardiovascular: Normal rate.   Pulmonary/Chest: Effort normal. No respiratory distress.   Skin: Skin is warm and dry.   Psychiatric: She is noncommunicative. She is inattentive.   Vitals reviewed.    Neurological Exam:   LOC: drowsy  Attention Span: poor  Language: Global aphasia  Articulation: mute   Orientation: Untestable due to severe aphasia   Visual Fields: Hemianopsia right  EOM (CN III, IV, VI): gaze left   Facial Movement (CN VII): Lower facial weakness on the Left  Motor: Arm left  Normal  3/5  Leg left  Normal  3/5  Arm right  Paresis: 3/5  Leg right Normal 3/5       Laboratory:  CMP:   No results for input(s): GLUCOSE, CALCIUM, ALBUMIN, PROT, NA, K, CO2, CL, BUN, CREATININE, ALKPHOS, ALT, AST, BILITOT in the last 24 hours.  BMP:   Recent Labs   Lab 06/05/19  0407      K 4.4      CO2 27   BUN 20   CREATININE 0.7   CALCIUM 9.2     CBC:   Recent Labs   Lab 06/03/19  0351 06/03/19  1631   WBC 15.03* 14.32*   RBC 2.67*  --    HGB 7.9*  --    HCT 25.9*  --      --    MCV 97  --    MCH 29.6  --    MCHC 30.5*  --      Lipid Panel:   No results for input(s): CHOL, LDLCALC, HDL, TRIG in the last 168 hours.  Coagulation:   No results for  input(s): PT, INR, APTT in the last 168 hours.  Hgb A1C:   No results for input(s): HGBA1C in the last 168 hours.  TSH:   No results for input(s): TSH in the last 168 hours.    Diagnostic Results     Brain Imaging     MRI 5/22  Evolving scattered areas of acute/recent infarction left MCA territory with new to increased component in the left inferior frontal gyrus now mild moderate in size.  There is no evidence for hemorrhagic conversion or significant mass effect.    Superimposed confluent T2 FLAIR signal abnormality elsewhere supra and infratentorial parenchyma while nonspecific concerning for advanced chronic microvascular ischemic change versus posttherapy change.    Remote basal gangliar, thalamic and cerebellar infarcts unchanged.    Compensatory enlargement of ventricular system similar to prior without hydrocephalus.      CTA Head and Neck 5/21  1. 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  The right vertebral artery is dominant and patent throughout its course in the neck.  2. There is calcified plaque in the common, internal and external carotid arteries bilaterally.  Stenosis on the right is mild at 50-69% and moderate on the left at approximately 70% without change.  CTA Head    1.  There is occlusion of the distal left M1 and proximal M2 segments of the middle cerebral artery which has occurred since the prior CTA head.  There is subtle increased density within the left middle cerebral artery at the distal M1 and proximal M2 segments on comparison head CT.  This is consistent with thrombus.    2.  The right anterior circulation is normal.    3.  There is developmental variation with fetal origin of the right posterior cerebral artery.    Cardiac Imaging   · Concentric left ventricular remodeling.  · Small left ventricular cavity size.  · Increased (hyperdynamic) left ventricular systolic function. The estimated  ejection fraction is 75%  · Grade I (mild) left ventricular diastolic dysfunction consistent with impaired relaxation.  · Moderate left atrial enlargement.  · Mild-to-moderate aortic valve stenosis.  · Aortic valve area is 1.43 cm2; peak velocity is 2.59 m/s; mean gradient is 15.51 mmHg.  · Mild mid-cavity left ventricular obstruction is present.  · Normal left atrial pressure.  · Mild mitral stenosis.      ERIC Aguilar  [facility]  Department of Vascular Neurology   [facility]

## 2019-06-06 NOTE — PLAN OF CARE
06/06/19 1538   Final Note   Assessment Type Final Discharge Note   Anticipated Discharge Disposition SNF  ([facility])   Right Care Referral Info   Post Acute Recommendation SNF / Sub-Acute Rehab

## 2019-06-06 NOTE — PLAN OF CARE
Ms Cobos needs to be transported via ems/stretcher due to debility, and poor trunk control.   Post stroke, she requires total assistance in bed mobility.     Liz Morris PA-C  Vascular Neurology  [phone]

## 2019-06-06 NOTE — PT/OT/SLP PROGRESS
Physical Therapy Treatment    Patient Name:  Sury Cobos   MRN:  [id number]    Recommendations:     Discharge Recommendations:  nursing facility, skilled   Discharge Equipment Recommendations: hospital bed, commode, shower chair, transfer board, wheelchair, manual   Barriers to discharge: Inaccessible home, Decreased caregiver support and patient below functional baseline    Assessment:     Sury Cobos is a 84 y.o. female admitted with a medical diagnosis of Embolic stroke involving left middle cerebral artery.  She presents with the following impairments/functional limitations:  weakness, impaired endurance, impaired self care skills, impaired functional mobilty, gait instability, visual deficits, impaired balance, impaired cognition, decreased coordination, decreased lower extremity function, decreased upper extremity function, decreased safety awareness, decreased ROM, abnormal tone, impaired coordination, impaired fine motor, impaired joint extensibility, impaired cardiopulmonary response to activity. The patient was found sitting up in chair, she demonstrates increased non-purposeful, spontaneous movement. She is vocalizing, but not stating words or responding appropriately. She does not follow commands and demonstrates L gaze preference and C/S rotation. She is able to turn her head to the R. Attempted sit to stand with maximum assistance, unable to fully extend through hips/knees trunk. Squat pivot chair to bed total assistance. Patient's granddaughter at bedside and updated on patient's current level of mobility and discussed goals of treatment. She is safe for 2 person RN assist to transfer.     Rehab Prognosis: Fair; patient would benefit from acute skilled PT services to address these deficits and reach maximum level of function.    Recent Surgery: * No surgery found *      Plan:     During this hospitalization, patient to be seen 3 x/week to address the identified rehab impairments via gait training,  "therapeutic activities, therapeutic exercises, neuromuscular re-education and progress toward the following goals:    · Plan of Care Expires:  06/23/19    Subjective     Chief Complaint: unable to state  Patient/Family Comments/goals: unable to state; patient's granddaughter encouraging participation with therapy, patient's granddaughter asked "is she going to walk today?", educated her that patient is unable to stand and is not currently appropriate for gait training  Pain/Comfort:  · Pain Rating 1: (no evidence of pain, patient unable to verbalize)      Objective:     Communicated with RN prior to session.  Patient found up in chair with chair alarm in place, telemetry, blood pressure cuff, PureWick, PEG Tube, peripheral IV upon PT entry to room.PAtient's granddaughter at bedside.      General Precautions: Standard, fall   Orthopedic Precautions:N/A   Braces: N/A     Functional Mobility:    Bed Mobility  Sit to supine: total assistance   Scooting to head of bed: maximum assistance    Transfers Sit to Stand:  maximum assistance, unable to fully extend through hips/knees/trunk  Squat pivot chair to bed: total assistance     Sitting edge of bed 8 minutes, minimum assistance, weight shift posteriorly and to L  -Posterior pelvic tilt, kyphosis, patient unable to keep feet on ground in weightbearing- immediately crosses legs- manual assist to maintain weight bearing  -Visual/verbal/manual cues for midline orientation, thoracic and cervical extension  -Hand over hand placement of bilateral hands in weightbearing  -Cues for visual tracking to midline, attempted to elicit visual tracking with various stimuli          AM-PAC 6 CLICK MOBILITY  Turning over in bed (including adjusting bedclothes, sheets and blankets)?: 2  Sitting down on and standing up from a chair with arms (e.g., wheelchair, bedside commode, etc.): 2  Moving from lying on back to sitting on the side of the bed?: 2  Moving to and from a bed to a chair " "(including a wheelchair)?: 2  Need to walk in hospital room?: 1  Climbing 3-5 steps with a railing?: 1  Basic Mobility Total Score: 10       Therapeutic Activities and Exercises:   Patient and granddaughter educated on role of therapy, goals of session, benefits of out of bed mobility. Patient agreeable to mobilize with therapy.  Discussed PT plan of care during hospitalization. Patient educated that they need to call for assistance to mobilize out of bed. Whiteboard updated as appropriate. Patient educated on how their diagnosis impacts their mobility within PT scope of practice. Hamstring, calf stretching 30" x3 each leg, lacking full knee extension ~20 deg bilaterally. Patient positioned in midline, heels floating.     Patient left HOB elevated with all lines intact, call button in reach, bed alarm on and RN notified..    GOALS:   Multidisciplinary Problems     Physical Therapy Goals        Problem: Physical Therapy Goal    Goal Priority Disciplines Outcome Goal Variances Interventions   Physical Therapy Goal     PT, PT/OT Ongoing (interventions implemented as appropriate)     Description:  Goals to be met by: 2019 (10 days)       Patient will increase functional independence with mobility by performin. Supine to sit with Moderate Assistance-Met  Supine to sit with minimum assistance   2. Sit to supine with Moderate Assistance  3. Rolling to Left and Right with Moderate Assistance.  4. Sit to stand transfer with Moderate Assistance  5. Bed to chair transfer with Maximum Assistance using squat pivot technique.  6. Sitting at edge of bed x15 minutes with Contact Guard Assistance and bilateral upper extremity support  7. Lower extremity exercise program x20 reps per handout, with assistance as needed to improve strength and increase activity tolerance.                          Time Tracking:     PT Received On: 19  PT Start Time: 1140     PT Stop Time: 1200  PT Total Time (min): 20 min     Billable " Minutes: Therapeutic Activity 20    Treatment Type: Treatment  PT/PTA: PT     PTA Visit Number: 0     Soumya Rodrigues, PT  06/06/2019

## 2019-06-06 NOTE — SUBJECTIVE & OBJECTIVE
Neurologic Chief Complaint: Aphasia    Subjective:     Interval History: Patient is seen for follow-up neurological assessment and treatment recommendations: drowsy but unchanged     HPI, Past Medical, Family, and Social History remains the same as documented in the initial encounter.     Review of Systems   Constitutional: Positive for fatigue. Negative for chills and fever.   Eyes: Positive for visual disturbance.   Gastrointestinal: Negative for vomiting.   Neurological: Positive for facial asymmetry, speech difficulty and weakness.     Scheduled Meds:   albuterol-ipratropium  3 mL Nebulization Q8H    apixaban  2.5 mg Per G Tube BID    atorvastatin  40 mg Per G Tube Daily    buPROPion  150 mg Per G Tube BID    cloNIDine  0.1 mg Per G Tube BID    memantine  10 mg Per G Tube BID    metoprolol  12.5 mg Per G Tube BID    olmesartan  10 mg Per G Tube Daily    QUEtiapine  25 mg Per G Tube QHS    senna-docusate 8.6-50 mg  1 tablet Per G Tube BID    sodium chloride 3%  4 mL Nebulization Q8H     Continuous Infusions:    PRN Meds:acetaminophen, dextrose 50%, glucagon (human recombinant), hydrALAZINE, insulin aspart U-100, ondansetron, sodium chloride 0.9%, sodium chloride 0.9%    Objective:     Vital Signs (Most Recent):  Temp: 97.1 °F (36.2 °C) (06/06/19 0758)  Pulse: (!) 57 (06/06/19 1111)  Resp: 17 (06/06/19 0758)  BP: 135/60 (06/06/19 0758)  SpO2: 96 % (06/06/19 0758)  BP Location: Right arm    Vital Signs Range (Last 24H):  Temp:  [96.8 °F (36 °C)-97.7 °F (36.5 °C)]   Pulse:  [57-72]   Resp:  [14-18]   BP: (123-177)/(58-69)   SpO2:  [95 %-100 %]   BP Location: Right arm    Physical Exam   Constitutional: She appears well-developed and well-nourished. No distress.   HENT:   Head: Normocephalic and atraumatic.   Cardiovascular: Normal rate.   Pulmonary/Chest: Effort normal. No respiratory distress.   Skin: Skin is warm and dry.   Psychiatric: She is noncommunicative. She is inattentive.   Vitals  reviewed.    Neurological Exam:   LOC: drowsy  Attention Span: poor  Language: Global aphasia  Articulation: mute   Orientation: Untestable due to severe aphasia   Visual Fields: Hemianopsia right  EOM (CN III, IV, VI): gaze left   Facial Movement (CN VII): Lower facial weakness on the Left  Motor: Arm left  Normal 3/5  Leg left  Normal 3/5  Arm right  Paresis: 3/5  Leg right Normal 3/5       Laboratory:  CMP:   No results for input(s): GLUCOSE, CALCIUM, ALBUMIN, PROT, NA, K, CO2, CL, BUN, CREATININE, ALKPHOS, ALT, AST, BILITOT in the last 24 hours.  BMP:   Recent Labs   Lab 06/05/19  0407      K 4.4      CO2 27   BUN 20   CREATININE 0.7   CALCIUM 9.2     CBC:   Recent Labs   Lab 06/03/19  0351 06/03/19  1631   WBC 15.03* 14.32*   RBC 2.67*  --    HGB 7.9*  --    HCT 25.9*  --      --    MCV 97  --    MCH 29.6  --    MCHC 30.5*  --      Lipid Panel:   No results for input(s): CHOL, LDLCALC, HDL, TRIG in the last 168 hours.  Coagulation:   No results for input(s): PT, INR, APTT in the last 168 hours.  Hgb A1C:   No results for input(s): HGBA1C in the last 168 hours.  TSH:   No results for input(s): TSH in the last 168 hours.    Diagnostic Results     Brain Imaging     MRI 5/22  Evolving scattered areas of acute/recent infarction left MCA territory with new to increased component in the left inferior frontal gyrus now mild moderate in size.  There is no evidence for hemorrhagic conversion or significant mass effect.    Superimposed confluent T2 FLAIR signal abnormality elsewhere supra and infratentorial parenchyma while nonspecific concerning for advanced chronic microvascular ischemic change versus posttherapy change.    Remote basal gangliar, thalamic and cerebellar infarcts unchanged.    Compensatory enlargement of ventricular system similar to prior without hydrocephalus.      CTA Head and Neck 5/21  1. 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  The right vertebral artery is dominant and patent throughout its course in the neck.  2. There is calcified plaque in the common, internal and external carotid arteries bilaterally.  Stenosis on the right is mild at 50-69% and moderate on the left at approximately 70% without change.  CTA Head    1.  There is occlusion of the distal left M1 and proximal M2 segments of the middle cerebral artery which has occurred since the prior CTA head.  There is subtle increased density within the left middle cerebral artery at the distal M1 and proximal M2 segments on comparison head CT.  This is consistent with thrombus.    2.  The right anterior circulation is normal.    3.  There is developmental variation with fetal origin of the right posterior cerebral artery.    Cardiac Imaging   · Concentric left ventricular remodeling.  · Small left ventricular cavity size.  · Increased (hyperdynamic) left ventricular systolic function. The estimated ejection fraction is 75%  · Grade I (mild) left ventricular diastolic dysfunction consistent with impaired relaxation.  · Moderate left atrial enlargement.  · Mild-to-moderate aortic valve stenosis.  · Aortic valve area is 1.43 cm2; peak velocity is 2.59 m/s; mean gradient is 15.51 mmHg.  · Mild mid-cavity left ventricular obstruction is present.  · Normal left atrial pressure.  · Mild mitral stenosis.

## 2019-06-06 NOTE — PROGRESS NOTES
"[facility]  Adult Nutrition  Progress Note    SUMMARY       Recommendations    Recommendation: 1. Continue Isosource 1.5 @48ml/hr (provides 1728 kcal, 78g pro, 876ml free water). Additional water flushes per MD. Hold for residuals >500ml.  Goals: 1. Pt's intake to meet % EEN and EPN x 7 days.  Nutrition Goal Status: progressing towards goal  Communication of RD Recs: discussed on rounds    Reason for Assessment    Reason For Assessment: RD follow-up  Diagnosis: stroke/CVA  Relevant Medical History: HTN, HLD, DM, CHF, CAD, a fib  Interdisciplinary Rounds: attended  General Information Comments: Pt with PEG continues at goal TF rate and tolerating well. Moderate malnutrition noted per NFPE 5/30; pt continues with muscle wasting.  Nutrition Discharge Planning: Pt with adequate enteral feeding to meet nutrition needs.    Nutrition Risk Screen    Nutrition Risk Screen: tube feeding or parenteral nutrition    Nutrition/Diet History    Spiritual, Cultural Beliefs, [employer]al Practices, Values that Affect Care: no  Factors Affecting Nutritional Intake: NPO, impaired cognitive status/motor control    Anthropometrics    Temp: 97.3 °F (36.3 °C)  Height Method: Stated  Height: 5' 6" (167.6 cm)  Height (inches): 66 in  Weight Method: Standard Scale  Weight: 59.5 kg (131 lb 2.8 oz)  Weight (lb): 131.18 lb  Ideal Body Weight (IBW), Female: 130 lb  % Ideal Body Weight, Female (lb): 96.15 lb  BMI (Calculated): 20.2  BMI Grade: 18.5-24.9 - normal       Lab/Procedures/Meds    Pertinent Labs Reviewed: reviewed  Pertinent Labs Comments: A1c 5.9%, Glu 148  Pertinent Medications Reviewed: reviewed  Pertinent Medications Comments: statin    Physical Findings/Assessment         Estimated/Assessed Needs    Weight Used For Calorie Calculations: 57.9 kg (127 lb 10.3 oz)(used admission wt to update kcal needs)  Energy Calorie Requirements (kcal): 1737 kcal  Energy Need Method: Kcal/kg(30 kcal/kg)  Protein Requirements: " 75-87g  Weight Used For Protein Calculations: 57.9 kg (127 lb 10.3 oz)  Fluid Requirements (mL): 1mL/kcal or per MD     RDA Method (mL): 1737  CHO Requirement: 50% of total kcals      Nutrition Prescription Ordered    Current Diet Order: NPO  Current Nutrition Support Formula Ordered: Isosource 1.5  Current Nutrition Support Rate Ordered: 48 (ml)  Current Nutrition Support Frequency Ordered: ml/hr    Evaluation of Received Nutrient/Fluid Intake    Enteral Calories (kcal): 1728  Enteral Protein (gm): 78  Enteral (Free Water) Fluid (mL): 876  % Kcal Needs: 99  % Protein Needs: 100  IV Fluid (mL): 0  I/O: +5.2L since admission  Comments: LBM 6/04  % Intake of Estimated Energy Needs: 75 - 100 %  % Meal Intake: NPO    Nutrition Risk    Level of Risk/Frequency of Follow-up: low     Assessment and Plan  Moderate Protein-Calorie Malnutrition  Malnutrition in the context of Acute Illness/Injury     Related to (etiology):  stroke     Signs and Symptoms (as evidenced by):     Body Fat Depletion: moderate depletion of orbitals, triceps   Muscle Mass Depletion: moderate depletion of temples, clavicle region, lower extremities      Interventions/Recommendations (treatment strategy):  Collaboration of care.  Enteral feeding: modified to increase kcal/protein     Nutrition Diagnosis Status:   continues    Monitor and Evaluation    Food and Nutrient Intake: energy intake, enteral nutrition intake  Food and Nutrient Adminstration: enteral and parenteral nutrition administration  Anthropometric Measurements: weight, weight change, body mass index  Biochemical Data, Medical Tests and Procedures: electrolyte and renal panel, gastrointestinal profile, glucose/endocrine profile, inflammatory profile, lipid profile  Nutrition-Focused Physical Findings: overall appearance     Malnutrition Assessment                 Orbital Region (Subcutaneous Fat Loss): moderate depletion  Upper Arm Region (Subcutaneous Fat Loss): moderate depletion    Jehovah's witness Region (Muscle Loss): moderate depletion  Clavicle Bone Region (Muscle Loss): moderate depletion  Patellar Region (Muscle Loss): moderate depletion  Anterior Thigh Region (Muscle Loss): moderate depletion  Posterior Calf Region (Muscle Loss): moderate depletion                 Nutrition Follow-Up    RD Follow-up?: Yes

## 2019-06-06 NOTE — PLAN OF CARE
1300-CM spoke to Nicolle-patient's daughter Lauren is scheduled for 1pm to sign paperwork. She will notify CM once this has been completed. She also stated that she has received insurance auth already. CM notified team for d/c orders.    0759-CM spoke to Nicolle-Patient has been accepted to [facility] in [place]- she will submit for insurance auth today and contact family about signing paperwork.     06/06/19 0759   Post-Acute Status   Post-Acute Authorization Placement   Post-Acute Placement Status Pending Payor Review

## 2019-06-06 NOTE — PLAN OF CARE
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  Enteral feeding: modified to increase kcal/protein    Nutrition Diagnosis Status:  New    Recommendations     Recommendation: 1. Increase Isosource 1.5 to new goal rate 48ml/hr to meet pt's needs. Provides 1728 kcal, 78g pro,  876ml free water.Water flush per MD.   Goals: 1. Pt's intake to meet % EEN and EPN x 7 days.  Nutrition Goal Status: progressing towards goal  Communication of RD Recs: reviewed with physician      Abnormal echocardiogram [R93.1]  Unknown    Alteration in skin integrity due to moisture [R23.9]  Yes    Hypercoagulable state [D68.59]  Yes    Debility [R53.81]  Yes    Diabetes mellitus [E11.9]  Yes    S/P percutaneous endoscopic gastrostomy (PEG) tube placement [Z93.1]  Not Applicable    Leukocytosis [D72.829]  No    Dementia with behavioral disturbance [F03.91]  Yes    Paroxysmal atrial fibrillation [I48.0]  Yes    Dysphagia [R13.10]  Yes    Cytotoxic cerebral edema [G93.6]  Yes    Aphasia [R47.01]  Yes    Essential hypertension [I10]  Yes    Mixed hyperlipidemia  [E78.2]  Yes      Resolved Hospital Problems    Diagnosis Date Resolved POA    Right spastic hemiparesis [G81.11] 05/21/2019 Yes       Patient is homebound due to:  Embolic stroke involving left middle cerebral artery    Allergies:Review of patient's allergies indicates:  No Known Allergies    Vitals:       Every shift (Skilled Nursing patients)    Diet: NPO - all meds and feeding through PEG   1. Continue Isosource 1.5 @48ml/hr (provides 1728 kcal, 78g pro, 876ml free water). Additional water flushes per MD. Hold for residuals >500ml.  Goals: 1. Pt's intake to meet % EEN and EPN x 7 days.  Nutrition Goal Status: progressing towards goal      Acitivities:    As per facility protocol     LABS:  Per facility protocol  NEEDS CBC ON 6/10 - LAST WHITE COUNT 14 ALTHOUGH NO APPARENT INFECTION      CMP, CBC each month for 3 months   PT-INR each week for 1 month then monthly   Pre-albumin each month for 3 months   Digoxin level in 1 month and every 6 months   TSH every year    Nursing Precautions:     - Aspiration precautions:             - Total assistance with meals            -  Upright 90 degrees befor during and after meals             -  Suction at bedside          - Fall precautions per nursing home protocol   - Seizure precaution per prison protocol   - Decubitus precautions:        -  for positioning   - Pressure reducing foam mattress   - Turn patient every two hours. Use wedge pillows to anchor patient    CONSULTS:      Physical Therapy to evaluate and treat     Occupational Therapy to evaluate and treat     Speech Therapy  to evaluate and treat     Nutrition to evaluate and recommend diet     Psychiatry to evaluate and follow patients for delirium    MISCELLANEOUS CARE:           PEG Care:  Clean site every 24 hours     Lay Care: Empty Lay bag every shift.  Change Lay every month     Routine Skin for Bedridden Patients:  Apply moisture barrier cream to all    skin folds and wet areas in  perineal area daily and after baths and                           all bowel movements.          DIABETES CARE:        Check blood sugar:      Fingerstick blood sugar a.m and p.m.    Fingerstick blood sugar AC and HS   Fingerstick blood sugar every 6 hours if unable to eat      Report CBG < 60 or > 400 to physician.                                          Insulin Sliding Scale          Glucose  Novolog Insulin Subcutaneous        0 - 60   Orange juice or glucose tablet, hold insulin      No insulin   201-250  2 units   251-300  4 units   301-350  6 units   351-400  8 units   >400   10 units then call physician      Medications: Discontinue all previous medication orders, if any. See new list below.     Sury Cobos   Home Medication Instructions MANDEEP:[id number]    Printed on:06/06/19 1412   Medication Information                      acetaminophen (TYLENOL) 325 MG tablet  2 tablets (650 mg total) by Per G Tube route every 6 (six) hours as needed (temp greater than 99.5 F).             apixaban (ELIQUIS) 2.5 mg Tab  1 tablet (2.5 mg total) by Per G Tube route 2 (two) times daily.             atorvastatin (LIPITOR) 40 MG tablet  1 tablet (40 mg total) by Per G Tube route once daily.             buPROPion (WELLBUTRIN) 75 MG tablet  2 tablets (150 mg total) by Per G Tube route 2 (two) times daily.             cloNIDine (CATAPRES) 0.1 MG tablet  1 tablet (0.1 mg total) by Per G Tube route 2 (two) times daily.             memantine (NAMENDA) 10 MG Tab  1 tablet (10 mg total) by Per G Tube route 2 (two) times daily.             metoprolol tartrate (LOPRESSOR) 25 MG tablet  0.5 tablets (12.5 mg total) by Per G Tube route 2 (two) times daily.             olmesartan (BENICAR) 5 MG Tab  2 tablets (10 mg total) by Per G Tube route once daily.             ondansetron 4 mg/2 mL Soln  Inject 4 mg into the vein every 8 (eight) hours as needed.                          senna-docusate 8.6-50 mg (PERICOLACE) 8.6-50 mg per  tablet  1 tablet by Per G Tube route 2 (two) times daily.      insulin aspart U-100 (NOVOLOG) 100 unit/mL (3 mL) InPn pen  Inject 0-5 Units into the skin every 6 (six) hours as needed (Hyperglycemia).               IF needed for agitation - would recommend - and has been receiving inpatient scheduled seroquel as below -   QUEtiapine (SEROQUEL) 25 MG Tab  1 tablet (25 mg total) by Per G Tube route every evening.  Will defer to admitting team.         _________________________________  ERIC Aguilar  06/06/2019

## 2019-06-06 NOTE — PLAN OF CARE
Problem: SLP Goal  Goal: SLP Goal  Speech Language Pathology Goals  Goals expected to be met by 5/28:  1. Patient will participate in ongoing swallow assessment to determine least restrictive diet recommendations.   2. Patient will participate in full speech, language, and cognitive assessment when appropriate.        Pt with improved alertness today.    SERENA Joe, CCC-SLP  6/6/2019

## 2019-06-06 NOTE — PLAN OF CARE
Problem: Physical Therapy Goal  Goal: Physical Therapy Goal  Goals to be met by: 2019 (10 days)       Patient will increase functional independence with mobility by performin. Supine to sit with Moderate Assistance-Met  Supine to sit with minimum assistance   2. Sit to supine with Moderate Assistance  3. Rolling to Left and Right with Moderate Assistance.  4. Sit to stand transfer with Moderate Assistance  5. Bed to chair transfer with Maximum Assistance using squat pivot technique.- met  CC  6. Sitting at edge of bed x15 minutes with Contact Guard Assistance and bilateral upper extremity support  7. Lower extremity exercise program x20 reps per handout, with assistance as needed to improve strength and increase activity tolerance.       Outcome: Ongoing (interventions implemented as appropriate)    Discharge Recommendation: Long-term SNF.  Please continue Progressive Mobility Protocol as appropriate.  Appropriate transfer level with nursing staff: Bed <> Chair:  Squat Pivot with maximal assistance with No Assistive Device x 2 person for safety.    Trino Stinson  2019

## 2019-06-06 NOTE — PLAN OF CARE
Problem: Adult Inpatient Plan of Care  Goal: Plan of Care Review  Recommendations     Recommendation: 1. Continue Isosource 1.5 @48ml/hr (provides 1728 kcal, 78g pro, 876ml free water). Additional water flushes per MD. Hold for residuals >500ml.  Goals: 1. Pt's intake to meet % EEN and EPN x 7 days.  Nutrition Goal Status: progressing towards goal  Communication of RD Recs: discussed on rounds    Assessment and Plan  Moderate Protein-Calorie Malnutrition  Malnutrition in the context of Acute Illness/Injury     Related to (etiology):  stroke     Signs and Symptoms (as evidenced by):     Body Fat Depletion: moderate depletion of orbitals, triceps   Muscle Mass Depletion: moderate depletion of temples, clavicle region, lower extremities      Interventions/Recommendations (treatment strategy):  Collaboration of care.  Enteral feeding: modified to increase kcal/protein     Nutrition Diagnosis Status:   continues

## 2019-06-07 NOTE — NURSING
Updated [facility] of departure of pt to facility. All lines and telemetry removed from pt. Pt clean and moved to stretcher successfully. SANIA

## 2019-06-07 NOTE — NURSING
Spoke with YONNY Avelar who states patient has not left facility yet. Informed her that no transport request was placed in [facility].     Transport arranged with Nan Kaufman for patient to go to [facility], [address] [place] [address]. Rosio notified.

## 2019-06-07 NOTE — DISCHARGE SUMMARY
[facility]  Vascular Neurology  Comprehensive Stroke Center  Discharge Summary     Summary:     Admit Date: 5/21/2019  5:49 AM    Discharge Date and Time: 6/6/2019 11:49 PM    Attending Physician: Dr Edouard     Discharge Provider: ERIC Aguilar    History of Present Illness: 85 y/o femlae who went to bed at usual time and family found her on the floor beside the bed with aphasia, right sided weakness,. She was taken to [facility] ED for evaluation. She had prior stroke right precentral gyrus 5-2018 that required PEG tube for dysphagia but does not require it now. She is back to baseline but ambulates with walker. She was d/c on eliquis but refills ran out and family did not refill. It was thought to be cardio embolic as AFIB on telemetry.  Telemedicine was done with Dr Reina and neuro exam was very challenging and due to this and no TPA given. CTA head and neck done and reveals L M2 occlusion so Dr Reina called back and he talked with family and despite DNR they want everything done so patient was transferred for possible IR.   Upon arrival patient still aphasic, right sided weakness,   CT scan head unable to a lot of microvascular disease and unable to score ASPECTS so will get MRI brain acute interventional Protocol.  Patient to go to IR, attempted to get in touch with daughter by Dr Wood but no answer.   Risk factors prior stroke, CAD, HTN, DM    Hospital Course (synopsis of major diagnoses, care, treatment, and services provided during the course of the hospital stay): 5/24 patient stepped down overnight to stroke unit, currently with NG tube in place, patient will likely need PEG tube placed, will attempt to discuss goals of care with family, need to restart anticoagulation but will hold for now until PEG decision is made  5/25 no acute events overnight. IR consulted. Work up complete. NPO and barium Sunday night. Plan for PEG Monday 5/27.   5/26 NAEON. Odorous urine reported  by RN; UA w/ reflex ordered. NPO and barium at midnight for PEG placement tomorrow.   5/27: UA collected yesterday indicative of UTI, rocephin started.  Patient had afib with RVR today, metoprolol 12.5mg BID started. PEG placement pending.   5/28: PEG placed yesterday- site clean and dry; will initiate TF and anticoagulation at 24 hour jessy. Nursing attempting to remove restraints, abdominal binder ordered to secure PEG. Patient's HR controlled adequately with metoprolol. Will discharge to NH, when restraint free for 72 hours.   5/29: Patient tolerating TF well; will be at goal by end of the day. Patient free of restraints as of 5am this morning.   5/30: NAEON. Patient more alert during exam today. TF at goal, patient tolerating well. She remains free of restraints.   5/31 - Restraint free since 5/29 @ 0500. Waiting on placement. Mclaughlin checked Eliquis - 3.80 for 30 day supply.  6/1 - NAEON. Waiting on placement.   6/4 - Neurologically stable. WBC trending down. Completed course of antibiotics. Should repeat CBC in 1 week @ SNF (6/10).  6/5 - waiting placement    See above for detailed hospital course.   Ms Cobos discharged to SNF    Stroke Etiology: Evident Atrial fibrillation Cardio-Aortic Embolism (CE)    STROKE DOCUMENTATION   Acute Stroke Times   Last Known Normal Date: 05/20/19  Last Known Normal Time: 2030  Symptom Onset Date: 05/20/19  Symptom Onset Time: 2030  Stroke Team Called Date: 05/20/19  Stroke Team Called Time: 2338  Stroke Team Arrival Date: 05/21/19  Stroke Team Arrival Time: 0605  CT Interpretation Time: 2338     NIH Scale:  1a. Level of Consciousness: 2-->Not alert, requires repeated stimulation to attend, or is obtunded and requires strong or painful stimulation to make movements (not stereotyped)  1b. LOC Questions: 2-->Answers neither question correctly  1c. LOC Commands: 2-->Performs neither task correctly  2. Best Gaze: 1-->Partial gaze palsy, gaze is abnormal in one or both eyes, but  forced deviation or total gaze paresis is not present  3. Visual: 2-->Complete hemianopia  4. Facial Palsy: 1-->Minor paralysis (flattened nasolabial fold, asymmetry on smiling)  5a. Motor Arm, Left: 2-->Some effort against gravity, limb cannot get to or maintain (if cued) 90 (or 45) degrees, drifts down to bed, but has some effort against gravity  5b. Motor Arm, Right: 2-->Some effort against gravity, limb cannot get to or maintain (if cued) 90 (or 45) degrees, drifts down to bed, but has some effort against gravity  6a. Motor Leg, Left: 2-->Some effort against gravity, leg falls to bed by 5 secs, but has some effort against gravity  6b. Motor Leg, Right: 2-->Some effort against gravity, leg falls to bed by 5 secs, but has some effort against gravity  7. Limb Ataxia: 0-->Absent  8. Sensory: 0-->Normal, no sensory loss  9. Best Language: 3-->Mute, global aphasia, no usable speech or auditory comprehension  10. Dysarthria: 2-->Severe dysarthria, patients speech is so slurred as to be unintelligible in the absence of or out of proportion to any dysphasia, or is mute/anarthric  11. Extinction and Inattention (formerly Neglect): 0-->No abnormality  Total (NIH Stroke Scale): 23        Modified Pendleton Score: 5  [place] Coma Scale:    ABCD2 Score:    [id number] Score:   HAS -BLED Score:   ICH Score:   Hunt & Burkett Classification:       Assessment/Plan:     Diagnostic Results:      Brain Imaging:      MRI 5/22  Evolving scattered areas of acute/recent infarction left MCA territory with new to increased component in the left inferior frontal gyrus now mild moderate in size.  There is no evidence for hemorrhagic conversion or significant mass effect.    Superimposed confluent T2 FLAIR signal abnormality elsewhere supra and infratentorial parenchyma while nonspecific concerning for advanced chronic microvascular ischemic change versus posttherapy change.    Remote basal gangliar, thalamic and cerebellar infarcts  unchanged.    Compensatory enlargement of ventricular system similar to prior without hydrocephalus.       CTA Head and Neck 5/21  1. 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  The right vertebral artery is dominant and patent throughout its course in the neck.  2. There is calcified plaque in the common, internal and external carotid arteries bilaterally.  Stenosis on the right is mild at 50-69% and moderate on the left at approximately 70% without change.  CTA Head    1.  There is occlusion of the distal left M1 and proximal M2 segments of the middle cerebral artery which has occurred since the prior CTA head.  There is subtle increased density within the left middle cerebral artery at the distal M1 and proximal M2 segments on comparison head CT.  This is consistent with thrombus.    2.  The right anterior circulation is normal.    3.  There is developmental variation with fetal origin of the right posterior cerebral artery.     Cardiac Imaging   · Concentric left ventricular remodeling.  · Small left ventricular cavity size.  · Increased (hyperdynamic) left ventricular systolic function. The estimated ejection fraction is 75%  · Grade I (mild) left ventricular diastolic dysfunction consistent with impaired relaxation.  · Moderate left atrial enlargement.  · Mild-to-moderate aortic valve stenosis.  · Aortic valve area is 1.43 cm2; peak velocity is 2.59 m/s; mean gradient is 15.51 mmHg.  · Mild mid-cavity left ventricular obstruction is present.  · Normal left atrial pressure.  · Mild mitral stenosis    Interventions: Thrombectomy    Complications: None    Disposition: snf     Final Active Diagnoses:    Diagnosis Date Noted POA    PRINCIPAL PROBLEM:  Embolic stroke involving left middle cerebral artery [I63.412] 05/21/2019 Yes    Moderate malnutrition [E44.0] 05/30/2019 Yes    Abnormal echocardiogram [R93.1] 05/23/2019  Unknown    Alteration in skin integrity due to moisture [R23.9] 05/22/2019 Yes    Hypercoagulable state [D68.59] 05/22/2019 Yes    Debility [R53.81] 05/22/2019 Yes    Diabetes mellitus [E11.9] 05/21/2019 Yes    S/P percutaneous endoscopic gastrostomy (PEG) tube placement [Z93.1] 06/09/2018 Not Applicable    Leukocytosis [D72.829] 06/07/2018 No    Dementia with behavioral disturbance [F03.91] 06/04/2018 Yes    Paroxysmal atrial fibrillation [I48.0] 06/01/2018 Yes    Dysphagia [R13.10] 05/31/2018 Yes    Cytotoxic cerebral edema [G93.6] 05/31/2018 Yes    Aphasia [R47.01] 05/27/2018 Yes    Essential hypertension [I10]  Yes    Mixed hyperlipidemia [E78.2]  Yes      Problems Resolved During this Admission:    Diagnosis Date Noted Date Resolved POA    Right spastic hemiparesis [G81.11] 05/21/2019 05/21/2019 Yes     * Embolic stroke involving left middle cerebral artery  83 y/o female with cardioembolic L MCA infarct 2/2 a fib (not compliant with anticoagulation at home. Patient was out of window for tPA, but when to IR for L M1 thrombectomy, TICI 3 flow restored. Etiology cardioembolic    Antithrombotics: Apixaban 2.5 mg BID    Statins: Lipitor 40    Aggressive risk factor modification: HTN, HLD, prior stroke, afib     Rehab efforts: The patient has been evaluated by a stroke team provider and the therapy needs have been fully considered based off the presenting complaints and exam findings. The following therapy evaluations are needed: PT evaluate and treat, OT evaluate and treat, SLP evaluate and treat, PM&R evaluate for appropriate placement- Recommending SNF    Diagnostics ordered/pending: None    VTE prophylaxis: Heparin 5000 units SQ every 8 hours , SCDs    BP parameters: SBP <140    Debility  Patient was reported to be back to baseline and ambulating with walker prior to this hospitalization  Currently aphasic and not following commands, but able to TOSCANO spontaneously  PT/OT now recommending SNF or home  with 24 hour support    Family is amenable to patient discharging to SNF.     Diabetes mellitus  A1C 5.9  Continue q6h glucose and SSI  Glucose within goal past 48 hrs       S/P percutaneous endoscopic gastrostomy (PEG) tube placement  PEG placed on 05/27     Leukocytosis  Patient with intermittent elevation of WBC over past few days  UA collected w/ reflex - patient started on Amoxicillin BID X 7 days (end date 6/4)  Chest Xray ordered and no abnormality noted   WBC trending down - repeat CBC in 1 week (6/10)    Paroxysmal atrial fibrillation  Found on last admit 5-2018.  Previously on Eliquis but ran out and not refilled. (Concerns of noncompliance due to price)    Eliquis 2.5 mg started after PEG placement    Patient went into Afib with RVR on 05/27- metoprolol 12.5 mg BID started.     Dysphagia  SLP to evaluate and treat  High chance for aspiration PNA, will monitor for signs of infection or worsening respiratory status    05/29- TF started and currently at goal, tolerating well    Cytotoxic cerebral edema  Area of cytotoxic cerebral edema identified when reviewing brain imaging in the territory of the L middle cerebral artery. There is not mass effect associated with it. We will continue to monitor the patients clinical exam for any worsening of symptoms which may indicate expansion of the stroke or the area of the edema resulting in the clinical change. The pattern is suggestive of cardioembolic etiology        Dementia with behavioral disturbance  Continue home memantine  Delirium precautions    Seroquel 25 mg nightly started on 05/28. Patient doing well on nightly Seroquel. She is free of restraints   Nursing home requesting to not keep patient on seroquel despite having it scheduled while inpatient. Will continue to recommend for agitation but will defer to their admitting MD upon arrival of patient     Mixed hyperlipidemia  Stroke risk factor  LDL 54, at goal  Continue home Lipitor 40    Essential  hypertension  Stroke risk factor  SBP goal <140    Continue clonidine and olmesartan    Aphasia  Due to stroke  SLP to evaluate and treat   PEG in place         Recommendations:     Post-discharge complication risks: Pneumonia, Skin breakdown, Urinary tract infections    Stroke Education given to: patient    Follow-up in Stroke Clinic in 4-6 WEEKS  PCP in one week   CBC to be drawn by snf in on 6/10     Discharge Plan:  Statin: Atorvastatin 40mg  Anticoagulant: apixiban  Aggresive risk factor modification:  Hypertension  Diabetes  High Cholesterol  Atrial Fibrillation    Follow Up:  Follow-up Information     Ese Moya MD.    Specialty:  Family Medicine  Why:  Outpatient Services  Contact information:  [address]  [address]  [place] [address]  [phone]             [facility] VASCULAR NEUROLOGY. Schedule an appointment as soon as possible for a visit in 4 weeks.    Specialty:  Vascular Neurology  Contact information:  [address]  [facility] [id number] [phone]                 Patient Instructions:   No discharge procedures on file.    Medications:  Reconciled Home Medications:      Medication List      START taking these medications    acetaminophen 325 MG tablet  Commonly known as:  TYLENOL  2 tablets (650 mg total) by Per G Tube route every 6 (six) hours as needed (temp greater than 99.5 F).     apixaban 2.5 mg Tab  Commonly known as:  ELIQUIS  1 tablet (2.5 mg total) by Per G Tube route 2 (two) times daily.     insulin aspart U-100 100 unit/mL (3 mL) Inpn pen  Commonly known as:  NovoLOG  Inject 0-5 Units into the skin every 6 (six) hours as needed (Hyperglycemia).     metoprolol tartrate 25 MG tablet  Commonly known as:  LOPRESSOR  0.5 tablets (12.5 mg total) by Per G Tube route 2 (two) times daily.     olmesartan 5 MG Tab  Commonly known as:  BENICAR  2 tablets (10 mg total) by Per G Tube route once daily.     ondansetron 4 mg/2 mL Soln  Inject 4 mg into the vein every 8  (eight) hours as needed.     QUEtiapine 25 MG Tab  Commonly known as:  SEROQUEL  1 tablet (25 mg total) by Per G Tube route every evening.     senna-docusate 8.6-50 mg 8.6-50 mg per tablet  Commonly known as:  PERICOLACE  1 tablet by Per G Tube route 2 (two) times daily.        CHANGE how you take these medications    atorvastatin 40 MG tablet  Commonly known as:  LIPITOR  1 tablet (40 mg total) by Per G Tube route once daily.  What changed:    · how to take this  · additional instructions     buPROPion 75 MG tablet  Commonly known as:  WELLBUTRIN  2 tablets (150 mg total) by Per G Tube route 2 (two) times daily.  What changed:    · how much to take  · how to take this  · additional instructions     cloNIDine 0.1 MG tablet  Commonly known as:  CATAPRES  1 tablet (0.1 mg total) by Per G Tube route 2 (two) times daily.  What changed:    · how to take this  · additional instructions     memantine 10 MG Tab  Commonly known as:  NAMENDA  1 tablet (10 mg total) by Per G Tube route 2 (two) times daily.  What changed:    · how to take this  · additional instructions        STOP taking these medications    HYDROcodone-acetaminophen  mg per tablet  Commonly known as:  NORCO     traZODone 50 MG tablet  Commonly known as:  ERIC Armstrong  Comprehensive Stroke Center  Department of Vascular Neurology   [facility]

## 2019-06-07 NOTE — ASSESSMENT & PLAN NOTE
Continue home memantine  Delirium precautions    Seroquel 25 mg nightly started on 05/28. Patient doing well on nightly Seroquel. She is free of restraints   Nursing home requesting to not keep patient on seroquel despite having it scheduled while inpatient. Will continue to recommend for agitation but will defer to their admitting MD upon arrival of patient

## 2019-06-09 NOTE — PT/OT/SLP DISCHARGE
Occupational Therapy Discharge Summary    Sury Cobos  MRN: [id number]   Principal Problem: Embolic stroke involving left middle cerebral artery      Patient Discharged from acute Occupational Therapy on 6/6/19.  Please refer to prior OT note dated 6/6/19 for functional status.    Assessment:      Goals partially met.    Objective:     GOALS:   Multidisciplinary Problems     Occupational Therapy Goals        Problem: Occupational Therapy Goal    Goal Priority Disciplines Outcome Interventions   Occupational Therapy Goal     OT, PT/OT Ongoing (interventions implemented as appropriate)    Description:  Goals to be met by: 6/6/2019     Patient will increase functional independence with ADLs by performing:    Pt will engage in functional task x2 min duration with 0 added cues/facilitation.  Sitting at edge of bed x10 minutes for functional task with Minimal Assistance for postural control. MET; not met 5/31  Rolling to Bilateral with Minimal Assistance and use of bedrail as needed.   Supine to sit with Moderate Assistance.  Stand pivot transfers with Moderate Assistance.  CG demo understanding for s/s of stroke.   CG demo understanding for B UE positioning and ROM exercises while pt in supine.   CG demo understanding for pressure relief techniques.                         Reasons for Discontinuation of Therapy Services  Transfer to alternate level of care.      Plan:     Patient Discharged to: Skilled Nursing Facility and ([facility])    Elisha Cota OT  6/9/2019

## 2019-06-14 ENCOUNTER — TELEPHONE (OUTPATIENT)
Dept: NEUROLOGY | Facility: CLINIC | Age: 84
End: 2019-06-14

## 2019-09-28 ENCOUNTER — HOSPITAL ENCOUNTER (EMERGENCY)
Facility: HOSPITAL | Age: 84
Discharge: HOME | End: 2019-09-28
Attending: EMERGENCY MEDICINE
Payer: MEDICARE

## 2019-09-28 VITALS
HEIGHT: 63 IN | SYSTOLIC BLOOD PRESSURE: 144 MMHG | WEIGHT: 131.19 LBS | DIASTOLIC BLOOD PRESSURE: 93 MMHG | RESPIRATION RATE: 18 BRPM | HEART RATE: 99 BPM | BODY MASS INDEX: 23.25 KG/M2 | OXYGEN SATURATION: 98 %

## 2019-09-28 DIAGNOSIS — Z93.1 S/P PERCUTANEOUS ENDOSCOPIC GASTROSTOMY (PEG) TUBE PLACEMENT: ICD-10-CM

## 2019-09-28 PROCEDURE — 43762 RPLC GTUBE NO REVJ TRC: CPT

## 2019-09-28 PROCEDURE — 25500020 PHARM REV CODE 255: Performed by: EMERGENCY MEDICINE

## 2019-09-28 PROCEDURE — 99284 EMERGENCY DEPT VISIT MOD MDM: CPT | Mod: 25

## 2019-09-28 PROCEDURE — 49450 REPLACE G/C TUBE PERC: CPT

## 2019-09-28 RX ADMIN — DIATRIZOATE MEGLUMINE AND DIATRIZOATE SODIUM 30 ML: 660; 100 LIQUID ORAL; RECTAL at 02:09

## 2019-09-28 NOTE — ED PROVIDER NOTES
Encounter Date: 9/28/2019       History   No chief complaint on file.      Patient here with reported accidental displacement of her PEG tube at the nursing home patient is a hospice patient is using the tags for medications and feedings a Lay catheter was inserted into the PEG site by nursing and patient was transported for replacement    The history is provided by the EMS personnel and the long-term.     Review of patient's allergies indicates:   Allergen Reactions    Hydrochlorothiazide      Other reaction(s): Unknown    Mirtazapine      Other reaction(s): Unknown    Potassium chloride      Other reaction(s): Unknown     Past Medical History:   Diagnosis Date    Acute ischemic left MCA stroke 5/21/2019    Aphasia 5/27/2018    Arthritis     Benign essential HTN     CHF (congestive heart failure)     Closed intertrochanteric fracture of left hip     Coronary artery disease     Dementia with behavioral disturbance 5/27/2018    Diabetes mellitus     Embolic stroke involving right middle cerebral artery 5/27/2018    Gout     High cholesterol     Low potassium syndrome     Paroxysmal atrial fibrillation 6/1/2018    PEG (percutaneous endoscopic gastrostomy) status 6/4/2018    Placed 6/4/18    Urinary incontinence      Past Surgical History:   Procedure Laterality Date    CARDIAC SURGERY      CORONARY ARTERY BYPASS GRAFT      ESOPHAGOGASTRODUODENOSCOPY N/A 6/5/2018    Procedure: EGD (ESOPHAGOGASTRODUODENOSCOPY);  Surgeon: Madhu Bliss MD;  Location: [facility] OR [address] [place] [address];  Service: General;  Laterality: N/A;    ESOPHAGOGASTRODUODENOSCOPY W/ PEG N/A 6/5/2018    Procedure: EGD, WITH PEG TUBE INSERTION;  Surgeon: Madhu Bliss MD;  Location: [facility] OR [address] [place] [address];  Service: General;  Laterality: N/A;    reduction & internal fixation of displaced closed comminuted intertrochanteric fx left hip  02/12/2018     No family history on file.  Social History     Tobacco Use    Smoking status:  Current Every Day Smoker     Packs/day: 3.00    Smokeless tobacco: Never Used   Substance Use Topics    Alcohol use: Yes     Comment: socially    Drug use: No     Review of Systems   Unable to perform ROS: Dementia       Physical Exam     Initial Vitals [09/28/19 1346]   BP Pulse Resp Temp SpO2   (!) 155/90 91 18 -- 100 %      MAP       --         Physical Exam    Constitutional: She appears cachectic.   HENT:   Head: Normocephalic and atraumatic.   Mouth/Throat: No oropharyngeal exudate.   Eyes: EOM are normal. Pupils are equal, round, and reactive to light.   Neck: Normal range of motion. Neck supple.   Cardiovascular: Normal rate, regular rhythm, normal heart sounds and intact distal pulses.   Pulmonary/Chest: Breath sounds normal.   Abdominal: Soft. Bowel sounds are normal. She exhibits no distension. There is no tenderness.   Peg site in left upper quadrant without bleeding well matured   Musculoskeletal: Normal range of motion.   Skin: Skin is warm and dry. Capillary refill takes less than 2 seconds.         ED Course   Feeding Tube  Date/Time: 9/28/2019 4:19 PM  Location procedure was performed: [facility] EMERGENCY DEPARTMENT  Performed by: Fausto Perea MD  Authorized by: Fausto Perea MD   Consent: The procedure was performed in an emergent situation. Verbal consent not obtained. Written consent not obtained.  Patient identity confirmed: arm band  Indications: tube dislodged  Preparation: Patient was prepped and draped in the usual sterile fashion.    Sedation:  Patient sedated: no    Local anesthesia used: no    Anesthesia:  Local anesthesia used: no  Tube type: gastrostomy  Patient position: supine  Tube size: 18 Fr  Endoscope used: no  Bulb inflation volume: 20 (ml)  Bulb inflation fluid: sterile water  Placement/position confirmation: x-ray and contrast  Complications: No  Estimated blood loss (mL): 0  Specimens: No  Patient tolerance: Patient tolerated the procedure well with no immediate  complications        Labs Reviewed - No data to display       Imaging Results          X-Ray Abdomen AP 1 View (KUB) (Final result)  Result time 09/28/19 14:29:08    Final result by Forrest Terry MD (09/28/19 14:29:08)                 Impression:      Gastrostomy tube in satisfactory position as above.      Electronically signed by: Forrest Terry MD  Date:    09/28/2019  Time:    14:29             Narrative:    EXAMINATION:  XR ABDOMEN AP 1 VIEW    CLINICAL HISTORY:  Post gastrostomy tube placement, evaluate positioning.    FINDINGS:  Fluoroscopy was not utilized for this exam.  Following injection of 20 mL of dilute water-soluble contrast through the patient's gastrostomy tube, single abdominal radiograph was obtained.  The distal tip of the gastrostomy tube lies within the body of the stomach, with normal contrast passage into the stomach.  There is no extraluminal contrast extravasation.    There is a nonobstructive bowel gas pattern, with scattered air and moderate stool throughout the colon.  No evidence of intraperitoneal free air.                                                      Clinical Impression:       ICD-10-CM ICD-9-CM   1. S/P percutaneous endoscopic gastrostomy (PEG) tube placement Z93.1 V44.1                                Fausto Perea MD  09/28/19 1620

## 2019-09-28 NOTE — ED NOTES
Pt sent by [facility] with a PEG tube displaced. Dr. Perea a the bedside to replace PEG tube.

## 2019-09-28 NOTE — ED NOTES
EMS states patient was sent here by [facility] for PEG tube replacement. Patient's normal is aphasic due to hx cerebral infarct. Bedridden. Daughter, Lauren Reyes, at bedside visiting at bedside.

## 2020-03-09 NOTE — ASSESSMENT & PLAN NOTE
"Physical Therapy   Daily Treatment     Patient Name: Rey Medina  Age:  25 y.o., Sex:  male  Medical Record #: [id number]  Today's Date: 3/9/2020     Precautions  Precautions: (P) Fall Risk, PEG Tube  Comments: (P) dysarthria, shunt, modified diet    Subjective    Pt in bed at start of therapy, refusing to participate and requesting \"5 more minutes.\"   \"I don't want to today.\"     Objective     03/09/20 1001   Precautions   Precautions Fall Risk;PEG Tube   Comments dysarthria, shunt, modified diet   Cognition    Level of Consciousness Agitated   Interdisciplinary Plan of Care Collaboration   IDT Collaboration with  Family / Caregiver   Patient Position at End of Therapy In Bed;Tray Table within Reach;Call Light within Reach;Family / Friend in Room   Collaboration Comments pt's sister present during therapy     Missed Minutes: 15 minutes, Non-Medical: pt agitated and refusing to continue therapy at this time.    Attempted supine treatment with pt in bed. Pt did not participate in supine ankle pumps or straight leg raise on either LE. Pt allowed PT to lift LE in motion of SLR x2, however, when PT requested that pt actively participate, pt refused.   PT also requested that pt allow passive stretching, pt became agitated and pulled LE away and refused to participate.     Assessment    Pt able to verbalize that he does not want to participate in therapy at this time.  Attempted supine there ex and manual stretching, however, pt became agitated, pushed away therapist's hands, and was unwilling to participate further.     Plan    OOB activity as tolerated, trial standing frame, Pre-gait training in // bars, standing tolerance & ambulation in // bars, seated unsupported balance/ core strengthening, BLE PROM/ stretching/ ther-ex, transfer training.  D/c currently scheduled for 3/12/2020    " -home wellbutrin 150 mg BID  -donepezil 10 mg qhs

## 2021-03-23 NOTE — PROGRESS NOTES
[facility]  General Surgery  Progress Note    Subjective:     History of Present Illness:  Ms. Cobos is a 82 y/o female with pmhx DM2, HTN, HLD, dementia who was last seen normal at 1320, alert  today with family. Daughter states she went to run and errand and returned at 1445 to find the patient slumped over and unable to talk or move her left side. She was taken to [facility] and telemedicine consult was done with Dr Marx. No acute changes on CT head. She was given IV TPA and transferre to [facility] for further evaluation. Upon arrival patient still with aphasia, L facial droop, dysarthria. Minimal left sided weakness. CTA head and neck multiphase complete, no LVO, no IR. Pt admitted to [facility] for higher level of care.     She since has been stepped down to the floor. Speech therapy has recommended PEG tube placement for long term enteral access. IR has reviewed her scan and did not see a safe window for them to insert a PEG tube. General surgery has been consulted due to her inability to swallow secondary to post-stroke dysphagia and dysarthria.    She and her family deny any previous abdominal surgeries. She did undergo an open hear surgery in the past. She has been tolerating tube feeds through her NGT. She has been having bowel function, with no nausea or vomiting.    Post-Op Info:  Procedure(s) (LRB):  EGD, WITH PEG TUBE INSERTION (N/A)  EGD (ESOPHAGOGASTRODUODENOSCOPY) (N/A)   1 Day Post-Op     Interval History: Patient had PEG tube placed yesterday, has been to gravity overnight, draining gastric contents this morning    Medications:  Continuous Infusions:  Scheduled Meds:   albuterol-ipratropium  3 mL Nebulization Q6H    aspirin  81 mg Per G Tube Daily    atorvastatin  40 mg Per G Tube Daily    buPROPion  150 mg Per G Tube BID    cloNIDine  0.1 mg Per G Tube BID    donepezil  10 mg Per G Tube QHS    furosemide  20 mg Per G Tube Daily    heparin (porcine)  5,000 Units  Subcutaneous Q8H    losartan  100 mg Per G Tube Daily    nicotine  1 patch Transdermal Daily    nystatin-triamcinolone   Topical (Top) BID    QUEtiapine  25 mg Per G Tube QHS    sodium chloride 0.9%  3 mL Intravenous Q8H    sodium chloride 3%  4 mL Nebulization BID     PRN Meds:acetaminophen, labetalol, sodium chloride 0.9%     Review of patient's allergies indicates:  No Known Allergies  Objective:     Vital Signs (Most Recent):  Temp: 98.9 °F (37.2 °C) (06/06/18 0816)  Pulse: 72 (06/06/18 0816)  Resp: 18 (06/06/18 0816)  BP: (!) 159/69 (06/06/18 0816)  SpO2: 96 % (06/06/18 0816) Vital Signs (24h Range):  Temp:  [98.1 °F (36.7 °C)-99.6 °F (37.6 °C)] 98.9 °F (37.2 °C)  Pulse:  [59-95] 72  Resp:  [14-21] 18  SpO2:  [90 %-99 %] 96 %  BP: (139-168)/(52-74) 159/69     Weight: 56.4 kg (124 lb 5.4 oz)  Body mass index is 25.11 kg/m².    Intake/Output - Last 3 Shifts       06/04 0700 - 06/05 0659 06/05 0700 - 06/06 0659 06/06 0700 - 06/07 0659    I.V. (mL/kg)  400 (7.1)     Total Intake(mL/kg)  400 (7.1)     Net   +400                   Physical Exam   Constitutional: She appears well-nourished. No distress.   Pulmonary/Chest: Effort normal. No respiratory distress.   Abdominal: Soft. She exhibits no distension. There is no tenderness.   G tube in place, venting, draining gastric contents   Neurological: She is alert.   Skin: Skin is warm and dry.   Vitals reviewed.      Significant Labs:  CBC:   Recent Labs  Lab 06/06/18  0550   WBC 10.98   RBC 3.72*   HGB 11.1*   HCT 35.8*      MCV 96   MCH 29.8   MCHC 31.0*     CMP:   Recent Labs  Lab 06/06/18  0550   GLU 78   CALCIUM 9.2   ALBUMIN 3.1*   PROT 7.6      K 4.4   CO2 28      BUN 24*   CREATININE 0.9   ALKPHOS 75   ALT 27   AST 27   BILITOT 0.5       Significant Diagnostics:  I have reviewed all pertinent imaging results/findings within the past 24 hours.    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 She has been tolerating TF per NGT. Underwent PEG tube placement 6/5/18.    OK for tube feed per g tube starting today at 11:30.  Can vent g-tube for nausea, vomiting    Will sign off today, please call with any questions or concerns            Paul Ospina MD  General Surgery  [facility]   Quality 111:Pneumonia Vaccination Status For Older Adults: Pneumococcal Vaccination not Administered or Previously Received, Reason not Otherwise Specified Quality 110: Preventive Care And Screening: Influenza Immunization: Influenza Immunization previously received during influenza season Detail Level: Detailed

## 2022-05-18 NOTE — PLAN OF CARE
Addended by: ANNA STARKEY on: 5/18/2022 09:39 AM     Modules accepted: Orders     Problem: Physical Therapy Goal  Goal: Physical Therapy Goal  Goals to be met by: 2018     Patient will increase functional independence with mobility by performin. Supine to sit with Contact Guard Assistance - MET  2. Sit to supine with Contact Guard Assistance - MET  3. Rolling to Left and Right with Supervision. - MET  4. Sit to stand transfer with Stand-by Assistance  5. Gait  x 20 feet with Contact Guard Assistance using Rolling Walker. - distance met but not level of assist  6. Stand for 10 minutes with Contact Guard Assistance using Rolling Walker       Outcome: Ongoing (interventions implemented as appropriate)  Patient participated well in therapy.  POC and goals remain appropriate.  Please refer to progress note for functional mobility.     Pt is safe to perform transfers and gait for short distance with nursing using RW, 1 person assistance.      Zulema Mei, PT  2018  [phone] (pager)

## 2023-07-21 NOTE — ASSESSMENT & PLAN NOTE
WBC 20  UA and CXR - normal   tmax 100.6  Some concern for purulent drainage from PEG site - cultured  Blood cultures  Started vanc and rocephin at the present   gen surg examined site, no recommended imaging at this time - loosened peg, reported it had felt tight which may be causing some of the discomfort  Will monitor    Your workup here was not concerning for anything dangerous. Therefore there is no need for you to stay at the hospital for further testing. We feel safe to send you home. You were prescribed Golytely for at home clean out. Drink at a rate of 8 ounces every 10 minutes until the entire contents are consumed or the rectal effluent is clear. You can over the counter dulcolax Miralax for management of symptoms. You can use these daily for regular bowel movement. You can use over the counter fleet enemas for when constipation worsens. As we discussed your urinary retention was likely in part due to large stool burden, but we would like you to follow up with urology outpatient. As we discussed you would prefer the lion catheter to be pulled prior to leaving. You should follow up with PCP and GI for your chronic constipation symptoms. Please keep your appointment for your colonoscopy and endoscopy in August.     Please return to the ED if you have not urinated in 8-12 hours. Return to the emergency department if you have any symptoms of  fever, worsening constipation, vomiting, repeat urine retention with pain, bladder or bowel incontinence.

## 2024-03-31 NOTE — TELEPHONE ENCOUNTER
----- Message from Estephanie Hussein RN sent at 6/7/2019  8:06 AM CDT -----  Please schedule a neuro followup appt for 4-6 weeks. Patient was inpatient and seen by Dr Edouard. Please contact patient with date and time of appt.    2

## (undated) DEVICE — SEE MEDLINE ITEM 157117

## (undated) DEVICE — ELECTRODE REM PLYHSV RETURN 9

## (undated) DEVICE — TRAY MINOR GEN SURG

## (undated) DEVICE — SUT MCRYL PLUS 4-0 PS2 27IN

## (undated) DEVICE — ADHESIVE MASTISOL VIAL 48/BX

## (undated) DEVICE — APPLICATOR CHLORAPREP ORN 26ML

## (undated) DEVICE — KIT PEG PULL STANDARD 20F

## (undated) DEVICE — SEE MEDLINE ITEM 157148

## (undated) DEVICE — SEE MEDLINE ITEM 146417

## (undated) DEVICE — GOWN SURGICAL X-LARGE